# Patient Record
Sex: MALE | Race: WHITE | URBAN - METROPOLITAN AREA
[De-identification: names, ages, dates, MRNs, and addresses within clinical notes are randomized per-mention and may not be internally consistent; named-entity substitution may affect disease eponyms.]

---

## 2017-09-13 ENCOUNTER — OUTPATIENT (OUTPATIENT)
Dept: OUTPATIENT SERVICES | Facility: HOSPITAL | Age: 77
LOS: 1 days | Discharge: HOME | End: 2017-09-13

## 2017-09-13 DIAGNOSIS — D64.9 ANEMIA, UNSPECIFIED: ICD-10-CM

## 2017-10-24 ENCOUNTER — OUTPATIENT (OUTPATIENT)
Dept: OUTPATIENT SERVICES | Facility: HOSPITAL | Age: 77
LOS: 1 days | Discharge: HOME | End: 2017-10-24

## 2017-10-24 DIAGNOSIS — D64.9 ANEMIA, UNSPECIFIED: ICD-10-CM

## 2018-01-08 ENCOUNTER — EMERGENCY (EMERGENCY)
Facility: HOSPITAL | Age: 78
LOS: 1 days | Discharge: HOME | End: 2018-01-08
Admitting: INTERNAL MEDICINE

## 2018-01-08 DIAGNOSIS — Z88.0 ALLERGY STATUS TO PENICILLIN: ICD-10-CM

## 2018-01-08 DIAGNOSIS — D64.9 ANEMIA, UNSPECIFIED: ICD-10-CM

## 2018-01-08 DIAGNOSIS — I25.10 ATHEROSCLEROTIC HEART DISEASE OF NATIVE CORONARY ARTERY WITHOUT ANGINA PECTORIS: ICD-10-CM

## 2018-01-08 DIAGNOSIS — Z79.02 LONG TERM (CURRENT) USE OF ANTITHROMBOTICS/ANTIPLATELETS: ICD-10-CM

## 2018-01-08 DIAGNOSIS — Z86.73 PERSONAL HISTORY OF TRANSIENT ISCHEMIC ATTACK (TIA), AND CEREBRAL INFARCTION WITHOUT RESIDUAL DEFICITS: ICD-10-CM

## 2018-01-08 DIAGNOSIS — I25.2 OLD MYOCARDIAL INFARCTION: ICD-10-CM

## 2018-01-08 DIAGNOSIS — E11.9 TYPE 2 DIABETES MELLITUS WITHOUT COMPLICATIONS: ICD-10-CM

## 2018-08-02 ENCOUNTER — EMERGENCY (EMERGENCY)
Facility: HOSPITAL | Age: 78
LOS: 0 days | Discharge: HOME | End: 2018-08-03
Attending: EMERGENCY MEDICINE | Admitting: EMERGENCY MEDICINE

## 2018-08-02 VITALS
RESPIRATION RATE: 18 BRPM | OXYGEN SATURATION: 100 % | TEMPERATURE: 98 F | HEART RATE: 72 BPM | DIASTOLIC BLOOD PRESSURE: 67 MMHG | SYSTOLIC BLOOD PRESSURE: 148 MMHG

## 2018-08-02 DIAGNOSIS — E11.9 TYPE 2 DIABETES MELLITUS WITHOUT COMPLICATIONS: ICD-10-CM

## 2018-08-02 DIAGNOSIS — D64.89 OTHER SPECIFIED ANEMIAS: ICD-10-CM

## 2018-08-02 DIAGNOSIS — Z98.61 CORONARY ANGIOPLASTY STATUS: ICD-10-CM

## 2018-08-02 DIAGNOSIS — I25.10 ATHEROSCLEROTIC HEART DISEASE OF NATIVE CORONARY ARTERY WITHOUT ANGINA PECTORIS: ICD-10-CM

## 2018-08-02 DIAGNOSIS — F17.210 NICOTINE DEPENDENCE, CIGARETTES, UNCOMPLICATED: ICD-10-CM

## 2018-08-02 DIAGNOSIS — Z95.5 PRESENCE OF CORONARY ANGIOPLASTY IMPLANT AND GRAFT: Chronic | ICD-10-CM

## 2018-08-02 DIAGNOSIS — Z88.0 ALLERGY STATUS TO PENICILLIN: ICD-10-CM

## 2018-08-02 LAB
ALBUMIN SERPL ELPH-MCNC: 4.5 G/DL — SIGNIFICANT CHANGE UP (ref 3.5–5.2)
ALLERGY+IMMUNOLOGY DIAG STUDY NOTE: SIGNIFICANT CHANGE UP
ALP SERPL-CCNC: 112 U/L — SIGNIFICANT CHANGE UP (ref 30–115)
ALT FLD-CCNC: 9 U/L — SIGNIFICANT CHANGE UP (ref 0–41)
ANION GAP SERPL CALC-SCNC: 13 MMOL/L — SIGNIFICANT CHANGE UP (ref 7–14)
AST SERPL-CCNC: 15 U/L — SIGNIFICANT CHANGE UP (ref 0–41)
BASOPHILS # BLD AUTO: 0.03 K/UL — SIGNIFICANT CHANGE UP (ref 0–0.2)
BASOPHILS NFR BLD AUTO: 0.7 % — SIGNIFICANT CHANGE UP (ref 0–1)
BILIRUB SERPL-MCNC: <0.2 MG/DL — SIGNIFICANT CHANGE UP (ref 0.2–1.2)
BLD GP AB SCN SERPL QL: ABNORMAL
BUN SERPL-MCNC: 21 MG/DL — HIGH (ref 10–20)
CALCIUM SERPL-MCNC: 9.1 MG/DL — SIGNIFICANT CHANGE UP (ref 8.5–10.1)
CHLORIDE SERPL-SCNC: 102 MMOL/L — SIGNIFICANT CHANGE UP (ref 98–110)
CO2 SERPL-SCNC: 22 MMOL/L — SIGNIFICANT CHANGE UP (ref 17–32)
CREAT SERPL-MCNC: 1.2 MG/DL — SIGNIFICANT CHANGE UP (ref 0.7–1.5)
DIR ANTIGLOB POLYSPECIFIC INTERPRETATION: SIGNIFICANT CHANGE UP
EOSINOPHIL # BLD AUTO: 0.09 K/UL — SIGNIFICANT CHANGE UP (ref 0–0.7)
EOSINOPHIL NFR BLD AUTO: 2.1 % — SIGNIFICANT CHANGE UP (ref 0–8)
GLUCOSE SERPL-MCNC: 96 MG/DL — SIGNIFICANT CHANGE UP (ref 70–99)
HCT VFR BLD CALC: 23.7 % — LOW (ref 42–52)
HGB BLD-MCNC: 6.7 G/DL — CRITICAL LOW (ref 14–18)
IMM GRANULOCYTES NFR BLD AUTO: 0.2 % — SIGNIFICANT CHANGE UP (ref 0.1–0.3)
IRON SATN MFR SERPL: 24 UG/DL — LOW (ref 35–150)
IRON SATN MFR SERPL: 5 % — LOW (ref 15–50)
LYMPHOCYTES # BLD AUTO: 1.25 K/UL — SIGNIFICANT CHANGE UP (ref 1.2–3.4)
LYMPHOCYTES # BLD AUTO: 29 % — SIGNIFICANT CHANGE UP (ref 20.5–51.1)
MAGNESIUM SERPL-MCNC: 2.5 MG/DL — HIGH (ref 1.8–2.4)
MCHC RBC-ENTMCNC: 20.1 PG — LOW (ref 27–31)
MCHC RBC-ENTMCNC: 28.3 G/DL — LOW (ref 32–37)
MCV RBC AUTO: 71 FL — LOW (ref 80–94)
MONOCYTES # BLD AUTO: 0.54 K/UL — SIGNIFICANT CHANGE UP (ref 0.1–0.6)
MONOCYTES NFR BLD AUTO: 12.5 % — HIGH (ref 1.7–9.3)
NEUTROPHILS # BLD AUTO: 2.39 K/UL — SIGNIFICANT CHANGE UP (ref 1.4–6.5)
NEUTROPHILS NFR BLD AUTO: 55.5 % — SIGNIFICANT CHANGE UP (ref 42.2–75.2)
NRBC # BLD: 0 /100 WBCS — SIGNIFICANT CHANGE UP (ref 0–0)
PHOSPHATE SERPL-MCNC: 3.8 MG/DL — SIGNIFICANT CHANGE UP (ref 2.1–4.9)
PLATELET # BLD AUTO: 243 K/UL — SIGNIFICANT CHANGE UP (ref 130–400)
POTASSIUM SERPL-MCNC: 4.8 MMOL/L — SIGNIFICANT CHANGE UP (ref 3.5–5)
POTASSIUM SERPL-SCNC: 4.8 MMOL/L — SIGNIFICANT CHANGE UP (ref 3.5–5)
PROT SERPL-MCNC: 7.4 G/DL — SIGNIFICANT CHANGE UP (ref 6–8)
RBC # BLD: 3.34 M/UL — LOW (ref 4.7–6.1)
RBC # FLD: 18.1 % — HIGH (ref 11.5–14.5)
SODIUM SERPL-SCNC: 137 MMOL/L — SIGNIFICANT CHANGE UP (ref 135–146)
TIBC SERPL-MCNC: 504 UG/DL — HIGH (ref 220–430)
TYPE + AB SCN PNL BLD: SIGNIFICANT CHANGE UP
UIBC SERPL-MCNC: 480 UG/DL — HIGH (ref 110–370)
WBC # BLD: 4.31 K/UL — LOW (ref 4.8–10.8)
WBC # FLD AUTO: 4.31 K/UL — LOW (ref 4.8–10.8)

## 2018-08-02 RX ORDER — ASPIRIN/CALCIUM CARB/MAGNESIUM 324 MG
1 TABLET ORAL
Qty: 30 | Refills: 0 | OUTPATIENT
Start: 2018-08-02 | End: 2018-08-31

## 2018-08-02 RX ORDER — ATORVASTATIN CALCIUM 80 MG/1
1 TABLET, FILM COATED ORAL
Qty: 30 | Refills: 0 | OUTPATIENT
Start: 2018-08-02 | End: 2018-08-31

## 2018-08-02 NOTE — ED PROVIDER NOTE - OBJECTIVE STATEMENT
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 Last colonoscopy and EGD x1 year ago. No plan for intervention on AVM. Pt denies BRBPR, melena, CP, SOB, near syncope, or change in exercise tolerance (pt is chronic tobacco smoker; tolerance is 1 flight of stairs for years). Pt does not have COPD or use home oxygen.

## 2018-08-02 NOTE — ED CDU PROVIDER INITIAL DAY NOTE - PHYSICAL EXAMINATION
Physical Exam    Vital Signs: I have reviewed the initial vital signs.  Constitutional: well-nourished, appears stated age, no acute distress  Eyes: PERRLA, EOM intact, RAPD absent, conjunctiva clear and symmetrical lids.  ENT: neck supple with no adenopathy, moist MM.  Cardiovascular: +S1/S2, no murmurs, regular rate, regular rhythm, well-perfused extremities  Respiratory: unlabored respiratory effort, clear to auscultation bilaterally, speaks in full sentences  Gastrointestinal: soft, non-tender abdomen, no pulsatile mass

## 2018-08-02 NOTE — ED CDU PROVIDER INITIAL DAY NOTE - OBJECTIVE STATEMENT
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 Last colonoscopy and EGD x1 year ago. No plan for intervention on AVM. Pt denies BRBPR, melena, CP, SOB, near syncope, or change in exercise tolerance.

## 2018-08-02 NOTE — ED PROVIDER NOTE - MEDICAL DECISION MAKING DETAILS
79 y/o M PMHx CAD and AVM requiring recurrent transfusion presenting with hemoglobin of 6.8 on outside lab. No signs or SX of symptomatic anemia. Will obtain repeat labs, including type and screen, and transfuse as necessary.

## 2018-08-02 NOTE — ED PROVIDER NOTE - NS ED ROS FT
Constitutional: See HPI. No fever/chills. Found to have hemoglobin of 6.8 yesterday.   Eyes: No visual changes, eye pain or discharge.  ENT: No hearing changes, pain, discharge or infections.  Neck: No neck pain or stiffness.  Cardiac: No chest pain, SOB or edema. No chest pain with exertion.  Respiratory: No cough or respiratory distress. No hemoptysis.   GI: No nausea, vomiting, diarrhea or abdominal pain.  : No dysuria, frequency or burning.   MS: No myalgia, muscle weakness, joint pain or back pain.  EXT: (+) b/l intermittent calf pain when walking, no pain currently.   Neuro: No headache or weakness. No LOC.  Skin: No rash.  Except as documented in the HPI, all other systems are negative.

## 2018-08-02 NOTE — ED PROVIDER NOTE - PROGRESS NOTE DETAILS
YA[place]BERSTEIN: Brown stool on rectal exam.  Pt aware of plan for transfusion and obs. Pt and familiy agree with plan

## 2018-08-02 NOTE — ED PROVIDER NOTE - PHYSICAL EXAMINATION
VITAL SIGNS: I have reviewed nursing notes and confirm.  CONSTITUTIONAL: Well-developed; well-nourished; in no acute distress.  SKIN: Skin exam is warm and dry, no acute rash.  HEAD: Normocephalic; atraumatic.  EYES: PERRL, EOM intact. Notable for questionable conjunctival pallor, no icterus.   ENT: MMM. No nasal discharge; airway clear. TMs clear.  NECK: Supple; non tender. No thyromegaly.   CARD: Tachycardic with 2/6 systolic murmur, regular   RESP: Unlabored breathing, barrel chested, no wheeze   ABD: Normal bowel sounds; soft; non-distended; non-tender; no hepatosplenomegaly.  EXT: Normal ROM. No clubbing, cyanosis or edema. No calf tenderness or swelling. No muscle tenderness to palpation.   LYMPH: No acute cervical adenopathy.  NEURO: Alert, oriented. Grossly unremarkable. No focal deficits.  PSYCH: Cooperative, appropriate.

## 2018-08-02 NOTE — ED ADULT NURSE NOTE - OBJECTIVE STATEMENT
pt comes in with hgb of 6.8 sent in by pmd. pt has history of intestional avm which causes him to lose blood. pt has had transfusions in the past.

## 2018-08-02 NOTE — ED CDU PROVIDER INITIAL DAY NOTE - ATTENDING CONTRIBUTION TO CARE
Seen with PA agree with above, lungs- clear, abdomen- soft no tenderness to any region, neuro- non focal, s1s2 no gallops or murmurs, full workup in progress will continue to re-evaluate

## 2018-08-02 NOTE — ED ADULT NURSE NOTE - NSIMPLEMENTINTERV_GEN_ALL_ED
Implemented All Universal Safety Interventions:  [place] to call system. Call bell, personal items and telephone within reach. Instruct patient to call for assistance. Room bathroom lighting operational. Non-slip footwear when patient is off stretcher. Physically safe environment: no spills, clutter or unnecessary equipment. Stretcher in lowest position, wheels locked, appropriate side rails in place.

## 2018-08-03 VITALS
RESPIRATION RATE: 18 BRPM | DIASTOLIC BLOOD PRESSURE: 61 MMHG | HEART RATE: 56 BPM | OXYGEN SATURATION: 98 % | TEMPERATURE: 98 F | SYSTOLIC BLOOD PRESSURE: 134 MMHG

## 2018-08-03 LAB
BASOPHILS # BLD AUTO: 0.02 K/UL — SIGNIFICANT CHANGE UP (ref 0–0.2)
BASOPHILS NFR BLD AUTO: 0.3 % — SIGNIFICANT CHANGE UP (ref 0–1)
EOSINOPHIL # BLD AUTO: 0.16 K/UL — SIGNIFICANT CHANGE UP (ref 0–0.7)
EOSINOPHIL NFR BLD AUTO: 2.8 % — SIGNIFICANT CHANGE UP (ref 0–8)
FERRITIN SERPL-MCNC: 8 NG/ML — LOW (ref 30–400)
HCT VFR BLD CALC: 27.2 % — LOW (ref 42–52)
HGB BLD-MCNC: 7.9 G/DL — LOW (ref 14–18)
IMM GRANULOCYTES NFR BLD AUTO: 0.3 % — SIGNIFICANT CHANGE UP (ref 0.1–0.3)
LYMPHOCYTES # BLD AUTO: 1.23 K/UL — SIGNIFICANT CHANGE UP (ref 1.2–3.4)
LYMPHOCYTES # BLD AUTO: 21.4 % — SIGNIFICANT CHANGE UP (ref 20.5–51.1)
MCHC RBC-ENTMCNC: 21.2 PG — LOW (ref 27–31)
MCHC RBC-ENTMCNC: 29 G/DL — LOW (ref 32–37)
MCV RBC AUTO: 72.9 FL — LOW (ref 80–94)
MONOCYTES # BLD AUTO: 0.52 K/UL — SIGNIFICANT CHANGE UP (ref 0.1–0.6)
MONOCYTES NFR BLD AUTO: 9.1 % — SIGNIFICANT CHANGE UP (ref 1.7–9.3)
NEUTROPHILS # BLD AUTO: 3.79 K/UL — SIGNIFICANT CHANGE UP (ref 1.4–6.5)
NEUTROPHILS NFR BLD AUTO: 66.1 % — SIGNIFICANT CHANGE UP (ref 42.2–75.2)
NRBC # BLD: 0 /100 WBCS — SIGNIFICANT CHANGE UP (ref 0–0)
PLATELET # BLD AUTO: 198 K/UL — SIGNIFICANT CHANGE UP (ref 130–400)
RBC # BLD: 3.73 M/UL — LOW (ref 4.7–6.1)
RBC # FLD: 18.6 % — HIGH (ref 11.5–14.5)
WBC # BLD: 5.74 K/UL — SIGNIFICANT CHANGE UP (ref 4.8–10.8)
WBC # FLD AUTO: 5.74 K/UL — SIGNIFICANT CHANGE UP (ref 4.8–10.8)

## 2018-08-03 NOTE — ED CDU PROVIDER SUBSEQUENT DAY NOTE - NS ED ROS FT
Review of Systems    Constitutional: (-) fever  Cardiovascular: (-) chest pain, (-) syncope  Respiratory: (-) cough, (-) shortness of breath  Gastrointestinal: (-) vomiting, (-) diarrhea  Musculoskeletal: (-) neck pain, (-) back pain  Integumentary: (-) rash, (-) edema  Neurological: (-) headache
EOMI; PERRL; no drainage or redness

## 2018-08-03 NOTE — ED ADULT NURSE REASSESSMENT NOTE - NS ED NURSE REASSESS COMMENT FT1
spoke with blood bank on status of 2 units of prbc. as per blood bank pt may be positive anti-b and blood is still running. blood bank will inform us when blood is ready. will continue to monitor.
0130 Pt with 2nd unit of blood transfusion in progress, no s/s of any reaction, will continue to monitor
2045 Received pt from outgoing nurse, pt in no distress, pt with low H & H, blood transfusion in progress, pt tolerating same well, no adverse reaction
0430 2nd unit of blood transfusion completed, blood specimen drawn & send for follow up result, pt tolerated process well with no complaint, pending discharge,

## 2018-08-03 NOTE — ED CDU PROVIDER SUBSEQUENT DAY NOTE - PROGRESS NOTE DETAILS
Pt doing well. discussed pt with Dr. Kaufman. He agrees that after pt gets 2U PRBC, if hgb improved, ok for d/c and will follow up with him.

## 2018-08-03 NOTE — ED CDU PROVIDER SUBSEQUENT DAY NOTE - PHYSICAL EXAMINATION
PHYSICAL EXAM:    GENERAL: Alert, appears stated age, well appearing, non-toxic  SKIN: Warm, pale and dry. MMM. no ecchymoses.   EYE: Normal lids/conjunctiva, PERRL, EOMI  ENT: Normal hearing, patent oropharynx  NECK: +supple. No meningismus  Pulm: Bilateral BS, normal resp effort, no wheezes, stridor, or retractions  CV: RRR, no M/R/G, 2+ pulses  Abd: soft, non-tender, non-distended  rectal exam as documented by Dr. storm Gould: no erythema, cyanosis, edema. no calf tenderness.   Neuro: AAOx3, no sensory/motor deficits, 5/5 strength throughout. normal gait.

## 2018-08-03 NOTE — ED CDU PROVIDER DISPOSITION NOTE - CLINICAL COURSE
Patient transfused blood and felt improved.  Hb reviewed.  Patient has close follow up.    Full DC instructions discussed and patient knows when to seek immediate medical attention.  Patient has proper follow up.  All results discussed and patient aware they may require further work up.  Proper follow up ensured. Limitations of ED work up discussed.  Medications administered and prescribed/OTC home meds discussed.  All questions and concerns from patient or family addressed. Understanding of instructions verbalized.

## 2018-08-03 NOTE — ED CDU PROVIDER SUBSEQUENT DAY NOTE - HISTORY
79 y/o M with PMH DM not on insulin, CAD with stent on Plavix, known AVM which is being managed non-operatively complicated by GI bleeding requiring intermittent transfusions found by cardiologist Dr. sorensen to have Hgb 6.8. Sent in for transfusion. Pt relates he presented for evaluation today because his family was concerned that he was too pale. Denies CP, palpitations, SOB, back pain, abdominal pain, n/v/d, black or bloody stools, fevers, trauma, fall, cough, recent travel, recent illness, sick contacts, leg pain/swelling, urinary symptoms, rash, syncope, lightheadedness.

## 2018-12-10 ENCOUNTER — TRANSCRIPTION ENCOUNTER (OUTPATIENT)
Age: 78
End: 2018-12-10

## 2020-09-23 ENCOUNTER — INPATIENT (INPATIENT)
Facility: HOSPITAL | Age: 80
LOS: 1 days | Discharge: HOME | End: 2020-09-25
Attending: INTERNAL MEDICINE | Admitting: INTERNAL MEDICINE
Payer: MEDICARE

## 2020-09-23 VITALS
RESPIRATION RATE: 16 BRPM | WEIGHT: 169.98 LBS | SYSTOLIC BLOOD PRESSURE: 125 MMHG | HEART RATE: 69 BPM | TEMPERATURE: 99 F | DIASTOLIC BLOOD PRESSURE: 68 MMHG | OXYGEN SATURATION: 98 %

## 2020-09-23 DIAGNOSIS — Z95.5 PRESENCE OF CORONARY ANGIOPLASTY IMPLANT AND GRAFT: Chronic | ICD-10-CM

## 2020-09-23 LAB
ALBUMIN SERPL ELPH-MCNC: 4.5 G/DL — SIGNIFICANT CHANGE UP (ref 3.5–5.2)
ALP SERPL-CCNC: 90 U/L — SIGNIFICANT CHANGE UP (ref 30–115)
ALT FLD-CCNC: 7 U/L — SIGNIFICANT CHANGE UP (ref 0–41)
ANION GAP SERPL CALC-SCNC: 11 MMOL/L — SIGNIFICANT CHANGE UP (ref 7–14)
APTT BLD: 30 SEC — SIGNIFICANT CHANGE UP (ref 27–39.2)
AST SERPL-CCNC: 13 U/L — SIGNIFICANT CHANGE UP (ref 0–41)
BASOPHILS # BLD AUTO: 0.02 K/UL — SIGNIFICANT CHANGE UP (ref 0–0.2)
BASOPHILS NFR BLD AUTO: 0.3 % — SIGNIFICANT CHANGE UP (ref 0–1)
BILIRUB SERPL-MCNC: <0.2 MG/DL — SIGNIFICANT CHANGE UP (ref 0.2–1.2)
BLD GP AB SCN SERPL QL: SIGNIFICANT CHANGE UP
BUN SERPL-MCNC: 23 MG/DL — HIGH (ref 10–20)
CALCIUM SERPL-MCNC: 9.7 MG/DL — SIGNIFICANT CHANGE UP (ref 8.5–10.1)
CHLORIDE SERPL-SCNC: 103 MMOL/L — SIGNIFICANT CHANGE UP (ref 98–110)
CHOLEST SERPL-MCNC: 104 MG/DL — SIGNIFICANT CHANGE UP (ref 100–200)
CO2 SERPL-SCNC: 25 MMOL/L — SIGNIFICANT CHANGE UP (ref 17–32)
CREAT SERPL-MCNC: 1.2 MG/DL — SIGNIFICANT CHANGE UP (ref 0.7–1.5)
DAT IGG-SP REAG RBC-IMP: ABNORMAL
DIR ANTIGLOB POLYSPECIFIC INTERPRETATION: ABNORMAL
EOSINOPHIL # BLD AUTO: 0.17 K/UL — SIGNIFICANT CHANGE UP (ref 0–0.7)
EOSINOPHIL NFR BLD AUTO: 2.4 % — SIGNIFICANT CHANGE UP (ref 0–8)
GLUCOSE SERPL-MCNC: 126 MG/DL — HIGH (ref 70–99)
HCT VFR BLD CALC: 38.9 % — LOW (ref 42–52)
HDLC SERPL-MCNC: 32 MG/DL — LOW
HGB BLD-MCNC: 12.8 G/DL — LOW (ref 14–18)
IAT COMP-SP REAG SERPL QL: SIGNIFICANT CHANGE UP
IMM GRANULOCYTES NFR BLD AUTO: 0.3 % — SIGNIFICANT CHANGE UP (ref 0.1–0.3)
INR BLD: 1.04 RATIO — SIGNIFICANT CHANGE UP (ref 0.65–1.3)
LIPID PNL WITH DIRECT LDL SERPL: 55 MG/DL — SIGNIFICANT CHANGE UP (ref 4–129)
LYMPHOCYTES # BLD AUTO: 1.97 K/UL — SIGNIFICANT CHANGE UP (ref 1.2–3.4)
LYMPHOCYTES # BLD AUTO: 27.9 % — SIGNIFICANT CHANGE UP (ref 20.5–51.1)
MCHC RBC-ENTMCNC: 32.3 PG — HIGH (ref 27–31)
MCHC RBC-ENTMCNC: 32.9 G/DL — SIGNIFICANT CHANGE UP (ref 32–37)
MCV RBC AUTO: 98.2 FL — HIGH (ref 80–94)
MONOCYTES # BLD AUTO: 0.75 K/UL — HIGH (ref 0.1–0.6)
MONOCYTES NFR BLD AUTO: 10.6 % — HIGH (ref 1.7–9.3)
NEUTROPHILS # BLD AUTO: 4.13 K/UL — SIGNIFICANT CHANGE UP (ref 1.4–6.5)
NEUTROPHILS NFR BLD AUTO: 58.5 % — SIGNIFICANT CHANGE UP (ref 42.2–75.2)
NRBC # BLD: 0 /100 WBCS — SIGNIFICANT CHANGE UP (ref 0–0)
PLATELET # BLD AUTO: 173 K/UL — SIGNIFICANT CHANGE UP (ref 130–400)
POTASSIUM SERPL-MCNC: 4.3 MMOL/L — SIGNIFICANT CHANGE UP (ref 3.5–5)
POTASSIUM SERPL-SCNC: 4.3 MMOL/L — SIGNIFICANT CHANGE UP (ref 3.5–5)
PROT SERPL-MCNC: 7.2 G/DL — SIGNIFICANT CHANGE UP (ref 6–8)
PROTHROM AB SERPL-ACNC: 12 SEC — SIGNIFICANT CHANGE UP (ref 9.95–12.87)
RAPID RVP RESULT: SIGNIFICANT CHANGE UP
RBC # BLD: 3.96 M/UL — LOW (ref 4.7–6.1)
RBC # FLD: 11.9 % — SIGNIFICANT CHANGE UP (ref 11.5–14.5)
SARS-COV-2 RNA SPEC QL NAA+PROBE: SIGNIFICANT CHANGE UP
SODIUM SERPL-SCNC: 139 MMOL/L — SIGNIFICANT CHANGE UP (ref 135–146)
TOTAL CHOLESTEROL/HDL RATIO MEASUREMENT: 3.2 RATIO — LOW (ref 4–5.5)
TRIGL SERPL-MCNC: 216 MG/DL — HIGH (ref 10–149)
TROPONIN T SERPL-MCNC: <0.01 NG/ML — SIGNIFICANT CHANGE UP
WBC # BLD: 7.06 K/UL — SIGNIFICANT CHANGE UP (ref 4.8–10.8)
WBC # FLD AUTO: 7.06 K/UL — SIGNIFICANT CHANGE UP (ref 4.8–10.8)

## 2020-09-23 PROCEDURE — 70496 CT ANGIOGRAPHY HEAD: CPT | Mod: 26

## 2020-09-23 PROCEDURE — 86077 PHYS BLOOD BANK SERV XMATCH: CPT

## 2020-09-23 PROCEDURE — 70450 CT HEAD/BRAIN W/O DYE: CPT | Mod: 26,59

## 2020-09-23 PROCEDURE — 99285 EMERGENCY DEPT VISIT HI MDM: CPT | Mod: GC

## 2020-09-23 PROCEDURE — 93010 ELECTROCARDIOGRAM REPORT: CPT

## 2020-09-23 PROCEDURE — 71045 X-RAY EXAM CHEST 1 VIEW: CPT | Mod: 26

## 2020-09-23 PROCEDURE — 99291 CRITICAL CARE FIRST HOUR: CPT

## 2020-09-23 PROCEDURE — 70498 CT ANGIOGRAPHY NECK: CPT | Mod: 26

## 2020-09-23 NOTE — ED ADULT NURSE REASSESSMENT NOTE - NS ED NURSE REASSESS COMMENT FT1
received pt from previous RN. pt aox4 in no acute distress. denies pain / discomfort. returned from ct at this time; pending read. NIH score documented in flow sheet. denies any new onset of weakness / neurological changes since he came to ed. VSS will continue to monitor /assess

## 2020-09-23 NOTE — ED ADULT TRIAGE NOTE - CHIEF COMPLAINT QUOTE
Pt c/o left arm/face numbness that began today around 545; pt felt similar symptoms about a week ago and then they resolved. As per son, pt's face looks normal - no droop noted. Left slightly weaker hand grasp. Pt c/o left arm/face numbness that began today around 545; pt felt similar symptoms about a week ago (left leg involved at that point) and then they resolved. As per son, pt's face looks normal - no droop noted. Left slightly weaker hand grasp. Pt denies difficulty speaking or getting words out. Stroke code called, Pt brought to crit.

## 2020-09-23 NOTE — ED PROVIDER NOTE - PROGRESS NOTE DETAILS
PODLOG: Stroke code called upon arrival. Pt taken to CT. Discussed with neurologist, requesting CTA's. PODLOG: CT head wnl. [place] bedside. NIHSS still 1. SC: Awaiting CT angio results, spoke with Pancho who requests admission to stroke unit after results.

## 2020-09-23 NOTE — ED ADULT NURSE NOTE - NSIMPLEMENTINTERV_GEN_ALL_ED
Implemented All Fall with Harm Risk Interventions:  [place] to call system. Call bell, personal items and telephone within reach. Instruct patient to call for assistance. Room bathroom lighting operational. Non-slip footwear when patient is off stretcher. Physically safe environment: no spills, clutter or unnecessary equipment. Stretcher in lowest position, wheels locked, appropriate side rails in place. Provide visual cue, wrist band, yellow gown, etc. Monitor gait and stability. Monitor for mental status changes and reorient to person, place, and time. Review medications for side effects contributing to fall risk. Reinforce activity limits and safety measures with patient and family. Provide visual clues: red socks.

## 2020-09-23 NOTE — ED PROVIDER NOTE - ATTENDING CONTRIBUTION TO CARE
I personally evaluated the patient. I reviewed the Resident’s or Physician Assistant’s note (as assigned above), and agree with the findings and plan except as documented in my note.    Pt is a 81 y/o male with hx of HTN, DM, DLD, CAD s/p PCI on plavix, presents for left arm numbness/heaviness and left facial droop that started 1745, mild, constant, improving since onset. No slurred speech, arm/leg weakness currently, chest pain, SOB, fever. Pt had similar sx's last week, resolved spontaneously.    Constitutional: Well developed, well nourished. NAD.  Head: Normocephalic, atraumatic.  Eyes: PERRL. EOMI.  ENT: No nasal discharge. Mucous membranes moist.  Neck: Supple. Painless ROM.  Cardiovascular: Normal S1, S2. Regular rate and rhythm. No murmurs, rubs, or gallops.  Pulmonary: Normal respiratory rate and effort. Lungs clear to auscultation bilaterally. No wheezing, rales, or rhonchi.  Abdominal: Soft. Nondistended. Nontender. No rebound, guarding, rigidity.  Extremities. Pelvis stable. No lower extremity edema, symmetric calves.  Skin: No rashes, cyanosis.  Neuro: AAOx3. CN II-XII grossly intact, except mild left facial weakness, no slurred speech. Strength 5/5 in upper and lower extremities. Sensation intact in all extremities. FNF testing normal. No pronator drift. NIHSS 1.  Psych: Normal mood. Normal affect.

## 2020-09-23 NOTE — ED PROVIDER NOTE - OBJECTIVE STATEMENT
80yoM w/ pmhx of 2 coronary stents and HLD who present with left sided weakness. His last known normal was at 1745 today 9/23/20. Patient had similar symptoms 1w ago which lasted several minutes. No history of CVA or seizures. Denies fever, chills, nausea, vomiting, CP, abd pain, urinary issues, fecal pattern issues, lower extremity weakness, numbness, or paresthesia. 80yoM w/ pmhx of 2 coronary stents and HLD who present with left sided weakness. Endorses left lower facial droop and left side UE weakness. His last known normal was at 1745 today 9/23/20. Patient had similar symptoms 1w ago which lasted several minutes. No history of CVA or seizures. Denies fever, chills, nausea, vomiting, CP, abd pain, urinary issues, fecal pattern issues, lower extremity weakness, numbness, or paresthesia. Does not use recreational drug or drink alcohol. Smoke 2-3ppd for 60 years. 80yoM w/ pmhx of 2 coronary stents, HTN, and HLD who present with left sided weakness. Endorses left lower facial droop and left side UE weakness. His last known normal was at 1745 today 9/23/20. Patient had similar symptoms 1w ago which lasted several minutes. No history of CVA or seizures. Denies fever, chills, nausea, vomiting, CP, abd pain, urinary issues, fecal pattern issues, lower extremity weakness, numbness, or paresthesia. Does not use recreational drug or drink alcohol. Smoke 2-3ppd for 60 years.

## 2020-09-23 NOTE — ED PROVIDER NOTE - PHYSICAL EXAMINATION
CONSTITUTIONAL: Well-developed; well-nourished; in no acute distress.   SKIN: warm, dry  HEAD: Normocephalic.  EYES: PERRL, EOMI.  ENT: Airway clear.  NECK: Supple.  LYMPH: No acute cervical adenopathy.  CARD: No murmurs, rubs or gallops. Regular rate and rhythm.   RESP: No wheezing, rales or rhonchi.  ABD: soft ntnd  EXT: No clubbing, cyanosis.   NEURO: Alert, oriented. no dysmetria, no pronator drift, speech clear, CN II-XII grossly intact except forL facial droop, negative romberg  PSYCH: Cooperative, appropriate.

## 2020-09-23 NOTE — ED PROVIDER NOTE - CLINICAL SUMMARY MEDICAL DECISION MAKING FREE TEXT BOX
Pt presented to Ed with left facial and left arm numbness, weakness, improving. NIHSS 1. Stroke code activated. Pt taken to CT and CTA. No bleed or LVO. Pt assessed by neurology, will admit to stroke unit. Endorsed to MAR.

## 2020-09-23 NOTE — ED ADULT NURSE NOTE - CHIEF COMPLAINT QUOTE
Pt c/o left arm/face numbness that began today around 545; pt felt similar symptoms about a week ago (left leg involved at that point) and then they resolved. As per son, pt's face looks normal - no droop noted. Left slightly weaker hand grasp. Pt denies difficulty speaking or getting words out. Stroke code called, Pt brought to crit.

## 2020-09-23 NOTE — ED ADULT NURSE NOTE - OBJECTIVE STATEMENT
81 y/o male brought in for left arm numbness radiating to left jaw starting at 5:45 pm. Patient states he has previous sx of numbness with left arm, left leg, and left facial numbness that lasted a few minutes. Patient on arrival speech is clear, no drift on extremities, no sensory deficits.

## 2020-09-24 ENCOUNTER — TRANSCRIPTION ENCOUNTER (OUTPATIENT)
Age: 80
End: 2020-09-24

## 2020-09-24 PROBLEM — E11.9 TYPE 2 DIABETES MELLITUS WITHOUT COMPLICATIONS: Chronic | Status: ACTIVE | Noted: 2018-08-02

## 2020-09-24 LAB
ALBUMIN SERPL ELPH-MCNC: 4.1 G/DL — SIGNIFICANT CHANGE UP (ref 3.5–5.2)
ALLERGY+IMMUNOLOGY DIAG STUDY NOTE: SIGNIFICANT CHANGE UP
ALP SERPL-CCNC: 86 U/L — SIGNIFICANT CHANGE UP (ref 30–115)
ALT FLD-CCNC: 6 U/L — SIGNIFICANT CHANGE UP (ref 0–41)
ANION GAP SERPL CALC-SCNC: 9 MMOL/L — SIGNIFICANT CHANGE UP (ref 7–14)
AST SERPL-CCNC: 11 U/L — SIGNIFICANT CHANGE UP (ref 0–41)
BILIRUB SERPL-MCNC: 0.3 MG/DL — SIGNIFICANT CHANGE UP (ref 0.2–1.2)
BUN SERPL-MCNC: 20 MG/DL — SIGNIFICANT CHANGE UP (ref 10–20)
CALCIUM SERPL-MCNC: 9 MG/DL — SIGNIFICANT CHANGE UP (ref 8.5–10.1)
CHLORIDE SERPL-SCNC: 107 MMOL/L — SIGNIFICANT CHANGE UP (ref 98–110)
CO2 SERPL-SCNC: 25 MMOL/L — SIGNIFICANT CHANGE UP (ref 17–32)
CREAT SERPL-MCNC: 1.1 MG/DL — SIGNIFICANT CHANGE UP (ref 0.7–1.5)
GLUCOSE BLDC GLUCOMTR-MCNC: 116 MG/DL — HIGH (ref 70–99)
GLUCOSE BLDC GLUCOMTR-MCNC: 124 MG/DL — HIGH (ref 70–99)
GLUCOSE BLDC GLUCOMTR-MCNC: 148 MG/DL — HIGH (ref 70–99)
GLUCOSE BLDC GLUCOMTR-MCNC: 225 MG/DL — HIGH (ref 70–99)
GLUCOSE SERPL-MCNC: 103 MG/DL — HIGH (ref 70–99)
HCT VFR BLD CALC: 36.6 % — LOW (ref 42–52)
HGB BLD-MCNC: 11.9 G/DL — LOW (ref 14–18)
MAGNESIUM SERPL-MCNC: 2.3 MG/DL — SIGNIFICANT CHANGE UP (ref 1.8–2.4)
MCHC RBC-ENTMCNC: 32.1 PG — HIGH (ref 27–31)
MCHC RBC-ENTMCNC: 32.5 G/DL — SIGNIFICANT CHANGE UP (ref 32–37)
MCV RBC AUTO: 98.7 FL — HIGH (ref 80–94)
NRBC # BLD: 0 /100 WBCS — SIGNIFICANT CHANGE UP (ref 0–0)
PLATELET # BLD AUTO: 161 K/UL — SIGNIFICANT CHANGE UP (ref 130–400)
POTASSIUM SERPL-MCNC: 4.2 MMOL/L — SIGNIFICANT CHANGE UP (ref 3.5–5)
POTASSIUM SERPL-SCNC: 4.2 MMOL/L — SIGNIFICANT CHANGE UP (ref 3.5–5)
PROT SERPL-MCNC: 6.1 G/DL — SIGNIFICANT CHANGE UP (ref 6–8)
RBC # BLD: 3.71 M/UL — LOW (ref 4.7–6.1)
RBC # FLD: 11.9 % — SIGNIFICANT CHANGE UP (ref 11.5–14.5)
SODIUM SERPL-SCNC: 141 MMOL/L — SIGNIFICANT CHANGE UP (ref 135–146)
WBC # BLD: 5.01 K/UL — SIGNIFICANT CHANGE UP (ref 4.8–10.8)
WBC # FLD AUTO: 5.01 K/UL — SIGNIFICANT CHANGE UP (ref 4.8–10.8)

## 2020-09-24 PROCEDURE — 99223 1ST HOSP IP/OBS HIGH 75: CPT | Mod: AI

## 2020-09-24 PROCEDURE — 70551 MRI BRAIN STEM W/O DYE: CPT | Mod: 26

## 2020-09-24 RX ORDER — SODIUM CHLORIDE 9 MG/ML
1000 INJECTION INTRAMUSCULAR; INTRAVENOUS; SUBCUTANEOUS
Refills: 0 | Status: DISCONTINUED | OUTPATIENT
Start: 2020-09-24 | End: 2020-09-25

## 2020-09-24 RX ORDER — INSULIN LISPRO 100/ML
VIAL (ML) SUBCUTANEOUS AT BEDTIME
Refills: 0 | Status: DISCONTINUED | OUTPATIENT
Start: 2020-09-24 | End: 2020-09-25

## 2020-09-24 RX ORDER — ASPIRIN/CALCIUM CARB/MAGNESIUM 324 MG
81 TABLET ORAL DAILY
Refills: 0 | Status: DISCONTINUED | OUTPATIENT
Start: 2020-09-24 | End: 2020-09-25

## 2020-09-24 RX ORDER — ENOXAPARIN SODIUM 100 MG/ML
40 INJECTION SUBCUTANEOUS DAILY
Refills: 0 | Status: DISCONTINUED | OUTPATIENT
Start: 2020-09-24 | End: 2020-09-25

## 2020-09-24 RX ORDER — METOPROLOL TARTRATE 50 MG
25 TABLET ORAL DAILY
Refills: 0 | Status: DISCONTINUED | OUTPATIENT
Start: 2020-09-24 | End: 2020-09-24

## 2020-09-24 RX ORDER — METFORMIN HYDROCHLORIDE 850 MG/1
1 TABLET ORAL
Qty: 0 | Refills: 0 | DISCHARGE

## 2020-09-24 RX ORDER — ATORVASTATIN CALCIUM 80 MG/1
80 TABLET, FILM COATED ORAL AT BEDTIME
Refills: 0 | Status: DISCONTINUED | OUTPATIENT
Start: 2020-09-24 | End: 2020-09-25

## 2020-09-24 RX ORDER — OMEPRAZOLE 10 MG/1
1 CAPSULE, DELAYED RELEASE ORAL
Qty: 0 | Refills: 0 | DISCHARGE

## 2020-09-24 RX ORDER — LISINOPRIL 2.5 MG/1
5 TABLET ORAL DAILY
Refills: 0 | Status: DISCONTINUED | OUTPATIENT
Start: 2020-09-24 | End: 2020-09-24

## 2020-09-24 RX ORDER — HYDROCHLOROTHIAZIDE 25 MG
12.5 TABLET ORAL
Refills: 0 | Status: DISCONTINUED | OUTPATIENT
Start: 2020-09-24 | End: 2020-09-24

## 2020-09-24 RX ORDER — HYDROCHLOROTHIAZIDE 25 MG
6.25 TABLET ORAL DAILY
Refills: 0 | Status: DISCONTINUED | OUTPATIENT
Start: 2020-09-24 | End: 2020-09-24

## 2020-09-24 RX ORDER — PANTOPRAZOLE SODIUM 20 MG/1
40 TABLET, DELAYED RELEASE ORAL
Refills: 0 | Status: DISCONTINUED | OUTPATIENT
Start: 2020-09-24 | End: 2020-09-25

## 2020-09-24 RX ORDER — INSULIN LISPRO 100/ML
VIAL (ML) SUBCUTANEOUS
Refills: 0 | Status: DISCONTINUED | OUTPATIENT
Start: 2020-09-24 | End: 2020-09-25

## 2020-09-24 RX ADMIN — LISINOPRIL 5 MILLIGRAM(S): 2.5 TABLET ORAL at 05:25

## 2020-09-24 RX ADMIN — PANTOPRAZOLE SODIUM 40 MILLIGRAM(S): 20 TABLET, DELAYED RELEASE ORAL at 05:25

## 2020-09-24 RX ADMIN — ATORVASTATIN CALCIUM 80 MILLIGRAM(S): 80 TABLET, FILM COATED ORAL at 21:25

## 2020-09-24 RX ADMIN — ENOXAPARIN SODIUM 40 MILLIGRAM(S): 100 INJECTION SUBCUTANEOUS at 11:12

## 2020-09-24 RX ADMIN — SODIUM CHLORIDE 50 MILLILITER(S): 9 INJECTION INTRAMUSCULAR; INTRAVENOUS; SUBCUTANEOUS at 11:12

## 2020-09-24 RX ADMIN — Medication 81 MILLIGRAM(S): at 17:31

## 2020-09-24 NOTE — OCCUPATIONAL THERAPY INITIAL EVALUATION ADULT - LUE MMT, REHAB EVAL
Pt states she had a lung biopsy. She went and had sutures removed on 09/08/2020 and now is having yellowish drainage, she threw up and feels awful now. I  Advised her to call office who removed. She stated was told if any problems to call the office. She also stated twice was told is released and should F/U with PCP. She was offered appt w/Dr Minoo Lomeli today at 4:00 but asked if Alfa Kent had an appt. Maria Esther's schedule is full. Tony Triston does have home care coming out today. She said will call to see if they can come early. no strength deficits were identified

## 2020-09-24 NOTE — DISCHARGE NOTE PROVIDER - NSDCCPCAREPLAN_GEN_ALL_CORE_FT
PRINCIPAL DISCHARGE DIAGNOSIS  Diagnosis: CVA (cerebral vascular accident)  Assessment and Plan of Treatment:       SECONDARY DISCHARGE DIAGNOSES  Diagnosis: Mass of right parotid gland  Assessment and Plan of Treatment: mass was incidentally found on Imaging. Please follow up with ENT as out patient.     PRINCIPAL DISCHARGE DIAGNOSIS  Diagnosis: TIA (transient ischemic attack)  Assessment and Plan of Treatment: please make sure you are taking your medicines. Neurology recommends to take 2 baby aspirins daily to prevent future strokes. Please stop smoking. Please follow up with neurologist in 2wks.      SECONDARY DISCHARGE DIAGNOSES  Diagnosis: Hypertension  Assessment and Plan of Treatment: please monitor your blood pressure at home and bring results to PMD (an appointment with a Geriatrician scheduled for you)    Diagnosis: Skull lesion  Assessment and Plan of Treatment: radiologist recommends a bone scan to be done at earliest convenience due to a questionable lesion on MRI.    Diagnosis: Mass of right parotid gland  Assessment and Plan of Treatment: mass was incidentally found on Cat Scan. Please follow up with ENT as out patient.     PRINCIPAL DISCHARGE DIAGNOSIS  Diagnosis: TIA (transient ischemic attack)  Assessment and Plan of Treatment: please make sure you are taking your medicines. Neurology recommends to take 2 baby aspirins daily to prevent future strokes. Please stop smoking. Please follow up with neurologist in 2wks.      SECONDARY DISCHARGE DIAGNOSES  Diagnosis: Hypertension  Assessment and Plan of Treatment: please monitor your blood pressure at home and bring results to PMD (an appointment with a Geriatrician scheduled for you). You blood pressure was good in the hospital and we recommend to not take Benazepril-HCTZ unless your BP is high at home.    Diagnosis: Skull lesion  Assessment and Plan of Treatment: radiologist recommends a bone scan to be done at earliest convenience due to a questionable lesion on MRI.    Diagnosis: Mass of right parotid gland  Assessment and Plan of Treatment: mass was incidentally found on Cat Scan. Please follow up with ENT as out patient.     PRINCIPAL DISCHARGE DIAGNOSIS  Diagnosis: TIA (transient ischemic attack)  Assessment and Plan of Treatment: please make sure you are taking your medicines. Neurology recommends to take 2 baby aspirins daily to prevent future strokes. Please stop smoking. Please follow up with neurologist in 2wks.      SECONDARY DISCHARGE DIAGNOSES  Diagnosis: Bradycardia  Assessment and Plan of Treatment: we recommend to not take Metoprolol for now as your heart rate was on a slow side. Please follow up with Dr. eMra as outpt    Diagnosis: Hypertension  Assessment and Plan of Treatment: please monitor your blood pressure at home and bring results to PMD (an appointment with a Geriatrician scheduled for you). You blood pressure was good in the hospital and we recommend to not take Benazepril-HCTZ unless your BP is high at home.    Diagnosis: Skull lesion  Assessment and Plan of Treatment: radiologist recommends a bone scan to be done at earliest convenience due to a questionable lesion on MRI.    Diagnosis: Mass of right parotid gland  Assessment and Plan of Treatment: mass was incidentally found on Cat Scan. Please follow up with ENT as out patient.

## 2020-09-24 NOTE — PROGRESS NOTE ADULT - SUBJECTIVE AND OBJECTIVE BOX
HPI:  80 year old male ith a PMH of CAD (s/p 2 stents, follows with Dr. Saldana), DM, HLD presents to the ED due to sudden onset of left arm and facial numbness, with a left sides facial droop. Of note patient is fully functional, right handed male at baseline. The symptoms of numbness lasted less then an hour but the facial droop persisted. Currently symptoms have resolved. Due to the numbness he presented to the ED. Endorses having these symptoms one week prior which lasted 20-30 mins, but also included lower extremity weakness as well. Denied SOB, dizziness, lightheadedness, vertigo, difficulty ambulating, slurred speech, changes in cognition, CP, headache, or head trauma.   When presenting to ED code stroke was called. No TPA given. Vitals: Temp 98.9F, HR 69, / 69, RR 16 YqC010%. CT head: No CT evidence for acute intracranial pathology. CT A H + N:  No large vessel occlusion or significant stenosis of the vessels of the head and neck. Patient admitted for CVA vs. TIA.        (24 Sep 2020 00:55)    Currently admitted to medicine with the primary diagnosis of CVA (cerebral vascular accident)     Today is hospital day 1d.     INTERVAL HPI / OVERNIGHT EVENTS:  Patient was examined and seen at bedside. This morning he is resting comfortably in bed and reports no new issues or overnight events. Patient states he feels back to normal this morning.   ROS: Otherwise unremarkable     PAST MEDICAL & SURGICAL HISTORY  MI (myocardial infarction)  DM (diabetes mellitus)  Coronary stent patent      ALLERGIES  penicillin (Unknown)    MEDICATIONS  STANDING MEDICATIONS  atorvastatin 80 milliGRAM(s) Oral at bedtime  enoxaparin Injectable 40 milliGRAM(s) SubCutaneous daily  pantoprazole    Tablet 40 milliGRAM(s) Oral before breakfast  sodium chloride 0.9%. 1000 milliLiter(s) IV Continuous <Continuous>    PRN MEDICATIONS    VITALS:  T(F): 96.5  HR: 57  BP: 111/60  RR: 18  SpO2: 98%    PHYSICAL EXAM  GEN: NAD, Resting comfortably in bed  PULM: Clear to auscultation bilaterally, No wheezes  CVS: Regular rate and rhythm, S1-S2, no murmurs  ABD: Soft, non-tender, non-distended, no guarding  EXT: No edema  NEURO: A&Ox3, no focal deficits  LABS                        11.9   5.01  )-----------( 161      ( 24 Sep 2020 06:57 )             36.6     09-24    141  |  107  |  20  ----------------------------<  103<H>  4.2   |  25  |  1.1    Ca    9.0      24 Sep 2020 06:57  Mg     2.3     09-24    TPro  6.1  /  Alb  4.1  /  TBili  0.3  /  DBili  x   /  AST  11  /  ALT  6   /  AlkPhos  86  09-24  PT/INR - ( 23 Sep 2020 19:10 )   PT: 12.00 sec;   INR: 1.04 ratio    PTT - ( 23 Sep 2020 19:10 )  PTT:30.0 sec  Troponin T, Serum: <0.01 ng/mL (09-23-20 @ 19:10)      CARDIAC MARKERS ( 23 Sep 2020 19:10 )  x     / <0.01 ng/mL / x     / x     / x        RADIOLOGY  < from: Xray Chest 1 View- PORTABLE-Urgent (09.23.20 @ 21:42) >  IMPRESSION:  No radiographic evidence of acute pulmonary disease.  < from: CT Angio Head w/ IV Cont (09.23.20 @ 19:59) >  1.  No evidence of major vascular stenosis or occlusion.  2.  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 Heterogeneous appearance of the palatine and lingual tonsils. Recommend ENT follow-up on an elective/nonemergent basis.  < from: CT Angio Neck w/ IV Cont (09.23.20 @ 19:58) >  IMPRESSION:  1.  No evidence of major vascular stenosis or occlusion.  2.  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 Heterogeneous appearance of the palatine and lingual tonsils. Recommend ENT follow-up on an elective/nonemergent basis.    CT Brain Stroke Protocol (09.23.20 @ 19:11  IMPRESSION:  No CT evidence for acute intracranial pathology.  Streak artifact through the frontal lobe limits evaluation of this area

## 2020-09-24 NOTE — DISCHARGE NOTE PROVIDER - NSDCHHHOMEBOUND_GEN_ALL_CORE
Stroke/TBI (neurological/cognitive impairment)/Requires supervison due to deteriorating mental status...

## 2020-09-24 NOTE — H&P ADULT - ATTENDING COMMENTS
I saw and evaluated the patient. I have reviewed and agree with the findings and plan of care as documented above in the resident’s note (unless indicated differently below). Any necessary changes were made in the body of the text. I saw and evaluated the patient. I have reviewed and agree with the findings and plan of care as documented above in the resident’s note (unless indicated differently below). Any necessary changes were made in the body of the text.    81 yo M pt p/w a second episode of left sided facial numbness and LUE numbness. Initial episode was about one week ago. Both episodes lasted for about 30 minutes. Initially episode associated w/ LLE weakness. 2nd episode associated w/ facial drooping (left sided). At the time of this evaluation, the patient's symptoms had resolved and he had no other concerns or complaints except as aforementioned.     ROS:  Constitutional: no fevers; no chills  Eyes: no conjunctivitis; no itching  ENT: no dysphagia; no odynophagia  CVS: no PND; no orthopnea; no chest pain  Resp: no SOB; no coughing  GI: no nausea; no vomiting; no diarrhea; no abd pain  : no dysuria; no hematuria  MSK: no myalgias; no arthralgias  Skin: no rashes; no ulcers  Neuro: no focal weakness; no headache  All other systems reviewed and are negative    PMHx, home medications, SurHx, FHx and Social history as above in the corresponding sections of the note - reviewed and edited where appropriate    Exam:  Vitals: BP = 165/78; P = 53; T = 96.3; RR = 18; SpO2 > 95 on room air  General: appears stated age; cooperative  Eyes: anicteric sclera; moist conjunctiva; PERRL; EOMI  HENT: NC/AT; clear oropharynx w/ moist mucous membranes; nL hard/soft palate  Neck: supple w/ FROM; trachea midline; no thyromegaly; no carotid bruits  Lungs: cta b/l with no tachypnea, accessory muscle usage, wheezing, rhonci or rales  CVS: RRR; S1 and S2 w/o MRGs  Abd: BS+; soft; non-tender to palpation x 4; no masses or HSM  Ext: no peripheral edema; pulses 2+ b/l  Skin: normal temp, turgor and texture; no rashes, ulcers or nodules  Neuro: CN II-XII intact; str 5/5 throughout; reports numbness of the plantar aspect of his feet b/l; otherwise sensation grossly intact  Psych: appropriate affect; alert and oriented to person, place, time and situation    Labs significant for H&H 12.8/38.9, MCV 98.2, BUN/Cr 23/1.2, gluc 126, trig 216  Images reviewed: CT w/ no large vessel occlusion; CT head unrevealing  EKG: ectopic atrial bradycardia; lateral T-w changes    Assessment:  (1) Suspected CVA vs. TIA  (2) Macrocytic anemia - stable H&H  (3) CKD 3a likely diabetic nephropathy  (4) Dyslipidemia w/ hypertriglyceridemia  (5) CAD - hx of PCI w/ placement of 2 stents   (6) Bradycardia - asymptomatic   (7) DM w/ hyperglycemia     Plan:  (1) Stroke unit monitoring and neuro w/u as ordered  (2) Neurology recommendations appreciated  (3) Medications as dosed  (4) Supportive care  (5) Dispo: anticipate d/c in 48 hrs (pending aforementioned)    Full code

## 2020-09-24 NOTE — DISCHARGE NOTE PROVIDER - CARE PROVIDERS DIRECT ADDRESSES
[email or web address]

## 2020-09-24 NOTE — DISCHARGE NOTE PROVIDER - NSDCQMSTROKERISK_NEU_ALL_CORE
History of a stroke or TIA Carotid stenosis/High blood pressure/Diabetes/High cholesterol/History of a stroke or TIA/Tobacco use

## 2020-09-24 NOTE — PHYSICAL THERAPY INITIAL EVALUATION ADULT - SPECIFY REASON(S)
Attempted to see pt for PT Initial Evaluation, pt is bradycardic - HR in the 40's. Will f/u for PT when appropriate.

## 2020-09-24 NOTE — CHART NOTE - NSCHARTNOTEFT_GEN_A_CORE
Patient verify now PRUTest  Date: 9-24-20  Time: 11:48:15  Cartridge Lot: [id number]  Exp. Date: 24-apr-21   ID [id number]   Result: 207 PRU     Patient verify now Aspirin Test  Date: 9-24-20  Time: 12:08:45  Cartridge Lot: [id number]  Exp. Date: 13-Jul-21   ID [id number]   Result: 637 ARU

## 2020-09-24 NOTE — H&P ADULT - NSHPPHYSICALEXAM_GEN_ALL_CORE
PHYSICAL EXAM:  GENERAL: NAD, speaks in full sentences, no signs of respiratory distress  HEAD: Atraumatic  NECK: Supple  CHEST/LUNG: Clear to auscultation bilaterally  HEART: S1, S2; RRR; No murmurs, rubs, or gallops  ABDOMEN: BS+; Soft, Non-tender, Non-distended  EXTREMITIES:  2+ Peripheral Pulses  PSYCH: AAOx3  NEUROLOGY: non-focal

## 2020-09-24 NOTE — DISCHARGE NOTE PROVIDER - CARE PROVIDER_API CALL
Darnell Brenner  EEG/EPILEPSY  [address], [address]  [place] [address]  Phone: [phone]  Fax: [phone]  Follow Up Time: 1 week    Yong Waters  OTOLARYNGOLOGY  [address] [place] [address]  Phone: [phone]  Fax: [phone]  Follow Up Time: 2 weeks    Samy Saldana  Cardiovascular Disease  [address]  [place] [address]  Phone: [phone]  Fax: [phone]  Follow Up Time: 2 weeks    Meron Chatman (DO)  Geriatric Medicine; Internal Medicine  [address] [place] [address]  Phone: [phone]  Fax: [phone]  Follow Up Time: 1 week   Darnell Brenner  EEG/EPILEPSY  [address]  [place] [address]  Phone: [phone]  Fax: [phone]  Follow Up Time: 1 week    Yong Waters  OTOLARYNGOLOGY  [address] [place] [address]  Phone: [phone]  Fax: [phone]  Follow Up Time: 2 weeks    Samy Saldana  Cardiovascular Disease  [address]  [place] [address]  Phone: [phone]  Fax: [phone]  Follow Up Time: 2 weeks    Meron Chatman (DO)  Geriatric Medicine; Internal Medicine  [address] [place] [address]  Phone: [phone]  Fax: [phone]  Scheduled Appointment: 09/30/2020 09:30 AM

## 2020-09-24 NOTE — PHARMACOTHERAPY INTERVENTION NOTE - COMMENTS
3B resident called at 1156-MD notified that 6.25mg dose is unavailable - will change to12.5 mg which is the lowest dose available

## 2020-09-24 NOTE — H&P ADULT - ASSESSMENT
80 year old male ith a PMH of CAD (s/p 2 stents, follows with Dr. Saldana), DM, HLD presents to the ED due to sudden onset of left arm and facial numbness, with a left sides facial droop.       #CVA vs TIA  -CT head: No CT evidence for acute intracranial pathology.   -CT A H + N:  No large vessel occlusion or significant stenosis of the vessels of the head and neck.   - Neurology on board   - f/u MRI Brain  - f/u ECHO with bubble study   - c/w plavix 75mg QD  - f/u neuro about aspirin 81mg. As per patient his cardiologist recently discontinued.   - Atorvastatin 80mg QD  - PT/ Rehab  - Stroke unit   - Q 4 neuro checks   - S + S screen      80 year old male ith a PMH of CAD (s/p 2 stents, follows with Dr. Saldana), DM, HLD presents to the ED due to sudden onset of left arm and facial numbness, with a left sides facial droop.       #CVA vs TIA  -CT head: No CT evidence for acute intracranial pathology.   -CT A H + N:  No large vessel occlusion or significant stenosis of the vessels of the head and neck.   - Neurology on board   - f/u MRI Brain  - f/u ECHO with bubble study   - c/w plavix 75mg QD  - f/u neuro about aspirin 81mg. As per patient his cardiologist recently discontinued.   - Atorvastatin 80mg QD  - PT/ Rehab  - Stroke unit   - Q 4 neuro checks   - S + S screen     #CAD s/p 2 stends   - follows with Dr. Dr. Saldana  - c/w plavix 75mg QD  - atorvostatin 80mg QD    #HLD  - atorvostatin 80mg QD    #DM  - holding home meds   - Start basal/ bolus if fingersticks > 180     #DVT PPX: lovenox  #GI PPX: protonix   #Activity: ambulate as tolerated  #Diet: low fat   #Dispo: acute

## 2020-09-24 NOTE — H&P ADULT - NSHPLABSRESULTS_GEN_ALL_CORE
VITALS:   T(F): 96.3  HR: 53  BP: 165/78  RR: 20  SpO2: 99%    LABS:                        12.8   7.06  )-----------( 173      ( 23 Sep 2020 19:10 )             38.9     09-23    139  |  103  |  23<H>  ----------------------------<  126<H>  4.3   |  25  |  1.2    Ca    9.7      23 Sep 2020 19:10    TPro  7.2  /  Alb  4.5  /  TBili  <0.2  /  DBili  x   /  AST  13  /  ALT  7   /  AlkPhos  90  09-23    PT/INR - ( 23 Sep 2020 19:10 )   PT: 12.00 sec;   INR: 1.04 ratio         PTT - ( 23 Sep 2020 19:10 )  PTT:30.0 sec      Troponin T, Serum: <0.01 ng/mL (09-23-20 @ 19:10)      CARDIAC MARKERS ( 23 Sep 2020 19:10 )  x     / <0.01 ng/mL / x     / x     / x

## 2020-09-24 NOTE — DISCHARGE NOTE PROVIDER - HOSPITAL COURSE
80 year old male ith a PMH of CAD (s/p 2 stents, follows with Dr. Saldana), DM, HLD presents to the ED due to sudden onset of left arm and facial numbness, with a left sides facial droop. Of note patient is fully functional, right handed male at baseline. The symptoms of numbness lasted less then an hour but the facial droop persisted. Currently symptoms have resolved. Due to the numbness he presented to the ED. Endorses having these symptoms one week prior which lasted 20-30 mins, but also included lower extremity weakness as well. Denied SOB, dizziness, lightheadedness, vertigo, difficulty ambulating, slurred speech, changes in cognition, CP, headache, or head trauma.   When presenting to ED code stroke was called. No TPA given. Vitals: Temp 98.9F, HR 69, / 69, RR 16 LnK075%. CT head: No CT evidence for acute intracranial pathology. CT A H + N:  No large vessel occlusion or significant stenosis of the vessels of the head and neck. Patient admitted for CVA vs. TIA. The workup did not elucidate any acute CVA incident, and the patient returned to baseline. It was found that the patient was non-adherent to plavix at home. Per his outpatient cardiologist, patient may be discharged on Aspirin 162 mg daily; patient has an appointment to followup with cardiology outpatient.    80 year old male ith a PMH of CAD (s/p 2 stents, follows with Dr. Saldana), DM, HLD presents to the ED due to sudden onset of left arm and facial numbness, with a left sides facial droop. Of note patient is fully functional, right handed male at baseline. The symptoms of numbness lasted less then an hour but the facial droop persisted. Currently symptoms have resolved. Due to the numbness he presented to the ED. Endorses having these symptoms one week prior which lasted 20-30 mins, but also included lower extremity weakness as well. Denied SOB, dizziness, lightheadedness, vertigo, difficulty ambulating, slurred speech, changes in cognition, CP, headache, or head trauma.   When presenting to ED code stroke was called. No TPA given. Vitals: Temp 98.9F, HR 69, / 69, RR 16 [id number]%. CT head: No CT evidence for acute intracranial pathology. CT A H + N:  No large vessel occlusion or significant stenosis of the vessels of the head and neck. Patient admitted for CVA vs. TIA. The workup, which included MRI, echo, did not elucidate any acute CVA incident or cardiac etiology of symptoms, and the patient returned to baseline. It was found that the patient was non-adherent to plavix at home. Per his outpatient cardiologist, patient may be discharged on Aspirin 162 mg daily; patient has an appointment to followup with cardiology outpatient.

## 2020-09-24 NOTE — PHYSICAL THERAPY INITIAL EVALUATION ADULT - PERTINENT HX OF CURRENT PROBLEM, REHAB EVAL
pt is a 79 y/o male admitted for PMH of CAD (s/p 2 stents, follows with Dr. Saldana), DM, HLD presents to the ED due to sudden onset of left arm and facial numbness, with a left sides facial droop

## 2020-09-24 NOTE — PROGRESS NOTE ADULT - ASSESSMENT
Assessment: Currently admitted to medicine with the primary diagnosis of CVA (cerebral vascular accident) vs TIA      Plan:  #Suspected CVA vs. TIA       - Symptoms have resolved       - Holding HTN and diuretics for now      - N/C MRI Brain pending      - ECHO with bubble study pending    #Macrocytic anemia - stable H&H    #CKD 3a likely diabetic nephropathy    #Dyslipidemia w/ hypertriglyceridemia      - Atorvastatin 80 mg PO Daily    #CAD - hx of PCI w/ placement of 2 stents      - Unclear if patient was taking plavix 75 mg po daily   Plt and asa function assay as below:    Patient verify now PRUTest  Date: 9-24-20  Time: 11:48:15  Cartridge Lot: [id number]  Exp. Date: 24-apr-21   ID [id number]   Result: 207 PRU     Patient verify now Aspirin Test  Date: 9-24-20  Time: 12:08:45  Cartridge Lot: [id number]  Exp. Date: 13-Jul-21   ID [id number]   Result: 637 ARU.    #Bradycardia - asymptomatic       - Monitor for now    #DM w/ hyperglycemia       - POCT ACHS      - Carb consistent diet (cleared dysphagia screen)    Diet: carb consistent   Dispo: anticipate d/c in 48 hrs (pending aforementioned)

## 2020-09-24 NOTE — SWALLOW BEDSIDE ASSESSMENT ADULT - SLP PERTINENT HISTORY OF CURRENT PROBLEM
81 y/o M presented w/ L arm and facial numbness, w/ L sided facial droop- which has now resolved. Pt also endorses same symptoms 1 wk prior which resolved in 20-30 mins. No TPA administered. CT Head (-). No hx dysphagia. Per RN, pt passed dysphagia screen this AM. MRI pending.

## 2020-09-24 NOTE — H&P ADULT - HISTORY OF PRESENT ILLNESS
80 year old male ith a PMH of CAD (s/p 2 stents, follows with Dr. Saldana), DM, HLD presents to the ED due to sudden onset of left arm and facial numbness, with a left sides facial droop. Of note patient is fully functional, right handed male at baseline. The symptoms of numbness lasted less then an hour but the facial droop persisted. Currently symptoms have resolved. Due to the numbness he presented to the ED. Endorses having these symptoms one week prior which lasted 20-30 mins, but also included lower extremity weakness as well. Denied SOB, dizziness, lightheadedness, vertigo, difficulty ambulating, slurred speech, changes in cognition, CP, headache, or head trauma.   When presenting to ED code stroke was called. No TPA given. Vitals: Temp 98.9F, HR 69, / 69, RR 16 [id number]%. CT head: No CT evidence for acute intracranial pathology. CT A H + N:  No large vessel occlusion or significant stenosis of the vessels of the head and neck. Patient admitted for CVA vs. TIA.

## 2020-09-24 NOTE — OCCUPATIONAL THERAPY INITIAL EVALUATION ADULT - SHORT TERM MEMORY, REHAB EVAL
impaired. Pt reports STM is baseline. Pt able to repeat 3/3 words, recall 2/3 words post 1 min and recall 1/3 words post 5 min. Pt able to recall 3/3 words post 5 min with verbal cues.

## 2020-09-24 NOTE — DISCHARGE NOTE PROVIDER - NSDCMRMEDTOKEN_GEN_ALL_CORE_FT
aspirin 81 mg oral tablet: 1 tab(s) orally  benazepril-hydrochlorothiazide 5 mg-6.25 mg oral tablet: 1 tab(s) orally once a day  Lipitor 80 mg oral tablet: 1 tab(s) orally once a day  metFORMIN 500 mg oral tablet: 1 tab(s) orally 2 times a day  metoprolol succinate 25 mg oral tablet, extended release: 1 tab(s) orally once a day  omeprazole 40 mg oral delayed release capsule: 1 cap(s) orally once a day   aspirin 81 mg oral tablet, chewable: 2 tab(s) orally once a day   atorvastatin 80 mg oral tablet: 1 tab(s) orally once a day (at bedtime)  metFORMIN 500 mg oral tablet: 1 tab(s) orally 2 times a day  metoprolol succinate 25 mg oral tablet, extended release: 1 tab(s) orally once a day  omeprazole 40 mg oral delayed release capsule: 1 cap(s) orally once a day   aspirin 81 mg oral tablet, chewable: 2 tab(s) orally once a day   atorvastatin 80 mg oral tablet: 1 tab(s) orally once a day (at bedtime)  metFORMIN 500 mg oral tablet: 1 tab(s) orally 2 times a day  omeprazole 40 mg oral delayed release capsule: 1 cap(s) orally once a day

## 2020-09-24 NOTE — SWALLOW BEDSIDE ASSESSMENT ADULT - SLP GENERAL OBSERVATIONS
Pt received sitting upright in bed, alert and oriented x3, no c/o pain. +room air. Pt reports speech/language abilities currently at baseline. some confusion noted.

## 2020-09-24 NOTE — DISCHARGE NOTE PROVIDER - PROVIDER TOKENS
PROVIDER:[TOKEN:[[id number]],FOLLOWUP:[1 week]],PROVIDER:[TOKEN:[[id number]],FOLLOWUP:[2 weeks]],PROVIDER:[TOKEN:[[id number]],FOLLOWUP:[2 weeks]],PROVIDER:[TOKEN:[[id number]],FOLLOWUP:[1 week]] PROVIDER:[TOKEN:[[id number]],FOLLOWUP:[1 week]],PROVIDER:[TOKEN:[[id number]],FOLLOWUP:[2 weeks]],PROVIDER:[TOKEN:[[id number]],FOLLOWUP:[2 weeks]],PROVIDER:[TOKEN:[[id number]],SCHEDULEDAPPT:[09/30/2020],SCHEDULEDAPPTTIME:[09:30 AM]]

## 2020-09-24 NOTE — OCCUPATIONAL THERAPY INITIAL EVALUATION ADULT - DIAGNOSIS, OT EVAL
What Type Of Note Output Would You Prefer (Optional)?: Standard Output Is The Patient Presenting As Previously Scheduled?: Yes How Severe Is Your Rash?: moderate Is This A New Presentation, Or A Follow-Up?: Rash Rehab of r/o cva Additional History: He stands for extended periods of time at work (>12 hours).  He states that the Topicort is not getting rid of the rash.

## 2020-09-25 ENCOUNTER — TRANSCRIPTION ENCOUNTER (OUTPATIENT)
Age: 80
End: 2020-09-25

## 2020-09-25 VITALS — WEIGHT: 174.17 LBS

## 2020-09-25 LAB
A1C WITH ESTIMATED AVERAGE GLUCOSE RESULT: 6.5 % — HIGH (ref 4–5.6)
ALBUMIN SERPL ELPH-MCNC: 3.9 G/DL — SIGNIFICANT CHANGE UP (ref 3.5–5.2)
ALP SERPL-CCNC: 84 U/L — SIGNIFICANT CHANGE UP (ref 30–115)
ALT FLD-CCNC: 6 U/L — SIGNIFICANT CHANGE UP (ref 0–41)
ANION GAP SERPL CALC-SCNC: 8 MMOL/L — SIGNIFICANT CHANGE UP (ref 7–14)
AST SERPL-CCNC: 11 U/L — SIGNIFICANT CHANGE UP (ref 0–41)
BASOPHILS # BLD AUTO: 0.01 K/UL — SIGNIFICANT CHANGE UP (ref 0–0.2)
BASOPHILS NFR BLD AUTO: 0.2 % — SIGNIFICANT CHANGE UP (ref 0–1)
BILIRUB SERPL-MCNC: 0.3 MG/DL — SIGNIFICANT CHANGE UP (ref 0.2–1.2)
BUN SERPL-MCNC: 26 MG/DL — HIGH (ref 10–20)
CALCIUM SERPL-MCNC: 8.7 MG/DL — SIGNIFICANT CHANGE UP (ref 8.5–10.1)
CHLORIDE SERPL-SCNC: 108 MMOL/L — SIGNIFICANT CHANGE UP (ref 98–110)
CHOLEST SERPL-MCNC: 90 MG/DL — LOW (ref 100–200)
CO2 SERPL-SCNC: 24 MMOL/L — SIGNIFICANT CHANGE UP (ref 17–32)
CREAT SERPL-MCNC: 1.4 MG/DL — SIGNIFICANT CHANGE UP (ref 0.7–1.5)
EOSINOPHIL # BLD AUTO: 0.2 K/UL — SIGNIFICANT CHANGE UP (ref 0–0.7)
EOSINOPHIL NFR BLD AUTO: 4 % — SIGNIFICANT CHANGE UP (ref 0–8)
ESTIMATED AVERAGE GLUCOSE: 140 MG/DL — HIGH (ref 68–114)
GLUCOSE BLDC GLUCOMTR-MCNC: 123 MG/DL — HIGH (ref 70–99)
GLUCOSE BLDC GLUCOMTR-MCNC: 129 MG/DL — HIGH (ref 70–99)
GLUCOSE SERPL-MCNC: 115 MG/DL — HIGH (ref 70–99)
HCT VFR BLD CALC: 36.1 % — LOW (ref 42–52)
HDLC SERPL-MCNC: 29 MG/DL — LOW
HGB BLD-MCNC: 11.7 G/DL — LOW (ref 14–18)
IMM GRANULOCYTES NFR BLD AUTO: 0.4 % — HIGH (ref 0.1–0.3)
LIPID PNL WITH DIRECT LDL SERPL: 51 MG/DL — SIGNIFICANT CHANGE UP (ref 4–129)
LYMPHOCYTES # BLD AUTO: 1.28 K/UL — SIGNIFICANT CHANGE UP (ref 1.2–3.4)
LYMPHOCYTES # BLD AUTO: 25.3 % — SIGNIFICANT CHANGE UP (ref 20.5–51.1)
MCHC RBC-ENTMCNC: 32.1 PG — HIGH (ref 27–31)
MCHC RBC-ENTMCNC: 32.4 G/DL — SIGNIFICANT CHANGE UP (ref 32–37)
MCV RBC AUTO: 98.9 FL — HIGH (ref 80–94)
MONOCYTES # BLD AUTO: 0.52 K/UL — SIGNIFICANT CHANGE UP (ref 0.1–0.6)
MONOCYTES NFR BLD AUTO: 10.3 % — HIGH (ref 1.7–9.3)
NEUTROPHILS # BLD AUTO: 3.03 K/UL — SIGNIFICANT CHANGE UP (ref 1.4–6.5)
NEUTROPHILS NFR BLD AUTO: 59.8 % — SIGNIFICANT CHANGE UP (ref 42.2–75.2)
NRBC # BLD: 0 /100 WBCS — SIGNIFICANT CHANGE UP (ref 0–0)
PLATELET # BLD AUTO: 147 K/UL — SIGNIFICANT CHANGE UP (ref 130–400)
POTASSIUM SERPL-MCNC: 4.4 MMOL/L — SIGNIFICANT CHANGE UP (ref 3.5–5)
POTASSIUM SERPL-SCNC: 4.4 MMOL/L — SIGNIFICANT CHANGE UP (ref 3.5–5)
PROT SERPL-MCNC: 6 G/DL — SIGNIFICANT CHANGE UP (ref 6–8)
RBC # BLD: 3.65 M/UL — LOW (ref 4.7–6.1)
RBC # FLD: 11.8 % — SIGNIFICANT CHANGE UP (ref 11.5–14.5)
SODIUM SERPL-SCNC: 140 MMOL/L — SIGNIFICANT CHANGE UP (ref 135–146)
TOTAL CHOLESTEROL/HDL RATIO MEASUREMENT: 3.1 RATIO — LOW (ref 4–5.5)
TRIGL SERPL-MCNC: 63 MG/DL — SIGNIFICANT CHANGE UP (ref 10–149)
TSH SERPL-MCNC: 1.95 UIU/ML — SIGNIFICANT CHANGE UP (ref 0.27–4.2)
WBC # BLD: 5.06 K/UL — SIGNIFICANT CHANGE UP (ref 4.8–10.8)
WBC # FLD AUTO: 5.06 K/UL — SIGNIFICANT CHANGE UP (ref 4.8–10.8)

## 2020-09-25 PROCEDURE — 99232 SBSQ HOSP IP/OBS MODERATE 35: CPT

## 2020-09-25 PROCEDURE — 99239 HOSP IP/OBS DSCHRG MGMT >30: CPT

## 2020-09-25 PROCEDURE — 93306 TTE W/DOPPLER COMPLETE: CPT | Mod: 26

## 2020-09-25 RX ORDER — ASPIRIN/CALCIUM CARB/MAGNESIUM 324 MG
1 TABLET ORAL
Qty: 0 | Refills: 0 | DISCHARGE

## 2020-09-25 RX ORDER — ASPIRIN/CALCIUM CARB/MAGNESIUM 324 MG
1 TABLET ORAL
Qty: 0 | Refills: 0 | DISCHARGE
Start: 2020-09-25

## 2020-09-25 RX ORDER — BENAZEPRIL HYDROCHLORIDE AND HYDROCHLOROTHIAZIDE 10; 12.5 MG/1; MG/1
1 TABLET, FILM COATED ORAL
Qty: 0 | Refills: 0 | DISCHARGE

## 2020-09-25 RX ORDER — METOPROLOL TARTRATE 50 MG
1 TABLET ORAL
Qty: 0 | Refills: 0 | DISCHARGE

## 2020-09-25 RX ORDER — ASPIRIN/CALCIUM CARB/MAGNESIUM 324 MG
2 TABLET ORAL
Qty: 60 | Refills: 0
Start: 2020-09-25 | End: 2020-10-24

## 2020-09-25 RX ORDER — ATORVASTATIN CALCIUM 80 MG/1
1 TABLET, FILM COATED ORAL
Qty: 0 | Refills: 0 | DISCHARGE

## 2020-09-25 RX ORDER — ASPIRIN/CALCIUM CARB/MAGNESIUM 324 MG
81 TABLET ORAL DAILY
Refills: 0 | Status: DISCONTINUED | OUTPATIENT
Start: 2020-09-25 | End: 2020-09-25

## 2020-09-25 RX ORDER — ATORVASTATIN CALCIUM 80 MG/1
1 TABLET, FILM COATED ORAL
Qty: 0 | Refills: 0 | DISCHARGE
Start: 2020-09-25

## 2020-09-25 RX ADMIN — Medication 81 MILLIGRAM(S): at 11:26

## 2020-09-25 RX ADMIN — PANTOPRAZOLE SODIUM 40 MILLIGRAM(S): 20 TABLET, DELAYED RELEASE ORAL at 10:20

## 2020-09-25 RX ADMIN — ENOXAPARIN SODIUM 40 MILLIGRAM(S): 100 INJECTION SUBCUTANEOUS at 11:27

## 2020-09-25 NOTE — PROGRESS NOTE ADULT - ASSESSMENT
Impression:  79 yo right handed male with pmhx of cad , MI , DM ,cardiac stents x 2, HLD, fully functional at baseline with baseline mrankin score of 0 presented with acute onset left arm and facial numbness and a left facial droop. On arrival to ed a stroke code was activated , his initial nihss was 4 and was not a tpa candidate due to unclear time of onset of symptoms. CTA H/N was negative for LVO so was not an NI candidate. Non focal neuro exam today. No acute overnight events. MRI brain completed overnight ,pending results.        Suggestion:  -Follow up pending official read of MRI BRAIN   -Follow up pending ECHO   -Continue Plavix 75 mg q 24  -continue Lipitor 80 mg q 24  -Q 4 neuro checks  -Call for acute change in neuro status    Disposition: Continue  Stroke Unit

## 2020-09-25 NOTE — DISCHARGE NOTE NURSING/CASE MANAGEMENT/SOCIAL WORK - NSDCPEPTSTRK_GEN_ALL_CORE
Risk factors for stroke/Stroke warning signs and symptoms/Signs and symptoms of stroke/Need for follow up after discharge/Call 911 for stroke/Prescribed medications/Stroke education booklet/Stroke support groups for patients, families, and friends

## 2020-09-25 NOTE — CONSULT NOTE ADULT - SUBJECTIVE AND OBJECTIVE BOX
HPI:  80 year old male ith a PMH of CAD (s/p 2 stents, follows with Dr. Saldana), DM, HLD presents to the ED due to sudden onset of left arm and facial numbness, with a left sides facial droop. Of note patient is fully functional, right handed male at baseline. The symptoms of numbness lasted less then an hour but the facial droop persisted. Currently symptoms have resolved. Due to the numbness he presented to the ED. Endorses having these symptoms one week prior which lasted 20-30 mins, but also included lower extremity weakness as well. Denied SOB, dizziness, lightheadedness, vertigo, difficulty ambulating, slurred speech, changes in cognition, CP, headache, or head trauma.   When presenting to ED code stroke was called. No TPA given. Vitals: Temp 98.9F, HR 69, / 69, RR 16 TyQ497%. CT head: No CT evidence for acute intracranial pathology. CT A H + N:  No large vessel occlusion or significant stenosis of the vessels of the head and neck. Patient admitted for CVA vs. TIA.        (24 Sep 2020 00:55)      PAST MEDICAL & SURGICAL HISTORY:  MI (myocardial infarction)    DM (diabetes mellitus)    Coronary stent patent        Hospital Course:    TODAY'S SUBJECTIVE & REVIEW OF SYMPTOMS:     Constitutional WNL   Cardio WNL   Resp WNL   GI WNL  Heme WNL  Endo WNL  Skin WNL  MSK WNL  Neuro left arm weakness / slurred speech  Cognitive WNL  Psych WNL      MEDICATIONS  (STANDING):  atorvastatin 80 milliGRAM(s) Oral at bedtime  enoxaparin Injectable 40 milliGRAM(s) SubCutaneous daily  pantoprazole    Tablet 40 milliGRAM(s) Oral before breakfast  sodium chloride 0.9%. 1000 milliLiter(s) (50 mL/Hr) IV Continuous <Continuous>    MEDICATIONS  (PRN):      FAMILY HISTORY:      Allergies    penicillin (Unknown)    Intolerances        SOCIAL HISTORY:    [  ] Etoh  [  ] Smoking  [  ] Substance abuse     Home Environment:  [  ] Home Alone  [x  ] Lives with Family  [  ] Home Health Aid    Dwelling:  [  ] Apartment  [x  ] Private House  [  ] Adult Home  [  ] Skilled Nursing Facility      [  ] Short Term  [  ] Long Term  [ x ] Stairs       Elevator [  ]    FUNCTIONAL STATUS PTA: (Check all that apply)  Ambulation: [  x ]Independent    [  ] Dependent     [  ] Non-Ambulatory  Assistive Device: [  ] SA Cane  [  ]  Q Cane  [  ] Walker  [  ]  Wheelchair  ADL : [x  ] Independent  [  ]  Dependent       Vital Signs Last 24 Hrs  T(C): 36.4 (24 Sep 2020 05:06), Max: 37.2 (23 Sep 2020 18:42)  T(F): 97.5 (24 Sep 2020 05:06), Max: 98.9 (23 Sep 2020 18:42)  HR: 47 (24 Sep 2020 05:06) (47 - 69)  BP: 129/60 (24 Sep 2020 05:06) (125/68 - 165/78)  BP(mean): --  RR: 18 (24 Sep 2020 05:06) (16 - 20)  SpO2: 98% (24 Sep 2020 02:05) (98% - 99%)      PHYSICAL EXAM: Awake & Alert  GENERAL: NAD  HEAD:  Normocephalic  CHEST/LUNG: Clear   HEART: S1S2+  ABDOMEN: Soft, Nontender  EXTREMITIES:  no calf tenderness    NERVOUS SYSTEM:  Cranial Nerves 2-12 intact [  ] Abnormal  [ x ] + mild dysarthria  ROM: WFL all extremities [ x ]  Abnormal [  ]  Motor Strength: WFL all extremities  [x  ]  Abnormal [  ]  Sensation: intact to light touch [ x ] Abnormal [  ]    FUNCTIONAL STATUS:  Bed Mobility: Independent [  ]  Supervision [ x ]  Needs Assistance [  ]  N/A [  ]  Transfers: Independent [  ]  Supervision [  ]  Needs Assistance [x  ]  N/A [  ]   Ambulation: Independent [  ]  Supervision [  ]  Needs Assistance [  ]  N/A [  ]  ADL: Independent [  ] Requires Assistance [  ] N/A [  ]      LABS:                        11.9   5.01  )-----------( 161      ( 24 Sep 2020 06:57 )             36.6     09-24    141  |  107  |  20  ----------------------------<  103<H>  4.2   |  25  |  1.1    Ca    9.0      24 Sep 2020 06:57  Mg     2.3     09-24    TPro  6.1  /  Alb  4.1  /  TBili  0.3  /  DBili  x   /  AST  11  /  ALT  6   /  AlkPhos  86  09-24    PT/INR - ( 23 Sep 2020 19:10 )   PT: 12.00 sec;   INR: 1.04 ratio         PTT - ( 23 Sep 2020 19:10 )  PTT:30.0 sec      RADIOLOGY & ADDITIONAL STUDIES:  
Patient is a 80y old  Male who presents with a chief complaint of     HPI:  80 year old male ith a PMH of CAD (s/p 2 stents, follows with Dr. Saldana), DM, HLD presents to the ED due to sudden onset of left arm and facial numbness, with a left sides facial droop. Of note patient is fully functional, right handed male at baseline. The symptoms of numbness lasted less then an hour but the facial droop persisted. Currently symptoms have resolved. Due to the numbness he presented to the ED. Endorses having these symptoms one week prior which lasted 20-30 mins, but also included lower extremity weakness as well. Denied SOB, dizziness, lightheadedness, vertigo, difficulty ambulating, slurred speech, changes in cognition, CP, headache, or head trauma.   When presenting to ED code stroke was called. No TPA given. Vitals: Temp 98.9F, HR 69, / 69, RR 16 GrW901%. CT head: No CT evidence for acute intracranial pathology. CT A H + N:  No large vessel occlusion or significant stenosis of the vessels of the head and neck. Patient admitted for CVA vs. TIA.        (24 Sep 2020 00:55)      PAST MEDICAL & SURGICAL HISTORY:  MI (myocardial infarction)    DM (diabetes mellitus)    Coronary stent patent        PREVIOUS DIAGNOSTIC TESTING:      ECHO  FINDINGS:    STRESS  FINDINGS:    CATHETERIZATION  FINDINGS:    MEDICATIONS  (STANDING):  aspirin  chewable 81 milliGRAM(s) Oral daily  atorvastatin 80 milliGRAM(s) Oral at bedtime  enoxaparin Injectable 40 milliGRAM(s) SubCutaneous daily  insulin lispro (HumaLOG) corrective regimen sliding scale   SubCutaneous three times a day before meals  insulin lispro (HumaLOG) corrective regimen sliding scale   SubCutaneous at bedtime  pantoprazole    Tablet 40 milliGRAM(s) Oral before breakfast    MEDICATIONS  (PRN):      FAMILY HISTORY:      SOCIAL HISTORY:  CIGARETTES:    ALCOHOL:    REVIEW OF SYSTEMS:  CONSTITUTIONAL: No fever, weight loss, or fatigue  NECK: No pain or stiffness  RESPIRATORY: No cough, wheezing, chills or hemoptysis; No shortness of breath  CARDIOVASCULAR: No chest pain, palpitations, dizziness, or leg swelling  GASTROINTESTINAL: No abdominal or epigastric pain. No nausea, vomiting, or hematemesis; No diarrhea or constipation. No melena or hematochezia.  GENITOURINARY: No dysuria, frequency, hematuria, or incontinence  NEUROLOGICAL: No headaches, memory loss, loss of strength, numbness, or tremors  SKIN: No itching, burning, rashes, or lesions   ENDOCRINE: No heat or cold intolerance; No hair loss  MUSCULOSKELETAL: No joint pain or swelling; No muscle, back, or extremity pain  HEME/LYMPH: No easy bruising, or bleeding gums          Vital Signs Last 24 Hrs  T(C): 35.8 (25 Sep 2020 14:27), Max: 36.6 (24 Sep 2020 20:55)  T(F): 96.4 (25 Sep 2020 14:27), Max: 97.9 (24 Sep 2020 20:55)  HR: 55 (25 Sep 2020 14:27) (52 - 60)  BP: 108/55 (25 Sep 2020 14:27) (108/55 - 166/76)  BP(mean): --  RR: 18 (25 Sep 2020 05:11) (18 - 20)  SpO2: 97% (25 Sep 2020 01:40) (97% - 97%)        PHYSICAL EXAM:  GENERAL: NAD, well-groomed, well-developed  HEAD:  Atraumatic, Normocephalic  NECK: Supple, No JVD, Normal thyroid  NERVOUS SYSTEM:  Alert & Oriented X3, Good concentration  CHEST/LUNG: Clear to percussion bilaterally; No rales, rhonchi, wheezing, or rubs  HEART: Regular rate and rhythm; No murmurs, rubs, or gallops  ABDOMEN: Soft, Nontender, Nondistended; Bowel sounds present  EXTREMITIES:  2+ Peripheral Pulses, No clubbing, cyanosis, or edema  SKIN: No rashes or lesions    INTERPRETATION OF TELEMETRY:    ECG:    I&O's Detail    24 Sep 2020 07:01  -  25 Sep 2020 07:00  --------------------------------------------------------  IN:    sodium chloride 0.9%: 337.5 mL  Total IN: 337.5 mL    OUT:    Voided (mL): 1 mL  Total OUT: 1 mL    Total NET: 336.5 mL          LABS:                        11.7   5.06  )-----------( 147      ( 25 Sep 2020 07:11 )             36.1     09-25    140  |  108  |  26<H>  ----------------------------<  115<H>  4.4   |  24  |  1.4    Ca    8.7      25 Sep 2020 07:11  Mg     2.3     09-24    TPro  6.0  /  Alb  3.9  /  TBili  0.3  /  DBili  x   /  AST  11  /  ALT  6   /  AlkPhos  84  09-25            I&O's Summary    24 Sep 2020 07:01  -  25 Sep 2020 07:00  --------------------------------------------------------  IN: 337.5 mL / OUT: 1 mL / NET: 336.5 mL        RADIOLOGY & ADDITIONAL STUDIES:
    Chief Complaint: Left arm and facial numbness and left facial droop      HPI:  79 yo right handed male with pmhx of cad , MI , DM, cardiac stents x 2, HLD, fully functional at baseline with baseline mrankin score of 0 presenting today with acute onset left arm and facial numbness and a left facial droop. He further states that he had similar symptoms a week ago which resolved spontaneously and he did not seek medical attention. On arrival to ed a stroke code was activated , his initial BP was 125/68 -->163/80 . On my evaluation patients sensory symptoms had completely resolved but his left facial droop remained . Patient or family does not know the time of onset of the facial droop so the exact time of onset of his symptoms is currently unclear. Patient denies dizziness, headache, fall trauma, n/v focal weakness or other complaints.         Relevant PMH:  [] Prior ischemic stroke/TIA  [] Afib  [x]CAD  [x]HTN  [x]DLD  [x]DM []PVD []Obesity [] Sedentary lifestyle []CHF  []MARY  []Cancer Hx         Social History: [x] Smoking (3-4 cigarettes/day, smoking for 30+years) []  Drug Use: []   Alcohol Use:   [] Other:        Possible Location of Stroke:  Unknown at this time, will have a better understanding post stroke workup.        Possible Cause of Stroke:  Unknown at this time, will have a better understanding post stroke workup.      Relevant Cerebral Imaging:  < from: CT Brain Stroke Protocol (09.23.20 @ 19:11) >  FINDINGS:    The ventricles and cortical sulci are appropriate for the patient's stated age.    Gray-white matter differentiation is preserved    Few scattered patchy hypodensities throughout the hemispheric white matter which are nonspecific and without mass effect, compatible with chronic microvascular ischemic changes.    There is no evidence for intracranial hemorrhage, significant space-occupying process, or recent territorial infarction    The calvarium is intact. Visualized paranasal sinuses and mastoids are well-aerated      IMPRESSION:    No CT evidence for acute intracranial pathology.      Dr. Victor M Wallace discussed preliminary findings with PodTej michaels M.D. on 9/23/2020 at 7:16 PM with read back.          ******PRELIMINARY REPORT******    ******PRELIMINARY REPORT******          VICTOR M EISMAN M.D., RESIDENT RADIOLOGIST    < end of copied text >          Relevant Cervicocerebral Imaging:  < from: CT Angio Head w/ IV Cont (09.23.20 @ 19:59) >  FINDINGS:      CTA neck:    The aortic arch, origin of the great vessels and subclavian arteries are patent. Brachiocephalic and left common carotid arise from a common trunk. Aortic atherosclerotic disease. The bilateral common carotid arteries and internal carotid arteries are patent. Few calcifications at the bilateral carotid bifurcations without significant stenosis.    The bilateral vertebral arteries and basilar artery are unremarkable. Left vertebral artery is dominant. Few calcifications at the distal left vertebral artery without significant stenosis.      CTA head:    Bilateral distal internal carotid arteries are patent. Calcifications of the bilateral carotid siphons without significant stenosis. The bilateral middle cerebral arteries and anterior cerebral arteries are unremarkable without significant  stenosis. The right vertebral artery terminates as PICA. Hypoplastic T1 segment of the right posterior cervical artery. The basilar artery, superior cerebellar arteries and left posterior cerebral artery are patent without significant stenosis.    Dural sinuses are well opacified and unremarkable.    Other:  Degenerative changes of the cervical spine.      IMPRESSION:    No large vessel occlusion or significant stenosis of the vessels of the head and neck.      < end of copied text >            Relevant blood tests:  lComplete Blood Count + Automated Diff (09.23.20 @ 19:10)   WBC Count: 7.06 K/uL   RBC Count: 3.96 M/uL   Hemoglobin: 12.8 g/dL   Hematocrit: 38.9 %   Mean Cell Volume: 98.2 fL   Mean Cell Hemoglobin: 32.3 pg   Mean Cell Hemoglobin Conc: 32.9 g/dL   Red Cell Distrib Width: 11.9 %   Platelet Count - Automated: 173 K/uL   Auto Neutrophil #: 4.13 K/uL   Auto Lymphocyte #: 1.97 K/uL   Auto Monocyte #: 0.75 K/uL   Auto Eosinophil #: 0.17 K/uL   Auto Basophil #: 0.02 K/uL   Auto Neutrophil %: 58.5: Differential percentages must be correlated with absolute numbers for   clinical significance. %   Auto Lymphocyte %: 27.9 %   Auto Monocyte %: 10.6 %   Auto Eosinophil %: 2.4 %   Auto Basophil %: 0.3 %   Auto Immature Granulocyte %: 0.3 %   Nucleated RBC: 0 /100 WBCs         Lipid Profile (09.23.20 @ 19:10)   Total Cholesterol/HDL Ratio Measurement: 3.2 Ratio   Cholesterol, Serum: 104 mg/dL   Triglycerides, Serum: 216 mg/dL   HDL Cholesterol, Serum: 32: HDL Levels >/= 60 mg/dL are considered beneficial and a "negative" risk   factor.   Effective 08/15/2018: New reference range and interpretive comment. mg/dL   Direct LDL: 55: LDL Cholesterol (mg/dL) --- Interpretive Comment (for adults 18 and over)     Relevant cardiac rhythm monitoring: Pending       Relevant Cardiac Structure:(TTE/CRISTY +/-):[]No intracardiac thrombus/[] no vegetation/[]no [place]/EF: Pending      Home Medications:  Plavix 75 mg q 24  Metoprolol 25 mg q 24  Enalapril 5mg q 24  Lipitor 80 mg q 24  Metformin 500mg q 24  Omeprazole 40 mg q 24      PT/OT/Speech/Rehab/S&Swr: Pending    Exam:  Patient a/o x 4, speech is fluent, with good repeats. Normal attention and comprehension, able to give history.  CN: Eomi, VFF, PERRLA, Speech and language intact, Left facial droop  Power: 5/5 throughout/ no drift  FTN: No dysmetria  HKS: No limb ataxia  Gait: deferred  No neglect      Vital Signs Last 24 Hrs  T(C): 37.2 (23 Sep 2020 19:02), Max: 37.2 (23 Sep 2020 18:42)  T(F): 98.9 (23 Sep 2020 19:02), Max: 98.9 (23 Sep 2020 18:42)  HR: 69 (23 Sep 2020 19:02) (69 - 69)  BP: 125/68 (23 Sep 2020 19:02) (125/68 - 125/68)  BP(mean): --  RR: 16 (23 Sep 2020 19:02) (16 - 16)  SpO2: 98% (23 Sep 2020 19:02) (98% - 98%)        NIHSS  LOC:       1a:  0   1b(Questions):   0        1c(Instructions):  0           Best Gaze: 0  Visual: 0  Motor:                 RUE: 0    RLE:  0   LUE:  0   LLE: 0    FACE:  1   Limb Ataxia: 0  Sensory:     0   Language:   0    Dysarthria:   0       Extinction and Inattention: 0    NIHSS on admission: 2--> 1                 m-RS:  0 No symptoms at all  1 No significant disability despite symptoms; able to carry out all usual duties and activities without assistance  2 Slight disability; unable to carry out all previous activities, but able to look after own affairs  3 Moderate disability; requiring some help, but able to walk without assistance  4 Moderately severe disability; unable to walk without assistance and unable to attend to own bodily needs without assistance  5 Severe disability; bedridden, incontinent and requiring constant nursing care and attention  6 Dead

## 2020-09-25 NOTE — PROGRESS NOTE ADULT - SUBJECTIVE AND OBJECTIVE BOX
Chief Complaint: Left arm and facial numbness and dysarthria    Examined patient at bedside, he is resting comfortably in his bed. No acute complaints. No focal deficits today. MRI brain completed, awaiting results. No acute events on the cardiac monitor. Vital signs stable          HPI:  79 yo right handed male with pmhx of cad , MI , DM, cardiac stents x 2, HLD, fully functional at baseline with baseline mrankin score of 0 presenting today with acute onset left arm and facial numbness and a left facial droop. He further states that he had similar symptoms a week ago which resolved spontaneously and he did not seek medical attention. On arrival to ed a stroke code was activated , his initial BP was 125/68 -->163/80 . On my evaluation patients sensory symptoms had completely resolved but his left facial droop remained . Patient or family does not know the time of onset of the facial droop so the exact time of onset of his symptoms is currently unclear. Patient denies dizziness, headache, fall trauma, n/v focal weakness or other complaints.         Relevant PMH:  [] Prior ischemic stroke/TIA  [] Afib  [x]CAD  [x]HTN  [x]DLD  [x]DM []PVD []Obesity [] Sedentary lifestyle []CHF  []MARY  []Cancer Hx         Social History: [x] Smoking (3-4 cigarettes/day, smoking for 30+years) []  Drug Use: []   Alcohol Use:   [] Other:          Possible Location of Stroke:  Unknown at this time, will have a better understanding post stroke workup.      Possible Cause of Stroke:  Unknown at this time, will have a better understanding post stroke workup.          Relevant Cerebral Imaging:  < from: CT Brain Stroke Protocol (09.23.20 @ 19:11) >  FINDINGS:    The ventricles and cortical sulci are appropriate for the patient's stated age.    Gray-white matter differentiation is preserved    Few scattered patchy hypodensities throughout the hemispheric white matter which are nonspecific and without mass effect, compatible with chronic microvascular ischemic changes.    There is no evidence for intracranial hemorrhage, significant space-occupying process, or recent territorial infarction    The calvarium is intact. Visualized paranasal sinuses and mastoids are well-aerated      IMPRESSION:    No CT evidence for acute intracranial pathology.      Dr. Victor M Wallace discussed preliminary findings with PodTej michaels M.D. on 9/23/2020 at 7:16 PM with read back.          ******PRELIMINARY REPORT******    ******PRELIMINARY REPORT******          VICTOR M KIMBALL M.D., RESIDENT RADIOLOGIST    < end of copied text >        Relevant Cervicocerebral Imaging:  CT Angio Neck w/ IV Cont:   EXAM:  CT ANGIO BRAIN (W)AW IC        EXAM:  CT ANGIO NECK (W)AW IC            PROCEDURE DATE:  09/23/2020    INTERPRETATION:  CLINICAL HISTORY / REASON FOR EXAM: Stroke code. Left arm numbness and weakness, resolving left facial droop.    TECHNIQUE: CTA of the head and neck was obtained following the administration of intravenous contrast. Coronal, sagittal and multiple 3-D MIP and volume rendered reformats were generated.    COMPARISON: None available.    FINDINGS:    CTA neck:  The visualized aortic arch and the great vessel origins demonstrate calcific plaque without significant stenosis. Brachiocephalic and left common carotid arise from a common trunk, normal variation (bovine configuration).    Common and internal carotid arteries are patent. There is small calcific plaque at the carotid bifurcations without significant stenosis.    The vertebral arteries are patent, dominant on the left and diminutive on the right.    CTA head:  There is calcific plaque of the carotid siphons without significant stenosis. The anterior and middle cerebral arteries are patent.    The right vertebral artery terminates as PICA. The basilar artery and posterior cerebral arteries are patent. Hypoplastic P1 segment of the right PCA, normal variant.    Dural sinuses are well opacified and unremarkable.    Other:  Degenerative changes of the cervical spine.  There is a bilobed heterogeneous right parotid mass measuring 1.2 x 2.8 x 4.2 cm (transverse, AP, CC).  Heterogeneous low attenuation regions are present within bilateral palatine tonsils and the lingual tonsils.    IMPRESSION:    1.  No evidence of major vascular stenosis or occlusion.    2.  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 Heterogeneous appearance of the palatine and lingual tonsils. Recommend ENT follow-up on an elective/nonemergent basis.    *Discussed with Dr. Lerma 8:33 am 9/24/20    VICTOR M KIMBALL M.D., RESIDENT RADIOLOGIST  This document has been electronically signed.  JOSE MANUEL MONDRAGON M.D., ATTENDING RADIOLOGIST  This document has been electronically signed. Sep 24 2020  8:33AM (09-23-20 @ 19:58)            Relevant blood tests:  Direct LDL: 55 mg/dL [4 - 129] (09-23-20 @ 19:10)      Relevant cardiac rhythm monitoring:  g< from: 12 Lead ECG (09.23.20 @ 20:01) >  Ventricular Rate 54 BPM    Atrial Rate 54 BPM    P-R Interval 144 ms    QRS Duration 102 ms    Q-T Interval 434 ms    QTC Calculation(Bazett) 411 ms    P Axis 200 degrees    R Axis 67 degrees    T Axis 151 degrees    Diagnosis Line Unusual P axis, possible ectopic atrial bradycardia  Cannot rule out Inferior infarct , age undetermined  ST & T wave abnormality, consider lateral ischemia  Abnormal ECG    Confirmed by Dipak Farmer ([id number]) on 9/23/2020 10:46:54 PM    < end of copied text >        Relevant Cardiac Structure:(TTE/CRISTY +/-):[]No intracardiac thrombus/[] no vegetation/[]no [place]ia/EF: PENDING      Home Medications:  aspirin 81 mg oral tablet: 1 tab(s) orally (24 Sep 2020 00:57)  benazepril-hydrochlorothiazide 5 mg-6.25 mg oral tablet: 1 tab(s) orally once a day (24 Sep 2020 00:56)  Lipitor 80 mg oral tablet: 1 tab(s) orally once a day (24 Sep 2020 00:56)  metFORMIN 500 mg oral tablet: 1 tab(s) orally 2 times a day (24 Sep 2020 00:57)  metoprolol succinate 25 mg oral tablet, extended release: 1 tab(s) orally once a day (24 Sep 2020 00:56)  omeprazole 40 mg oral delayed release capsule: 1 cap(s) orally once a day (24 Sep 2020 00:57)          MEDICATIONS  (STANDING):  aspirin  chewable 81 milligram Oral daily  atorvastatin 80 milligrams Oral at bedtime  enoxaparin Injectable 40 milligrams Subcutaneous daily  insulin lispro (HumaLOG) corrective regimen sliding scale   Subcutaneous three times a day before meals  insulin lispro (HumaLOG) corrective regimen sliding scale   Subcutaneous at bedtime  pantoprazole    Tablet 40 milligram Oral before breakfast  sodium chloride 0.9%. 1000 milliliter (50 mL/Hr) IV Continuous <Continuous>      PT/OT/Speech/Rehab/S&Swr: Completed    Exam:  -Patient is a/o x 3, speech fluent, with good repeats and normal attention and comprehension  -CN: EOMI, PERRLA, VFF, Face symmetric, speech and language intact, tongue midline  -Power: 5/5 throughout/ no drift  -FTN: No dysmetria  -HKS: No limb ataxia  -Sensory: Intact and symmetric  -Gait: deferred    NIHSS 0  Loc 0       commands 0      questions 0  VF 0  Gaze 0  Face 0  RUE 0   RLE 0  LUE 0    LLE 0  Sensory 0  Speech 0  Language 0  Ataxia 0  Extinction 0    NIHSS Yesterday  1       Nihss today 0        mRS   0 No symptoms at all  1 No significant disability despite symptoms; able to carry out all usual duties and activities without assistance  2 Slight disability; unable to carry out all previous activities, but able to look after own affairs  3 Moderate disability; requiring some help, but able to walk without assistance  4 Moderately severe disability; unable to walk without assistance and unable to attend to own bodily needs without assistance  5 Severe disability; bedridden, incontinent and requiring constant nursing care and attention  6 Dead      Vital Signs Last 24 Hrs  T(C): 36.2 (25 Sep 2020 01:40), Max: 36.6 (24 Sep 2020 20:55)  T(F): 97.1 (25 Sep 2020 01:40), Max: 97.9 (24 Sep 2020 20:55)  HR: 52 (25 Sep 2020 01:40) (47 - 60)  BP: 124/57 (25 Sep 2020 01:40) (111/60 - 166/76)  BP(mean): --  RR: 18 (25 Sep 2020 01:40) (18 - 20)  SpO2: 97% (25 Sep 2020 01:40) (97% - 98%)

## 2020-09-25 NOTE — CONSULT NOTE ADULT - ASSESSMENT
Impression:  79 yo right handed male with pmhx of cad , MI , DM ,cardiac stents x 2, HLD, fully functional at baseline with baseline mrankin score of 0 presenting today with acute onset left arm and facial numbness and a left facial droop. He further states that he had similar symptoms a week ago which resolved spontaneously and he did not seek medical attention. On arrival to ed a stroke code was activated , his initial BP was 125/68 -->163/80 . On my evaluation patients sensory symptoms had completely resolved but his left facial droop remained . Patient or family does not know the time of onset of the facial droop so the exact time of onset of his symptoms is currently unclear. CTH is negative for acute intracranial findings, patient is currently not a tpa candidate due to unclear time of onset of symptoms. CTA H/N is negative for LVO . Case was discussed with neuro attending on call Dr Valente.     Suggestion:  Routine stroke workup including:  N/C MRI BRAIN   ECHO with bubble study  Continue Plavix 75 mg q 24  continue Lipitor 80 mg q 24  pt/rehab eval  Keep npo until clears dysphagia screen  Q 4 neuro checks  Call for acute change in neuro status    Disposition: Admit to Stroke Unit    
History   MI CAD PCI DM CHOL with CVA     echo EF  45    cont meds neuro follow
IMPRESSION: Rehab of r/o cva    PRECAUTIONS: [  ] Cardiac  [  ] Respiratory  [  ] Seizures [  ] Contact Isolation  [  ] Droplet Isolation  [  ] Other    Weight Bearing Status:     RECOMMENDATION:    Out of Bed to Chair     DVT/Decubiti Prophylaxis    REHAB PLAN:     [ x  ] Bedside P/T 3-5 times a week   [ x  ]   Bedside O/T  2-3 times a week             [   ] No Rehab Therapy Indicated                   [ x  ]  Speech Therapy   Conditioning/ROM                                    ADL  Bed Mobility                                               Conditioning/ROM  Transfers                                                     Bed Mobility  Sitting /Standing Balance                         Transfers                                        Gait Training                                               Sitting/Standing Balance  Stair Training [   ]Applicable                    Home equipment Eval                                                                        Splinting  [   ] Only      GOALS:   ADL   [ x  ]   Independent                    Transfers  [ x  ] Independent                          Ambulation  [x   ] Independent     [ x   ] With device                            [   ]  CG                                                         [   ]  CG                                                                  [   ] CG                            [    ] Min A                                                   [   ] Min A                                                              [   ] Min  A          DISCHARGE PLAN:   [   ]  Good candidate for Intensive Rehabilitation/Hospital based                                             Will tolerate 3hrs Intensive Rehab Daily                                       [    ]  Short Term Rehab in Skilled Nursing Facility                                       [ x   ]  Home with Outpatient or  services                                         [    ]  Possible Candidate for Intensive Hospital based Rehab

## 2020-09-25 NOTE — DISCHARGE NOTE NURSING/CASE MANAGEMENT/SOCIAL WORK - PATIENT PORTAL LINK FT
You can access the FollowMyHealth Patient Portal offered by [facility] by registering at the following website: [email or web address]. By joining [employer]’s FollowMyHealth portal, you will also be able to view your health information using other applications (apps) compatible with our system.

## 2020-09-25 NOTE — PROGRESS NOTE ADULT - ATTENDING COMMENTS
Abby Burgos MD  Hospitalist   Spectra: [phone]    Patient is a 80y old  Male who presents with a chief complaint of     INTERVAL HPI/OVERNIGHT EVENTS: no acute events noted, complete resolution of symptoms.     REVIEW OF SYSTEMS: negative  Vital Signs Last 24 Hrs  T(C): 35.3 (24 Sep 2020 19:27), Max: 36.4 (24 Sep 2020 05:06)  T(F): 95.6 (24 Sep 2020 19:27), Max: 97.5 (24 Sep 2020 05:06)  HR: 60 (24 Sep 2020 19:27) (47 - 60)  BP: 166/76 (24 Sep 2020 19:27) (111/60 - 166/76)  BP(mean): --  RR: 20 (24 Sep 2020 19:27) (18 - 20)  SpO2: 98% (24 Sep 2020 10:59) (98% - 99%)    PHYSICAL EXAM:   NAD; Normocephalic;   LUNGS - no wheezing  HEART: S1 S2+   ABDOMEN: Soft, Nontender, non distended  EXTREMITIES: no cyanosis; no edema  NERVOUS SYSTEM:  Awake and alert; no focal neuro deficits appreciated    LABS:                        11.9   5.01  )-----------( 161      ( 24 Sep 2020 06:57 )             36.6     09-24    141  |  107  |  20  ----------------------------<  103<H>  4.2   |  25  |  1.1    Ca    9.0      24 Sep 2020 06:57  Mg     2.3     09-24    TPro  6.1  /  Alb  4.1  /  TBili  0.3  /  DBili  x   /  AST  11  /  ALT  6   /  AlkPhos  86  09-24    PT/INR - ( 23 Sep 2020 19:10 )   PT: 12.00 sec;   INR: 1.04 ratio         PTT - ( 23 Sep 2020 19:10 )  PTT:30.0 sec    CAPILLARY BLOOD GLUCOSE  126 (23 Sep 2020 21:09)      POCT Blood Glucose.: 124 mg/dL (24 Sep 2020 16:43)  POCT Blood Glucose.: 225 mg/dL (24 Sep 2020 11:18)  POCT Blood Glucose.: 116 mg/dL (24 Sep 2020 07:35)      A/P:-  Pt is seen and evaluated by bedside.   1. CVA   2. CKD stage 3 - stable   3. CAD s/p 2 stents   4. HLD  5. DM     - CT head negative for acute pathology   - CTA head/neck - no large vessel occlusion   - Neuro on board   - Follow echo with bubble studies and MRI   - platelet functional assay for aspirin and plavix noted   - continue with aspirin - follow up neuro for further recs   - continue atorvastatin 80 mg daily   - sliding scale - add basal bolus if needed   - target -180  - follow HbA1c, fasting lipid profile and tsh   - lovenox for dvt prophy      Plan of care was discussed with patient ; all questions and concerns were addressed and care was aligned with patient's wishes.
Transient sensory deficit, if due to a vascular neurologic event, likely to be from small vessel thrombotic  Antiplatelets treatment (either ASA or plavix) and risk factor modification is recommended for secondary prophylaxis. ASA seems reasonable as patient was currently not on any antiplatelet   Stable for discharge.

## 2020-09-25 NOTE — PROGRESS NOTE ADULT - SUBJECTIVE AND OBJECTIVE BOX
INTERVAL HPI/OVERNIGHT EVENTS: no complaints, feels back to baseline  ROS: Denies CP, SOB, AP, new weakness  All other systems reviewed and are within normal limits.  InitialHPI:  80 year old male ith a PMH of CAD (s/p 2 stents, follows with Dr. Saldana), DM, HLD presents to the ED due to sudden onset of left arm and facial numbness, with a left sides facial droop. Of note patient is fully functional, right handed male at baseline. The symptoms of numbness lasted less then an hour but the facial droop persisted. Currently symptoms have resolved. Due to the numbness he presented to the ED. Endorses having these symptoms one week prior which lasted 20-30 mins, but also included lower extremity weakness as well. Denied SOB, dizziness, lightheadedness, vertigo, difficulty ambulating, slurred speech, changes in cognition, CP, headache, or head trauma.   When presenting to ED code stroke was called. No TPA given. Vitals: Temp 98.9F, HR 69, / 69, RR 16 [id number]%. CT head: No CT evidence for acute intracranial pathology. CT A H + N:  No large vessel occlusion or significant stenosis of the vessels of the head and neck. Patient admitted for CVA vs. TIA.        (24 Sep 2020 00:55)    PAST MEDICAL & SURGICAL HISTORY:  MI (myocardial infarction)    DM (diabetes mellitus)    Coronary stent patent        General: NAD, AAO3, +mild cognitive deficits  HEENT:  no LAD  CV: S1 S2  Resp: decreased breath sounds at bases  GI: NT/ND/S +BS  MS: no clubbing/cyanosis/edema, + pulses b/l  Neuro: nonfocal, +reflexes thruout    tele - no events    MEDICATIONS  (STANDING):  aspirin  chewable 81 milliGRAM(s) Oral daily  atorvastatin 80 milliGRAM(s) Oral at bedtime  enoxaparin Injectable 40 milliGRAM(s) SubCutaneous daily  insulin lispro (HumaLOG) corrective regimen sliding scale   SubCutaneous three times a day before meals  insulin lispro (HumaLOG) corrective regimen sliding scale   SubCutaneous at bedtime  pantoprazole    Tablet 40 milliGRAM(s) Oral before breakfast    MEDICATIONS  (PRN):    Vital Signs Last 24 Hrs  T(C): 35.8 (25 Sep 2020 14:27), Max: 36.6 (24 Sep 2020 20:55)  T(F): 96.4 (25 Sep 2020 14:27), Max: 97.9 (24 Sep 2020 20:55)  HR: 55 (25 Sep 2020 14:27) (52 - 60)  BP: 108/55 (25 Sep 2020 14:27) (108/55 - 166/76)  BP(mean): --  RR: 18 (25 Sep 2020 05:11) (18 - 20)  SpO2: 97% (25 Sep 2020 01:40) (97% - 97%)  CAPILLARY BLOOD GLUCOSE      POCT Blood Glucose.: 123 mg/dL (25 Sep 2020 11:05)  POCT Blood Glucose.: 129 mg/dL (25 Sep 2020 07:35)  POCT Blood Glucose.: 148 mg/dL (24 Sep 2020 21:13)  POCT Blood Glucose.: 124 mg/dL (24 Sep 2020 16:43)                          11.7   5.06  )-----------( 147      ( 25 Sep 2020 07:11 )             36.1     09-25    140  |  108  |  26<H>  ----------------------------<  115<H>  4.4   |  24  |  1.4    Ca    8.7      25 Sep 2020 07:11  Mg     2.3     09-24    TPro  6.0  /  Alb  3.9  /  TBili  0.3  /  DBili  x   /  AST  11  /  ALT  6   /  AlkPhos  84  09-25    LIVER FUNCTIONS - ( 25 Sep 2020 07:11 )  Alb: 3.9 g/dL / Pro: 6.0 g/dL / ALK PHOS: 84 U/L / ALT: 6 U/L / AST: 11 U/L / GGT: x           CARDIAC MARKERS ( 23 Sep 2020 19:10 )  x     / <0.01 ng/mL / x     / x     / x          PT/INR - ( 23 Sep 2020 19:10 )   PT: 12.00 sec;   INR: 1.04 ratio         PTT - ( 23 Sep 2020 19:10 )  PTT:30.0 sec            Chart, Consultant(s) Notes Reviewed:  [x ] YES  [ ] NO  Care Discussed with Consultants/Other Providers/ Housestaff [ x] YES  [ ] NO  Radiology, labs, old available records personally reviewed.

## 2020-09-25 NOTE — PROGRESS NOTE ADULT - ASSESSMENT
Assessment: Currently admitted to medicine with the primary diagnosis of CVA (cerebral vascular accident) vs TIA    Plan:  #Suspected TIA       - Symptoms have resolved    -MRI negative  -pt was not taking neither ASA nor Plavix (confirmed with pharmacy) and therefore Plt function test showed no inhibition  -neuro Dr. Bonner recommends  on d/c  -d/w Dr. Mera who agrees w/ ASA from cardiac standpoint  -appt scheduled with Dr. Cordoba on 9/30 9.30am    #Macrocytic anemia - stable H&H    #CKD 3a likely diabetic nephropathy    #Dyslipidemia w/ hypertriglyceridemia      - Atorvastatin 80 mg PO Daily    #CAD - hx of PCI w/ placement of 2 stents      #Bradycardia - asymptomatic     #DM w/ hyperglycemia       - POCT ACHS      - Carb consistent diet (cleared dysphagia screen)    D/w son John in details and encouraged him to go to appts with his father and monitor his meds due to some mild cognitive deficits.    Discharge instructions discussed and patient/son Jonh know when to seek immediate medical attention.  Patient has proper follow up.  All results discussed and patient/son aware they require further follow up/work up.  Stressed importance of proper follow up.  Medications prescribed and changes discussed.  All questions and concerns from patient and family addressed. Understanding of instructions verbalized.    Time spent in completing discharge process and coordinating care 45 minutes.    Discussed with housestaff, nursing, case mgmt, neuro, cardio, son

## 2020-09-29 DIAGNOSIS — D53.9 NUTRITIONAL ANEMIA, UNSPECIFIED: ICD-10-CM

## 2020-09-29 DIAGNOSIS — E11.22 TYPE 2 DIABETES MELLITUS WITH DIABETIC CHRONIC KIDNEY DISEASE: ICD-10-CM

## 2020-09-29 DIAGNOSIS — E11.65 TYPE 2 DIABETES MELLITUS WITH HYPERGLYCEMIA: ICD-10-CM

## 2020-09-29 DIAGNOSIS — F17.200 NICOTINE DEPENDENCE, UNSPECIFIED, UNCOMPLICATED: ICD-10-CM

## 2020-09-29 DIAGNOSIS — R29.810 FACIAL WEAKNESS: ICD-10-CM

## 2020-09-29 DIAGNOSIS — G45.9 TRANSIENT CEREBRAL ISCHEMIC ATTACK, UNSPECIFIED: ICD-10-CM

## 2020-09-29 DIAGNOSIS — Z79.4 LONG TERM (CURRENT) USE OF INSULIN: ICD-10-CM

## 2020-09-29 DIAGNOSIS — Z88.0 ALLERGY STATUS TO PENICILLIN: ICD-10-CM

## 2020-09-29 DIAGNOSIS — Z79.82 LONG TERM (CURRENT) USE OF ASPIRIN: ICD-10-CM

## 2020-09-29 DIAGNOSIS — I10 ESSENTIAL (PRIMARY) HYPERTENSION: ICD-10-CM

## 2020-09-29 DIAGNOSIS — R00.1 BRADYCARDIA, UNSPECIFIED: ICD-10-CM

## 2020-09-29 DIAGNOSIS — R22.0 LOCALIZED SWELLING, MASS AND LUMP, HEAD: ICD-10-CM

## 2020-09-29 DIAGNOSIS — E78.5 HYPERLIPIDEMIA, UNSPECIFIED: ICD-10-CM

## 2020-09-29 DIAGNOSIS — I25.2 OLD MYOCARDIAL INFARCTION: ICD-10-CM

## 2020-09-29 PROBLEM — I21.9 ACUTE MYOCARDIAL INFARCTION, UNSPECIFIED: Chronic | Status: ACTIVE | Noted: 2020-09-23

## 2020-09-29 PROCEDURE — 86077 PHYS BLOOD BANK SERV XMATCH: CPT

## 2020-09-30 ENCOUNTER — APPOINTMENT (OUTPATIENT)
Dept: GERIATRICS | Facility: CLINIC | Age: 80
End: 2020-09-30

## 2020-09-30 PROBLEM — Z00.00 ENCOUNTER FOR PREVENTIVE HEALTH EXAMINATION: Status: ACTIVE | Noted: 2020-09-30

## 2020-11-23 NOTE — ED ADULT NURSE NOTE - CHPI ED NUR SYMPTOMS NEG
Dry/Warm
no vomiting/no blurred vision/no change in level of consciousness/no loss of consciousness/no confusion/no nausea/no weakness/no dizziness/no fever

## 2021-04-05 NOTE — PHYSICAL THERAPY INITIAL EVALUATION ADULT - FOLLOWS COMMANDS/ANSWERS QUESTIONS, REHAB EVAL
CALL TO PATIENT; he had a Bamlanivimab infusion for covid on 1/4/21; he was adivsed not to have a coivd vaccination for 90 days; the 90 days are up now and he questions what to do ; he is scheduled for heart catherization on 4/14/21; should he wait on getting the vaccine until that procedure is over; he has office f/u scheduled with  on 4/27/21.   100% of the time

## 2023-03-31 NOTE — OCCUPATIONAL THERAPY INITIAL EVALUATION ADULT - LEVEL OF INDEPENDENCE: STAND/SIT, REHAB EVAL
Patient: Jaylyn Erickson Date: 3/31/2023   : 1963    60 year old female      OUTPATIENT WOUND CARE PROGRESS NOTE    Supervising Wound Care / Hyperbaric Medicine Physician: Not Applicable  Consulting Provider:  MAX Powers  Date of Consultation/Last Comprehensive Exam:  2023  Referring  Provider:  Tanisha Pandey MD    SUBJECTIVE:    Chief Complaint:  Right leg ulcer      Wound/Ulcer Present:    Other, Trauma/Cellulitis    Additional Wound Category:  None     Maximum Baseline Ambulatory Status:  Unable to assess    History of Present Illness:  This is a 60 year old female admitted to acute care on 2023 for infected right leg wound. Patient reports that she had dropped a heavy load of wet laundry in a laundry basket down her leg about two weeks ago.     Wound culture taken 2023; prelim staph aureus and proteus mirabilis.  Venous duplex negative for DVT 2023.    At baseline, the patient lives independently with her spouse. She works at [employer] in the [employer].    Right leg wound s/p I and D by surgery on 2023 with placement of NPWT.    Patient has returned to work on 3/7/2023 full time without issue.    Interval Note 23:  Patient is in clinic today for routine wound check of right leg wound and dressing change.   Patient states that she's tired since after working.  She has no issue with her wound or compression wraps.  Per RN, patient has heavy drainage and through the wraps, today.  However, noted by RN to have no periwound skin breakdown, today.  Patient has been doing well, otherwise, and had no acute illness.  She has been eating well.     Current Treatment Regimen:  Dressing:  Keli/Therabond//Coflex 2-layer   Frequency:  Twice a week   Changed by:  Wound care clinic nurse    Review of Systems:  Pertinent items are noted in HPI (history of present illness).    Past Medical History:   Diagnosis Date   • Arthritis     Arthritis in her back   • Asthma     • Chronic venous insufficiency 2023   • Essential (primary) hypertension    • Hypothyroidism    • Surgical wound, non healing, sequela 2023     Past Surgical History:   Procedure Laterality Date   • Appendectomy     •  delivery+postpartum care         •  delivery+postpartum care         • Ligate fallopian tube      Sterilization ligate Fallopian tubes   • Removal gallbladder      Cholecystectomy   • Remove tonsils/adenoids,<14 y/o      Tonsillectomy w Adenoids   • Thyroidectomy      Thyroidectomy (complete)   • Tonsillectomy and adenoidectomy     • Total abdom hysterectomy      [facility] with Ovaries Intact     Social History     Socioeconomic History   • Marital status: /Civil Union     Spouse name: Not on file   • Number of children: Not on file   • Years of education: Not on file   • Highest education level: Not on file   Occupational History   • Occupation: deli at [employer]   Tobacco Use   • Smoking status: Former     Packs/day: 0.30     Years: 15.00     Pack years: 4.50     Types: Cigarettes     Quit date: 2017     Years since quittin.2   • Smokeless tobacco: Never   • Tobacco comments:     1 pack per week; quit !   Vaping Use   • Vaping Use: never used   Substance and Sexual Activity   • Alcohol use: No   • Drug use: No   • Sexual activity: Yes     Partners: Male     Birth control/protection: Surgical     Comment: hysterectomy   Other Topics Concern   • Not on file   Social History Narrative   • Not on file     Social Determinants of Health     Financial Resource Strain: Low Risk    • Social Determinants: Financial Resource Strain: None   Food Insecurity: No Food Insecurity   • Social Determinants: Food Insecurity: Never   Transportation Needs: No Transportation Needs   • Lack of Transportation (Medical): No   • Lack of Transportation (Non-Medical): No   Physical Activity: Not on file   Stress: Not on file   Social Connections: Socially Integrated    • Social Determinants: Social Connections: 5 or more times a week   Intimate Partner Violence: Not At Risk   • Social Determinants: Intimate Partner Violence Past Fear: No   • Social Determinants: Intimate Partner Violence Current Fear: No     Family History   Problem Relation Age of Onset   • Heart disease Mother         had Shunts placed   • Cancer Maternal Aunt         breast   • NEGATIVE FAMILY HX OF Other         ovarian, colon cancer       Current Outpatient Medications   Medication Sig   • acetaminophen (TYLENOL) 325 MG tablet Take 2 tablets by mouth every 4 hours as needed for Pain.   • oxyCODONE, IMM REL, (ROXICODONE) 5 MG immediate release tablet Take 1 tablet by mouth every 6 hours as needed for Pain.   • polyethylene glycol (MIRALAX) 17 GM/SCOOP powder Take 17 g by mouth daily as needed (constipation). Stir and dissolve powder in any 4 to 8 ounces of beverage, then drink.   • amLODIPine (NORVASC) 5 MG tablet Take 5 mg by mouth daily.   • aspirin 325 MG EC tablet Take 325 mg by mouth daily.   • ferrous sulfate 325 (65 FE) MG tablet Take 325 mg by mouth daily.   • LORazepam (ATIVAN) 1 MG tablet Take 1 mg by mouth at bedtime as needed for Anxiety.   • levothyroxine 200 MCG tablet Take 200 mcg by mouth daily.   • lisinopril (ZESTRIL) 40 MG tablet Take 40 mg by mouth 2 (two) times a day.   • amphetamine-dextroamphetamine (ADDERALL) 10 MG tablet Take 10 mg by mouth 2 times daily.   • ALBUTEROL 90 MCG/ACT IN AERS Inhale 2 puffs into the lungs as needed.     No current facility-administered medications for this encounter.        ALLERGIES:  Patient has no known allergies.    OBJECTIVE:  Vital Signs:    Visit Vitals  BP (!) 173/81 (BP Location: LFA - Left forearm, Patient Position: Sitting)   Pulse 85   Temp 97.6 °F (36.4 °C) (Tympanic)   Resp 20   LMP 10/09/2007       Physical Exam:  General appearance: Alert, obese, oriented to person, place, time and situation, in no distress and cooperative  Pulmonary:  normal respiratory effort     Right lower extremity: trace to 1+ edema, controlled; palpable pedal pulses; (triphasic DP/PT doppler signals 2023). Periwound skin are intact today.  No skin breakdown.    Right medial leg: Full-thickness ulcer; base with primarily granulation tissue with mix of non viable slough sharply debrided to increase viability. Neoepithelial ingrowth from wound margins, noted.  Interspersed areas with depth in wound remains but with increase granulation tissue noted.  Heavy amount of drainage remains.  No purulence or malodor.  No periwound erythema or increase warmth or fluctuance or induration.  Chronic inflammatory changes to skin, less inflammed, with chronic hemosiderosis staining.       3/31/2023 R medial leg Pre                         Post debride      3/24/2023 Overview RLE             R medial leg Pre debride                     Post debride      3/17/2023 Overview of RLE            R medial leg Pre debride                    Post debride      03/10/2023 Overview RLE:   Right medial le/10/2023 Right medial leg pre-debride:  Post-debride:    ===============================================================  2023 Overview of RLE      2023 Overview of RLE                                        R medial leg wound      2023 Overview of RLE                                       R medial leg      2023 Right medial leg wound      2023 Right leg:      Wound Bed Quality:  As above      Antonina-wound Quality:    As above    Additional Descriptors:  As above    Wound Measurements Per Flowsheet:     Wound Leg Right Anterior;Lower Incision (Active)   Number of days: 1067       Wound Abdomen Skin fold Moisture Associated Skin Damage (MASD) (Active)   Number of days: 1067       Wound Leg Right Anterior Traumatic (Active)   Wound Length (cm) 6.6 cm 23 1400   Wound Width (cm) 3.3 cm 23 1400   Wound Depth (cm) 0.7 cm 23 1400   Wound Surface Area  (cm^2) 21.78 cm^2 03/31/23 1400   Wound Volume (cm^3) 15.246 cm^3 03/31/23 1400   Wound Volume Change (Initial) -5 cm3 03/31/23 1400   Wound Volume % Change (Initial) -24.71 % 03/31/23 1400   Wound Volume Change (30 days) 1.92 cm3 03/31/23 1400   Wound Volume % Change (30 days) 14.42 % 03/31/23 1400   Number of days: 65     PROCEDURE: Right medial leg wound debrided  Debridement: Selective Non-Excisional Debridement of Non-viable Tissue.  Informed consent obtained.  Time out was completed.  Patient, procedure, and site verified.  Anesthesia-  Topical  Size-  Less than or equal to 20 sq cm  Total debrided area was 6 sq cm    Instrument-  Curette  Non-viable tissue removed-  Fibrin/Slough  Hemostasis achieved-  Pressure  Patient procedure was-  limited by pain.  Refer to nursing notes for wound dimensions and assessment.  Patient stable upon completion of procedure.    Procedure was Performed by:  Physician     Laboratory assessments reviewed:  Prealbumin (mg/dL)   Date Value   01/26/2023 12.1 (L)      Albumin (g/dL)   Date Value   01/30/2023 2.8 (L)   01/25/2023 3.2 (L)   04/28/2020 3.5 (L)      No results available in last 24 hours    Lab Results   Component Value Date    WBC 3.5 (L) 01/30/2023    GLUCOSE 98 01/30/2023    CRP 0.3 01/30/2023    RESR 34 (H) 01/25/2023    CREATININE 0.68 01/30/2023    GFRA >60 09/17/2011    GFRNA >60 09/17/2011        Culture results:  No results found for: SDES CULTURE (no units)   Date Value   03/28/2007 ESCHERICHIA COLI        Diagnostic Assessments Reviewed:  No new update    Nutritional Assessment:  Prealbumin and/or Albumin reviewed  Nutritional consult completed 01/26/2023    Wound treatment goals are palliative:  No    DIAGNOSES:  Cellulitis RIGHT LEG  Non-healing surgical wound right leg  Traumatic wound RIGHT LEG  Venous insufficiency, chronic       Medical Decision Making:   Traumatic right leg wound complicated by infection; in the setting of venous insufficiency.    S/p  debridement by surgery on 1/27/2022 and wound vac placement.    Patient is seen for interval  follow up, 03/31/23 for right leg wound; Wound continues to improve with increase granulation and neoepithelial ingrowth, with interspersed areas of depth. Wound was again, sharply debrided to increase viability; debridement limited due to pain, today. Increase drainage noted on dressings, today, went through the wrap. No signs of acute infection. Will continue with Keli to areas of depth, only, followed by Adaptic to prevent sticking, thenTherabond and absorbent dry secondary dressing like diaper.    Detailed wound care as below.       Local wound care (RIGHT LOWER EXTREMITY):  -2x/week and as needed.  -Clean with soap and water.  Rinse and pat dry.  -Vashe soak for 5 minutes  -Keli to wound base with depth, followed by adaptic, then Therabond;  Absorbent dry secondary dressings of choice for drainage as needed.( diaper or equivalent)  -Compression with Coflex 2-layer    Infection control:  - Wound culture taken 01/25/2023; prelim with staph and proteus.  - On Rocephin per hospitalist.  Discharged to home on 1/30/2023 with Cefpodoxime( Vantin) 200 mg bid for 12 days.  Completed.  -No signs of acute infection.     Vascular:  - No evidence of ischemia; does appear to have an underlying venous insufficiency; initiating light compression 01/26/2023.    Offloading:  - Encourage elevation of legs; avoid pressure to wound area.    Nutrition:  - Low albumins/prealbumin; dietician consult 01/26/2023; started Ensure Plus HP. Patient is eating well.     Follow up in 1 week with provider.  2x/week with RN for dressings change.  Sooner if needed.      Plan of Care:  Advanced Wound Care Recommendations:  As above  Percent Wound Closure from consult:  N/A  Care plan to augment wound closure:  Not applicable.  Consult 01/25/2023    Significant note data copied forward from my prior note and reviewed by me as needed in the formulation  of this note and plan.    Ade Grey MD     supervision

## 2023-09-14 ENCOUNTER — APPOINTMENT (OUTPATIENT)
Dept: NEUROLOGY | Facility: CLINIC | Age: 83
End: 2023-09-14
Payer: MEDICARE

## 2023-09-14 ENCOUNTER — APPOINTMENT (OUTPATIENT)
Dept: RADIOLOGY | Facility: CLINIC | Age: 83
End: 2023-09-14

## 2023-09-14 VITALS — HEART RATE: 68 BPM | SYSTOLIC BLOOD PRESSURE: 167 MMHG | DIASTOLIC BLOOD PRESSURE: 96 MMHG

## 2023-09-14 VITALS — HEIGHT: 70 IN | WEIGHT: 170 LBS | BODY MASS INDEX: 24.34 KG/M2

## 2023-09-14 DIAGNOSIS — F17.200 NICOTINE DEPENDENCE, UNSPECIFIED, UNCOMPLICATED: ICD-10-CM

## 2023-09-14 PROCEDURE — 99204 OFFICE O/P NEW MOD 45 MIN: CPT

## 2023-09-14 PROCEDURE — 72110 X-RAY EXAM L-2 SPINE 4/>VWS: CPT

## 2023-09-14 RX ORDER — GABAPENTIN 100 MG/1
100 CAPSULE ORAL 3 TIMES DAILY
Qty: 90 | Refills: 2 | Status: ACTIVE | COMMUNITY
Start: 2023-09-14 | End: 1900-01-01

## 2023-09-22 ENCOUNTER — APPOINTMENT (OUTPATIENT)
Dept: MRI IMAGING | Facility: CLINIC | Age: 83
End: 2023-09-22
Payer: MEDICARE

## 2023-09-22 PROCEDURE — 72148 MRI LUMBAR SPINE W/O DYE: CPT | Mod: MH

## 2023-09-25 NOTE — PATIENT PROFILE ADULT - LONGEST PERIOD TOBACCO-FREE
Urine culture does not show UTI.  Please call mom with this update.  Looks like he was hospitalized over the weekend for constipation and abdominal pain.  Please check in for an update and report back to Dr. Gorge Walls if there are still concerns regarding abdominal pain.  She is PCP. 2WEEKS

## 2023-09-29 ENCOUNTER — APPOINTMENT (OUTPATIENT)
Dept: PAIN MANAGEMENT | Facility: CLINIC | Age: 83
End: 2023-09-29
Payer: MEDICARE

## 2023-09-29 PROCEDURE — 95886 MUSC TEST DONE W/N TEST COMP: CPT

## 2023-09-29 PROCEDURE — 95912 NRV CNDJ TEST 11-12 STUDIES: CPT

## 2023-10-17 ENCOUNTER — APPOINTMENT (OUTPATIENT)
Dept: PAIN MANAGEMENT | Facility: CLINIC | Age: 83
End: 2023-10-17
Payer: MEDICARE

## 2023-10-17 VITALS
DIASTOLIC BLOOD PRESSURE: 102 MMHG | SYSTOLIC BLOOD PRESSURE: 157 MMHG | BODY MASS INDEX: 24.34 KG/M2 | HEART RATE: 75 BPM | HEIGHT: 70 IN | WEIGHT: 170 LBS

## 2023-10-17 DIAGNOSIS — Z86.73 PERSONAL HISTORY OF TRANSIENT ISCHEMIC ATTACK (TIA), AND CEREBRAL INFARCTION W/OUT RESIDUAL DEFICITS: ICD-10-CM

## 2023-10-17 DIAGNOSIS — M54.50 LOW BACK PAIN, UNSPECIFIED: ICD-10-CM

## 2023-10-17 DIAGNOSIS — Z86.39 PERSONAL HISTORY OF OTHER ENDOCRINE, NUTRITIONAL AND METABOLIC DISEASE: ICD-10-CM

## 2023-10-17 DIAGNOSIS — M51.17 INTERVERTEBRAL DISC DISORDERS WITH RADICULOPATHY, LUMBOSACRAL REGION: ICD-10-CM

## 2023-10-17 DIAGNOSIS — I25.2 OLD MYOCARDIAL INFARCTION: ICD-10-CM

## 2023-10-17 PROCEDURE — 99214 OFFICE O/P EST MOD 30 MIN: CPT

## 2023-10-17 RX ORDER — ASPIRIN 81 MG
81 TABLET, DELAYED RELEASE (ENTERIC COATED) ORAL
Refills: 0 | Status: ACTIVE | COMMUNITY

## 2023-10-17 RX ORDER — ROSUVASTATIN CALCIUM 10 MG/1
10 TABLET, FILM COATED ORAL
Refills: 0 | Status: ACTIVE | COMMUNITY

## 2023-10-17 RX ORDER — METFORMIN HYDROCHLORIDE 500 MG/1
500 TABLET, COATED ORAL
Refills: 0 | Status: ACTIVE | COMMUNITY

## 2023-10-17 RX ORDER — CLOPIDOGREL BISULFATE 75 MG/1
75 TABLET, FILM COATED ORAL
Refills: 0 | Status: ACTIVE | COMMUNITY

## 2023-10-31 ENCOUNTER — APPOINTMENT (OUTPATIENT)
Dept: PAIN MANAGEMENT | Facility: CLINIC | Age: 83
End: 2023-10-31
Payer: MEDICARE

## 2023-10-31 PROCEDURE — 93040 RHYTHM ECG WITH REPORT: CPT | Mod: 79

## 2023-10-31 PROCEDURE — 94761 N-INVAS EAR/PLS OXIMETRY MLT: CPT

## 2023-10-31 PROCEDURE — 62323 NJX INTERLAMINAR LMBR/SAC: CPT

## 2023-10-31 PROCEDURE — 93770 DETERMINATION VENOUS PRESS: CPT

## 2023-11-21 ENCOUNTER — APPOINTMENT (OUTPATIENT)
Dept: PAIN MANAGEMENT | Facility: CLINIC | Age: 83
End: 2023-11-21

## 2023-11-21 ENCOUNTER — APPOINTMENT (OUTPATIENT)
Dept: PAIN MANAGEMENT | Facility: CLINIC | Age: 83
End: 2023-11-21
Payer: MEDICARE

## 2023-11-21 PROCEDURE — 93770 DETERMINATION VENOUS PRESS: CPT

## 2023-11-21 PROCEDURE — 93040 RHYTHM ECG WITH REPORT: CPT | Mod: 79

## 2023-11-21 PROCEDURE — 94761 N-INVAS EAR/PLS OXIMETRY MLT: CPT

## 2023-11-21 PROCEDURE — 62323 NJX INTERLAMINAR LMBR/SAC: CPT

## 2023-12-08 ENCOUNTER — APPOINTMENT (OUTPATIENT)
Dept: PAIN MANAGEMENT | Facility: CLINIC | Age: 83
End: 2023-12-08

## 2023-12-20 ENCOUNTER — APPOINTMENT (OUTPATIENT)
Dept: PAIN MANAGEMENT | Facility: CLINIC | Age: 83
End: 2023-12-20
Payer: MEDICARE

## 2023-12-20 VITALS
WEIGHT: 170 LBS | DIASTOLIC BLOOD PRESSURE: 65 MMHG | HEART RATE: 95 BPM | BODY MASS INDEX: 24.34 KG/M2 | HEIGHT: 70 IN | SYSTOLIC BLOOD PRESSURE: 140 MMHG

## 2023-12-20 DIAGNOSIS — M54.16 RADICULOPATHY, LUMBAR REGION: ICD-10-CM

## 2023-12-20 PROCEDURE — 99213 OFFICE O/P EST LOW 20 MIN: CPT

## 2023-12-20 RX ORDER — METHOCARBAMOL 750 MG/1
750 TABLET, FILM COATED ORAL 3 TIMES DAILY
Qty: 90 | Refills: 1 | Status: ACTIVE | COMMUNITY
Start: 2023-12-20 | End: 1900-01-01

## 2023-12-20 NOTE — HISTORY OF PRESENT ILLNESS
[FreeTextEntry1] : ORIGINAL PRESENTATION: This is an 83-year-old male here to establish care for lower back pain that travels across the lower back with consistent radicular features into the lower extremities. The pain is severe and makes it difficult for him to perform his ADLs. He previously underwent back surgery about 20 years ago. He is referred to me by Dr. Berg for further care. Imaging of the lumbar spine and EMG/NCV of the lower extremities were reviewed with the patient and are documented below. He was provided with a script for physical therapy but has not started as of yet. We discussed trialing conservative treat vs injection therapy in detail today. He is interested in moving forward with injection therapy.   The patient has had this pain for 1 year. The pain started after surgery.  Patient describes pain as moderate to severe and constant. During the last month the pain has been worse in no typical pattern. Pain described as sharp, pressure-like, cramping. Pain is associated with numbness and pins and needles into the feet. Pain is increased with standing, walking, exercise. Pain is not changed with lying down, sitting, relaxation, coughing/sneezing, bowel movements. Bowel or bladder habits have not changed.  ACTIVITIES: Patient could walk 1-2 blocks before the pain starts.The patient often lays down because of pain. Patient uses cane at this time. Patient has difficulty performing household chores, doing yard work or shopping, socializing with friends, participating regulation activities, exercising at this time.  PRIOR PAIN TREATMENTS: Moderate relief with surgery, heat treatment.  PRIOR PAIN MEDICATIONS: No prior pain medications.  PATIENT PRESENTS FOR FOLLOW UP: He is under our care for lower back pain that travels across the lower back with consistent radicular features into the lower extremities. He is s/p caudal JUSTIN x2 on 10/31/23 and 11/21/23 which overall provided him with moderate relief of his symptoms. The pain was not as severe following the first injection. After the second, he reports minimal relief and change in the state of his pain. Continues to have difficulty with ambulation and notes that he will not go anywhere without his cane because he is afraid of falling. There is also a lingering tightness in the leg muscles. The option to repeat the injection was discussed and he is agreeable to move forward.

## 2023-12-20 NOTE — PHYSICAL EXAM
[Normal Coordination] : normal coordination [Normal DTR UE/LE] : normal DTR UE/LE  [Normal Sensation] : normal sensation [Normal Mood and Affect] : normal mood and affect [Orientated] : orientated [Able to Communicate] : able to communicate [Well Developed] : well developed [Well Nourished] : well nourished [Extension] : extension [Diminished] : patella reflex diminished [] : no swelling

## 2023-12-20 NOTE — ASSESSMENT
[FreeTextEntry1] : This is an 83-year-old male with complaints of lower back pain with radicular features into the bilateral lower extremities. The pain is constant and makes it difficult for him to perform his ADLs. Pain also travels across the lower back. He is s/p caudal JUSTIN x2 on 10/31/23 and 11/21/23 which overall provided him with moderate relief of his symptoms. He continues to have a lingering tightness in the leg muscles and difficulty ambulating. He was scheduled to undergo a 3rd injection, but the appointment was canceled. He would like to trial a 3rd caudal JUSTIN w/ mac at this time. In the interim, I will send a muscle relaxant to the pharmacy.  We will follow up post injection. All this patients questions were answered and the conversation was understood well.  Patient had a MRI that shows a radicular component along with pain referred into the lower extremity. Patient has trialed rehab (Home exercise, physical therapy or chiropractic care) and medications I will schedule a Caudal 1-3 depending on effectiveness.  ANESTHESIA PARAGRAPH: The patient has severe anxiety of procedures that necessitates monitored anesthesia care (MAC). The procedure performed will be close to major nerves, arteries, and spinal cord and/or joint structures. Due to the proximity of these structures, we need the patient to be still during the procedure. With the help of MAC, this will be safely achieved and decrease the risk of any complications.  RISK AND BENEFIT PARAGRAPH: Risk, benefits, pros and cons of procedure were explained to the patient using models and diagrams and their questions were answered.  I, Norma Hoyt, attest that this documentation has been prepared under the direction and in the presence of Provider Rajni Gary MD.   Thank you for allowing me to assist in the management of this patient.    Best Regards,    Rajni Gary M.D., FAAPMR   Diplomate, American Board of Physical Medicine and Rehabilitation Diplomate, American Board of Pain Medicine  Diplomate, American Board of Pain Management

## 2023-12-20 NOTE — DATA REVIEWED
[FreeTextEntry1] : MRI of the lumbar spine dated 9/22/2023 finds remote L4-5 and L3-4 laminotomies and discectomies.  Levoscoliosis.  Marked urinary bladder distention.  Stable L4-5 postoperative changes with no evidence of recurrent disc herniation.  L3-4 progressive severe spinal stenosis and right foraminal stenosis with interval right central and foraminal disc herniation and severe right foraminal stenosis.  Normal L2-3, L1-2 and T12-L1, intravertebral discs.    X-ray of the lumbar spine dated 9/14/2023 finds mild levoscoliosis with preservation of normal sagittal lordosis.  Multilevel spondylosis with severe chronic degeneration of the L4-5 and L5-S1 intervertebral discs.  No appreciable change between normal and flexion views.  Minimally accentuated upper lumbar lordosis in extension and visualization of interbody cath at L5-S1.  No evidence of instability.  Classification of the abdominal aorta mild SI joint sclerosis.  EMG/NCV of the lower extremities dated 9/29/2023 finds evidence of diffuse sensorimotor polyneuropathy in both lower extremities, consistent with peripheral neuropathy.   SOAPP: low risk 10/17/23 Low risk: Patient has combination of a low risk SOAP and no risk factors. UDS would be repeated randomly every quarter.  UDS: No data obtained today.  [place] REGISTRY: Checked.

## 2024-01-01 ENCOUNTER — INPATIENT (INPATIENT)
Facility: HOSPITAL | Age: 84
LOS: 16 days | DRG: 812 | End: 2024-12-13
Attending: HOSPITALIST | Admitting: INTERNAL MEDICINE
Payer: MEDICARE

## 2024-01-01 ENCOUNTER — RESULT REVIEW (OUTPATIENT)
Age: 84
End: 2024-01-01

## 2024-01-01 ENCOUNTER — TRANSCRIPTION ENCOUNTER (OUTPATIENT)
Age: 84
End: 2024-01-01

## 2024-01-01 VITALS
HEART RATE: 93 BPM | DIASTOLIC BLOOD PRESSURE: 70 MMHG | WEIGHT: 126.1 LBS | OXYGEN SATURATION: 97 % | HEIGHT: 68 IN | RESPIRATION RATE: 18 BRPM | TEMPERATURE: 98 F | SYSTOLIC BLOOD PRESSURE: 109 MMHG

## 2024-01-01 VITALS — TEMPERATURE: 97 F

## 2024-01-01 DIAGNOSIS — D64.9 ANEMIA, UNSPECIFIED: ICD-10-CM

## 2024-01-01 DIAGNOSIS — Z51.5 ENCOUNTER FOR PALLIATIVE CARE: ICD-10-CM

## 2024-01-01 DIAGNOSIS — Z95.5 PRESENCE OF CORONARY ANGIOPLASTY IMPLANT AND GRAFT: Chronic | ICD-10-CM

## 2024-01-01 DIAGNOSIS — A41.9 SEPSIS, UNSPECIFIED ORGANISM: ICD-10-CM

## 2024-01-01 DIAGNOSIS — Z71.89 OTHER SPECIFIED COUNSELING: ICD-10-CM

## 2024-01-01 LAB
-  AMPICILLIN/SULBACTAM: SIGNIFICANT CHANGE UP
-  AMPICILLIN/SULBACTAM: SIGNIFICANT CHANGE UP
-  AMPICILLIN: SIGNIFICANT CHANGE UP
-  AMPICILLIN: SIGNIFICANT CHANGE UP
-  AZTREONAM: SIGNIFICANT CHANGE UP
-  AZTREONAM: SIGNIFICANT CHANGE UP
-  CEFAZOLIN: SIGNIFICANT CHANGE UP
-  CEFAZOLIN: SIGNIFICANT CHANGE UP
-  CEFEPIME: SIGNIFICANT CHANGE UP
-  CEFEPIME: SIGNIFICANT CHANGE UP
-  CEFTRIAXONE: SIGNIFICANT CHANGE UP
-  CEFTRIAXONE: SIGNIFICANT CHANGE UP
-  CEFUROXIME: SIGNIFICANT CHANGE UP
-  CIPROFLOXACIN: SIGNIFICANT CHANGE UP
-  CIPROFLOXACIN: SIGNIFICANT CHANGE UP
-  CTX-M RESISTANCE MARKER: SIGNIFICANT CHANGE UP
-  ERTAPENEM: SIGNIFICANT CHANGE UP
-  ERTAPENEM: SIGNIFICANT CHANGE UP
-  ESBL: SIGNIFICANT CHANGE UP
-  GENTAMICIN: SIGNIFICANT CHANGE UP
-  GENTAMICIN: SIGNIFICANT CHANGE UP
-  LEVOFLOXACIN: SIGNIFICANT CHANGE UP
-  LEVOFLOXACIN: SIGNIFICANT CHANGE UP
-  MEROPENEM: SIGNIFICANT CHANGE UP
-  MEROPENEM: SIGNIFICANT CHANGE UP
-  NITROFURANTOIN: SIGNIFICANT CHANGE UP
-  PIPERACILLIN/TAZOBACTAM: SIGNIFICANT CHANGE UP
-  PIPERACILLIN/TAZOBACTAM: SIGNIFICANT CHANGE UP
-  TOBRAMYCIN: SIGNIFICANT CHANGE UP
-  TOBRAMYCIN: SIGNIFICANT CHANGE UP
-  TRIMETHOPRIM/SULFAMETHOXAZOLE: SIGNIFICANT CHANGE UP
-  TRIMETHOPRIM/SULFAMETHOXAZOLE: SIGNIFICANT CHANGE UP
A1C WITH ESTIMATED AVERAGE GLUCOSE RESULT: 5.7 % — HIGH (ref 4–5.6)
ALBUMIN SERPL ELPH-MCNC: 2.3 G/DL — LOW (ref 3.5–5.2)
ALBUMIN SERPL ELPH-MCNC: 2.4 G/DL — LOW (ref 3.5–5.2)
ALBUMIN SERPL ELPH-MCNC: 2.5 G/DL — LOW (ref 3.5–5.2)
ALBUMIN SERPL ELPH-MCNC: 2.6 G/DL — LOW (ref 3.5–5.2)
ALBUMIN SERPL ELPH-MCNC: 3.2 G/DL — LOW (ref 3.5–5.2)
ALBUMIN SERPL ELPH-MCNC: 3.4 G/DL — LOW (ref 3.5–5.2)
ALP SERPL-CCNC: 104 U/L — SIGNIFICANT CHANGE UP (ref 30–115)
ALP SERPL-CCNC: 114 U/L — SIGNIFICANT CHANGE UP (ref 30–115)
ALP SERPL-CCNC: 125 U/L — HIGH (ref 30–115)
ALP SERPL-CCNC: 140 U/L — HIGH (ref 30–115)
ALP SERPL-CCNC: 145 U/L — HIGH (ref 30–115)
ALP SERPL-CCNC: 151 U/L — HIGH (ref 30–115)
ALP SERPL-CCNC: 167 U/L — HIGH (ref 30–115)
ALP SERPL-CCNC: 190 U/L — HIGH (ref 30–115)
ALT FLD-CCNC: 23 U/L — SIGNIFICANT CHANGE UP (ref 0–41)
ALT FLD-CCNC: 24 U/L — SIGNIFICANT CHANGE UP (ref 0–41)
ALT FLD-CCNC: 28 U/L — SIGNIFICANT CHANGE UP (ref 0–41)
ALT FLD-CCNC: 32 U/L — SIGNIFICANT CHANGE UP (ref 0–41)
ALT FLD-CCNC: 43 U/L — HIGH (ref 0–41)
ALT FLD-CCNC: 54 U/L — HIGH (ref 0–41)
ALT FLD-CCNC: <5 U/L — SIGNIFICANT CHANGE UP (ref 0–41)
ALT FLD-CCNC: <5 U/L — SIGNIFICANT CHANGE UP (ref 0–41)
ANION GAP SERPL CALC-SCNC: 10 MMOL/L — SIGNIFICANT CHANGE UP (ref 7–14)
ANION GAP SERPL CALC-SCNC: 10 MMOL/L — SIGNIFICANT CHANGE UP (ref 7–14)
ANION GAP SERPL CALC-SCNC: 11 MMOL/L — SIGNIFICANT CHANGE UP (ref 7–14)
ANION GAP SERPL CALC-SCNC: 12 MMOL/L — SIGNIFICANT CHANGE UP (ref 7–14)
ANION GAP SERPL CALC-SCNC: 13 MMOL/L — SIGNIFICANT CHANGE UP (ref 7–14)
ANION GAP SERPL CALC-SCNC: 14 MMOL/L — SIGNIFICANT CHANGE UP (ref 7–14)
ANION GAP SERPL CALC-SCNC: 17 MMOL/L — HIGH (ref 7–14)
ANION GAP SERPL CALC-SCNC: 9 MMOL/L — SIGNIFICANT CHANGE UP (ref 7–14)
APPEARANCE UR: ABNORMAL
APPEARANCE UR: ABNORMAL
APTT BLD: 34.5 SEC — SIGNIFICANT CHANGE UP (ref 27–39.2)
APTT BLD: 37.9 SEC — SIGNIFICANT CHANGE UP (ref 27–39.2)
AST SERPL-CCNC: 13 U/L — SIGNIFICANT CHANGE UP (ref 0–41)
AST SERPL-CCNC: 16 U/L — SIGNIFICANT CHANGE UP (ref 0–41)
AST SERPL-CCNC: 27 U/L — SIGNIFICANT CHANGE UP (ref 0–41)
AST SERPL-CCNC: 37 U/L — SIGNIFICANT CHANGE UP (ref 0–41)
AST SERPL-CCNC: 52 U/L — HIGH (ref 0–41)
AST SERPL-CCNC: 65 U/L — HIGH (ref 0–41)
AST SERPL-CCNC: 7 U/L — SIGNIFICANT CHANGE UP (ref 0–41)
AST SERPL-CCNC: 9 U/L — SIGNIFICANT CHANGE UP (ref 0–41)
B PERT IGG+IGM PNL SER: ABNORMAL
BASE EXCESS BLDA CALC-SCNC: -1.6 MMOL/L — SIGNIFICANT CHANGE UP (ref -2–3)
BASOPHILS # BLD AUTO: 0 K/UL — SIGNIFICANT CHANGE UP (ref 0–0.2)
BASOPHILS # BLD AUTO: 0.01 K/UL — SIGNIFICANT CHANGE UP (ref 0–0.2)
BASOPHILS NFR BLD AUTO: 0 % — SIGNIFICANT CHANGE UP (ref 0–1)
BASOPHILS NFR BLD AUTO: 0.1 % — SIGNIFICANT CHANGE UP (ref 0–1)
BILIRUB SERPL-MCNC: 0.2 MG/DL — SIGNIFICANT CHANGE UP (ref 0.2–1.2)
BILIRUB SERPL-MCNC: <0.2 MG/DL — SIGNIFICANT CHANGE UP (ref 0.2–1.2)
BILIRUB UR-MCNC: NEGATIVE — SIGNIFICANT CHANGE UP
BILIRUB UR-MCNC: NEGATIVE — SIGNIFICANT CHANGE UP
BUN SERPL-MCNC: 26 MG/DL — HIGH (ref 10–20)
BUN SERPL-MCNC: 27 MG/DL — HIGH (ref 10–20)
BUN SERPL-MCNC: 27 MG/DL — HIGH (ref 10–20)
BUN SERPL-MCNC: 28 MG/DL — HIGH (ref 10–20)
BUN SERPL-MCNC: 28 MG/DL — HIGH (ref 10–20)
BUN SERPL-MCNC: 32 MG/DL — HIGH (ref 10–20)
BUN SERPL-MCNC: 32 MG/DL — HIGH (ref 10–20)
BUN SERPL-MCNC: 33 MG/DL — HIGH (ref 10–20)
BUN SERPL-MCNC: 35 MG/DL — HIGH (ref 10–20)
BUN SERPL-MCNC: 36 MG/DL — HIGH (ref 10–20)
BUN SERPL-MCNC: 36 MG/DL — HIGH (ref 10–20)
BUN SERPL-MCNC: 38 MG/DL — HIGH (ref 10–20)
BUN SERPL-MCNC: 40 MG/DL — HIGH (ref 10–20)
BUN SERPL-MCNC: 42 MG/DL — HIGH (ref 10–20)
BUN SERPL-MCNC: 45 MG/DL — HIGH (ref 10–20)
BUN SERPL-MCNC: 48 MG/DL — HIGH (ref 10–20)
BUN SERPL-MCNC: 48 MG/DL — HIGH (ref 10–20)
BUN SERPL-MCNC: 51 MG/DL — HIGH (ref 10–20)
BUN SERPL-MCNC: 57 MG/DL — HIGH (ref 10–20)
BUN SERPL-MCNC: 58 MG/DL — HIGH (ref 10–20)
BUN SERPL-MCNC: 59 MG/DL — HIGH (ref 10–20)
CALCIUM SERPL-MCNC: 7.9 MG/DL — LOW (ref 8.4–10.4)
CALCIUM SERPL-MCNC: 8.3 MG/DL — LOW (ref 8.4–10.5)
CALCIUM SERPL-MCNC: 8.3 MG/DL — LOW (ref 8.4–10.5)
CALCIUM SERPL-MCNC: 8.4 MG/DL — SIGNIFICANT CHANGE UP (ref 8.4–10.4)
CALCIUM SERPL-MCNC: 8.4 MG/DL — SIGNIFICANT CHANGE UP (ref 8.4–10.4)
CALCIUM SERPL-MCNC: 8.4 MG/DL — SIGNIFICANT CHANGE UP (ref 8.4–10.5)
CALCIUM SERPL-MCNC: 8.5 MG/DL — SIGNIFICANT CHANGE UP (ref 8.4–10.4)
CALCIUM SERPL-MCNC: 8.5 MG/DL — SIGNIFICANT CHANGE UP (ref 8.4–10.4)
CALCIUM SERPL-MCNC: 8.5 MG/DL — SIGNIFICANT CHANGE UP (ref 8.4–10.5)
CALCIUM SERPL-MCNC: 8.6 MG/DL — SIGNIFICANT CHANGE UP (ref 8.4–10.5)
CALCIUM SERPL-MCNC: 8.7 MG/DL — SIGNIFICANT CHANGE UP (ref 8.4–10.5)
CALCIUM SERPL-MCNC: 8.7 MG/DL — SIGNIFICANT CHANGE UP (ref 8.4–10.5)
CALCIUM SERPL-MCNC: 8.8 MG/DL — SIGNIFICANT CHANGE UP (ref 8.4–10.5)
CALCIUM SERPL-MCNC: 8.8 MG/DL — SIGNIFICANT CHANGE UP (ref 8.4–10.5)
CALCIUM SERPL-MCNC: 8.9 MG/DL — SIGNIFICANT CHANGE UP (ref 8.4–10.4)
CALCIUM SERPL-MCNC: 8.9 MG/DL — SIGNIFICANT CHANGE UP (ref 8.4–10.5)
CALCIUM SERPL-MCNC: 9.4 MG/DL — SIGNIFICANT CHANGE UP (ref 8.4–10.5)
CALCIUM SERPL-MCNC: 9.5 MG/DL — SIGNIFICANT CHANGE UP (ref 8.4–10.5)
CALCIUM SERPL-MCNC: 9.5 MG/DL — SIGNIFICANT CHANGE UP (ref 8.4–10.5)
CHLORIDE SERPL-SCNC: 103 MMOL/L — SIGNIFICANT CHANGE UP (ref 98–110)
CHLORIDE SERPL-SCNC: 103 MMOL/L — SIGNIFICANT CHANGE UP (ref 98–110)
CHLORIDE SERPL-SCNC: 104 MMOL/L — SIGNIFICANT CHANGE UP (ref 98–110)
CHLORIDE SERPL-SCNC: 105 MMOL/L — SIGNIFICANT CHANGE UP (ref 98–110)
CHLORIDE SERPL-SCNC: 106 MMOL/L — SIGNIFICANT CHANGE UP (ref 98–110)
CHLORIDE SERPL-SCNC: 108 MMOL/L — SIGNIFICANT CHANGE UP (ref 98–110)
CHLORIDE SERPL-SCNC: 109 MMOL/L — SIGNIFICANT CHANGE UP (ref 98–110)
CHLORIDE SERPL-SCNC: 110 MMOL/L — SIGNIFICANT CHANGE UP (ref 98–110)
CHLORIDE SERPL-SCNC: 110 MMOL/L — SIGNIFICANT CHANGE UP (ref 98–110)
CHLORIDE SERPL-SCNC: 111 MMOL/L — HIGH (ref 98–110)
CHLORIDE SERPL-SCNC: 111 MMOL/L — HIGH (ref 98–110)
CHLORIDE SERPL-SCNC: 112 MMOL/L — HIGH (ref 98–110)
CHLORIDE SERPL-SCNC: 112 MMOL/L — HIGH (ref 98–110)
CHLORIDE SERPL-SCNC: 113 MMOL/L — HIGH (ref 98–110)
CHLORIDE SERPL-SCNC: 114 MMOL/L — HIGH (ref 98–110)
CO2 SERPL-SCNC: 16 MMOL/L — LOW (ref 17–32)
CO2 SERPL-SCNC: 17 MMOL/L — SIGNIFICANT CHANGE UP (ref 17–32)
CO2 SERPL-SCNC: 18 MMOL/L — SIGNIFICANT CHANGE UP (ref 17–32)
CO2 SERPL-SCNC: 18 MMOL/L — SIGNIFICANT CHANGE UP (ref 17–32)
CO2 SERPL-SCNC: 19 MMOL/L — SIGNIFICANT CHANGE UP (ref 17–32)
CO2 SERPL-SCNC: 20 MMOL/L — SIGNIFICANT CHANGE UP (ref 17–32)
CO2 SERPL-SCNC: 21 MMOL/L — SIGNIFICANT CHANGE UP (ref 17–32)
CO2 SERPL-SCNC: 22 MMOL/L — SIGNIFICANT CHANGE UP (ref 17–32)
CO2 SERPL-SCNC: 24 MMOL/L — SIGNIFICANT CHANGE UP (ref 17–32)
CO2 SERPL-SCNC: 25 MMOL/L — SIGNIFICANT CHANGE UP (ref 17–32)
CO2 SERPL-SCNC: 26 MMOL/L — SIGNIFICANT CHANGE UP (ref 17–32)
COLOR FLD: SIGNIFICANT CHANGE UP
COLOR SPEC: YELLOW — SIGNIFICANT CHANGE UP
COLOR SPEC: YELLOW — SIGNIFICANT CHANGE UP
CREAT ?TM UR-MCNC: <4 MG/DL — SIGNIFICANT CHANGE UP
CREAT SERPL-MCNC: 0.9 MG/DL — SIGNIFICANT CHANGE UP (ref 0.7–1.5)
CREAT SERPL-MCNC: 1.1 MG/DL — SIGNIFICANT CHANGE UP (ref 0.7–1.5)
CREAT SERPL-MCNC: 1.2 MG/DL — SIGNIFICANT CHANGE UP (ref 0.7–1.5)
CREAT SERPL-MCNC: 1.3 MG/DL — SIGNIFICANT CHANGE UP (ref 0.7–1.5)
CREAT SERPL-MCNC: 1.4 MG/DL — SIGNIFICANT CHANGE UP (ref 0.7–1.5)
CREAT SERPL-MCNC: 1.5 MG/DL — SIGNIFICANT CHANGE UP (ref 0.7–1.5)
CREAT SERPL-MCNC: 1.6 MG/DL — HIGH (ref 0.7–1.5)
CREAT SERPL-MCNC: 1.7 MG/DL — HIGH (ref 0.7–1.5)
CREAT SERPL-MCNC: 1.8 MG/DL — HIGH (ref 0.7–1.5)
CULTURE RESULTS: ABNORMAL
CULTURE RESULTS: SIGNIFICANT CHANGE UP
CULTURE RESULTS: SIGNIFICANT CHANGE UP
D DIMER BLD IA.RAPID-MCNC: 2501 NG/ML DDU — HIGH
DIFF PNL FLD: ABNORMAL
DIFF PNL FLD: ABNORMAL
EGFR: 37 ML/MIN/1.73M2 — LOW
EGFR: 40 ML/MIN/1.73M2 — LOW
EGFR: 42 ML/MIN/1.73M2 — LOW
EGFR: 46 ML/MIN/1.73M2 — LOW
EGFR: 50 ML/MIN/1.73M2 — LOW
EGFR: 55 ML/MIN/1.73M2 — LOW
EGFR: 60 ML/MIN/1.73M2 — SIGNIFICANT CHANGE UP
EGFR: 67 ML/MIN/1.73M2 — SIGNIFICANT CHANGE UP
EGFR: 85 ML/MIN/1.73M2 — SIGNIFICANT CHANGE UP
EOSINOPHIL # BLD AUTO: 0.01 K/UL — SIGNIFICANT CHANGE UP (ref 0–0.7)
EOSINOPHIL # BLD AUTO: 0.02 K/UL — SIGNIFICANT CHANGE UP (ref 0–0.7)
EOSINOPHIL # BLD AUTO: 0.05 K/UL — SIGNIFICANT CHANGE UP (ref 0–0.7)
EOSINOPHIL # BLD AUTO: 0.05 K/UL — SIGNIFICANT CHANGE UP (ref 0–0.7)
EOSINOPHIL # BLD AUTO: 0.06 K/UL — SIGNIFICANT CHANGE UP (ref 0–0.7)
EOSINOPHIL # BLD AUTO: 0.1 K/UL — SIGNIFICANT CHANGE UP (ref 0–0.7)
EOSINOPHIL # BLD AUTO: 0.13 K/UL — SIGNIFICANT CHANGE UP (ref 0–0.7)
EOSINOPHIL NFR BLD AUTO: 0.2 % — SIGNIFICANT CHANGE UP (ref 0–8)
EOSINOPHIL NFR BLD AUTO: 0.2 % — SIGNIFICANT CHANGE UP (ref 0–8)
EOSINOPHIL NFR BLD AUTO: 0.7 % — SIGNIFICANT CHANGE UP (ref 0–8)
EOSINOPHIL NFR BLD AUTO: 0.8 % — SIGNIFICANT CHANGE UP (ref 0–8)
EOSINOPHIL NFR BLD AUTO: 1 % — SIGNIFICANT CHANGE UP (ref 0–8)
EOSINOPHIL NFR BLD AUTO: 1.1 % — SIGNIFICANT CHANGE UP (ref 0–8)
EOSINOPHIL NFR BLD AUTO: 1.6 % — SIGNIFICANT CHANGE UP (ref 0–8)
ESTIMATED AVERAGE GLUCOSE: 117 MG/DL — HIGH (ref 68–114)
FERRITIN SERPL-MCNC: 74 NG/ML — SIGNIFICANT CHANGE UP (ref 30–400)
FLUID INTAKE SUBSTANCE CLASS: SIGNIFICANT CHANGE UP
GAS PNL BLDA: SIGNIFICANT CHANGE UP
GLUCOSE BLDC GLUCOMTR-MCNC: 111 MG/DL — HIGH (ref 70–99)
GLUCOSE BLDC GLUCOMTR-MCNC: 119 MG/DL — HIGH (ref 70–99)
GLUCOSE BLDC GLUCOMTR-MCNC: 122 MG/DL — HIGH (ref 70–99)
GLUCOSE BLDC GLUCOMTR-MCNC: 124 MG/DL — HIGH (ref 70–99)
GLUCOSE BLDC GLUCOMTR-MCNC: 125 MG/DL — HIGH (ref 70–99)
GLUCOSE BLDC GLUCOMTR-MCNC: 125 MG/DL — HIGH (ref 70–99)
GLUCOSE BLDC GLUCOMTR-MCNC: 128 MG/DL — HIGH (ref 70–99)
GLUCOSE BLDC GLUCOMTR-MCNC: 129 MG/DL — HIGH (ref 70–99)
GLUCOSE BLDC GLUCOMTR-MCNC: 134 MG/DL — HIGH (ref 70–99)
GLUCOSE BLDC GLUCOMTR-MCNC: 136 MG/DL — HIGH (ref 70–99)
GLUCOSE BLDC GLUCOMTR-MCNC: 140 MG/DL — HIGH (ref 70–99)
GLUCOSE BLDC GLUCOMTR-MCNC: 148 MG/DL — HIGH (ref 70–99)
GLUCOSE BLDC GLUCOMTR-MCNC: 150 MG/DL — HIGH (ref 70–99)
GLUCOSE BLDC GLUCOMTR-MCNC: 162 MG/DL — HIGH (ref 70–99)
GLUCOSE BLDC GLUCOMTR-MCNC: 164 MG/DL — HIGH (ref 70–99)
GLUCOSE BLDC GLUCOMTR-MCNC: 166 MG/DL — HIGH (ref 70–99)
GLUCOSE BLDC GLUCOMTR-MCNC: 171 MG/DL — HIGH (ref 70–99)
GLUCOSE BLDC GLUCOMTR-MCNC: 173 MG/DL — HIGH (ref 70–99)
GLUCOSE BLDC GLUCOMTR-MCNC: 184 MG/DL — HIGH (ref 70–99)
GLUCOSE BLDC GLUCOMTR-MCNC: 188 MG/DL — HIGH (ref 70–99)
GLUCOSE BLDC GLUCOMTR-MCNC: 189 MG/DL — HIGH (ref 70–99)
GLUCOSE BLDC GLUCOMTR-MCNC: 191 MG/DL — HIGH (ref 70–99)
GLUCOSE BLDC GLUCOMTR-MCNC: 192 MG/DL — HIGH (ref 70–99)
GLUCOSE BLDC GLUCOMTR-MCNC: 203 MG/DL — HIGH (ref 70–99)
GLUCOSE BLDC GLUCOMTR-MCNC: 220 MG/DL — HIGH (ref 70–99)
GLUCOSE BLDC GLUCOMTR-MCNC: 231 MG/DL — HIGH (ref 70–99)
GLUCOSE BLDC GLUCOMTR-MCNC: 232 MG/DL — HIGH (ref 70–99)
GLUCOSE BLDC GLUCOMTR-MCNC: 262 MG/DL — HIGH (ref 70–99)
GLUCOSE BLDC GLUCOMTR-MCNC: 344 MG/DL — HIGH (ref 70–99)
GLUCOSE BLDC GLUCOMTR-MCNC: 344 MG/DL — HIGH (ref 70–99)
GLUCOSE FLD-MCNC: 22 MG/DL — SIGNIFICANT CHANGE UP
GLUCOSE SERPL-MCNC: 106 MG/DL — HIGH (ref 70–99)
GLUCOSE SERPL-MCNC: 113 MG/DL — HIGH (ref 70–99)
GLUCOSE SERPL-MCNC: 116 MG/DL — HIGH (ref 70–99)
GLUCOSE SERPL-MCNC: 117 MG/DL — HIGH (ref 70–99)
GLUCOSE SERPL-MCNC: 120 MG/DL — HIGH (ref 70–99)
GLUCOSE SERPL-MCNC: 122 MG/DL — HIGH (ref 70–99)
GLUCOSE SERPL-MCNC: 125 MG/DL — HIGH (ref 70–99)
GLUCOSE SERPL-MCNC: 131 MG/DL — HIGH (ref 70–99)
GLUCOSE SERPL-MCNC: 134 MG/DL — HIGH (ref 70–99)
GLUCOSE SERPL-MCNC: 141 MG/DL — HIGH (ref 70–99)
GLUCOSE SERPL-MCNC: 141 MG/DL — HIGH (ref 70–99)
GLUCOSE SERPL-MCNC: 145 MG/DL — HIGH (ref 70–99)
GLUCOSE SERPL-MCNC: 149 MG/DL — HIGH (ref 70–99)
GLUCOSE SERPL-MCNC: 162 MG/DL — HIGH (ref 70–99)
GLUCOSE SERPL-MCNC: 175 MG/DL — HIGH (ref 70–99)
GLUCOSE SERPL-MCNC: 181 MG/DL — HIGH (ref 70–99)
GLUCOSE SERPL-MCNC: 182 MG/DL — HIGH (ref 70–99)
GLUCOSE SERPL-MCNC: 185 MG/DL — HIGH (ref 70–99)
GLUCOSE SERPL-MCNC: 197 MG/DL — HIGH (ref 70–99)
GLUCOSE SERPL-MCNC: 255 MG/DL — HIGH (ref 70–99)
GLUCOSE SERPL-MCNC: 269 MG/DL — HIGH (ref 70–99)
GLUCOSE UR QL: NEGATIVE MG/DL — SIGNIFICANT CHANGE UP
GLUCOSE UR QL: NEGATIVE MG/DL — SIGNIFICANT CHANGE UP
GRAM STN FLD: ABNORMAL
GRAM STN FLD: SIGNIFICANT CHANGE UP
HAPTOGLOB SERPL-MCNC: 319 MG/DL — HIGH (ref 34–200)
HCO3 BLDA-SCNC: 26 MMOL/L — SIGNIFICANT CHANGE UP (ref 21–28)
HCT VFR BLD CALC: 17.6 % — LOW (ref 42–52)
HCT VFR BLD CALC: 21.4 % — LOW (ref 42–52)
HCT VFR BLD CALC: 22.2 % — LOW (ref 42–52)
HCT VFR BLD CALC: 22.6 % — LOW (ref 42–52)
HCT VFR BLD CALC: 23.5 % — LOW (ref 42–52)
HCT VFR BLD CALC: 23.6 % — LOW (ref 42–52)
HCT VFR BLD CALC: 23.7 % — LOW (ref 42–52)
HCT VFR BLD CALC: 23.7 % — LOW (ref 42–52)
HCT VFR BLD CALC: 23.8 % — LOW (ref 42–52)
HCT VFR BLD CALC: 23.8 % — LOW (ref 42–52)
HCT VFR BLD CALC: 24.2 % — LOW (ref 42–52)
HCT VFR BLD CALC: 25 % — LOW (ref 42–52)
HCT VFR BLD CALC: 25.2 % — LOW (ref 42–52)
HCT VFR BLD CALC: 25.2 % — LOW (ref 42–52)
HCT VFR BLD CALC: 25.5 % — LOW (ref 42–52)
HCT VFR BLD CALC: 25.7 % — LOW (ref 42–52)
HCT VFR BLD CALC: 25.9 % — LOW (ref 42–52)
HCT VFR BLD CALC: 26 % — LOW (ref 42–52)
HCT VFR BLD CALC: 26.2 % — LOW (ref 42–52)
HCT VFR BLD CALC: 26.4 % — LOW (ref 42–52)
HCT VFR BLD CALC: 26.7 % — LOW (ref 42–52)
HCT VFR BLD CALC: 27 % — LOW (ref 42–52)
HCT VFR BLD CALC: 27.4 % — LOW (ref 42–52)
HCT VFR BLD CALC: 27.9 % — LOW (ref 42–52)
HCT VFR BLD CALC: 27.9 % — LOW (ref 42–52)
HCT VFR BLD CALC: 28 % — LOW (ref 42–52)
HCT VFR BLD CALC: 29.2 % — LOW (ref 42–52)
HCT VFR BLD CALC: 31.3 % — LOW (ref 42–52)
HGB BLD-MCNC: 5.4 G/DL — CRITICAL LOW (ref 14–18)
HGB BLD-MCNC: 6.6 G/DL — CRITICAL LOW (ref 14–18)
HGB BLD-MCNC: 6.7 G/DL — CRITICAL LOW (ref 14–18)
HGB BLD-MCNC: 7 G/DL — LOW (ref 14–18)
HGB BLD-MCNC: 7.1 G/DL — LOW (ref 14–18)
HGB BLD-MCNC: 7.1 G/DL — LOW (ref 14–18)
HGB BLD-MCNC: 7.2 G/DL — LOW (ref 14–18)
HGB BLD-MCNC: 7.2 G/DL — LOW (ref 14–18)
HGB BLD-MCNC: 7.3 G/DL — LOW (ref 14–18)
HGB BLD-MCNC: 7.4 G/DL — LOW (ref 14–18)
HGB BLD-MCNC: 7.5 G/DL — LOW (ref 14–18)
HGB BLD-MCNC: 7.6 G/DL — LOW (ref 14–18)
HGB BLD-MCNC: 7.7 G/DL — LOW (ref 14–18)
HGB BLD-MCNC: 7.8 G/DL — LOW (ref 14–18)
HGB BLD-MCNC: 7.9 G/DL — LOW (ref 14–18)
HGB BLD-MCNC: 8.1 G/DL — LOW (ref 14–18)
HGB BLD-MCNC: 8.1 G/DL — LOW (ref 14–18)
HGB BLD-MCNC: 8.2 G/DL — LOW (ref 14–18)
HGB BLD-MCNC: 8.2 G/DL — LOW (ref 14–18)
HGB BLD-MCNC: 8.4 G/DL — LOW (ref 14–18)
HGB BLD-MCNC: 8.5 G/DL — LOW (ref 14–18)
HGB BLD-MCNC: 8.5 G/DL — LOW (ref 14–18)
HGB BLD-MCNC: 8.6 G/DL — LOW (ref 14–18)
HGB BLD-MCNC: 8.6 G/DL — LOW (ref 14–18)
HGB BLD-MCNC: 8.9 G/DL — LOW (ref 14–18)
HGB BLD-MCNC: 9.3 G/DL — LOW (ref 14–18)
IMM GRANULOCYTES NFR BLD AUTO: 0.3 % — SIGNIFICANT CHANGE UP (ref 0.1–0.3)
IMM GRANULOCYTES NFR BLD AUTO: 0.6 % — HIGH (ref 0.1–0.3)
IMM GRANULOCYTES NFR BLD AUTO: 0.6 % — HIGH (ref 0.1–0.3)
IMM GRANULOCYTES NFR BLD AUTO: 0.7 % — HIGH (ref 0.1–0.3)
IMM GRANULOCYTES NFR BLD AUTO: 0.7 % — HIGH (ref 0.1–0.3)
IMM GRANULOCYTES NFR BLD AUTO: 0.9 % — HIGH (ref 0.1–0.3)
IMM GRANULOCYTES NFR BLD AUTO: 1.1 % — HIGH (ref 0.1–0.3)
INR BLD: 0.9 RATIO — SIGNIFICANT CHANGE UP (ref 0.65–1.3)
INR BLD: 1.1 RATIO — SIGNIFICANT CHANGE UP (ref 0.65–1.3)
IRON SATN MFR SERPL: 11 % — LOW (ref 15–50)
IRON SATN MFR SERPL: 31 UG/DL — LOW (ref 35–150)
KETONES UR-MCNC: NEGATIVE MG/DL — SIGNIFICANT CHANGE UP
KETONES UR-MCNC: NEGATIVE MG/DL — SIGNIFICANT CHANGE UP
LACTATE SERPL-SCNC: 1.6 MMOL/L — SIGNIFICANT CHANGE UP (ref 0.7–2)
LACTATE SERPL-SCNC: 2.3 MMOL/L — HIGH (ref 0.7–2)
LACTATE SERPL-SCNC: 2.3 MMOL/L — HIGH (ref 0.7–2)
LDH SERPL L TO P-CCNC: 145 U/L — SIGNIFICANT CHANGE UP (ref 50–242)
LEUKOCYTE ESTERASE UR-ACNC: ABNORMAL
LEUKOCYTE ESTERASE UR-ACNC: ABNORMAL
LYMPHOCYTES # BLD AUTO: 0.24 K/UL — LOW (ref 1.2–3.4)
LYMPHOCYTES # BLD AUTO: 0.26 K/UL — LOW (ref 1.2–3.4)
LYMPHOCYTES # BLD AUTO: 0.43 K/UL — LOW (ref 1.2–3.4)
LYMPHOCYTES # BLD AUTO: 0.45 K/UL — LOW (ref 1.2–3.4)
LYMPHOCYTES # BLD AUTO: 0.48 K/UL — LOW (ref 1.2–3.4)
LYMPHOCYTES # BLD AUTO: 0.62 K/UL — LOW (ref 1.2–3.4)
LYMPHOCYTES # BLD AUTO: 0.64 K/UL — LOW (ref 1.2–3.4)
LYMPHOCYTES # BLD AUTO: 3 % — LOW (ref 20.5–51.1)
LYMPHOCYTES # BLD AUTO: 4.6 % — LOW (ref 20.5–51.1)
LYMPHOCYTES # BLD AUTO: 4.7 % — LOW (ref 20.5–51.1)
LYMPHOCYTES # BLD AUTO: 6.5 % — LOW (ref 20.5–51.1)
LYMPHOCYTES # BLD AUTO: 7.9 % — LOW (ref 20.5–51.1)
LYMPHOCYTES # BLD AUTO: 8.1 % — LOW (ref 20.5–51.1)
LYMPHOCYTES # BLD AUTO: 8.7 % — LOW (ref 20.5–51.1)
LYMPHOCYTES # FLD: 4 — SIGNIFICANT CHANGE UP
MAGNESIUM SERPL-MCNC: 1.7 MG/DL — LOW (ref 1.8–2.4)
MAGNESIUM SERPL-MCNC: 1.8 MG/DL — SIGNIFICANT CHANGE UP (ref 1.8–2.4)
MAGNESIUM SERPL-MCNC: 1.9 MG/DL — SIGNIFICANT CHANGE UP (ref 1.8–2.4)
MAGNESIUM SERPL-MCNC: 2 MG/DL — SIGNIFICANT CHANGE UP (ref 1.8–2.4)
MAGNESIUM SERPL-MCNC: 2 MG/DL — SIGNIFICANT CHANGE UP (ref 1.8–2.4)
MAGNESIUM SERPL-MCNC: 2.1 MG/DL — SIGNIFICANT CHANGE UP (ref 1.8–2.4)
MAGNESIUM SERPL-MCNC: 2.2 MG/DL — SIGNIFICANT CHANGE UP (ref 1.8–2.4)
MAGNESIUM SERPL-MCNC: 2.3 MG/DL — SIGNIFICANT CHANGE UP (ref 1.8–2.4)
MCHC RBC-ENTMCNC: 28.7 PG — SIGNIFICANT CHANGE UP (ref 27–31)
MCHC RBC-ENTMCNC: 29 PG — SIGNIFICANT CHANGE UP (ref 27–31)
MCHC RBC-ENTMCNC: 29.2 PG — SIGNIFICANT CHANGE UP (ref 27–31)
MCHC RBC-ENTMCNC: 29.4 PG — SIGNIFICANT CHANGE UP (ref 27–31)
MCHC RBC-ENTMCNC: 29.4 PG — SIGNIFICANT CHANGE UP (ref 27–31)
MCHC RBC-ENTMCNC: 29.5 PG — SIGNIFICANT CHANGE UP (ref 27–31)
MCHC RBC-ENTMCNC: 29.5 PG — SIGNIFICANT CHANGE UP (ref 27–31)
MCHC RBC-ENTMCNC: 29.6 G/DL — LOW (ref 32–37)
MCHC RBC-ENTMCNC: 29.6 PG — SIGNIFICANT CHANGE UP (ref 27–31)
MCHC RBC-ENTMCNC: 29.7 G/DL — LOW (ref 32–37)
MCHC RBC-ENTMCNC: 29.7 G/DL — LOW (ref 32–37)
MCHC RBC-ENTMCNC: 29.8 PG — SIGNIFICANT CHANGE UP (ref 27–31)
MCHC RBC-ENTMCNC: 29.9 PG — SIGNIFICANT CHANGE UP (ref 27–31)
MCHC RBC-ENTMCNC: 29.9 PG — SIGNIFICANT CHANGE UP (ref 27–31)
MCHC RBC-ENTMCNC: 30 G/DL — LOW (ref 32–37)
MCHC RBC-ENTMCNC: 30 G/DL — LOW (ref 32–37)
MCHC RBC-ENTMCNC: 30 PG — SIGNIFICANT CHANGE UP (ref 27–31)
MCHC RBC-ENTMCNC: 30.1 G/DL — LOW (ref 32–37)
MCHC RBC-ENTMCNC: 30.1 G/DL — LOW (ref 32–37)
MCHC RBC-ENTMCNC: 30.1 PG — SIGNIFICANT CHANGE UP (ref 27–31)
MCHC RBC-ENTMCNC: 30.1 PG — SIGNIFICANT CHANGE UP (ref 27–31)
MCHC RBC-ENTMCNC: 30.2 PG — SIGNIFICANT CHANGE UP (ref 27–31)
MCHC RBC-ENTMCNC: 30.2 PG — SIGNIFICANT CHANGE UP (ref 27–31)
MCHC RBC-ENTMCNC: 30.4 G/DL — LOW (ref 32–37)
MCHC RBC-ENTMCNC: 30.4 G/DL — LOW (ref 32–37)
MCHC RBC-ENTMCNC: 30.5 G/DL — LOW (ref 32–37)
MCHC RBC-ENTMCNC: 30.5 G/DL — LOW (ref 32–37)
MCHC RBC-ENTMCNC: 30.6 G/DL — LOW (ref 32–37)
MCHC RBC-ENTMCNC: 30.7 G/DL — LOW (ref 32–37)
MCHC RBC-ENTMCNC: 30.7 G/DL — LOW (ref 32–37)
MCHC RBC-ENTMCNC: 30.8 G/DL — LOW (ref 32–37)
MCHC RBC-ENTMCNC: 30.8 G/DL — LOW (ref 32–37)
MCHC RBC-ENTMCNC: 30.9 G/DL — LOW (ref 32–37)
MCHC RBC-ENTMCNC: 30.9 PG — SIGNIFICANT CHANGE UP (ref 27–31)
MCHC RBC-ENTMCNC: 31 G/DL — LOW (ref 32–37)
MCHC RBC-ENTMCNC: 31 G/DL — LOW (ref 32–37)
MCHC RBC-ENTMCNC: 31.1 G/DL — LOW (ref 32–37)
MCHC RBC-ENTMCNC: 31.2 G/DL — LOW (ref 32–37)
MCHC RBC-ENTMCNC: 31.3 G/DL — LOW (ref 32–37)
MCHC RBC-ENTMCNC: 31.4 G/DL — LOW (ref 32–37)
MCV RBC AUTO: 100 FL — HIGH (ref 80–94)
MCV RBC AUTO: 100.6 FL — HIGH (ref 80–94)
MCV RBC AUTO: 95.2 FL — HIGH (ref 80–94)
MCV RBC AUTO: 95.5 FL — HIGH (ref 80–94)
MCV RBC AUTO: 95.5 FL — HIGH (ref 80–94)
MCV RBC AUTO: 95.7 FL — HIGH (ref 80–94)
MCV RBC AUTO: 95.8 FL — HIGH (ref 80–94)
MCV RBC AUTO: 95.9 FL — HIGH (ref 80–94)
MCV RBC AUTO: 96 FL — HIGH (ref 80–94)
MCV RBC AUTO: 96 FL — HIGH (ref 80–94)
MCV RBC AUTO: 96.2 FL — HIGH (ref 80–94)
MCV RBC AUTO: 96.3 FL — HIGH (ref 80–94)
MCV RBC AUTO: 96.4 FL — HIGH (ref 80–94)
MCV RBC AUTO: 96.7 FL — HIGH (ref 80–94)
MCV RBC AUTO: 96.9 FL — HIGH (ref 80–94)
MCV RBC AUTO: 97 FL — HIGH (ref 80–94)
MCV RBC AUTO: 97.1 FL — HIGH (ref 80–94)
MCV RBC AUTO: 97.1 FL — HIGH (ref 80–94)
MCV RBC AUTO: 97.5 FL — HIGH (ref 80–94)
MCV RBC AUTO: 97.6 FL — HIGH (ref 80–94)
MCV RBC AUTO: 97.6 FL — HIGH (ref 80–94)
MCV RBC AUTO: 97.7 FL — HIGH (ref 80–94)
MCV RBC AUTO: 98.3 FL — HIGH (ref 80–94)
MCV RBC AUTO: 98.8 FL — HIGH (ref 80–94)
MCV RBC AUTO: 99.6 FL — HIGH (ref 80–94)
METHOD TYPE: SIGNIFICANT CHANGE UP
MONOCYTES # BLD AUTO: 0.24 K/UL — SIGNIFICANT CHANGE UP (ref 0.1–0.6)
MONOCYTES # BLD AUTO: 0.37 K/UL — SIGNIFICANT CHANGE UP (ref 0.1–0.6)
MONOCYTES # BLD AUTO: 0.37 K/UL — SIGNIFICANT CHANGE UP (ref 0.1–0.6)
MONOCYTES # BLD AUTO: 0.38 K/UL — SIGNIFICANT CHANGE UP (ref 0.1–0.6)
MONOCYTES # BLD AUTO: 0.41 K/UL — SIGNIFICANT CHANGE UP (ref 0.1–0.6)
MONOCYTES # BLD AUTO: 0.47 K/UL — SIGNIFICANT CHANGE UP (ref 0.1–0.6)
MONOCYTES # BLD AUTO: 0.51 K/UL — SIGNIFICANT CHANGE UP (ref 0.1–0.6)
MONOCYTES NFR BLD AUTO: 3.9 % — SIGNIFICANT CHANGE UP (ref 1.7–9.3)
MONOCYTES NFR BLD AUTO: 4 % — SIGNIFICANT CHANGE UP (ref 1.7–9.3)
MONOCYTES NFR BLD AUTO: 5.1 % — SIGNIFICANT CHANGE UP (ref 1.7–9.3)
MONOCYTES NFR BLD AUTO: 5.8 % — SIGNIFICANT CHANGE UP (ref 1.7–9.3)
MONOCYTES NFR BLD AUTO: 6.4 % — SIGNIFICANT CHANGE UP (ref 1.7–9.3)
MONOCYTES NFR BLD AUTO: 6.5 % — SIGNIFICANT CHANGE UP (ref 1.7–9.3)
MONOCYTES NFR BLD AUTO: 6.6 % — SIGNIFICANT CHANGE UP (ref 1.7–9.3)
MONOS+MACROS # FLD: 1 % — SIGNIFICANT CHANGE UP
MRSA PCR RESULT.: NEGATIVE — SIGNIFICANT CHANGE UP
NEUTROPHILS # BLD AUTO: 4.97 K/UL — SIGNIFICANT CHANGE UP (ref 1.4–6.5)
NEUTROPHILS # BLD AUTO: 5.25 K/UL — SIGNIFICANT CHANGE UP (ref 1.4–6.5)
NEUTROPHILS # BLD AUTO: 5.67 K/UL — SIGNIFICANT CHANGE UP (ref 1.4–6.5)
NEUTROPHILS # BLD AUTO: 5.92 K/UL — SIGNIFICANT CHANGE UP (ref 1.4–6.5)
NEUTROPHILS # BLD AUTO: 6.59 K/UL — HIGH (ref 1.4–6.5)
NEUTROPHILS # BLD AUTO: 7.31 K/UL — HIGH (ref 1.4–6.5)
NEUTROPHILS # BLD AUTO: 8.52 K/UL — HIGH (ref 1.4–6.5)
NEUTROPHILS NFR BLD AUTO: 83.2 % — HIGH (ref 42.2–75.2)
NEUTROPHILS NFR BLD AUTO: 83.6 % — HIGH (ref 42.2–75.2)
NEUTROPHILS NFR BLD AUTO: 85.8 % — HIGH (ref 42.2–75.2)
NEUTROPHILS NFR BLD AUTO: 86.4 % — HIGH (ref 42.2–75.2)
NEUTROPHILS NFR BLD AUTO: 88 % — HIGH (ref 42.2–75.2)
NEUTROPHILS NFR BLD AUTO: 89.6 % — HIGH (ref 42.2–75.2)
NEUTROPHILS NFR BLD AUTO: 90.7 % — HIGH (ref 42.2–75.2)
NEUTROPHILS-BODY FLUID: 95 % — SIGNIFICANT CHANGE UP
NITRITE UR-MCNC: NEGATIVE — SIGNIFICANT CHANGE UP
NITRITE UR-MCNC: NEGATIVE — SIGNIFICANT CHANGE UP
NRBC # BLD: 0 /100 WBCS — SIGNIFICANT CHANGE UP (ref 0–0)
ORGANISM # SPEC MICROSCOPIC CNT: ABNORMAL
ORGANISM # SPEC MICROSCOPIC CNT: SIGNIFICANT CHANGE UP
ORGANISM # SPEC MICROSCOPIC CNT: SIGNIFICANT CHANGE UP
OSMOLALITY UR: 474 MOS/KG — SIGNIFICANT CHANGE UP (ref 50–1200)
PCO2 BLDA: 52 MMHG — HIGH (ref 35–48)
PH BLDA: 7.3 — LOW (ref 7.35–7.45)
PH FLD: 7.4 — SIGNIFICANT CHANGE UP
PH UR: 6 — SIGNIFICANT CHANGE UP (ref 5–8)
PH UR: 8.5 (ref 5–8)
PHOSPHATE SERPL-MCNC: 3 MG/DL — SIGNIFICANT CHANGE UP (ref 2.1–4.9)
PHOSPHATE SERPL-MCNC: 4 MG/DL — SIGNIFICANT CHANGE UP (ref 2.1–4.9)
PLATELET # BLD AUTO: 224 K/UL — SIGNIFICANT CHANGE UP (ref 130–400)
PLATELET # BLD AUTO: 226 K/UL — SIGNIFICANT CHANGE UP (ref 130–400)
PLATELET # BLD AUTO: 233 K/UL — SIGNIFICANT CHANGE UP (ref 130–400)
PLATELET # BLD AUTO: 238 K/UL — SIGNIFICANT CHANGE UP (ref 130–400)
PLATELET # BLD AUTO: 240 K/UL — SIGNIFICANT CHANGE UP (ref 130–400)
PLATELET # BLD AUTO: 241 K/UL — SIGNIFICANT CHANGE UP (ref 130–400)
PLATELET # BLD AUTO: 265 K/UL — SIGNIFICANT CHANGE UP (ref 130–400)
PLATELET # BLD AUTO: 271 K/UL — SIGNIFICANT CHANGE UP (ref 130–400)
PLATELET # BLD AUTO: 306 K/UL — SIGNIFICANT CHANGE UP (ref 130–400)
PLATELET # BLD AUTO: 348 K/UL — SIGNIFICANT CHANGE UP (ref 130–400)
PLATELET # BLD AUTO: 363 K/UL — SIGNIFICANT CHANGE UP (ref 130–400)
PLATELET # BLD AUTO: 371 K/UL — SIGNIFICANT CHANGE UP (ref 130–400)
PLATELET # BLD AUTO: 372 K/UL — SIGNIFICANT CHANGE UP (ref 130–400)
PLATELET # BLD AUTO: 380 K/UL — SIGNIFICANT CHANGE UP (ref 130–400)
PLATELET # BLD AUTO: 399 K/UL — SIGNIFICANT CHANGE UP (ref 130–400)
PLATELET # BLD AUTO: 401 K/UL — HIGH (ref 130–400)
PLATELET # BLD AUTO: 412 K/UL — HIGH (ref 130–400)
PLATELET # BLD AUTO: 417 K/UL — HIGH (ref 130–400)
PLATELET # BLD AUTO: 419 K/UL — HIGH (ref 130–400)
PLATELET # BLD AUTO: 421 K/UL — HIGH (ref 130–400)
PLATELET # BLD AUTO: 432 K/UL — HIGH (ref 130–400)
PLATELET # BLD AUTO: 435 K/UL — HIGH (ref 130–400)
PLATELET # BLD AUTO: 441 K/UL — HIGH (ref 130–400)
PLATELET # BLD AUTO: 469 K/UL — HIGH (ref 130–400)
PLATELET # BLD AUTO: 480 K/UL — HIGH (ref 130–400)
PMV BLD: 10 FL — SIGNIFICANT CHANGE UP (ref 7.4–10.4)
PMV BLD: 10.1 FL — SIGNIFICANT CHANGE UP (ref 7.4–10.4)
PMV BLD: 10.2 FL — SIGNIFICANT CHANGE UP (ref 7.4–10.4)
PMV BLD: 10.3 FL — SIGNIFICANT CHANGE UP (ref 7.4–10.4)
PMV BLD: 10.3 FL — SIGNIFICANT CHANGE UP (ref 7.4–10.4)
PMV BLD: 10.5 FL — HIGH (ref 7.4–10.4)
PMV BLD: 10.7 FL — HIGH (ref 7.4–10.4)
PMV BLD: 10.8 FL — HIGH (ref 7.4–10.4)
PMV BLD: 10.8 FL — HIGH (ref 7.4–10.4)
PMV BLD: 11.1 FL — HIGH (ref 7.4–10.4)
PMV BLD: 11.1 FL — HIGH (ref 7.4–10.4)
PMV BLD: 11.2 FL — HIGH (ref 7.4–10.4)
PMV BLD: 11.3 FL — HIGH (ref 7.4–10.4)
PMV BLD: 11.3 FL — HIGH (ref 7.4–10.4)
PMV BLD: 12.4 FL — HIGH (ref 7.4–10.4)
PMV BLD: 9.6 FL — SIGNIFICANT CHANGE UP (ref 7.4–10.4)
PMV BLD: 9.7 FL — SIGNIFICANT CHANGE UP (ref 7.4–10.4)
PMV BLD: 9.8 FL — SIGNIFICANT CHANGE UP (ref 7.4–10.4)
PMV BLD: 9.9 FL — SIGNIFICANT CHANGE UP (ref 7.4–10.4)
PMV BLD: 9.9 FL — SIGNIFICANT CHANGE UP (ref 7.4–10.4)
PO2 BLDA: 91 MMHG — SIGNIFICANT CHANGE UP (ref 83–108)
POTASSIUM SERPL-MCNC: 3.6 MMOL/L — SIGNIFICANT CHANGE UP (ref 3.5–5)
POTASSIUM SERPL-MCNC: 3.7 MMOL/L — SIGNIFICANT CHANGE UP (ref 3.5–5)
POTASSIUM SERPL-MCNC: 3.8 MMOL/L — SIGNIFICANT CHANGE UP (ref 3.5–5)
POTASSIUM SERPL-MCNC: 3.8 MMOL/L — SIGNIFICANT CHANGE UP (ref 3.5–5)
POTASSIUM SERPL-MCNC: 4.1 MMOL/L — SIGNIFICANT CHANGE UP (ref 3.5–5)
POTASSIUM SERPL-MCNC: 4.2 MMOL/L — SIGNIFICANT CHANGE UP (ref 3.5–5)
POTASSIUM SERPL-MCNC: 4.2 MMOL/L — SIGNIFICANT CHANGE UP (ref 3.5–5)
POTASSIUM SERPL-MCNC: 4.3 MMOL/L — SIGNIFICANT CHANGE UP (ref 3.5–5)
POTASSIUM SERPL-MCNC: 4.3 MMOL/L — SIGNIFICANT CHANGE UP (ref 3.5–5)
POTASSIUM SERPL-MCNC: 4.4 MMOL/L — SIGNIFICANT CHANGE UP (ref 3.5–5)
POTASSIUM SERPL-MCNC: 4.5 MMOL/L — SIGNIFICANT CHANGE UP (ref 3.5–5)
POTASSIUM SERPL-MCNC: 4.6 MMOL/L — SIGNIFICANT CHANGE UP (ref 3.5–5)
POTASSIUM SERPL-MCNC: 4.8 MMOL/L — SIGNIFICANT CHANGE UP (ref 3.5–5)
POTASSIUM SERPL-MCNC: 4.9 MMOL/L — SIGNIFICANT CHANGE UP (ref 3.5–5)
POTASSIUM SERPL-MCNC: 4.9 MMOL/L — SIGNIFICANT CHANGE UP (ref 3.5–5)
POTASSIUM SERPL-MCNC: 5.1 MMOL/L — HIGH (ref 3.5–5)
POTASSIUM SERPL-MCNC: 5.7 MMOL/L — HIGH (ref 3.5–5)
POTASSIUM SERPL-SCNC: 3.6 MMOL/L — SIGNIFICANT CHANGE UP (ref 3.5–5)
POTASSIUM SERPL-SCNC: 3.7 MMOL/L — SIGNIFICANT CHANGE UP (ref 3.5–5)
POTASSIUM SERPL-SCNC: 3.8 MMOL/L — SIGNIFICANT CHANGE UP (ref 3.5–5)
POTASSIUM SERPL-SCNC: 3.8 MMOL/L — SIGNIFICANT CHANGE UP (ref 3.5–5)
POTASSIUM SERPL-SCNC: 4.1 MMOL/L — SIGNIFICANT CHANGE UP (ref 3.5–5)
POTASSIUM SERPL-SCNC: 4.2 MMOL/L — SIGNIFICANT CHANGE UP (ref 3.5–5)
POTASSIUM SERPL-SCNC: 4.2 MMOL/L — SIGNIFICANT CHANGE UP (ref 3.5–5)
POTASSIUM SERPL-SCNC: 4.3 MMOL/L — SIGNIFICANT CHANGE UP (ref 3.5–5)
POTASSIUM SERPL-SCNC: 4.3 MMOL/L — SIGNIFICANT CHANGE UP (ref 3.5–5)
POTASSIUM SERPL-SCNC: 4.4 MMOL/L — SIGNIFICANT CHANGE UP (ref 3.5–5)
POTASSIUM SERPL-SCNC: 4.5 MMOL/L — SIGNIFICANT CHANGE UP (ref 3.5–5)
POTASSIUM SERPL-SCNC: 4.6 MMOL/L — SIGNIFICANT CHANGE UP (ref 3.5–5)
POTASSIUM SERPL-SCNC: 4.8 MMOL/L — SIGNIFICANT CHANGE UP (ref 3.5–5)
POTASSIUM SERPL-SCNC: 4.9 MMOL/L — SIGNIFICANT CHANGE UP (ref 3.5–5)
POTASSIUM SERPL-SCNC: 4.9 MMOL/L — SIGNIFICANT CHANGE UP (ref 3.5–5)
POTASSIUM SERPL-SCNC: 5.1 MMOL/L — HIGH (ref 3.5–5)
POTASSIUM SERPL-SCNC: 5.7 MMOL/L — HIGH (ref 3.5–5)
POTASSIUM UR-SCNC: 3 MMOL/L — SIGNIFICANT CHANGE UP
PROCALCITONIN SERPL-MCNC: 1.59 NG/ML — HIGH (ref 0.02–0.1)
PROT ?TM UR-MCNC: <5 MG/DLG/24H — SIGNIFICANT CHANGE UP
PROT FLD-MCNC: 2.2 G/DL — SIGNIFICANT CHANGE UP
PROT SERPL-MCNC: 4.7 G/DL — LOW (ref 6–8)
PROT SERPL-MCNC: 4.9 G/DL — LOW (ref 6–8)
PROT SERPL-MCNC: 5 G/DL — LOW (ref 6–8)
PROT SERPL-MCNC: 5 G/DL — LOW (ref 6–8)
PROT SERPL-MCNC: 5.4 G/DL — LOW (ref 6–8)
PROT SERPL-MCNC: 5.6 G/DL — LOW (ref 6–8)
PROT SERPL-MCNC: 5.7 G/DL — LOW (ref 6–8)
PROT SERPL-MCNC: 6.2 G/DL — SIGNIFICANT CHANGE UP (ref 6–8)
PROT UR-MCNC: 100 MG/DL
PROT UR-MCNC: 300 MG/DL
PROT/CREAT UR-RTO: SIGNIFICANT CHANGE UP RATIO (ref 0–0.2)
PROTEUS SP DNA BLD POS QL NAA+NON-PROBE: SIGNIFICANT CHANGE UP
PROTHROM AB SERPL-ACNC: 10.6 SEC — SIGNIFICANT CHANGE UP (ref 9.95–12.87)
PROTHROM AB SERPL-ACNC: 13 SEC — HIGH (ref 9.95–12.87)
RAPID RVP RESULT: SIGNIFICANT CHANGE UP
RBC # BLD: 1.75 M/UL — LOW (ref 4.7–6.1)
RBC # BLD: 2.22 M/UL — LOW (ref 4.7–6.1)
RBC # BLD: 2.23 M/UL — LOW (ref 4.7–6.1)
RBC # BLD: 2.37 M/UL — LOW (ref 4.7–6.1)
RBC # BLD: 2.41 M/UL — LOW (ref 4.7–6.1)
RBC # BLD: 2.43 M/UL — LOW (ref 4.7–6.1)
RBC # BLD: 2.43 M/UL — LOW (ref 4.7–6.1)
RBC # BLD: 2.48 M/UL — LOW (ref 4.7–6.1)
RBC # BLD: 2.48 M/UL — LOW (ref 4.7–6.1)
RBC # BLD: 2.56 M/UL — LOW (ref 4.7–6.1)
RBC # BLD: 2.6 M/UL — LOW (ref 4.7–6.1)
RBC # BLD: 2.63 M/UL — LOW (ref 4.7–6.1)
RBC # BLD: 2.64 M/UL — LOW (ref 4.7–6.1)
RBC # BLD: 2.65 M/UL — LOW (ref 4.7–6.1)
RBC # BLD: 2.67 M/UL — LOW (ref 4.7–6.1)
RBC # BLD: 2.71 M/UL — LOW (ref 4.7–6.1)
RBC # BLD: 2.72 M/UL — LOW (ref 4.7–6.1)
RBC # BLD: 2.75 M/UL — LOW (ref 4.7–6.1)
RBC # BLD: 2.75 M/UL — LOW (ref 4.7–6.1)
RBC # BLD: 2.78 M/UL — LOW (ref 4.7–6.1)
RBC # BLD: 2.86 M/UL — LOW (ref 4.7–6.1)
RBC # BLD: 2.87 M/UL — LOW (ref 4.7–6.1)
RBC # BLD: 2.89 M/UL — LOW (ref 4.7–6.1)
RBC # BLD: 2.93 M/UL — LOW (ref 4.7–6.1)
RBC # BLD: 3.05 M/UL — LOW (ref 4.7–6.1)
RBC # BLD: 3.21 M/UL — LOW (ref 4.7–6.1)
RBC # FLD: 15.3 % — HIGH (ref 11.5–14.5)
RBC # FLD: 15.4 % — HIGH (ref 11.5–14.5)
RBC # FLD: 15.5 % — HIGH (ref 11.5–14.5)
RBC # FLD: 15.5 % — HIGH (ref 11.5–14.5)
RBC # FLD: 15.6 % — HIGH (ref 11.5–14.5)
RBC # FLD: 15.7 % — HIGH (ref 11.5–14.5)
RBC # FLD: 15.8 % — HIGH (ref 11.5–14.5)
RBC # FLD: 15.9 % — HIGH (ref 11.5–14.5)
RBC # FLD: 16 % — HIGH (ref 11.5–14.5)
RBC # FLD: 16 % — HIGH (ref 11.5–14.5)
RBC # FLD: 16.2 % — HIGH (ref 11.5–14.5)
RBC # FLD: 16.4 % — HIGH (ref 11.5–14.5)
RBC # FLD: 16.5 % — HIGH (ref 11.5–14.5)
RBC # FLD: 16.5 % — HIGH (ref 11.5–14.5)
RBC # FLD: 16.9 % — HIGH (ref 11.5–14.5)
RBC # FLD: 17.3 % — HIGH (ref 11.5–14.5)
RCV VOL RI: HIGH /UL (ref 0–0)
RETICS #: 87.7 K/UL — SIGNIFICANT CHANGE UP (ref 25–125)
RETICS/RBC NFR: 3.2 % — HIGH (ref 0.5–1.5)
SAO2 % BLDA: 97.3 % — SIGNIFICANT CHANGE UP (ref 94–98)
SARS-COV-2 RNA SPEC QL NAA+PROBE: SIGNIFICANT CHANGE UP
SODIUM SERPL-SCNC: 138 MMOL/L — SIGNIFICANT CHANGE UP (ref 135–146)
SODIUM SERPL-SCNC: 139 MMOL/L — SIGNIFICANT CHANGE UP (ref 135–146)
SODIUM SERPL-SCNC: 140 MMOL/L — SIGNIFICANT CHANGE UP (ref 135–146)
SODIUM SERPL-SCNC: 141 MMOL/L — SIGNIFICANT CHANGE UP (ref 135–146)
SODIUM SERPL-SCNC: 142 MMOL/L — SIGNIFICANT CHANGE UP (ref 135–146)
SODIUM SERPL-SCNC: 143 MMOL/L — SIGNIFICANT CHANGE UP (ref 135–146)
SODIUM SERPL-SCNC: 144 MMOL/L — SIGNIFICANT CHANGE UP (ref 135–146)
SODIUM SERPL-SCNC: 144 MMOL/L — SIGNIFICANT CHANGE UP (ref 135–146)
SODIUM SERPL-SCNC: 145 MMOL/L — SIGNIFICANT CHANGE UP (ref 135–146)
SODIUM SERPL-SCNC: 145 MMOL/L — SIGNIFICANT CHANGE UP (ref 135–146)
SODIUM SERPL-SCNC: 148 MMOL/L — HIGH (ref 135–146)
SODIUM UR-SCNC: 20 MMOL/L — SIGNIFICANT CHANGE UP
SP GR SPEC: 1.02 — SIGNIFICANT CHANGE UP (ref 1–1.03)
SP GR SPEC: >1.03 — HIGH (ref 1–1.03)
SPECIMEN SOURCE: SIGNIFICANT CHANGE UP
SURGICAL PATHOLOGY STUDY: SIGNIFICANT CHANGE UP
SURGICAL PATHOLOGY STUDY: SIGNIFICANT CHANGE UP
TIBC SERPL-MCNC: 278 UG/DL — SIGNIFICANT CHANGE UP (ref 220–430)
TOTAL NUCLEATED CELL COUNT, BODY FLUID: 372 /UL — SIGNIFICANT CHANGE UP
TRANSFERRIN SERPL-MCNC: 230 MG/DL — SIGNIFICANT CHANGE UP (ref 200–360)
TUBE TYPE: SIGNIFICANT CHANGE UP
UIBC SERPL-MCNC: 247 UG/DL — SIGNIFICANT CHANGE UP (ref 110–370)
UROBILINOGEN FLD QL: 0.2 MG/DL — SIGNIFICANT CHANGE UP (ref 0.2–1)
UROBILINOGEN FLD QL: 0.2 MG/DL — SIGNIFICANT CHANGE UP (ref 0.2–1)
UUN UR-MCNC: <6 MG/DL — SIGNIFICANT CHANGE UP
WBC # BLD: 10.73 K/UL — SIGNIFICANT CHANGE UP (ref 4.8–10.8)
WBC # BLD: 13.4 K/UL — HIGH (ref 4.8–10.8)
WBC # BLD: 13.69 K/UL — HIGH (ref 4.8–10.8)
WBC # BLD: 14.62 K/UL — HIGH (ref 4.8–10.8)
WBC # BLD: 15.15 K/UL — HIGH (ref 4.8–10.8)
WBC # BLD: 15.31 K/UL — HIGH (ref 4.8–10.8)
WBC # BLD: 15.36 K/UL — HIGH (ref 4.8–10.8)
WBC # BLD: 15.44 K/UL — HIGH (ref 4.8–10.8)
WBC # BLD: 15.45 K/UL — HIGH (ref 4.8–10.8)
WBC # BLD: 16.63 K/UL — HIGH (ref 4.8–10.8)
WBC # BLD: 3.75 K/UL — LOW (ref 4.8–10.8)
WBC # BLD: 5.5 K/UL — SIGNIFICANT CHANGE UP (ref 4.8–10.8)
WBC # BLD: 5.65 K/UL — SIGNIFICANT CHANGE UP (ref 4.8–10.8)
WBC # BLD: 6.07 K/UL — SIGNIFICANT CHANGE UP (ref 4.8–10.8)
WBC # BLD: 6.27 K/UL — SIGNIFICANT CHANGE UP (ref 4.8–10.8)
WBC # BLD: 6.6 K/UL — SIGNIFICANT CHANGE UP (ref 4.8–10.8)
WBC # BLD: 6.75 K/UL — SIGNIFICANT CHANGE UP (ref 4.8–10.8)
WBC # BLD: 7.09 K/UL — SIGNIFICANT CHANGE UP (ref 4.8–10.8)
WBC # BLD: 7.24 K/UL — SIGNIFICANT CHANGE UP (ref 4.8–10.8)
WBC # BLD: 7.92 K/UL — SIGNIFICANT CHANGE UP (ref 4.8–10.8)
WBC # BLD: 7.93 K/UL — SIGNIFICANT CHANGE UP (ref 4.8–10.8)
WBC # BLD: 8.06 K/UL — SIGNIFICANT CHANGE UP (ref 4.8–10.8)
WBC # BLD: 8.27 K/UL — SIGNIFICANT CHANGE UP (ref 4.8–10.8)
WBC # BLD: 8.77 K/UL — SIGNIFICANT CHANGE UP (ref 4.8–10.8)
WBC # BLD: 9.51 K/UL — SIGNIFICANT CHANGE UP (ref 4.8–10.8)
WBC # FLD AUTO: 10.73 K/UL — SIGNIFICANT CHANGE UP (ref 4.8–10.8)
WBC # FLD AUTO: 13.4 K/UL — HIGH (ref 4.8–10.8)
WBC # FLD AUTO: 13.69 K/UL — HIGH (ref 4.8–10.8)
WBC # FLD AUTO: 14.62 K/UL — HIGH (ref 4.8–10.8)
WBC # FLD AUTO: 15.15 K/UL — HIGH (ref 4.8–10.8)
WBC # FLD AUTO: 15.31 K/UL — HIGH (ref 4.8–10.8)
WBC # FLD AUTO: 15.36 K/UL — HIGH (ref 4.8–10.8)
WBC # FLD AUTO: 15.44 K/UL — HIGH (ref 4.8–10.8)
WBC # FLD AUTO: 15.45 K/UL — HIGH (ref 4.8–10.8)
WBC # FLD AUTO: 16.63 K/UL — HIGH (ref 4.8–10.8)
WBC # FLD AUTO: 3.75 K/UL — LOW (ref 4.8–10.8)
WBC # FLD AUTO: 5.5 K/UL — SIGNIFICANT CHANGE UP (ref 4.8–10.8)
WBC # FLD AUTO: 5.65 K/UL — SIGNIFICANT CHANGE UP (ref 4.8–10.8)
WBC # FLD AUTO: 6.07 K/UL — SIGNIFICANT CHANGE UP (ref 4.8–10.8)
WBC # FLD AUTO: 6.27 K/UL — SIGNIFICANT CHANGE UP (ref 4.8–10.8)
WBC # FLD AUTO: 6.6 K/UL — SIGNIFICANT CHANGE UP (ref 4.8–10.8)
WBC # FLD AUTO: 6.75 K/UL — SIGNIFICANT CHANGE UP (ref 4.8–10.8)
WBC # FLD AUTO: 7.09 K/UL — SIGNIFICANT CHANGE UP (ref 4.8–10.8)
WBC # FLD AUTO: 7.24 K/UL — SIGNIFICANT CHANGE UP (ref 4.8–10.8)
WBC # FLD AUTO: 7.92 K/UL — SIGNIFICANT CHANGE UP (ref 4.8–10.8)
WBC # FLD AUTO: 7.93 K/UL — SIGNIFICANT CHANGE UP (ref 4.8–10.8)
WBC # FLD AUTO: 8.06 K/UL — SIGNIFICANT CHANGE UP (ref 4.8–10.8)
WBC # FLD AUTO: 8.27 K/UL — SIGNIFICANT CHANGE UP (ref 4.8–10.8)
WBC # FLD AUTO: 8.77 K/UL — SIGNIFICANT CHANGE UP (ref 4.8–10.8)
WBC # FLD AUTO: 9.51 K/UL — SIGNIFICANT CHANGE UP (ref 4.8–10.8)

## 2024-01-01 PROCEDURE — 71275 CT ANGIOGRAPHY CHEST: CPT | Mod: 26

## 2024-01-01 PROCEDURE — 80053 COMPREHEN METABOLIC PANEL: CPT

## 2024-01-01 PROCEDURE — 85045 AUTOMATED RETICULOCYTE COUNT: CPT

## 2024-01-01 PROCEDURE — 36415 COLL VENOUS BLD VENIPUNCTURE: CPT

## 2024-01-01 PROCEDURE — 85018 HEMOGLOBIN: CPT

## 2024-01-01 PROCEDURE — 93010 ELECTROCARDIOGRAM REPORT: CPT

## 2024-01-01 PROCEDURE — 99239 HOSP IP/OBS DSCHRG MGMT >30: CPT

## 2024-01-01 PROCEDURE — 71045 X-RAY EXAM CHEST 1 VIEW: CPT | Mod: 26

## 2024-01-01 PROCEDURE — 83036 HEMOGLOBIN GLYCOSYLATED A1C: CPT

## 2024-01-01 PROCEDURE — 99223 1ST HOSP IP/OBS HIGH 75: CPT

## 2024-01-01 PROCEDURE — 44366 SMALL BOWEL ENDOSCOPY: CPT | Mod: XS

## 2024-01-01 PROCEDURE — 71045 X-RAY EXAM CHEST 1 VIEW: CPT

## 2024-01-01 PROCEDURE — 36430 TRANSFUSION BLD/BLD COMPNT: CPT

## 2024-01-01 PROCEDURE — 99232 SBSQ HOSP IP/OBS MODERATE 35: CPT

## 2024-01-01 PROCEDURE — 86900 BLOOD TYPING SEROLOGIC ABO: CPT

## 2024-01-01 PROCEDURE — 43239 EGD BIOPSY SINGLE/MULTIPLE: CPT | Mod: XS

## 2024-01-01 PROCEDURE — 87077 CULTURE AEROBIC IDENTIFY: CPT

## 2024-01-01 PROCEDURE — 85379 FIBRIN DEGRADATION QUANT: CPT

## 2024-01-01 PROCEDURE — 87205 SMEAR GRAM STAIN: CPT

## 2024-01-01 PROCEDURE — 89051 BODY FLUID CELL COUNT: CPT

## 2024-01-01 PROCEDURE — 80048 BASIC METABOLIC PNL TOTAL CA: CPT

## 2024-01-01 PROCEDURE — 51703 INSERT BLADDER CATH COMPLEX: CPT

## 2024-01-01 PROCEDURE — 83935 ASSAY OF URINE OSMOLALITY: CPT

## 2024-01-01 PROCEDURE — 87086 URINE CULTURE/COLONY COUNT: CPT

## 2024-01-01 PROCEDURE — 84156 ASSAY OF PROTEIN URINE: CPT

## 2024-01-01 PROCEDURE — 83010 ASSAY OF HAPTOGLOBIN QUANT: CPT

## 2024-01-01 PROCEDURE — 99233 SBSQ HOSP IP/OBS HIGH 50: CPT

## 2024-01-01 PROCEDURE — 87150 DNA/RNA AMPLIFIED PROBE: CPT

## 2024-01-01 PROCEDURE — 84157 ASSAY OF PROTEIN OTHER: CPT

## 2024-01-01 PROCEDURE — 86902 BLOOD TYPE ANTIGEN DONOR EA: CPT

## 2024-01-01 PROCEDURE — 86850 RBC ANTIBODY SCREEN: CPT

## 2024-01-01 PROCEDURE — 87075 CULTR BACTERIA EXCEPT BLOOD: CPT

## 2024-01-01 PROCEDURE — 87015 SPECIMEN INFECT AGNT CONCNTJ: CPT

## 2024-01-01 PROCEDURE — 87186 SC STD MICRODIL/AGAR DIL: CPT

## 2024-01-01 PROCEDURE — 88305 TISSUE EXAM BY PATHOLOGIST: CPT

## 2024-01-01 PROCEDURE — 83605 ASSAY OF LACTIC ACID: CPT

## 2024-01-01 PROCEDURE — 85025 COMPLETE CBC W/AUTO DIFF WBC: CPT

## 2024-01-01 PROCEDURE — 45380 COLONOSCOPY AND BIOPSY: CPT | Mod: XU

## 2024-01-01 PROCEDURE — 88305 TISSUE EXAM BY PATHOLOGIST: CPT | Mod: 26

## 2024-01-01 PROCEDURE — 83615 LACTATE (LD) (LDH) ENZYME: CPT

## 2024-01-01 PROCEDURE — 93005 ELECTROCARDIOGRAM TRACING: CPT

## 2024-01-01 PROCEDURE — 82570 ASSAY OF URINE CREATININE: CPT

## 2024-01-01 PROCEDURE — 99285 EMERGENCY DEPT VISIT HI MDM: CPT

## 2024-01-01 PROCEDURE — 84295 ASSAY OF SERUM SODIUM: CPT

## 2024-01-01 PROCEDURE — 45385 COLONOSCOPY W/LESION REMOVAL: CPT

## 2024-01-01 PROCEDURE — 83735 ASSAY OF MAGNESIUM: CPT

## 2024-01-01 PROCEDURE — 88312 SPECIAL STAINS GROUP 1: CPT | Mod: 26

## 2024-01-01 PROCEDURE — 87070 CULTURE OTHR SPECIMN AEROBIC: CPT

## 2024-01-01 PROCEDURE — 83986 ASSAY PH BODY FLUID NOS: CPT

## 2024-01-01 PROCEDURE — 86901 BLOOD TYPING SEROLOGIC RH(D): CPT

## 2024-01-01 PROCEDURE — 94640 AIRWAY INHALATION TREATMENT: CPT

## 2024-01-01 PROCEDURE — 97162 PT EVAL MOD COMPLEX 30 MIN: CPT | Mod: GP

## 2024-01-01 PROCEDURE — 84540 ASSAY OF URINE/UREA-N: CPT

## 2024-01-01 PROCEDURE — 76770 US EXAM ABDO BACK WALL COMP: CPT

## 2024-01-01 PROCEDURE — 82330 ASSAY OF CALCIUM: CPT

## 2024-01-01 PROCEDURE — 84100 ASSAY OF PHOSPHORUS: CPT

## 2024-01-01 PROCEDURE — 97166 OT EVAL MOD COMPLEX 45 MIN: CPT | Mod: GO

## 2024-01-01 PROCEDURE — 86922 COMPATIBILITY TEST ANTIGLOB: CPT

## 2024-01-01 PROCEDURE — 81001 URINALYSIS AUTO W/SCOPE: CPT

## 2024-01-01 PROCEDURE — 93970 EXTREMITY STUDY: CPT

## 2024-01-01 PROCEDURE — 0225U NFCT DS DNA&RNA 21 SARSCOV2: CPT

## 2024-01-01 PROCEDURE — 82945 GLUCOSE OTHER FLUID: CPT

## 2024-01-01 PROCEDURE — 84300 ASSAY OF URINE SODIUM: CPT

## 2024-01-01 PROCEDURE — 84132 ASSAY OF SERUM POTASSIUM: CPT

## 2024-01-01 PROCEDURE — 82803 BLOOD GASES ANY COMBINATION: CPT

## 2024-01-01 PROCEDURE — 85610 PROTHROMBIN TIME: CPT

## 2024-01-01 PROCEDURE — 87040 BLOOD CULTURE FOR BACTERIA: CPT

## 2024-01-01 PROCEDURE — 85730 THROMBOPLASTIN TIME PARTIAL: CPT

## 2024-01-01 PROCEDURE — 93970 EXTREMITY STUDY: CPT | Mod: 26

## 2024-01-01 PROCEDURE — 71275 CT ANGIOGRAPHY CHEST: CPT | Mod: MC

## 2024-01-01 PROCEDURE — 85014 HEMATOCRIT: CPT

## 2024-01-01 PROCEDURE — 84133 ASSAY OF URINE POTASSIUM: CPT

## 2024-01-01 PROCEDURE — 87640 STAPH A DNA AMP PROBE: CPT

## 2024-01-01 PROCEDURE — 76770 US EXAM ABDO BACK WALL COMP: CPT | Mod: 26

## 2024-01-01 PROCEDURE — 82962 GLUCOSE BLOOD TEST: CPT

## 2024-01-01 PROCEDURE — 87641 MR-STAPH DNA AMP PROBE: CPT

## 2024-01-01 PROCEDURE — P9016: CPT

## 2024-01-01 PROCEDURE — 88312 SPECIAL STAINS GROUP 1: CPT

## 2024-01-01 PROCEDURE — 71045 X-RAY EXAM CHEST 1 VIEW: CPT | Mod: 26,76

## 2024-01-01 PROCEDURE — 97110 THERAPEUTIC EXERCISES: CPT | Mod: GO

## 2024-01-01 PROCEDURE — 85027 COMPLETE CBC AUTOMATED: CPT

## 2024-01-01 PROCEDURE — 84145 PROCALCITONIN (PCT): CPT

## 2024-01-01 RX ORDER — CEFEPIME 2 G/1
1000 INJECTION, POWDER, FOR SOLUTION INTRAVENOUS ONCE
Refills: 0 | Status: COMPLETED | OUTPATIENT
Start: 2024-01-01 | End: 2024-01-01

## 2024-01-01 RX ORDER — SODIUM CHLORIDE 9 MG/ML
1000 INJECTION, SOLUTION INTRAMUSCULAR; INTRAVENOUS; SUBCUTANEOUS
Refills: 0 | Status: DISCONTINUED | OUTPATIENT
Start: 2024-01-01 | End: 2024-01-01

## 2024-01-01 RX ORDER — 0.9 % SODIUM CHLORIDE 0.9 %
500 INTRAVENOUS SOLUTION INTRAVENOUS ONCE
Refills: 0 | Status: COMPLETED | OUTPATIENT
Start: 2024-01-01 | End: 2024-01-01

## 2024-01-01 RX ORDER — IRON SUCROSE 20 MG/ML
200 INJECTION, SOLUTION INTRAVENOUS ONCE
Refills: 0 | Status: COMPLETED | OUTPATIENT
Start: 2024-01-01 | End: 2024-01-01

## 2024-01-01 RX ORDER — CHLORHEXIDINE GLUCONATE 1.2 MG/ML
1 RINSE ORAL
Refills: 0 | Status: DISCONTINUED | OUTPATIENT
Start: 2024-01-01 | End: 2024-01-01

## 2024-01-01 RX ORDER — SODIUM SULFATE, MAGNESIUM SULFATE, AND POTASSIUM CHLORIDE 17.75; 2.7; 2.25 G/1; G/1; G/1
24 TABLET ORAL
Qty: 1 | Refills: 0
Start: 2024-01-01 | End: 2024-01-01

## 2024-01-01 RX ORDER — OMEPRAZOLE 20 MG/1
1 CAPSULE, DELAYED RELEASE ORAL
Refills: 0 | DISCHARGE

## 2024-01-01 RX ORDER — GLYCOPYRROLATE 1 MG/1
0.2 TABLET ORAL ONCE
Refills: 0 | Status: COMPLETED | OUTPATIENT
Start: 2024-01-01 | End: 2024-01-01

## 2024-01-01 RX ORDER — POLYETHYLENE GLYCOL 3350 17 G/17G
17 POWDER, FOR SOLUTION ORAL DAILY
Refills: 0 | Status: DISCONTINUED | OUTPATIENT
Start: 2024-01-01 | End: 2024-01-01

## 2024-01-01 RX ORDER — SODIUM BICARBONATE 84 MG/ML
650 INJECTION, SOLUTION INTRAVENOUS THREE TIMES A DAY
Refills: 0 | Status: DISCONTINUED | OUTPATIENT
Start: 2024-01-01 | End: 2024-01-01

## 2024-01-01 RX ORDER — MEROPENEM 500 MG/1
1000 INJECTION, POWDER, FOR SOLUTION INTRAVENOUS EVERY 12 HOURS
Refills: 0 | Status: DISCONTINUED | OUTPATIENT
Start: 2024-01-01 | End: 2024-01-01

## 2024-01-01 RX ORDER — VANCOMYCIN HCL 900 MCG/MG
1000 POWDER (GRAM) MISCELLANEOUS ONCE
Refills: 0 | Status: COMPLETED | OUTPATIENT
Start: 2024-01-01 | End: 2024-01-01

## 2024-01-01 RX ORDER — PANTOPRAZOLE SODIUM 40 MG/1
40 TABLET, DELAYED RELEASE ORAL EVERY 12 HOURS
Refills: 0 | Status: DISCONTINUED | OUTPATIENT
Start: 2024-01-01 | End: 2024-01-01

## 2024-01-01 RX ORDER — VANCOMYCIN HCL 900 MCG/MG
1000 POWDER (GRAM) MISCELLANEOUS EVERY 24 HOURS
Refills: 0 | Status: DISCONTINUED | OUTPATIENT
Start: 2024-01-01 | End: 2024-01-01

## 2024-01-01 RX ORDER — MEROPENEM 500 MG/1
1000 INJECTION, POWDER, FOR SOLUTION INTRAVENOUS ONCE
Refills: 0 | Status: COMPLETED | OUTPATIENT
Start: 2024-01-01 | End: 2024-01-01

## 2024-01-01 RX ORDER — INSULIN GLARGINE 100 [IU]/ML
4 INJECTION, SOLUTION SUBCUTANEOUS ONCE
Refills: 0 | Status: COMPLETED | OUTPATIENT
Start: 2024-01-01 | End: 2024-01-01

## 2024-01-01 RX ORDER — VANCOMYCIN HCL 900 MCG/MG
1000 POWDER (GRAM) MISCELLANEOUS EVERY 12 HOURS
Refills: 0 | Status: DISCONTINUED | OUTPATIENT
Start: 2024-01-01 | End: 2024-01-01

## 2024-01-01 RX ORDER — ACETAMINOPHEN 500MG 500 MG/1
975 TABLET, COATED ORAL EVERY 8 HOURS
Refills: 0 | Status: DISCONTINUED | OUTPATIENT
Start: 2024-01-01 | End: 2024-01-01

## 2024-01-01 RX ORDER — MEROPENEM 500 MG/1
INJECTION, POWDER, FOR SOLUTION INTRAVENOUS
Refills: 0 | Status: DISCONTINUED | OUTPATIENT
Start: 2024-01-01 | End: 2024-01-01

## 2024-01-01 RX ORDER — IPRATROPIUM BROMIDE AND ALBUTEROL SULFATE 2.5; .5 MG/3ML; MG/3ML
3 SOLUTION RESPIRATORY (INHALATION) ONCE
Refills: 0 | Status: COMPLETED | OUTPATIENT
Start: 2024-01-01 | End: 2024-01-01

## 2024-01-01 RX ORDER — VANCOMYCIN HCL 900 MCG/MG
POWDER (GRAM) MISCELLANEOUS
Refills: 0 | Status: DISCONTINUED | OUTPATIENT
Start: 2024-01-01 | End: 2024-01-01

## 2024-01-01 RX ORDER — 0.9 % SODIUM CHLORIDE 0.9 %
1000 INTRAVENOUS SOLUTION INTRAVENOUS
Refills: 0 | Status: DISCONTINUED | OUTPATIENT
Start: 2024-01-01 | End: 2024-01-01

## 2024-01-01 RX ORDER — SODIUM CHLORIDE 9 MG/ML
4 INJECTION, SOLUTION INTRAMUSCULAR; INTRAVENOUS; SUBCUTANEOUS EVERY 12 HOURS
Refills: 0 | Status: DISCONTINUED | OUTPATIENT
Start: 2024-01-01 | End: 2024-01-01

## 2024-01-01 RX ORDER — IPRATROPIUM BROMIDE AND ALBUTEROL SULFATE 2.5; .5 MG/3ML; MG/3ML
3 SOLUTION RESPIRATORY (INHALATION) EVERY 6 HOURS
Refills: 0 | Status: DISCONTINUED | OUTPATIENT
Start: 2024-01-01 | End: 2024-01-01

## 2024-01-01 RX ORDER — SODIUM ZIRCONIUM CYCLOSILICATE 5 G/5G
10 POWDER, FOR SUSPENSION ORAL ONCE
Refills: 0 | Status: COMPLETED | OUTPATIENT
Start: 2024-01-01 | End: 2024-01-01

## 2024-01-01 RX ORDER — SODIUM CHLORIDE 9 MG/ML
4 INJECTION, SOLUTION INTRAMUSCULAR; INTRAVENOUS; SUBCUTANEOUS ONCE
Refills: 0 | Status: COMPLETED | OUTPATIENT
Start: 2024-01-01 | End: 2024-01-01

## 2024-01-01 RX ORDER — ACETAMINOPHEN, DIPHENHYDRAMINE HCL, PHENYLEPHRINE HCL 325; 25; 5 MG/1; MG/1; MG/1
3 TABLET ORAL AT BEDTIME
Refills: 0 | Status: DISCONTINUED | OUTPATIENT
Start: 2024-01-01 | End: 2024-01-01

## 2024-01-01 RX ORDER — ACETAMINOPHEN 500MG 500 MG/1
650 TABLET, COATED ORAL ONCE
Refills: 0 | Status: COMPLETED | OUTPATIENT
Start: 2024-01-01 | End: 2024-01-01

## 2024-01-01 RX ORDER — IRON SUCROSE 20 MG/ML
200 INJECTION, SOLUTION INTRAVENOUS EVERY 24 HOURS
Refills: 0 | Status: DISCONTINUED | OUTPATIENT
Start: 2024-01-01 | End: 2024-01-01

## 2024-01-01 RX ORDER — CEFTRIAXONE SODIUM 1 G
1000 VIAL (EA) INJECTION EVERY 24 HOURS
Refills: 0 | Status: DISCONTINUED | OUTPATIENT
Start: 2024-01-01 | End: 2024-01-01

## 2024-01-01 RX ORDER — IRON SUCROSE 20 MG/ML
200 INJECTION, SOLUTION INTRAVENOUS ONCE
Refills: 0 | Status: DISCONTINUED | OUTPATIENT
Start: 2024-01-01 | End: 2024-01-01

## 2024-01-01 RX ORDER — SENNOSIDES 8.6 MG
2 TABLET ORAL AT BEDTIME
Refills: 0 | Status: DISCONTINUED | OUTPATIENT
Start: 2024-01-01 | End: 2024-01-01

## 2024-01-01 RX ORDER — GLUCAGON INJECTION, SOLUTION 0.5 MG/.1ML
1 INJECTION, SOLUTION SUBCUTANEOUS ONCE
Refills: 0 | Status: DISCONTINUED | OUTPATIENT
Start: 2024-01-01 | End: 2024-01-01

## 2024-01-01 RX ORDER — 0.9 % SODIUM CHLORIDE 0.9 %
250 INTRAVENOUS SOLUTION INTRAVENOUS ONCE
Refills: 0 | Status: COMPLETED | OUTPATIENT
Start: 2024-01-01 | End: 2024-01-01

## 2024-01-01 RX ORDER — INSULIN GLARGINE 100 [IU]/ML
5 INJECTION, SOLUTION SUBCUTANEOUS ONCE
Refills: 0 | Status: COMPLETED | OUTPATIENT
Start: 2024-01-01 | End: 2024-01-01

## 2024-01-01 RX ORDER — INSULIN GLARGINE 100 [IU]/ML
5 INJECTION, SOLUTION SUBCUTANEOUS EVERY MORNING
Refills: 0 | Status: DISCONTINUED | OUTPATIENT
Start: 2024-01-01 | End: 2024-01-01

## 2024-01-01 RX ORDER — TAMSULOSIN HYDROCHLORIDE 0.4 MG/1
0.4 CAPSULE ORAL AT BEDTIME
Refills: 0 | Status: DISCONTINUED | OUTPATIENT
Start: 2024-01-01 | End: 2024-01-01

## 2024-01-01 RX ORDER — MEROPENEM 500 MG/1
1000 INJECTION, POWDER, FOR SOLUTION INTRAVENOUS EVERY 12 HOURS
Refills: 0 | Status: COMPLETED | OUTPATIENT
Start: 2024-01-01 | End: 2024-01-01

## 2024-01-01 RX ORDER — SODIUM CHLORIDE 9 MG/ML
1000 INJECTION, SOLUTION INTRAMUSCULAR; INTRAVENOUS; SUBCUTANEOUS ONCE
Refills: 0 | Status: COMPLETED | OUTPATIENT
Start: 2024-01-01 | End: 2024-01-01

## 2024-01-01 RX ORDER — CEFEPIME 2 G/1
INJECTION, POWDER, FOR SOLUTION INTRAVENOUS
Refills: 0 | Status: DISCONTINUED | OUTPATIENT
Start: 2024-01-01 | End: 2024-01-01

## 2024-01-01 RX ORDER — 0.9 % SODIUM CHLORIDE 0.9 %
250 INTRAVENOUS SOLUTION INTRAVENOUS
Refills: 0 | Status: DISCONTINUED | OUTPATIENT
Start: 2024-01-01 | End: 2024-01-01

## 2024-01-01 RX ORDER — GABAPENTIN 300 MG/1
300 CAPSULE ORAL THREE TIMES A DAY
Refills: 0 | Status: DISCONTINUED | OUTPATIENT
Start: 2024-01-01 | End: 2024-01-01

## 2024-01-01 RX ORDER — FERROUS SULFATE 325(65) MG
325 TABLET ORAL DAILY
Refills: 0 | Status: DISCONTINUED | OUTPATIENT
Start: 2024-01-01 | End: 2024-01-01

## 2024-01-01 RX ORDER — CEFEPIME 2 G/1
1000 INJECTION, POWDER, FOR SOLUTION INTRAVENOUS EVERY 12 HOURS
Refills: 0 | Status: DISCONTINUED | OUTPATIENT
Start: 2024-01-01 | End: 2024-01-01

## 2024-01-01 RX ORDER — SCOPOLAMINE 1 MG/3D
1 PATCH, EXTENDED RELEASE TRANSDERMAL
Refills: 0 | Status: DISCONTINUED | OUTPATIENT
Start: 2024-01-01 | End: 2024-01-01

## 2024-01-01 RX ORDER — VANCOMYCIN HCL 900 MCG/MG
850 POWDER (GRAM) MISCELLANEOUS EVERY 24 HOURS
Refills: 0 | Status: DISCONTINUED | OUTPATIENT
Start: 2024-01-01 | End: 2024-01-01

## 2024-01-01 RX ADMIN — PANTOPRAZOLE SODIUM 40 MILLIGRAM(S): 40 TABLET, DELAYED RELEASE ORAL at 17:37

## 2024-01-01 RX ADMIN — TAMSULOSIN HYDROCHLORIDE 0.4 MILLIGRAM(S): 0.4 CAPSULE ORAL at 21:54

## 2024-01-01 RX ADMIN — MEROPENEM 100 MILLIGRAM(S): 500 INJECTION, POWDER, FOR SOLUTION INTRAVENOUS at 05:36

## 2024-01-01 RX ADMIN — Medication 2 TABLET(S): at 21:40

## 2024-01-01 RX ADMIN — GABAPENTIN 300 MILLIGRAM(S): 300 CAPSULE ORAL at 21:48

## 2024-01-01 RX ADMIN — Medication 20 MILLIGRAM(S): at 21:54

## 2024-01-01 RX ADMIN — CHLORHEXIDINE GLUCONATE 1 APPLICATION(S): 1.2 RINSE ORAL at 05:46

## 2024-01-01 RX ADMIN — PANTOPRAZOLE SODIUM 40 MILLIGRAM(S): 40 TABLET, DELAYED RELEASE ORAL at 16:46

## 2024-01-01 RX ADMIN — Medication 4: at 17:21

## 2024-01-01 RX ADMIN — GABAPENTIN 300 MILLIGRAM(S): 300 CAPSULE ORAL at 06:11

## 2024-01-01 RX ADMIN — CHLORHEXIDINE GLUCONATE 1 APPLICATION(S): 1.2 RINSE ORAL at 06:08

## 2024-01-01 RX ADMIN — Medication 250 MILLIGRAM(S): at 18:12

## 2024-01-01 RX ADMIN — SODIUM BICARBONATE 650 MILLIGRAM(S): 84 INJECTION, SOLUTION INTRAVENOUS at 13:11

## 2024-01-01 RX ADMIN — SODIUM CHLORIDE 75 MILLILITER(S): 9 INJECTION, SOLUTION INTRAMUSCULAR; INTRAVENOUS; SUBCUTANEOUS at 08:00

## 2024-01-01 RX ADMIN — MEROPENEM 100 MILLIGRAM(S): 500 INJECTION, POWDER, FOR SOLUTION INTRAVENOUS at 17:59

## 2024-01-01 RX ADMIN — GABAPENTIN 300 MILLIGRAM(S): 300 CAPSULE ORAL at 05:31

## 2024-01-01 RX ADMIN — Medication 2 TABLET(S): at 21:26

## 2024-01-01 RX ADMIN — CHLORHEXIDINE GLUCONATE 1 APPLICATION(S): 1.2 RINSE ORAL at 05:42

## 2024-01-01 RX ADMIN — SODIUM BICARBONATE 650 MILLIGRAM(S): 84 INJECTION, SOLUTION INTRAVENOUS at 06:54

## 2024-01-01 RX ADMIN — PANTOPRAZOLE SODIUM 40 MILLIGRAM(S): 40 TABLET, DELAYED RELEASE ORAL at 06:54

## 2024-01-01 RX ADMIN — INSULIN GLARGINE 5 UNIT(S): 100 INJECTION, SOLUTION SUBCUTANEOUS at 11:59

## 2024-01-01 RX ADMIN — GABAPENTIN 300 MILLIGRAM(S): 300 CAPSULE ORAL at 21:53

## 2024-01-01 RX ADMIN — GABAPENTIN 300 MILLIGRAM(S): 300 CAPSULE ORAL at 13:39

## 2024-01-01 RX ADMIN — CHLORHEXIDINE GLUCONATE 1 APPLICATION(S): 1.2 RINSE ORAL at 05:53

## 2024-01-01 RX ADMIN — Medication 20 MILLIGRAM(S): at 21:39

## 2024-01-01 RX ADMIN — PANTOPRAZOLE SODIUM 40 MILLIGRAM(S): 40 TABLET, DELAYED RELEASE ORAL at 06:06

## 2024-01-01 RX ADMIN — MEROPENEM 100 MILLIGRAM(S): 500 INJECTION, POWDER, FOR SOLUTION INTRAVENOUS at 06:54

## 2024-01-01 RX ADMIN — GABAPENTIN 300 MILLIGRAM(S): 300 CAPSULE ORAL at 05:24

## 2024-01-01 RX ADMIN — ACETAMINOPHEN, DIPHENHYDRAMINE HCL, PHENYLEPHRINE HCL 3 MILLIGRAM(S): 325; 25; 5 TABLET ORAL at 21:49

## 2024-01-01 RX ADMIN — MEROPENEM 100 MILLIGRAM(S): 500 INJECTION, POWDER, FOR SOLUTION INTRAVENOUS at 17:37

## 2024-01-01 RX ADMIN — SODIUM BICARBONATE 650 MILLIGRAM(S): 84 INJECTION, SOLUTION INTRAVENOUS at 05:35

## 2024-01-01 RX ADMIN — PANTOPRAZOLE SODIUM 40 MILLIGRAM(S): 40 TABLET, DELAYED RELEASE ORAL at 05:46

## 2024-01-01 RX ADMIN — GABAPENTIN 300 MILLIGRAM(S): 300 CAPSULE ORAL at 06:54

## 2024-01-01 RX ADMIN — ACETAMINOPHEN 500MG 975 MILLIGRAM(S): 500 TABLET, COATED ORAL at 12:43

## 2024-01-01 RX ADMIN — SODIUM CHLORIDE 100 MILLILITER(S): 9 INJECTION, SOLUTION INTRAMUSCULAR; INTRAVENOUS; SUBCUTANEOUS at 13:14

## 2024-01-01 RX ADMIN — Medication 3: at 11:56

## 2024-01-01 RX ADMIN — SODIUM CHLORIDE 4 MILLILITER(S): 9 INJECTION, SOLUTION INTRAMUSCULAR; INTRAVENOUS; SUBCUTANEOUS at 10:32

## 2024-01-01 RX ADMIN — SODIUM BICARBONATE 650 MILLIGRAM(S): 84 INJECTION, SOLUTION INTRAVENOUS at 13:16

## 2024-01-01 RX ADMIN — SCOPOLAMINE 1 PATCH: 1 PATCH, EXTENDED RELEASE TRANSDERMAL at 18:31

## 2024-01-01 RX ADMIN — Medication 325 MILLIGRAM(S): at 12:18

## 2024-01-01 RX ADMIN — Medication 20 MILLIGRAM(S): at 21:26

## 2024-01-01 RX ADMIN — TAMSULOSIN HYDROCHLORIDE 0.4 MILLIGRAM(S): 0.4 CAPSULE ORAL at 23:47

## 2024-01-01 RX ADMIN — POLYETHYLENE GLYCOL 3350 17 GRAM(S): 17 POWDER, FOR SOLUTION ORAL at 11:28

## 2024-01-01 RX ADMIN — Medication 1: at 11:47

## 2024-01-01 RX ADMIN — Medication 20 MILLIGRAM(S): at 22:29

## 2024-01-01 RX ADMIN — Medication 250 MILLIGRAM(S): at 05:53

## 2024-01-01 RX ADMIN — GABAPENTIN 300 MILLIGRAM(S): 300 CAPSULE ORAL at 21:12

## 2024-01-01 RX ADMIN — Medication 81 MILLIGRAM(S): at 11:52

## 2024-01-01 RX ADMIN — PANTOPRAZOLE SODIUM 40 MILLIGRAM(S): 40 TABLET, DELAYED RELEASE ORAL at 17:49

## 2024-01-01 RX ADMIN — ACETAMINOPHEN 500MG 650 MILLIGRAM(S): 500 TABLET, COATED ORAL at 14:16

## 2024-01-01 RX ADMIN — CHLORHEXIDINE GLUCONATE 1 APPLICATION(S): 1.2 RINSE ORAL at 05:44

## 2024-01-01 RX ADMIN — TAMSULOSIN HYDROCHLORIDE 0.4 MILLIGRAM(S): 0.4 CAPSULE ORAL at 21:39

## 2024-01-01 RX ADMIN — TAMSULOSIN HYDROCHLORIDE 0.4 MILLIGRAM(S): 0.4 CAPSULE ORAL at 21:14

## 2024-01-01 RX ADMIN — POLYETHYLENE GLYCOL 3350 17 GRAM(S): 17 POWDER, FOR SOLUTION ORAL at 11:18

## 2024-01-01 RX ADMIN — Medication 325 MILLIGRAM(S): at 11:52

## 2024-01-01 RX ADMIN — Medication 2: at 17:27

## 2024-01-01 RX ADMIN — SODIUM CHLORIDE 1000 MILLILITER(S): 9 INJECTION, SOLUTION INTRAMUSCULAR; INTRAVENOUS; SUBCUTANEOUS at 14:34

## 2024-01-01 RX ADMIN — Medication 100 MILLIGRAM(S): at 17:20

## 2024-01-01 RX ADMIN — Medication 3: at 11:47

## 2024-01-01 RX ADMIN — PANTOPRAZOLE SODIUM 40 MILLIGRAM(S): 40 TABLET, DELAYED RELEASE ORAL at 18:07

## 2024-01-01 RX ADMIN — GABAPENTIN 300 MILLIGRAM(S): 300 CAPSULE ORAL at 13:11

## 2024-01-01 RX ADMIN — PANTOPRAZOLE SODIUM 40 MILLIGRAM(S): 40 TABLET, DELAYED RELEASE ORAL at 05:41

## 2024-01-01 RX ADMIN — SODIUM BICARBONATE 650 MILLIGRAM(S): 84 INJECTION, SOLUTION INTRAVENOUS at 21:36

## 2024-01-01 RX ADMIN — TAMSULOSIN HYDROCHLORIDE 0.4 MILLIGRAM(S): 0.4 CAPSULE ORAL at 21:48

## 2024-01-01 RX ADMIN — SODIUM BICARBONATE 650 MILLIGRAM(S): 84 INJECTION, SOLUTION INTRAVENOUS at 05:43

## 2024-01-01 RX ADMIN — Medication 1: at 17:49

## 2024-01-01 RX ADMIN — CHLORHEXIDINE GLUCONATE 1 APPLICATION(S): 1.2 RINSE ORAL at 12:44

## 2024-01-01 RX ADMIN — PANTOPRAZOLE SODIUM 40 MILLIGRAM(S): 40 TABLET, DELAYED RELEASE ORAL at 05:44

## 2024-01-01 RX ADMIN — PANTOPRAZOLE SODIUM 40 MILLIGRAM(S): 40 TABLET, DELAYED RELEASE ORAL at 05:59

## 2024-01-01 RX ADMIN — SODIUM BICARBONATE 650 MILLIGRAM(S): 84 INJECTION, SOLUTION INTRAVENOUS at 21:14

## 2024-01-01 RX ADMIN — GABAPENTIN 300 MILLIGRAM(S): 300 CAPSULE ORAL at 21:39

## 2024-01-01 RX ADMIN — SODIUM BICARBONATE 650 MILLIGRAM(S): 84 INJECTION, SOLUTION INTRAVENOUS at 21:48

## 2024-01-01 RX ADMIN — GABAPENTIN 300 MILLIGRAM(S): 300 CAPSULE ORAL at 21:44

## 2024-01-01 RX ADMIN — MEROPENEM 100 MILLIGRAM(S): 500 INJECTION, POWDER, FOR SOLUTION INTRAVENOUS at 13:57

## 2024-01-01 RX ADMIN — Medication 2: at 08:40

## 2024-01-01 RX ADMIN — Medication 50 MILLILITER(S): at 18:28

## 2024-01-01 RX ADMIN — GLYCOPYRROLATE 0.2 MILLIGRAM(S): 1 TABLET ORAL at 09:38

## 2024-01-01 RX ADMIN — GABAPENTIN 300 MILLIGRAM(S): 300 CAPSULE ORAL at 06:04

## 2024-01-01 RX ADMIN — TAMSULOSIN HYDROCHLORIDE 0.4 MILLIGRAM(S): 0.4 CAPSULE ORAL at 21:49

## 2024-01-01 RX ADMIN — MEROPENEM 100 MILLIGRAM(S): 500 INJECTION, POWDER, FOR SOLUTION INTRAVENOUS at 05:18

## 2024-01-01 RX ADMIN — GABAPENTIN 300 MILLIGRAM(S): 300 CAPSULE ORAL at 13:36

## 2024-01-01 RX ADMIN — Medication 81 MILLIGRAM(S): at 11:28

## 2024-01-01 RX ADMIN — INSULIN GLARGINE 4 UNIT(S): 100 INJECTION, SOLUTION SUBCUTANEOUS at 23:52

## 2024-01-01 RX ADMIN — PANTOPRAZOLE SODIUM 40 MILLIGRAM(S): 40 TABLET, DELAYED RELEASE ORAL at 06:04

## 2024-01-01 RX ADMIN — GABAPENTIN 300 MILLIGRAM(S): 300 CAPSULE ORAL at 06:01

## 2024-01-01 RX ADMIN — PANTOPRAZOLE SODIUM 40 MILLIGRAM(S): 40 TABLET, DELAYED RELEASE ORAL at 17:01

## 2024-01-01 RX ADMIN — IRON SUCROSE 100 MILLIGRAM(S): 20 INJECTION, SOLUTION INTRAVENOUS at 13:37

## 2024-01-01 RX ADMIN — Medication 1: at 08:15

## 2024-01-01 RX ADMIN — CHLORHEXIDINE GLUCONATE 1 APPLICATION(S): 1.2 RINSE ORAL at 06:21

## 2024-01-01 RX ADMIN — PANTOPRAZOLE SODIUM 40 MILLIGRAM(S): 40 TABLET, DELAYED RELEASE ORAL at 18:05

## 2024-01-01 RX ADMIN — Medication 1: at 17:00

## 2024-01-01 RX ADMIN — Medication 2: at 17:25

## 2024-01-01 RX ADMIN — ACETAMINOPHEN, DIPHENHYDRAMINE HCL, PHENYLEPHRINE HCL 3 MILLIGRAM(S): 325; 25; 5 TABLET ORAL at 21:44

## 2024-01-01 RX ADMIN — ACETAMINOPHEN, DIPHENHYDRAMINE HCL, PHENYLEPHRINE HCL 3 MILLIGRAM(S): 325; 25; 5 TABLET ORAL at 21:38

## 2024-01-01 RX ADMIN — Medication 25 GRAM(S): at 16:46

## 2024-01-01 RX ADMIN — SODIUM BICARBONATE 650 MILLIGRAM(S): 84 INJECTION, SOLUTION INTRAVENOUS at 22:00

## 2024-01-01 RX ADMIN — IRON SUCROSE 100 MILLIGRAM(S): 20 INJECTION, SOLUTION INTRAVENOUS at 06:51

## 2024-01-01 RX ADMIN — TAMSULOSIN HYDROCHLORIDE 0.4 MILLIGRAM(S): 0.4 CAPSULE ORAL at 21:37

## 2024-01-01 RX ADMIN — SODIUM ZIRCONIUM CYCLOSILICATE 10 GRAM(S): 5 POWDER, FOR SUSPENSION ORAL at 06:00

## 2024-01-01 RX ADMIN — POLYETHYLENE GLYCOL 3350 17 GRAM(S): 17 POWDER, FOR SOLUTION ORAL at 13:57

## 2024-01-01 RX ADMIN — MEROPENEM 100 MILLIGRAM(S): 500 INJECTION, POWDER, FOR SOLUTION INTRAVENOUS at 17:38

## 2024-01-01 RX ADMIN — GABAPENTIN 300 MILLIGRAM(S): 300 CAPSULE ORAL at 05:35

## 2024-01-01 RX ADMIN — SODIUM BICARBONATE 650 MILLIGRAM(S): 84 INJECTION, SOLUTION INTRAVENOUS at 05:46

## 2024-01-01 RX ADMIN — SCOPOLAMINE 1 PATCH: 1 PATCH, EXTENDED RELEASE TRANSDERMAL at 19:02

## 2024-01-01 RX ADMIN — TAMSULOSIN HYDROCHLORIDE 0.4 MILLIGRAM(S): 0.4 CAPSULE ORAL at 22:30

## 2024-01-01 RX ADMIN — Medication 20 MILLIGRAM(S): at 22:00

## 2024-01-01 RX ADMIN — TAMSULOSIN HYDROCHLORIDE 0.4 MILLIGRAM(S): 0.4 CAPSULE ORAL at 21:38

## 2024-01-01 RX ADMIN — CHLORHEXIDINE GLUCONATE 1 APPLICATION(S): 1.2 RINSE ORAL at 06:07

## 2024-01-01 RX ADMIN — Medication 1: at 07:59

## 2024-01-01 RX ADMIN — TAMSULOSIN HYDROCHLORIDE 0.4 MILLIGRAM(S): 0.4 CAPSULE ORAL at 21:53

## 2024-01-01 RX ADMIN — Medication 250 MILLIGRAM(S): at 18:59

## 2024-01-01 RX ADMIN — CHLORHEXIDINE GLUCONATE 1 APPLICATION(S): 1.2 RINSE ORAL at 05:45

## 2024-01-01 RX ADMIN — SODIUM BICARBONATE 650 MILLIGRAM(S): 84 INJECTION, SOLUTION INTRAVENOUS at 14:16

## 2024-01-01 RX ADMIN — Medication 81 MILLIGRAM(S): at 12:43

## 2024-01-01 RX ADMIN — Medication 325 MILLIGRAM(S): at 11:35

## 2024-01-01 RX ADMIN — SODIUM BICARBONATE 650 MILLIGRAM(S): 84 INJECTION, SOLUTION INTRAVENOUS at 21:53

## 2024-01-01 RX ADMIN — SODIUM BICARBONATE 650 MILLIGRAM(S): 84 INJECTION, SOLUTION INTRAVENOUS at 05:52

## 2024-01-01 RX ADMIN — PANTOPRAZOLE SODIUM 40 MILLIGRAM(S): 40 TABLET, DELAYED RELEASE ORAL at 05:31

## 2024-01-01 RX ADMIN — Medication 1: at 11:27

## 2024-01-01 RX ADMIN — GABAPENTIN 300 MILLIGRAM(S): 300 CAPSULE ORAL at 13:09

## 2024-01-01 RX ADMIN — CHLORHEXIDINE GLUCONATE 1 APPLICATION(S): 1.2 RINSE ORAL at 06:57

## 2024-01-01 RX ADMIN — Medication 3: at 11:52

## 2024-01-01 RX ADMIN — Medication 3: at 18:14

## 2024-01-01 RX ADMIN — SODIUM BICARBONATE 650 MILLIGRAM(S): 84 INJECTION, SOLUTION INTRAVENOUS at 21:38

## 2024-01-01 RX ADMIN — ACETAMINOPHEN, DIPHENHYDRAMINE HCL, PHENYLEPHRINE HCL 3 MILLIGRAM(S): 325; 25; 5 TABLET ORAL at 21:12

## 2024-01-01 RX ADMIN — GABAPENTIN 300 MILLIGRAM(S): 300 CAPSULE ORAL at 21:38

## 2024-01-01 RX ADMIN — GABAPENTIN 300 MILLIGRAM(S): 300 CAPSULE ORAL at 05:41

## 2024-01-01 RX ADMIN — Medication 20 MILLIGRAM(S): at 23:48

## 2024-01-01 RX ADMIN — GABAPENTIN 300 MILLIGRAM(S): 300 CAPSULE ORAL at 05:52

## 2024-01-01 RX ADMIN — SODIUM BICARBONATE 650 MILLIGRAM(S): 84 INJECTION, SOLUTION INTRAVENOUS at 06:01

## 2024-01-01 RX ADMIN — PANTOPRAZOLE SODIUM 40 MILLIGRAM(S): 40 TABLET, DELAYED RELEASE ORAL at 05:35

## 2024-01-01 RX ADMIN — Medication 250 MILLILITER(S): at 03:16

## 2024-01-01 RX ADMIN — Medication 325 MILLIGRAM(S): at 11:18

## 2024-01-01 RX ADMIN — Medication 2 TABLET(S): at 21:44

## 2024-01-01 RX ADMIN — SODIUM CHLORIDE 75 MILLILITER(S): 9 INJECTION, SOLUTION INTRAMUSCULAR; INTRAVENOUS; SUBCUTANEOUS at 21:50

## 2024-01-01 RX ADMIN — IRON SUCROSE 100 MILLIGRAM(S): 20 INJECTION, SOLUTION INTRAVENOUS at 05:52

## 2024-01-01 RX ADMIN — ACETAMINOPHEN 500MG 975 MILLIGRAM(S): 500 TABLET, COATED ORAL at 09:37

## 2024-01-01 RX ADMIN — Medication 325 MILLIGRAM(S): at 11:48

## 2024-01-01 RX ADMIN — GLYCOPYRROLATE 0.2 MILLIGRAM(S): 1 TABLET ORAL at 15:34

## 2024-01-01 RX ADMIN — IPRATROPIUM BROMIDE AND ALBUTEROL SULFATE 3 MILLILITER(S): 2.5; .5 SOLUTION RESPIRATORY (INHALATION) at 16:46

## 2024-01-01 RX ADMIN — Medication 20 MILLIGRAM(S): at 21:49

## 2024-01-01 RX ADMIN — GABAPENTIN 300 MILLIGRAM(S): 300 CAPSULE ORAL at 16:22

## 2024-01-01 RX ADMIN — TAMSULOSIN HYDROCHLORIDE 0.4 MILLIGRAM(S): 0.4 CAPSULE ORAL at 21:44

## 2024-01-01 RX ADMIN — PANTOPRAZOLE SODIUM 40 MILLIGRAM(S): 40 TABLET, DELAYED RELEASE ORAL at 05:24

## 2024-01-01 RX ADMIN — MEROPENEM 100 MILLIGRAM(S): 500 INJECTION, POWDER, FOR SOLUTION INTRAVENOUS at 06:12

## 2024-01-01 RX ADMIN — ACETAMINOPHEN 500MG 975 MILLIGRAM(S): 500 TABLET, COATED ORAL at 11:35

## 2024-01-01 RX ADMIN — SODIUM BICARBONATE 650 MILLIGRAM(S): 84 INJECTION, SOLUTION INTRAVENOUS at 22:30

## 2024-01-01 RX ADMIN — PANTOPRAZOLE SODIUM 40 MILLIGRAM(S): 40 TABLET, DELAYED RELEASE ORAL at 17:28

## 2024-01-01 RX ADMIN — CHLORHEXIDINE GLUCONATE 1 APPLICATION(S): 1.2 RINSE ORAL at 05:28

## 2024-01-01 RX ADMIN — PANTOPRAZOLE SODIUM 40 MILLIGRAM(S): 40 TABLET, DELAYED RELEASE ORAL at 17:30

## 2024-01-01 RX ADMIN — GABAPENTIN 300 MILLIGRAM(S): 300 CAPSULE ORAL at 22:00

## 2024-01-01 RX ADMIN — Medication 81 MILLIGRAM(S): at 11:23

## 2024-01-01 RX ADMIN — POLYETHYLENE GLYCOL 3350 17 GRAM(S): 17 POWDER, FOR SOLUTION ORAL at 13:38

## 2024-01-01 RX ADMIN — Medication 81 MILLIGRAM(S): at 13:38

## 2024-01-01 RX ADMIN — MEROPENEM 100 MILLIGRAM(S): 500 INJECTION, POWDER, FOR SOLUTION INTRAVENOUS at 05:06

## 2024-01-01 RX ADMIN — Medication 81 MILLIGRAM(S): at 11:48

## 2024-01-01 RX ADMIN — SCOPOLAMINE 1 PATCH: 1 PATCH, EXTENDED RELEASE TRANSDERMAL at 19:30

## 2024-01-01 RX ADMIN — INSULIN GLARGINE 5 UNIT(S): 100 INJECTION, SOLUTION SUBCUTANEOUS at 08:45

## 2024-01-01 RX ADMIN — Medication 325 MILLIGRAM(S): at 12:03

## 2024-01-01 RX ADMIN — SODIUM CHLORIDE 75 MILLILITER(S): 9 INJECTION, SOLUTION INTRAMUSCULAR; INTRAVENOUS; SUBCUTANEOUS at 23:48

## 2024-01-01 RX ADMIN — MEROPENEM 100 MILLIGRAM(S): 500 INJECTION, POWDER, FOR SOLUTION INTRAVENOUS at 17:16

## 2024-01-01 RX ADMIN — SODIUM CHLORIDE 75 MILLILITER(S): 9 INJECTION, SOLUTION INTRAMUSCULAR; INTRAVENOUS; SUBCUTANEOUS at 15:45

## 2024-01-01 RX ADMIN — SODIUM BICARBONATE 650 MILLIGRAM(S): 84 INJECTION, SOLUTION INTRAVENOUS at 21:37

## 2024-01-01 RX ADMIN — PANTOPRAZOLE SODIUM 40 MILLIGRAM(S): 40 TABLET, DELAYED RELEASE ORAL at 06:01

## 2024-01-01 RX ADMIN — Medication 1: at 11:38

## 2024-01-01 RX ADMIN — Medication 81 MILLIGRAM(S): at 12:19

## 2024-01-01 RX ADMIN — PANTOPRAZOLE SODIUM 40 MILLIGRAM(S): 40 TABLET, DELAYED RELEASE ORAL at 17:58

## 2024-01-01 RX ADMIN — ACETAMINOPHEN 500MG 975 MILLIGRAM(S): 500 TABLET, COATED ORAL at 20:33

## 2024-01-01 RX ADMIN — POLYETHYLENE GLYCOL 3350 17 GRAM(S): 17 POWDER, FOR SOLUTION ORAL at 11:48

## 2024-01-01 RX ADMIN — Medication 3: at 12:51

## 2024-01-01 RX ADMIN — POLYETHYLENE GLYCOL 3350 17 GRAM(S): 17 POWDER, FOR SOLUTION ORAL at 12:42

## 2024-01-01 RX ADMIN — Medication 2 TABLET(S): at 22:16

## 2024-01-01 RX ADMIN — GABAPENTIN 300 MILLIGRAM(S): 300 CAPSULE ORAL at 14:17

## 2024-01-01 RX ADMIN — Medication 81 MILLIGRAM(S): at 11:36

## 2024-01-01 RX ADMIN — ACETAMINOPHEN, DIPHENHYDRAMINE HCL, PHENYLEPHRINE HCL 3 MILLIGRAM(S): 325; 25; 5 TABLET ORAL at 21:54

## 2024-01-01 RX ADMIN — GABAPENTIN 300 MILLIGRAM(S): 300 CAPSULE ORAL at 22:16

## 2024-01-01 RX ADMIN — PANTOPRAZOLE SODIUM 40 MILLIGRAM(S): 40 TABLET, DELAYED RELEASE ORAL at 18:15

## 2024-01-01 RX ADMIN — SODIUM BICARBONATE 650 MILLIGRAM(S): 84 INJECTION, SOLUTION INTRAVENOUS at 13:36

## 2024-01-01 RX ADMIN — ACETAMINOPHEN, DIPHENHYDRAMINE HCL, PHENYLEPHRINE HCL 3 MILLIGRAM(S): 325; 25; 5 TABLET ORAL at 22:16

## 2024-01-01 RX ADMIN — GABAPENTIN 300 MILLIGRAM(S): 300 CAPSULE ORAL at 06:20

## 2024-01-01 RX ADMIN — CHLORHEXIDINE GLUCONATE 1 APPLICATION(S): 1.2 RINSE ORAL at 05:33

## 2024-01-01 RX ADMIN — Medication 1: at 16:46

## 2024-01-01 RX ADMIN — GABAPENTIN 300 MILLIGRAM(S): 300 CAPSULE ORAL at 06:07

## 2024-01-01 RX ADMIN — GABAPENTIN 300 MILLIGRAM(S): 300 CAPSULE ORAL at 13:31

## 2024-01-01 RX ADMIN — POLYETHYLENE GLYCOL 3350 17 GRAM(S): 17 POWDER, FOR SOLUTION ORAL at 11:24

## 2024-01-01 RX ADMIN — Medication 250 MILLILITER(S): at 01:37

## 2024-01-01 RX ADMIN — SODIUM BICARBONATE 650 MILLIGRAM(S): 84 INJECTION, SOLUTION INTRAVENOUS at 16:15

## 2024-01-01 RX ADMIN — Medication 20 MILLIGRAM(S): at 21:14

## 2024-01-01 RX ADMIN — ACETAMINOPHEN, DIPHENHYDRAMINE HCL, PHENYLEPHRINE HCL 3 MILLIGRAM(S): 325; 25; 5 TABLET ORAL at 21:48

## 2024-01-01 RX ADMIN — GABAPENTIN 300 MILLIGRAM(S): 300 CAPSULE ORAL at 05:46

## 2024-01-01 RX ADMIN — TAMSULOSIN HYDROCHLORIDE 0.4 MILLIGRAM(S): 0.4 CAPSULE ORAL at 22:16

## 2024-01-01 RX ADMIN — ACETAMINOPHEN 500MG 650 MILLIGRAM(S): 500 TABLET, COATED ORAL at 13:46

## 2024-01-01 RX ADMIN — Medication 325 MILLIGRAM(S): at 11:23

## 2024-01-01 RX ADMIN — Medication 1: at 07:54

## 2024-01-01 RX ADMIN — PANTOPRAZOLE SODIUM 40 MILLIGRAM(S): 40 TABLET, DELAYED RELEASE ORAL at 06:02

## 2024-01-01 RX ADMIN — MEROPENEM 100 MILLIGRAM(S): 500 INJECTION, POWDER, FOR SOLUTION INTRAVENOUS at 17:27

## 2024-01-01 RX ADMIN — SODIUM BICARBONATE 650 MILLIGRAM(S): 84 INJECTION, SOLUTION INTRAVENOUS at 13:31

## 2024-01-01 RX ADMIN — MEROPENEM 100 MILLIGRAM(S): 500 INJECTION, POWDER, FOR SOLUTION INTRAVENOUS at 17:01

## 2024-01-01 RX ADMIN — CHLORHEXIDINE GLUCONATE 1 APPLICATION(S): 1.2 RINSE ORAL at 06:06

## 2024-01-01 RX ADMIN — Medication 2: at 12:43

## 2024-01-01 RX ADMIN — Medication 81 MILLIGRAM(S): at 12:03

## 2024-01-01 RX ADMIN — Medication 2 TABLET(S): at 21:13

## 2024-01-01 RX ADMIN — Medication 325 MILLIGRAM(S): at 13:37

## 2024-01-01 RX ADMIN — Medication 2 TABLET(S): at 21:37

## 2024-01-01 RX ADMIN — SODIUM BICARBONATE 650 MILLIGRAM(S): 84 INJECTION, SOLUTION INTRAVENOUS at 05:59

## 2024-01-01 RX ADMIN — Medication 1: at 07:55

## 2024-01-01 RX ADMIN — ACETAMINOPHEN 500MG 975 MILLIGRAM(S): 500 TABLET, COATED ORAL at 13:30

## 2024-01-01 RX ADMIN — SODIUM BICARBONATE 650 MILLIGRAM(S): 84 INJECTION, SOLUTION INTRAVENOUS at 06:11

## 2024-01-01 RX ADMIN — Medication 81 MILLIGRAM(S): at 13:35

## 2024-01-01 RX ADMIN — Medication 2 TABLET(S): at 21:48

## 2024-01-01 RX ADMIN — ACETAMINOPHEN, DIPHENHYDRAMINE HCL, PHENYLEPHRINE HCL 3 MILLIGRAM(S): 325; 25; 5 TABLET ORAL at 21:39

## 2024-01-01 RX ADMIN — CHLORHEXIDINE GLUCONATE 1 APPLICATION(S): 1.2 RINSE ORAL at 06:01

## 2024-01-01 RX ADMIN — Medication 2 TABLET(S): at 21:38

## 2024-01-01 RX ADMIN — ACETAMINOPHEN 500MG 975 MILLIGRAM(S): 500 TABLET, COATED ORAL at 10:00

## 2024-01-01 RX ADMIN — SODIUM CHLORIDE 75 MILLILITER(S): 9 INJECTION, SOLUTION INTRAMUSCULAR; INTRAVENOUS; SUBCUTANEOUS at 21:58

## 2024-01-01 RX ADMIN — SODIUM BICARBONATE 650 MILLIGRAM(S): 84 INJECTION, SOLUTION INTRAVENOUS at 06:07

## 2024-01-01 RX ADMIN — Medication 325 MILLIGRAM(S): at 12:44

## 2024-01-01 RX ADMIN — Medication 81 MILLIGRAM(S): at 11:18

## 2024-01-01 RX ADMIN — PANTOPRAZOLE SODIUM 40 MILLIGRAM(S): 40 TABLET, DELAYED RELEASE ORAL at 06:20

## 2024-01-01 RX ADMIN — Medication 325 MILLIGRAM(S): at 14:15

## 2024-01-01 RX ADMIN — Medication 20 MILLIGRAM(S): at 21:44

## 2024-01-01 RX ADMIN — INSULIN GLARGINE 5 UNIT(S): 100 INJECTION, SOLUTION SUBCUTANEOUS at 08:41

## 2024-01-01 RX ADMIN — PANTOPRAZOLE SODIUM 40 MILLIGRAM(S): 40 TABLET, DELAYED RELEASE ORAL at 06:11

## 2024-01-01 RX ADMIN — PANTOPRAZOLE SODIUM 40 MILLIGRAM(S): 40 TABLET, DELAYED RELEASE ORAL at 05:53

## 2024-01-01 RX ADMIN — GABAPENTIN 300 MILLIGRAM(S): 300 CAPSULE ORAL at 06:02

## 2024-01-01 RX ADMIN — ACETAMINOPHEN 500MG 975 MILLIGRAM(S): 500 TABLET, COATED ORAL at 11:47

## 2024-01-01 RX ADMIN — POLYETHYLENE GLYCOL 3350 17 GRAM(S): 17 POWDER, FOR SOLUTION ORAL at 12:03

## 2024-01-01 RX ADMIN — Medication 1: at 17:16

## 2024-01-01 RX ADMIN — Medication 2 TABLET(S): at 21:54

## 2024-01-01 RX ADMIN — GABAPENTIN 300 MILLIGRAM(S): 300 CAPSULE ORAL at 22:30

## 2024-01-01 RX ADMIN — Medication 20 MILLIGRAM(S): at 21:38

## 2024-01-01 RX ADMIN — MEROPENEM 100 MILLIGRAM(S): 500 INJECTION, POWDER, FOR SOLUTION INTRAVENOUS at 18:15

## 2024-01-01 RX ADMIN — PANTOPRAZOLE SODIUM 40 MILLIGRAM(S): 40 TABLET, DELAYED RELEASE ORAL at 17:35

## 2024-01-01 RX ADMIN — GABAPENTIN 300 MILLIGRAM(S): 300 CAPSULE ORAL at 13:16

## 2024-01-01 RX ADMIN — ACETAMINOPHEN, DIPHENHYDRAMINE HCL, PHENYLEPHRINE HCL 3 MILLIGRAM(S): 325; 25; 5 TABLET ORAL at 22:29

## 2024-01-01 RX ADMIN — TAMSULOSIN HYDROCHLORIDE 0.4 MILLIGRAM(S): 0.4 CAPSULE ORAL at 21:26

## 2024-01-01 RX ADMIN — PANTOPRAZOLE SODIUM 40 MILLIGRAM(S): 40 TABLET, DELAYED RELEASE ORAL at 17:20

## 2024-01-01 RX ADMIN — Medication 1: at 17:30

## 2024-01-01 RX ADMIN — Medication 81 MILLIGRAM(S): at 13:58

## 2024-01-01 RX ADMIN — ACETAMINOPHEN, DIPHENHYDRAMINE HCL, PHENYLEPHRINE HCL 3 MILLIGRAM(S): 325; 25; 5 TABLET ORAL at 23:47

## 2024-01-01 RX ADMIN — Medication 250 MILLILITER(S): at 18:28

## 2024-01-01 RX ADMIN — MEROPENEM 100 MILLIGRAM(S): 500 INJECTION, POWDER, FOR SOLUTION INTRAVENOUS at 05:53

## 2024-01-01 RX ADMIN — GABAPENTIN 300 MILLIGRAM(S): 300 CAPSULE ORAL at 21:26

## 2024-01-01 RX ADMIN — ACETAMINOPHEN 500MG 975 MILLIGRAM(S): 500 TABLET, COATED ORAL at 20:53

## 2024-01-01 RX ADMIN — TAMSULOSIN HYDROCHLORIDE 0.4 MILLIGRAM(S): 0.4 CAPSULE ORAL at 22:00

## 2024-01-01 RX ADMIN — MEROPENEM 100 MILLIGRAM(S): 500 INJECTION, POWDER, FOR SOLUTION INTRAVENOUS at 05:59

## 2024-01-01 RX ADMIN — GABAPENTIN 300 MILLIGRAM(S): 300 CAPSULE ORAL at 23:47

## 2024-01-01 RX ADMIN — SODIUM CHLORIDE 75 MILLILITER(S): 9 INJECTION, SOLUTION INTRAMUSCULAR; INTRAVENOUS; SUBCUTANEOUS at 11:17

## 2024-01-01 RX ADMIN — MEROPENEM 100 MILLIGRAM(S): 500 INJECTION, POWDER, FOR SOLUTION INTRAVENOUS at 03:16

## 2024-01-01 RX ADMIN — SODIUM BICARBONATE 650 MILLIGRAM(S): 84 INJECTION, SOLUTION INTRAVENOUS at 22:16

## 2024-01-01 RX ADMIN — Medication 325 MILLIGRAM(S): at 11:28

## 2024-01-01 RX ADMIN — PANTOPRAZOLE SODIUM 40 MILLIGRAM(S): 40 TABLET, DELAYED RELEASE ORAL at 17:16

## 2024-01-01 RX ADMIN — SODIUM CHLORIDE 100 MILLILITER(S): 9 INJECTION, SOLUTION INTRAMUSCULAR; INTRAVENOUS; SUBCUTANEOUS at 23:29

## 2024-01-01 RX ADMIN — Medication 50 MILLILITER(S): at 05:53

## 2024-01-01 RX ADMIN — SODIUM CHLORIDE 75 MILLILITER(S): 9 INJECTION, SOLUTION INTRAMUSCULAR; INTRAVENOUS; SUBCUTANEOUS at 17:32

## 2024-01-01 RX ADMIN — GABAPENTIN 300 MILLIGRAM(S): 300 CAPSULE ORAL at 05:43

## 2024-01-01 RX ADMIN — GLYCOPYRROLATE 0.2 MILLIGRAM(S): 1 TABLET ORAL at 22:25

## 2024-01-01 RX ADMIN — Medication 20 MILLIGRAM(S): at 21:48

## 2024-01-01 RX ADMIN — INSULIN GLARGINE 5 UNIT(S): 100 INJECTION, SOLUTION SUBCUTANEOUS at 08:21

## 2024-01-01 RX ADMIN — Medication 20 MILLIGRAM(S): at 22:17

## 2024-01-01 RX ADMIN — IPRATROPIUM BROMIDE AND ALBUTEROL SULFATE 3 MILLILITER(S): 2.5; .5 SOLUTION RESPIRATORY (INHALATION) at 10:32

## 2024-01-01 RX ADMIN — SODIUM BICARBONATE 650 MILLIGRAM(S): 84 INJECTION, SOLUTION INTRAVENOUS at 21:39

## 2024-01-01 RX ADMIN — Medication 325 MILLIGRAM(S): at 13:58

## 2024-01-01 RX ADMIN — Medication 5: at 11:17

## 2024-01-01 RX ADMIN — Medication 1: at 11:58

## 2024-01-01 RX ADMIN — MEROPENEM 100 MILLIGRAM(S): 500 INJECTION, POWDER, FOR SOLUTION INTRAVENOUS at 05:43

## 2024-01-01 RX ADMIN — Medication 81 MILLIGRAM(S): at 14:17

## 2024-01-01 RX ADMIN — GABAPENTIN 300 MILLIGRAM(S): 300 CAPSULE ORAL at 21:55

## 2024-01-01 RX ADMIN — CHLORHEXIDINE GLUCONATE 1 APPLICATION(S): 1.2 RINSE ORAL at 11:48

## 2024-01-01 RX ADMIN — SODIUM BICARBONATE 650 MILLIGRAM(S): 84 INJECTION, SOLUTION INTRAVENOUS at 05:53

## 2024-01-01 RX ADMIN — Medication 20 MILLIGRAM(S): at 21:37

## 2024-01-01 RX ADMIN — MEROPENEM 100 MILLIGRAM(S): 500 INJECTION, POWDER, FOR SOLUTION INTRAVENOUS at 18:07

## 2024-01-01 RX ADMIN — PANTOPRAZOLE SODIUM 40 MILLIGRAM(S): 40 TABLET, DELAYED RELEASE ORAL at 05:52

## 2024-01-01 RX ADMIN — MEROPENEM 100 MILLIGRAM(S): 500 INJECTION, POWDER, FOR SOLUTION INTRAVENOUS at 05:45

## 2024-01-01 RX ADMIN — GABAPENTIN 300 MILLIGRAM(S): 300 CAPSULE ORAL at 13:58

## 2024-01-01 RX ADMIN — ACETAMINOPHEN, DIPHENHYDRAMINE HCL, PHENYLEPHRINE HCL 3 MILLIGRAM(S): 325; 25; 5 TABLET ORAL at 21:26

## 2024-01-01 RX ADMIN — ACETAMINOPHEN, DIPHENHYDRAMINE HCL, PHENYLEPHRINE HCL 3 MILLIGRAM(S): 325; 25; 5 TABLET ORAL at 22:00

## 2024-01-01 RX ADMIN — CEFEPIME 100 MILLIGRAM(S): 2 INJECTION, POWDER, FOR SOLUTION INTRAVENOUS at 10:01

## 2024-01-09 ENCOUNTER — EMERGENCY (EMERGENCY)
Facility: HOSPITAL | Age: 84
LOS: 0 days | Discharge: HOME | End: 2024-01-10
Attending: EMERGENCY MEDICINE
Payer: MEDICARE

## 2024-01-09 VITALS
SYSTOLIC BLOOD PRESSURE: 183 MMHG | HEART RATE: 77 BPM | DIASTOLIC BLOOD PRESSURE: 79 MMHG | RESPIRATION RATE: 18 BRPM | OXYGEN SATURATION: 96 % | TEMPERATURE: 98 F

## 2024-01-09 VITALS
TEMPERATURE: 98 F | OXYGEN SATURATION: 99 % | WEIGHT: 169.98 LBS | RESPIRATION RATE: 18 BRPM | SYSTOLIC BLOOD PRESSURE: 191 MMHG | DIASTOLIC BLOOD PRESSURE: 78 MMHG | HEART RATE: 88 BPM

## 2024-01-09 DIAGNOSIS — E78.5 HYPERLIPIDEMIA, UNSPECIFIED: ICD-10-CM

## 2024-01-09 DIAGNOSIS — D64.9 ANEMIA, UNSPECIFIED: ICD-10-CM

## 2024-01-09 DIAGNOSIS — Z95.5 PRESENCE OF CORONARY ANGIOPLASTY IMPLANT AND GRAFT: Chronic | ICD-10-CM

## 2024-01-09 DIAGNOSIS — R53.1 WEAKNESS: ICD-10-CM

## 2024-01-09 DIAGNOSIS — E11.9 TYPE 2 DIABETES MELLITUS WITHOUT COMPLICATIONS: ICD-10-CM

## 2024-01-09 DIAGNOSIS — Z88.0 ALLERGY STATUS TO PENICILLIN: ICD-10-CM

## 2024-01-09 DIAGNOSIS — F17.210 NICOTINE DEPENDENCE, CIGARETTES, UNCOMPLICATED: ICD-10-CM

## 2024-01-09 DIAGNOSIS — I10 ESSENTIAL (PRIMARY) HYPERTENSION: ICD-10-CM

## 2024-01-09 LAB
ALBUMIN SERPL ELPH-MCNC: 4.2 G/DL — SIGNIFICANT CHANGE UP (ref 3.5–5.2)
ALBUMIN SERPL ELPH-MCNC: 4.2 G/DL — SIGNIFICANT CHANGE UP (ref 3.5–5.2)
ALP SERPL-CCNC: 90 U/L — SIGNIFICANT CHANGE UP (ref 30–115)
ALP SERPL-CCNC: 90 U/L — SIGNIFICANT CHANGE UP (ref 30–115)
ALT FLD-CCNC: 7 U/L — SIGNIFICANT CHANGE UP (ref 0–41)
ALT FLD-CCNC: 7 U/L — SIGNIFICANT CHANGE UP (ref 0–41)
ANION GAP SERPL CALC-SCNC: 11 MMOL/L — SIGNIFICANT CHANGE UP (ref 7–14)
ANION GAP SERPL CALC-SCNC: 11 MMOL/L — SIGNIFICANT CHANGE UP (ref 7–14)
AST SERPL-CCNC: 13 U/L — SIGNIFICANT CHANGE UP (ref 0–41)
AST SERPL-CCNC: 13 U/L — SIGNIFICANT CHANGE UP (ref 0–41)
BASOPHILS # BLD AUTO: 0.02 K/UL — SIGNIFICANT CHANGE UP (ref 0–0.2)
BASOPHILS # BLD AUTO: 0.02 K/UL — SIGNIFICANT CHANGE UP (ref 0–0.2)
BASOPHILS NFR BLD AUTO: 0.4 % — SIGNIFICANT CHANGE UP (ref 0–1)
BASOPHILS NFR BLD AUTO: 0.4 % — SIGNIFICANT CHANGE UP (ref 0–1)
BILIRUB SERPL-MCNC: <0.2 MG/DL — SIGNIFICANT CHANGE UP (ref 0.2–1.2)
BILIRUB SERPL-MCNC: <0.2 MG/DL — SIGNIFICANT CHANGE UP (ref 0.2–1.2)
BLD GP AB SCN SERPL QL: SIGNIFICANT CHANGE UP
BLD GP AB SCN SERPL QL: SIGNIFICANT CHANGE UP
BUN SERPL-MCNC: 18 MG/DL — SIGNIFICANT CHANGE UP (ref 10–20)
BUN SERPL-MCNC: 18 MG/DL — SIGNIFICANT CHANGE UP (ref 10–20)
CALCIUM SERPL-MCNC: 8.9 MG/DL — SIGNIFICANT CHANGE UP (ref 8.4–10.5)
CALCIUM SERPL-MCNC: 8.9 MG/DL — SIGNIFICANT CHANGE UP (ref 8.4–10.5)
CHLORIDE SERPL-SCNC: 103 MMOL/L — SIGNIFICANT CHANGE UP (ref 98–110)
CHLORIDE SERPL-SCNC: 103 MMOL/L — SIGNIFICANT CHANGE UP (ref 98–110)
CO2 SERPL-SCNC: 24 MMOL/L — SIGNIFICANT CHANGE UP (ref 17–32)
CO2 SERPL-SCNC: 24 MMOL/L — SIGNIFICANT CHANGE UP (ref 17–32)
CREAT SERPL-MCNC: 1.1 MG/DL — SIGNIFICANT CHANGE UP (ref 0.7–1.5)
CREAT SERPL-MCNC: 1.1 MG/DL — SIGNIFICANT CHANGE UP (ref 0.7–1.5)
EGFR: 67 ML/MIN/1.73M2 — SIGNIFICANT CHANGE UP
EGFR: 67 ML/MIN/1.73M2 — SIGNIFICANT CHANGE UP
EOSINOPHIL # BLD AUTO: 0.21 K/UL — SIGNIFICANT CHANGE UP (ref 0–0.7)
EOSINOPHIL # BLD AUTO: 0.21 K/UL — SIGNIFICANT CHANGE UP (ref 0–0.7)
EOSINOPHIL NFR BLD AUTO: 4 % — SIGNIFICANT CHANGE UP (ref 0–8)
EOSINOPHIL NFR BLD AUTO: 4 % — SIGNIFICANT CHANGE UP (ref 0–8)
GLUCOSE SERPL-MCNC: 165 MG/DL — HIGH (ref 70–99)
GLUCOSE SERPL-MCNC: 165 MG/DL — HIGH (ref 70–99)
HCT VFR BLD CALC: 21.3 % — LOW (ref 42–52)
HCT VFR BLD CALC: 21.3 % — LOW (ref 42–52)
HGB BLD-MCNC: 6.2 G/DL — CRITICAL LOW (ref 14–18)
HGB BLD-MCNC: 6.2 G/DL — CRITICAL LOW (ref 14–18)
IMM GRANULOCYTES NFR BLD AUTO: 0.2 % — SIGNIFICANT CHANGE UP (ref 0.1–0.3)
IMM GRANULOCYTES NFR BLD AUTO: 0.2 % — SIGNIFICANT CHANGE UP (ref 0.1–0.3)
LYMPHOCYTES # BLD AUTO: 0.93 K/UL — LOW (ref 1.2–3.4)
LYMPHOCYTES # BLD AUTO: 0.93 K/UL — LOW (ref 1.2–3.4)
LYMPHOCYTES # BLD AUTO: 17.5 % — LOW (ref 20.5–51.1)
LYMPHOCYTES # BLD AUTO: 17.5 % — LOW (ref 20.5–51.1)
MCHC RBC-ENTMCNC: 26.1 PG — LOW (ref 27–31)
MCHC RBC-ENTMCNC: 26.1 PG — LOW (ref 27–31)
MCHC RBC-ENTMCNC: 29.1 G/DL — LOW (ref 32–37)
MCHC RBC-ENTMCNC: 29.1 G/DL — LOW (ref 32–37)
MCV RBC AUTO: 89.5 FL — SIGNIFICANT CHANGE UP (ref 80–94)
MCV RBC AUTO: 89.5 FL — SIGNIFICANT CHANGE UP (ref 80–94)
MONOCYTES # BLD AUTO: 0.7 K/UL — HIGH (ref 0.1–0.6)
MONOCYTES # BLD AUTO: 0.7 K/UL — HIGH (ref 0.1–0.6)
MONOCYTES NFR BLD AUTO: 13.2 % — HIGH (ref 1.7–9.3)
MONOCYTES NFR BLD AUTO: 13.2 % — HIGH (ref 1.7–9.3)
NEUTROPHILS # BLD AUTO: 3.44 K/UL — SIGNIFICANT CHANGE UP (ref 1.4–6.5)
NEUTROPHILS # BLD AUTO: 3.44 K/UL — SIGNIFICANT CHANGE UP (ref 1.4–6.5)
NEUTROPHILS NFR BLD AUTO: 64.7 % — SIGNIFICANT CHANGE UP (ref 42.2–75.2)
NEUTROPHILS NFR BLD AUTO: 64.7 % — SIGNIFICANT CHANGE UP (ref 42.2–75.2)
NRBC # BLD: 0 /100 WBCS — SIGNIFICANT CHANGE UP (ref 0–0)
NRBC # BLD: 0 /100 WBCS — SIGNIFICANT CHANGE UP (ref 0–0)
PLATELET # BLD AUTO: 213 K/UL — SIGNIFICANT CHANGE UP (ref 130–400)
PLATELET # BLD AUTO: 213 K/UL — SIGNIFICANT CHANGE UP (ref 130–400)
PMV BLD: 10.8 FL — HIGH (ref 7.4–10.4)
PMV BLD: 10.8 FL — HIGH (ref 7.4–10.4)
POTASSIUM SERPL-MCNC: 4.3 MMOL/L — SIGNIFICANT CHANGE UP (ref 3.5–5)
POTASSIUM SERPL-MCNC: 4.3 MMOL/L — SIGNIFICANT CHANGE UP (ref 3.5–5)
POTASSIUM SERPL-SCNC: 4.3 MMOL/L — SIGNIFICANT CHANGE UP (ref 3.5–5)
POTASSIUM SERPL-SCNC: 4.3 MMOL/L — SIGNIFICANT CHANGE UP (ref 3.5–5)
PROT SERPL-MCNC: 6.5 G/DL — SIGNIFICANT CHANGE UP (ref 6–8)
PROT SERPL-MCNC: 6.5 G/DL — SIGNIFICANT CHANGE UP (ref 6–8)
RBC # BLD: 2.38 M/UL — LOW (ref 4.7–6.1)
RBC # BLD: 2.38 M/UL — LOW (ref 4.7–6.1)
RBC # FLD: 16.1 % — HIGH (ref 11.5–14.5)
RBC # FLD: 16.1 % — HIGH (ref 11.5–14.5)
SODIUM SERPL-SCNC: 138 MMOL/L — SIGNIFICANT CHANGE UP (ref 135–146)
SODIUM SERPL-SCNC: 138 MMOL/L — SIGNIFICANT CHANGE UP (ref 135–146)
WBC # BLD: 5.31 K/UL — SIGNIFICANT CHANGE UP (ref 4.8–10.8)
WBC # BLD: 5.31 K/UL — SIGNIFICANT CHANGE UP (ref 4.8–10.8)
WBC # FLD AUTO: 5.31 K/UL — SIGNIFICANT CHANGE UP (ref 4.8–10.8)
WBC # FLD AUTO: 5.31 K/UL — SIGNIFICANT CHANGE UP (ref 4.8–10.8)

## 2024-01-09 PROCEDURE — 36415 COLL VENOUS BLD VENIPUNCTURE: CPT

## 2024-01-09 PROCEDURE — 86900 BLOOD TYPING SEROLOGIC ABO: CPT

## 2024-01-09 PROCEDURE — 36430 TRANSFUSION BLD/BLD COMPNT: CPT

## 2024-01-09 PROCEDURE — 86901 BLOOD TYPING SEROLOGIC RH(D): CPT

## 2024-01-09 PROCEDURE — P9016: CPT

## 2024-01-09 PROCEDURE — 85025 COMPLETE CBC W/AUTO DIFF WBC: CPT

## 2024-01-09 PROCEDURE — 86850 RBC ANTIBODY SCREEN: CPT

## 2024-01-09 PROCEDURE — 86922 COMPATIBILITY TEST ANTIGLOB: CPT

## 2024-01-09 PROCEDURE — 99285 EMERGENCY DEPT VISIT HI MDM: CPT

## 2024-01-09 PROCEDURE — 80053 COMPREHEN METABOLIC PANEL: CPT

## 2024-01-09 RX ORDER — DIPHENHYDRAMINE HCL 50 MG
50 CAPSULE ORAL ONCE
Refills: 0 | Status: COMPLETED | OUTPATIENT
Start: 2024-01-09 | End: 2024-01-09

## 2024-01-09 RX ADMIN — Medication 50 MILLIGRAM(S): at 20:21

## 2024-01-09 NOTE — ED PROVIDER NOTE - OBJECTIVE STATEMENT
84 yo male with a pmh of htn, hld, dm, iron deficiency anemia sent in by hematologist for low hgb. pt states to have experienced weakness. pt denies any bloody stools. pt denies any other symptoms including fevers, chill, headache, recent illness/travel, cough, abdominal pain, chest pain, or SOB. 82 yo male with a pmh of htn, hld, dm, iron deficiency anemia sent in by hematologist for low hgb. pt states to have experienced weakness. pt denies any bloody stools. pt denies any other symptoms including fevers, chill, headache, recent illness/travel, cough, abdominal pain, chest pain, or SOB.

## 2024-01-09 NOTE — ED PROVIDER NOTE - CLINICAL SUMMARY MEDICAL DECISION MAKING FREE TEXT BOX
Labs, EKG and imaging were ordered, where indicated.  Independent interpretation of any labs, EKG & imaging that was ordered was performed by me, Dr. Jacobo. Appropriate medications for patient's presenting complaints were ordered and effects were reassessed, where indicated.  Patient's records (prior hospital, ED visit, and/or nursing home note) were reviewed, if available.  Additional history was obtained from EMS, family, and/or PCP (where available).  Escalation to admission/observation was considered.  However patient feels much better and patient/parent is comfortable with discharge.  Appropriate follow-up was arranged.     Authored by Dr. Gale Jacobo: s/o to me by dr. bush - 83m [facility] anemia, s/o pending completion of prbc transfusion which was completed w/o issue, dc Labs, EKG and imaging were ordered, where indicated.  Independent interpretation of any labs, EKG & imaging that was ordered was performed by me, Dr. Jacobo. Appropriate medications for patient's presenting complaints were ordered and effects were reassessed, where indicated.  Patient's records (prior hospital, ED visit, and/or nursing home note) were reviewed, if available.  Additional history was obtained from EMS, family, and/or PCP (where available).  Escalation to admission/observation was considered.  However patient feels much better and patient/parent is comfortable with discharge.  Appropriate follow-up was arranged.     Authored by Dr. Gale Jacobo: s/o to me by dr. bush - 83m [facility] anemia, s/o pending completion of prbc transfusion which was completed w/o issue, dc

## 2024-01-09 NOTE — ED PROVIDER NOTE - PROVIDER TOKENS
PROVIDER:[TOKEN:[[id number]],FOLLOWUP:[1-3 Days]] PROVIDER:[TOKEN:[[id number]],FOLLOWUP:[1-3 Days]]

## 2024-01-09 NOTE — ED PROVIDER NOTE - PATIENT PORTAL LINK FT
You can access the FollowMyHealth Patient Portal offered by [facility] by registering at the following website: [email or web address]. By joining [employer]’s FollowMyHealth portal, you will also be able to view your health information using other applications (apps) compatible with our system. You can access the FollowMyHealth Patient Portal offered by [facility] by registering at the following website: [email or web address]. By joining [employer]’s FollowMyHealth portal, you will also be able to view your health information using other applications (apps) compatible with our system.

## 2024-01-09 NOTE — ED PROVIDER NOTE - CARE PROVIDER_API CALL
Jane Epperson  Hematology  [address]  [place] [address]  Phone: [phone]  Fax: [phone]  Follow Up Time: 1-3 Days   Jane Epperson  Hematology  [address]  [place] [address]  Phone: [phone]  Fax: [phone]  Follow Up Time: 1-3 Days

## 2024-01-09 NOTE — ED PROVIDER NOTE - ATTENDING APP SHARED VISIT CONTRIBUTION OF CARE
83-year-old male past med history of anemia requiring iron infusion and blood transfusions presenting to ED for blood transfusion after he was found to have hemoglobin 6.5 on routine testing.  He denies any acute complaints.  No history of bleeding.  Patient had colonoscopy in the past, refusing any further colonoscopies. Currently denies any chest pain or shortness of breath, no dizziness lightheadedness or any other acute complaints.  Collateral information obtained from the patient's son and granddaughter. The patient is scheduled to see his hematologist soon.  Well-appearing elderly male sitting on a stretcher no acute distress, PERRL, pale conjunctivae and skin, speaking full sentences, lungs clear to auscultation bilaterally, no midline spine or CVA TTP, RRR, well-perfused extremities, abdomen soft/NT/ND, patient is awake and alert, normal mood and affect.  Plan: Labs, transfuse as needed, anticipate discharge home.  Patient and family are amenable to plan.

## 2024-01-09 NOTE — ED ADULT NURSE NOTE - NSFALLUNIVINTERV_ED_ALL_ED
Assistance with ambulation/Monitor gait and stability/Monitor for mental status changes and reorient to person, place, and time, as needed/Provide visual cue: red socks, yellow wristband, yellow gown, etc/Reinforce activity limits and safety measures with patient and family/Use of alarms - bed, stretcher, chair and/or video monitoring/Bed/Stretcher in lowest position, wheels locked, appropriate side rails in place/Call bell, personal items and telephone in reach/Instruct patient to call for assistance before getting out of bed/chair/stretcher/Non-slip footwear applied when patient is off stretcher/[place] to call system/Physically safe environment - no spills, clutter or unnecessary equipment/Purposeful proactive rounding/Room/bathroom lighting operational, light cord in reach Assistance with ambulation/Monitor gait and stability/Monitor for mental status changes and reorient to person, place, and time, as needed/Provide visual cue: red socks, yellow wristband, yellow gown, etc/Reinforce activity limits and safety measures with patient and family/Use of alarms - bed, stretcher, chair and/or video monitoring/Bed/Stretcher in lowest position, wheels locked, appropriate side rails in place/Call bell, personal items and telephone in reach/Instruct patient to call for assistance before getting out of bed/chair/stretcher/Non-slip footwear applied when patient is off stretcher/[place] to call system/Physically safe environment - no spills, clutter or unnecessary equipment/Purposeful proactive rounding/Room/bathroom lighting operational, light cord in reach

## 2024-01-12 ENCOUNTER — APPOINTMENT (OUTPATIENT)
Dept: PAIN MANAGEMENT | Facility: CLINIC | Age: 84
End: 2024-01-12

## 2024-02-20 NOTE — PATIENT PROFILE ADULT - IS THERE A SUSPICION OF ABUSE/NEGLIGENCE?
Date of Service: 02-20-24 @ 08:30           CARDIOLOGY     PROGRESS  NOTE   ________________________________________________    CHIEF COMPLAINT:Patient is a 88y old  Female who presents with a chief complaint of abd  pain/  vomiting (19 Feb 2024 22:02)  no complain  	  REVIEW OF SYSTEMS:  CONSTITUTIONAL: No fever, weight loss, or fatigue  EYES: No eye pain, visual disturbances, or discharge  ENT:  No difficulty hearing, tinnitus, vertigo; No sinus or throat pain  NECK: No pain or stiffness  RESPIRATORY: No cough, wheezing, chills or hemoptysis; No Shortness of Breath  CARDIOVASCULAR: No chest pain, palpitations, passing out, dizziness, or leg swelling  GASTROINTESTINAL: No abdominal or epigastric pain. No nausea, vomiting, or hematemesis; No diarrhea or constipation. No melena or hematochezia.  GENITOURINARY: No dysuria, frequency, hematuria, or incontinence  NEUROLOGICAL: No headaches, memory loss, loss of strength, numbness, or tremors  SKIN: No itching, burning, rashes, or lesions   LYMPH Nodes: No enlarged glands  ENDOCRINE: No heat or cold intolerance; No hair loss  MUSCULOSKELETAL: No joint pain or swelling; No muscle, back, or extremity pain  PSYCHIATRIC: No depression, anxiety, mood swings, or difficulty sleeping  HEME/LYMPH: No easy bruising, or bleeding gums  ALLERGY AND IMMUNOLOGIC: No hives or eczema	    [x ] All others negative	  [ ] Unable to obtain    PHYSICAL EXAM:  T(C): 36.3 (02-20-24 @ 05:55), Max: 36.8 (02-19-24 @ 20:11)  HR: 67 (02-20-24 @ 05:55) (67 - 90)  BP: 153/72 (02-20-24 @ 05:55) (107/64 - 153/72)  RR: 19 (02-20-24 @ 05:55) (18 - 20)  SpO2: 95% (02-20-24 @ 05:55) (92% - 95%)  Wt(kg): --  I&O's Summary    19 Feb 2024 07:01  -  20 Feb 2024 07:00  --------------------------------------------------------  IN: 480 mL / OUT: 0 mL / NET: 480 mL        Appearance: Normal	  HEENT:   Normal oral mucosa, PERRL, EOMI	  Lymphatic: No lymphadenopathy  Cardiovascular: Normal S1 S2, No JVD, + murmurs, No edema  Respiratory: rhonchi  Psychiatry: A & O x 3, Mood & affect appropriate  Gastrointestinal:  Soft, Non-tender, + BS	  Skin: No rashes, No ecchymoses, No cyanosis	  Neurologic: Non-focal  Extremities: Normal range of motion, No clubbing, cyanosis or edema  Vascular: Peripheral pulses palpable 2+ bilaterally    MEDICATIONS  (STANDING):  albuterol/ipratropium for Nebulization 3 milliLiter(s) Nebulizer every 6 hours  amLODIPine   Tablet 5 milliGRAM(s) Oral daily  apixaban 2.5 milliGRAM(s) Oral two times a day  aspirin enteric coated 81 milliGRAM(s) Oral daily  atorvastatin 20 milliGRAM(s) Oral at bedtime  cyclopentolate 1% Solution 1 Drop(s) Left EYE two times a day  FLUoxetine 20 milliGRAM(s) Oral daily  influenza  Vaccine (HIGH DOSE) 0.7 milliLiter(s) IntraMuscular once  mirtazapine 7.5 milliGRAM(s) Oral daily  nystatin Powder 1 Application(s) Topical two times a day  prednisoLONE acetate 1% Suspension 1 Drop(s) Left EYE two times a day  triamterene 37.5 mG/hydrochlorothiazide 25 mG Tablet 1 Tablet(s) Oral daily      TELEMETRY: 	    ECG:  	  RADIOLOGY:  OTHER: 	  	  LABS:	 	    CARDIAC MARKERS:        proBNP:   Lipid Profile:   HgA1c:   TSH:     < from: 12 Lead ECG (02.08.24 @ 14:16) >  Diagnosis Line NORMAL SINUS RHYTHM  LEFT AXIS DEVIATION  ANTEROSEPTAL INFARCT (CITED ON OR BEFORE 22-MAR-2023)  ABNORMAL ECG  WHEN COMPARED WITH ECG OF 09-MAY-2023 01:32,  SIGNIFICANT CHANGES HAVE OCCURRED      Assessment and plan  ---------------------------  88 F with PMH CAD, pulmonary sarcoidosis, COPD (not on home O2), HTN, BIBEMS from [facility] for evaluation of abdominal pain, constipation, vomiting x 4 days.  Patient is unreliable historian and forgetful, answers questions with brief responses.  Has no complaints and denies abdominal pain or constipation, reporting stool output today morning that appeared normal.  Reports 1 episode of vomiting this morning appearing food colored, denies nausea at present time.  Per aide at bedside, patient has chronic constipation but has been straining more than usual over the past 3-4 days and history from nauseated with intermittent retching, only vomiting this morning.  Per further daughter over the phone, there is concern for possible bowel obstruction.  Patient has no history of abdominal surgeries.  pt with hx of htn, sarcoidosis admitted with abdominal pain/ chest cw chf  continue triamterene HCTZ  dvt prophylaxis  check pro bnp  will consider ct chest no contrast  may add ACE/ARB for increase bp  will adjust cardiac meds  check echo, awaiting ct chest  add Norvasc 5 mg daily  dc nebevilol  severe protein deficiency , nutrition eval  ct chest noted abnormality on chest x ray sec to underlying emphysema/ atelectasis  increase ambulation, incentive spirometry, no objection to dc cardiac wise  may cancel TTE not a candidate for any invasive cardiac dow      	         no

## 2024-05-29 NOTE — ED ADULT NURSE NOTE - HOW OFTEN DO YOU HAVE A DRINK CONTAINING ALCOHOL?
Medication: humalog 10 U TID w/ meals  Last office visit date: 11/2/23  Medication Refill Protocol Failed.  Protocol approved due to: other (documentation required).  Will recheck labs at upcoming appt.       Medication: Tresiba 54 U daily  Last office visit date: 11/2/23  Medication Refill Protocol Failed.  Protocol approved due to: other (documentation required).  Will recheck labs at upcoming appt.       
Never

## 2024-08-01 ENCOUNTER — RESULT REVIEW (OUTPATIENT)
Age: 84
End: 2024-08-01

## 2024-08-06 ENCOUNTER — RESULT REVIEW (OUTPATIENT)
Age: 84
End: 2024-08-06

## 2024-08-12 ENCOUNTER — TRANSCRIPTION ENCOUNTER (OUTPATIENT)
Age: 84
End: 2024-08-12

## 2024-08-13 ENCOUNTER — TRANSCRIPTION ENCOUNTER (OUTPATIENT)
Age: 84
End: 2024-08-13

## 2024-08-15 DIAGNOSIS — R91.8 OTHER NONSPECIFIC ABNORMAL FINDING OF LUNG FIELD: ICD-10-CM

## 2024-08-16 PROBLEM — F17.200 NICOTINE DEPENDENCE, UNSPECIFIED, UNCOMPLICATED: Chronic | Status: ACTIVE | Noted: 2024-07-21

## 2024-08-16 PROBLEM — Z86.79 PERSONAL HISTORY OF OTHER DISEASES OF THE CIRCULATORY SYSTEM: Chronic | Status: ACTIVE | Noted: 2024-07-21

## 2024-08-16 PROBLEM — E78.5 HYPERLIPIDEMIA, UNSPECIFIED: Chronic | Status: ACTIVE | Noted: 2024-07-21

## 2024-08-18 ENCOUNTER — NON-APPOINTMENT (OUTPATIENT)
Age: 84
End: 2024-08-18

## 2024-08-19 ENCOUNTER — APPOINTMENT (OUTPATIENT)
Dept: CARDIOTHORACIC SURGERY | Facility: CLINIC | Age: 84
End: 2024-08-19

## 2024-08-19 VITALS
HEIGHT: 70 IN | SYSTOLIC BLOOD PRESSURE: 96 MMHG | HEART RATE: 92 BPM | WEIGHT: 132 LBS | DIASTOLIC BLOOD PRESSURE: 62 MMHG | OXYGEN SATURATION: 98 % | RESPIRATION RATE: 14 BRPM | BODY MASS INDEX: 18.9 KG/M2 | TEMPERATURE: 97.4 F

## 2024-08-19 DIAGNOSIS — Z09 ENCOUNTER FOR FOLLOW-UP EXAMINATION AFTER COMPLETED TREATMENT FOR CONDITIONS OTHER THAN MALIGNANT NEOPLASM: ICD-10-CM

## 2024-08-19 DIAGNOSIS — J90 PLEURAL EFFUSION, NOT ELSEWHERE CLASSIFIED: ICD-10-CM

## 2024-08-19 RX ORDER — TAMSULOSIN HYDROCHLORIDE 0.4 MG/1
0.4 CAPSULE ORAL
Refills: 0 | Status: ACTIVE | COMMUNITY

## 2024-08-19 NOTE — ASSESSMENT
[FreeTextEntry1] : Mr. Abdifatah Mason is an 83 yo M with PMHx CAD (s/p 2 stents in 2007, follows with Dr. Avila), DM, HLD, anemia, tobacco use, presented for shortness of breath over the past several weeks prior to the ED on 7/21. Presented to the ED as family member just discovered patient's symptoms and brought him in. Patient states that his symptoms mildly worsened with exertion. Denied any chest pain, nausea/vomiting/diarrhea, abdominal pain, bloody stools, leg swelling, cough, fever, chills. Smokes <1 ppd. Ct chest revealed large right pleural effusion that was drained via thoracentesis. Thoracic surgery was consulted for recurrent pleural effusions on the right side requiring 2x thoracenteses. The patient underwent thoracentesis with output of 1.8 L on 7/24 and with output of 1.9 L on 7/26. Patient is a poor historian and is not able to provide details of past medical history, but reports that he is a smoker and had smoked about 1 pack per day but has recently stopped in the past few weeks, but has COPD. He says he has had some intermittent shortness of breath recently, but is unsure of when it started. 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 CXR showed right sided pleural effusion and CT of the chest showed Partially imaged large right hydropneumothorax with near complete collapse of the right lower middle lobes. Possible small loculated pericardial effusion. Placed right sided pigtail 7/31. Preop cardiac risk strat done and taken for a right VATS, partial lung decortication and pleural biopsy and placement of pleurx catheter for recurrent pleural effusions on 8/6. R sided 28fr chest tube placed during case that was removed POD #1. Pleurx cath to be left in place for foreseeable future. Intra-operative frozen sample + for neoplasm. Physical therapy recommended rehab facility. Urology consulted for laws recs given lack of ambulation and not passing TOV in which they recommended continue flomax, DC w/ laws, and follow-up outpatient with Dr. Pastrana for TOV & further management. Sugars were spiking as high as 300s his admission for which is now better controlled. A1C 5.8. Overall, patient breathing well, no accessory muscle use, serial AM CXR look good besides an increase of right sided opacity likely recurrent effusion for which the pleurx would be needed to drain. Saturating high 90s on room air. Hemodynamically stable, tolerating diet and ready for discharge. Attending aware and approved. Patient will be discharged to [facility] rehab. Will be discharged with laws catheter and right sided Pleurx catheter. Patient is here from [facility], for rehabilitation, with his son and granddaughter, presents in a wheelchair. Patient is frail and endorses his pain is not controlled. Right Pleurx in place and intact, incisions healing well. Patient denies any fever or chills  Path results reviewed and discussed. Right Pleura BX, parietal pleura BX, Right Lung Visceral pleura BX & Mass BX all positive for Malignant mesothelioma, epithelioid type. (with rare atypical cells in diaphragmatic mass) PET CT ordered and scheduled for 8/21/2024. Right Pleurx cath drained in office, 450 serous drainage and dressing changed.   -Plan Continue Draining Pleurx cath 2 X / week RX'd Percocet Q6H PRN for pain PET/ CT scheduled 8/21/2024 @ [facility] F/U CTS PRN F/U PMD: Dr. Stevens for routine medical care F/U HemOnc: Dr. aJne Epperson (appointment on 8/26/2024) F/U RadiOnc  Dr. Johnson for palliative services (will consult) Uro Dr. June for management of urinary retention (currently dons leg bag)

## 2024-08-19 NOTE — REASON FOR VISIT
[Family Member] : family member [de-identified] : 8/6/2024 [de-identified] : Right VATS, partial lung decortication and pleural biopsy and placement of pleurex catheter

## 2024-08-19 NOTE — REASON FOR VISIT
[Family Member] : family member [de-identified] : 8/6/2024 [de-identified] : Right VATS, partial lung decortication and pleural biopsy and placement of pleurex catheter

## 2024-08-19 NOTE — COUNSELING
[Hygeine (Including Daily Shower)] : hygeine (including daily shower) [Importance of Regular Medical Follow-Up] : the importance of regular medical follow-up [No Heavy Lifting] : no heavy lifting (>15-20 lb. for 1 month or 25 lb. for 3 months from date of surgery) [Blood Pressure Control] : blood pressure control [S/S of infection] : signs and symptoms of infection (and to whom it should be reported) [Progressive Ambulation/Activity] : progressive ambulation/activity [Medication/Vitamin/Herb/Food Interaction] : medication/vitamin/herb/food interaction [FreeTextEntry1] :  Mr. CLARI BURGER 84 year M   , S/P Lung Resection.  Incisions healing well and all sutures were removed. Incision site care was reviewed. No lotions, perfumes to the incision site. On arrival patient denies fever, chills nausea, and vomiting. Denies SOB or palpitation. All questions and concerns were addressed.  Medications were reviewed with the patient. Pain needs addressed.  Advised no flying or lifting anything >10lbs for at least 6 weeks post thoracic surgery.

## 2024-08-19 NOTE — ASSESSMENT
[FreeTextEntry1] : Mr. Abdifatah Mason is an 83 yo M with PMHx CAD (s/p 2 stents in 2007, follows with Dr. Avila), DM, HLD, anemia, tobacco use, presented for shortness of breath over the past several weeks prior to the ED on 7/21. Presented to the ED as family member just discovered patient's symptoms and brought him in. Patient states that his symptoms mildly worsened with exertion. Denied any chest pain, nausea/vomiting/diarrhea, abdominal pain, bloody stools, leg swelling, cough, fever, chills. Smokes <1 ppd. Ct chest revealed large right pleural effusion that was drained via thoracentesis. Thoracic surgery was consulted for recurrent pleural effusions on the right side requiring 2x thoracenteses. The patient underwent thoracentesis with output of 1.8 L on 7/24 and with output of 1.9 L on 7/26. Patient is a poor historian and is not able to provide details of past medical history, but reports that he is a smoker and had smoked about 1 pack per day but has recently stopped in the past few weeks, but has COPD. He says he has had some intermittent shortness of breath recently, but is unsure of when it started. 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 CXR showed right sided pleural effusion and CT of the chest showed Partially imaged large right hydropneumothorax with near complete collapse of the right lower middle lobes. Possible small loculated pericardial effusion. Placed right sided pigtail 7/31. Preop cardiac risk strat done and taken for a right VATS, partial lung decortication and pleural biopsy and placement of pleurx catheter for recurrent pleural effusions on 8/6. R sided 28fr chest tube placed during case that was removed POD #1. Pleurx cath to be left in place for foreseeable future. Intra-operative frozen sample + for neoplasm. Physical therapy recommended rehab facility. Urology consulted for laws recs given lack of ambulation and not passing TOV in which they recommended continue flomax, DC w/ laws, and follow-up outpatient with Dr. Pastrana for TOV & further management. Sugars were spiking as high as 300s his admission for which is now better controlled. A1C 5.8. Overall, patient breathing well, no accessory muscle use, serial AM CXR look good besides an increase of right sided opacity likely recurrent effusion for which the pleurx would be needed to drain. Saturating high 90s on room air. Hemodynamically stable, tolerating diet and ready for discharge. Attending aware and approved. Patient will be discharged to [facility] rehab. Will be discharged with laws catheter and right sided Pleurx catheter. Patient is here from [facility], for rehabilitation, with his son and granddaughter, presents in a wheelchair. Patient is frail and endorses his pain is not controlled. Right Pleurx in place and intact, incisions healing well. Patient denies any fever or chills  Path results reviewed and discussed. Right Pleura BX, parietal pleura BX, Right Lung Visceral pleura BX & Mass BX all positive for Malignant mesothelioma, epithelioid type. (with rare atypical cells in diaphragmatic mass) PET CT ordered and scheduled for 8/21/2024. Right Pleurx cath drained in office, 450 serous drainage and dressing changed.   -Plan Continue Draining Pleurx cath 2 X / week RX'd Percocet Q6H PRN for pain PET/ CT scheduled 8/21/2024 @ [facility] F/U CTS PRN F/U PMD: Dr. Stevens for routine medical care F/U HemOnc: Dr. Jane Epperson (appointment on 8/26/2024) F/U RadiOnc  Dr. Johnson for palliative services (will consult) Uro Dr. June for management of urinary retention (currently dons leg bag)

## 2024-08-21 ENCOUNTER — RESULT REVIEW (OUTPATIENT)
Age: 84
End: 2024-08-21

## 2024-08-29 ENCOUNTER — APPOINTMENT (OUTPATIENT)
Dept: RADIATION ONCOLOGY | Facility: HOSPITAL | Age: 84
End: 2024-08-29
Payer: MEDICARE

## 2024-08-29 ENCOUNTER — OUTPATIENT (OUTPATIENT)
Dept: OUTPATIENT SERVICES | Facility: HOSPITAL | Age: 84
LOS: 1 days | End: 2024-08-29
Payer: MEDICARE

## 2024-08-29 VITALS
BODY MASS INDEX: 19.33 KG/M2 | RESPIRATION RATE: 16 BRPM | HEIGHT: 70 IN | DIASTOLIC BLOOD PRESSURE: 67 MMHG | OXYGEN SATURATION: 100 % | HEART RATE: 83 BPM | TEMPERATURE: 97.2 F | SYSTOLIC BLOOD PRESSURE: 109 MMHG | WEIGHT: 135 LBS

## 2024-08-29 DIAGNOSIS — C45.9 MESOTHELIOMA, UNSPECIFIED: ICD-10-CM

## 2024-08-29 DIAGNOSIS — Z95.5 PRESENCE OF CORONARY ANGIOPLASTY IMPLANT AND GRAFT: Chronic | ICD-10-CM

## 2024-08-29 DIAGNOSIS — R91.8 OTHER NONSPECIFIC ABNORMAL FINDING OF LUNG FIELD: ICD-10-CM

## 2024-08-29 PROCEDURE — 99203 OFFICE O/P NEW LOW 30 MIN: CPT

## 2024-08-30 ENCOUNTER — NON-APPOINTMENT (OUTPATIENT)
Age: 84
End: 2024-08-30

## 2024-08-30 PROBLEM — C45.9 MESOTHELIOMA, MALIGNANT: Status: ACTIVE | Noted: 2024-08-30

## 2024-08-30 RX ORDER — OXYCODONE HYDROCHLORIDE AND ACETAMINOPHEN 5; 325 MG/1; MG/1
5-325 TABLET ORAL
Refills: 0 | Status: ACTIVE | COMMUNITY

## 2024-08-30 RX ORDER — PRAVASTATIN SODIUM 20 MG/1
20 TABLET ORAL
Refills: 0 | Status: ACTIVE | COMMUNITY

## 2024-08-30 RX ORDER — HEPARIN SODIUM 5000 [USP'U]/ML
5000 INJECTION INTRAVENOUS; SUBCUTANEOUS
Refills: 0 | Status: ACTIVE | COMMUNITY

## 2024-08-30 RX ORDER — SENNOSIDES 8.6 MG TABLETS 8.6 MG/1
8.6 TABLET ORAL
Refills: 0 | Status: ACTIVE | COMMUNITY

## 2024-08-30 RX ORDER — LORATADINE 10 MG
17 TABLET,DISINTEGRATING ORAL
Refills: 0 | Status: ACTIVE | COMMUNITY

## 2024-08-30 RX ORDER — GLUCOSAMINE HCL/CHONDROITIN SU 500-400 MG
3 CAPSULE ORAL
Refills: 0 | Status: ACTIVE | COMMUNITY

## 2024-08-30 RX ORDER — GABAPENTIN 300 MG/1
300 CAPSULE ORAL 3 TIMES DAILY
Qty: 90 | Refills: 0 | Status: ACTIVE | COMMUNITY
Start: 2024-08-30 | End: 1900-01-01

## 2024-08-30 RX ORDER — CHLORHEXIDINE GLUCONATE 4 %
325 (65 FE) LIQUID (ML) TOPICAL
Refills: 0 | Status: ACTIVE | COMMUNITY

## 2024-08-30 NOTE — VITALS
[Maximal Pain Intensity: 7/10] : 7/10 [Pain Location: ___] : Pain Location: [unfilled] [Opioid] : opioid [50: Requires considerable assistance and frequent medical care.] : 50: Requires considerable assistance and frequent medical care. [Date: ____________] : Patient's last distress assessment performed on [unfilled]. [8 - Distress Level] : Distress Level: 8 [Referred Patient  to social work for follow-up] : Patient was referred to social work for follow-up [Patient given social work contact information and resource sheet] : Patient was given social work contact information and resource sheet

## 2024-08-30 NOTE — SOCIAL HISTORY
Neurological Surgery Attending    Sonya Dill    Interval History: More alert today. Sitting up in chair to eat his breakfast.  No complaint of headache. Interval Examination: Awake alert and following commands with all 4 extremities.   Strength is [Date (Year) Stopped: _____] : Date (Year) Stopped: [unfilled]

## 2024-09-01 NOTE — PHYSICAL EXAM
[General Appearance - Alert] : alert [General Appearance - In No Acute Distress] : in no acute distress [Thin] : thin [] : no respiratory distress [Oriented To Time, Place, And Person] : oriented to person, place, and time [de-identified] : his right chest wall is very sensitive to touch [de-identified] : unable to assess/wheelchair

## 2024-09-01 NOTE — DISEASE MANAGEMENT
[Clinical] : TNM Stage: c [N/A] : Currently not applicable [FreeTextEntry4] : Malignant mesothelioma, epithelioid type. [TTNM] : x [NTNM] : 1 [MTNM] : 0 [de-identified] : right lung

## 2024-09-01 NOTE — END OF VISIT
[Time Spent: ___ minutes] : I have spent [unfilled] minutes of time on the encounter which excludes teaching and separately reported services. [FreeTextEntry3] : MD Note: I personally performed the evaluation and management services for this patient. This includes conducting the examination, assessing all conditions, and establishing the plan of care. Today, my ACP, Lucie Chapman, was here to observe my evaluation and management services for this patient. I agree with the documentation above, which I have reviewed and edited where appropriate.

## 2024-09-01 NOTE — HISTORY OF PRESENT ILLNESS
[FreeTextEntry1] : Patient is a 84-year-old gentlemen who initially present to the ED at [facility], in July 2024 for progressively worsening NOONAN.  On chest x-ray (7/21/2024) revealed right pleural effusion/opacity. CT Chest on 7/21/2024 shows, large right pleural effusion with associated right pleural thickening, near-complete atelectasis of the right lung and mediastinal shift to the left. Partially imaged moderate bilateral hydronephrosis, of uncertain etiology; if clinically warranted, further evaluation with dedicated imaging of the abdomen and pelvis may be of use.   7/24/2024 & 7/26/ 2024 s/p right thoracentesis X2 (1.8 L & 1.9 L).   On 7/29/2024 CT Abdomen shows, no definite evidence of abdominal pelvic neoplastic process  On 8/6/2024 he underwent right VATs with biopsies and pathology shows, right parietal pleura, Malignant mesothelioma, epithelioid type.   Right lung visceral pleura, Malignant mesothelioma, epithelioid type.  Right pleura, Malignant mesothelioma, epithelioid type. Right chest diaphragmatic mass (3 X 3 X 0.3 cm) excision, fibrous plaque with rare atypical cells, consistent with malignant mesothelioma.  PET CT 8/21/2024 1. Few scattered right pleural FDG avid foci, max SUV 4.2, possibly neoplastic with history of malignant mesothelioma. 2. Post right VATS, partial lung decortication and pleural biopsy. Slight interval increase right hydropneumothorax since 7/31/2024, post right pleurex catheter placement. 3. Nonspecific FDG avid left cervical level III lymph node, 0.8 cm short axis with max SUV 7.5. 4. FDG avid right parotid gland mass, measuring up to 2.5 cm with max SUV 8.3, previously noted September 2020. Most FDG avid parotid lesions are benign. 5. Urinary bladder collapsed with Laws catheter. Apparent diffuse urinary bladder wall thickening, which may be due to underdistention versus urinary bladder infection.  Patient is here to discuss radiation. He is accompanied with son, and granddaughter, Meaghan. After his discharge from [facility], on 8/13/24, he has been residing at [facility] for Rehab. He is currently in a wheelchair and requires assistance with ambulating and ADL's. He presents today with pain in right upper shoulder region that radiates to mid back. Pain is a 7/10 and he takes percocet q6hrs prn. He feels he does need it most times after the 6 hrs passes. He also was discharged with a laws catheter and right lower chest Pluerix tube. [place] [facility] [place] it 2 times per week. He was a smoker up until July when he was admitted at [facility].   He has been in the care of , [facility] for a few years due to anemia. Family has spoken to  about his new findings, but they have not yet made a follow up appointment. They will do so soon.   Dr. Guzmán is the CTSx

## 2024-09-01 NOTE — END OF VISIT
[FreeTextEntry3] : MD Note: I personally performed the evaluation and management services for this patient. This includes conducting the examination, assessing all conditions, and establishing the plan of care. Today, my ACP, Lucie Chapman, was here to observe my evaluation and management services for this patient. I agree with the documentation above, which I have reviewed and edited where appropriate. [Time Spent: ___ minutes] : I have spent [unfilled] minutes of time on the encounter which excludes teaching and separately reported services.

## 2024-09-01 NOTE — REVIEW OF SYSTEMS
[Fatigue] : fatigue [Recent Change In Weight] : ~T recent weight change [SOB on Exertion] : shortness of breath during exertion [Joint Pain] : joint pain [Difficulty Walking] : difficulty walking [Muscle Weakness] : muscle weakness [Leg Claudication] : intermittent leg claudication [Constipation] : constipation [Negative] : Allergic/Immunologic [Fever] : no fever [Chills] : no chills [Night Sweats] : no night sweats [Shortness Of Breath] : no shortness of breath [Wheezing] : no wheezing [Cough] : no cough [Muscle Pain] : no muscle pain [FreeTextEntry2] : [facility]  [FreeTextEntry6] : right lower chest drain intact/Pleurix/ Shortness of breath improved since hospitalization [FreeTextEntry7] : decreased appetite/ heart burn [FreeTextEntry8] : laws catheter since July [FreeTextEntry9] : using wheelchair [de-identified] : confusion and memory loss [de-identified] : anemia/ follows with Dr Epperson for few years

## 2024-09-01 NOTE — DISEASE MANAGEMENT
[Clinical] : TNM Stage: c [N/A] : Currently not applicable [FreeTextEntry4] : Malignant mesothelioma, epithelioid type. [TTNM] : x [NTNM] : 1 [MTNM] : 0 [de-identified] : right lung

## 2024-09-01 NOTE — PHYSICAL EXAM
[General Appearance - Alert] : alert [General Appearance - In No Acute Distress] : in no acute distress [Thin] : thin [] : no respiratory distress [Oriented To Time, Place, And Person] : oriented to person, place, and time [de-identified] : his right chest wall is very sensitive to touch [de-identified] : unable to assess/wheelchair

## 2024-09-01 NOTE — REVIEW OF SYSTEMS
[Fatigue] : fatigue [Recent Change In Weight] : ~T recent weight change [SOB on Exertion] : shortness of breath during exertion [Joint Pain] : joint pain [Difficulty Walking] : difficulty walking [Muscle Weakness] : muscle weakness [Leg Claudication] : intermittent leg claudication [Constipation] : constipation [Negative] : Allergic/Immunologic [Fever] : no fever [Chills] : no chills [Night Sweats] : no night sweats [Shortness Of Breath] : no shortness of breath [Wheezing] : no wheezing [Cough] : no cough [Muscle Pain] : no muscle pain [FreeTextEntry2] : [facility]  [FreeTextEntry6] : right lower chest drain intact/Pleurix/ Shortness of breath improved since hospitalization [FreeTextEntry7] : decreased appetite/ heart burn [FreeTextEntry8] : laws catheter since July [FreeTextEntry9] : using wheelchair [de-identified] : confusion and memory loss [de-identified] : anemia/ follows with Dr Epperson for few years

## 2024-09-01 NOTE — DISEASE MANAGEMENT
[Clinical] : TNM Stage: c [N/A] : Currently not applicable [FreeTextEntry4] : Malignant mesothelioma, epithelioid type. [TTNM] : x [NTNM] : 1 [MTNM] : 0 [de-identified] : right lung

## 2024-09-01 NOTE — PHYSICAL EXAM
[General Appearance - Alert] : alert [General Appearance - In No Acute Distress] : in no acute distress [Thin] : thin [] : no respiratory distress [Oriented To Time, Place, And Person] : oriented to person, place, and time [de-identified] : his right chest wall is very sensitive to touch [de-identified] : unable to assess/wheelchair

## 2024-09-01 NOTE — HISTORY OF PRESENT ILLNESS
[FreeTextEntry1] : Patient is a 84-year-old gentlemen who initially present to the ED at [facility], in July 2024 for progressively worsening NOONAN.  On chest x-ray (7/21/2024) revealed right pleural effusion/opacity. CT Chest on 7/21/2024 shows, large right pleural effusion with associated right pleural thickening, near-complete atelectasis of the right lung and mediastinal shift to the left. Partially imaged moderate bilateral hydronephrosis, of uncertain etiology; if clinically warranted, further evaluation with dedicated imaging of the abdomen and pelvis may be of use.   7/24/2024 & 7/26/ 2024 s/p right thoracentesis X2 (1.8 L & 1.9 L).   On 7/29/2024 CT Abdomen shows, no definite evidence of abdominal pelvic neoplastic process  On 8/6/2024 he underwent right VATs with biopsies and pathology shows, right parietal pleura, Malignant mesothelioma, epithelioid type.   Right lung visceral pleura, Malignant mesothelioma, epithelioid type.  Right pleura, Malignant mesothelioma, epithelioid type. Right chest diaphragmatic mass (3 X 3 X 0.3 cm) excision, fibrous plaque with rare atypical cells, consistent with malignant mesothelioma.  PET CT 8/21/2024 1. Few scattered right pleural FDG avid foci, max SUV 4.2, possibly neoplastic with history of malignant mesothelioma. 2. Post right VATS, partial lung decortication and pleural biopsy. Slight interval increase right hydropneumothorax since 7/31/2024, post right pleurex catheter placement. 3. Nonspecific FDG avid left cervical level III lymph node, 0.8 cm short axis with max SUV 7.5. 4. FDG avid right parotid gland mass, measuring up to 2.5 cm with max SUV 8.3, previously noted September 2020. Most FDG avid parotid lesions are benign. 5. Urinary bladder collapsed with Laws catheter. Apparent diffuse urinary bladder wall thickening, which may be due to underdistention versus urinary bladder infection.  Patient is here to discuss radiation. He is accompanied with son, and granddaughter, Meaghan. After his discharge from [facility], on 8/13/24, he has been residing at [facility] for Rehab. He is currently in a wheelchair and requires assistance with ambulating and ADL's. He presents today with pain in right upper shoulder region that radiates to mid back. Pain is a 7/10 and he takes percocet q6hrs prn. He feels he does need it most times after the 6 hrs passes. He also was discharged with a laws catheter and right lower chest Pluerix tube. Nurses at [facility] empty it 2 times per week. He was a smoker up until July when he was admitted at [facility].   He has been in the care of , [facility] for a few years due to anemia. Family has spoken to  about his new findings, but they have not yet made a follow up appointment. They will do so soon.   Dr. Guzmán is the CTSx

## 2024-09-01 NOTE — HISTORY OF PRESENT ILLNESS
[FreeTextEntry1] : Patient is a 84-year-old gentlemen who initially present to the ED at [facility], in July 2024 for progressively worsening NOONAN.  On chest x-ray (7/21/2024) revealed right pleural effusion/opacity. CT Chest on 7/21/2024 shows, large right pleural effusion with associated right pleural thickening, near-complete atelectasis of the right lung and mediastinal shift to the left. Partially imaged moderate bilateral hydronephrosis, of uncertain etiology; if clinically warranted, further evaluation with dedicated imaging of the abdomen and pelvis may be of use.   7/24/2024 & 7/26/ 2024 s/p right thoracentesis X2 (1.8 L & 1.9 L).   On 7/29/2024 CT Abdomen shows, no definite evidence of abdominal pelvic neoplastic process  On 8/6/2024 he underwent right VATs with biopsies and pathology shows, right parietal pleura, Malignant mesothelioma, epithelioid type.   Right lung visceral pleura, Malignant mesothelioma, epithelioid type.  Right pleura, Malignant mesothelioma, epithelioid type. Right chest diaphragmatic mass (3 X 3 X 0.3 cm) excision, fibrous plaque with rare atypical cells, consistent with malignant mesothelioma.  PET CT 8/21/2024 1. Few scattered right pleural FDG avid foci, max SUV 4.2, possibly neoplastic with history of malignant mesothelioma. 2. Post right VATS, partial lung decortication and pleural biopsy. Slight interval increase right hydropneumothorax since 7/31/2024, post right pleurex catheter placement. 3. Nonspecific FDG avid left cervical level III lymph node, 0.8 cm short axis with max SUV 7.5. 4. FDG avid right parotid gland mass, measuring up to 2.5 cm with max SUV 8.3, previously noted September 2020. Most FDG avid parotid lesions are benign. 5. Urinary bladder collapsed with Laws catheter. Apparent diffuse urinary bladder wall thickening, which may be due to underdistention versus urinary bladder infection.  Patient is here to discuss radiation. He is accompanied with son, and granddaughter, Meaghan. After his discharge from [facility], on 8/13/24, he has been residing at [facility] for [facility]hab. He is currently in a wheelchair and requires assistance with ambulating and ADL's. He presents today with pain in right upper shoulder region that radiates to mid back. Pain is a 7/10 and he takes percocet q6hrs prn. He feels he does need it most times after the 6 hrs passes. He also was discharged with a laws catheter and right lower chest Pluerix tube. Nurses at [facility] empty it 2 times per week. He was a smoker up until July when he was admitted at [facility].   He has been in the care of , [facility] for a few years due to anemia. Family has spoken to  about his new findings, but they have not yet made a follow up appointment. They will do so soon.   Dr. Guzmán is the CTSx

## 2024-09-01 NOTE — DISEASE MANAGEMENT
[Clinical] : TNM Stage: c [N/A] : Currently not applicable [FreeTextEntry4] : Malignant mesothelioma, epithelioid type. [TTNM] : x [NTNM] : 1 [MTNM] : 0 [de-identified] : right lung

## 2024-09-01 NOTE — PHYSICAL EXAM
[General Appearance - Alert] : alert [General Appearance - In No Acute Distress] : in no acute distress [Thin] : thin [] : no respiratory distress [Oriented To Time, Place, And Person] : oriented to person, place, and time [de-identified] : his right chest wall is very sensitive to touch [de-identified] : unable to assess/wheelchair

## 2024-09-01 NOTE — REVIEW OF SYSTEMS
[Fatigue] : fatigue [Recent Change In Weight] : ~T recent weight change [SOB on Exertion] : shortness of breath during exertion [Joint Pain] : joint pain [Difficulty Walking] : difficulty walking [Muscle Weakness] : muscle weakness [Leg Claudication] : intermittent leg claudication [Constipation] : constipation [Negative] : Allergic/Immunologic [Fever] : no fever [Chills] : no chills [Night Sweats] : no night sweats [Shortness Of Breath] : no shortness of breath [Wheezing] : no wheezing [Cough] : no cough [Muscle Pain] : no muscle pain [FreeTextEntry2] : [facility]  [FreeTextEntry6] : right lower chest drain intact/Pleurix/ Shortness of breath improved since hospitalization [FreeTextEntry7] : decreased appetite/ heart burn [FreeTextEntry8] : laws catheter since July [FreeTextEntry9] : using wheelchair [de-identified] : confusion and memory loss [de-identified] : anemia/ follows with Dr Epperson for few years

## 2024-09-01 NOTE — HISTORY OF PRESENT ILLNESS
[FreeTextEntry1] : Patient is a 84-year-old gentlemen who initially present to the ED at [facility], in July 2024 for progressively worsening NOONAN.  On chest x-ray (7/21/2024) revealed right pleural effusion/opacity. CT Chest on 7/21/2024 shows, large right pleural effusion with associated right pleural thickening, near-complete atelectasis of the right lung and mediastinal shift to the left. Partially imaged moderate bilateral hydronephrosis, of uncertain etiology; if clinically warranted, further evaluation with dedicated imaging of the abdomen and pelvis may be of use.   7/24/2024 & 7/26/ 2024 s/p right thoracentesis X2 (1.8 L & 1.9 L).   On 7/29/2024 CT Abdomen shows, no definite evidence of abdominal pelvic neoplastic process  On 8/6/2024 he underwent right VATs with biopsies and pathology shows, right parietal pleura, Malignant mesothelioma, epithelioid type.   Right lung visceral pleura, Malignant mesothelioma, epithelioid type.  Right pleura, Malignant mesothelioma, epithelioid type. Right chest diaphragmatic mass (3 X 3 X 0.3 cm) excision, fibrous plaque with rare atypical cells, consistent with malignant mesothelioma.  PET CT 8/21/2024 1. Few scattered right pleural FDG avid foci, max SUV 4.2, possibly neoplastic with history of malignant mesothelioma. 2. Post right VATS, partial lung decortication and pleural biopsy. Slight interval increase right hydropneumothorax since 7/31/2024, post right pleurex catheter placement. 3. Nonspecific FDG avid left cervical level III lymph node, 0.8 cm short axis with max SUV 7.5. 4. FDG avid right parotid gland mass, measuring up to 2.5 cm with max SUV 8.3, previously noted September 2020. Most FDG avid parotid lesions are benign. 5. Urinary bladder collapsed with Laws catheter. Apparent diffuse urinary bladder wall thickening, which may be due to underdistention versus urinary bladder infection.  Patient is here to discuss radiation. He is accompanied with son, and granddaughter, Meaghan. After his discharge from [facility], on 8/13/24, he has been residing at [facility] for Rehab. He is currently in a wheelchair and requires assistance with ambulating and ADL's. He presents today with pain in right upper shoulder region that radiates to mid back. Pain is a 7/10 and he takes percocet q6hrs prn. He feels he does need it most times after the 6 hrs passes. He also was discharged with a laws catheter and right lower chest Pluerix tube. [place] [facility] [place] 2 times per week. He was a smoker up until July when he was admitted at [facility].   He has been in the care of , [facility] for a few years due to anemia. Family has spoken to  about his new findings, but they have not yet made a follow up appointment. They will do so soon.   Dr. Guzmán is the CTSx

## 2024-09-01 NOTE — REVIEW OF SYSTEMS
[Fatigue] : fatigue [Recent Change In Weight] : ~T recent weight change [SOB on Exertion] : shortness of breath during exertion [Joint Pain] : joint pain [Difficulty Walking] : difficulty walking [Muscle Weakness] : muscle weakness [Leg Claudication] : intermittent leg claudication [Constipation] : constipation [Negative] : Allergic/Immunologic [Fever] : no fever [Chills] : no chills [Night Sweats] : no night sweats [Shortness Of Breath] : no shortness of breath [Wheezing] : no wheezing [Cough] : no cough [Muscle Pain] : no muscle pain [FreeTextEntry2] : [facility]  [FreeTextEntry6] : right lower chest drain intact/Pleurix/ Shortness of breath improved since hospitalization [FreeTextEntry7] : decreased appetite/ heart burn [FreeTextEntry8] : laws catheter since July [FreeTextEntry9] : using wheelchair [de-identified] : confusion and memory loss [de-identified] : anemia/ follows with Dr Epperson for few years

## 2024-09-09 DIAGNOSIS — C45.0 MESOTHELIOMA OF PLEURA: ICD-10-CM

## 2024-09-19 ENCOUNTER — INPATIENT (INPATIENT)
Facility: HOSPITAL | Age: 84
LOS: 7 days | Discharge: HOME | DRG: 812 | End: 2024-09-27
Attending: INTERNAL MEDICINE | Admitting: HOSPITALIST
Payer: MEDICARE

## 2024-09-19 VITALS
RESPIRATION RATE: 20 BRPM | HEART RATE: 86 BPM | TEMPERATURE: 100 F | OXYGEN SATURATION: 100 % | WEIGHT: 134.92 LBS | HEIGHT: 68 IN | SYSTOLIC BLOOD PRESSURE: 115 MMHG | DIASTOLIC BLOOD PRESSURE: 68 MMHG

## 2024-09-19 DIAGNOSIS — Z95.5 PRESENCE OF CORONARY ANGIOPLASTY IMPLANT AND GRAFT: Chronic | ICD-10-CM

## 2024-09-19 DIAGNOSIS — D64.9 ANEMIA, UNSPECIFIED: ICD-10-CM

## 2024-09-19 LAB
ALBUMIN SERPL ELPH-MCNC: 3.5 G/DL — SIGNIFICANT CHANGE UP (ref 3.5–5.2)
ALP SERPL-CCNC: 105 U/L — SIGNIFICANT CHANGE UP (ref 30–115)
ALT FLD-CCNC: 8 U/L — SIGNIFICANT CHANGE UP (ref 0–41)
ANION GAP SERPL CALC-SCNC: 12 MMOL/L — SIGNIFICANT CHANGE UP (ref 7–14)
ANISOCYTOSIS BLD QL: SIGNIFICANT CHANGE UP
APTT BLD: 35.9 SEC — SIGNIFICANT CHANGE UP (ref 27–39.2)
AST SERPL-CCNC: 11 U/L — SIGNIFICANT CHANGE UP (ref 0–41)
BASOPHILS # BLD AUTO: 0 K/UL — SIGNIFICANT CHANGE UP (ref 0–0.2)
BASOPHILS NFR BLD AUTO: 0 % — SIGNIFICANT CHANGE UP (ref 0–1)
BILIRUB SERPL-MCNC: <0.2 MG/DL — SIGNIFICANT CHANGE UP (ref 0.2–1.2)
BLD GP AB SCN SERPL QL: SIGNIFICANT CHANGE UP
BUN SERPL-MCNC: 25 MG/DL — HIGH (ref 10–20)
CALCIUM SERPL-MCNC: 9.3 MG/DL — SIGNIFICANT CHANGE UP (ref 8.4–10.4)
CHLORIDE SERPL-SCNC: 100 MMOL/L — SIGNIFICANT CHANGE UP (ref 98–110)
CO2 SERPL-SCNC: 26 MMOL/L — SIGNIFICANT CHANGE UP (ref 17–32)
CREAT SERPL-MCNC: 1.5 MG/DL — SIGNIFICANT CHANGE UP (ref 0.7–1.5)
EGFR: 46 ML/MIN/1.73M2 — LOW
EOSINOPHIL # BLD AUTO: 0 K/UL — SIGNIFICANT CHANGE UP (ref 0–0.7)
EOSINOPHIL NFR BLD AUTO: 0 % — SIGNIFICANT CHANGE UP (ref 0–8)
GIANT PLATELETS BLD QL SMEAR: PRESENT — SIGNIFICANT CHANGE UP
GLUCOSE SERPL-MCNC: 130 MG/DL — HIGH (ref 70–99)
HCT VFR BLD CALC: 22.3 % — LOW (ref 42–52)
HGB BLD-MCNC: 6.9 G/DL — CRITICAL LOW (ref 14–18)
INR BLD: 1 RATIO — SIGNIFICANT CHANGE UP (ref 0.65–1.3)
IRON SATN MFR SERPL: 10 % — LOW (ref 15–50)
IRON SATN MFR SERPL: 31 UG/DL — LOW (ref 35–150)
LYMPHOCYTES # BLD AUTO: 0.53 K/UL — LOW (ref 1.2–3.4)
LYMPHOCYTES # BLD AUTO: 5.3 % — LOW (ref 20.5–51.1)
MACROCYTES BLD QL: SIGNIFICANT CHANGE UP
MANUAL SMEAR VERIFICATION: SIGNIFICANT CHANGE UP
MCHC RBC-ENTMCNC: 30.5 PG — SIGNIFICANT CHANGE UP (ref 27–31)
MCHC RBC-ENTMCNC: 30.9 G/DL — LOW (ref 32–37)
MCV RBC AUTO: 98.7 FL — HIGH (ref 80–94)
MONOCYTES # BLD AUTO: 0.45 K/UL — SIGNIFICANT CHANGE UP (ref 0.1–0.6)
MONOCYTES NFR BLD AUTO: 4.5 % — SIGNIFICANT CHANGE UP (ref 1.7–9.3)
MYELOCYTES NFR BLD: 1.8 % — HIGH (ref 0–0)
NEUTROPHILS # BLD AUTO: 8.34 K/UL — HIGH (ref 1.4–6.5)
NEUTROPHILS NFR BLD AUTO: 82.1 % — HIGH (ref 42.2–75.2)
NEUTS BAND # BLD: 0.9 % — SIGNIFICANT CHANGE UP (ref 0–6)
OVALOCYTES BLD QL SMEAR: SLIGHT — SIGNIFICANT CHANGE UP
PLAT MORPH BLD: NORMAL — SIGNIFICANT CHANGE UP
PLATELET # BLD AUTO: 553 K/UL — HIGH (ref 130–400)
PMV BLD: 9.2 FL — SIGNIFICANT CHANGE UP (ref 7.4–10.4)
POLYCHROMASIA BLD QL SMEAR: SLIGHT — SIGNIFICANT CHANGE UP
POTASSIUM SERPL-MCNC: 4.7 MMOL/L — SIGNIFICANT CHANGE UP (ref 3.5–5)
POTASSIUM SERPL-SCNC: 4.7 MMOL/L — SIGNIFICANT CHANGE UP (ref 3.5–5)
PROT SERPL-MCNC: 6.2 G/DL — SIGNIFICANT CHANGE UP (ref 6–8)
PROTHROM AB SERPL-ACNC: 11.4 SEC — SIGNIFICANT CHANGE UP (ref 9.95–12.87)
RBC # BLD: 2.26 M/UL — LOW (ref 4.7–6.1)
RBC # FLD: 14 % — SIGNIFICANT CHANGE UP (ref 11.5–14.5)
RBC BLD AUTO: ABNORMAL
SODIUM SERPL-SCNC: 138 MMOL/L — SIGNIFICANT CHANGE UP (ref 135–146)
TIBC SERPL-MCNC: 325 UG/DL — SIGNIFICANT CHANGE UP (ref 220–430)
UIBC SERPL-MCNC: 294 UG/DL — SIGNIFICANT CHANGE UP (ref 110–370)
VARIANT LYMPHS # BLD: 5.4 % — HIGH (ref 0–5)
WBC # BLD: 10.05 K/UL — SIGNIFICANT CHANGE UP (ref 4.8–10.8)
WBC # FLD AUTO: 10.05 K/UL — SIGNIFICANT CHANGE UP (ref 4.8–10.8)

## 2024-09-19 PROCEDURE — 82746 ASSAY OF FOLIC ACID SERUM: CPT

## 2024-09-19 PROCEDURE — 85025 COMPLETE CBC W/AUTO DIFF WBC: CPT

## 2024-09-19 PROCEDURE — 81001 URINALYSIS AUTO W/SCOPE: CPT

## 2024-09-19 PROCEDURE — 86850 RBC ANTIBODY SCREEN: CPT

## 2024-09-19 PROCEDURE — 97116 GAIT TRAINING THERAPY: CPT | Mod: GP

## 2024-09-19 PROCEDURE — 83930 ASSAY OF BLOOD OSMOLALITY: CPT

## 2024-09-19 PROCEDURE — P9016: CPT

## 2024-09-19 PROCEDURE — 71045 X-RAY EXAM CHEST 1 VIEW: CPT | Mod: 26

## 2024-09-19 PROCEDURE — 83540 ASSAY OF IRON: CPT

## 2024-09-19 PROCEDURE — 97161 PT EVAL LOW COMPLEX 20 MIN: CPT | Mod: GP

## 2024-09-19 PROCEDURE — 82607 VITAMIN B-12: CPT

## 2024-09-19 PROCEDURE — 84100 ASSAY OF PHOSPHORUS: CPT

## 2024-09-19 PROCEDURE — 82962 GLUCOSE BLOOD TEST: CPT

## 2024-09-19 PROCEDURE — 83550 IRON BINDING TEST: CPT

## 2024-09-19 PROCEDURE — 86901 BLOOD TYPING SEROLOGIC RH(D): CPT

## 2024-09-19 PROCEDURE — 86922 COMPATIBILITY TEST ANTIGLOB: CPT

## 2024-09-19 PROCEDURE — 84156 ASSAY OF PROTEIN URINE: CPT

## 2024-09-19 PROCEDURE — 84443 ASSAY THYROID STIM HORMONE: CPT

## 2024-09-19 PROCEDURE — 80053 COMPREHEN METABOLIC PANEL: CPT

## 2024-09-19 PROCEDURE — 85027 COMPLETE CBC AUTOMATED: CPT

## 2024-09-19 PROCEDURE — 80048 BASIC METABOLIC PNL TOTAL CA: CPT

## 2024-09-19 PROCEDURE — 82728 ASSAY OF FERRITIN: CPT

## 2024-09-19 PROCEDURE — 99285 EMERGENCY DEPT VISIT HI MDM: CPT

## 2024-09-19 PROCEDURE — 84300 ASSAY OF URINE SODIUM: CPT

## 2024-09-19 PROCEDURE — 82570 ASSAY OF URINE CREATININE: CPT

## 2024-09-19 PROCEDURE — 36430 TRANSFUSION BLD/BLD COMPNT: CPT

## 2024-09-19 PROCEDURE — 82533 TOTAL CORTISOL: CPT

## 2024-09-19 PROCEDURE — P9040: CPT

## 2024-09-19 PROCEDURE — 71045 X-RAY EXAM CHEST 1 VIEW: CPT

## 2024-09-19 PROCEDURE — 83735 ASSAY OF MAGNESIUM: CPT

## 2024-09-19 PROCEDURE — 86900 BLOOD TYPING SEROLOGIC ABO: CPT

## 2024-09-19 PROCEDURE — 36415 COLL VENOUS BLD VENIPUNCTURE: CPT

## 2024-09-19 PROCEDURE — 97110 THERAPEUTIC EXERCISES: CPT | Mod: GP

## 2024-09-19 PROCEDURE — 84133 ASSAY OF URINE POTASSIUM: CPT

## 2024-09-19 PROCEDURE — 84540 ASSAY OF URINE/UREA-N: CPT

## 2024-09-19 PROCEDURE — 97530 THERAPEUTIC ACTIVITIES: CPT | Mod: GP

## 2024-09-19 PROCEDURE — 83935 ASSAY OF URINE OSMOLALITY: CPT

## 2024-09-19 RX ORDER — SODIUM CHLORIDE 0.9 % (FLUSH) 0.9 %
1000 SYRINGE (ML) INJECTION ONCE
Refills: 0 | Status: DISCONTINUED | OUTPATIENT
Start: 2024-09-19 | End: 2024-09-19

## 2024-09-19 RX ORDER — 5-HYDROXYTRYPTOPHAN (5-HTP) 100 MG
3 TABLET,DISINTEGRATING ORAL AT BEDTIME
Refills: 0 | Status: DISCONTINUED | OUTPATIENT
Start: 2024-09-19 | End: 2024-09-27

## 2024-09-19 RX ORDER — ACETAMINOPHEN 325 MG
650 TABLET ORAL EVERY 6 HOURS
Refills: 0 | Status: DISCONTINUED | OUTPATIENT
Start: 2024-09-19 | End: 2024-09-27

## 2024-09-19 NOTE — ED PROVIDER NOTE - PHYSICAL EXAMINATION
CONST: Well appearing in NAD  EYES: PERRL, EOMI, Sclera and conjunctiva clear.   ENT: Oropharynx normal appearing, no erythema or exudates. Uvula midline.  CARD: Normal S1 S2; Normal rate and rhythm  RESP: Equal BS B/L, No wheezes, rhonchi or rales. No distress  GI: Soft, non-tender, non-distended.  CHINYERE brown stool per rectum exam chaperoned by AR Soliman  MS: Normal ROM in all extremities. No midline spinal tenderness.  SKIN: Warm, dry, no acute rashes.   NEURO: A&Ox3, No focal deficits. Strength 5/5 with no sensory deficits. Steady gait

## 2024-09-19 NOTE — ED PROVIDER NOTE - ATTENDING APP SHARED VISIT CONTRIBUTION OF CARE
83 yo M pmhx CAD s/p stents, DM, HLD, anemia, (+) smoker, admitted Aug 2024, found to have pleural effusions, s/p Pleurx, s/p VATS, bx (+) for lung neoplasm, presents to ED from [facility] for eval of low hemoglobin. Pt denies rectal bleeding, hematuria, vomiting blood. Reports generalized weakness. No CP or SOB.    CONSTITUTIONAL: NAD.   SKIN: warm, dry, pale   HEAD: Normocephalic; atraumatic.  ENT: MMM.   NECK: Supple.  CARD: RRR.   RESP: No wheezes, rales or rhonchi. R sided Pleurx   ABD: soft ntnd.   RECTAL: negative for blood   EXT: Normal ROM.  No lower extremity edema.   NEURO: AAOX3. follows commands. no FND.

## 2024-09-19 NOTE — ED PROVIDER NOTE - OBJECTIVE STATEMENT
84-year-old male past medical history of CAD status post stents, diabetes, hyperlipidemia, anemia, lung cancer, recurrent pleural effusions status post thoracenteses with chronic Pleurx cath in place, chronic indwelling Walden presents to the ED for evaluation.  Patient sent in from nursing home for low hemoglobin level to 6.3.  No prior transfusions.  Patient denies any black or bloody stools, hematemesis, anticoagulation use.  On Plavix.  Endorsing generalized fatigue.  Denies any chest pain, shortness of breath, cough, abdominal pain, nausea, vomiting, diarrhea.

## 2024-09-19 NOTE — ED PROVIDER NOTE - DIFFERENTIAL DIAGNOSIS
Differential Diagnosis The differential diagnosis for patients clinical presentation includes but is not limited to: anemia, malignancy related, gi bleed

## 2024-09-19 NOTE — CHART NOTE - NSCHARTNOTEFT_GEN_A_CORE
Sign out from ED:     83 y/o man w PMHx of HLD, DM, CAD, s/p stents, lung CA dx within last few months, R sided PleurX, h/o AVM/GIB, JASMIN, referred from NH for anemia to 6.3, in ED found to have Hb 6.9, ordered for 1uPRBC, admitted to medicine for anemia.   Cr 1.5 form prior 1.2, plt 553    Added on TIBC and ferritin to initial labs done prior to pt's transfusion.

## 2024-09-19 NOTE — ED PROVIDER NOTE - CARE PLAN
Assessment and plan of treatment:	Plan - labs   1 Principal Discharge DX:	Anemia  Assessment and plan of treatment:	Plan - labs

## 2024-09-20 LAB
ALBUMIN SERPL ELPH-MCNC: 3.2 G/DL — LOW (ref 3.5–5.2)
ALP SERPL-CCNC: 104 U/L — SIGNIFICANT CHANGE UP (ref 30–115)
ALT FLD-CCNC: 7 U/L — SIGNIFICANT CHANGE UP (ref 0–41)
ANION GAP SERPL CALC-SCNC: 12 MMOL/L — SIGNIFICANT CHANGE UP (ref 7–14)
AST SERPL-CCNC: 9 U/L — SIGNIFICANT CHANGE UP (ref 0–41)
BASOPHILS # BLD AUTO: 0.02 K/UL — SIGNIFICANT CHANGE UP (ref 0–0.2)
BASOPHILS NFR BLD AUTO: 0.2 % — SIGNIFICANT CHANGE UP (ref 0–1)
BILIRUB SERPL-MCNC: 0.3 MG/DL — SIGNIFICANT CHANGE UP (ref 0.2–1.2)
BUN SERPL-MCNC: 25 MG/DL — HIGH (ref 10–20)
CALCIUM SERPL-MCNC: 9.2 MG/DL — SIGNIFICANT CHANGE UP (ref 8.4–10.5)
CHLORIDE SERPL-SCNC: 103 MMOL/L — SIGNIFICANT CHANGE UP (ref 98–110)
CO2 SERPL-SCNC: 25 MMOL/L — SIGNIFICANT CHANGE UP (ref 17–32)
CREAT SERPL-MCNC: 1.2 MG/DL — SIGNIFICANT CHANGE UP (ref 0.7–1.5)
EGFR: 60 ML/MIN/1.73M2 — SIGNIFICANT CHANGE UP
EOSINOPHIL # BLD AUTO: 0.22 K/UL — SIGNIFICANT CHANGE UP (ref 0–0.7)
EOSINOPHIL NFR BLD AUTO: 2.5 % — SIGNIFICANT CHANGE UP (ref 0–8)
FERRITIN SERPL-MCNC: 82 NG/ML — SIGNIFICANT CHANGE UP (ref 30–400)
FOLATE SERPL-MCNC: 6.2 NG/ML — SIGNIFICANT CHANGE UP
GLUCOSE BLDC GLUCOMTR-MCNC: 126 MG/DL — HIGH (ref 70–99)
GLUCOSE SERPL-MCNC: 104 MG/DL — HIGH (ref 70–99)
HCT VFR BLD CALC: 22.2 % — LOW (ref 42–52)
HCT VFR BLD CALC: 24.5 % — LOW (ref 42–52)
HGB BLD-MCNC: 6.8 G/DL — CRITICAL LOW (ref 14–18)
HGB BLD-MCNC: 7.4 G/DL — LOW (ref 14–18)
IMM GRANULOCYTES NFR BLD AUTO: 0.7 % — HIGH (ref 0.1–0.3)
LYMPHOCYTES # BLD AUTO: 1.24 K/UL — SIGNIFICANT CHANGE UP (ref 1.2–3.4)
LYMPHOCYTES # BLD AUTO: 13.9 % — LOW (ref 20.5–51.1)
MCHC RBC-ENTMCNC: 28.2 PG — SIGNIFICANT CHANGE UP (ref 27–31)
MCHC RBC-ENTMCNC: 28.5 PG — SIGNIFICANT CHANGE UP (ref 27–31)
MCHC RBC-ENTMCNC: 30.2 G/DL — LOW (ref 32–37)
MCHC RBC-ENTMCNC: 30.6 G/DL — LOW (ref 32–37)
MCV RBC AUTO: 92.9 FL — SIGNIFICANT CHANGE UP (ref 80–94)
MCV RBC AUTO: 93.5 FL — SIGNIFICANT CHANGE UP (ref 80–94)
MONOCYTES # BLD AUTO: 0.85 K/UL — HIGH (ref 0.1–0.6)
MONOCYTES NFR BLD AUTO: 9.6 % — HIGH (ref 1.7–9.3)
NEUTROPHILS # BLD AUTO: 6.51 K/UL — HIGH (ref 1.4–6.5)
NEUTROPHILS NFR BLD AUTO: 73.1 % — SIGNIFICANT CHANGE UP (ref 42.2–75.2)
NRBC # BLD: 0 /100 WBCS — SIGNIFICANT CHANGE UP (ref 0–0)
NRBC # BLD: 0 /100 WBCS — SIGNIFICANT CHANGE UP (ref 0–0)
PLATELET # BLD AUTO: 458 K/UL — HIGH (ref 130–400)
PLATELET # BLD AUTO: 503 K/UL — HIGH (ref 130–400)
PMV BLD: 9.3 FL — SIGNIFICANT CHANGE UP (ref 7.4–10.4)
PMV BLD: 9.3 FL — SIGNIFICANT CHANGE UP (ref 7.4–10.4)
POTASSIUM SERPL-MCNC: 4.9 MMOL/L — SIGNIFICANT CHANGE UP (ref 3.5–5)
POTASSIUM SERPL-SCNC: 4.9 MMOL/L — SIGNIFICANT CHANGE UP (ref 3.5–5)
PROT SERPL-MCNC: 6 G/DL — SIGNIFICANT CHANGE UP (ref 6–8)
RBC # BLD: 2.39 M/UL — LOW (ref 4.7–6.1)
RBC # BLD: 2.62 M/UL — LOW (ref 4.7–6.1)
RBC # FLD: 16.1 % — HIGH (ref 11.5–14.5)
RBC # FLD: 16.6 % — HIGH (ref 11.5–14.5)
SODIUM SERPL-SCNC: 140 MMOL/L — SIGNIFICANT CHANGE UP (ref 135–146)
VIT B12 SERPL-MCNC: 589 PG/ML — SIGNIFICANT CHANGE UP (ref 232–1245)
WBC # BLD: 8.9 K/UL — SIGNIFICANT CHANGE UP (ref 4.8–10.8)
WBC # BLD: 9.23 K/UL — SIGNIFICANT CHANGE UP (ref 4.8–10.8)
WBC # FLD AUTO: 8.9 K/UL — SIGNIFICANT CHANGE UP (ref 4.8–10.8)
WBC # FLD AUTO: 9.23 K/UL — SIGNIFICANT CHANGE UP (ref 4.8–10.8)

## 2024-09-20 PROCEDURE — 99222 1ST HOSP IP/OBS MODERATE 55: CPT

## 2024-09-20 RX ORDER — ATORVASTATIN CALCIUM 10 MG/1
10 TABLET, FILM COATED ORAL AT BEDTIME
Refills: 0 | Status: DISCONTINUED | OUTPATIENT
Start: 2024-09-20 | End: 2024-09-27

## 2024-09-20 RX ORDER — ALCOHOL ANTISEPTIC PADS
15 PADS, MEDICATED (EA) TOPICAL ONCE
Refills: 0 | Status: DISCONTINUED | OUTPATIENT
Start: 2024-09-20 | End: 2024-09-27

## 2024-09-20 RX ORDER — ALCOHOL ANTISEPTIC PADS
25 PADS, MEDICATED (EA) TOPICAL ONCE
Refills: 0 | Status: DISCONTINUED | OUTPATIENT
Start: 2024-09-20 | End: 2024-09-27

## 2024-09-20 RX ORDER — PANTOPRAZOLE SODIUM 40 MG/1
40 TABLET, DELAYED RELEASE ORAL
Refills: 0 | Status: DISCONTINUED | OUTPATIENT
Start: 2024-09-20 | End: 2024-09-24

## 2024-09-20 RX ORDER — GLUCAGON INJECTION, SOLUTION 0.5 MG/.1ML
1 INJECTION, SOLUTION SUBCUTANEOUS ONCE
Refills: 0 | Status: DISCONTINUED | OUTPATIENT
Start: 2024-09-20 | End: 2024-09-27

## 2024-09-20 RX ORDER — FERROUS SULFATE 325(65) MG
325 TABLET ORAL DAILY
Refills: 0 | Status: DISCONTINUED | OUTPATIENT
Start: 2024-09-20 | End: 2024-09-22

## 2024-09-20 RX ORDER — INFLUENZA VIRUS VACCINE 15; 15; 15; 15 UG/.5ML; UG/.5ML; UG/.5ML; UG/.5ML
0.5 SUSPENSION INTRAMUSCULAR ONCE
Refills: 0 | Status: DISCONTINUED | OUTPATIENT
Start: 2024-09-20 | End: 2024-09-27

## 2024-09-20 RX ORDER — ALCOHOL ANTISEPTIC PADS
12.5 PADS, MEDICATED (EA) TOPICAL ONCE
Refills: 0 | Status: DISCONTINUED | OUTPATIENT
Start: 2024-09-20 | End: 2024-09-27

## 2024-09-20 RX ORDER — INSULIN LISPRO 100/ML
VIAL (ML) SUBCUTANEOUS AT BEDTIME
Refills: 0 | Status: DISCONTINUED | OUTPATIENT
Start: 2024-09-20 | End: 2024-09-27

## 2024-09-20 RX ORDER — INSULIN LISPRO 100/ML
VIAL (ML) SUBCUTANEOUS
Refills: 0 | Status: DISCONTINUED | OUTPATIENT
Start: 2024-09-20 | End: 2024-09-27

## 2024-09-20 RX ORDER — SENNOSIDES 8.6 MG
2 TABLET ORAL AT BEDTIME
Refills: 0 | Status: DISCONTINUED | OUTPATIENT
Start: 2024-09-20 | End: 2024-09-27

## 2024-09-20 RX ORDER — GABAPENTIN 800 MG/1
300 TABLET, FILM COATED ORAL THREE TIMES A DAY
Refills: 0 | Status: DISCONTINUED | OUTPATIENT
Start: 2024-09-20 | End: 2024-09-27

## 2024-09-20 RX ORDER — METFORMIN HCL 500 MG
500 TABLET ORAL
Refills: 0 | Status: DISCONTINUED | OUTPATIENT
Start: 2024-09-20 | End: 2024-09-21

## 2024-09-20 RX ORDER — SODIUM CHLORIDE IRRIG SOLUTION 0.9 %
1000 SOLUTION, IRRIGATION IRRIGATION
Refills: 0 | Status: DISCONTINUED | OUTPATIENT
Start: 2024-09-20 | End: 2024-09-27

## 2024-09-20 RX ORDER — TAMSULOSIN HCL 0.4 MG
0.4 CAPSULE ORAL AT BEDTIME
Refills: 0 | Status: DISCONTINUED | OUTPATIENT
Start: 2024-09-20 | End: 2024-09-27

## 2024-09-20 RX ORDER — GABAPENTIN 800 MG/1
1 TABLET, FILM COATED ORAL
Refills: 0 | DISCHARGE

## 2024-09-20 RX ORDER — SODIUM CHLORIDE 0.9 % (FLUSH) 0.9 %
1000 SYRINGE (ML) INJECTION
Refills: 0 | Status: DISCONTINUED | OUTPATIENT
Start: 2024-09-20 | End: 2024-09-27

## 2024-09-20 RX ADMIN — Medication 500 MILLIGRAM(S): at 21:11

## 2024-09-20 RX ADMIN — Medication 75 MILLILITER(S): at 06:51

## 2024-09-20 RX ADMIN — Medication 0.4 MILLIGRAM(S): at 21:12

## 2024-09-20 RX ADMIN — ATORVASTATIN CALCIUM 10 MILLIGRAM(S): 10 TABLET, FILM COATED ORAL at 21:11

## 2024-09-20 RX ADMIN — GABAPENTIN 300 MILLIGRAM(S): 800 TABLET, FILM COATED ORAL at 06:51

## 2024-09-20 RX ADMIN — Medication 500 MILLIGRAM(S): at 06:51

## 2024-09-20 RX ADMIN — PANTOPRAZOLE SODIUM 40 MILLIGRAM(S): 40 TABLET, DELAYED RELEASE ORAL at 08:19

## 2024-09-20 RX ADMIN — Medication 2: at 21:45

## 2024-09-20 RX ADMIN — ATORVASTATIN CALCIUM 10 MILLIGRAM(S): 10 TABLET, FILM COATED ORAL at 03:13

## 2024-09-20 RX ADMIN — GABAPENTIN 300 MILLIGRAM(S): 800 TABLET, FILM COATED ORAL at 15:17

## 2024-09-20 RX ADMIN — GABAPENTIN 300 MILLIGRAM(S): 800 TABLET, FILM COATED ORAL at 03:13

## 2024-09-20 RX ADMIN — Medication 0.4 MILLIGRAM(S): at 03:15

## 2024-09-20 RX ADMIN — GABAPENTIN 300 MILLIGRAM(S): 800 TABLET, FILM COATED ORAL at 21:11

## 2024-09-20 RX ADMIN — Medication 325 MILLIGRAM(S): at 13:59

## 2024-09-20 NOTE — PHYSICAL THERAPY INITIAL EVALUATION ADULT - PERTINENT HX OF CURRENT PROBLEM, REHAB EVAL
83 y/o man w PMHx of HLD, DM, CAD s/p stents x2 in 2007, pt of Dr Avila, h/o asbestosis of lung, dx w malignant mesothelioma ~8/2024 and s/p VATS pleurodesis, R sided PleurX, h/o AVM/GIB, JASMIN, referred from NH for anemia to 6.3, in ED found to have Hb 6.9, ordered for 1uPRBC, admitted to medicine for anemia.   Cr 1.5 form prior 1.2, plt 553, Follows Dr Johnson for rad/onc

## 2024-09-20 NOTE — DISCHARGE NOTE PROVIDER - PROVIDER TOKENS
PROVIDER:[TOKEN:[[id number]]],PROVIDER:[TOKEN:[[id number]]] PROVIDER:[TOKEN:[[id number]],FOLLOWUP:[2 weeks]],PROVIDER:[TOKEN:[[id number]],FOLLOWUP:[1-3 days]],PROVIDER:[TOKEN:[[id number]],FOLLOWUP:[1 week]],PROVIDER:[TOKEN:[[id number]],FOLLOWUP:[2 weeks]]

## 2024-09-20 NOTE — DISCHARGE NOTE PROVIDER - HOSPITAL COURSE
85 y/o man w PMHx of HLD, DM, CAD s/p stents x2 in 2007, pt of Dr Avila, h/o asbestosis of lung, dx w malignant mesothelioma ~8/2024 and s/p VATS pleurodesis, R sided PleurX, h/o AVM/GIB, JASMIN, referred from NH for anemia to 6.3, in ED found to have Hb 6.9, ordered for 1uPRBC, admitted to medicine for anemia.   Cr 1.5 form prior 1.2, plt 553, Follows Dr Johnson for rad/onc    Per pt, has not noted any bleeding - no hematuria, black or bloody stools. Felt generally malaised/weak earlier, and improved now. No falls/syncope. No fever, cough. Reports he has maintained a decent appetite, no n/v.         #Anemia, normo/macrocytic  #h/o AVM/GIB, JASMIN  DDx anemia 2/2 nutritional deficits, blood loss anemia though no gross/overt bleeding, ?BM suppression. No concurrent thrombocytopenia.   Received 1uPRBC in ED w subsequent Hb approximately the same. However, pt is stable, no tachycardia/hypotension, increased WOB, significant pallor, and reports feeling improved. Will repeat am Hb.   - Trend Hb; on last d/c was 7.3 so not dramatic drop in Hb   - Keep active T&S   - Added on TIBC and ferritin to initial labs done prior to pt's transfusion.  -Hb 7.4 post transfusion   -Oncology evaluation done prior to dc     #HLD - cont home meds   #DM - 1+SS   #CAD s/p stents x2 in 2007, pt of Dr Avila - cont home meds     #malignant mesothelioma ~8/2024   s/p VATS pleurodesis, R sided PleurX  - f/u as outpatient      83 y/o man w PMHx of HLD, DM, CAD s/p stents x2 in 2007, pt of Dr Avila, h/o asbestosis of lung, dx w malignant mesothelioma ~8/2024 and s/p VATS pleurodesis, R sided PleurX, h/o AVM/GIB, JASMIN, referred from NH for anemia to 6.3, in ED found to have Hb 6.9, ordered for 1uPRBC, admitted to medicine for anemia.   Cr 1.5 form prior 1.2, plt 553, Follows Dr Johnson for rad/onc    Per pt, has not noted any bleeding - no hematuria, black or bloody stools. Felt generally malaised/weak earlier, and improved now. No falls/syncope. No fever, cough. Reports he has maintained a decent appetite, no n/v.     Hospital Course  - patient received a total of 2 PRBCs; no signs of blood loss or hemodynamic instability were noted while monitored inpatient.  - plan is to discharge patient with hematology f/u for outpatient immunotherapy with dr. Jane Epperson.    #Anemia, normo/macrocytic  #h/o AVM/GIB, JASMIN  DDx anemia 2/2 nutritional deficits, blood loss anemia though no gross/overt bleeding, ?BM suppression. No concurrent thrombocytopenia.   Received 1uPRBC in ED w subsequent Hb approximately the same. However, pt is stable, no tachycardia/hypotension, increased WOB, significant pallor, and reports feeling improved. Will repeat am Hb.   - Trend Hb; on last d/c was 7.3 so not dramatic drop in Hb   - Keep active T&S   - Added on TIBC and ferritin to initial labs done prior to pt's transfusion.  -Hb 7.4 post transfusion     #HLD - cont home meds   #DM - 1+SS   #CAD s/p stents x2 in 2007, pt of Dr Avila - cont home meds     #malignant mesothelioma ~8/2024   s/p VATS pleurodesis, R sided PleurX  - f/u as outpatient      83 y/o man w PMHx of HLD, DM, CAD s/p stents x2 in 2007, pt of Dr Avila, h/o asbestosis of lung, dx w malignant mesothelioma ~8/2024 and s/p VATS pleurodesis, R sided PleurX, h/o AVM/GIB, JASMIN, referred from NH for anemia to 6.3, in ED found to have Hb 6.9, ordered for 1uPRBC, admitted to medicine for anemia.   Cr 1.5 form prior 1.2, plt 553, Follows Dr Johnson for rad/onc    Per pt, has not noted any bleeding - no hematuria, black or bloody stools. Felt generally malaised/weak earlier, and improved now. No falls/syncope. No fever, cough. Reports he has maintained a decent appetite, no n/v.     Hospital Course  - patient received a total of 2 PRBCs; no signs of blood loss or hemodynamic instability were noted while monitored inpatient. Hematology recommended venofer which the patient received. GI recommended colonoscopy but patient and son refused. Patient remained HD stable and Hb stable.   - plan is to discharge patient with hematology f/u for outpatient immunotherapy with dr. Jane Epperson.  - Patient has acute grief vs MDD, will follow up with psychiatry     #Acute Grief vs MDD  - wife passed away a few months ago  - patient refusing medication sometimes and asks to be left alone, no suicide ideas, no ideas to hurt other people.    - Psychiatry consulted and patient will follow up outpatient     #Anemia, normo/macrocytic  #h/o AVM/GIB, JASMIN  DDx anemia 2/2 nutritional deficits, blood loss anemia though no gross/overt bleeding, ?BM suppression. No concurrent thrombocytopenia.   Received 1uPRBC in ED w subsequent Hb approximately the same. However, pt is stable, no tachycardia/hypotension, increased WOB, significant pallor, and reports feeling improved. Will repeat am Hb.   - Trend Hb; on last d/c was 7.3 so not dramatic drop in Hb   - Keep active T&S   - Added on TIBC and ferritin to initial labs done prior to pt's transfusion.  -Hb 7.4 post transfusion     #HLD - cont home meds   #DM - 1+SS   #CAD s/p stents x2 in 2007, pt of Dr Avila - cont home meds     #malignant mesothelioma ~8/2024   s/p VATS pleurodesis, R sided PleurX  - f/u as outpatient     Case discussed with attending Dr. Sanchez and patient is fit for discharge.

## 2024-09-20 NOTE — DISCHARGE NOTE PROVIDER - DETAILS OF MALNUTRITION DIAGNOSIS/DIAGNOSES
This patient has been assessed with a concern for Malnutrition and was treated during this hospitalization for the following Nutrition diagnosis/diagnoses:     -  09/22/2024: Severe protein-calorie malnutrition

## 2024-09-20 NOTE — ED ADULT NURSE NOTE - CHIEF COMPLAINT QUOTE
Arrived EMS from [facility] for low hemoglobin, Pt c/ o weakness

## 2024-09-20 NOTE — PROGRESS NOTE ADULT - SUBJECTIVE AND OBJECTIVE BOX
Patient is a 84y old  Male who presents with a chief complaint of severe anemia      84yoM with PMHx CAD (s/p 2 stents, follows with Dr. Avila), DM, HLD, anemia, tobacco use, presents for severe anemia at hb 6   He was brought in from rehab after bw showing hb 6. Pt underwent 1 prbc and hb is now at 7 .  Pt was recently dx of mesothelioma and underwent surgery removing dx in pleural cavity but unable to remove below the diaphragm.   Denies any chest pain, nausea/vomiting/diarrhea, abdominal pain, bloody stools, leg swelling, cough, fever, chills.   He also has hx of AVM causing slow and intermittent GI bleeding requiring iv venofer   Pt has improved clinically while rehab with active PT    PAST MEDICAL & SURGICAL HISTORY:  DM (diabetes mellitus)      MI (myocardial infarction)      History of CAD (coronary artery disease)      Dyslipidemia      Currently smokes tobacco      Coronary stent patent          SOCIAL HX:   Smoking        Active smoker                ETOH                            Other    FAMILY HISTORY:  .  No cardiovascular or pulmonary family history     REVIEW OF SYSTEMS:    All ROS are negative exept per HPI       Allergies    penicillin (Unknown)    Intolerances          PHYSICAL EXAM  vss      pt sleeping   CONSTITUTIONAL:  ILL Looking    ENT:   Airway patent,   No thrush    EYES:   Clear bilaterally,   pupils equal,   round and reactive to light.    CARDIAC:   Normal rate,   regular rhythm.    no edema      RESPIRATORY:   dec bs r side    GASTROINTESTINAL:  Abdomen soft, non-tender,   No guarding,   Positive BS    MUSCULOSKELETAL:   Range of motion is not limited,  No clubbing, cyanosis    NEUROLOGICAL:   Alert and oriented   No motor deficits.    SKIN:   Skin normal color for race,   No evidence of rash.        LABS:               hb 7 from 6 s/p transfusion  no ferritin available                                                                                                           MEDICATIONS  (STANDING):  atorvastatin 10 milliGRAM(s) Oral at bedtime  budesonide 160 MICROgram(s)/formoterol 4.5 MICROgram(s) Inhaler 2 Puff(s) Inhalation two times a day  clopidogrel Tablet 75 milliGRAM(s) Oral daily  dextrose 5%. 1000 milliLiter(s) (50 mL/Hr) IV Continuous <Continuous>  dextrose 5%. 1000 milliLiter(s) (100 mL/Hr) IV Continuous <Continuous>  dextrose 50% Injectable 12.5 Gram(s) IV Push once  dextrose 50% Injectable 25 Gram(s) IV Push once  dextrose 50% Injectable 25 Gram(s) IV Push once  enoxaparin Injectable 40 milliGRAM(s) SubCutaneous every 24 hours  glucagon  Injectable 1 milliGRAM(s) IntraMuscular once  insulin glargine Injectable (LANTUS) 10 Unit(s) SubCutaneous at bedtime  insulin lispro (ADMELOG) corrective regimen sliding scale   SubCutaneous three times a day before meals  insulin lispro (ADMELOG) corrective regimen sliding scale   SubCutaneous at bedtime  insulin lispro Injectable (ADMELOG) 3 Unit(s) SubCutaneous three times a day before meals    MEDICATIONS  (PRN):  dextrose Oral Gel 15 Gram(s) Oral once PRN Blood Glucose LESS THAN 70 milliGRAM(s)/deciliter

## 2024-09-20 NOTE — H&P ADULT - HISTORY OF PRESENT ILLNESS
83 y/o man w PMHx of HLD, DM, CAD s/p stents x2 in 2007, pt of Dr Avila, h/o asbestosis of lung, dx w malignant mesothelioma ~8/2024 and s/p VATS pleurodesis, R sided PleurX, h/o AVM/GIB, JASMIN, referred from NH for anemia to 6.3, in ED found to have Hb 6.9, ordered for 1uPRBC, admitted to medicine for anemia.   Cr 1.5 form prior 1.2, plt 553    Follows   85 y/o man w PMHx of HLD, DM, CAD s/p stents x2 in 2007, pt of Dr Avila, h/o asbestosis of lung, dx w malignant mesothelioma ~8/2024 and s/p VATS pleurodesis, R sided PleurX, h/o AVM/GIB, JASMIN, referred from NH for anemia to 6.3, in ED found to have Hb 6.9, ordered for 1uPRBC, admitted to medicine for anemia.   Cr 1.5 form prior 1.2, plt 553, Follows Dr Johnson for rad/onc    Per pt, has not noted any bleeding - no hematuria, black or bloody stools. Felt generally malaised/weak earlier, and improved now. No falls/syncope. No fever, cough. Reports he has maintained a decent appetite, no n/v.

## 2024-09-20 NOTE — H&P ADULT - ASSESSMENT
85 y/o man w PMHx of HLD, DM, CAD s/p stents x2 in 2007, pt of Dr Avila, h/o asbestosis of lung, dx w malignant mesothelioma ~8/2024 and s/p VATS pleurodesis, R sided PleurX, h/o AVM/GIB, JASMIN, referred from NH for anemia to 6.3, in ED found to have Hb 6.9, ordered for 1uPRBC, admitted to medicine for anemia.   Cr 1.5 form prior 1.2, plt 553, Follows Dr Johnson for rad/onc    Per pt, has not noted any bleeding - no hematuria, black or bloody stools. Felt generally malaised/weak earlier, and improved now. No falls/syncope. No fever, cough. Reports he has maintained a decent appetite, no n/v.                                                                                 ----------------------------------------------------  # DVT prophylaxis:   # GI prophylaxis:   # Diet:   # Activity:   # Code status:   # Disposition:                                                                            --------------------------------------------------------    # Handoff:  83 y/o man w PMHx of HLD, DM, CAD s/p stents x2 in 2007, pt of Dr Avila, h/o asbestosis of lung, dx w malignant mesothelioma ~8/2024 and s/p VATS pleurodesis, R sided PleurX, h/o AVM/GIB, JASMIN, referred from NH for anemia to 6.3, in ED found to have Hb 6.9, ordered for 1uPRBC, admitted to medicine for anemia.     #Anemia, normo/macrocytic  #h/o AVM/GIB, JASMIN  DDx anemia 2/2 nutritional deficits, blood loss anemia though no gross/overt bleeding, ?BM suppression. No concurrent thrombocytopenia.   Received 1uPRBC in ED w subsequent Hb approximately the same. However, pt is stable, no tachycardia/hypotension, increased WOB, significant pallor, and reports feeling improved. Will repeat am Hb.   - Trend Hb; on last d/c was 7.3 so not dramatic drop in Hb   - Keep active T&S   - Added on TIBC and ferritin to initial labs done prior to pt's transfusion.  - f/u B12, B9   - start DVT ppx when able   - Please call to add on reticulocyte count in am     #HLD - cont home meds   #DM - 1+SS   #CAD s/p stents x2 in 2007, pt of Dr Avila - cont home meds     #malignant mesothelioma ~8/2024   s/p VATS pleurodesis, R sided PleurX  - f/u as outpatient                                                                               ----------------------------------------------------  # DVT prophylaxis: start when stable hb  # GI prophylaxis:   # Diet: regular dash tlc cc  # Activity: ambulate w assistance   # Code status: did not discuss   # Disposition: medicine; 24-48h                                                                           --------------------------------------------------------

## 2024-09-20 NOTE — H&P ADULT - NSHPPHYSICALEXAM_GEN_ALL_CORE
GENERAL: NAD, lying in bed comfortably. Thin appearing man.   HEAD:  Atraumatic, Normocephalic  EYES: EOMI, sclera clear  ENT: Moist mucous membranes  NECK: Supple, trachea midline  CHEST/LUNG: Decreased breath sounds R lung most prominently in RLL, L lung clear w good air movement. Anterior R low chest w PleurX catheter, minimal erythema, nontender, clean, dry, intact.   HEART: Regular rate and rhythm; No appreciable murmurs, rubs, or gallops  ABDOMEN: (+)BS; Soft, nontender, nondistended  EXTREMITIES:  2+ Radial Pulses, brisk capillary refill. No clubbing, cyanosis, or edema  NERVOUS SYSTEM:  A&Ox3, no noted facial droop. Moving all extremities w/o difficulty.   SKIN: No rashes or lesions to b/l forearm, face.

## 2024-09-20 NOTE — DISCHARGE NOTE PROVIDER - NSDCCPCAREPLAN_GEN_ALL_CORE_FT
PRINCIPAL DISCHARGE DIAGNOSIS  Diagnosis: Anemia  Assessment and Plan of Treatment: You were seen and admitted for worsening weakness and a low hemoglobin level requiring transfusion. You were given 1 packed red blood cell transfusion, and your hemoglobin level went up accordingly. Your oncologist was consulted for assessment and further recommendations prior to your discharge. Please take your medications as prescribed and follow the instructions given to you regarding your health. Keep follow-up appointments with your doctors as instructed.      PRINCIPAL DISCHARGE DIAGNOSIS  Diagnosis: Anemia  Assessment and Plan of Treatment: You were seen and admitted for worsening weakness and a low hemoglobin level requiring transfusion. You were given packed red blood cell transfusions, and your hemoglobin level went up accordingly. Your oncologist was consulted for assessment and further recommendations prior to your discharge. Please take your medications as prescribed and follow the instructions given to you regarding your health. Keep follow-up appointments with your doctors as instructed.      PRINCIPAL DISCHARGE DIAGNOSIS  Diagnosis: Anemia  Assessment and Plan of Treatment: You were seen and admitted for worsening weakness and a low hemoglobin level requiring transfusion. You were given packed red blood cell transfusions, and your hemoglobin level went up accordingly. Our gastroenterology team recommended you undergo a colonoscopy but you refused. Please follow up with gastroeneterology clinic with Dr. Young. Your oncologist was consulted for assessment and further recommendations prior to your discharge. Please follow up with Dr. Epperson. Also follow up with Dr. Rowe from psychiatry. Please take your medications as prescribed and follow the instructions given to you regarding your health. Keep follow-up appointments with your doctors as instructed.

## 2024-09-20 NOTE — H&P ADULT - ATTENDING COMMENTS
Pt has a pmhx of HTN, DM, CAD s/p 2 stents, asbestosis, malignant mesothelioma 8/24, JASMIN, came from NH found to have hgb 6.3, Hgb 6.9 in ED, received 1 unit pRBC, he denies lower GI bleeding, denies upper GI bleeding, he had malase but he feels better transfusion    Vitals: HD stable, O2 stable  Gen: appropriate affect, no acute distress  Neuro: no focal defects, no sensory deficits  HEENT: EOMI, no JVD, normocephalic, atraumatic, no scleral icterus  Cardio: RRR, S1 S2 present, no murmurs, rubs, or gallops  Resp: lungs b/l CTA, chest wall intact  Abd: soft, nondistended, nontender  MSK: no gross joint abnormalities, no obvious swelling  Skin: no rashes or wounds  Heme: no ecchymosis or petechiae present    symptomatic anemia  -CHINYERE negative in ER    pain control  DVT ppx  GI ppx  diet  Code status  DC planning     I personally reviewed labs and imaging and ordered necessary testing/medications  I discussed care of the patient with licensed providers  I personally reviewed chart and consultant recommendations  Pt has complex medical issues that require extensive diagnosis and intervention / threat to life  I personally spent X minutes in care of patient. Pt has a pmhx of HTN, DM, CAD s/p 2 stents, asbestosis, malignant mesothelioma 8/24, JASMIN, came from NH found to have hgb 6.3, Hgb 6.9 in ED, received 1 unit pRBC, he denies lower GI bleeding, denies upper GI bleeding, he had malase but he feels better transfusion    Vitals: HD stable, O2 stable  Gen: appropriate affect, no acute distress  Neuro: no focal defects, no sensory deficits  HEENT: EOMI, no JVD, normocephalic, atraumatic, no scleral icterus  Cardio: RRR, S1 S2 present, no murmurs, rubs, or gallops  Resp: lungs b/l CTA, chest wall intact  Abd: soft, nondistended, nontender  MSK: no gross joint abnormalities, no obvious swelling  Skin: no rashes or wounds  Heme: no ecchymosis or petechiae present    symptomatic anemia, JASMIN  -CHINYERE negative in ER  -received 1 unit in ED  -to receive a second unit today 9/20  -start ferrous sulfate     HLD, DM, CAD s/p stents  -as above    malignant mesothelioma  -CXR with mildly increased right pleural effusion  -to have r pleurx drained today    DVT ppx  - holding at this time due to anemia  DC planning - prepare for DC tomorrow 9/21 in AM    I personally reviewed labs and imaging (CXR) and ordered necessary testing/medications  I discussed care of the patient with licensed providers  I personally reviewed chart and consultant recommendations  Pt has complex medical issues that require extensive diagnosis and intervention / threat to life  I personally spent 45 minutes in care of patient.

## 2024-09-20 NOTE — DISCHARGE NOTE PROVIDER - NSDCMRMEDTOKEN_GEN_ALL_CORE_FT
clopidogrel 75 mg oral tablet: 1 tab(s) orally once a day  ferrous sulfate 325 mg (65 mg elemental iron) oral tablet: 3 tab(s) orally once a day  gabapentin 300 mg oral tablet: 1 tab(s) orally 3 times a day  melatonin 3 mg oral tablet: 1 tab(s) orally once a day (at bedtime)  metFORMIN 500 mg oral tablet: 1 tab(s) orally 2 times a day  polyethylene glycol 3350 oral powder for reconstitution: 17 gram(s) orally once a day  pravastatin 20 mg oral tablet: 1 tab(s) orally  senna leaf extract oral tablet: 2 tab(s) orally once a day (at bedtime)  tamsulosin 0.4 mg oral capsule: 1 cap(s) orally once a day (at bedtime)   clopidogrel 75 mg oral tablet: 1 tab(s) orally once a day  ferrous sulfate 325 mg (65 mg elemental iron) oral tablet: 1 tab(s) orally once a day  gabapentin 300 mg oral tablet: 1 tab(s) orally 3 times a day  iron sucrose 20 mg/mL intravenous solution: 5 milliliter(s) intravenous every 24 hours one dose on 9/26 then stop  melatonin 3 mg oral tablet: 1 tab(s) orally once a day (at bedtime)  metFORMIN 500 mg oral tablet: 1 tab(s) orally 2 times a day  pantoprazole 40 mg oral delayed release tablet: 1 tab(s) orally every 12 hours  polyethylene glycol 3350 oral powder for reconstitution: 17 gram(s) orally once a day  pravastatin 20 mg oral tablet: 1 tab(s) orally  senna leaf extract oral tablet: 2 tab(s) orally once a day (at bedtime)  tamsulosin 0.4 mg oral capsule: 1 cap(s) orally once a day (at bedtime)

## 2024-09-20 NOTE — ED ADULT NURSE REASSESSMENT NOTE - NS ED NURSE REASSESS COMMENT FT1
Pt. received from previous RN. Pt. lying on stretcher, breathing with ease on RA. A&O x4. 20g noted to the L FA; IV intact, no redness/swelling to the site. Awaiting clean bed assignment. Pt. received from previous RN. Pt. lying on stretcher, breathing with ease on RA. A&O x4. 20g noted to the L FA; IV intact, no redness/swelling to the site. NS running at 75 mL/hr at an hour via 20g. Awaiting clean bed assignment.

## 2024-09-20 NOTE — DISCHARGE NOTE PROVIDER - CARE PROVIDERS DIRECT ADDRESSES
[email or web address]

## 2024-09-20 NOTE — H&P ADULT - NSICDXPASTMEDICALHX_GEN_ALL_CORE_FT
PAST MEDICAL HISTORY:  Currently smokes tobacco     DM (diabetes mellitus)     Dyslipidemia     History of CAD (coronary artery disease)     Mesothelioma, malignant     MI (myocardial infarction)

## 2024-09-20 NOTE — CHART NOTE - NSCHARTNOTEFT_GEN_A_CORE
Pt was seen personally by me Dr. Douglas Ojeda for symptomatic anemia and mildly increased R sided pleural effusion. Pt was given 2 units of blood in hospital. Pt also had pleurx catheter drained, which is routine for pt and was done here for pt as routine care. Pt started on iron pills for iron deficiency. Pt will go back to nursing home upon discharge 9/21 This note is for DC planning for nursing home authoriziation of return. Pt was seen personally by me Dr. Douglas Ojeda for symptomatic anemia and mildly increased R sided pleural effusion. Pt was given 2 units of blood in hospital. Pt also had pleurx catheter drained, which is routine for pt and was done here for pt as routine care. Pt started on iron pills for iron deficiency. Pt will go back to nursing home after SECOND UNIT OF BLOOD agreed with Dr. Zeenat fernandes upon discharge 9/21 and nursing home is aware.

## 2024-09-20 NOTE — DISCHARGE NOTE PROVIDER - CARE PROVIDER_API CALL
Jane Epperson  Hematology  [address]  [place] [address]  Phone: [phone]  Fax: [phone]  Follow Up Time:     Kris Olivia  Internal Medicine  [address] [place] [address]  Phone: [phone]  Fax: [phone]  Follow Up Time:    Jane Epperson  Hematology  [address]  [place] [address]  Phone: [phone]  Fax: [phone]  Follow Up Time: 2 weeks    Kris Olivia  Internal Medicine  [address] [place] [address]  Phone: [phone]  Fax: [phone]  Follow Up Time: 1-3 days    Braxton Young  Gastroenterology  [address] [place] [address]  Phone: [phone]  Fax: [phone]  Follow Up Time: 1 week    Augie Rowe  Psychiatry  [address] [place] [address]  Phone: [phone]  Fax: [phone]  Follow Up Time: 2 weeks

## 2024-09-20 NOTE — PATIENT PROFILE ADULT - FALL HARM RISK - HARM RISK INTERVENTIONS
Assistance with ambulation/Assistance OOB with selected safe patient handling equipment/Communicate Risk of Fall with Harm to all staff/Discuss with provider need for PT consult/Monitor gait and stability/Provide patient with walking aids - walker, cane, crutches/Reinforce activity limits and safety measures with patient and family/Review medications for side effects contributing to fall risk/Sit up slowly, dangle for a short time, stand at bedside before walking/Tailored Fall Risk Interventions/Toileting schedule using arm’s reach rule for commode and bathroom/Visual Cue: Yellow wristband and red socks/Bed in lowest position, wheels locked, appropriate side rails in place/Call bell, personal items and telephone in reach/Instruct patient to call for assistance before getting out of bed or chair/Non-slip footwear when patient is out of bed/[place] to call system/Physically safe environment - no spills, clutter or unnecessary equipment/Purposeful Proactive Rounding/Room/bathroom lighting operational, light cord in reach

## 2024-09-20 NOTE — PHYSICAL THERAPY INITIAL EVALUATION ADULT - ADDITIONAL COMMENTS
Pt unable to provide hx, per chart pt admitted from [facility] STR, ambulating with RW and requiring assistance with ADLs.

## 2024-09-20 NOTE — PHYSICAL THERAPY INITIAL EVALUATION ADULT - GENERAL OBSERVATIONS, REHAB EVAL
[id number] Pt received and left L sidelying in bed, NAD, pt agreeable to PT session however deferred OOB mobility, +IV +laws

## 2024-09-21 LAB
BASOPHILS # BLD AUTO: 0.03 K/UL — SIGNIFICANT CHANGE UP (ref 0–0.2)
BASOPHILS NFR BLD AUTO: 0.4 % — SIGNIFICANT CHANGE UP (ref 0–1)
EOSINOPHIL # BLD AUTO: 0.29 K/UL — SIGNIFICANT CHANGE UP (ref 0–0.7)
EOSINOPHIL NFR BLD AUTO: 3.6 % — SIGNIFICANT CHANGE UP (ref 0–8)
FERRITIN SERPL-MCNC: 89 NG/ML — SIGNIFICANT CHANGE UP (ref 30–400)
GLUCOSE BLDC GLUCOMTR-MCNC: 127 MG/DL — HIGH (ref 70–99)
GLUCOSE BLDC GLUCOMTR-MCNC: 266 MG/DL — HIGH (ref 70–99)
HCT VFR BLD CALC: 25.1 % — LOW (ref 42–52)
HCT VFR BLD CALC: 26.2 % — LOW (ref 42–52)
HGB BLD-MCNC: 8 G/DL — LOW (ref 14–18)
HGB BLD-MCNC: 8.1 G/DL — LOW (ref 14–18)
IMM GRANULOCYTES NFR BLD AUTO: 0.5 % — HIGH (ref 0.1–0.3)
LYMPHOCYTES # BLD AUTO: 1.04 K/UL — LOW (ref 1.2–3.4)
LYMPHOCYTES # BLD AUTO: 12.7 % — LOW (ref 20.5–51.1)
MCHC RBC-ENTMCNC: 28.6 PG — SIGNIFICANT CHANGE UP (ref 27–31)
MCHC RBC-ENTMCNC: 29.6 PG — SIGNIFICANT CHANGE UP (ref 27–31)
MCHC RBC-ENTMCNC: 30.9 G/DL — LOW (ref 32–37)
MCHC RBC-ENTMCNC: 31.9 G/DL — LOW (ref 32–37)
MCV RBC AUTO: 92.6 FL — SIGNIFICANT CHANGE UP (ref 80–94)
MCV RBC AUTO: 93 FL — SIGNIFICANT CHANGE UP (ref 80–94)
MONOCYTES # BLD AUTO: 0.72 K/UL — HIGH (ref 0.1–0.6)
MONOCYTES NFR BLD AUTO: 8.8 % — SIGNIFICANT CHANGE UP (ref 1.7–9.3)
NEUTROPHILS # BLD AUTO: 6.04 K/UL — SIGNIFICANT CHANGE UP (ref 1.4–6.5)
NEUTROPHILS NFR BLD AUTO: 74 % — SIGNIFICANT CHANGE UP (ref 42.2–75.2)
NRBC # BLD: 0 /100 WBCS — SIGNIFICANT CHANGE UP (ref 0–0)
NRBC # BLD: 0 /100 WBCS — SIGNIFICANT CHANGE UP (ref 0–0)
PLATELET # BLD AUTO: 423 K/UL — HIGH (ref 130–400)
PLATELET # BLD AUTO: 430 K/UL — HIGH (ref 130–400)
PMV BLD: 9.4 FL — SIGNIFICANT CHANGE UP (ref 7.4–10.4)
PMV BLD: 9.4 FL — SIGNIFICANT CHANGE UP (ref 7.4–10.4)
RBC # BLD: 2.7 M/UL — LOW (ref 4.7–6.1)
RBC # BLD: 2.83 M/UL — LOW (ref 4.7–6.1)
RBC # FLD: 16.3 % — HIGH (ref 11.5–14.5)
RBC # FLD: 16.5 % — HIGH (ref 11.5–14.5)
WBC # BLD: 8.16 K/UL — SIGNIFICANT CHANGE UP (ref 4.8–10.8)
WBC # BLD: 9.18 K/UL — SIGNIFICANT CHANGE UP (ref 4.8–10.8)
WBC # FLD AUTO: 8.16 K/UL — SIGNIFICANT CHANGE UP (ref 4.8–10.8)
WBC # FLD AUTO: 9.18 K/UL — SIGNIFICANT CHANGE UP (ref 4.8–10.8)

## 2024-09-21 PROCEDURE — 99232 SBSQ HOSP IP/OBS MODERATE 35: CPT

## 2024-09-21 RX ADMIN — GABAPENTIN 300 MILLIGRAM(S): 800 TABLET, FILM COATED ORAL at 14:12

## 2024-09-21 RX ADMIN — Medication 2 TABLET(S): at 21:34

## 2024-09-21 RX ADMIN — GABAPENTIN 300 MILLIGRAM(S): 800 TABLET, FILM COATED ORAL at 05:30

## 2024-09-21 RX ADMIN — ATORVASTATIN CALCIUM 10 MILLIGRAM(S): 10 TABLET, FILM COATED ORAL at 21:34

## 2024-09-21 RX ADMIN — Medication 325 MILLIGRAM(S): at 12:51

## 2024-09-21 RX ADMIN — Medication 75 MILLIGRAM(S): at 14:12

## 2024-09-21 RX ADMIN — PANTOPRAZOLE SODIUM 40 MILLIGRAM(S): 40 TABLET, DELAYED RELEASE ORAL at 05:29

## 2024-09-21 RX ADMIN — Medication 0.4 MILLIGRAM(S): at 21:34

## 2024-09-21 RX ADMIN — Medication 500 MILLIGRAM(S): at 05:28

## 2024-09-21 RX ADMIN — GABAPENTIN 300 MILLIGRAM(S): 800 TABLET, FILM COATED ORAL at 21:34

## 2024-09-21 RX ADMIN — Medication 1: at 21:33

## 2024-09-21 NOTE — PROGRESS NOTE ADULT - SUBJECTIVE AND OBJECTIVE BOX
Patient is a 84y old  Male who presents with a chief complaint of severe anemia      84yoM with PMHx CAD (s/p 2 stents, follows with Dr. Avila), DM, HLD, anemia, tobacco use, presents for severe anemia at hb 6   He was brought in from rehab after bw showing hb 6. Pt underwent 1 prbc and hb is now at 7 .  Pt was recently dx of mesothelioma and underwent surgery removing dx in pleural cavity but unable to remove below the diaphragm.   Denies any chest pain, nausea/vomiting/diarrhea, abdominal pain, bloody stools, leg swelling, cough, fever, chills.   He also has hx of AVM causing slow and intermittent GI bleeding requiring iv venofer   Pt has improved clinically while rehab with active PT    PAST MEDICAL & SURGICAL HISTORY:  DM (diabetes mellitus)      MI (myocardial infarction)      History of CAD (coronary artery disease)      Dyslipidemia      Currently smokes tobacco      Coronary stent patent          SOCIAL HX:   Smoking        Active smoker                ETOH                            Other    FAMILY HISTORY:  .  No cardiovascular or pulmonary family history     REVIEW OF SYSTEMS:    All ROS are negative exept per HPI       Allergies    penicillin (Unknown)    Intolerances          PHYSICAL EXAM  vss      pt sleeping   CONSTITUTIONAL:  ILL Looking    ENT:   Airway patent,   No thrush    EYES:   Clear bilaterally,   pupils equal,   round and reactive to light.    CARDIAC:   Normal rate,   regular rhythm.    no edema      RESPIRATORY:   dec bs r side    GASTROINTESTINAL:  Abdomen soft, non-tender,   No guarding,   Positive BS    MUSCULOSKELETAL:   Range of motion is not limited,  No clubbing, cyanosis    NEUROLOGICAL:   Alert and oriented   No motor deficits.    SKIN:   Skin normal color for race,   No evidence of rash.        LABS:               hb 7 from 6 s/p transfusion >> hb 8   ferr 80's                                                                                                           MEDICATIONS  (STANDING):  atorvastatin 10 milliGRAM(s) Oral at bedtime  budesonide 160 MICROgram(s)/formoterol 4.5 MICROgram(s) Inhaler 2 Puff(s) Inhalation two times a day  clopidogrel Tablet 75 milliGRAM(s) Oral daily  dextrose 5%. 1000 milliLiter(s) (50 mL/Hr) IV Continuous <Continuous>  dextrose 5%. 1000 milliLiter(s) (100 mL/Hr) IV Continuous <Continuous>  dextrose 50% Injectable 12.5 Gram(s) IV Push once  dextrose 50% Injectable 25 Gram(s) IV Push once  dextrose 50% Injectable 25 Gram(s) IV Push once  enoxaparin Injectable 40 milliGRAM(s) SubCutaneous every 24 hours  glucagon  Injectable 1 milliGRAM(s) IntraMuscular once  insulin glargine Injectable (LANTUS) 10 Unit(s) SubCutaneous at bedtime  insulin lispro (ADMELOG) corrective regimen sliding scale   SubCutaneous three times a day before meals  insulin lispro (ADMELOG) corrective regimen sliding scale   SubCutaneous at bedtime  insulin lispro Injectable (ADMELOG) 3 Unit(s) SubCutaneous three times a day before meals    MEDICATIONS  (PRN):  dextrose Oral Gel 15 Gram(s) Oral once PRN Blood Glucose LESS THAN 70 milliGRAM(s)/deciliter

## 2024-09-21 NOTE — PROGRESS NOTE ADULT - SUBJECTIVE AND OBJECTIVE BOX
T H I S   I S    N O  T   A    F I N A L I Z E D   N O T CLARI FISHMAN  84y, Male  Allergy: penicillin (Unknown)    Hospital Day: 2d    Patient seen and examined earlier today.     PMH/PSH:  PAST MEDICAL & SURGICAL HISTORY:  DM (diabetes mellitus)      MI (myocardial infarction)      History of CAD (coronary artery disease)      Dyslipidemia      Currently smokes tobacco      Mesothelioma, malignant      Coronary stent patent          LAST 24-Hr EVENTS:    VITALS:  T(F): 98.1 (09-21-24 @ 07:24), Max: 99.2 (09-20-24 @ 23:00)  HR: 77 (09-21-24 @ 07:24)  BP: 131/70 (09-21-24 @ 07:24) (115/58 - 152/73)  RR: 18 (09-21-24 @ 07:24)  SpO2: 97% (09-20-24 @ 23:00)          TESTS & MEASUREMENTS:  Weight/BMI  61.2 (09-19-24 @ 15:29)  20.5 (09-19-24 @ 15:29)    09-20-24 @ 07:01  -  09-21-24 @ 07:00  --------------------------------------------------------  IN: 0 mL / OUT: 600 mL / NET: -600 mL                            8.0    8.16  )-----------( 430      ( 21 Sep 2024 06:15 )             25.1     PT/INR - ( 19 Sep 2024 17:17 )   PT: 11.40 sec;   INR: 1.00 ratio         PTT - ( 19 Sep 2024 17:17 )  PTT:35.9 sec  INR: 1.00 ratio (09-19-24 @ 17:17)    09-20    140  |  103  |  25[H]  ----------------------------<  104[H]  4.9   |  25  |  1.2    Ca    9.2      20 Sep 2024 06:15    TPro  6.0  /  Alb  3.2[L]  /  TBili  0.3  /  DBili  x   /  AST  9   /  ALT  7   /  AlkPhos  104  09-20    LIVER FUNCTIONS - ( 20 Sep 2024 06:15 )  Alb: 3.2 g/dL / Pro: 6.0 g/dL / ALK PHOS: 104 U/L / ALT: 7 U/L / AST: 9 U/L / GGT: x                 Urinalysis Basic - ( 20 Sep 2024 06:15 )    Color: x / Appearance: x / SG: x / pH: x  Gluc: 104 mg/dL / Ketone: x  / Bili: x / Urobili: x   Blood: x / Protein: x / Nitrite: x   Leuk Esterase: x / RBC: x / WBC x   Sq Epi: x / Non Sq Epi: x / Bacteria: x          Ferritin: 89 ng/mL (09-20-24 @ 15:07)  Ferritin: 82 ng/mL (09-19-24 @ 21:13)            A1C with Estimated Average Glucose Result: 5.8 % (08-11-24 @ 05:09)  A1C with Estimated Average Glucose Result: 5.7 % (07-22-24 @ 04:30)          RADIOLOGY, ECG, & ADDITIONAL TESTS:  12 Lead ECG:   Ventricular Rate 79 BPM    Atrial Rate 79 BPM    P-R Interval 136 ms    QRS Duration 104 ms    Q-T Interval 394 ms    QTC Calculation(Bazett) 451 ms    P Axis 18 degrees    R Axis 120 degrees    T Axis -38 degrees    Diagnosis Line Sinus rhythm with Premature atrial complexes  Possible Right ventricular hypertrophy  ST & T wave abnormality, consider inferolateral ischemia  Abnormal ECG    Confirmed by Ramakrishna German ([id number]) on 7/21/2024 5:21:45 PM (07-21-24 @ 16:22)      RECENT DIAGNOSTIC ORDERS:      MEDICATIONS:  MEDICATIONS  (STANDING):  atorvastatin 10 milliGRAM(s) Oral at bedtime  dextrose 5%. 1000 milliLiter(s) (50 mL/Hr) IV Continuous <Continuous>  dextrose 5%. 1000 milliLiter(s) (100 mL/Hr) IV Continuous <Continuous>  dextrose 50% Injectable 25 Gram(s) IV Push once  dextrose 50% Injectable 12.5 Gram(s) IV Push once  dextrose 50% Injectable 25 Gram(s) IV Push once  ferrous    sulfate 325 milliGRAM(s) Oral daily  gabapentin 300 milliGRAM(s) Oral three times a day  glucagon  Injectable 1 milliGRAM(s) IntraMuscular once  influenza  Vaccine (HIGH DOSE) 0.5 milliLiter(s) IntraMuscular once  insulin lispro (ADMELOG) corrective regimen sliding scale   SubCutaneous at bedtime  insulin lispro (ADMELOG) corrective regimen sliding scale   SubCutaneous three times a day before meals  metFORMIN 500 milliGRAM(s) Oral two times a day  pantoprazole    Tablet 40 milliGRAM(s) Oral before breakfast  polyethylene glycol 3350 17 Gram(s) Oral daily  senna 2 Tablet(s) Oral at bedtime  sodium chloride 0.9%. 1000 milliLiter(s) (75 mL/Hr) IV Continuous <Continuous>  tamsulosin 0.4 milliGRAM(s) Oral at bedtime    MEDICATIONS  (PRN):  acetaminophen     Tablet .. 650 milliGRAM(s) Oral every 6 hours PRN Temp greater or equal to 38C (100.4F), Mild Pain (1 - 3)  dextrose Oral Gel 15 Gram(s) Oral once PRN Blood Glucose LESS THAN 70 milliGRAM(s)/deciliter  melatonin 3 milliGRAM(s) Oral at bedtime PRN Insomnia      HOME MEDICATIONS:  clopidogrel 75 mg oral tablet (07-21)  ferrous sulfate 325 mg (65 mg elemental iron) oral tablet (09-20)  gabapentin 300 mg oral tablet (09-20)  melatonin 3 mg oral tablet (08-13)  metFORMIN 500 mg oral tablet (07-21)  polyethylene glycol 3350 oral powder for reconstitution (08-13)  pravastatin 20 mg oral tablet (07-21)  senna leaf extract oral tablet (08-13)  tamsulosin 0.4 mg oral capsule (08-13)      PHYSICAL EXAM:  GENERAL:   CHEST/LUNG:   HEART:   ABDOMEN:   EXTREMITIES:               JENNFYERCLARI  84y, Male  Allergy: penicillin (Unknown)    Hospital Day: 2d    Patient seen and examined earlier today. patient stated that he feels ok , no reported bleeding     PMH/PSH:  PAST MEDICAL & SURGICAL HISTORY:  DM (diabetes mellitus)      MI (myocardial infarction)      History of CAD (coronary artery disease)      Dyslipidemia      Currently smokes tobacco      Mesothelioma, malignant      Coronary stent patent          LAST 24-Hr EVENTS:    VITALS:  T(F): 98.1 (09-21-24 @ 07:24), Max: 99.2 (09-20-24 @ 23:00)  HR: 77 (09-21-24 @ 07:24)  BP: 131/70 (09-21-24 @ 07:24) (115/58 - 152/73)  RR: 18 (09-21-24 @ 07:24)  SpO2: 97% (09-20-24 @ 23:00)          TESTS & MEASUREMENTS:  Weight/BMI  61.2 (09-19-24 @ 15:29)  20.5 (09-19-24 @ 15:29)    09-20-24 @ 07:01  -  09-21-24 @ 07:00  --------------------------------------------------------  IN: 0 mL / OUT: 600 mL / NET: -600 mL                            8.0    8.16  )-----------( 430      ( 21 Sep 2024 06:15 )             25.1     PT/INR - ( 19 Sep 2024 17:17 )   PT: 11.40 sec;   INR: 1.00 ratio         PTT - ( 19 Sep 2024 17:17 )  PTT:35.9 sec  INR: 1.00 ratio (09-19-24 @ 17:17)    09-20    140  |  103  |  25[H]  ----------------------------<  104[H]  4.9   |  25  |  1.2    Ca    9.2      20 Sep 2024 06:15    TPro  6.0  /  Alb  3.2[L]  /  TBili  0.3  /  DBili  x   /  AST  9   /  ALT  7   /  AlkPhos  104  09-20    LIVER FUNCTIONS - ( 20 Sep 2024 06:15 )  Alb: 3.2 g/dL / Pro: 6.0 g/dL / ALK PHOS: 104 U/L / ALT: 7 U/L / AST: 9 U/L / GGT: x                 Urinalysis Basic - ( 20 Sep 2024 06:15 )    Color: x / Appearance: x / SG: x / pH: x  Gluc: 104 mg/dL / Ketone: x  / Bili: x / Urobili: x   Blood: x / Protein: x / Nitrite: x   Leuk Esterase: x / RBC: x / WBC x   Sq Epi: x / Non Sq Epi: x / Bacteria: x          Ferritin: 89 ng/mL (09-20-24 @ 15:07)  Ferritin: 82 ng/mL (09-19-24 @ 21:13)            A1C with Estimated Average Glucose Result: 5.8 % (08-11-24 @ 05:09)  A1C with Estimated Average Glucose Result: 5.7 % (07-22-24 @ 04:30)          RADIOLOGY, ECG, & ADDITIONAL TESTS:  12 Lead ECG:   Ventricular Rate 79 BPM    Atrial Rate 79 BPM    P-R Interval 136 ms    QRS Duration 104 ms    Q-T Interval 394 ms    QTC Calculation(Bazett) 451 ms    P Axis 18 degrees    R Axis 120 degrees    T Axis -38 degrees    Diagnosis Line Sinus rhythm with Premature atrial complexes  Possible Right ventricular hypertrophy  ST & T wave abnormality, consider inferolateral ischemia  Abnormal ECG    Confirmed by Ramakrishna German ([id number]) on 7/21/2024 5:21:45 PM (07-21-24 @ 16:22)      RECENT DIAGNOSTIC ORDERS:      MEDICATIONS:  MEDICATIONS  (STANDING):  atorvastatin 10 milliGRAM(s) Oral at bedtime  dextrose 5%. 1000 milliLiter(s) (50 mL/Hr) IV Continuous <Continuous>  dextrose 5%. 1000 milliLiter(s) (100 mL/Hr) IV Continuous <Continuous>  dextrose 50% Injectable 25 Gram(s) IV Push once  dextrose 50% Injectable 12.5 Gram(s) IV Push once  dextrose 50% Injectable 25 Gram(s) IV Push once  ferrous    sulfate 325 milliGRAM(s) Oral daily  gabapentin 300 milliGRAM(s) Oral three times a day  glucagon  Injectable 1 milliGRAM(s) IntraMuscular once  influenza  Vaccine (HIGH DOSE) 0.5 milliLiter(s) IntraMuscular once  insulin lispro (ADMELOG) corrective regimen sliding scale   SubCutaneous at bedtime  insulin lispro (ADMELOG) corrective regimen sliding scale   SubCutaneous three times a day before meals  metFORMIN 500 milliGRAM(s) Oral two times a day  pantoprazole    Tablet 40 milliGRAM(s) Oral before breakfast  polyethylene glycol 3350 17 Gram(s) Oral daily  senna 2 Tablet(s) Oral at bedtime  sodium chloride 0.9%. 1000 milliLiter(s) (75 mL/Hr) IV Continuous <Continuous>  tamsulosin 0.4 milliGRAM(s) Oral at bedtime    MEDICATIONS  (PRN):  acetaminophen     Tablet .. 650 milliGRAM(s) Oral every 6 hours PRN Temp greater or equal to 38C (100.4F), Mild Pain (1 - 3)  dextrose Oral Gel 15 Gram(s) Oral once PRN Blood Glucose LESS THAN 70 milliGRAM(s)/deciliter  melatonin 3 milliGRAM(s) Oral at bedtime PRN Insomnia      HOME MEDICATIONS:  clopidogrel 75 mg oral tablet (07-21)  ferrous sulfate 325 mg (65 mg elemental iron) oral tablet (09-20)  gabapentin 300 mg oral tablet (09-20)  melatonin 3 mg oral tablet (08-13)  metFORMIN 500 mg oral tablet (07-21)  polyethylene glycol 3350 oral powder for reconstitution (08-13)  pravastatin 20 mg oral tablet (07-21)  senna leaf extract oral tablet (08-13)  tamsulosin 0.4 mg oral capsule (08-13)      PHYSICAL EXAM:  On exam  General: awake, alert, NAD, chronic ill appearance, pale   Lungs:  clear to ausculations b/l, normal resp effort  Heart: regular ryhthm   Abdomen: soft, non tender non distended  Ext: no edema, can move all  his extremities

## 2024-09-22 LAB
BLD GP AB SCN SERPL QL: SIGNIFICANT CHANGE UP
GLUCOSE BLDC GLUCOMTR-MCNC: 129 MG/DL — HIGH (ref 70–99)
GLUCOSE BLDC GLUCOMTR-MCNC: 183 MG/DL — HIGH (ref 70–99)
GLUCOSE BLDC GLUCOMTR-MCNC: 200 MG/DL — HIGH (ref 70–99)
GLUCOSE BLDC GLUCOMTR-MCNC: 238 MG/DL — HIGH (ref 70–99)

## 2024-09-22 PROCEDURE — 99232 SBSQ HOSP IP/OBS MODERATE 35: CPT

## 2024-09-22 RX ORDER — IRON SUCROSE 20 MG/ML
100 INJECTION, SOLUTION INTRAVENOUS EVERY 24 HOURS
Refills: 0 | Status: DISCONTINUED | OUTPATIENT
Start: 2024-09-22 | End: 2024-09-25

## 2024-09-22 RX ORDER — IRON SUCROSE 20 MG/ML
100 INJECTION, SOLUTION INTRAVENOUS EVERY 24 HOURS
Refills: 0 | Status: DISCONTINUED | OUTPATIENT
Start: 2024-09-22 | End: 2024-09-22

## 2024-09-22 RX ADMIN — GABAPENTIN 300 MILLIGRAM(S): 800 TABLET, FILM COATED ORAL at 05:15

## 2024-09-22 RX ADMIN — Medication 2: at 11:20

## 2024-09-22 RX ADMIN — Medication 2 TABLET(S): at 21:06

## 2024-09-22 RX ADMIN — PANTOPRAZOLE SODIUM 40 MILLIGRAM(S): 40 TABLET, DELAYED RELEASE ORAL at 05:15

## 2024-09-22 RX ADMIN — Medication 0.4 MILLIGRAM(S): at 21:06

## 2024-09-22 RX ADMIN — Medication 1: at 16:17

## 2024-09-22 RX ADMIN — GABAPENTIN 300 MILLIGRAM(S): 800 TABLET, FILM COATED ORAL at 15:21

## 2024-09-22 RX ADMIN — GABAPENTIN 300 MILLIGRAM(S): 800 TABLET, FILM COATED ORAL at 21:06

## 2024-09-22 RX ADMIN — IRON SUCROSE 210 MILLIGRAM(S): 20 INJECTION, SOLUTION INTRAVENOUS at 20:19

## 2024-09-22 RX ADMIN — ATORVASTATIN CALCIUM 10 MILLIGRAM(S): 10 TABLET, FILM COATED ORAL at 21:06

## 2024-09-22 RX ADMIN — Medication 75 MILLIGRAM(S): at 11:22

## 2024-09-22 RX ADMIN — Medication 325 MILLIGRAM(S): at 11:21

## 2024-09-22 NOTE — DIETITIAN INITIAL EVALUATION ADULT - PERTINENT LABORATORY DATA
POCT Blood Glucose.: 183 mg/dL (09-22-24 @ 16:12)  A1C with Estimated Average Glucose Result: 5.8 % (08-11-24 @ 05:09)  A1C with Estimated Average Glucose Result: 5.7 % (07-22-24 @ 04:30)

## 2024-09-22 NOTE — DIETITIAN INITIAL EVALUATION ADULT - ORAL INTAKE PTA/DIET HISTORY
I spoke to the patient's son name John via telephone [phone]. Per son, the patient followed a regular diet at home but is a very picky eater but likes to consume hamburgers, sandwiches, meatloaf, mashed potatoes, lettuce and tomato. Denies chewing and swallowing difficulty. NKFA but dislikes fish. Took an iron supplement. Per son, prior to admission, the patient resided at [facility]. Per son, the patient experienced a 35# unintentional weight loss in six months secondary to mesothelioma.

## 2024-09-22 NOTE — DIETITIAN INITIAL EVALUATION ADULT - NSFNSGIIOFT_GEN_A_CORE
Dx: 85y/o male with h/o HTN, DM, CAD s/p 2 stents, asbestosis, malignant mesothelioma, JASMIN, presented from nursing home. Found to have hgb 6.3, Hgb 6.9 in the ED. Admitted for symptomatic anemia. The patient received 1unit of PRBC.

## 2024-09-22 NOTE — DIETITIAN INITIAL EVALUATION ADULT - NAME AND PHONE
Intervention: 1.Meals and Snacks 2.Medical Food Supplement   Monitor/Evaluate: Diet order, energy intake, nutrition focused physical findings, glucose, renal and anemia profile

## 2024-09-22 NOTE — DIETITIAN INITIAL EVALUATION ADULT - ORAL NUTRITION SUPPLEMENTS
1.Recommend provide Vanilla Glucerna Shake TID (220kcal, 10gm protein, 8oz per carton)  2.Continue to provide Ensure Max TID (150kcal, 30gm protein, 8oz per carton) -recommend change the flavor to vanilla per patient's request

## 2024-09-22 NOTE — DIETITIAN INITIAL EVALUATION ADULT - OTHER CALCULATIONS
The patient is a 21y Female complaining of sore throat.
Estimated Calorie Needs: [id number] x AF 1.4-1.9=0547-1195ikpn/day -Due to PCM  Estimated Protein Needs: 86-110grams/day (1.4-1.8grams/kg of admit weight) -Due to age and PCM  Estimated Fluid Needs: 1803-2318mL/day (1mL/kcal)

## 2024-09-22 NOTE — DIETITIAN NUTRITION RISK NOTIFICATION - TREATMENT: THE FOLLOWING DIET HAS BEEN RECOMMENDED
Diet, Consistent Carbohydrate w/Evening Snack:   Supplement Feeding Modality:  Oral  Ensure Max Cans or Servings Per Day:  1       Frequency:  Three Times a day  Glucerna Shake Cans or Servings Per Day:  1       Frequency:  Three Times a day (09-22-24 @ 20:42) [Pending Verification By Attending]  Diet, Regular:   Consistent Carbohydrate {Evening Snack} (CSTCHOSN)  DASH/TLC {Sodium & Cholesterol Restricted} (DASH)  Supplement Feeding Modality:  Oral  Ensure Max Cans or Servings Per Day:  3       Frequency:  Daily (09-20-24 @ 04:56) [Active]

## 2024-09-22 NOTE — DIETITIAN INITIAL EVALUATION ADULT - PERTINENT MEDS FT
MEDICATIONS  (STANDING):  atorvastatin 10 milliGRAM(s) Oral at bedtime  clopidogrel Tablet 75 milliGRAM(s) Oral daily  dextrose 5%. 1000 milliLiter(s) (50 mL/Hr) IV Continuous <Continuous>  dextrose 5%. 1000 milliLiter(s) (100 mL/Hr) IV Continuous <Continuous>  dextrose 50% Injectable 25 Gram(s) IV Push once  dextrose 50% Injectable 12.5 Gram(s) IV Push once  dextrose 50% Injectable 25 Gram(s) IV Push once  gabapentin 300 milliGRAM(s) Oral three times a day  glucagon  Injectable 1 milliGRAM(s) IntraMuscular once  influenza  Vaccine (HIGH DOSE) 0.5 milliLiter(s) IntraMuscular once  insulin lispro (ADMELOG) corrective regimen sliding scale   SubCutaneous at bedtime  insulin lispro (ADMELOG) corrective regimen sliding scale   SubCutaneous three times a day before meals  iron sucrose IVPB 100 milliGRAM(s) IV Intermittent every 24 hours  pantoprazole    Tablet 40 milliGRAM(s) Oral before breakfast  polyethylene glycol 3350 17 Gram(s) Oral daily  senna 2 Tablet(s) Oral at bedtime  sodium chloride 0.9%. 1000 milliLiter(s) (75 mL/Hr) IV Continuous <Continuous>  tamsulosin 0.4 milliGRAM(s) Oral at bedtime    MEDICATIONS  (PRN):  acetaminophen     Tablet .. 650 milliGRAM(s) Oral every 6 hours PRN Temp greater or equal to 38C (100.4F), Mild Pain (1 - 3)  dextrose Oral Gel 15 Gram(s) Oral once PRN Blood Glucose LESS THAN 70 milliGRAM(s)/deciliter  melatonin 3 milliGRAM(s) Oral at bedtime PRN Insomnia

## 2024-09-22 NOTE — PROGRESS NOTE ADULT - SUBJECTIVE AND OBJECTIVE BOX
T H I S   I S    N O  T   A    F I N A L I Z E D   N O T E      CLARI BURGER  84y, Male  Allergy: penicillin (Unknown)    Hospital Day: 3d    Patient seen and examined earlier today.     PMH/PSH:  PAST MEDICAL & SURGICAL HISTORY:  DM (diabetes mellitus)      MI (myocardial infarction)      History of CAD (coronary artery disease)      Dyslipidemia      Currently smokes tobacco      Mesothelioma, malignant      Coronary stent patent          LAST 24-Hr EVENTS:    VITALS:  T(F): 99 (09-22-24 @ 07:52), Max: 99 (09-22-24 @ 07:52)  HR: 80 (09-22-24 @ 07:52)  BP: 147/81 (09-22-24 @ 07:52) (111/68 - 155/63)  RR: 18 (09-22-24 @ 07:52)  SpO2: 98% (09-22-24 @ 07:52)          TESTS & MEASUREMENTS:  Weight/BMI  61.2 (09-19-24 @ 15:29)  20.5 (09-19-24 @ 15:29)    09-20-24 @ 07:01  -  09-21-24 @ 07:00  --------------------------------------------------------  IN: 0 mL / OUT: 600 mL / NET: -600 mL    09-21-24 @ 07:01  -  09-22-24 @ 07:00  --------------------------------------------------------  IN: 0 mL / OUT: 1450 mL / NET: -1450 mL                            8.1    9.18  )-----------( 423      ( 21 Sep 2024 21:25 )             26.2       INR: 1.00 ratio (09-19-24 @ 17:17)                        Ferritin: 89 ng/mL (09-20-24 @ 15:07)  Ferritin: 82 ng/mL (09-19-24 @ 21:13)            A1C with Estimated Average Glucose Result: 5.8 % (08-11-24 @ 05:09)  A1C with Estimated Average Glucose Result: 5.7 % (07-22-24 @ 04:30)          RADIOLOGY, ECG, & ADDITIONAL TESTS:  12 Lead ECG:   Ventricular Rate 79 BPM    Atrial Rate 79 BPM    P-R Interval 136 ms    QRS Duration 104 ms    Q-T Interval 394 ms    QTC Calculation(Bazett) 451 ms    P Axis 18 degrees    R Axis 120 degrees    T Axis -38 degrees    Diagnosis Line Sinus rhythm with Premature atrial complexes  Possible Right ventricular hypertrophy  ST & T wave abnormality, consider inferolateral ischemia  Abnormal ECG    Confirmed by Ramakrishna German ([id number]) on 7/21/2024 5:21:45 PM (07-21-24 @ 16:22)      RECENT DIAGNOSTIC ORDERS:  Complete Blood Count: 16:00 (09-22-24 @ 10:35)  Complete Blood Count: Routine (09-22-24 @ 10:35)      MEDICATIONS:  MEDICATIONS  (STANDING):  atorvastatin 10 milliGRAM(s) Oral at bedtime  clopidogrel Tablet 75 milliGRAM(s) Oral daily  dextrose 5%. 1000 milliLiter(s) (100 mL/Hr) IV Continuous <Continuous>  dextrose 5%. 1000 milliLiter(s) (50 mL/Hr) IV Continuous <Continuous>  dextrose 50% Injectable 12.5 Gram(s) IV Push once  dextrose 50% Injectable 25 Gram(s) IV Push once  dextrose 50% Injectable 25 Gram(s) IV Push once  ferrous    sulfate 325 milliGRAM(s) Oral daily  gabapentin 300 milliGRAM(s) Oral three times a day  glucagon  Injectable 1 milliGRAM(s) IntraMuscular once  influenza  Vaccine (HIGH DOSE) 0.5 milliLiter(s) IntraMuscular once  insulin lispro (ADMELOG) corrective regimen sliding scale   SubCutaneous at bedtime  insulin lispro (ADMELOG) corrective regimen sliding scale   SubCutaneous three times a day before meals  pantoprazole    Tablet 40 milliGRAM(s) Oral before breakfast  polyethylene glycol 3350 17 Gram(s) Oral daily  senna 2 Tablet(s) Oral at bedtime  sodium chloride 0.9%. 1000 milliLiter(s) (75 mL/Hr) IV Continuous <Continuous>  tamsulosin 0.4 milliGRAM(s) Oral at bedtime    MEDICATIONS  (PRN):  acetaminophen     Tablet .. 650 milliGRAM(s) Oral every 6 hours PRN Temp greater or equal to 38C (100.4F), Mild Pain (1 - 3)  dextrose Oral Gel 15 Gram(s) Oral once PRN Blood Glucose LESS THAN 70 milliGRAM(s)/deciliter  melatonin 3 milliGRAM(s) Oral at bedtime PRN Insomnia      HOME MEDICATIONS:  clopidogrel 75 mg oral tablet (07-21)  ferrous sulfate 325 mg (65 mg elemental iron) oral tablet (09-20)  gabapentin 300 mg oral tablet (09-20)  melatonin 3 mg oral tablet (08-13)  metFORMIN 500 mg oral tablet (07-21)  polyethylene glycol 3350 oral powder for reconstitution (08-13)  pravastatin 20 mg oral tablet (07-21)  senna leaf extract oral tablet (08-13)  tamsulosin 0.4 mg oral capsule (08-13)      PHYSICAL EXAM:  GENERAL:   CHEST/LUNG:   HEART:   ABDOMEN:   EXTREMITIES:             JENNYFERCLARI  84y, Male  Allergy: penicillin (Unknown)    Hospital Day: 3d    Patient seen and examined earlier today. Pt stated that he feels fine, no new events or issues reported     PMH/PSH:  PAST MEDICAL & SURGICAL HISTORY:  DM (diabetes mellitus)      MI (myocardial infarction)      History of CAD (coronary artery disease)      Dyslipidemia      Currently smokes tobacco      Mesothelioma, malignant      Coronary stent patent          LAST 24-Hr EVENTS:    VITALS:  T(F): 99 (09-22-24 @ 07:52), Max: 99 (09-22-24 @ 07:52)  HR: 80 (09-22-24 @ 07:52)  BP: 147/81 (09-22-24 @ 07:52) (111/68 - 155/63)  RR: 18 (09-22-24 @ 07:52)  SpO2: 98% (09-22-24 @ 07:52)          TESTS & MEASUREMENTS:  Weight/BMI  61.2 (09-19-24 @ 15:29)  20.5 (09-19-24 @ 15:29)    09-20-24 @ 07:01  -  09-21-24 @ 07:00  --------------------------------------------------------  IN: 0 mL / OUT: 600 mL / NET: -600 mL    09-21-24 @ 07:01  -  09-22-24 @ 07:00  --------------------------------------------------------  IN: 0 mL / OUT: 1450 mL / NET: -1450 mL                            8.1    9.18  )-----------( 423      ( 21 Sep 2024 21:25 )             26.2       INR: 1.00 ratio (09-19-24 @ 17:17)                        Ferritin: 89 ng/mL (09-20-24 @ 15:07)  Ferritin: 82 ng/mL (09-19-24 @ 21:13)            A1C with Estimated Average Glucose Result: 5.8 % (08-11-24 @ 05:09)  A1C with Estimated Average Glucose Result: 5.7 % (07-22-24 @ 04:30)          RADIOLOGY, ECG, & ADDITIONAL TESTS:  12 Lead ECG:   Ventricular Rate 79 BPM    Atrial Rate 79 BPM    P-R Interval 136 ms    QRS Duration 104 ms    Q-T Interval 394 ms    QTC Calculation(Bazett) 451 ms    P Axis 18 degrees    R Axis 120 degrees    T Axis -38 degrees    Diagnosis Line Sinus rhythm with Premature atrial complexes  Possible Right ventricular hypertrophy  ST & T wave abnormality, consider inferolateral ischemia  Abnormal ECG    Confirmed by Ramakrishna German ([id number]) on 7/21/2024 5:21:45 PM (07-21-24 @ 16:22)      RECENT DIAGNOSTIC ORDERS:  Complete Blood Count: 16:00 (09-22-24 @ 10:35)  Complete Blood Count: Routine (09-22-24 @ 10:35)      MEDICATIONS:  MEDICATIONS  (STANDING):  atorvastatin 10 milliGRAM(s) Oral at bedtime  clopidogrel Tablet 75 milliGRAM(s) Oral daily  dextrose 5%. 1000 milliLiter(s) (100 mL/Hr) IV Continuous <Continuous>  dextrose 5%. 1000 milliLiter(s) (50 mL/Hr) IV Continuous <Continuous>  dextrose 50% Injectable 12.5 Gram(s) IV Push once  dextrose 50% Injectable 25 Gram(s) IV Push once  dextrose 50% Injectable 25 Gram(s) IV Push once  ferrous    sulfate 325 milliGRAM(s) Oral daily  gabapentin 300 milliGRAM(s) Oral three times a day  glucagon  Injectable 1 milliGRAM(s) IntraMuscular once  influenza  Vaccine (HIGH DOSE) 0.5 milliLiter(s) IntraMuscular once  insulin lispro (ADMELOG) corrective regimen sliding scale   SubCutaneous at bedtime  insulin lispro (ADMELOG) corrective regimen sliding scale   SubCutaneous three times a day before meals  pantoprazole    Tablet 40 milliGRAM(s) Oral before breakfast  polyethylene glycol 3350 17 Gram(s) Oral daily  senna 2 Tablet(s) Oral at bedtime  sodium chloride 0.9%. 1000 milliLiter(s) (75 mL/Hr) IV Continuous <Continuous>  tamsulosin 0.4 milliGRAM(s) Oral at bedtime    MEDICATIONS  (PRN):  acetaminophen     Tablet .. 650 milliGRAM(s) Oral every 6 hours PRN Temp greater or equal to 38C (100.4F), Mild Pain (1 - 3)  dextrose Oral Gel 15 Gram(s) Oral once PRN Blood Glucose LESS THAN 70 milliGRAM(s)/deciliter  melatonin 3 milliGRAM(s) Oral at bedtime PRN Insomnia      HOME MEDICATIONS:  clopidogrel 75 mg oral tablet (07-21)  ferrous sulfate 325 mg (65 mg elemental iron) oral tablet (09-20)  gabapentin 300 mg oral tablet (09-20)  melatonin 3 mg oral tablet (08-13)  metFORMIN 500 mg oral tablet (07-21)  polyethylene glycol 3350 oral powder for reconstitution (08-13)  pravastatin 20 mg oral tablet (07-21)  senna leaf extract oral tablet (08-13)  tamsulosin 0.4 mg oral capsule (08-13)      PHYSICAL EXAM:  GENERAL:   CHEST/LUNG:   HEART:   ABDOMEN:   EXTREMITIES:

## 2024-09-22 NOTE — DIETITIAN INITIAL EVALUATION ADULT - COLLABORATION WITH OTHER PROVIDERS
I tried to contact the patient's physician name Dr. Terrell but was unsuccessful.  I tried to contact the patient's physician name Dr. Parsons but was unsuccessful.

## 2024-09-23 LAB
ALBUMIN SERPL ELPH-MCNC: 3.1 G/DL — LOW (ref 3.5–5.2)
ALP SERPL-CCNC: 87 U/L — SIGNIFICANT CHANGE UP (ref 30–115)
ALT FLD-CCNC: 7 U/L — SIGNIFICANT CHANGE UP (ref 0–41)
ANION GAP SERPL CALC-SCNC: 8 MMOL/L — SIGNIFICANT CHANGE UP (ref 7–14)
ANION GAP SERPL CALC-SCNC: 8 MMOL/L — SIGNIFICANT CHANGE UP (ref 7–14)
APPEARANCE UR: CLEAR — SIGNIFICANT CHANGE UP
AST SERPL-CCNC: 9 U/L — SIGNIFICANT CHANGE UP (ref 0–41)
BILIRUB SERPL-MCNC: <0.2 MG/DL — SIGNIFICANT CHANGE UP (ref 0.2–1.2)
BILIRUB UR-MCNC: NEGATIVE — SIGNIFICANT CHANGE UP
BUN SERPL-MCNC: 27 MG/DL — HIGH (ref 10–20)
BUN SERPL-MCNC: 30 MG/DL — HIGH (ref 10–20)
CALCIUM SERPL-MCNC: 8.7 MG/DL — SIGNIFICANT CHANGE UP (ref 8.4–10.5)
CALCIUM SERPL-MCNC: 8.8 MG/DL — SIGNIFICANT CHANGE UP (ref 8.4–10.5)
CHLORIDE SERPL-SCNC: 98 MMOL/L — SIGNIFICANT CHANGE UP (ref 98–110)
CHLORIDE SERPL-SCNC: 98 MMOL/L — SIGNIFICANT CHANGE UP (ref 98–110)
CO2 SERPL-SCNC: 23 MMOL/L — SIGNIFICANT CHANGE UP (ref 17–32)
CO2 SERPL-SCNC: 25 MMOL/L — SIGNIFICANT CHANGE UP (ref 17–32)
COLOR SPEC: YELLOW — SIGNIFICANT CHANGE UP
CREAT ?TM UR-MCNC: 87 MG/DL — SIGNIFICANT CHANGE UP
CREAT SERPL-MCNC: 1.1 MG/DL — SIGNIFICANT CHANGE UP (ref 0.7–1.5)
CREAT SERPL-MCNC: 1.2 MG/DL — SIGNIFICANT CHANGE UP (ref 0.7–1.5)
DIFF PNL FLD: NEGATIVE — SIGNIFICANT CHANGE UP
EGFR: 60 ML/MIN/1.73M2 — SIGNIFICANT CHANGE UP
EGFR: 67 ML/MIN/1.73M2 — SIGNIFICANT CHANGE UP
GLUCOSE BLDC GLUCOMTR-MCNC: 124 MG/DL — HIGH (ref 70–99)
GLUCOSE BLDC GLUCOMTR-MCNC: 165 MG/DL — HIGH (ref 70–99)
GLUCOSE BLDC GLUCOMTR-MCNC: 181 MG/DL — HIGH (ref 70–99)
GLUCOSE SERPL-MCNC: 134 MG/DL — HIGH (ref 70–99)
GLUCOSE SERPL-MCNC: 142 MG/DL — HIGH (ref 70–99)
GLUCOSE UR QL: NEGATIVE MG/DL — SIGNIFICANT CHANGE UP
HCT VFR BLD CALC: 25.1 % — LOW (ref 42–52)
HGB BLD-MCNC: 7.9 G/DL — LOW (ref 14–18)
KETONES UR-MCNC: NEGATIVE MG/DL — SIGNIFICANT CHANGE UP
LEUKOCYTE ESTERASE UR-ACNC: ABNORMAL
MCHC RBC-ENTMCNC: 29 PG — SIGNIFICANT CHANGE UP (ref 27–31)
MCHC RBC-ENTMCNC: 31.5 G/DL — LOW (ref 32–37)
MCV RBC AUTO: 92.3 FL — SIGNIFICANT CHANGE UP (ref 80–94)
NITRITE UR-MCNC: NEGATIVE — SIGNIFICANT CHANGE UP
NRBC # BLD: 0 /100 WBCS — SIGNIFICANT CHANGE UP (ref 0–0)
OSMOLALITY SERPL: 295 MOS/KG — SIGNIFICANT CHANGE UP (ref 280–301)
OSMOLALITY UR: 518 MOS/KG — SIGNIFICANT CHANGE UP (ref 50–1200)
PH UR: 5.5 — SIGNIFICANT CHANGE UP (ref 5–8)
PLATELET # BLD AUTO: 398 K/UL — SIGNIFICANT CHANGE UP (ref 130–400)
PMV BLD: 9.1 FL — SIGNIFICANT CHANGE UP (ref 7.4–10.4)
POTASSIUM SERPL-MCNC: 4.4 MMOL/L — SIGNIFICANT CHANGE UP (ref 3.5–5)
POTASSIUM SERPL-MCNC: 4.4 MMOL/L — SIGNIFICANT CHANGE UP (ref 3.5–5)
POTASSIUM SERPL-SCNC: 4.4 MMOL/L — SIGNIFICANT CHANGE UP (ref 3.5–5)
POTASSIUM SERPL-SCNC: 4.4 MMOL/L — SIGNIFICANT CHANGE UP (ref 3.5–5)
POTASSIUM UR-SCNC: 36 MMOL/L — SIGNIFICANT CHANGE UP
PROT ?TM UR-MCNC: 16 MG/DLG/24H — SIGNIFICANT CHANGE UP
PROT SERPL-MCNC: 5.5 G/DL — LOW (ref 6–8)
PROT UR-MCNC: SIGNIFICANT CHANGE UP MG/DL
PROT/CREAT UR-RTO: 0.2 RATIO — SIGNIFICANT CHANGE UP (ref 0–0.2)
RBC # BLD: 2.72 M/UL — LOW (ref 4.7–6.1)
RBC # FLD: 15.9 % — HIGH (ref 11.5–14.5)
SODIUM SERPL-SCNC: 129 MMOL/L — LOW (ref 135–146)
SODIUM SERPL-SCNC: 131 MMOL/L — LOW (ref 135–146)
SODIUM UR-SCNC: 55 MMOL/L — SIGNIFICANT CHANGE UP
SP GR SPEC: 1.02 — SIGNIFICANT CHANGE UP (ref 1–1.03)
TSH SERPL-MCNC: 1.11 UIU/ML — SIGNIFICANT CHANGE UP (ref 0.27–4.2)
UROBILINOGEN FLD QL: 0.2 MG/DL — SIGNIFICANT CHANGE UP (ref 0.2–1)
UUN UR-MCNC: 818 MG/DL — SIGNIFICANT CHANGE UP
WBC # BLD: 8.59 K/UL — SIGNIFICANT CHANGE UP (ref 4.8–10.8)
WBC # FLD AUTO: 8.59 K/UL — SIGNIFICANT CHANGE UP (ref 4.8–10.8)

## 2024-09-23 PROCEDURE — 99233 SBSQ HOSP IP/OBS HIGH 50: CPT

## 2024-09-23 PROCEDURE — 71045 X-RAY EXAM CHEST 1 VIEW: CPT | Mod: 26

## 2024-09-23 RX ADMIN — PANTOPRAZOLE SODIUM 40 MILLIGRAM(S): 40 TABLET, DELAYED RELEASE ORAL at 05:17

## 2024-09-23 RX ADMIN — GABAPENTIN 300 MILLIGRAM(S): 800 TABLET, FILM COATED ORAL at 14:23

## 2024-09-23 RX ADMIN — GABAPENTIN 300 MILLIGRAM(S): 800 TABLET, FILM COATED ORAL at 05:17

## 2024-09-23 RX ADMIN — GABAPENTIN 300 MILLIGRAM(S): 800 TABLET, FILM COATED ORAL at 21:01

## 2024-09-23 RX ADMIN — Medication 0.4 MILLIGRAM(S): at 21:01

## 2024-09-23 RX ADMIN — IRON SUCROSE 210 MILLIGRAM(S): 20 INJECTION, SOLUTION INTRAVENOUS at 20:24

## 2024-09-23 RX ADMIN — Medication 1: at 08:10

## 2024-09-23 RX ADMIN — Medication 2 TABLET(S): at 21:01

## 2024-09-23 RX ADMIN — Medication 1: at 11:32

## 2024-09-23 RX ADMIN — ATORVASTATIN CALCIUM 10 MILLIGRAM(S): 10 TABLET, FILM COATED ORAL at 21:01

## 2024-09-23 RX ADMIN — Medication 75 MILLIGRAM(S): at 11:05

## 2024-09-23 NOTE — PROGRESS NOTE ADULT - SUBJECTIVE AND OBJECTIVE BOX
T H I S   I S    N O  T   A    F I N A L I Z E D   N O T CLARI FISHMAN  84y, Male  Allergy: penicillin (Unknown)    Hospital Day: 4d    Patient seen and examined earlier today.     PMH/PSH:  PAST MEDICAL & SURGICAL HISTORY:  DM (diabetes mellitus)      MI (myocardial infarction)      History of CAD (coronary artery disease)      Dyslipidemia      Currently smokes tobacco      Mesothelioma, malignant      Coronary stent patent          LAST 24-Hr EVENTS:    VITALS:  T(F): 98.2 (09-23-24 @ 08:47), Max: 98.5 (09-22-24 @ 15:41)  HR: 91 (09-23-24 @ 08:47)  BP: 136/70 (09-23-24 @ 08:47) (116/69 - 136/70)  RR: 17 (09-23-24 @ 08:47)  SpO2: 98% (09-23-24 @ 08:47)          TESTS & MEASUREMENTS:  Weight/BMI  61.2 (09-19-24 @ 15:29)  20.5 (09-22-24 @ 20:06)    09-21-24 @ 07:01  -  09-22-24 @ 07:00  --------------------------------------------------------  IN: 0 mL / OUT: 1450 mL / NET: -1450 mL    09-22-24 @ 07:01  -  09-23-24 @ 07:00  --------------------------------------------------------  IN: 0 mL / OUT: 1400 mL / NET: -1400 mL    09-23-24 @ 07:01  -  09-23-24 @ 10:40  --------------------------------------------------------  IN: 0 mL / OUT: 300 mL / NET: -300 mL                            7.9    8.59  )-----------( 398      ( 23 Sep 2024 06:18 )             25.1       INR: 1.00 ratio (09-19-24 @ 17:17)    09-23    129[L]  |  98  |  27[H]  ----------------------------<  134[H]  4.4   |  23  |  1.1    Ca    8.7      23 Sep 2024 06:18    TPro  5.5[L]  /  Alb  3.1[L]  /  TBili  <0.2  /  DBili  x   /  AST  9   /  ALT  7   /  AlkPhos  87  09-23    LIVER FUNCTIONS - ( 23 Sep 2024 06:18 )  Alb: 3.1 g/dL / Pro: 5.5 g/dL / ALK PHOS: 87 U/L / ALT: 7 U/L / AST: 9 U/L / GGT: x                 Urinalysis Basic - ( 23 Sep 2024 06:18 )    Color: x / Appearance: x / SG: x / pH: x  Gluc: 134 mg/dL / Ketone: x  / Bili: x / Urobili: x   Blood: x / Protein: x / Nitrite: x   Leuk Esterase: x / RBC: x / WBC x   Sq Epi: x / Non Sq Epi: x / Bacteria: x          Ferritin: 89 ng/mL (09-20-24 @ 15:07)  Ferritin: 82 ng/mL (09-19-24 @ 21:13)            A1C with Estimated Average Glucose Result: 5.8 % (08-11-24 @ 05:09)  A1C with Estimated Average Glucose Result: 5.7 % (07-22-24 @ 04:30)      Indwelling Urethral Catheter:     Connect To:  Leg Bag    Indication:  Urinary Retention / Obstruction (09-23-24 @ 03:26) (not performed)      RADIOLOGY, ECG, & ADDITIONAL TESTS:  12 Lead ECG:   Ventricular Rate 79 BPM    Atrial Rate 79 BPM    P-R Interval 136 ms    QRS Duration 104 ms    Q-T Interval 394 ms    QTC Calculation(Bazett) 451 ms    P Axis 18 degrees    R Axis 120 degrees    T Axis -38 degrees    Diagnosis Line Sinus rhythm with Premature atrial complexes  Possible Right ventricular hypertrophy  ST & T wave abnormality, consider inferolateral ischemia  Abnormal ECG    Confirmed by Ramakrishna German ([id number]) on 7/21/2024 5:21:45 PM (07-21-24 @ 16:22)      RECENT DIAGNOSTIC ORDERS:  Potassium, Random Urine: Routine (09-23-24 @ 10:24)  Urea Nitrogen,  Random Urine: Routine (09-23-24 @ 10:24)  Osmolality, Random Urine: Routine (09-23-24 @ 10:24)  Protein/Creatinine Ratio, Urine: Routine (09-23-24 @ 10:24)  Sodium, Random Urine: Routine (09-23-24 @ 10:24)  Urinalysis: Routine (09-23-24 @ 10:24)  Osmolality, Serum: 11:00 (09-23-24 @ 10:24)  Thyroid Stimulating Hormone, Serum: 11:00 (09-23-24 @ 10:24)  Basic Metabolic Panel: 11:00 (09-23-24 @ 08:38)  Diet, Consistent Carbohydrate w/Evening Snack:   Supplement Feeding Modality:  Oral  Ensure Max Cans or Servings Per Day:  1       Frequency:  Three Times a day  Glucerna Shake Cans or Servings Per Day:  1       Frequency:  Three Times a day (09-22-24 @ 20:42)      MEDICATIONS:  MEDICATIONS  (STANDING):  atorvastatin 10 milliGRAM(s) Oral at bedtime  clopidogrel Tablet 75 milliGRAM(s) Oral daily  dextrose 5%. 1000 milliLiter(s) (50 mL/Hr) IV Continuous <Continuous>  dextrose 5%. 1000 milliLiter(s) (100 mL/Hr) IV Continuous <Continuous>  dextrose 50% Injectable 25 Gram(s) IV Push once  dextrose 50% Injectable 12.5 Gram(s) IV Push once  dextrose 50% Injectable 25 Gram(s) IV Push once  gabapentin 300 milliGRAM(s) Oral three times a day  glucagon  Injectable 1 milliGRAM(s) IntraMuscular once  influenza  Vaccine (HIGH DOSE) 0.5 milliLiter(s) IntraMuscular once  insulin lispro (ADMELOG) corrective regimen sliding scale   SubCutaneous three times a day before meals  insulin lispro (ADMELOG) corrective regimen sliding scale   SubCutaneous at bedtime  iron sucrose IVPB 100 milliGRAM(s) IV Intermittent every 24 hours  pantoprazole    Tablet 40 milliGRAM(s) Oral before breakfast  polyethylene glycol 3350 17 Gram(s) Oral daily  senna 2 Tablet(s) Oral at bedtime  sodium chloride 0.9%. 1000 milliLiter(s) (75 mL/Hr) IV Continuous <Continuous>  tamsulosin 0.4 milliGRAM(s) Oral at bedtime    MEDICATIONS  (PRN):  acetaminophen     Tablet .. 650 milliGRAM(s) Oral every 6 hours PRN Temp greater or equal to 38C (100.4F), Mild Pain (1 - 3)  dextrose Oral Gel 15 Gram(s) Oral once PRN Blood Glucose LESS THAN 70 milliGRAM(s)/deciliter  melatonin 3 milliGRAM(s) Oral at bedtime PRN Insomnia      HOME MEDICATIONS:  clopidogrel 75 mg oral tablet (07-21)  ferrous sulfate 325 mg (65 mg elemental iron) oral tablet (09-20)  gabapentin 300 mg oral tablet (09-20)  melatonin 3 mg oral tablet (08-13)  metFORMIN 500 mg oral tablet (07-21)  polyethylene glycol 3350 oral powder for reconstitution (08-13)  pravastatin 20 mg oral tablet (07-21)  senna leaf extract oral tablet (08-13)  tamsulosin 0.4 mg oral capsule (08-13)      PHYSICAL EXAM:  GENERAL:   CHEST/LUNG:   HEART:   ABDOMEN:   EXTREMITIES:               JENNYFERCLARI  84y, Male  Allergy: penicillin (Unknown)    Hospital Day: 4d    Patient seen and examined earlier today. STATED THAT he feels better, his son at bedside later     PMH/PSH:  PAST MEDICAL & SURGICAL HISTORY:  DM (diabetes mellitus)      MI (myocardial infarction)      History of CAD (coronary artery disease)      Dyslipidemia      Currently smokes tobacco      Mesothelioma, malignant      Coronary stent patent          LAST 24-Hr EVENTS:    VITALS:  T(F): 98.2 (09-23-24 @ 08:47), Max: 98.5 (09-22-24 @ 15:41)  HR: 91 (09-23-24 @ 08:47)  BP: 136/70 (09-23-24 @ 08:47) (116/69 - 136/70)  RR: 17 (09-23-24 @ 08:47)  SpO2: 98% (09-23-24 @ 08:47)          TESTS & MEASUREMENTS:  Weight/BMI  61.2 (09-19-24 @ 15:29)  20.5 (09-22-24 @ 20:06)    09-21-24 @ 07:01  -  09-22-24 @ 07:00  --------------------------------------------------------  IN: 0 mL / OUT: 1450 mL / NET: -1450 mL    09-22-24 @ 07:01  -  09-23-24 @ 07:00  --------------------------------------------------------  IN: 0 mL / OUT: 1400 mL / NET: -1400 mL    09-23-24 @ 07:01  -  09-23-24 @ 10:40  --------------------------------------------------------  IN: 0 mL / OUT: 300 mL / NET: -300 mL                            7.9    8.59  )-----------( 398      ( 23 Sep 2024 06:18 )             25.1       INR: 1.00 ratio (09-19-24 @ 17:17)    09-23    129[L]  |  98  |  27[H]  ----------------------------<  134[H]  4.4   |  23  |  1.1    Ca    8.7      23 Sep 2024 06:18    TPro  5.5[L]  /  Alb  3.1[L]  /  TBili  <0.2  /  DBili  x   /  AST  9   /  ALT  7   /  AlkPhos  87  09-23    LIVER FUNCTIONS - ( 23 Sep 2024 06:18 )  Alb: 3.1 g/dL / Pro: 5.5 g/dL / ALK PHOS: 87 U/L / ALT: 7 U/L / AST: 9 U/L / GGT: x                 Urinalysis Basic - ( 23 Sep 2024 06:18 )    Color: x / Appearance: x / SG: x / pH: x  Gluc: 134 mg/dL / Ketone: x  / Bili: x / Urobili: x   Blood: x / Protein: x / Nitrite: x   Leuk Esterase: x / RBC: x / WBC x   Sq Epi: x / Non Sq Epi: x / Bacteria: x          Ferritin: 89 ng/mL (09-20-24 @ 15:07)  Ferritin: 82 ng/mL (09-19-24 @ 21:13)            A1C with Estimated Average Glucose Result: 5.8 % (08-11-24 @ 05:09)  A1C with Estimated Average Glucose Result: 5.7 % (07-22-24 @ 04:30)      Indwelling Urethral Catheter:     Connect To:  Leg Bag    Indication:  Urinary Retention / Obstruction (09-23-24 @ 03:26) (not performed)      RADIOLOGY, ECG, & ADDITIONAL TESTS:  12 Lead ECG:   Ventricular Rate 79 BPM    Atrial Rate 79 BPM    P-R Interval 136 ms    QRS Duration 104 ms    Q-T Interval 394 ms    QTC Calculation(Bazett) 451 ms    P Axis 18 degrees    R Axis 120 degrees    T Axis -38 degrees    Diagnosis Line Sinus rhythm with Premature atrial complexes  Possible Right ventricular hypertrophy  ST & T wave abnormality, consider inferolateral ischemia  Abnormal ECG    Confirmed by Ramakrishna German ([id number]) on 7/21/2024 5:21:45 PM (07-21-24 @ 16:22)      RECENT DIAGNOSTIC ORDERS:  Potassium, Random Urine: Routine (09-23-24 @ 10:24)  Urea Nitrogen,  Random Urine: Routine (09-23-24 @ 10:24)  Osmolality, Random Urine: Routine (09-23-24 @ 10:24)  Protein/Creatinine Ratio, Urine: Routine (09-23-24 @ 10:24)  Sodium, Random Urine: Routine (09-23-24 @ 10:24)  Urinalysis: Routine (09-23-24 @ 10:24)  Osmolality, Serum: 11:00 (09-23-24 @ 10:24)  Thyroid Stimulating Hormone, Serum: 11:00 (09-23-24 @ 10:24)  Basic Metabolic Panel: 11:00 (09-23-24 @ 08:38)  Diet, Consistent Carbohydrate w/Evening Snack:   Supplement Feeding Modality:  Oral  Ensure Max Cans or Servings Per Day:  1       Frequency:  Three Times a day  Glucerna Shake Cans or Servings Per Day:  1       Frequency:  Three Times a day (09-22-24 @ 20:42)      MEDICATIONS:  MEDICATIONS  (STANDING):  atorvastatin 10 milliGRAM(s) Oral at bedtime  clopidogrel Tablet 75 milliGRAM(s) Oral daily  dextrose 5%. 1000 milliLiter(s) (50 mL/Hr) IV Continuous <Continuous>  dextrose 5%. 1000 milliLiter(s) (100 mL/Hr) IV Continuous <Continuous>  dextrose 50% Injectable 25 Gram(s) IV Push once  dextrose 50% Injectable 12.5 Gram(s) IV Push once  dextrose 50% Injectable 25 Gram(s) IV Push once  gabapentin 300 milliGRAM(s) Oral three times a day  glucagon  Injectable 1 milliGRAM(s) IntraMuscular once  influenza  Vaccine (HIGH DOSE) 0.5 milliLiter(s) IntraMuscular once  insulin lispro (ADMELOG) corrective regimen sliding scale   SubCutaneous three times a day before meals  insulin lispro (ADMELOG) corrective regimen sliding scale   SubCutaneous at bedtime  iron sucrose IVPB 100 milliGRAM(s) IV Intermittent every 24 hours  pantoprazole    Tablet 40 milliGRAM(s) Oral before breakfast  polyethylene glycol 3350 17 Gram(s) Oral daily  senna 2 Tablet(s) Oral at bedtime  sodium chloride 0.9%. 1000 milliLiter(s) (75 mL/Hr) IV Continuous <Continuous>  tamsulosin 0.4 milliGRAM(s) Oral at bedtime    MEDICATIONS  (PRN):  acetaminophen     Tablet .. 650 milliGRAM(s) Oral every 6 hours PRN Temp greater or equal to 38C (100.4F), Mild Pain (1 - 3)  dextrose Oral Gel 15 Gram(s) Oral once PRN Blood Glucose LESS THAN 70 milliGRAM(s)/deciliter  melatonin 3 milliGRAM(s) Oral at bedtime PRN Insomnia      HOME MEDICATIONS:  clopidogrel 75 mg oral tablet (07-21)  ferrous sulfate 325 mg (65 mg elemental iron) oral tablet (09-20)  gabapentin 300 mg oral tablet (09-20)  melatonin 3 mg oral tablet (08-13)  metFORMIN 500 mg oral tablet (07-21)  polyethylene glycol 3350 oral powder for reconstitution (08-13)  pravastatin 20 mg oral tablet (07-21)  senna leaf extract oral tablet (08-13)  tamsulosin 0.4 mg oral capsule (08-13)      PHYSICAL EXAM:  On exam  General: awake, alert, NAD, chronic ill appearance  Lungs:  clear to ausculations b/l, normal resp effort  Heart: regular ryhthm   Abdomen: soft, non tender non distended  Ext: no edema, can move all  his extremities

## 2024-09-24 LAB
ALBUMIN SERPL ELPH-MCNC: 2.9 G/DL — LOW (ref 3.5–5.2)
ALP SERPL-CCNC: 88 U/L — SIGNIFICANT CHANGE UP (ref 30–115)
ALT FLD-CCNC: 7 U/L — SIGNIFICANT CHANGE UP (ref 0–41)
ANION GAP SERPL CALC-SCNC: 11 MMOL/L — SIGNIFICANT CHANGE UP (ref 7–14)
AST SERPL-CCNC: 8 U/L — SIGNIFICANT CHANGE UP (ref 0–41)
BASOPHILS # BLD AUTO: 0.02 K/UL — SIGNIFICANT CHANGE UP (ref 0–0.2)
BASOPHILS # BLD AUTO: 0.02 K/UL — SIGNIFICANT CHANGE UP (ref 0–0.2)
BASOPHILS NFR BLD AUTO: 0.2 % — SIGNIFICANT CHANGE UP (ref 0–1)
BASOPHILS NFR BLD AUTO: 0.2 % — SIGNIFICANT CHANGE UP (ref 0–1)
BILIRUB SERPL-MCNC: <0.2 MG/DL — SIGNIFICANT CHANGE UP (ref 0.2–1.2)
BUN SERPL-MCNC: 29 MG/DL — HIGH (ref 10–20)
CALCIUM SERPL-MCNC: 9 MG/DL — SIGNIFICANT CHANGE UP (ref 8.4–10.5)
CHLORIDE SERPL-SCNC: 104 MMOL/L — SIGNIFICANT CHANGE UP (ref 98–110)
CO2 SERPL-SCNC: 26 MMOL/L — SIGNIFICANT CHANGE UP (ref 17–32)
CORTIS AM PEAK SERPL-MCNC: 16 UG/DL — SIGNIFICANT CHANGE UP (ref 6–18.4)
CREAT SERPL-MCNC: 1 MG/DL — SIGNIFICANT CHANGE UP (ref 0.7–1.5)
EGFR: 75 ML/MIN/1.73M2 — SIGNIFICANT CHANGE UP
EOSINOPHIL # BLD AUTO: 0.16 K/UL — SIGNIFICANT CHANGE UP (ref 0–0.7)
EOSINOPHIL # BLD AUTO: 0.27 K/UL — SIGNIFICANT CHANGE UP (ref 0–0.7)
EOSINOPHIL NFR BLD AUTO: 1.4 % — SIGNIFICANT CHANGE UP (ref 0–8)
EOSINOPHIL NFR BLD AUTO: 3 % — SIGNIFICANT CHANGE UP (ref 0–8)
GLUCOSE BLDC GLUCOMTR-MCNC: 115 MG/DL — HIGH (ref 70–99)
GLUCOSE BLDC GLUCOMTR-MCNC: 138 MG/DL — HIGH (ref 70–99)
GLUCOSE BLDC GLUCOMTR-MCNC: 181 MG/DL — HIGH (ref 70–99)
GLUCOSE BLDC GLUCOMTR-MCNC: 328 MG/DL — HIGH (ref 70–99)
GLUCOSE SERPL-MCNC: 122 MG/DL — HIGH (ref 70–99)
HCT VFR BLD CALC: 23.8 % — LOW (ref 42–52)
HCT VFR BLD CALC: 24.2 % — LOW (ref 42–52)
HGB BLD-MCNC: 7.3 G/DL — LOW (ref 14–18)
HGB BLD-MCNC: 7.5 G/DL — LOW (ref 14–18)
IMM GRANULOCYTES NFR BLD AUTO: 0.4 % — HIGH (ref 0.1–0.3)
IMM GRANULOCYTES NFR BLD AUTO: 0.6 % — HIGH (ref 0.1–0.3)
LYMPHOCYTES # BLD AUTO: 0.85 K/UL — LOW (ref 1.2–3.4)
LYMPHOCYTES # BLD AUTO: 0.88 K/UL — LOW (ref 1.2–3.4)
LYMPHOCYTES # BLD AUTO: 7.5 % — LOW (ref 20.5–51.1)
LYMPHOCYTES # BLD AUTO: 9.7 % — LOW (ref 20.5–51.1)
MAGNESIUM SERPL-MCNC: 1.9 MG/DL — SIGNIFICANT CHANGE UP (ref 1.8–2.4)
MCHC RBC-ENTMCNC: 29 PG — SIGNIFICANT CHANGE UP (ref 27–31)
MCHC RBC-ENTMCNC: 29.3 PG — SIGNIFICANT CHANGE UP (ref 27–31)
MCHC RBC-ENTMCNC: 30.7 G/DL — LOW (ref 32–37)
MCHC RBC-ENTMCNC: 31 G/DL — LOW (ref 32–37)
MCV RBC AUTO: 94.4 FL — HIGH (ref 80–94)
MCV RBC AUTO: 94.5 FL — HIGH (ref 80–94)
MONOCYTES # BLD AUTO: 0.82 K/UL — HIGH (ref 0.1–0.6)
MONOCYTES # BLD AUTO: 0.92 K/UL — HIGH (ref 0.1–0.6)
MONOCYTES NFR BLD AUTO: 10.1 % — HIGH (ref 1.7–9.3)
MONOCYTES NFR BLD AUTO: 7.2 % — SIGNIFICANT CHANGE UP (ref 1.7–9.3)
NEUTROPHILS # BLD AUTO: 6.95 K/UL — HIGH (ref 1.4–6.5)
NEUTROPHILS # BLD AUTO: 9.44 K/UL — HIGH (ref 1.4–6.5)
NEUTROPHILS NFR BLD AUTO: 76.6 % — HIGH (ref 42.2–75.2)
NEUTROPHILS NFR BLD AUTO: 83.1 % — HIGH (ref 42.2–75.2)
NRBC # BLD: 0 /100 WBCS — SIGNIFICANT CHANGE UP (ref 0–0)
NRBC # BLD: 0 /100 WBCS — SIGNIFICANT CHANGE UP (ref 0–0)
PHOSPHATE SERPL-MCNC: 3.8 MG/DL — SIGNIFICANT CHANGE UP (ref 2.1–4.9)
PLATELET # BLD AUTO: 392 K/UL — SIGNIFICANT CHANGE UP (ref 130–400)
PLATELET # BLD AUTO: 395 K/UL — SIGNIFICANT CHANGE UP (ref 130–400)
PMV BLD: 9.5 FL — SIGNIFICANT CHANGE UP (ref 7.4–10.4)
PMV BLD: 9.5 FL — SIGNIFICANT CHANGE UP (ref 7.4–10.4)
POTASSIUM SERPL-MCNC: 4.6 MMOL/L — SIGNIFICANT CHANGE UP (ref 3.5–5)
POTASSIUM SERPL-SCNC: 4.6 MMOL/L — SIGNIFICANT CHANGE UP (ref 3.5–5)
PROT SERPL-MCNC: 5.6 G/DL — LOW (ref 6–8)
RBC # BLD: 2.52 M/UL — LOW (ref 4.7–6.1)
RBC # BLD: 2.56 M/UL — LOW (ref 4.7–6.1)
RBC # FLD: 15.8 % — HIGH (ref 11.5–14.5)
RBC # FLD: 15.9 % — HIGH (ref 11.5–14.5)
SODIUM SERPL-SCNC: 141 MMOL/L — SIGNIFICANT CHANGE UP (ref 135–146)
WBC # BLD: 11.36 K/UL — HIGH (ref 4.8–10.8)
WBC # BLD: 9.08 K/UL — SIGNIFICANT CHANGE UP (ref 4.8–10.8)
WBC # FLD AUTO: 11.36 K/UL — HIGH (ref 4.8–10.8)
WBC # FLD AUTO: 9.08 K/UL — SIGNIFICANT CHANGE UP (ref 4.8–10.8)

## 2024-09-24 PROCEDURE — 99222 1ST HOSP IP/OBS MODERATE 55: CPT

## 2024-09-24 PROCEDURE — 99232 SBSQ HOSP IP/OBS MODERATE 35: CPT

## 2024-09-24 RX ORDER — PANTOPRAZOLE SODIUM 40 MG/1
40 TABLET, DELAYED RELEASE ORAL EVERY 12 HOURS
Refills: 0 | Status: DISCONTINUED | OUTPATIENT
Start: 2024-09-24 | End: 2024-09-27

## 2024-09-24 RX ADMIN — Medication 650 MILLIGRAM(S): at 10:32

## 2024-09-24 RX ADMIN — GABAPENTIN 300 MILLIGRAM(S): 800 TABLET, FILM COATED ORAL at 05:25

## 2024-09-24 RX ADMIN — Medication 75 MILLIGRAM(S): at 11:48

## 2024-09-24 RX ADMIN — Medication 650 MILLIGRAM(S): at 09:37

## 2024-09-24 RX ADMIN — Medication 2 TABLET(S): at 21:11

## 2024-09-24 RX ADMIN — ATORVASTATIN CALCIUM 10 MILLIGRAM(S): 10 TABLET, FILM COATED ORAL at 21:11

## 2024-09-24 RX ADMIN — Medication 2: at 21:12

## 2024-09-24 RX ADMIN — GABAPENTIN 300 MILLIGRAM(S): 800 TABLET, FILM COATED ORAL at 14:18

## 2024-09-24 RX ADMIN — Medication 0.4 MILLIGRAM(S): at 21:11

## 2024-09-24 RX ADMIN — PANTOPRAZOLE SODIUM 40 MILLIGRAM(S): 40 TABLET, DELAYED RELEASE ORAL at 05:25

## 2024-09-24 RX ADMIN — Medication 1: at 11:48

## 2024-09-24 RX ADMIN — GABAPENTIN 300 MILLIGRAM(S): 800 TABLET, FILM COATED ORAL at 21:11

## 2024-09-24 NOTE — CONSULT NOTE ADULT - ASSESSMENT
85 y/o man w PMHx of HLD, DM, CAD s/p stents x2 in 2007 on plavix,  h/o asbestosis of lung, recent diagnosis of  malignant mesothelioma ~8/2024 and s/p VATS pleurodesis, R sided PleurX, h/o AVM/GIB? , JASMIN, referred from NH for anemia to 6.3, in ED found to have Hb 6.9,  admitted to medicine for anemia. Patient denies any abdominal pain, nausea, vomiting, unintentional weight loss, swallowing difficulty, blood in stool. GI consulted for anemia.     #Acute on chronic macrocytic anemia with JASIMN - no overt GI bleed  - Hemodynamically stable & no active bleeding  - Baseline hemoglobin - 8-9   - Hemoglobin on admission - 6.9 >s/p 2u 8  - On plavix   - Iron :31, %sat: 10 TIBC 325 ferritin 89 while on iron  - reported AVMs? endoscopic work up with  per grand son Nelli Lopez  - CHINYERE: dark green stool, no blood    #Rec  - Spoke to grandson (HCP) over phone [phone] regarding endoscopic work up. After discussing risks vs benefits given his advance age and co morbidities he would like to hold off. Offered VCE as outpatient   - Maintain active Type and screen  - Trend H&H BID  - Recommend PPI 40mg BID PO  - Please target Hb  >8  - Please avoid any NSAIDs  - Patient can follow  or  Follow up with our [facility] located at [address] [place] [address]. Phone Number: [phone]    - Recall us PRN 83 y/o man w PMHx of HLD, DM, CAD s/p stents x2 in 2007 on plavix,  h/o asbestosis of lung, recent diagnosis of  malignant mesothelioma ~8/2024 and s/p VATS pleurodesis, R sided PleurX, h/o AVM/GIB? , JASMIN, referred from NH for anemia to 6.3, in ED found to have Hb 6.9,  admitted to medicine for anemia. Patient denies any abdominal pain, nausea, vomiting, unintentional weight loss, swallowing difficulty, blood in stool. GI consulted for anemia.     #Acute on chronic macrocytic anemia with JASMIN - no overt GI bleed  - Hemodynamically stable & no active bleeding  - Baseline hemoglobin - 8-9   - Hemoglobin on admission - 6.9 >s/p 2u 8  - On plavix   - Iron :31, %sat: 10 TIBC 325 ferritin 89 while on iron  - reported AVMs? endoscopic work up with  per grand son Nelli Lopez  - CHINYERE: dark green stool, no blood    #Rec  - Spoke to grandson (HCP) over phone [phone] regarding endoscopic work up. After discussing risks vs benefits given his advance age and co morbidities he would like to hold off. Offered VCE as outpatient   - Maintain active Type and screen  - Trend H&H BID  - Recommend PPI 40mg BID PO  - Please target Hb  >8  - Please avoid any NSAIDs  - Patient can follow  or  Follow up with our [facility] located at [address] [place] [address]. Phone Number: [phone]    - Recall us PRN    #Small hypodensity in segment 7 of the liver seen on prior CT.  -Consider RUQ US as OP  -f/u w/ primary GI

## 2024-09-24 NOTE — PROGRESS NOTE ADULT - TIME BILLING
time spent on review of labs, imaging studies, old records, obtaining history, personally examining patient, counselling and communicating with patient, entering orders for medications/tests/etc, discussions with other health care providers, documentation in electronic health records, independent interpretation of labs, imaging/procedure results and care coordination.

## 2024-09-24 NOTE — CONSULT NOTE ADULT - SUBJECTIVE AND OBJECTIVE BOX
Gastroenterology Consultation:    Patient is a 84y old  Male who presents with a chief complaint of Anemia     (22 Sep 2024 20:06)        Admitted on: 09-19-24      HPI:  85 y/o man w PMHx of HLD, DM, CAD s/p stents x2 in 2007, pt of Dr Avila, h/o asbestosis of lung, dx w malignant mesothelioma ~8/2024 and s/p VATS pleurodesis, R sided PleurX, h/o AVM/GIB, JASMIN, referred from NH for anemia to 6.3, in ED found to have Hb 6.9, ordered for 1uPRBC, admitted to medicine for anemia.   Cr 1.5 form prior 1.2, plt 553, Follows Dr Johnson for rad/onc    Per pt, has not noted any bleeding - no hematuria, black or bloody stools. Felt generally malaised/weak earlier, and improved now. No falls/syncope. No fever, cough. Reports he has maintained a decent appetite, no n/v.       Prior EGD:    Prior Colonoscopy:      PAST MEDICAL & SURGICAL HISTORY:  DM (diabetes mellitus)      MI (myocardial infarction)      History of CAD (coronary artery disease)      Dyslipidemia      Currently smokes tobacco      Mesothelioma, malignant      Coronary stent patent            FAMILY HISTORY:      Social History:  Tobacco:  Alcohol:  Drugs:    Home Medications:  clopidogrel 75 mg oral tablet: 1 tab(s) orally once a day (21 Jul 2024 18:04)  ferrous sulfate 325 mg (65 mg elemental iron) oral tablet: 3 tab(s) orally once a day (20 Sep 2024 01:39)  gabapentin 300 mg oral tablet: 1 tab(s) orally 3 times a day (20 Sep 2024 01:37)  melatonin 3 mg oral tablet: 1 tab(s) orally once a day (at bedtime) (13 Aug 2024 09:23)  metFORMIN 500 mg oral tablet: 1 tab(s) orally 2 times a day (21 Jul 2024 18:04)  polyethylene glycol 3350 oral powder for reconstitution: 17 gram(s) orally once a day (13 Aug 2024 09:23)  pravastatin 20 mg oral tablet: 1 tab(s) orally (21 Jul 2024 18:04)  senna leaf extract oral tablet: 2 tab(s) orally once a day (at bedtime) (13 Aug 2024 09:23)  tamsulosin 0.4 mg oral capsule: 1 cap(s) orally once a day (at bedtime) (13 Aug 2024 09:23)        MEDICATIONS  (STANDING):  atorvastatin 10 milliGRAM(s) Oral at bedtime  clopidogrel Tablet 75 milliGRAM(s) Oral daily  dextrose 5%. 1000 milliLiter(s) (100 mL/Hr) IV Continuous <Continuous>  dextrose 5%. 1000 milliLiter(s) (50 mL/Hr) IV Continuous <Continuous>  dextrose 50% Injectable 12.5 Gram(s) IV Push once  dextrose 50% Injectable 25 Gram(s) IV Push once  dextrose 50% Injectable 25 Gram(s) IV Push once  gabapentin 300 milliGRAM(s) Oral three times a day  glucagon  Injectable 1 milliGRAM(s) IntraMuscular once  influenza  Vaccine (HIGH DOSE) 0.5 milliLiter(s) IntraMuscular once  insulin lispro (ADMELOG) corrective regimen sliding scale   SubCutaneous three times a day before meals  insulin lispro (ADMELOG) corrective regimen sliding scale   SubCutaneous at bedtime  iron sucrose IVPB 100 milliGRAM(s) IV Intermittent every 24 hours  pantoprazole    Tablet 40 milliGRAM(s) Oral before breakfast  polyethylene glycol 3350 17 Gram(s) Oral daily  senna 2 Tablet(s) Oral at bedtime  sodium chloride 0.9%. 1000 milliLiter(s) (75 mL/Hr) IV Continuous <Continuous>  tamsulosin 0.4 milliGRAM(s) Oral at bedtime    MEDICATIONS  (PRN):  acetaminophen     Tablet .. 650 milliGRAM(s) Oral every 6 hours PRN Temp greater or equal to 38C (100.4F), Mild Pain (1 - 3)  dextrose Oral Gel 15 Gram(s) Oral once PRN Blood Glucose LESS THAN 70 milliGRAM(s)/deciliter  melatonin 3 milliGRAM(s) Oral at bedtime PRN Insomnia      Allergies  penicillin (Unknown)      Review of Systems:   Constitutional:  No Fever, No Chills  ENT/Mouth:  No Hearing Changes,  No Difficulty Swallowing  Eyes:  No Eye Pain, No Vision Changes  Cardiovascular:  No Chest Pain, No Palpitations  Respiratory:  No Cough, No Dyspnea  Gastrointestinal:  As described in HPI          Physical Examination:  T(C): 36.9 (09-24-24 @ 08:52), Max: 36.9 (09-24-24 @ 08:52)  HR: 96 (09-24-24 @ 08:52) (75 - 98)  BP: 120/70 (09-24-24 @ 08:52) (120/70 - 136/75)  RR: 17 (09-24-24 @ 08:52) (17 - 18)  SpO2: 95% (09-24-24 @ 08:52) (95% - 98%)      09-22-24 @ 07:01  -  09-23-24 @ 07:00  --------------------------------------------------------  IN: 0 mL / OUT: 1400 mL / NET: -1400 mL    09-23-24 @ 07:01  -  09-24-24 @ 07:00  --------------------------------------------------------  IN: 0 mL / OUT: 1200 mL / NET: -1200 mL          GENERAL: AAOx3, no acute distress.  HEAD:  Atraumatic, Normocephalic  EYES: conjunctiva and sclera clear  CHEST/LUNG: Clear to auscultation bilaterally; No wheeze, rhonchi, or rales  HEART: Regular rate and rhythm; normal S1, S2, No murmurs.  ABDOMEN: Soft, nontender, nondistended; Bowel sounds present  SKIN: Intact, no jaundice        Data:                        7.3    9.08  )-----------( 395      ( 24 Sep 2024 06:40 )             23.8     Hgb Trend:  7.3  09-24-24 @ 06:40  7.9  09-23-24 @ 06:18  8.1  09-21-24 @ 21:25        09-24    141  |  104  |  29[H]  ----------------------------<  122[H]  4.6   |  26  |  1.0    Ca    9.0      24 Sep 2024 06:40  Phos  3.8     09-24  Mg     1.9     09-24    TPro  5.6[L]  /  Alb  2.9[L]  /  TBili  <0.2  /  DBili  x   /  AST  8   /  ALT  7   /  AlkPhos  88  09-24    Liver panel trend:  TBili <0.2   /   AST 8   /   ALT 7   /   AlkP 88   /   Tptn 5.6   /   Alb 2.9    /   DBili --      09-24  TBili <0.2   /   AST 9   /   ALT 7   /   AlkP 87   /   Tptn 5.5   /   Alb 3.1    /   DBili -- 09-23  TBili 0.3   /   AST 9   /   ALT 7   /   AlkP 104   /   Tptn 6.0   /   Alb 3.2    /   DBili -- 09-20  TBili <0.2   /   AST 11   /   ALT 8   /   AlkP 105   /   Tptn 6.2   /   Alb 3.5    /   DBili --      09-19       Gastroenterology Consultation:    Patient is a 84y old  Male who presents with a chief complaint of Anemia     (22 Sep 2024 20:06)        Admitted on: 09-19-24      HPI:  83 y/o man w PMHx of HLD, DM, CAD s/p stents x2 in 2007 on plavix,  h/o asbestosis of lung, recent diagnosis of  malignant mesothelioma ~8/2024 and s/p VATS pleurodesis, R sided PleurX, h/o AVM/GIB? , JASMIN, referred from NH for anemia to 6.3, in ED found to have Hb 6.9,  admitted to medicine for anemia. Patient denies any abdominal pain, nausea, vomiting, unintentional weight loss, swallowing difficulty, blood in stool. GI consulted for anemia.   No recent travel, antibiotic use, SSRI use, NSAID use    Collateral history from grand son John Aiken [phone], reports that he had endoscopic work up with  years ago at [facility] and was told he had AVMs. Currently he has black stool while on iron.       Prior EGD: >5 years ago with     Prior Colonoscopy: >5 years ago with       PAST MEDICAL & SURGICAL HISTORY:  DM (diabetes mellitus)      MI (myocardial infarction)      History of CAD (coronary artery disease)      Dyslipidemia      Currently smokes tobacco      Mesothelioma, malignant      Coronary stent patent            FAMILY HISTORY:      Social History:  Tobacco:  Alcohol:  Drugs:    Home Medications:  clopidogrel 75 mg oral tablet: 1 tab(s) orally once a day (21 Jul 2024 18:04)  ferrous sulfate 325 mg (65 mg elemental iron) oral tablet: 3 tab(s) orally once a day (20 Sep 2024 01:39)  gabapentin 300 mg oral tablet: 1 tab(s) orally 3 times a day (20 Sep 2024 01:37)  melatonin 3 mg oral tablet: 1 tab(s) orally once a day (at bedtime) (13 Aug 2024 09:23)  metFORMIN 500 mg oral tablet: 1 tab(s) orally 2 times a day (21 Jul 2024 18:04)  polyethylene glycol 3350 oral powder for reconstitution: 17 gram(s) orally once a day (13 Aug 2024 09:23)  pravastatin 20 mg oral tablet: 1 tab(s) orally (21 Jul 2024 18:04)  senna leaf extract oral tablet: 2 tab(s) orally once a day (at bedtime) (13 Aug 2024 09:23)  tamsulosin 0.4 mg oral capsule: 1 cap(s) orally once a day (at bedtime) (13 Aug 2024 09:23)        MEDICATIONS  (STANDING):  atorvastatin 10 milliGRAM(s) Oral at bedtime  clopidogrel Tablet 75 milliGRAM(s) Oral daily  dextrose 5%. 1000 milliLiter(s) (100 mL/Hr) IV Continuous <Continuous>  dextrose 5%. 1000 milliLiter(s) (50 mL/Hr) IV Continuous <Continuous>  dextrose 50% Injectable 12.5 Gram(s) IV Push once  dextrose 50% Injectable 25 Gram(s) IV Push once  dextrose 50% Injectable 25 Gram(s) IV Push once  gabapentin 300 milliGRAM(s) Oral three times a day  glucagon  Injectable 1 milliGRAM(s) IntraMuscular once  influenza  Vaccine (HIGH DOSE) 0.5 milliLiter(s) IntraMuscular once  insulin lispro (ADMELOG) corrective regimen sliding scale   SubCutaneous three times a day before meals  insulin lispro (ADMELOG) corrective regimen sliding scale   SubCutaneous at bedtime  iron sucrose IVPB 100 milliGRAM(s) IV Intermittent every 24 hours  pantoprazole    Tablet 40 milliGRAM(s) Oral before breakfast  polyethylene glycol 3350 17 Gram(s) Oral daily  senna 2 Tablet(s) Oral at bedtime  sodium chloride 0.9%. 1000 milliLiter(s) (75 mL/Hr) IV Continuous <Continuous>  tamsulosin 0.4 milliGRAM(s) Oral at bedtime    MEDICATIONS  (PRN):  acetaminophen     Tablet .. 650 milliGRAM(s) Oral every 6 hours PRN Temp greater or equal to 38C (100.4F), Mild Pain (1 - 3)  dextrose Oral Gel 15 Gram(s) Oral once PRN Blood Glucose LESS THAN 70 milliGRAM(s)/deciliter  melatonin 3 milliGRAM(s) Oral at bedtime PRN Insomnia      Allergies  penicillin (Unknown)      Review of Systems:   Constitutional:  No Fever, No Chills  ENT/Mouth:  No Hearing Changes,  No Difficulty Swallowing  Eyes:  No Eye Pain, No Vision Changes  Cardiovascular:  No Chest Pain, No Palpitations  Respiratory:  No Cough, No Dyspnea  Gastrointestinal:  As described in HPI          Physical Examination:  T(C): 36.9 (09-24-24 @ 08:52), Max: 36.9 (09-24-24 @ 08:52)  HR: 96 (09-24-24 @ 08:52) (75 - 98)  BP: 120/70 (09-24-24 @ 08:52) (120/70 - 136/75)  RR: 17 (09-24-24 @ 08:52) (17 - 18)  SpO2: 95% (09-24-24 @ 08:52) (95% - 98%)      09-22-24 @ 07:01  -  09-23-24 @ 07:00  --------------------------------------------------------  IN: 0 mL / OUT: 1400 mL / NET: -1400 mL    09-23-24 @ 07:01  -  09-24-24 @ 07:00  --------------------------------------------------------  IN: 0 mL / OUT: 1200 mL / NET: -1200 mL          GENERAL: AAOx3, no acute distress.  HEAD:  Atraumatic, Normocephalic  EYES: conjunctiva and sclera clear  CHEST/LUNG: Clear to auscultation bilaterally; No wheeze, rhonchi, or rales  HEART: Regular rate and rhythm; normal S1, S2, No murmurs.  ABDOMEN: Soft, nontender, nondistended; Bowel sounds present  SKIN: Intact, no jaundice        Data:                        7.3    9.08  )-----------( 395      ( 24 Sep 2024 06:40 )             23.8     Hgb Trend:  7.3  09-24-24 @ 06:40  7.9  09-23-24 @ 06:18  8.1  09-21-24 @ 21:25        09-24    141  |  104  |  29[H]  ----------------------------<  122[H]  4.6   |  26  |  1.0    Ca    9.0      24 Sep 2024 06:40  Phos  3.8     09-24  Mg     1.9     09-24    TPro  5.6[L]  /  Alb  2.9[L]  /  TBili  <0.2  /  DBili  x   /  AST  8   /  ALT  7   /  AlkPhos  88  09-24    Liver panel trend:  TBili <0.2   /   AST 8   /   ALT 7   /   AlkP 88   /   Tptn 5.6   /   Alb 2.9    /   DBili --      09-24  TBili <0.2   /   AST 9   /   ALT 7   /   AlkP 87   /   Tptn 5.5   /   Alb 3.1    /   DBili --      09-23  TBili 0.3   /   AST 9   /   ALT 7   /   AlkP 104   /   Tptn 6.0   /   Alb 3.2    /   DBili --      09-20  TBili <0.2   /   AST 11   /   ALT 8   /   AlkP 105   /   Tptn 6.2   /   Alb 3.5    /   DBili --      09-19

## 2024-09-24 NOTE — CONSULT NOTE ADULT - ATTENDING COMMENTS
Agree with the above.  The patient and family refusing endoscopic intervention at this time.  Transfuse as needed to keep hemoglobin above 8.  Can follow-up with primary GI Dr. Katz. Please call back PRN. Rest of recommendations per the note above.     Time-based billing (NON-critical care).   55 minutes spent on total encounter; more than 50% of the visit was spent counseling and / or coordinating care by the attending physician.  The necessity of the time spent during the encounter on this date of service was due to: Coordination of care.

## 2024-09-24 NOTE — PROGRESS NOTE ADULT - SUBJECTIVE AND OBJECTIVE BOX
T H I S   I S    N O  T   A    F I N A L I Z E D   N O T CLARI FISHMAN  84y, Male  Allergy: penicillin (Unknown)    Hospital Day: 5d    Patient seen and examined earlier today.     PMH/PSH:  PAST MEDICAL & SURGICAL HISTORY:  DM (diabetes mellitus)      MI (myocardial infarction)      History of CAD (coronary artery disease)      Dyslipidemia      Currently smokes tobacco      Mesothelioma, malignant      Coronary stent patent          LAST 24-Hr EVENTS:    VITALS:  T(F): 98.5 (09-24-24 @ 08:52), Max: 98.5 (09-24-24 @ 08:52)  HR: 96 (09-24-24 @ 08:52)  BP: 120/70 (09-24-24 @ 08:52) (120/70 - 136/75)  RR: 17 (09-24-24 @ 08:52)  SpO2: 95% (09-24-24 @ 08:52)          TESTS & MEASUREMENTS:  Weight/BMI  61.2 (09-19-24 @ 15:29)  20.5 (09-22-24 @ 20:06)    09-22-24 @ 07:01  -  09-23-24 @ 07:00  --------------------------------------------------------  IN: 0 mL / OUT: 1400 mL / NET: -1400 mL    09-23-24 @ 07:01  -  09-24-24 @ 07:00  --------------------------------------------------------  IN: 0 mL / OUT: 1200 mL / NET: -1200 mL                            7.5    11.36 )-----------( 392      ( 24 Sep 2024 11:30 )             24.2       INR: 1.00 ratio (09-19-24 @ 17:17)    09-24    141  |  104  |  29[H]  ----------------------------<  122[H]  4.6   |  26  |  1.0    Ca    9.0      24 Sep 2024 06:40  Phos  3.8     09-24  Mg     1.9     09-24    TPro  5.6[L]  /  Alb  2.9[L]  /  TBili  <0.2  /  DBili  x   /  AST  8   /  ALT  7   /  AlkPhos  88  09-24    LIVER FUNCTIONS - ( 24 Sep 2024 06:40 )  Alb: 2.9 g/dL / Pro: 5.6 g/dL / ALK PHOS: 88 U/L / ALT: 7 U/L / AST: 8 U/L / GGT: x                 Urinalysis Basic - ( 24 Sep 2024 06:40 )    Color: x / Appearance: x / SG: x / pH: x  Gluc: 122 mg/dL / Ketone: x  / Bili: x / Urobili: x   Blood: x / Protein: x / Nitrite: x   Leuk Esterase: x / RBC: x / WBC x   Sq Epi: x / Non Sq Epi: x / Bacteria: x          Ferritin: 89 ng/mL (09-20-24 @ 15:07)  Ferritin: 82 ng/mL (09-19-24 @ 21:13)            A1C with Estimated Average Glucose Result: 5.8 % (08-11-24 @ 05:09)  A1C with Estimated Average Glucose Result: 5.7 % (07-22-24 @ 04:30)      Indwelling Urethral Catheter:     Connect To:  Leg Bag    Indication:  Urinary Retention / Obstruction (09-24-24 @ 03:02) (not performed)  Indwelling Urethral Catheter:     Connect To:  Leg Bag    Indication:  Urinary Retention / Obstruction (09-23-24 @ 03:26) (not performed)      RADIOLOGY, ECG, & ADDITIONAL TESTS:  12 Lead ECG:   Ventricular Rate 79 BPM    Atrial Rate 79 BPM    P-R Interval 136 ms    QRS Duration 104 ms    Q-T Interval 394 ms    QTC Calculation(Bazett) 451 ms    P Axis 18 degrees    R Axis 120 degrees    T Axis -38 degrees    Diagnosis Line Sinus rhythm with Premature atrial complexes  Possible Right ventricular hypertrophy  ST & T wave abnormality, consider inferolateral ischemia  Abnormal ECG    Confirmed by Ramakrishna German ([id number]) on 7/21/2024 5:21:45 PM (07-21-24 @ 16:22)      RECENT DIAGNOSTIC ORDERS:  Antibody Screen Interpretation: 11:30 (09-24-24 @ 12:06)  Type + Screen: AM  Sched. Collection:25-Sep-2024 04:30 (09-24-24 @ 09:37)  Magnesium: AM Sched. Collection: 25-Sep-2024 04:30 (09-24-24 @ 09:37)  Phosphorus: AM Sched. Collection: 25-Sep-2024 04:30 (09-24-24 @ 09:37)  Comprehensive Metabolic Panel: AM Sched. Collection: 25-Sep-2024 04:30 (09-24-24 @ 09:37)  Complete Blood Count + Automated Diff: AM Sched. Collection: 25-Sep-2024 04:30 (09-24-24 @ 09:37)  Type + Screen: 11:00 (09-24-24 @ 08:26)      MEDICATIONS:  MEDICATIONS  (STANDING):  atorvastatin 10 milliGRAM(s) Oral at bedtime  clopidogrel Tablet 75 milliGRAM(s) Oral daily  dextrose 5%. 1000 milliLiter(s) (100 mL/Hr) IV Continuous <Continuous>  dextrose 5%. 1000 milliLiter(s) (50 mL/Hr) IV Continuous <Continuous>  dextrose 50% Injectable 12.5 Gram(s) IV Push once  dextrose 50% Injectable 25 Gram(s) IV Push once  dextrose 50% Injectable 25 Gram(s) IV Push once  gabapentin 300 milliGRAM(s) Oral three times a day  glucagon  Injectable 1 milliGRAM(s) IntraMuscular once  influenza  Vaccine (HIGH DOSE) 0.5 milliLiter(s) IntraMuscular once  insulin lispro (ADMELOG) corrective regimen sliding scale   SubCutaneous at bedtime  insulin lispro (ADMELOG) corrective regimen sliding scale   SubCutaneous three times a day before meals  iron sucrose IVPB 100 milliGRAM(s) IV Intermittent every 24 hours  pantoprazole    Tablet 40 milliGRAM(s) Oral before breakfast  polyethylene glycol 3350 17 Gram(s) Oral daily  senna 2 Tablet(s) Oral at bedtime  sodium chloride 0.9%. 1000 milliLiter(s) (75 mL/Hr) IV Continuous <Continuous>  tamsulosin 0.4 milliGRAM(s) Oral at bedtime    MEDICATIONS  (PRN):  acetaminophen     Tablet .. 650 milliGRAM(s) Oral every 6 hours PRN Temp greater or equal to 38C (100.4F), Mild Pain (1 - 3)  dextrose Oral Gel 15 Gram(s) Oral once PRN Blood Glucose LESS THAN 70 milliGRAM(s)/deciliter  melatonin 3 milliGRAM(s) Oral at bedtime PRN Insomnia      HOME MEDICATIONS:  clopidogrel 75 mg oral tablet (07-21)  ferrous sulfate 325 mg (65 mg elemental iron) oral tablet (09-20)  gabapentin 300 mg oral tablet (09-20)  melatonin 3 mg oral tablet (08-13)  metFORMIN 500 mg oral tablet (07-21)  polyethylene glycol 3350 oral powder for reconstitution (08-13)  pravastatin 20 mg oral tablet (07-21)  senna leaf extract oral tablet (08-13)  tamsulosin 0.4 mg oral capsule (08-13)      PHYSICAL EXAM:  GENERAL:   CHEST/LUNG:   HEART:   ABDOMEN:   EXTREMITIES:             JENNYFERCLARI  84y, Male  Allergy: penicillin (Unknown)    Hospital Day: 5d    Patient seen and examined earlier today.     PMH/PSH:  PAST MEDICAL & SURGICAL HISTORY:  DM (diabetes mellitus)      MI (myocardial infarction)      History of CAD (coronary artery disease)      Dyslipidemia      Currently smokes tobacco      Mesothelioma, malignant      Coronary stent patent          LAST 24-Hr EVENTS:    VITALS:  T(F): 98.5 (09-24-24 @ 08:52), Max: 98.5 (09-24-24 @ 08:52)  HR: 96 (09-24-24 @ 08:52)  BP: 120/70 (09-24-24 @ 08:52) (120/70 - 136/75)  RR: 17 (09-24-24 @ 08:52)  SpO2: 95% (09-24-24 @ 08:52)          TESTS & MEASUREMENTS:  Weight/BMI  61.2 (09-19-24 @ 15:29)  20.5 (09-22-24 @ 20:06)    09-22-24 @ 07:01  -  09-23-24 @ 07:00  --------------------------------------------------------  IN: 0 mL / OUT: 1400 mL / NET: -1400 mL    09-23-24 @ 07:01  -  09-24-24 @ 07:00  --------------------------------------------------------  IN: 0 mL / OUT: 1200 mL / NET: -1200 mL                            7.5    11.36 )-----------( 392      ( 24 Sep 2024 11:30 )             24.2       INR: 1.00 ratio (09-19-24 @ 17:17)    09-24    141  |  104  |  29[H]  ----------------------------<  122[H]  4.6   |  26  |  1.0    Ca    9.0      24 Sep 2024 06:40  Phos  3.8     09-24  Mg     1.9     09-24    TPro  5.6[L]  /  Alb  2.9[L]  /  TBili  <0.2  /  DBili  x   /  AST  8   /  ALT  7   /  AlkPhos  88  09-24    LIVER FUNCTIONS - ( 24 Sep 2024 06:40 )  Alb: 2.9 g/dL / Pro: 5.6 g/dL / ALK PHOS: 88 U/L / ALT: 7 U/L / AST: 8 U/L / GGT: x                 Urinalysis Basic - ( 24 Sep 2024 06:40 )    Color: x / Appearance: x / SG: x / pH: x  Gluc: 122 mg/dL / Ketone: x  / Bili: x / Urobili: x   Blood: x / Protein: x / Nitrite: x   Leuk Esterase: x / RBC: x / WBC x   Sq Epi: x / Non Sq Epi: x / Bacteria: x          Ferritin: 89 ng/mL (09-20-24 @ 15:07)  Ferritin: 82 ng/mL (09-19-24 @ 21:13)            A1C with Estimated Average Glucose Result: 5.8 % (08-11-24 @ 05:09)  A1C with Estimated Average Glucose Result: 5.7 % (07-22-24 @ 04:30)      Indwelling Urethral Catheter:     Connect To:  Leg Bag    Indication:  Urinary Retention / Obstruction (09-24-24 @ 03:02) (not performed)  Indwelling Urethral Catheter:     Connect To:  Leg Bag    Indication:  Urinary Retention / Obstruction (09-23-24 @ 03:26) (not performed)      RADIOLOGY, ECG, & ADDITIONAL TESTS:  12 Lead ECG:   Ventricular Rate 79 BPM    Atrial Rate 79 BPM    P-R Interval 136 ms    QRS Duration 104 ms    Q-T Interval 394 ms    QTC Calculation(Bazett) 451 ms    P Axis 18 degrees    R Axis 120 degrees    T Axis -38 degrees    Diagnosis Line Sinus rhythm with Premature atrial complexes  Possible Right ventricular hypertrophy  ST & T wave abnormality, consider inferolateral ischemia  Abnormal ECG    Confirmed by Ramakrishna German ([id number]) on 7/21/2024 5:21:45 PM (07-21-24 @ 16:22)      RECENT DIAGNOSTIC ORDERS:  Antibody Screen Interpretation: 11:30 (09-24-24 @ 12:06)  Type + Screen: AM  Sched. Collection:25-Sep-2024 04:30 (09-24-24 @ 09:37)  Magnesium: AM Sched. Collection: 25-Sep-2024 04:30 (09-24-24 @ 09:37)  Phosphorus: AM Sched. Collection: 25-Sep-2024 04:30 (09-24-24 @ 09:37)  Comprehensive Metabolic Panel: AM Sched. Collection: 25-Sep-2024 04:30 (09-24-24 @ 09:37)  Complete Blood Count + Automated Diff: AM Sched. Collection: 25-Sep-2024 04:30 (09-24-24 @ 09:37)  Type + Screen: 11:00 (09-24-24 @ 08:26)      MEDICATIONS:  MEDICATIONS  (STANDING):  atorvastatin 10 milliGRAM(s) Oral at bedtime  clopidogrel Tablet 75 milliGRAM(s) Oral daily  dextrose 5%. 1000 milliLiter(s) (100 mL/Hr) IV Continuous <Continuous>  dextrose 5%. 1000 milliLiter(s) (50 mL/Hr) IV Continuous <Continuous>  dextrose 50% Injectable 12.5 Gram(s) IV Push once  dextrose 50% Injectable 25 Gram(s) IV Push once  dextrose 50% Injectable 25 Gram(s) IV Push once  gabapentin 300 milliGRAM(s) Oral three times a day  glucagon  Injectable 1 milliGRAM(s) IntraMuscular once  influenza  Vaccine (HIGH DOSE) 0.5 milliLiter(s) IntraMuscular once  insulin lispro (ADMELOG) corrective regimen sliding scale   SubCutaneous at bedtime  insulin lispro (ADMELOG) corrective regimen sliding scale   SubCutaneous three times a day before meals  iron sucrose IVPB 100 milliGRAM(s) IV Intermittent every 24 hours  pantoprazole    Tablet 40 milliGRAM(s) Oral before breakfast  polyethylene glycol 3350 17 Gram(s) Oral daily  senna 2 Tablet(s) Oral at bedtime  sodium chloride 0.9%. 1000 milliLiter(s) (75 mL/Hr) IV Continuous <Continuous>  tamsulosin 0.4 milliGRAM(s) Oral at bedtime    MEDICATIONS  (PRN):  acetaminophen     Tablet .. 650 milliGRAM(s) Oral every 6 hours PRN Temp greater or equal to 38C (100.4F), Mild Pain (1 - 3)  dextrose Oral Gel 15 Gram(s) Oral once PRN Blood Glucose LESS THAN 70 milliGRAM(s)/deciliter  melatonin 3 milliGRAM(s) Oral at bedtime PRN Insomnia      HOME MEDICATIONS:  clopidogrel 75 mg oral tablet (07-21)  ferrous sulfate 325 mg (65 mg elemental iron) oral tablet (09-20)  gabapentin 300 mg oral tablet (09-20)  melatonin 3 mg oral tablet (08-13)  metFORMIN 500 mg oral tablet (07-21)  polyethylene glycol 3350 oral powder for reconstitution (08-13)  pravastatin 20 mg oral tablet (07-21)  senna leaf extract oral tablet (08-13)  tamsulosin 0.4 mg oral capsule (08-13)      PHYSICAL EXAM:  On exam  General: awake, alert, NAD, chronic ill appearance  Lungs:  clear to ausculations b/l, normal resp effort  Heart: regular ryhthm   Abdomen: soft, non tender non distended  Ext: no edema, can move all  his extremities

## 2024-09-25 ENCOUNTER — TRANSCRIPTION ENCOUNTER (OUTPATIENT)
Age: 84
End: 2024-09-25

## 2024-09-25 LAB
ALBUMIN SERPL ELPH-MCNC: 2.7 G/DL — LOW (ref 3.5–5.2)
ALP SERPL-CCNC: 88 U/L — SIGNIFICANT CHANGE UP (ref 30–115)
ALT FLD-CCNC: 9 U/L — SIGNIFICANT CHANGE UP (ref 0–41)
ANION GAP SERPL CALC-SCNC: 10 MMOL/L — SIGNIFICANT CHANGE UP (ref 7–14)
AST SERPL-CCNC: 12 U/L — SIGNIFICANT CHANGE UP (ref 0–41)
BASOPHILS # BLD AUTO: 0.02 K/UL — SIGNIFICANT CHANGE UP (ref 0–0.2)
BASOPHILS NFR BLD AUTO: 0.2 % — SIGNIFICANT CHANGE UP (ref 0–1)
BILIRUB SERPL-MCNC: 0.4 MG/DL — SIGNIFICANT CHANGE UP (ref 0.2–1.2)
BUN SERPL-MCNC: 33 MG/DL — HIGH (ref 10–20)
CALCIUM SERPL-MCNC: 8.7 MG/DL — SIGNIFICANT CHANGE UP (ref 8.4–10.5)
CHLORIDE SERPL-SCNC: 103 MMOL/L — SIGNIFICANT CHANGE UP (ref 98–110)
CO2 SERPL-SCNC: 24 MMOL/L — SIGNIFICANT CHANGE UP (ref 17–32)
CREAT SERPL-MCNC: 1.1 MG/DL — SIGNIFICANT CHANGE UP (ref 0.7–1.5)
EGFR: 67 ML/MIN/1.73M2 — SIGNIFICANT CHANGE UP
EOSINOPHIL # BLD AUTO: 0.21 K/UL — SIGNIFICANT CHANGE UP (ref 0–0.7)
EOSINOPHIL NFR BLD AUTO: 2.2 % — SIGNIFICANT CHANGE UP (ref 0–8)
GLUCOSE BLDC GLUCOMTR-MCNC: 149 MG/DL — HIGH (ref 70–99)
GLUCOSE SERPL-MCNC: 142 MG/DL — HIGH (ref 70–99)
HCT VFR BLD CALC: 24.9 % — LOW (ref 42–52)
HGB BLD-MCNC: 7.8 G/DL — LOW (ref 14–18)
IMM GRANULOCYTES NFR BLD AUTO: 0.5 % — HIGH (ref 0.1–0.3)
LYMPHOCYTES # BLD AUTO: 0.96 K/UL — LOW (ref 1.2–3.4)
LYMPHOCYTES # BLD AUTO: 10.2 % — LOW (ref 20.5–51.1)
MAGNESIUM SERPL-MCNC: 1.9 MG/DL — SIGNIFICANT CHANGE UP (ref 1.8–2.4)
MCHC RBC-ENTMCNC: 28.8 PG — SIGNIFICANT CHANGE UP (ref 27–31)
MCHC RBC-ENTMCNC: 31.3 G/DL — LOW (ref 32–37)
MCV RBC AUTO: 91.9 FL — SIGNIFICANT CHANGE UP (ref 80–94)
MONOCYTES # BLD AUTO: 0.81 K/UL — HIGH (ref 0.1–0.6)
MONOCYTES NFR BLD AUTO: 8.6 % — SIGNIFICANT CHANGE UP (ref 1.7–9.3)
NEUTROPHILS # BLD AUTO: 7.39 K/UL — HIGH (ref 1.4–6.5)
NEUTROPHILS NFR BLD AUTO: 78.3 % — HIGH (ref 42.2–75.2)
NRBC # BLD: 0 /100 WBCS — SIGNIFICANT CHANGE UP (ref 0–0)
PHOSPHATE SERPL-MCNC: 3.7 MG/DL — SIGNIFICANT CHANGE UP (ref 2.1–4.9)
PLATELET # BLD AUTO: 366 K/UL — SIGNIFICANT CHANGE UP (ref 130–400)
PMV BLD: 9.5 FL — SIGNIFICANT CHANGE UP (ref 7.4–10.4)
POTASSIUM SERPL-MCNC: 4.7 MMOL/L — SIGNIFICANT CHANGE UP (ref 3.5–5)
POTASSIUM SERPL-SCNC: 4.7 MMOL/L — SIGNIFICANT CHANGE UP (ref 3.5–5)
PROT SERPL-MCNC: 5.3 G/DL — LOW (ref 6–8)
RBC # BLD: 2.71 M/UL — LOW (ref 4.7–6.1)
RBC # FLD: 16 % — HIGH (ref 11.5–14.5)
SODIUM SERPL-SCNC: 137 MMOL/L — SIGNIFICANT CHANGE UP (ref 135–146)
WBC # BLD: 9.44 K/UL — SIGNIFICANT CHANGE UP (ref 4.8–10.8)
WBC # FLD AUTO: 9.44 K/UL — SIGNIFICANT CHANGE UP (ref 4.8–10.8)

## 2024-09-25 PROCEDURE — 99232 SBSQ HOSP IP/OBS MODERATE 35: CPT

## 2024-09-25 RX ORDER — IRON SUCROSE 20 MG/ML
100 INJECTION, SOLUTION INTRAVENOUS EVERY 24 HOURS
Refills: 0 | Status: COMPLETED | OUTPATIENT
Start: 2024-09-25 | End: 2024-09-25

## 2024-09-25 RX ORDER — PANTOPRAZOLE SODIUM 40 MG/1
1 TABLET, DELAYED RELEASE ORAL
Qty: 0 | Refills: 0 | DISCHARGE
Start: 2024-09-25

## 2024-09-25 RX ORDER — CHLORHEXIDINE GLUCONATE ORAL RINSE 1.2 MG/ML
1 SOLUTION DENTAL
Refills: 0 | Status: DISCONTINUED | OUTPATIENT
Start: 2024-09-25 | End: 2024-09-27

## 2024-09-25 RX ORDER — FERROUS SULFATE 325(65) MG
1 TABLET ORAL
Qty: 0 | Refills: 0 | DISCHARGE

## 2024-09-25 RX ORDER — IRON SUCROSE 20 MG/ML
5 INJECTION, SOLUTION INTRAVENOUS
Qty: 0 | Refills: 0 | DISCHARGE
Start: 2024-09-25

## 2024-09-25 RX ADMIN — ATORVASTATIN CALCIUM 10 MILLIGRAM(S): 10 TABLET, FILM COATED ORAL at 21:30

## 2024-09-25 RX ADMIN — PANTOPRAZOLE SODIUM 40 MILLIGRAM(S): 40 TABLET, DELAYED RELEASE ORAL at 05:00

## 2024-09-25 RX ADMIN — CHLORHEXIDINE GLUCONATE ORAL RINSE 1 APPLICATION(S): 1.2 SOLUTION DENTAL at 11:16

## 2024-09-25 RX ADMIN — Medication 3 MILLIGRAM(S): at 21:30

## 2024-09-25 RX ADMIN — Medication 17 GRAM(S): at 11:02

## 2024-09-25 RX ADMIN — Medication 75 MILLIGRAM(S): at 11:02

## 2024-09-25 RX ADMIN — IRON SUCROSE 210 MILLIGRAM(S): 20 INJECTION, SOLUTION INTRAVENOUS at 05:00

## 2024-09-25 RX ADMIN — Medication 0.4 MILLIGRAM(S): at 21:30

## 2024-09-25 RX ADMIN — GABAPENTIN 300 MILLIGRAM(S): 800 TABLET, FILM COATED ORAL at 05:00

## 2024-09-25 RX ADMIN — GABAPENTIN 300 MILLIGRAM(S): 800 TABLET, FILM COATED ORAL at 21:31

## 2024-09-25 NOTE — DISCHARGE NOTE NURSING/CASE MANAGEMENT/SOCIAL WORK - PATIENT PORTAL LINK FT
You can access the FollowMyHealth Patient Portal offered by [facility] by registering at the following website: [email or web address]. By joining [employer]’s FollowMyHealth portal, you will also be able to view your health information using other applications (apps) compatible with our system.

## 2024-09-25 NOTE — PROGRESS NOTE ADULT - SUBJECTIVE AND OBJECTIVE BOX
SUBJECTIVE:  HPI:  83 y/o man w PMHx of HLD, DM, CAD s/p stents x2 in 2007, pt of Dr Avila, h/o asbestosis of lung, dx w malignant mesothelioma ~8/2024 and s/p VATS pleurodesis, R sided PleurX, h/o AVM/GIB, JASMIN, referred from NH for anemia to 6.3, in ED found to have Hb 6.9, ordered for 1uPRBC, admitted to medicine for anemia.   Cr 1.5 form prior 1.2, plt 553, Follows Dr Johnson for rad/onc    Per pt, has not noted any bleeding - no hematuria, black or bloody stools. Felt generally malaised/weak earlier, and improved now. No falls/syncope. No fever, cough. Reports he has maintained a decent appetite, no n/v.      (20 Sep 2024 04:40)      Patient is a 84y old Male who presents with a chief complaint of Anemia     (22 Sep 2024 20:06)    Currently admitted to medicine with the primary diagnosis of Anemia       Today is hospital day 6d.     PAST MEDICAL & SURGICAL HISTORY  DM (diabetes mellitus)    MI (myocardial infarction)    History of CAD (coronary artery disease)    Dyslipidemia    Currently smokes tobacco    Mesothelioma, malignant    Coronary stent patent        ALLERGIES:  penicillin (Unknown)    MEDICATIONS:  ACTIVE MEDICATIONS  acetaminophen     Tablet .. 650 milliGRAM(s) Oral every 6 hours PRN  atorvastatin 10 milliGRAM(s) Oral at bedtime  chlorhexidine 2% Cloths 1 Application(s) Topical <User Schedule>  clopidogrel Tablet 75 milliGRAM(s) Oral daily  dextrose 5%. 1000 milliLiter(s) IV Continuous <Continuous>  dextrose 5%. 1000 milliLiter(s) IV Continuous <Continuous>  dextrose 50% Injectable 25 Gram(s) IV Push once  dextrose 50% Injectable 12.5 Gram(s) IV Push once  dextrose 50% Injectable 25 Gram(s) IV Push once  dextrose Oral Gel 15 Gram(s) Oral once PRN  gabapentin 300 milliGRAM(s) Oral three times a day  glucagon  Injectable 1 milliGRAM(s) IntraMuscular once  influenza  Vaccine (HIGH DOSE) 0.5 milliLiter(s) IntraMuscular once  insulin lispro (ADMELOG) corrective regimen sliding scale   SubCutaneous at bedtime  insulin lispro (ADMELOG) corrective regimen sliding scale   SubCutaneous three times a day before meals  iron sucrose IVPB 100 milliGRAM(s) IV Intermittent every 24 hours  melatonin 3 milliGRAM(s) Oral at bedtime PRN  pantoprazole    Tablet 40 milliGRAM(s) Oral every 12 hours  polyethylene glycol 3350 17 Gram(s) Oral daily  senna 2 Tablet(s) Oral at bedtime  sodium chloride 0.9%. 1000 milliLiter(s) IV Continuous <Continuous>  tamsulosin 0.4 milliGRAM(s) Oral at bedtime      VITALS:   T(F): 97.8  HR: 80  BP: 123/65  RR: 18  SpO2: 96%    LABS:                        7.8    9.44  )-----------( 366      ( 25 Sep 2024 07:44 )             24.9     09-25    137  |  103  |  33[H]  ----------------------------<  142[H]  4.7   |  24  |  1.1    Ca    8.7      25 Sep 2024 07:44  Phos  3.7     09-25  Mg     1.9     09-25    TPro  5.3[L]  /  Alb  2.7[L]  /  TBili  0.4  /  DBili  x   /  AST  12  /  ALT  9   /  AlkPhos  88  09-25      Urinalysis Basic - ( 25 Sep 2024 07:44 )    Color: x / Appearance: x / SG: x / pH: x  Gluc: 142 mg/dL / Ketone: x  / Bili: x / Urobili: x   Blood: x / Protein: x / Nitrite: x   Leuk Esterase: x / RBC: x / WBC x   Sq Epi: x / Non Sq Epi: x / Bacteria: x                    PHYSICAL EXAM:  GEN: No acute distress  LUNGS: Clear to auscultation bilaterally   HEART: S1/S2 present.    ABD: Soft, non-tender, non-distended.   EXT: No pedal edema  NEURO: AAOX3    Indwelling Urethral Catheter:     Connect To:  Leg Bag    Indication:  Urinary Retention / Obstruction (09-25-24 @ 08:17) (not performed) [Active]  Indwelling Urethral Catheter:     Connect To:  Leg Bag    Indication:  Urinary Retention / Obstruction (09-24-24 @ 03:02) (not performed) [Active]  Indwelling Urethral Catheter:     Connect To:  Leg Bag    Indication:  Urinary Retention / Obstruction (09-23-24 @ 03:26) (not performed) [Active]

## 2024-09-25 NOTE — PROGRESS NOTE ADULT - SUBJECTIVE AND OBJECTIVE BOX
SUBJECTIVE:    Patient is a 84y old Male who presents with a chief complaint of Anemia     (22 Sep 2024 20:06)    Currently admitted to medicine with the primary diagnosis of Anemia       Today is hospital day 6d.     PAST MEDICAL & SURGICAL HISTORY  DM (diabetes mellitus)    MI (myocardial infarction)    History of CAD (coronary artery disease)    Dyslipidemia    Currently smokes tobacco    Mesothelioma, malignant    Coronary stent patent      ALLERGIES:  penicillin (Unknown)    MEDICATIONS:  STANDING MEDICATIONS  atorvastatin 10 milliGRAM(s) Oral at bedtime  chlorhexidine 2% Cloths 1 Application(s) Topical <User Schedule>  clopidogrel Tablet 75 milliGRAM(s) Oral daily  dextrose 5%. 1000 milliLiter(s) IV Continuous <Continuous>  dextrose 5%. 1000 milliLiter(s) IV Continuous <Continuous>  dextrose 50% Injectable 12.5 Gram(s) IV Push once  dextrose 50% Injectable 25 Gram(s) IV Push once  dextrose 50% Injectable 25 Gram(s) IV Push once  gabapentin 300 milliGRAM(s) Oral three times a day  glucagon  Injectable 1 milliGRAM(s) IntraMuscular once  influenza  Vaccine (HIGH DOSE) 0.5 milliLiter(s) IntraMuscular once  insulin lispro (ADMELOG) corrective regimen sliding scale   SubCutaneous three times a day before meals  insulin lispro (ADMELOG) corrective regimen sliding scale   SubCutaneous at bedtime  iron sucrose IVPB 100 milliGRAM(s) IV Intermittent every 24 hours  pantoprazole    Tablet 40 milliGRAM(s) Oral every 12 hours  polyethylene glycol 3350 17 Gram(s) Oral daily  senna 2 Tablet(s) Oral at bedtime  sodium chloride 0.9%. 1000 milliLiter(s) IV Continuous <Continuous>  tamsulosin 0.4 milliGRAM(s) Oral at bedtime    PRN MEDICATIONS  acetaminophen     Tablet .. 650 milliGRAM(s) Oral every 6 hours PRN  dextrose Oral Gel 15 Gram(s) Oral once PRN  melatonin 3 milliGRAM(s) Oral at bedtime PRN    VITALS:   T(F): 97.8  HR: 80  BP: 123/65  RR: 18  SpO2: 96%    LABS:                        7.8    9.44  )-----------( 366      ( 25 Sep 2024 07:44 )             24.9     09-25    137  |  103  |  33[H]  ----------------------------<  142[H]  4.7   |  24  |  1.1    Ca    8.7      25 Sep 2024 07:44  Phos  3.7     09-25  Mg     1.9     09-25    TPro  5.3[L]  /  Alb  2.7[L]  /  TBili  0.4  /  DBili  x   /  AST  12  /  ALT  9   /  AlkPhos  88  09-25      Urinalysis Basic - ( 25 Sep 2024 07:44 )    Color: x / Appearance: x / SG: x / pH: x  Gluc: 142 mg/dL / Ketone: x  / Bili: x / Urobili: x   Blood: x / Protein: x / Nitrite: x   Leuk Esterase: x / RBC: x / WBC x   Sq Epi: x / Non Sq Epi: x / Bacteria: x                RADIOLOGY:    PHYSICAL EXAM:  GEN: No acute distress  LUNGS: Clear to auscultation bilaterally   HEART: S1/S2 present. RRR.   ABD/ GI: Soft, non-tender, non-distended. Bowel sounds present  EXT: NC/NC/NE/2+PP/MCCAIN  NEURO: AAOX3

## 2024-09-25 NOTE — PROGRESS NOTE ADULT - SUBJECTIVE AND OBJECTIVE BOX
Patient is a 84y old  Male who presents with a chief complaint of severe anemia      84yoM with PMHx CAD (s/p 2 stents, follows with Dr. Avila), DM, HLD, anemia, tobacco use, presents for severe anemia at hb 6   He was brought in from rehab after bw showing hb 6. Pt underwent 1 prbc and hb is now at 7 .  Pt was recently dx of mesothelioma and underwent surgery removing dx in pleural cavity but unable to remove below the diaphragm.   Denies any chest pain, nausea/vomiting/diarrhea, abdominal pain, bloody stools, leg swelling, cough, fever, chills.   He also has hx of AVM causing slow and intermittent GI bleeding requiring iv venofer   Pt has improved clinically while rehab with active PT    PAST MEDICAL & SURGICAL HISTORY:  DM (diabetes mellitus)      MI (myocardial infarction)      History of CAD (coronary artery disease)      Dyslipidemia      Currently smokes tobacco      Coronary stent patent          SOCIAL HX:   Smoking        Active smoker                ETOH                            Other    FAMILY HISTORY:  .  No cardiovascular or pulmonary family history     REVIEW OF SYSTEMS:    All ROS are negative exept per HPI       Allergies    penicillin (Unknown)    Intolerances          PHYSICAL EXAM  vss      pt sleeping   CONSTITUTIONAL:  ILL Looking    ENT:   Airway patent,   No thrush    EYES:   Clear bilaterally,   pupils equal,   round and reactive to light.    CARDIAC:   Normal rate,   regular rhythm.    no edema      RESPIRATORY:   dec bs r side    GASTROINTESTINAL:  Abdomen soft, non-tender,   No guarding,   Positive BS    MUSCULOSKELETAL:   Range of motion is not limited,  No clubbing, cyanosis    NEUROLOGICAL:   Alert and oriented   No motor deficits.    SKIN:   Skin normal color for race,   No evidence of rash.        LABS:               hb 7 from 6 s/p transfusion >> hb 8 >> hb 7   ferr 80's                                                                                                           MEDICATIONS  (STANDING):  atorvastatin 10 milliGRAM(s) Oral at bedtime  budesonide 160 MICROgram(s)/formoterol 4.5 MICROgram(s) Inhaler 2 Puff(s) Inhalation two times a day  clopidogrel Tablet 75 milliGRAM(s) Oral daily  dextrose 5%. 1000 milliLiter(s) (50 mL/Hr) IV Continuous <Continuous>  dextrose 5%. 1000 milliLiter(s) (100 mL/Hr) IV Continuous <Continuous>  dextrose 50% Injectable 12.5 Gram(s) IV Push once  dextrose 50% Injectable 25 Gram(s) IV Push once  dextrose 50% Injectable 25 Gram(s) IV Push once  enoxaparin Injectable 40 milliGRAM(s) SubCutaneous every 24 hours  glucagon  Injectable 1 milliGRAM(s) IntraMuscular once  insulin glargine Injectable (LANTUS) 10 Unit(s) SubCutaneous at bedtime  insulin lispro (ADMELOG) corrective regimen sliding scale   SubCutaneous three times a day before meals  insulin lispro (ADMELOG) corrective regimen sliding scale   SubCutaneous at bedtime  insulin lispro Injectable (ADMELOG) 3 Unit(s) SubCutaneous three times a day before meals    MEDICATIONS  (PRN):  dextrose Oral Gel 15 Gram(s) Oral once PRN Blood Glucose LESS THAN 70 milliGRAM(s)/deciliter

## 2024-09-26 LAB
ALBUMIN SERPL ELPH-MCNC: 3.1 G/DL — LOW (ref 3.5–5.2)
ALP SERPL-CCNC: 92 U/L — SIGNIFICANT CHANGE UP (ref 30–115)
ALT FLD-CCNC: 17 U/L — SIGNIFICANT CHANGE UP (ref 0–41)
ANION GAP SERPL CALC-SCNC: 10 MMOL/L — SIGNIFICANT CHANGE UP (ref 7–14)
AST SERPL-CCNC: 18 U/L — SIGNIFICANT CHANGE UP (ref 0–41)
BASOPHILS # BLD AUTO: 0.02 K/UL — SIGNIFICANT CHANGE UP (ref 0–0.2)
BASOPHILS NFR BLD AUTO: 0.2 % — SIGNIFICANT CHANGE UP (ref 0–1)
BILIRUB SERPL-MCNC: 0.2 MG/DL — SIGNIFICANT CHANGE UP (ref 0.2–1.2)
BUN SERPL-MCNC: 30 MG/DL — HIGH (ref 10–20)
CALCIUM SERPL-MCNC: 8.5 MG/DL — SIGNIFICANT CHANGE UP (ref 8.4–10.5)
CHLORIDE SERPL-SCNC: 104 MMOL/L — SIGNIFICANT CHANGE UP (ref 98–110)
CO2 SERPL-SCNC: 26 MMOL/L — SIGNIFICANT CHANGE UP (ref 17–32)
CREAT SERPL-MCNC: 1.1 MG/DL — SIGNIFICANT CHANGE UP (ref 0.7–1.5)
EGFR: 67 ML/MIN/1.73M2 — SIGNIFICANT CHANGE UP
EOSINOPHIL # BLD AUTO: 0.33 K/UL — SIGNIFICANT CHANGE UP (ref 0–0.7)
EOSINOPHIL NFR BLD AUTO: 4 % — SIGNIFICANT CHANGE UP (ref 0–8)
GLUCOSE BLDC GLUCOMTR-MCNC: 153 MG/DL — HIGH (ref 70–99)
GLUCOSE BLDC GLUCOMTR-MCNC: 178 MG/DL — HIGH (ref 70–99)
GLUCOSE BLDC GLUCOMTR-MCNC: 194 MG/DL — HIGH (ref 70–99)
GLUCOSE BLDC GLUCOMTR-MCNC: 212 MG/DL — HIGH (ref 70–99)
GLUCOSE SERPL-MCNC: 136 MG/DL — HIGH (ref 70–99)
HCT VFR BLD CALC: 25.5 % — LOW (ref 42–52)
HGB BLD-MCNC: 7.9 G/DL — LOW (ref 14–18)
IMM GRANULOCYTES NFR BLD AUTO: 0.6 % — HIGH (ref 0.1–0.3)
IRON SATN MFR SERPL: 21 UG/DL — LOW (ref 35–150)
LYMPHOCYTES # BLD AUTO: 0.66 K/UL — LOW (ref 1.2–3.4)
LYMPHOCYTES # BLD AUTO: 8.1 % — LOW (ref 20.5–51.1)
MAGNESIUM SERPL-MCNC: 2 MG/DL — SIGNIFICANT CHANGE UP (ref 1.8–2.4)
MCHC RBC-ENTMCNC: 29.5 PG — SIGNIFICANT CHANGE UP (ref 27–31)
MCHC RBC-ENTMCNC: 31 G/DL — LOW (ref 32–37)
MCV RBC AUTO: 95.1 FL — HIGH (ref 80–94)
MONOCYTES # BLD AUTO: 0.77 K/UL — HIGH (ref 0.1–0.6)
MONOCYTES NFR BLD AUTO: 9.4 % — HIGH (ref 1.7–9.3)
NEUTROPHILS # BLD AUTO: 6.32 K/UL — SIGNIFICANT CHANGE UP (ref 1.4–6.5)
NEUTROPHILS NFR BLD AUTO: 77.7 % — HIGH (ref 42.2–75.2)
NRBC # BLD: 0 /100 WBCS — SIGNIFICANT CHANGE UP (ref 0–0)
PLATELET # BLD AUTO: 387 K/UL — SIGNIFICANT CHANGE UP (ref 130–400)
PMV BLD: 9.5 FL — SIGNIFICANT CHANGE UP (ref 7.4–10.4)
POTASSIUM SERPL-MCNC: 4.5 MMOL/L — SIGNIFICANT CHANGE UP (ref 3.5–5)
POTASSIUM SERPL-SCNC: 4.5 MMOL/L — SIGNIFICANT CHANGE UP (ref 3.5–5)
PROT SERPL-MCNC: 5.6 G/DL — LOW (ref 6–8)
RBC # BLD: 2.68 M/UL — LOW (ref 4.7–6.1)
RBC # FLD: 15.9 % — HIGH (ref 11.5–14.5)
SODIUM SERPL-SCNC: 140 MMOL/L — SIGNIFICANT CHANGE UP (ref 135–146)
WBC # BLD: 8.15 K/UL — SIGNIFICANT CHANGE UP (ref 4.8–10.8)
WBC # FLD AUTO: 8.15 K/UL — SIGNIFICANT CHANGE UP (ref 4.8–10.8)

## 2024-09-26 PROCEDURE — 99232 SBSQ HOSP IP/OBS MODERATE 35: CPT

## 2024-09-26 RX ADMIN — Medication 1: at 11:15

## 2024-09-26 RX ADMIN — PANTOPRAZOLE SODIUM 40 MILLIGRAM(S): 40 TABLET, DELAYED RELEASE ORAL at 05:46

## 2024-09-26 RX ADMIN — GABAPENTIN 300 MILLIGRAM(S): 800 TABLET, FILM COATED ORAL at 21:37

## 2024-09-26 RX ADMIN — ATORVASTATIN CALCIUM 10 MILLIGRAM(S): 10 TABLET, FILM COATED ORAL at 21:38

## 2024-09-26 RX ADMIN — GABAPENTIN 300 MILLIGRAM(S): 800 TABLET, FILM COATED ORAL at 13:03

## 2024-09-26 RX ADMIN — Medication 17 GRAM(S): at 11:09

## 2024-09-26 RX ADMIN — Medication 75 MILLIGRAM(S): at 11:09

## 2024-09-26 RX ADMIN — GABAPENTIN 300 MILLIGRAM(S): 800 TABLET, FILM COATED ORAL at 05:46

## 2024-09-26 RX ADMIN — Medication 3 MILLIGRAM(S): at 21:38

## 2024-09-26 RX ADMIN — PANTOPRAZOLE SODIUM 40 MILLIGRAM(S): 40 TABLET, DELAYED RELEASE ORAL at 17:48

## 2024-09-26 RX ADMIN — Medication 0.4 MILLIGRAM(S): at 21:37

## 2024-09-26 RX ADMIN — CHLORHEXIDINE GLUCONATE ORAL RINSE 1 APPLICATION(S): 1.2 SOLUTION DENTAL at 05:47

## 2024-09-26 RX ADMIN — Medication 2: at 17:48

## 2024-09-26 NOTE — PROGRESS NOTE ADULT - SUBJECTIVE AND OBJECTIVE BOX
SUBJECTIVE:  HPI:  85 y/o man w PMHx of HLD, DM, CAD s/p stents x2 in 2007, pt of Dr Avila, h/o asbestosis of lung, dx w malignant mesothelioma ~8/2024 and s/p VATS pleurodesis, R sided PleurX, h/o AVM/GIB, JASMIN, referred from NH for anemia to 6.3, in ED found to have Hb 6.9, ordered for 1uPRBC, admitted to medicine for anemia.   Cr 1.5 form prior 1.2, plt 553, Follows Dr Johnson for rad/onc    Per pt, has not noted any bleeding - no hematuria, black or bloody stools. Felt generally malaised/weak earlier, and improved now. No falls/syncope. No fever, cough. Reports he has maintained a decent appetite, no n/v.      (20 Sep 2024 04:40)      Patient is a 84y old Male who presents with a chief complaint of Anemia     (22 Sep 2024 20:06)    Currently admitted to medicine with the primary diagnosis of Anemia       Today is hospital day 7d.     PAST MEDICAL & SURGICAL HISTORY  DM (diabetes mellitus)    MI (myocardial infarction)    History of CAD (coronary artery disease)    Dyslipidemia    Currently smokes tobacco    Mesothelioma, malignant    Coronary stent patent        ALLERGIES:  penicillin (Unknown)    MEDICATIONS:  ACTIVE MEDICATIONS  acetaminophen     Tablet .. 650 milliGRAM(s) Oral every 6 hours PRN  atorvastatin 10 milliGRAM(s) Oral at bedtime  chlorhexidine 2% Cloths 1 Application(s) Topical <User Schedule>  clopidogrel Tablet 75 milliGRAM(s) Oral daily  dextrose 5%. 1000 milliLiter(s) IV Continuous <Continuous>  dextrose 5%. 1000 milliLiter(s) IV Continuous <Continuous>  dextrose 50% Injectable 25 Gram(s) IV Push once  dextrose 50% Injectable 12.5 Gram(s) IV Push once  dextrose 50% Injectable 25 Gram(s) IV Push once  dextrose Oral Gel 15 Gram(s) Oral once PRN  gabapentin 300 milliGRAM(s) Oral three times a day  glucagon  Injectable 1 milliGRAM(s) IntraMuscular once  influenza  Vaccine (HIGH DOSE) 0.5 milliLiter(s) IntraMuscular once  insulin lispro (ADMELOG) corrective regimen sliding scale   SubCutaneous at bedtime  insulin lispro (ADMELOG) corrective regimen sliding scale   SubCutaneous three times a day before meals  iron sucrose IVPB 100 milliGRAM(s) IV Intermittent every 24 hours  melatonin 3 milliGRAM(s) Oral at bedtime PRN  pantoprazole    Tablet 40 milliGRAM(s) Oral every 12 hours  polyethylene glycol 3350 17 Gram(s) Oral daily  senna 2 Tablet(s) Oral at bedtime  sodium chloride 0.9%. 1000 milliLiter(s) IV Continuous <Continuous>  tamsulosin 0.4 milliGRAM(s) Oral at bedtime      VITALS:   Vital Signs Last 24 Hrs  T(C): 36.4 (26 Sep 2024 08:01), Max: 36.4 (26 Sep 2024 08:01)  T(F): 97.6 (26 Sep 2024 08:01), Max: 97.6 (26 Sep 2024 08:01)  HR: 80 (26 Sep 2024 08:01) (80 - 80)  BP: 164/81 (26 Sep 2024 08:01) (164/81 - 164/81)  BP(mean): --  RR: 18 (26 Sep 2024 08:01) (18 - 18)  SpO2: 96% (26 Sep 2024 08:01) (96% - 96%)    Parameters below as of 26 Sep 2024 08:01  Patient On (Oxygen Delivery Method): room air        LABS:                          7.9    8.15  )-----------( 387      ( 26 Sep 2024 06:46 )             25.5     09-26    140  |  104  |  30[H]  ----------------------------<  136[H]  4.5   |  26  |  1.1    Ca    8.5      26 Sep 2024 06:46  Phos  3.7     09-25  Mg     2.0     09-26    TPro  5.6[L]  /  Alb  3.1[L]  /  TBili  0.2  /  DBili  x   /  AST  18  /  ALT  17  /  AlkPhos  92  09-26    LIVER FUNCTIONS - ( 26 Sep 2024 06:46 )  Alb: 3.1 g/dL / Pro: 5.6 g/dL / ALK PHOS: 92 U/L / ALT: 17 U/L / AST: 18 U/L / GGT: x             Urinalysis Basic - ( 26 Sep 2024 06:46 )    Color: x / Appearance: x / SG: x / pH: x  Gluc: 136 mg/dL / Ketone: x  / Bili: x / Urobili: x   Blood: x / Protein: x / Nitrite: x   Leuk Esterase: x / RBC: x / WBC x   Sq Epi: x / Non Sq Epi: x / Bacteria: x        PHYSICAL EXAM:  GEN: No acute distress  LUNGS: Clear to auscultation bilaterally   HEART: S1/S2 present.    ABD: Soft, non-tender, non-distended.   EXT: No pedal edema  NEURO: AAOX3    Indwelling Urethral Catheter:     Connect To:  Leg Bag    Indication:  Urinary Retention / Obstruction (09-25-24 @ 08:17) (not performed) [Active]  Indwelling Urethral Catheter:     Connect To:  Leg Bag    Indication:  Urinary Retention / Obstruction (09-24-24 @ 03:02) (not performed) [Active]  Indwelling Urethral Catheter:     Connect To:  Leg Bag    Indication:  Urinary Retention / Obstruction (09-23-24 @ 03:26) (not performed) [Active]

## 2024-09-26 NOTE — PROGRESS NOTE ADULT - SUBJECTIVE AND OBJECTIVE BOX
Patient is a 84y old  Male who presents with a chief complaint of Anemia     (22 Sep 2024 20:06)       Pt is seen and examined this morning   pt is awake and lying in bed  pt seems comfortable and denies any complaints at this time          ROS:  Negative except for:weakness    MEDICATIONS  (STANDING):  atorvastatin 10 milliGRAM(s) Oral at bedtime  chlorhexidine 2% Cloths 1 Application(s) Topical <User Schedule>  clopidogrel Tablet 75 milliGRAM(s) Oral daily  dextrose 5%. 1000 milliLiter(s) (50 mL/Hr) IV Continuous <Continuous>  dextrose 5%. 1000 milliLiter(s) (100 mL/Hr) IV Continuous <Continuous>  dextrose 50% Injectable 12.5 Gram(s) IV Push once  dextrose 50% Injectable 25 Gram(s) IV Push once  dextrose 50% Injectable 25 Gram(s) IV Push once  gabapentin 300 milliGRAM(s) Oral three times a day  glucagon  Injectable 1 milliGRAM(s) IntraMuscular once  influenza  Vaccine (HIGH DOSE) 0.5 milliLiter(s) IntraMuscular once  insulin lispro (ADMELOG) corrective regimen sliding scale   SubCutaneous at bedtime  insulin lispro (ADMELOG) corrective regimen sliding scale   SubCutaneous three times a day before meals  pantoprazole    Tablet 40 milliGRAM(s) Oral every 12 hours  polyethylene glycol 3350 17 Gram(s) Oral daily  senna 2 Tablet(s) Oral at bedtime  sodium chloride 0.9%. 1000 milliLiter(s) (75 mL/Hr) IV Continuous <Continuous>  tamsulosin 0.4 milliGRAM(s) Oral at bedtime    MEDICATIONS  (PRN):  acetaminophen     Tablet .. 650 milliGRAM(s) Oral every 6 hours PRN Temp greater or equal to 38C (100.4F), Mild Pain (1 - 3)  dextrose Oral Gel 15 Gram(s) Oral once PRN Blood Glucose LESS THAN 70 milliGRAM(s)/deciliter  melatonin 3 milliGRAM(s) Oral at bedtime PRN Insomnia      Allergies    penicillin (Unknown)    Intolerances        Vital Signs Last 24 Hrs  T(C): 36.4 (26 Sep 2024 08:01), Max: 36.4 (26 Sep 2024 08:01)  T(F): 97.6 (26 Sep 2024 08:01), Max: 97.6 (26 Sep 2024 08:01)  HR: 80 (26 Sep 2024 08:01) (80 - 80)  BP: 164/81 (26 Sep 2024 08:01) (164/81 - 164/81)  BP(mean): --  RR: 18 (26 Sep 2024 08:01) (18 - 18)  SpO2: 96% (26 Sep 2024 08:01) (96% - 96%)    Parameters below as of 26 Sep 2024 08:01  Patient On (Oxygen Delivery Method): room air        PHYSICAL EXAM  General: adult in NAD  HEENT: clear oropharynx, anicteric sclera, pink conjunctiva  Neck: supple  CV: normal S1/S2 with no murmur rubs or gallops  Lungs: positive air movement b/l ant lungs,clear to auscultation, no wheezes, no rales  Abdomen: soft non-tender non-distended, no hepatosplenomegaly  Ext: no clubbing cyanosis or edema  Skin: no rashes and no petechiae  Neuro: alert and oriented X 2, no focal deficits  LABS:                          7.9    8.15  )-----------( 387      ( 26 Sep 2024 06:46 )             25.5         Mean Cell Volume : 95.1 fL  Mean Cell Hemoglobin : 29.5 pg  Mean Cell Hemoglobin Concentration : 31.0 g/dL  Auto Neutrophil # : 6.32 K/uL  Auto Lymphocyte # : 0.66 K/uL  Auto Monocyte # : 0.77 K/uL  Auto Eosinophil # : 0.33 K/uL  Auto Basophil # : 0.02 K/uL  Auto Neutrophil % : 77.7 %  Auto Lymphocyte % : 8.1 %  Auto Monocyte % : 9.4 %  Auto Eosinophil % : 4.0 %  Auto Basophil % : 0.2 %    Serial CBC's  09-26 @ 06:46  Hct-25.5 / Hgb-7.9 / Plat-387 / RBC-2.68 / WBC-8.15          Serial CBC's  09-25 @ 07:44  Hct-24.9 / Hgb-7.8 / Plat-366 / RBC-2.71 / WBC-9.44          Serial CBC's  09-24 @ 11:30  Hct-24.2 / Hgb-7.5 / Plat-392 / RBC-2.56 / WBC-11.36          Serial CBC's  09-24 @ 06:40  Hct-23.8 / Hgb-7.3 / Plat-395 / RBC-2.52 / WBC-9.08          Serial CBC's  09-23 @ 06:18  Hct-25.1 / Hgb-7.9 / Plat-398 / RBC-2.72 / WBC-8.59            09-26    140  |  104  |  30[H]  ----------------------------<  136[H]  4.5   |  26  |  1.1    Ca    8.5      26 Sep 2024 06:46  Phos  3.7     09-25  Mg     2.0     09-26    TPro  5.6[L]  /  Alb  3.1[L]  /  TBili  0.2  /  DBili  x   /  AST  18  /  ALT  17  /  AlkPhos  92  09-26          WBC Count: 8.15 K/uL (09-26-24 @ 06:46)  Hemoglobin: 7.9 g/dL (09-26-24 @ 06:46)  Hematocrit: 25.5 % (09-26-24 @ 06:46)  Platelet Count - Automated: 387 K/uL (09-26-24 @ 06:46)  WBC Count: 9.44 K/uL (09-25-24 @ 07:44)  Hemoglobin: 7.8 g/dL (09-25-24 @ 07:44)  Hematocrit: 24.9 % (09-25-24 @ 07:44)  Platelet Count - Automated: 366 K/uL (09-25-24 @ 07:44)  WBC Count: 11.36 K/uL (09-24-24 @ 11:30)  Hemoglobin: 7.5 g/dL (09-24-24 @ 11:30)  Hematocrit: 24.2 % (09-24-24 @ 11:30)  Platelet Count - Automated: 392 K/uL (09-24-24 @ 11:30)  WBC Count: 9.08 K/uL (09-24-24 @ 06:40)  Hemoglobin: 7.3 g/dL (09-24-24 @ 06:40)  Hematocrit: 23.8 % (09-24-24 @ 06:40)  Platelet Count - Automated: 395 K/uL (09-24-24 @ 06:40)  WBC Count: 8.59 K/uL (09-23-24 @ 06:18)  Hemoglobin: 7.9 g/dL (09-23-24 @ 06:18)  Hematocrit: 25.1 % (09-23-24 @ 06:18)  Platelet Count - Automated: 398 K/uL (09-23-24 @ 06:18)  WBC Count: 9.18 K/uL (09-21-24 @ 21:25)  Hemoglobin: 8.1 g/dL (09-21-24 @ 21:25)  Hematocrit: 26.2 % (09-21-24 @ 21:25)  Platelet Count - Automated: 423 K/uL (09-21-24 @ 21:25)  WBC Count: 8.16 K/uL (09-21-24 @ 06:15)  Hemoglobin: 8.0 g/dL (09-21-24 @ 06:15)  Hematocrit: 25.1 % (09-21-24 @ 06:15)  Platelet Count - Automated: 430 K/uL (09-21-24 @ 06:15)  Ferritin: 89 ng/mL (09-20-24 @ 15:07)  Vitamin B12, Serum: 589 pg/mL (09-20-24 @ 06:15)  Folate, Serum: 6.2 ng/mL (09-20-24 @ 06:15)  WBC Count: 8.90 K/uL (09-20-24 @ 06:15)  Hemoglobin: 7.4 g/dL (09-20-24 @ 06:15)  Hematocrit: 24.5 % (09-20-24 @ 06:15)  Platelet Count - Automated: 503 K/uL (09-20-24 @ 06:15)  WBC Count: 9.23 K/uL (09-20-24 @ 00:13)  Hemoglobin: 6.8 g/dL (09-20-24 @ 00:13)  Hematocrit: 22.2 % (09-20-24 @ 00:13)  Platelet Count - Automated: 458 K/uL (09-20-24 @ 00:13)  Iron - Total Binding Capacity.: 325 ug/dL (09-19-24 @ 21:13)  Ferritin: 82 ng/mL (09-19-24 @ 21:13)  WBC Count: 10.05 K/uL (09-19-24 @ 17:17)  Hemoglobin: 6.9 g/dL (09-19-24 @ 17:17)  Hematocrit: 22.3 % (09-19-24 @ 17:17)  Platelet Count - Automated: 553 K/uL (09-19-24 @ 17:17)                BLOOD SMEAR INTERPRETATION:       RADIOLOGY & ADDITIONAL STUDIES:

## 2024-09-26 NOTE — PROGRESS NOTE ADULT - SUBJECTIVE AND OBJECTIVE BOX
SUBJECTIVE:    Patient is a 84y old Male who presents with a chief complaint of Anemia     (22 Sep 2024 20:06)    Currently admitted to medicine with the primary diagnosis of Anemia       Today is hospital day 7d.     PAST MEDICAL & SURGICAL HISTORY  DM (diabetes mellitus)    MI (myocardial infarction)    History of CAD (coronary artery disease)    Dyslipidemia    Currently smokes tobacco    Mesothelioma, malignant    Coronary stent patent      ALLERGIES:  penicillin (Unknown)    MEDICATIONS:  STANDING MEDICATIONS  atorvastatin 10 milliGRAM(s) Oral at bedtime  chlorhexidine 2% Cloths 1 Application(s) Topical <User Schedule>  clopidogrel Tablet 75 milliGRAM(s) Oral daily  dextrose 5%. 1000 milliLiter(s) IV Continuous <Continuous>  dextrose 5%. 1000 milliLiter(s) IV Continuous <Continuous>  dextrose 50% Injectable 25 Gram(s) IV Push once  dextrose 50% Injectable 12.5 Gram(s) IV Push once  dextrose 50% Injectable 25 Gram(s) IV Push once  gabapentin 300 milliGRAM(s) Oral three times a day  glucagon  Injectable 1 milliGRAM(s) IntraMuscular once  influenza  Vaccine (HIGH DOSE) 0.5 milliLiter(s) IntraMuscular once  insulin lispro (ADMELOG) corrective regimen sliding scale   SubCutaneous three times a day before meals  insulin lispro (ADMELOG) corrective regimen sliding scale   SubCutaneous at bedtime  pantoprazole    Tablet 40 milliGRAM(s) Oral every 12 hours  polyethylene glycol 3350 17 Gram(s) Oral daily  senna 2 Tablet(s) Oral at bedtime  sodium chloride 0.9%. 1000 milliLiter(s) IV Continuous <Continuous>  tamsulosin 0.4 milliGRAM(s) Oral at bedtime    PRN MEDICATIONS  acetaminophen     Tablet .. 650 milliGRAM(s) Oral every 6 hours PRN  dextrose Oral Gel 15 Gram(s) Oral once PRN  melatonin 3 milliGRAM(s) Oral at bedtime PRN    VITALS:   T(F): 97.6  HR: 80  BP: 164/81  RR: 18  SpO2: 96%    LABS:                        7.9    8.15  )-----------( 387      ( 26 Sep 2024 06:46 )             25.5 09-26    140  |  104  |  30[H]  ----------------------------<  136[H]  4.5   |  26  |  1.1    Ca    8.5      26 Sep 2024 06:46  Phos  3.7     09-25  Mg     2.0     09-26    TPro  5.6[L]  /  Alb  3.1[L]  /  TBili  0.2  /  DBili  x   /  AST  18  /  ALT  17  /  AlkPhos  92  09-26      Urinalysis Basic - ( 26 Sep 2024 06:46 )    Color: x / Appearance: x / SG: x / pH: x  Gluc: 136 mg/dL / Ketone: x  / Bili: x / Urobili: x   Blood: x / Protein: x / Nitrite: x   Leuk Esterase: x / RBC: x / WBC x   Sq Epi: x / Non Sq Epi: x / Bacteria: x                RADIOLOGY:    PHYSICAL EXAM:  GEN: No acute distress  LUNGS: Clear to auscultation bilaterally   HEART: S1/S2 present. RRR.   ABD/ GI: Soft, non-tender, non-distended. Bowel sounds present  EXT: NC/NC/NE/2+PP/MCCAIN  NEURO: AAOX1

## 2024-09-27 VITALS
TEMPERATURE: 98 F | HEART RATE: 62 BPM | RESPIRATION RATE: 18 BRPM | OXYGEN SATURATION: 96 % | DIASTOLIC BLOOD PRESSURE: 63 MMHG | SYSTOLIC BLOOD PRESSURE: 113 MMHG

## 2024-09-27 LAB
ALBUMIN SERPL ELPH-MCNC: 3.1 G/DL — LOW (ref 3.5–5.2)
ALP SERPL-CCNC: 104 U/L — SIGNIFICANT CHANGE UP (ref 30–115)
ALT FLD-CCNC: 22 U/L — SIGNIFICANT CHANGE UP (ref 0–41)
ANION GAP SERPL CALC-SCNC: 12 MMOL/L — SIGNIFICANT CHANGE UP (ref 7–14)
AST SERPL-CCNC: 20 U/L — SIGNIFICANT CHANGE UP (ref 0–41)
BASOPHILS # BLD AUTO: 0.01 K/UL — SIGNIFICANT CHANGE UP (ref 0–0.2)
BASOPHILS NFR BLD AUTO: 0.1 % — SIGNIFICANT CHANGE UP (ref 0–1)
BILIRUB SERPL-MCNC: <0.2 MG/DL — SIGNIFICANT CHANGE UP (ref 0.2–1.2)
BUN SERPL-MCNC: 25 MG/DL — HIGH (ref 10–20)
CALCIUM SERPL-MCNC: 8.9 MG/DL — SIGNIFICANT CHANGE UP (ref 8.4–10.4)
CHLORIDE SERPL-SCNC: 102 MMOL/L — SIGNIFICANT CHANGE UP (ref 98–110)
CO2 SERPL-SCNC: 25 MMOL/L — SIGNIFICANT CHANGE UP (ref 17–32)
CREAT SERPL-MCNC: 1 MG/DL — SIGNIFICANT CHANGE UP (ref 0.7–1.5)
EGFR: 75 ML/MIN/1.73M2 — SIGNIFICANT CHANGE UP
EOSINOPHIL # BLD AUTO: 0.3 K/UL — SIGNIFICANT CHANGE UP (ref 0–0.7)
EOSINOPHIL NFR BLD AUTO: 4.4 % — SIGNIFICANT CHANGE UP (ref 0–8)
GLUCOSE BLDC GLUCOMTR-MCNC: 149 MG/DL — HIGH (ref 70–99)
GLUCOSE BLDC GLUCOMTR-MCNC: 155 MG/DL — HIGH (ref 70–99)
GLUCOSE SERPL-MCNC: 129 MG/DL — HIGH (ref 70–99)
HCT VFR BLD CALC: 26.5 % — LOW (ref 42–52)
HGB BLD-MCNC: 8.2 G/DL — LOW (ref 14–18)
IMM GRANULOCYTES NFR BLD AUTO: 0.4 % — HIGH (ref 0.1–0.3)
LYMPHOCYTES # BLD AUTO: 0.83 K/UL — LOW (ref 1.2–3.4)
LYMPHOCYTES # BLD AUTO: 12.2 % — LOW (ref 20.5–51.1)
MAGNESIUM SERPL-MCNC: 2 MG/DL — SIGNIFICANT CHANGE UP (ref 1.8–2.4)
MCHC RBC-ENTMCNC: 29.3 PG — SIGNIFICANT CHANGE UP (ref 27–31)
MCHC RBC-ENTMCNC: 30.9 G/DL — LOW (ref 32–37)
MCV RBC AUTO: 94.6 FL — HIGH (ref 80–94)
MONOCYTES # BLD AUTO: 0.62 K/UL — HIGH (ref 0.1–0.6)
MONOCYTES NFR BLD AUTO: 9.1 % — SIGNIFICANT CHANGE UP (ref 1.7–9.3)
NEUTROPHILS # BLD AUTO: 5.02 K/UL — SIGNIFICANT CHANGE UP (ref 1.4–6.5)
NEUTROPHILS NFR BLD AUTO: 73.8 % — SIGNIFICANT CHANGE UP (ref 42.2–75.2)
NRBC # BLD: 0 /100 WBCS — SIGNIFICANT CHANGE UP (ref 0–0)
PHOSPHATE SERPL-MCNC: 4 MG/DL — SIGNIFICANT CHANGE UP (ref 2.1–4.9)
PLATELET # BLD AUTO: 396 K/UL — SIGNIFICANT CHANGE UP (ref 130–400)
PMV BLD: 9.4 FL — SIGNIFICANT CHANGE UP (ref 7.4–10.4)
POTASSIUM SERPL-MCNC: 4.6 MMOL/L — SIGNIFICANT CHANGE UP (ref 3.5–5)
POTASSIUM SERPL-SCNC: 4.6 MMOL/L — SIGNIFICANT CHANGE UP (ref 3.5–5)
PROT SERPL-MCNC: 5.7 G/DL — LOW (ref 6–8)
RBC # BLD: 2.8 M/UL — LOW (ref 4.7–6.1)
RBC # FLD: 15.6 % — HIGH (ref 11.5–14.5)
SODIUM SERPL-SCNC: 139 MMOL/L — SIGNIFICANT CHANGE UP (ref 135–146)
WBC # BLD: 6.81 K/UL — SIGNIFICANT CHANGE UP (ref 4.8–10.8)
WBC # FLD AUTO: 6.81 K/UL — SIGNIFICANT CHANGE UP (ref 4.8–10.8)

## 2024-09-27 PROCEDURE — 99221 1ST HOSP IP/OBS SF/LOW 40: CPT

## 2024-09-27 PROCEDURE — 99239 HOSP IP/OBS DSCHRG MGMT >30: CPT

## 2024-09-27 RX ADMIN — Medication 1: at 09:15

## 2024-09-27 RX ADMIN — GABAPENTIN 300 MILLIGRAM(S): 800 TABLET, FILM COATED ORAL at 06:18

## 2024-09-27 RX ADMIN — GABAPENTIN 300 MILLIGRAM(S): 800 TABLET, FILM COATED ORAL at 13:20

## 2024-09-27 RX ADMIN — PANTOPRAZOLE SODIUM 40 MILLIGRAM(S): 40 TABLET, DELAYED RELEASE ORAL at 06:18

## 2024-09-27 RX ADMIN — CHLORHEXIDINE GLUCONATE ORAL RINSE 1 APPLICATION(S): 1.2 SOLUTION DENTAL at 09:15

## 2024-09-27 RX ADMIN — Medication 75 MILLIGRAM(S): at 12:15

## 2024-09-27 NOTE — PROGRESS NOTE ADULT - PROVIDER SPECIALTY LIST ADULT
Heme/Onc
Hospitalist
Heme/Onc
Heme/Onc
Hospitalist
Heme/Onc
Internal Medicine
Internal Medicine

## 2024-09-27 NOTE — PROGRESS NOTE ADULT - ASSESSMENT
1. worsening anemia baseline 7   now at 6 s/p transfusion >>> improved to 7 >>. hb 8 s/p 2nd unit   feeling better      2. hx of AVM JASMIN GI beeding  repeat ferritin >> 80's in setting PRBC transf.  suspect blood loss a/w ferr < 100 with transfusion >>> iv venofer qd upto x 5     3. recently dx mesothelioma s/p removal  planning on immunotherapy with/w/o chemo once he improves his perf status at rehab.      4  hx of asbestosis lung  CT >1 to 2 yrs ago  ongoing smoking.. advised him not to     5  . HO CAD SP PCI on plavix       
  1. worsening anemia baseline 7   now at 6 s/p transfusion >>> improved to 7 >>. hb 8 s/p 2nd unit   feeling better      2. hx of AVM JASMIN GI beeding  repeat ferritin >> 80's in setting PRBC transf.  suspect blood loss a/w ferr < 100 with transfusion >>> iv venofer qd upto x 5     3. recently dx mesothelioma s/p removal  planning on immunotherapy with/w/o chemo once he improves his perf status at rehab.      4  hx of asbestosis lung  CT >1 to 2 yrs ago  ongoing smoking.. advised him not to     5  . HO CAD SP PCI on plavix       
83 y/o male with     worsening anemia baseline 7 ,likely multifactorial including JASMIN  now at 6 s/p transfusion >>> improved to 7 >    2. hx of AVM JASMIN GI bleeding  repeat ferritin >> 80's with low sat  ,s/p tfx    on  iv venofer qd upto x 5     3. recently dx mesothelioma s/p removal  plan for  immunotherapy with/w/o chemo once he improves his perf status at rehab.  he will f/u with Dr Epperson as outpt for further discussion       4  hx of asbestosis lung  CT >1 to 2 yrs ago  ongoing smoking.    cont other supportive care  d/c plan as per primary team 
Pt has a pmhx of HTN, DM, CAD s/p 2 stents, asbestosis, malignant mesothelioma , JASMIN, came from NH found to have hgb 6.3, Hgb 6.9 in ED, received 1 unit pRBC, he denies lower GI bleeding, denies upper GI bleeding, he had malase but he feels better transfusion    symptomatic anemia, JASMIN  -reported CHINYERE negative in ER  - hx of AVM causing slow and intermittent GI bleeding requiring iv venofer as per hem/onc  -s/p 2 unit   - venofer got 3 doses pulled out IV  hgb on admission 6.8 .   Hgb  7.9.  s/p 3 Units OF PRBC  PPI BID as per GI   As per GI Family refused scope at this time   Offered VCE as outpatient     Hyponatremia resolved with no intervention     HLD, DM, CAD s/p stents  c/w Plavix and home meds   but hold metformin     malignant mesothelioma  -CXR with mildly increased right pleural effusion  -s/p r pleurx drained    hem/onc input appreciated   repeat cxr stable     DVT ppx  - holding at this time due to anemia- scd , heparin sq if Hgb stable       DC planning - called son that patient not talking and pulling out iv lines--he thinks patient is depressed after wife  and now he does not want to be in hospital and son says that he could be discharged to SNF to follow with psych there  will discuss Dc plan with CM today  
Pt has a pmhx of HTN, DM, CAD s/p 2 stents, asbestosis, malignant mesothelioma 8/24, JASMIN, came from NH found to have hgb 6.3, Hgb 6.9 in ED, received 1 unit pRBC, he denies lower GI bleeding, denies upper GI bleeding, he had malase but he feels better transfusion    #symptomatic anemia, JASMIN  -reported CHINYERE negative in ER  - hx of AVM causing slow and intermittent GI bleeding requiring iv venofer as per hem/onc  -s/p 2 unit   -started  ferrous sulfate - continue monior CBC today   hgb on admission 6.8 ,   Hem/onc input appreciated -c/w  IV Venofer   Hgb dropped again to 7.3 . GI consulted, would give one unit pRBC for tachycardiac patient asymptomatic   monitor CBC today  keep active type and screen  keep 2 large IV access  transfuse to keep Hgb >8  monitor hemodynamics  PPI BID as per GI   As per GI Family refused scope at this time   Offered VCE as outpatient     #hyponatremia resolved with no intervention     #HLD, DM, CAD s/p stents  c/w Plavix and home meds   but hold metformin     #malignant mesothelioma  -CXR with mildly increased right pleural effusion  -ts/p r pleurx drained 9/20   hem/onc input appreciated   repeat cxr stable     DVT ppx  - holding at this time due to anemia- scd , heparin sq if Hgb stable     Pending: patient will be dced tomorrow, monitoring cbc and last dose of venofer     family updated     
Pt has a pmhx of HTN, DM, CAD s/p 2 stents, asbestosis, malignant mesothelioma 8/24, JASMIN, came from NH found to have hgb 6.3, Hgb 6.9 in ED, received 1 unit pRBC, he denies lower GI bleeding, denies upper GI bleeding, he had malase but he feels better transfusion    symptomatic anemia, JASMIN  -reported CHINYERE negative in ER  - hx of AVM causing slow and intermittent GI bleeding requiring iv venofer as per hem/onc  -s/p 2 unit   -started  ferrous sulfate - continue monior CBC today   hgb on admission 6.8 ,   Hem/onc input appreciated -c/w  IV Venofer   Hgb dropped again to 7.3 . GI consulted, would give one unit pRBC for tachycardiac patient asymptomatic   monitor CBC today  keep active type and screen  keep 2 large IV access  transfuse to keep Hgb >8  monitor hemodynamics  PPI BID as per GI   As per GI Family refused scope at this time   Offered VCE as outpatient     hyponatremia resolved with no intervention     HLD, DM, CAD s/p stents  c/w Plavix and home meds   but hold metformin     malignant mesothelioma  -CXR with mildly increased right pleural effusion  -ts/p r pleurx drained 9/20   hem/onc input appreciated   repeat cxr stable     DVT ppx  - holding at this time due to anemia- scd , heparin sq if Hgb stable     Pending: patient will be dced tomorrow, monitoring cbc and last dose of venofer     family updated     
  1. worsening anemia baseline 7   now at 6 s/p transfusion >>> improved to 7   feeling better  recommend another unit to keep hb >8 and ok to d/c if stable    2. hx of AVM JASMIN GI beeding  repeat ferritin     3. recently dx mesothelioma s/p removal  planning on immunotherapy with/w/o chemo once he improves his perf status at rehab.      4  hx of asbestosis lung  CT >1 to 2 yrs ago  ongoing smoking.. advised him not to     5  . HO CAD SP PCI on plavix       
  1. worsening anemia baseline 7   now at 6 s/p transfusion >>> improved to 7 >>. hb 8 s/p 2nd unit   feeling better      2. hx of AVM JASMIN GI beeding  repeat ferritin     3. recently dx mesothelioma s/p removal  planning on immunotherapy with/w/o chemo once he improves his perf status at rehab.      4  hx of asbestosis lung  CT >1 to 2 yrs ago  ongoing smoking.. advised him not to     5  . HO CAD SP PCI on plavix       
  1. worsening anemia baseline 7   now at 6 s/p transfusion >>> improved to 7 >>. hb 8 s/p 2nd unit   feeling better      2. hx of AVM JASMIN GI beeding  repeat ferritin >> 80's in setting PRBC transf.  hb 7   suspect blood loss a/w ferr < 100 with transfusion >>> iv venofer qd upto x 5     3. recently dx mesothelioma s/p removal  planning on immunotherapy with/w/o chemo once he improves his perf status at rehab.      4  hx of asbestosis lung  CT >1 to 2 yrs ago  ongoing smoking.. advised him not to     5  . HO CAD SP PCI on plavix       
85 y/o male with     worsening anemia baseline 7 ,likely multifactorial including JASMIN  now at 6 s/p transfusion >>> improved to 7 >    2. hx of AVM JASMIN GI bleeding  repeat ferritin >> 80's with low sat  ,s/p tfx    on  iv venofer qd upto x 5     3. recently dx mesothelioma s/p removal  plan for  immunotherapy with/w/o chemo once he improves his perf status at rehab.  he will f/u with Dr Epperson as outpt for further discussion       4  hx of asbestosis lung  CT >1 to 2 yrs ago  ongoing smoking.    cont other supportive care  d/c plan as per primary team   will f/u     
  1. worsening anemia baseline 7   now at 6 s/p transfusion >>> improved to 7 >>. hb 8 s/p 2nd unit   feeling better      2. hx of AVM JASMIN GI beeding  repeat ferritin >> 80's in setting PRBC transf.  hb 7   suspect blood loss a/w ferr < 100 with transfusion >>>on  iv venofer qd upto x 5     3. recently dx mesothelioma s/p removal  planning on immunotherapy with/w/o chemo once he improves his perf status at rehab.      4  hx of asbestosis lung  CT >1 to 2 yrs ago  ongoing smoking.. advised him not to     5  . HO CAD SP PCI on plavix       
Pt has a pmhx of HTN, DM, CAD s/p 2 stents, asbestosis, malignant mesothelioma 8/24, JASMIN, came from NH found to have hgb 6.3, Hgb 6.9 in ED, received 1 unit pRBC, he denies lower GI bleeding, denies upper GI bleeding, he had malase but he feels better transfusion    symptomatic anemia, JASMIN  -reported CHINYERE negative in ER  - hx of AVM causing slow and intermittent GI bleeding requiring iv venofer as per hem/onc  -s/p 2 unit   - venofer day 4  hgb on admission 6.8 ,   Hgb dropped again to 7.3 . GI consulted, would give one unit pRBC for tachycardiac patient asymptomatic   PPI BID as per GI   As per GI Family refused scope at this time   Offered VCE as outpatient     Hyponatremia resolved with no intervention     HLD, DM, CAD s/p stents  c/w Plavix and home meds   but hold metformin     malignant mesothelioma  -CXR with mildly increased right pleural effusion  -ts/p r pleurx drained 9/20   hem/onc input appreciated   repeat cxr stable     DVT ppx  - holding at this time due to anemia- scd , heparin sq if Hgb stable       DC planning - moniotr CBC   possible d/c 24 hours  patient refusing to talk today        
Pt has a pmhx of HTN, DM, CAD s/p 2 stents, asbestosis, malignant mesothelioma 8/24, JASMIN, came from NH found to have hgb 6.3, Hgb 6.9 in ED, received 1 unit pRBC, he denies lower GI bleeding, denies upper GI bleeding, he had malase but he feels better transfusion    symptomatic anemia, JASMIN  -reported CHINYERE negative in ER  - hx of AVM causing slow and intermittent GI bleeding requiring iv venofer as per hem/onc  -s/p 2 unit   - venofer got 3 doses pulled out IV  hgb on admission 6.8 .   Hgb  8.2.  s/p 3 Units OF PRBC  PPI BID as per GI   As per GI Family refused scope at this time   Offered VCE as outpatient     Hyponatremia resolved with no intervention     HLD, DM, CAD s/p stents  c/w Plavix and home meds   metformin     malignant mesothelioma  -CXR with mildly increased right pleural effusion  -s/p r pleurx drained 9/20   hem/onc input appreciated   repeat cxr stable     DVT ppx  - holding at this time due to anemia- scd , heparin sq if Hgb stable       DC plan today-- son did not want psych eval here and he will do it as outpatient  spent more than 30mins
Pt has a pmhx of HTN, DM, CAD s/p 2 stents, asbestosis, malignant mesothelioma 8/24, JASMIN, came from NH found to have hgb 6.3, Hgb 6.9 in ED, received 1 unit pRBC, he denies lower GI bleeding, denies upper GI bleeding, he had malase but he feels better transfusion    symptomatic anemia, JASMIN  -reported CHINYERE negative in ER  - hx of AVM causing slow and intermittent GI bleeding requiring iv venofer as per hem/onc  -s/p 2 unit   -started  ferrous sulfate - continue monior CBC today   hgb on admission 6.8 , last night 8.1   Hem/onc input appreciated - start IV Venofer       HLD, DM, CAD s/p stents  resume Plavix and home meds   but hold metformin     malignant mesothelioma  -CXR with mildly increased right pleural effusion  -ts/p r pleurx drained 9/20   hem/onc input appreciated     DVT ppx  - holding at this time due to anemia- scd ,     pending repeat CBC today     DC planning - pending  authorization of return.  
Pt has a pmhx of HTN, DM, CAD s/p 2 stents, asbestosis, malignant mesothelioma 8/24, JASMIN, came from NH found to have hgb 6.3, Hgb 6.9 in ED, received 1 unit pRBC, he denies lower GI bleeding, denies upper GI bleeding, he had malase but he feels better transfusion    symptomatic anemia, JASMIN  -reported CHINYERE negative in ER  - hx of AVM causing slow and intermittent GI bleeding requiring iv venofer as per hem/onc  -s/p 2 unit   -started  ferrous sulfate - continue monior CBC today   hgb on admission 6.8 ,   Hem/onc input appreciated -c/w  IV Venofer   Hgb dropped again to 7.3 . GI consulted, would give one unit pRBC for tachycardiac patient asymptomatic   monitor CBC today  keep active type and screen  keep 2 large IV access  transfuse to keep Hgb >8  monitor hemodynamics  PPI BID as per GI   As per GI Family refused scope at this time   Offered VCE as outpatient     hyponatremia resolved with no intervention     HLD, DM, CAD s/p stents  c/w Plavix and home meds   but hold metformin     malignant mesothelioma  -CXR with mildly increased right pleural effusion  -ts/p r pleurx drained 9/20   hem/onc input appreciated   repeat cxr stable     DVT ppx  - holding at this time due to anemia- scd , heparin sq if Hgb stable       DC planning - moniotr CBC   possible d/c 24-48 hours if stable     family updated   
Pt has a pmhx of HTN, DM, CAD s/p 2 stents, asbestosis, malignant mesothelioma 8/24, JASMIN, came from NH found to have hgb 6.3, Hgb 6.9 in ED, received 1 unit pRBC, he denies lower GI bleeding, denies upper GI bleeding, he had malase but he feels better transfusion    symptomatic anemia, JASMIN  -reported CHINYERE negative in ER  -s/p 2 unit   -started  ferrous sulfate - continue monior CBC today   Hemoglobin: 8.0 g/dL (09-21 @ 06:15)  Hemoglobin: 7.4 g/dL (09-20 @ 06:15)  Hemoglobin: 6.8 g/dL (09-20 @ 00:13)  Hemoglobin: 6.9 g/dL (09-19 @ 17:17)  Hem/onc input appreciated       HLD, DM, CAD s/p stents  resume Plavix and home meds   but hold metformin     malignant mesothelioma  -CXR with mildly increased right pleural effusion  -ts/p r pleurx drained 9/20   hem/onc input appreciatd     DVT ppx  - holding at this time due to anemia- scd ,     pending repeat CBC today     DC planning - pending  authorization of return.  
Pt has a pmhx of HTN, DM, CAD s/p 2 stents, asbestosis, malignant mesothelioma 8/24, JASMIN, came from NH found to have hgb 6.3, Hgb 6.9 in ED, received 1 unit pRBC, he denies lower GI bleeding, denies upper GI bleeding, he had malase but he feels better transfusion    symptomatic anemia, JASMIN  -reported CHINYERE negative in ER  - hx of AVM causing slow and intermittent GI bleeding requiring iv venofer as per hem/onc  -s/p 2 unit   -started  ferrous sulfate - continue monior CBC today   hgb on admission 6.8 , last night 8.1   Hem/onc input appreciated -c/w  IV Venofer   Na noted , repeat and send hyponatremia work up , monitor for now       HLD, DM, CAD s/p stents  resume Plavix and home meds   but hold metformin     malignant mesothelioma  -CXR with mildly increased right pleural effusion  -ts/p r pleurx drained 9/20   hem/onc input appreciated   repeat cxr stable     DVT ppx  - holding at this time due to anemia- scd ,       DC planning - moniotr CBC and Na , I called [employer] . and had the insurance approval     discussed with the patient son in details

## 2024-09-27 NOTE — BH CONSULTATION LIAISON ASSESSMENT NOTE - NSBHATTESTCOMMENTATTENDFT_PSY_A_CORE
I saw and evaluated the patient today 09-27-24 with resident during key/critical portions of the visit.  Nursing/primary team/consultant records reviewed. I agree with the resident's note including past psych history, home medications, social history, allergies, surgical history, family history, and review of system. I have reviewed relevant vitals, laboratory values, imaging studies, and microbiology.  I agree with the trainee's findings and assessment and plan as documented in the trainee's note.     - Above resident's note was edited by me     - agree with rest of A/P as per detailed resident note, unless noted below.

## 2024-09-27 NOTE — BH CONSULTATION LIAISON ASSESSMENT NOTE - HPI (INCLUDE ILLNESS QUALITY, SEVERITY, DURATION, TIMING, CONTEXT, MODIFYING FACTORS, ASSOCIATED SIGNS AND SYMPTOMS)
83 y/o man w PMHx of HLD, DM, CAD s/p stents x2 in 2007, h/o asbestosis of lung, dx w malignant mesothelioma ~8/2024 and s/p VATS pleurodesis, R sided PleurX, h/o AVM/GIB, JASMIN, admitted for anemia with Hgb below 7. Psychiatry was consulted to evaluate for depression.    Writer spoke to RN at bedside who notified writer that consult was placed because pt's son was concerned his father may be depressed and requested a psych consult, however since that time son decided he does not want the consult anymore. Son reported to RN that his father has an OP appointment with a psychiatrist set up.    On assesment at bedside patient was uncooperative and asked psych team to leave. He denied depressed mood and when asked about SI, he stated "Why would I want to do that?".

## 2024-09-27 NOTE — BH CONSULTATION LIAISON ASSESSMENT NOTE - RISK ASSESSMENT
Patient denies SI or depressed mood. There are no acute etiology of concern. Pt is not at elevated acute risk of suicidality.     Due to lack of cooperation unable to assess chronic risk of suicidality, however chronic risk cannot be mitigated in an acute hospitalization and therefore it can be assessed and treated in the OP setting.

## 2024-09-27 NOTE — PROGRESS NOTE ADULT - SUBJECTIVE AND OBJECTIVE BOX
SUBJECTIVE:    Patient is a 84y old Male who presents with a chief complaint of Anemia     (22 Sep 2024 20:06)    Currently admitted to medicine with the primary diagnosis of Anemia       Today is hospital day 8d.     PAST MEDICAL & SURGICAL HISTORY  DM (diabetes mellitus)    MI (myocardial infarction)    History of CAD (coronary artery disease)    Dyslipidemia    Currently smokes tobacco    Mesothelioma, malignant    Coronary stent patent      ALLERGIES:  penicillin (Unknown)    MEDICATIONS:  STANDING MEDICATIONS  atorvastatin 10 milliGRAM(s) Oral at bedtime  chlorhexidine 2% Cloths 1 Application(s) Topical <User Schedule>  clopidogrel Tablet 75 milliGRAM(s) Oral daily  dextrose 5%. 1000 milliLiter(s) IV Continuous <Continuous>  dextrose 5%. 1000 milliLiter(s) IV Continuous <Continuous>  dextrose 50% Injectable 12.5 Gram(s) IV Push once  dextrose 50% Injectable 25 Gram(s) IV Push once  dextrose 50% Injectable 25 Gram(s) IV Push once  gabapentin 300 milliGRAM(s) Oral three times a day  glucagon  Injectable 1 milliGRAM(s) IntraMuscular once  influenza  Vaccine (HIGH DOSE) 0.5 milliLiter(s) IntraMuscular once  insulin lispro (ADMELOG) corrective regimen sliding scale   SubCutaneous at bedtime  insulin lispro (ADMELOG) corrective regimen sliding scale   SubCutaneous three times a day before meals  pantoprazole    Tablet 40 milliGRAM(s) Oral every 12 hours  polyethylene glycol 3350 17 Gram(s) Oral daily  senna 2 Tablet(s) Oral at bedtime  sodium chloride 0.9%. 1000 milliLiter(s) IV Continuous <Continuous>  tamsulosin 0.4 milliGRAM(s) Oral at bedtime    PRN MEDICATIONS  acetaminophen     Tablet .. 650 milliGRAM(s) Oral every 6 hours PRN  dextrose Oral Gel 15 Gram(s) Oral once PRN  melatonin 3 milliGRAM(s) Oral at bedtime PRN    VITALS:   T(F): 98.2  HR: 89  BP: 132/72  RR: 18  SpO2: 96%    LABS:                        8.2    6.81  )-----------( 396      ( 27 Sep 2024 06:37 )             26.5     09-27    139  |  102  |  25[H]  ----------------------------<  129[H]  4.6   |  25  |  1.0    Ca    8.9      27 Sep 2024 06:37  Phos  4.0     09-27  Mg     2.0     09-27    TPro  5.7[L]  /  Alb  3.1[L]  /  TBili  <0.2  /  DBili  x   /  AST  20  /  ALT  22  /  AlkPhos  104  09-27      Urinalysis Basic - ( 27 Sep 2024 06:37 )    Color: x / Appearance: x / SG: x / pH: x  Gluc: 129 mg/dL / Ketone: x  / Bili: x / Urobili: x   Blood: x / Protein: x / Nitrite: x   Leuk Esterase: x / RBC: x / WBC x   Sq Epi: x / Non Sq Epi: x / Bacteria: x                RADIOLOGY:    PHYSICAL EXAM:  GEN: No acute distress  LUNGS: Clear to auscultation bilaterally   HEART: S1/S2 present. RRR.   ABD/ GI: Soft, non-tender, non-distended. Bowel sounds present  EXT: NC/NC/NE/2+PP/MCCAIN  NEURO: AAOX3

## 2024-09-27 NOTE — BH CONSULTATION LIAISON ASSESSMENT NOTE - SUMMARY
84M with no known psych history admitted for anemia with Hgb below 7. Psychiatry was consulted to evaluate for depression. Patient denies depressed mood or SI. Pt and son no longer want psych consult. Pt has OP appointment with psychiatrist set up.    There is no acute mood or etiology on bedside evaluation.    Plan:  -FU with psychiatry OP  -psychiatry will be signing off case   84M with no known psych history admitted for anemia with Hgb below 7. Psychiatry was consulted to evaluate for depression. Patient denies depressed mood or SI. Pt and son no longer want psych consult. Pt has OP appointment with psychiatrist set up.    There is no acute mood or etiology on bedside evaluation.    Plan:  -FU with psychiatry OP  -psychiatry will be signing off case

## 2024-09-27 NOTE — PROGRESS NOTE ADULT - SUBJECTIVE AND OBJECTIVE BOX
Patient is a 84y old  Male who presents with a chief complaint of Anemia     (22 Sep 2024 20:06)       Pt is seen and examined  pt is awake and lying in bed  pt seems comfortable          ROS:  Negative except for:weakness    MEDICATIONS  (STANDING):  atorvastatin 10 milliGRAM(s) Oral at bedtime  chlorhexidine 2% Cloths 1 Application(s) Topical <User Schedule>  clopidogrel Tablet 75 milliGRAM(s) Oral daily  dextrose 5%. 1000 milliLiter(s) (100 mL/Hr) IV Continuous <Continuous>  dextrose 5%. 1000 milliLiter(s) (50 mL/Hr) IV Continuous <Continuous>  dextrose 50% Injectable 12.5 Gram(s) IV Push once  dextrose 50% Injectable 25 Gram(s) IV Push once  dextrose 50% Injectable 25 Gram(s) IV Push once  gabapentin 300 milliGRAM(s) Oral three times a day  glucagon  Injectable 1 milliGRAM(s) IntraMuscular once  influenza  Vaccine (HIGH DOSE) 0.5 milliLiter(s) IntraMuscular once  insulin lispro (ADMELOG) corrective regimen sliding scale   SubCutaneous at bedtime  insulin lispro (ADMELOG) corrective regimen sliding scale   SubCutaneous three times a day before meals  pantoprazole    Tablet 40 milliGRAM(s) Oral every 12 hours  polyethylene glycol 3350 17 Gram(s) Oral daily  senna 2 Tablet(s) Oral at bedtime  sodium chloride 0.9%. 1000 milliLiter(s) (75 mL/Hr) IV Continuous <Continuous>  tamsulosin 0.4 milliGRAM(s) Oral at bedtime    MEDICATIONS  (PRN):  acetaminophen     Tablet .. 650 milliGRAM(s) Oral every 6 hours PRN Temp greater or equal to 38C (100.4F), Mild Pain (1 - 3)  dextrose Oral Gel 15 Gram(s) Oral once PRN Blood Glucose LESS THAN 70 milliGRAM(s)/deciliter  melatonin 3 milliGRAM(s) Oral at bedtime PRN Insomnia      Allergies    penicillin (Unknown)    Intolerances        Vital Signs Last 24 Hrs  T(C): 36.8 (27 Sep 2024 00:01), Max: 36.8 (26 Sep 2024 15:38)  T(F): 98.2 (27 Sep 2024 00:01), Max: 98.2 (26 Sep 2024 15:38)  HR: 89 (27 Sep 2024 00:01) (88 - 89)  BP: 132/72 (27 Sep 2024 00:01) (132/72 - 134/71)  BP(mean): --  RR: 18 (27 Sep 2024 00:01) (18 - 18)  SpO2: 96% (27 Sep 2024 00:01) (95% - 96%)    Parameters below as of 27 Sep 2024 00:01  Patient On (Oxygen Delivery Method): room air        PHYSICAL EXAM  General: adult in NAD  HEENT: clear oropharynx, anicteric sclera, pink conjunctiva  Neck: supple  CV: normal S1/S2 with no murmur rubs or gallops  Lungs: positive air movement b/l ant lungs,clear to auscultation, no wheezes, no rales  Abdomen: soft non-tender non-distended, no hepatosplenomegaly  Ext: no clubbing cyanosis or edema  Skin: no rashes and no petechiae  Neuro: alert and oriented X 2, no focal deficits  LABS:                          8.2    6.81  )-----------( 396      ( 27 Sep 2024 06:37 )             26.5         Mean Cell Volume : 94.6 fL  Mean Cell Hemoglobin : 29.3 pg  Mean Cell Hemoglobin Concentration : 30.9 g/dL  Auto Neutrophil # : 5.02 K/uL  Auto Lymphocyte # : 0.83 K/uL  Auto Monocyte # : 0.62 K/uL  Auto Eosinophil # : 0.30 K/uL  Auto Basophil # : 0.01 K/uL  Auto Neutrophil % : 73.8 %  Auto Lymphocyte % : 12.2 %  Auto Monocyte % : 9.1 %  Auto Eosinophil % : 4.4 %  Auto Basophil % : 0.1 %    Serial CBC's  09-27 @ 06:37  Hct-26.5 / Hgb-8.2 / Plat-396 / RBC-2.80 / WBC-6.81          Serial CBC's  09-26 @ 06:46  Hct-25.5 / Hgb-7.9 / Plat-387 / RBC-2.68 / WBC-8.15          Serial CBC's  09-25 @ 07:44  Hct-24.9 / Hgb-7.8 / Plat-366 / RBC-2.71 / WBC-9.44          Serial CBC's  09-24 @ 11:30  Hct-24.2 / Hgb-7.5 / Plat-392 / RBC-2.56 / WBC-11.36          Serial CBC's  09-24 @ 06:40  Hct-23.8 / Hgb-7.3 / Plat-395 / RBC-2.52 / WBC-9.08            09-27    139  |  102  |  25[H]  ----------------------------<  129[H]  4.6   |  25  |  1.0    Ca    8.9      27 Sep 2024 06:37  Phos  4.0     09-27  Mg     2.0     09-27    TPro  5.7[L]  /  Alb  3.1[L]  /  TBili  <0.2  /  DBili  x   /  AST  20  /  ALT  22  /  AlkPhos  104  09-27          WBC Count: 6.81 K/uL (09-27-24 @ 06:37)  Hemoglobin: 8.2 g/dL (09-27-24 @ 06:37)  Hematocrit: 26.5 % (09-27-24 @ 06:37)  Platelet Count - Automated: 396 K/uL (09-27-24 @ 06:37)  WBC Count: 8.15 K/uL (09-26-24 @ 06:46)  Hemoglobin: 7.9 g/dL (09-26-24 @ 06:46)  Hematocrit: 25.5 % (09-26-24 @ 06:46)  Platelet Count - Automated: 387 K/uL (09-26-24 @ 06:46)  WBC Count: 9.44 K/uL (09-25-24 @ 07:44)  Hemoglobin: 7.8 g/dL (09-25-24 @ 07:44)  Hematocrit: 24.9 % (09-25-24 @ 07:44)  Platelet Count - Automated: 366 K/uL (09-25-24 @ 07:44)  WBC Count: 11.36 K/uL (09-24-24 @ 11:30)  Hemoglobin: 7.5 g/dL (09-24-24 @ 11:30)  Hematocrit: 24.2 % (09-24-24 @ 11:30)  Platelet Count - Automated: 392 K/uL (09-24-24 @ 11:30)  WBC Count: 9.08 K/uL (09-24-24 @ 06:40)  Hemoglobin: 7.3 g/dL (09-24-24 @ 06:40)  Hematocrit: 23.8 % (09-24-24 @ 06:40)  Platelet Count - Automated: 395 K/uL (09-24-24 @ 06:40)  WBC Count: 8.59 K/uL (09-23-24 @ 06:18)  Hemoglobin: 7.9 g/dL (09-23-24 @ 06:18)  Hematocrit: 25.1 % (09-23-24 @ 06:18)  Platelet Count - Automated: 398 K/uL (09-23-24 @ 06:18)  WBC Count: 9.18 K/uL (09-21-24 @ 21:25)  Hemoglobin: 8.1 g/dL (09-21-24 @ 21:25)  Hematocrit: 26.2 % (09-21-24 @ 21:25)  Platelet Count - Automated: 423 K/uL (09-21-24 @ 21:25)  WBC Count: 8.16 K/uL (09-21-24 @ 06:15)  Hemoglobin: 8.0 g/dL (09-21-24 @ 06:15)  Hematocrit: 25.1 % (09-21-24 @ 06:15)  Platelet Count - Automated: 430 K/uL (09-21-24 @ 06:15)  Ferritin: 89 ng/mL (09-20-24 @ 15:07)  Vitamin B12, Serum: 589 pg/mL (09-20-24 @ 06:15)  Folate, Serum: 6.2 ng/mL (09-20-24 @ 06:15)  WBC Count: 8.90 K/uL (09-20-24 @ 06:15)  Hemoglobin: 7.4 g/dL (09-20-24 @ 06:15)  Hematocrit: 24.5 % (09-20-24 @ 06:15)  Platelet Count - Automated: 503 K/uL (09-20-24 @ 06:15)  WBC Count: 9.23 K/uL (09-20-24 @ 00:13)  Hemoglobin: 6.8 g/dL (09-20-24 @ 00:13)  Hematocrit: 22.2 % (09-20-24 @ 00:13)  Platelet Count - Automated: 458 K/uL (09-20-24 @ 00:13)  Iron - Total Binding Capacity.: 325 ug/dL (09-19-24 @ 21:13)  Ferritin: 82 ng/mL (09-19-24 @ 21:13)  WBC Count: 10.05 K/uL (09-19-24 @ 17:17)  Hemoglobin: 6.9 g/dL (09-19-24 @ 17:17)  Hematocrit: 22.3 % (09-19-24 @ 17:17)  Platelet Count - Automated: 553 K/uL (09-19-24 @ 17:17)                BLOOD SMEAR INTERPRETATION:       RADIOLOGY & ADDITIONAL STUDIES:

## 2024-09-27 NOTE — BH CONSULTATION LIAISON ASSESSMENT NOTE - CURRENT MEDICATION
MEDICATIONS  (STANDING):  atorvastatin 10 milliGRAM(s) Oral at bedtime  chlorhexidine 2% Cloths 1 Application(s) Topical <User Schedule>  clopidogrel Tablet 75 milliGRAM(s) Oral daily  dextrose 5%. 1000 milliLiter(s) (50 mL/Hr) IV Continuous <Continuous>  dextrose 5%. 1000 milliLiter(s) (100 mL/Hr) IV Continuous <Continuous>  dextrose 50% Injectable 25 Gram(s) IV Push once  dextrose 50% Injectable 12.5 Gram(s) IV Push once  dextrose 50% Injectable 25 Gram(s) IV Push once  gabapentin 300 milliGRAM(s) Oral three times a day  glucagon  Injectable 1 milliGRAM(s) IntraMuscular once  influenza  Vaccine (HIGH DOSE) 0.5 milliLiter(s) IntraMuscular once  insulin lispro (ADMELOG) corrective regimen sliding scale   SubCutaneous three times a day before meals  insulin lispro (ADMELOG) corrective regimen sliding scale   SubCutaneous at bedtime  pantoprazole    Tablet 40 milliGRAM(s) Oral every 12 hours  polyethylene glycol 3350 17 Gram(s) Oral daily  senna 2 Tablet(s) Oral at bedtime  sodium chloride 0.9%. 1000 milliLiter(s) (75 mL/Hr) IV Continuous <Continuous>  tamsulosin 0.4 milliGRAM(s) Oral at bedtime    MEDICATIONS  (PRN):  acetaminophen     Tablet .. 650 milliGRAM(s) Oral every 6 hours PRN Temp greater or equal to 38C (100.4F), Mild Pain (1 - 3)  dextrose Oral Gel 15 Gram(s) Oral once PRN Blood Glucose LESS THAN 70 milliGRAM(s)/deciliter  melatonin 3 milliGRAM(s) Oral at bedtime PRN Insomnia

## 2024-09-27 NOTE — CHART NOTE - NSCHARTNOTEFT_GEN_A_CORE
Registered Dietitian Follow-Up     Patient Profile Reviewed                           Yes [X]   No []     Nutrition History Previously Obtained        Yes [X]  No []       Pertinent Subjective Information: Per RN, patient with normally good PO intake when his family brings him food from outside or home. Reports today patient did not want breakfast or lunch. Observed nutrition oral supplements on bedside. Pt reported he will drink them if he wants to. Pt tolerating meals well, no difficulty chewing/swallowing.      Pertinent Medical Interventions: Pt has a pmhx of HTN, DM, CAD s/p 2 stents, asbestosis, malignant mesothelioma 8/24, [facility], came from NH found to have hgb 6.3, Hgb 6.9 in ED, received 1 unit pRBC, he denies lower GI bleeding, denies upper GI bleeding, he had malase but he feels better transfusion     Diet order:   Diet, Regular:   Consistent Carbohydrate {Evening Snack} (CSTCHOSN)  DASH/TLC {Sodium & Cholesterol Restricted} (DASH)  Supplement Feeding Modality:  Oral  Ensure Max Cans or Servings Per Day:  3       Frequency:  Daily (09-20-24 @ 04:56) [Active]      Anthropometrics:  Height (cm): 172.7 (09-22-24 @ 20:06)  Weight (kg): 61.2 kg   BMI: 20.5   IBW: 70 kg    MEDICATIONS  (STANDING):  atorvastatin 10 milliGRAM(s) Oral at bedtime  chlorhexidine 2% Cloths 1 Application(s) Topical <User Schedule>  clopidogrel Tablet 75 milliGRAM(s) Oral daily  dextrose 5%. 1000 milliLiter(s) (100 mL/Hr) IV Continuous <Continuous>  dextrose 5%. 1000 milliLiter(s) (50 mL/Hr) IV Continuous <Continuous>  dextrose 50% Injectable 12.5 Gram(s) IV Push once  dextrose 50% Injectable 25 Gram(s) IV Push once  dextrose 50% Injectable 25 Gram(s) IV Push once  gabapentin 300 milliGRAM(s) Oral three times a day  glucagon  Injectable 1 milliGRAM(s) IntraMuscular once  influenza  Vaccine (HIGH DOSE) 0.5 milliLiter(s) IntraMuscular once  insulin lispro (ADMELOG) corrective regimen sliding scale   SubCutaneous three times a day before meals  insulin lispro (ADMELOG) corrective regimen sliding scale   SubCutaneous at bedtime  pantoprazole    Tablet 40 milliGRAM(s) Oral every 12 hours  polyethylene glycol 3350 17 Gram(s) Oral daily  senna 2 Tablet(s) Oral at bedtime  sodium chloride 0.9%. 1000 milliLiter(s) (75 mL/Hr) IV Continuous <Continuous>  tamsulosin 0.4 milliGRAM(s) Oral at bedtime    MEDICATIONS  (PRN):  acetaminophen     Tablet .. 650 milliGRAM(s) Oral every 6 hours PRN Temp greater or equal to 38C (100.4F), Mild Pain (1 - 3)  dextrose Oral Gel 15 Gram(s) Oral once PRN Blood Glucose LESS THAN 70 milliGRAM(s)/deciliter  melatonin 3 milliGRAM(s) Oral at bedtime PRN Insomnia    Pertinent Labs: 09-27 @ 06:37: Na 139, BUN 25[H], Cr 1.0, [H], K+ 4.6, Phos 4.0, Mg 2.0, Alk Phos 104, ALT/SGPT 22, AST/SGOT 20, HbA1c --    Finger Sticks:  POCT Blood Glucose.: 149 mg/dL (09-27 @ 11:04)  POCT Blood Glucose.: 155 mg/dL (09-27 @ 08:04)  POCT Blood Glucose.: 194 mg/dL (09-26 @ 21:57)  POCT Blood Glucose.: 212 mg/dL (09-26 @ 17:02)    Physical Findings:  - Appearance: Alert/ Confused  - GI function: per RN, no n/v. Last BM 9/26  - Tubes: N/A  - Oral/Mouth cavity: Regular   - Skin: No pressure injuries  - Edema: No edema      Nutrition Requirements: per initial assessment 9/22  Weight Used: CBW     Estimated Energy Needs    Continue [X]  Adjust []   Energy Recommendations: [id number] x AF 1.4-1.8=3614-1825pewr/day -Due to PCM       Estimated Protein Needs    Continue [X]  Adjust []   Protein Recommendations: 86-110grams/day (1.4-1.8grams/kg of admit weight) -Due to age and PCM        Estimated Fluid Needs        Continue [X]  Adjust []  Adjusted Fluid Recommendations: 1803-2318mL/day (1mL/kcal)     Nutrient Intake: Varied PO intake per RN     [X] Previous Nutrition Diagnosis: Severe PCM            [X] Ongoing          [] Resolved    [] No active nutrition diagnosis identified at this time     Nutrition Education:  Deferred - pt alert/confused     Goal/Expected Outcome: Pt to meet 50-75% of estimated needs consistently in 3-5 days.     Indicator/Monitoring: diet order, energy intake, weights, labs, GI s/s, BG, skin status, NFPE     Recommendation:   1. Continue with current diet order  2. Continue with Ensure Max TID to optimize nutrient needs  3. Obtain food preferences from patient/family to encourage increased PO intake  4. Assist with meal times prn     Pt at high risk f/u in 3-5 days or prn    RD to remain available: Shaila Santos x [phone] or via TEAMS

## 2024-09-27 NOTE — PROGRESS NOTE ADULT - NUTRITIONAL ASSESSMENT
This patient has been assessed with a concern for Malnutrition and has been determined to have a diagnosis/diagnoses of Severe protein-calorie malnutrition.    This patient is being managed with:   Diet Regular-  Consistent Carbohydrate {Evening Snack} (CSTCHOSN)  DASH/TLC {Sodium & Cholesterol Restricted} (DASH)  Supplement Feeding Modality:  Oral  Ensure Max Cans or Servings Per Day:  3       Frequency:  Daily  Entered: Sep 20 2024  4:55AM    The following pending diet order is being considered for treatment of Severe protein-calorie malnutrition:  Diet Consistent Carbohydrate w/Evening Snack-  Supplement Feeding Modality:  Oral  Ensure Max Cans or Servings Per Day:  1       Frequency:  Three Times a day  Glucerna Shake Cans or Servings Per Day:  1       Frequency:  Three Times a day  Entered: Sep 22 2024  8:42PM  

## 2024-09-27 NOTE — BH CONSULTATION LIAISON ASSESSMENT NOTE - NSBHCHARTREVIEWVS_PSY_A_CORE FT
Vital Signs Last 24 Hrs  T(C): 36.8 (27 Sep 2024 00:01), Max: 36.8 (26 Sep 2024 15:38)  T(F): 98.2 (27 Sep 2024 00:01), Max: 98.2 (26 Sep 2024 15:38)  HR: 89 (27 Sep 2024 00:01) (88 - 89)  BP: 132/72 (27 Sep 2024 00:01) (132/72 - 134/71)  BP(mean): --  RR: 18 (27 Sep 2024 00:01) (18 - 18)  SpO2: 96% (27 Sep 2024 00:01) (95% - 96%)    Parameters below as of 27 Sep 2024 00:01  Patient On (Oxygen Delivery Method): room air

## 2024-10-09 DIAGNOSIS — Z88.0 ALLERGY STATUS TO PENICILLIN: ICD-10-CM

## 2024-10-09 DIAGNOSIS — J90 PLEURAL EFFUSION, NOT ELSEWHERE CLASSIFIED: ICD-10-CM

## 2024-10-09 DIAGNOSIS — E43 UNSPECIFIED SEVERE PROTEIN-CALORIE MALNUTRITION: ICD-10-CM

## 2024-10-09 DIAGNOSIS — D50.9 IRON DEFICIENCY ANEMIA, UNSPECIFIED: ICD-10-CM

## 2024-10-09 DIAGNOSIS — C45.7 MESOTHELIOMA OF OTHER SITES: ICD-10-CM

## 2024-10-09 DIAGNOSIS — E87.1 HYPO-OSMOLALITY AND HYPONATREMIA: ICD-10-CM

## 2024-10-09 DIAGNOSIS — Z95.5 PRESENCE OF CORONARY ANGIOPLASTY IMPLANT AND GRAFT: ICD-10-CM

## 2024-10-09 DIAGNOSIS — Z72.0 TOBACCO USE: ICD-10-CM

## 2024-10-09 DIAGNOSIS — F43.20 ADJUSTMENT DISORDER, UNSPECIFIED: ICD-10-CM

## 2024-10-09 DIAGNOSIS — E11.9 TYPE 2 DIABETES MELLITUS WITHOUT COMPLICATIONS: ICD-10-CM

## 2024-10-09 DIAGNOSIS — G47.00 INSOMNIA, UNSPECIFIED: ICD-10-CM

## 2024-10-09 DIAGNOSIS — Z79.84 LONG TERM (CURRENT) USE OF ORAL HYPOGLYCEMIC DRUGS: ICD-10-CM

## 2024-10-09 DIAGNOSIS — E78.5 HYPERLIPIDEMIA, UNSPECIFIED: ICD-10-CM

## 2024-10-09 DIAGNOSIS — I25.2 OLD MYOCARDIAL INFARCTION: ICD-10-CM

## 2024-10-09 DIAGNOSIS — I25.10 ATHEROSCLEROTIC HEART DISEASE OF NATIVE CORONARY ARTERY WITHOUT ANGINA PECTORIS: ICD-10-CM

## 2024-10-14 ENCOUNTER — INPATIENT (INPATIENT)
Facility: HOSPITAL | Age: 84
LOS: 22 days | Discharge: NURSING, REHAB OR LONG-TERM CARE | DRG: 377 | End: 2024-11-06
Attending: INTERNAL MEDICINE | Admitting: STUDENT IN AN ORGANIZED HEALTH CARE EDUCATION/TRAINING PROGRAM
Payer: MEDICARE

## 2024-10-14 VITALS
DIASTOLIC BLOOD PRESSURE: 63 MMHG | TEMPERATURE: 98 F | HEART RATE: 89 BPM | OXYGEN SATURATION: 93 % | SYSTOLIC BLOOD PRESSURE: 114 MMHG | HEIGHT: 68 IN | WEIGHT: 141.98 LBS | RESPIRATION RATE: 18 BRPM

## 2024-10-14 DIAGNOSIS — D64.9 ANEMIA, UNSPECIFIED: ICD-10-CM

## 2024-10-14 DIAGNOSIS — Z95.5 PRESENCE OF CORONARY ANGIOPLASTY IMPLANT AND GRAFT: Chronic | ICD-10-CM

## 2024-10-14 LAB
IRON SATN MFR SERPL: 12 % — LOW (ref 15–50)
IRON SATN MFR SERPL: 29 UG/DL — LOW (ref 35–150)
TIBC SERPL-MCNC: 250 UG/DL — SIGNIFICANT CHANGE UP (ref 220–430)
TRANSFERRIN SERPL-MCNC: 223 MG/DL — SIGNIFICANT CHANGE UP (ref 200–360)
UIBC SERPL-MCNC: 221 UG/DL — SIGNIFICANT CHANGE UP (ref 110–370)

## 2024-10-14 PROCEDURE — 85730 THROMBOPLASTIN TIME PARTIAL: CPT

## 2024-10-14 PROCEDURE — 85025 COMPLETE CBC W/AUTO DIFF WBC: CPT

## 2024-10-14 PROCEDURE — 0001U RBC DNA HEA 35 AG 11 BLD GRP: CPT

## 2024-10-14 PROCEDURE — 80048 BASIC METABOLIC PNL TOTAL CA: CPT

## 2024-10-14 PROCEDURE — 85610 PROTHROMBIN TIME: CPT

## 2024-10-14 PROCEDURE — 93010 ELECTROCARDIOGRAM REPORT: CPT

## 2024-10-14 PROCEDURE — 36415 COLL VENOUS BLD VENIPUNCTURE: CPT

## 2024-10-14 PROCEDURE — 83010 ASSAY OF HAPTOGLOBIN QUANT: CPT

## 2024-10-14 PROCEDURE — 97110 THERAPEUTIC EXERCISES: CPT | Mod: GP

## 2024-10-14 PROCEDURE — 86901 BLOOD TYPING SEROLOGIC RH(D): CPT

## 2024-10-14 PROCEDURE — 86922 COMPATIBILITY TEST ANTIGLOB: CPT

## 2024-10-14 PROCEDURE — 83615 LACTATE (LD) (LDH) ENZYME: CPT

## 2024-10-14 PROCEDURE — 82962 GLUCOSE BLOOD TEST: CPT

## 2024-10-14 PROCEDURE — 85027 COMPLETE CBC AUTOMATED: CPT

## 2024-10-14 PROCEDURE — 86902 BLOOD TYPE ANTIGEN DONOR EA: CPT

## 2024-10-14 PROCEDURE — 86870 RBC ANTIBODY IDENTIFICATION: CPT

## 2024-10-14 PROCEDURE — 85045 AUTOMATED RETICULOCYTE COUNT: CPT

## 2024-10-14 PROCEDURE — 86850 RBC ANTIBODY SCREEN: CPT

## 2024-10-14 PROCEDURE — 86900 BLOOD TYPING SEROLOGIC ABO: CPT

## 2024-10-14 PROCEDURE — 36430 TRANSFUSION BLD/BLD COMPNT: CPT

## 2024-10-14 PROCEDURE — 80053 COMPREHEN METABOLIC PANEL: CPT

## 2024-10-14 PROCEDURE — 86880 COOMBS TEST DIRECT: CPT

## 2024-10-14 PROCEDURE — 97530 THERAPEUTIC ACTIVITIES: CPT | Mod: GP

## 2024-10-14 PROCEDURE — P9016: CPT

## 2024-10-14 PROCEDURE — 83735 ASSAY OF MAGNESIUM: CPT

## 2024-10-14 PROCEDURE — 99285 EMERGENCY DEPT VISIT HI MDM: CPT

## 2024-10-14 PROCEDURE — 93970 EXTREMITY STUDY: CPT

## 2024-10-14 PROCEDURE — 97116 GAIT TRAINING THERAPY: CPT | Mod: GP

## 2024-10-14 PROCEDURE — C1889: CPT

## 2024-10-14 PROCEDURE — 83036 HEMOGLOBIN GLYCOSYLATED A1C: CPT

## 2024-10-14 PROCEDURE — 71045 X-RAY EXAM CHEST 1 VIEW: CPT

## 2024-10-14 PROCEDURE — 97162 PT EVAL MOD COMPLEX 30 MIN: CPT | Mod: GP

## 2024-10-14 PROCEDURE — 91110 GI TRC IMG INTRAL ESOPH-ILE: CPT

## 2024-10-14 PROCEDURE — 71045 X-RAY EXAM CHEST 1 VIEW: CPT | Mod: 26

## 2024-10-14 NOTE — ED ADULT NURSE NOTE - NSFALLHARMRISKINTERV_ED_ALL_ED

## 2024-10-14 NOTE — ED PROVIDER NOTE - NS ED ATTENDING STATEMENT MOD
OFFICE VISIT      Patient: Kellie Darden   : 1972 MRN: [id number]    SUBJECTIVE:  Chief Complaint   Patient presents with   • Follow-up     3 month follow up       A 50 year old female presents for a follow-up.     Patient has given consent to record this visit for documentation in their clinical record.      HISTORY OF PRESENT ILLNESS:  Wears mask, practices social distancing, has not been sick recently and no exposure to any Covid-19 positive case.    Essential hypertension: On Valsartan, and Hydrochlorothiazide.     Morbid obesity with BMI of 45.0-49.9, adult (CMS/[facility]): Current BMI is 47 kg/m2. She has gained 3 lb since May 2022.     Primary osteoarthritis of both knees: Reports pain in the right knee. Denies pain in the left knee. She notices improvement in symptoms when she is physically active. She did XR right knee which revealed mild sparing. Last year she did XR left knee which revealed mild arthritis. Her left knee is worse as compared to the right knee.     Osteoarthritis of spine with radiculopathy, lumbar region: She has pain in the back, and left thigh. She has burning sensation. Reports numbness, and tingling in the back. She notices radiation of pain downwards. Reports aggravation of symptoms on lifting. States pain improves on stretching. Mentions pain is not consistent. She has an issue since the last weekend.  XR lumbar spine revealed arthritis, and loss of disc height. She did not do XR hips. She had sciatica before on the right side. She visited physical therapy at that time with benefits. She never did an MRI. She uses Ibuprofen, 2-3 times in a day. She used Ibuprofen, muscle relaxant, and a steroid before, and visited physical therapy.     Additional comments: She administered five Covid-19 vaccines.   Mammogram due in 2023. She did labs in May 2022.       PAST MEDICAL HISTORY:  Past Medical History:   Diagnosis Date   • Arthritis    • Cataract    • Essential (primary)  hypertension    • No known problems    • Sciatica of right side 4/8/2019     MEDICATIONS:  Current Outpatient Medications   Medication Sig   • methylPREDNISolone (MEDROL DOSEPAK) 4 MG tablet Take 1 tablet by mouth as directed. follow package directions   • valsartan (DIOVAN) 320 MG tablet Take 1 tablet by mouth daily.   • hydroCHLOROthiazide (HYDRODIURIL) 12.5 MG tablet Take 1 tablet by mouth daily.   • ibuprofen (MOTRIN) 800 MG tablet TAKE 1 TABLET BY MOUTH EVERY 8 HOURS AS NEEDED FOR PAIN   • fluconazole (DIFLUCAN) 150 MG tablet TAKE 1 TABLET BY MOUTH 1 TIME FOR 1 DOSE.   • hyoscyamine (LEVSIN SL) 0.125 MG sublingual tablet Place 1 tablet under the tongue every 4 hours as needed for Cramping.     No current facility-administered medications for this visit.     ALLERGIES:  ALLERGIES:  No Known Allergies  PAST SURGICAL HISTORY:  History reviewed. No pertinent surgical history.  FAMILY HISTORY:  Family History   Problem Relation Age of Onset   • Diabetes Mother    • Hypertension Father    • Hypertension Paternal Grandfather    • Cancer, Breast Neg Hx    • Cancer Neg Hx    • Cancer, Colon Neg Hx    • Cancer, Ovarian Neg Hx      SOCIAL HISTORY:  Social History     Tobacco Use   Smoking Status Never Smoker   Smokeless Tobacco Never Used     Social History     Substance and Sexual Activity   Alcohol Use No       Review of Systems     Constitutional: Negative for chills, fever and fatigue.  HENT: Negative for congestion, rhinorrhea, sinus pressure, sinus pain and sore throat.    Respiratory: Negative for shortness of breath, cough and wheezing.    Cardiovascular: Negative for chest pain, palpitations and leg swelling.  Gastrointestinal: Negative for abdominal pain, diarrhea, constipation, nausea and vomiting.  Genitourinary: Negative for difficulty urinating, burning in urination, frequency, and urgency.  Musculoskeletal: Per HPI.   Neurological: Per HPI.   Psychiatric/Behavioral: Negative for anxiety, depression.     All  other systems reviewed and are negative.        OBJECTIVE:  Vitals:    09/26/22 1022   BP: 137/70   BP Location: LFA - Left forearm   Patient Position: Sitting   Cuff Size: Large Adult   Pulse: 72   Resp: 16   Temp: 97.2 °F (36.2 °C)   TempSrc: Tympanic   SpO2: 100%   Weight: (!) 146.2 kg (322 lb 5 oz)   Height: 5' 9\"   PainSc: 6    LMP: 03/18/2022       Physical Exam     Constitutional: Obese.  Alert, in no acute distress and current vital signs reviewed.  HEENT: atraumatic and normocephalic. no discharge, no eyelid swelling and the sclerae were normal. normal appearing outer ear, normal appearing nose and normal lips.  Neck: normal appearing neck and supple neck. Respiratory: breath sounds clear to auscultation bilaterally, but no respiratory distress and normal respiratory rate and effort.  Cardiovascular: normal rate, regular rhythm, normal S1, normal S2 and edema was not present in the lower extremities.  Abdomen: soft and nontender.  Musculoskeletal: Swelling in the right knee. Crepitus in the left knee.   Psychiatric: alert and awake, interactive and mood/affect were appropriate.  Skin, Hair, Nails: normal skin color and pigmentation.        DIAGNOSTIC STUDIES:  LAB RESULTS:  No visits with results within 1 Month(s) from this visit.   Latest known visit with results is:   Lab Services on 05/27/2022   Component Date Value Ref Range Status   • Sodium 05/27/2022 142  135 - 145 mmol/L Final   • Potassium 05/27/2022 4.1  3.4 - 5.1 mmol/L Final   • Chloride 05/27/2022 111 (A) 97 - 110 mmol/L Final   • Carbon Dioxide 05/27/2022 27  21 - 32 mmol/L Final   • Anion Gap 05/27/2022 8  7 - 19 mmol/L Final   • Glucose 05/27/2022 82  70 - 99 mg/dL Final   • BUN 05/27/2022 11  6 - 20 mg/dL Final   • Creatinine 05/27/2022 0.74  0.51 - 0.95 mg/dL Final   • Glomerular Filtration Rate 05/27/2022 >90  >=60 Final   • BUN/ Creatinine Ratio 05/27/2022 15  7 - 25 Final   • Calcium 05/27/2022 8.7  8.4 - 10.2 mg/dL Final   • Bilirubin,  Total 05/27/2022 0.5  0.2 - 1.0 mg/dL Final   • GOT/AST 05/27/2022 16  <=37 Units/L Final   • GPT/ALT 05/27/2022 13  <64 Units/L Final   • Alkaline Phosphatase 05/27/2022 83  45 - 117 Units/L Final   • Albumin 05/27/2022 3.3 (A) 3.6 - 5.1 g/dL Final   • Protein, Total 05/27/2022 7.6  6.4 - 8.2 g/dL Final   • Globulin 05/27/2022 4.3 (A) 2.0 - 4.0 g/dL Final   • A/G Ratio 05/27/2022 0.8 (A) 1.0 - 2.4 Final   • Hemoglobin A1C 05/27/2022 5.5  4.5 - 5.6 % Final   • Cholesterol 05/27/2022 138  <=199 mg/dL Final   • Triglycerides 05/27/2022 74  <=149 mg/dL Final   • HDL 05/27/2022 38 (A) >=50 mg/dL Final   • LDL 05/27/2022 85  <=129 mg/dL Final   • Non-HDL Cholesterol 05/27/2022 100  mg/dL Final   • Cholesterol/ HDL Ratio 05/27/2022 3.6  <=4.4 Final   • WBC 05/27/2022 7.6  4.2 - 11.0 K/mcL Final   • RBC 05/27/2022 4.33  4.00 - 5.20 mil/mcL Final   • HGB 05/27/2022 12.8  12.0 - 15.5 g/dL Final   • HCT 05/27/2022 38.9  36.0 - 46.5 % Final   • MCV 05/27/2022 89.8  78.0 - 100.0 fl Final   • MCH 05/27/2022 29.6  26.0 - 34.0 pg Final   • MCHC 05/27/2022 32.9  32.0 - 36.5 g/dL Final   • RDW-CV 05/27/2022 12.8  11.0 - 15.0 % Final   • RDW-SD 05/27/2022 42.3  39.0 - 50.0 fL Final   • PLT 05/27/2022 352  140 - 450 K/mcL Final   • NRBC 05/27/2022 0  <=0 /100 WBC Final   • Neutrophil, Percent 05/27/2022 64  % Final   • Lymphocytes, Percent 05/27/2022 24  % Final   • Mono, Percent 05/27/2022 9  % Final   • Eosinophils, Percent 05/27/2022 3  % Final   • Basophils, Percent 05/27/2022 0  % Final   • Immature Granulocytes 05/27/2022 0  % Final   • Absolute Neutrophils 05/27/2022 4.9  1.8 - 7.7 K/mcL Final   • Absolute Lymphocytes 05/27/2022 1.8  1.0 - 4.8 K/mcL Final   • Absolute Monocytes 05/27/2022 0.7  0.3 - 0.9 K/mcL Final   • Absolute Eosinophils  05/27/2022 0.2  0.0 - 0.5 K/mcL Final   • Absolute Basophils 05/27/2022 0.0  0.0 - 0.3 K/mcL Final   • Absolute Immmature Granulocytes 05/27/2022 0.0  0.0 - 0.2 K/mcL Final       ASSESSMENT  AND PLAN:  This is a 50 year old female who presents with :  1. Essential hypertension    2. Morbid obesity with BMI of 45.0-49.9, adult (CMS/[facility])    3. Primary osteoarthritis of both knees    4. Osteoarthritis of spine with radiculopathy, lumbar region    5. Need for influenza vaccination        Orders Placed This Encounter   • MRI LUMBAR SPINE WO CONTRAST   • INFLUENZA QUADRIVALENT SPLIT PRES FREE 0.5 ML VACC, IM (FLULAVAL,FLUARIX,FLUZONE)   • SERVICE TO PHYSICAL THERAPY   • methylPREDNISolone (MEDROL DOSEPAK) 4 MG tablet   • valsartan (DIOVAN) 320 MG tablet   • hydroCHLOROthiazide (HYDRODIURIL) 12.5 MG tablet       Plan:    Essential hypertension:  Today, her BP is normal.   Reviewed and discussed recent and previous labs.   Medications reviewed and reconciled.  Continue current management.  Refills provided.     Morbid obesity with BMI of 45.0-49.9, adult (CMS/[facility]):  Continue current management.  Advised to lose weight.     Primary osteoarthritis of both knees:  Reviewed and discussed recent and previous diagnostics.  Continue current management.  Advised to visit an orthopedic for administering an injection in the right knee; however, she declined as she is scared.     Osteoarthritis of spine with radiculopathy, lumbar region:  Medications reviewed and reconciled.  Reviewed and discussed recent and previous diagnostics.  Ordered MRI lumbar spine wo contrast for further evaluation. Explained the rationale.   Prescribed Medrol Dosepak, to be taken as directed; mode of action, benefits and side effects explained.  Continue current management. Explained maximum dose of Ibuprofen.   Advised to continue home exercise as directed.   Recommended Gabapentin as an option.   Explained symptoms are likely to correlate to nerve impingement.   Referred to physical therapy for further evaluation and management.    Need for influenza vaccination:  Administered Influenza vaccine in office today without any complications;  guidelines, significance and side effects explained.    Additional plan:  Reviewed and discussed recent and previous labs.     Follow-up as directed.     Educated on the precautionary measures for prevention of Covid-19.    Refer to orders.  Medical compliance with plan discussed and risks of non-compliance reviewed.  Patient education completed on disease process, etiology & prognosis.  Proper usage and side effects of medications reviewed & discussed.  Patient understands and agrees with the plan.  Return to clinic as clinically indicated as discussed with patient who verbalized understanding of the plan and is in agreement with the plan.    I,  Dr. Jenniffer Hernandez, have created a visit summary document based on the audio recording between Dr. Shy ARRIAGA MD and this patient for the physician to review, edit as needed, and authenticate.  Creation Date: 9/27/2022 I have reviewed and edited the visit summary above and attest that it is accurate.       This was a shared visit with the [facility]. I reviewed and verified the documentation. Not applicable

## 2024-10-14 NOTE — ED PROVIDER NOTE - PHYSICAL EXAMINATION
Vital Signs: I have reviewed the initial vital signs.  CONSTITUTIONAL: Pt in no acute distress, pale appearing  SKIN: Skin exam is warm and dry, no acute rash.  HEAD: Normocephalic; atraumatic.  EYES: PERRL, EOM intact; +conjunctival pallor  ENT: No nasal discharge; airway clear.   NECK: Supple;   CARD: S1, S2 normal; no murmurs, gallops, or rubs. Regular rate and rhythm.  RESP: CTAB. No wheezes, rales or rhonchi.  soft; non-distended; non-tender; no hepatosplenomegaly.  MSK: +pleurex cath to right chest wall. No signs of surrounding infection or drainage. Normal ROM. No clubbing, cyanosis or edema.  NEURO: Alert, oriented. Grossly unremarkable. No focal deficits.  .

## 2024-10-14 NOTE — ED ADULT NURSE NOTE - OBJECTIVE STATEMENT
sent in from [facility] for low hgb. pt has no complaints at this time.PMH anemia with multiple transfusions in past

## 2024-10-14 NOTE — ED PROVIDER NOTE - CLINICAL SUMMARY MEDICAL DECISION MAKING FREE TEXT BOX
Patient admitted for further management and drainage of lung. Will also be transfused 2u rbc. anemia studies ordered for inpt team to follow

## 2024-10-14 NOTE — ED PROVIDER NOTE - ATTENDING APP SHARED VISIT CONTRIBUTION OF CARE
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 Per NH, patient also refused Pleurx drainage today. pt hgb mpoted 6.4. denies bleeding, blood in stool. feels very fatigued. no abddominal pain, fevers, chills, cough.     plan for trasnfusion, admission as pt needs lung care

## 2024-10-14 NOTE — ED PROVIDER NOTE - CARE PLAN
Principal Discharge DX:	Anemia  Secondary Diagnosis:	Pleural effusion  Secondary Diagnosis:	Mesothelioma   1

## 2024-10-14 NOTE — ED PROVIDER NOTE - OBJECTIVE STATEMENT
83 y/o M pmhx mesothelioma with recurrent right pleural effusions s/p catheter, CAD, HTN, HLD, DM, JASMIN presents with son from NH for low hemoglobin and generalized weakness. Per son, pt has had drops in hemoglobin multiple times requiring blood transfusions. Per EMR, pt was evaluated by GI during admission and further work up was deferred. Pt denies known bleeding. Denies fever, chest pain, sob, abdominal pain, urinary sxs. Son states pt gets Pleurx drained 2-3x per week and is due.

## 2024-10-15 PROBLEM — C45.9 MESOTHELIOMA, UNSPECIFIED: Chronic | Status: ACTIVE | Noted: 2024-09-20

## 2024-10-15 LAB
ANION GAP SERPL CALC-SCNC: 8 MMOL/L — SIGNIFICANT CHANGE UP (ref 7–14)
BUN SERPL-MCNC: 25 MG/DL — HIGH (ref 10–20)
CALCIUM SERPL-MCNC: 7 MG/DL — LOW (ref 8.4–10.5)
CHLORIDE SERPL-SCNC: 110 MMOL/L — SIGNIFICANT CHANGE UP (ref 98–110)
CO2 SERPL-SCNC: 21 MMOL/L — SIGNIFICANT CHANGE UP (ref 17–32)
CREAT SERPL-MCNC: 0.8 MG/DL — SIGNIFICANT CHANGE UP (ref 0.7–1.5)
EGFR: 88 ML/MIN/1.73M2 — SIGNIFICANT CHANGE UP
FERRITIN SERPL-MCNC: 142 NG/ML — SIGNIFICANT CHANGE UP (ref 30–400)
GLUCOSE BLDC GLUCOMTR-MCNC: 115 MG/DL — HIGH (ref 70–99)
GLUCOSE BLDC GLUCOMTR-MCNC: 144 MG/DL — HIGH (ref 70–99)
GLUCOSE BLDC GLUCOMTR-MCNC: 153 MG/DL — HIGH (ref 70–99)
GLUCOSE BLDC GLUCOMTR-MCNC: 164 MG/DL — HIGH (ref 70–99)
GLUCOSE SERPL-MCNC: 79 MG/DL — SIGNIFICANT CHANGE UP (ref 70–99)
HCT VFR BLD CALC: 27.7 % — LOW (ref 42–52)
HGB BLD-MCNC: 8.7 G/DL — LOW (ref 14–18)
MAGNESIUM SERPL-MCNC: 1.6 MG/DL — LOW (ref 1.8–2.4)
MCHC RBC-ENTMCNC: 28.2 PG — SIGNIFICANT CHANGE UP (ref 27–31)
MCHC RBC-ENTMCNC: 31.4 G/DL — LOW (ref 32–37)
MCV RBC AUTO: 89.9 FL — SIGNIFICANT CHANGE UP (ref 80–94)
NRBC # BLD: 0 /100 WBCS — SIGNIFICANT CHANGE UP (ref 0–0)
PLATELET # BLD AUTO: 456 K/UL — HIGH (ref 130–400)
PMV BLD: 9.2 FL — SIGNIFICANT CHANGE UP (ref 7.4–10.4)
POTASSIUM SERPL-MCNC: 3.8 MMOL/L — SIGNIFICANT CHANGE UP (ref 3.5–5)
POTASSIUM SERPL-SCNC: 3.8 MMOL/L — SIGNIFICANT CHANGE UP (ref 3.5–5)
RBC # BLD: 3.08 M/UL — LOW (ref 4.7–6.1)
RBC # FLD: 16.1 % — HIGH (ref 11.5–14.5)
SODIUM SERPL-SCNC: 139 MMOL/L — SIGNIFICANT CHANGE UP (ref 135–146)
WBC # BLD: 8.84 K/UL — SIGNIFICANT CHANGE UP (ref 4.8–10.8)
WBC # FLD AUTO: 8.84 K/UL — SIGNIFICANT CHANGE UP (ref 4.8–10.8)

## 2024-10-15 PROCEDURE — 99223 1ST HOSP IP/OBS HIGH 75: CPT

## 2024-10-15 PROCEDURE — 71045 X-RAY EXAM CHEST 1 VIEW: CPT | Mod: 26

## 2024-10-15 PROCEDURE — 99222 1ST HOSP IP/OBS MODERATE 55: CPT

## 2024-10-15 RX ORDER — FERROUS SULFATE 325(65) MG
325 TABLET ORAL DAILY
Refills: 0 | Status: DISCONTINUED | OUTPATIENT
Start: 2024-10-15 | End: 2024-11-03

## 2024-10-15 RX ORDER — PANTOPRAZOLE SODIUM 40 MG/1
40 TABLET, DELAYED RELEASE ORAL EVERY 12 HOURS
Refills: 0 | Status: DISCONTINUED | OUTPATIENT
Start: 2024-10-15 | End: 2024-10-25

## 2024-10-15 RX ORDER — MELATONIN 5 MG
3 TABLET ORAL AT BEDTIME
Refills: 0 | Status: DISCONTINUED | OUTPATIENT
Start: 2024-10-15 | End: 2024-11-06

## 2024-10-15 RX ORDER — GABAPENTIN 300 MG/1
300 CAPSULE ORAL THREE TIMES A DAY
Refills: 0 | Status: DISCONTINUED | OUTPATIENT
Start: 2024-10-15 | End: 2024-11-06

## 2024-10-15 RX ORDER — CLOPIDOGREL 75 MG/1
75 TABLET ORAL DAILY
Refills: 0 | Status: DISCONTINUED | OUTPATIENT
Start: 2024-10-15 | End: 2024-10-16

## 2024-10-15 RX ORDER — SENNA 187 MG
2 TABLET ORAL AT BEDTIME
Refills: 0 | Status: DISCONTINUED | OUTPATIENT
Start: 2024-10-15 | End: 2024-11-03

## 2024-10-15 RX ORDER — TAMSULOSIN HCL 0.4 MG
0.4 CAPSULE ORAL AT BEDTIME
Refills: 0 | Status: DISCONTINUED | OUTPATIENT
Start: 2024-10-15 | End: 2024-11-06

## 2024-10-15 RX ORDER — HEPARIN SODIUM 10000 [USP'U]/ML
5000 INJECTION INTRAVENOUS; SUBCUTANEOUS EVERY 12 HOURS
Refills: 0 | Status: DISCONTINUED | OUTPATIENT
Start: 2024-10-15 | End: 2024-10-26

## 2024-10-15 RX ORDER — POLYETHYLENE GLYCOL 3350 17 G/17G
17 POWDER, FOR SOLUTION ORAL DAILY
Refills: 0 | Status: DISCONTINUED | OUTPATIENT
Start: 2024-10-15 | End: 2024-11-03

## 2024-10-15 RX ORDER — MAGNESIUM SULFATE IN 0.9% NACL 2 G/50 ML
2 INTRAVENOUS SOLUTION, PIGGYBACK (ML) INTRAVENOUS ONCE
Refills: 0 | Status: COMPLETED | OUTPATIENT
Start: 2024-10-15 | End: 2024-10-15

## 2024-10-15 RX ADMIN — GABAPENTIN 300 MILLIGRAM(S): 300 CAPSULE ORAL at 05:07

## 2024-10-15 RX ADMIN — Medication 2 TABLET(S): at 21:08

## 2024-10-15 RX ADMIN — PANTOPRAZOLE SODIUM 40 MILLIGRAM(S): 40 TABLET, DELAYED RELEASE ORAL at 05:07

## 2024-10-15 RX ADMIN — GABAPENTIN 300 MILLIGRAM(S): 300 CAPSULE ORAL at 21:08

## 2024-10-15 RX ADMIN — POLYETHYLENE GLYCOL 3350 17 GRAM(S): 17 POWDER, FOR SOLUTION ORAL at 12:05

## 2024-10-15 RX ADMIN — Medication 0.4 MILLIGRAM(S): at 21:08

## 2024-10-15 RX ADMIN — Medication 325 MILLIGRAM(S): at 12:06

## 2024-10-15 RX ADMIN — HEPARIN SODIUM 5000 UNIT(S): 10000 INJECTION INTRAVENOUS; SUBCUTANEOUS at 15:49

## 2024-10-15 RX ADMIN — CLOPIDOGREL 75 MILLIGRAM(S): 75 TABLET ORAL at 12:06

## 2024-10-15 RX ADMIN — Medication 25 GRAM(S): at 15:46

## 2024-10-15 RX ADMIN — GABAPENTIN 300 MILLIGRAM(S): 300 CAPSULE ORAL at 13:36

## 2024-10-15 RX ADMIN — PANTOPRAZOLE SODIUM 40 MILLIGRAM(S): 40 TABLET, DELAYED RELEASE ORAL at 17:20

## 2024-10-15 RX ADMIN — Medication 3 MILLIGRAM(S): at 21:08

## 2024-10-15 RX ADMIN — Medication 10 MILLIGRAM(S): at 21:08

## 2024-10-15 NOTE — PATIENT PROFILE ADULT - FALL HARM RISK - HARM RISK INTERVENTIONS

## 2024-10-15 NOTE — H&P ADULT - ATTENDING COMMENTS
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 Per NH, patient also refused Pleurx drainage today and is requesting to drain it inpatient (gets drained 2-3 times per week).    #Acute on chronic anemia  #Hx JASMIN - no overt GI bleed  #Hx AVM?  - Hemodynamically stable & no active bleeding  - Baseline hemoglobin - 8-9   - Hemoglobin on admission - 6.3 >s/p 2units prbc  - On plavix   - last admission received 5 days of Venofer   -Seen by GI last admission> spoke to grandson (HCP)  regarding endoscopic work up. After discussing risks vs benefits given his advance age and co morbidities he would like to hold off. Offered VCE as outpatient .   -Can reconsider work up if within goals of care , will need to speak to grandson  -Will also need heme onc outpt for possible venofer infusions outpt   - Maintain active Type and screen  - Trend H&H  -c/w ferrous sulfate  - Continue home PPI 40mg BID PO  - Target Hgb >8   - Avoid NSAIDs    #Malignant mesothelioma (diagnosed 8/2024 )  #Hx Asbestos lung   -s/p VATS pleurodesis, R sided PleurX  -Per NH, patient also refused Pleurx drainage today and is requesting to drain it inpatient (gets drained 2-3 times per week)  -pulm recs noted - CTS c/s and Oncology c/s   -[facility] heme onc outpatient       #HTN/HLD  #CAD s/p stents x2 in 2007 (follows Dr Avila)  -c/w home meds     #Small hypodensity in segment 7 of the liver seen on prior CT.  - RUQ US as OP  -f/u outpt GI      #Acute Grief vs MDD  - wife passed away a few months ago  - patient refusing medication sometimes and asks to be left alone, no suicide ideas, no ideas to hurt other people.    - Psychiatry eval admission recommended outpt fu       #DVT ppx-HSQ   #GI ppx- PPI   #Diet-dash/tlc | CC   #Activity-IAT  #Dispo-acute

## 2024-10-15 NOTE — CONSULT NOTE ADULT - SUBJECTIVE AND OBJECTIVE BOX
Patient is a 84y old  Male who presents with a chief complaint of     HPI:  HPI: 83 y/o man  PMHx of HLD, DM, CAD s/p stents x2 in 2007, pt of Dr Avila, hx  asbestosis of lung, diagnosed w malignant mesothelioma (8/2024) and s/p VATS pleurodesis, R sided PleurX, hx AVM/GIB and JASMIN, sent in by NH for low hemoglobin. Per NH, patient also refused Pleurx drainage today and is requesting to drain it inpatient (gets drained 2-3 times per week). Of note, patient had a recent admission in September for anemia for which he received 2 units prbc, IV Venofer and was recommended EGD/[place] but given his advance age and co morbidities decided to hold off. Patient is a poor historian. Uses wheelchair at baseline. Denies fever chills, shortness of breath, cough, chest pain, leg swelling, hematemesis, hematochezia.     Vital Signs Last 24 Hrs  T(C): 36.4 (15 Oct 2024 01:09), Max: 36.9 (14 Oct 2024 22:51)  T(F): 97.5 (15 Oct 2024 01:09), Max: 98.4 (14 Oct 2024 22:51)  HR: 100 (15 Oct 2024 01:09) (89 - 105)  BP: 132/74 (15 Oct 2024 01:09) (104/64 - 132/74)  RR: 18 (15 Oct 2024 01:09) (17 - 19)  SpO2: 97% (15 Oct 2024 01:09) (93% - 97%)    Parameters below as of 15 Oct 2024 01:09  Patient On (Oxygen Delivery Method): room air    Labs: Hgb 6.4 , Platelets 500 , Cr 1.2 (baseline)   CXR: Worsening R pleural effusion   EKG: NSR with PACs    Admitted for anemia      (15 Oct 2024 01:16)      PAST MEDICAL & SURGICAL HISTORY:  DM (diabetes mellitus)      MI (myocardial infarction)      History of CAD (coronary artery disease)      Dyslipidemia      Currently smokes tobacco      Mesothelioma, malignant      Coronary stent patent          SOCIAL HX:   Smoking                             FAMILY HISTORY:  :  No known cardiovacular family hisotry     Review Of Systems:     All ROS are negative except per HPI       Allergies    penicillin (Unknown)    Intolerances          PHYSICAL EXAM    ICU Vital Signs Last 24 Hrs  T(C): 36.6 (15 Oct 2024 05:40), Max: 36.9 (14 Oct 2024 22:51)  T(F): 97.8 (15 Oct 2024 05:40), Max: 98.4 (14 Oct 2024 22:51)  HR: 78 (15 Oct 2024 05:40) (78 - 105)  BP: 129/77 (15 Oct 2024 05:40) (104/64 - 158/83)  BP(mean): 94 (15 Oct 2024 05:40) (94 - 94)  ABP: --  ABP(mean): --  RR: 19 (15 Oct 2024 05:40) (17 - 19)  SpO2: 95% (15 Oct 2024 05:40) (93% - 98%)    O2 Parameters below as of 15 Oct 2024 05:40  Patient On (Oxygen Delivery Method): room air            CONSTITUTIONAL:  Well nourished.   NAD    ENT:   Airway patent,   Mouth with normal mucosa.     CARDIAC:   Normal rate,   Regular rhythm.    No edema      RESPIRATORY:   No wheezing  Bilateral BS   Not tachypneic,  No use of accessory muscles    GASTROINTESTINAL:  Abdomen soft,   Non-tender,   No guarding,   + BS      NEUROLOGICAL:   Alert and oriented   No motor deficits.    SKIN:   Skin normal color for race,   No evidence of rash.              10-14-24 @ 07:01  -  10-15-24 @ 06:43  --------------------------------------------------------  IN:  Total IN: 0 mL    OUT:    Indwelling Catheter - Urethral (mL): 300 mL  Total OUT: 300 mL    Total NET: -300 mL          LABS:                          6.4    8.64  )-----------( 500      ( 14 Oct 2024 17:52 )             21.4                                               10-14    136  |  101  |  32[H]  ----------------------------<  104[H]  4.6   |  25  |  1.2    Ca    9.1      14 Oct 2024 17:52    TPro  6.0  /  Alb  3.3[L]  /  TBili  <0.2  /  DBili  x   /  AST  7   /  ALT  <5  /  AlkPhos  110  10-14                                             Urinalysis Basic - ( 14 Oct 2024 17:52 )    Color: x / Appearance: x / SG: x / pH: x  Gluc: 104 mg/dL / Ketone: x  / Bili: x / Urobili: x   Blood: x / Protein: x / Nitrite: x   Leuk Esterase: x / RBC: x / WBC x   Sq Epi: x / Non Sq Epi: x / Bacteria: x                                                  LIVER FUNCTIONS - ( 14 Oct 2024 17:52 )  Alb: 3.3 g/dL / Pro: 6.0 g/dL / ALK PHOS: 110 U/L / ALT: <5 U/L / AST: 7 U/L / GGT: x                                                                                                                                       X-Rays                                                                            ECHO      MEDICATIONS  (STANDING):  atorvastatin 10 milliGRAM(s) Oral at bedtime  clopidogrel Tablet 75 milliGRAM(s) Oral daily  ferrous    sulfate 325 milliGRAM(s) Oral daily  gabapentin 300 milliGRAM(s) Oral three times a day  heparin   Injectable 5000 Unit(s) SubCutaneous every 12 hours  melatonin 3 milliGRAM(s) Oral at bedtime  pantoprazole    Tablet 40 milliGRAM(s) Oral every 12 hours  polyethylene glycol 3350 17 Gram(s) Oral daily  senna 2 Tablet(s) Oral at bedtime  tamsulosin 0.4 milliGRAM(s) Oral at bedtime    MEDICATIONS  (PRN):         Patient is a 84y old  Male who presents with a chief complaint of Anemia    HPI:  HPI: 83 y/o man  PMHx of HLD, DM, CAD s/p stents x2 in 2007, pt of Dr Avila, hx  asbestosis of lung, diagnosed w malignant mesothelioma (8/2024) and s/p VATS pleurodesis, R sided PleurX, hx AVM/GIB and JASMIN, sent in by NH for low hemoglobin. Per NH, patient also refused Pleurx drainage today and is requesting to drain it inpatient (gets drained 2-3 times per week). Of note, patient had a recent admission in September for anemia for which he received 2 units prbc, IV Venofer and was recommended EGD/[place] but given his advance age and co morbidities decided to hold off. Patient is a poor historian. Uses wheelchair at baseline. Denies fever chills, shortness of breath, cough, chest pain, leg swelling, hematemesis, hematochezia.     Vital Signs Last 24 Hrs  T(C): 36.4 (15 Oct 2024 01:09), Max: 36.9 (14 Oct 2024 22:51)  T(F): 97.5 (15 Oct 2024 01:09), Max: 98.4 (14 Oct 2024 22:51)  HR: 100 (15 Oct 2024 01:09) (89 - 105)  BP: 132/74 (15 Oct 2024 01:09) (104/64 - 132/74)  RR: 18 (15 Oct 2024 01:09) (17 - 19)  SpO2: 97% (15 Oct 2024 01:09) (93% - 97%)    Parameters below as of 15 Oct 2024 01:09  Patient On (Oxygen Delivery Method): room air    Labs: Hgb 6.4 , Platelets 500 , Cr 1.2 (baseline)   CXR: Worsening R pleural effusion   EKG: NSR with PACs    Admitted for anemia      (15 Oct 2024 01:16)      PAST MEDICAL & SURGICAL HISTORY:  DM (diabetes mellitus)      MI (myocardial infarction)      History of CAD (coronary artery disease)      Dyslipidemia      Currently smokes tobacco      Mesothelioma, malignant      Coronary stent patent          SOCIAL HX:   Smoking                             FAMILY HISTORY:  :  No known cardiovacular family hisotry     Review Of Systems:     All ROS are negative except per HPI       Allergies    penicillin (Unknown)    Intolerances          PHYSICAL EXAM    ICU Vital Signs Last 24 Hrs  T(C): 36.6 (15 Oct 2024 05:40), Max: 36.9 (14 Oct 2024 22:51)  T(F): 97.8 (15 Oct 2024 05:40), Max: 98.4 (14 Oct 2024 22:51)  HR: 78 (15 Oct 2024 05:40) (78 - 105)  BP: 129/77 (15 Oct 2024 05:40) (104/64 - 158/83)  BP(mean): 94 (15 Oct 2024 05:40) (94 - 94)  ABP: --  ABP(mean): --  RR: 19 (15 Oct 2024 05:40) (17 - 19)  SpO2: 95% (15 Oct 2024 05:40) (93% - 98%)    O2 Parameters below as of 15 Oct 2024 05:40  Patient On (Oxygen Delivery Method): room air            CONSTITUTIONAL:  NAD    ENT:   Airway patent,   Mouth with normal mucosa.     CARDIAC:   Normal rate,   Regular rhythm.    No edema      RESPIRATORY:   No wheezing  decreased BS on right  Not tachypneic,  No use of accessory muscles    GASTROINTESTINAL:  Abdomen soft,   Non-tender,   No guarding,   + BS      NEUROLOGICAL:   awake and follows commands  No motor deficits.    SKIN:   Skin normal color for race,   No evidence of rash.              10-14-24 @ 07:01  -  10-15-24 @ 06:43  --------------------------------------------------------  IN:  Total IN: 0 mL    OUT:    Indwelling Catheter - Urethral (mL): 300 mL  Total OUT: 300 mL    Total NET: -300 mL          LABS:                          6.4    8.64  )-----------( 500      ( 14 Oct 2024 17:52 )             21.4                                               10-14    136  |  101  |  32[H]  ----------------------------<  104[H]  4.6   |  25  |  1.2    Ca    9.1      14 Oct 2024 17:52    TPro  6.0  /  Alb  3.3[L]  /  TBili  <0.2  /  DBili  x   /  AST  7   /  ALT  <5  /  AlkPhos  110  10-14                                             Urinalysis Basic - ( 14 Oct 2024 17:52 )    Color: x / Appearance: x / SG: x / pH: x  Gluc: 104 mg/dL / Ketone: x  / Bili: x / Urobili: x   Blood: x / Protein: x / Nitrite: x   Leuk Esterase: x / RBC: x / WBC x   Sq Epi: x / Non Sq Epi: x / Bacteria: x                                                  LIVER FUNCTIONS - ( 14 Oct 2024 17:52 )  Alb: 3.3 g/dL / Pro: 6.0 g/dL / ALK PHOS: 110 U/L / ALT: <5 U/L / AST: 7 U/L / GGT: x                                                                                                                                       X-Rays                                                                            ECHO      MEDICATIONS  (STANDING):  atorvastatin 10 milliGRAM(s) Oral at bedtime  clopidogrel Tablet 75 milliGRAM(s) Oral daily  ferrous    sulfate 325 milliGRAM(s) Oral daily  gabapentin 300 milliGRAM(s) Oral three times a day  heparin   Injectable 5000 Unit(s) SubCutaneous every 12 hours  melatonin 3 milliGRAM(s) Oral at bedtime  pantoprazole    Tablet 40 milliGRAM(s) Oral every 12 hours  polyethylene glycol 3350 17 Gram(s) Oral daily  senna 2 Tablet(s) Oral at bedtime  tamsulosin 0.4 milliGRAM(s) Oral at bedtime    MEDICATIONS  (PRN):         Patient is a 84y old  Male who presents with a chief complaint of Anemia    HPI:  HPI: 83 y/o man  PMHx of HLD, DM, CAD s/p stents x2 in 2007, pt of Dr Avila, hx  asbestosis of lung, diagnosed w malignant mesothelioma (8/2024) and s/p VATS pleurodesis, R sided PleurX, hx AVM/GIB and JASMIN, sent in by NH for low hemoglobin. Per NH, patient also refused Pleurx drainage today and is requesting to drain it inpatient (gets drained 2-3 times per week). Of note, patient had a recent admission in September for anemia for which he received 2 units prbc, IV Venofer and was recommended EGD/[place] but given his advance age and co morbidities decided to hold off. Patient is a poor historian. Uses wheelchair at baseline. Denies fever chills, shortness of breath, cough, chest pain, leg swelling, hematemesis, hematochezia.     Vital Signs Last 24 Hrs  T(C): 36.4 (15 Oct 2024 01:09), Max: 36.9 (14 Oct 2024 22:51)  T(F): 97.5 (15 Oct 2024 01:09), Max: 98.4 (14 Oct 2024 22:51)  HR: 100 (15 Oct 2024 01:09) (89 - 105)  BP: 132/74 (15 Oct 2024 01:09) (104/64 - 132/74)  RR: 18 (15 Oct 2024 01:09) (17 - 19)  SpO2: 97% (15 Oct 2024 01:09) (93% - 97%)    Parameters below as of 15 Oct 2024 01:09  Patient On (Oxygen Delivery Method): room air    Labs: Hgb 6.4 , Platelets 500 , Cr 1.2 (baseline)   CXR: Worsening R pleural effusion   EKG: NSR with PACs    Admitted for anemia      (15 Oct 2024 01:16)      PAST MEDICAL & SURGICAL HISTORY:  DM (diabetes mellitus)      MI (myocardial infarction)      History of CAD (coronary artery disease)      Dyslipidemia      Currently smokes tobacco      Mesothelioma, malignant      Coronary stent patent          SOCIAL HX:   Smoking   Former                           FAMILY HISTORY:  :  No known cardiovacular family hisotry     Review Of Systems:     All ROS are negative except per HPI       Allergies    penicillin (Unknown)    Intolerances          PHYSICAL EXAM    ICU Vital Signs Last 24 Hrs  T(C): 36.6 (15 Oct 2024 05:40), Max: 36.9 (14 Oct 2024 22:51)  T(F): 97.8 (15 Oct 2024 05:40), Max: 98.4 (14 Oct 2024 22:51)  HR: 78 (15 Oct 2024 05:40) (78 - 105)  BP: 129/77 (15 Oct 2024 05:40) (104/64 - 158/83)  BP(mean): 94 (15 Oct 2024 05:40) (94 - 94)  ABP: --  ABP(mean): --  RR: 19 (15 Oct 2024 05:40) (17 - 19)  SpO2: 95% (15 Oct 2024 05:40) (93% - 98%)    O2 Parameters below as of 15 Oct 2024 05:40  Patient On (Oxygen Delivery Method): room air            CONSTITUTIONAL:  NAD    ENT:   Airway patent,   Mouth with normal mucosa.     CARDIAC:   Normal rate,   Regular rhythm.    No edema      RESPIRATORY:   No wheezing  decreased BS on right  Not tachypneic,  No use of accessory muscles    GASTROINTESTINAL:  Abdomen soft,   Non-tender,   No guarding,   + BS      NEUROLOGICAL:   awake and follows commands  No motor deficits.    SKIN:   Skin normal color for race,   No evidence of rash.              10-14-24 @ 07:01  -  10-15-24 @ 06:43  --------------------------------------------------------  IN:  Total IN: 0 mL    OUT:    Indwelling Catheter - Urethral (mL): 300 mL  Total OUT: 300 mL    Total NET: -300 mL          LABS:                          6.4    8.64  )-----------( 500      ( 14 Oct 2024 17:52 )             21.4                                               10-14    136  |  101  |  32[H]  ----------------------------<  104[H]  4.6   |  25  |  1.2    Ca    9.1      14 Oct 2024 17:52    TPro  6.0  /  Alb  3.3[L]  /  TBili  <0.2  /  DBili  x   /  AST  7   /  ALT  <5  /  AlkPhos  110  10-14                                             Urinalysis Basic - ( 14 Oct 2024 17:52 )    Color: x / Appearance: x / SG: x / pH: x  Gluc: 104 mg/dL / Ketone: x  / Bili: x / Urobili: x   Blood: x / Protein: x / Nitrite: x   Leuk Esterase: x / RBC: x / WBC x   Sq Epi: x / Non Sq Epi: x / Bacteria: x                                                  LIVER FUNCTIONS - ( 14 Oct 2024 17:52 )  Alb: 3.3 g/dL / Pro: 6.0 g/dL / ALK PHOS: 110 U/L / ALT: <5 U/L / AST: 7 U/L / GGT: x                                                                                                                                       X-Rays                                                                            ECHO      MEDICATIONS  (STANDING):  atorvastatin 10 milliGRAM(s) Oral at bedtime  clopidogrel Tablet 75 milliGRAM(s) Oral daily  ferrous    sulfate 325 milliGRAM(s) Oral daily  gabapentin 300 milliGRAM(s) Oral three times a day  heparin   Injectable 5000 Unit(s) SubCutaneous every 12 hours  melatonin 3 milliGRAM(s) Oral at bedtime  pantoprazole    Tablet 40 milliGRAM(s) Oral every 12 hours  polyethylene glycol 3350 17 Gram(s) Oral daily  senna 2 Tablet(s) Oral at bedtime  tamsulosin 0.4 milliGRAM(s) Oral at bedtime    MEDICATIONS  (PRN):

## 2024-10-15 NOTE — H&P ADULT - HISTORY OF PRESENT ILLNESS
HPI: 83 y/o man  PMHx of HLD, DM, CAD s/p stents x2 in 2007, pt of Dr Avila, hx  asbestosis of lung, diagnosed w malignant mesothelioma (8/2024) and s/p VATS pleurodesis, R sided PleurX, hx AVM/GIB and JASMIN, presents to ED for   Of note, patient had a recent admission in September for anemia for which he received 2 units prbc, IV Venofer and was recommended EGD/[place] but refused. Follows Dr Johnson for rad/onc.     Vital Signs Last 24 Hrs  T(C): 36.4 (15 Oct 2024 01:09), Max: 36.9 (14 Oct 2024 22:51)  T(F): 97.5 (15 Oct 2024 01:09), Max: 98.4 (14 Oct 2024 22:51)  HR: 100 (15 Oct 2024 01:09) (89 - 105)  BP: 132/74 (15 Oct 2024 01:09) (104/64 - 132/74)  RR: 18 (15 Oct 2024 01:09) (17 - 19)  SpO2: 97% (15 Oct 2024 01:09) (93% - 97%)    Parameters below as of 15 Oct 2024 01:09  Patient On (Oxygen Delivery Method): room air    Labs:      HPI: 83 y/o man  PMHx of HLD, DM, CAD s/p stents x2 in 2007, pt of Dr Avila, hx  asbestosis of lung, diagnosed w malignant mesothelioma (8/2024) and s/p VATS pleurodesis, R sided PleurX, hx AVM/GIB and JASIMN, presents to ED for   Of note, patient had a recent admission in September for anemia for which he received 2 units prbc, IV Venofer and was recommended EGD/[place] but refused. Follows Dr Johnson for rad/onc.     Vital Signs Last 24 Hrs  T(C): 36.4 (15 Oct 2024 01:09), Max: 36.9 (14 Oct 2024 22:51)  T(F): 97.5 (15 Oct 2024 01:09), Max: 98.4 (14 Oct 2024 22:51)  HR: 100 (15 Oct 2024 01:09) (89 - 105)  BP: 132/74 (15 Oct 2024 01:09) (104/64 - 132/74)  RR: 18 (15 Oct 2024 01:09) (17 - 19)  SpO2: 97% (15 Oct 2024 01:09) (93% - 97%)    Parameters below as of 15 Oct 2024 01:09  Patient On (Oxygen Delivery Method): room air    Labs: Hgb 6.4 , Platelets 500 , Cr 1.2 (baseline)   CXR: Worsening R pleural effusion   EKG:    Admitted      HPI: 83 y/o man  PMHx of HLD, DM, CAD s/p stents x2 in 2007, pt of Dr Avila, hx  asbestosis of lung, diagnosed w malignant mesothelioma (8/2024) and s/p VATS pleurodesis, R sided PleurX, hx AVM/GIB and JASMIN, sent in by NH for low hemoglobin. Per NH, patient also refused Pleurx drainage today and is requesting to drain it inpatient (gets drained 2-3 times per week). Of note, patient had a recent admission in September for anemia for which he received 2 units prbc, IV Venofer and was recommended EGD/[place] but given his advance age and co morbidities decided to hold off. Patient is a poor historian. Uses wheelchair at baseline. Denies fever chills, shortness of breath, cough, chest pain, leg swelling, hematemesis, hematochezia.     Vital Signs Last 24 Hrs  T(C): 36.4 (15 Oct 2024 01:09), Max: 36.9 (14 Oct 2024 22:51)  T(F): 97.5 (15 Oct 2024 01:09), Max: 98.4 (14 Oct 2024 22:51)  HR: 100 (15 Oct 2024 01:09) (89 - 105)  BP: 132/74 (15 Oct 2024 01:09) (104/64 - 132/74)  RR: 18 (15 Oct 2024 01:09) (17 - 19)  SpO2: 97% (15 Oct 2024 01:09) (93% - 97%)    Parameters below as of 15 Oct 2024 01:09  Patient On (Oxygen Delivery Method): room air    Labs: Hgb 6.4 , Platelets 500 , Cr 1.2 (baseline)   CXR: Worsening R pleural effusion   EKG: NSR with PACs    Admitted for anemia

## 2024-10-15 NOTE — PATIENT PROFILE ADULT - NSTOBACCO TYPE_GEN_A_CORE_RD
Pt presents to ED C/O reoccurring LLE pain/ cramping worsening x 4 days. States, " I have a hx of a full body jerking sensation, they did an MRI on my back last year and didn't find anything I saw neurology and they put me on gabapentin and b12 but it didn't help". Pt able to ambulate independently. Neuro intact on arrival. Pt states, " I want an opinion on my leg and my hx".
Cigarettes

## 2024-10-15 NOTE — H&P ADULT - NSHPPHYSICALEXAM_GEN_ALL_CORE
T(C): 36.4 (10-15-24 @ 01:09), Max: 36.9 (10-14-24 @ 22:51)  HR: 100 (10-15-24 @ 01:09) (89 - 105)  BP: 132/74 (10-15-24 @ 01:09) (104/64 - 132/74)  RR: 18 (10-15-24 @ 01:09) (17 - 19)  SpO2: 97% (10-15-24 @ 01:09) (93% - 97%)    CONSTITUTIONAL: No apparent distress  RESP: No respiratory distress, Decreased BS Right   CV: RRR, +S1S2,  no peripheral edema  GI: Soft, NT, ND  SKIN: No rashes or ulcers   PSYCH: A+O x 3

## 2024-10-15 NOTE — H&P ADULT - ASSESSMENT
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 Per NH, patient also refused Pleurx drainage today and is requesting to drain it inpatient (gets drained 2-3 times per week).    #Acute on chronic anemia  #Hx JASMIN - no overt GI bleed  #Hx AVM?  - Hemodynamically stable & no active bleeding  - Baseline hemoglobin - 8-9   - Hemoglobin on admission - 6.3 >s/p 2units prbc  - On plavix   - last admission received 5 days of Venofer   -Seen by GI last admission> spoke to grandson (HCP)  regarding endoscopic work up. After discussing risks vs benefits given his advance age and co morbidities he would like to hold off. Offered VCE as outpatient .   -Can reconsider work up if within goals of care , will need to speak to grandson  -Will also need heme onc outpt for possible venofer infusions outpt   - Maintain active Type and screen  - Trend H&H  - Continue home PPI 40mg BID PO  - Target Hgb >8   - Avoid NSAIDs    #Malignant mesothelioma (diagnosed 8/2024 )  #Hx Asbestos lung   -s/p VATS pleurodesis, R sided PleurX  -Per NH, patient also refused Pleurx drainage today and is requesting to drain it inpatient (gets drained 2-3 times per week)  -Fu pulm  -Fu heme onc outpatient       #HTN/HLD  #CAD s/p stents x2 in 2007 (follows Dr Avila)  -c/w home meds     #Small hypodensity in segment 7 of the liver seen on prior CT.  - RUQ US as OP  -f/u outpt GI      #Acute Grief vs MDD  - wife passed away a few months ago  - patient refusing medication sometimes and asks to be left alone, no suicide ideas, no ideas to hurt other people.    - Psychiatry eval admission recommended outpt fu       #DVT ppx-HSQ   #GI ppx- PPI   #Diet-dash/tlc | CC   #Activity-IAT  #Dispo-acute       83 y/o man  PMHx of HLD, DM, CAD s/p stents x2 in 2007, pt of Dr Avila, hx  asbestosis of lung, diagnosed w malignant mesothelioma (8/2024) and s/p VATS pleurodesis, R sided PleurX, hx AVM/GIB and JASMIN, sent in by NH for low hemoglobin. Per NH, patient also refused Pleurx drainage today and is requesting to drain it inpatient (gets drained 2-3 times per week).    #Acute on chronic anemia  #Hx JASMIN - no overt GI bleed  #Hx AVM?  - Hemodynamically stable & no active bleeding  - Baseline hemoglobin - 8-9   - Hemoglobin on admission - 6.3 >s/p 2units prbc  - On plavix   - last admission received 5 days of Venofer   -Seen by GI last admission> spoke to grandson (HCP)  regarding endoscopic work up. After discussing risks vs benefits given his advance age and co morbidities he would like to hold off. Offered VCE as outpatient .   -Can reconsider work up if within goals of care , will need to speak to grandson  -Will also need heme onc outpt for possible venofer infusions outpt   - Maintain active Type and screen  - Trend H&H  -c/w ferrous sulfate  - Continue home PPI 40mg BID PO  - Target Hgb >8   - Avoid NSAIDs    #Malignant mesothelioma (diagnosed 8/2024 )  #Hx Asbestos lung   -s/p VATS pleurodesis, R sided PleurX  -Per NH, patient also refused Pleurx drainage today and is requesting to drain it inpatient (gets drained 2-3 times per week)  -Fu pulm  -Fu heme onc outpatient       #HTN/HLD  #CAD s/p stents x2 in 2007 (follows Dr Avila)  -c/w home meds     #Small hypodensity in segment 7 of the liver seen on prior CT.  - RUQ US as OP  -f/u outpt GI      #Acute Grief vs MDD  - wife passed away a few months ago  - patient refusing medication sometimes and asks to be left alone, no suicide ideas, no ideas to hurt other people.    - Psychiatry eval admission recommended outpt fu       #DVT ppx-HSQ   #GI ppx- PPI   #Diet-dash/tlc | CC   #Activity-IAT  #Dispo-acute

## 2024-10-15 NOTE — H&P ADULT - NSHPLABSRESULTS_GEN_ALL_CORE
.  LABS:                         6.4    8.64  )-----------( 500      ( 14 Oct 2024 17:52 )             21.4     10-14    136  |  101  |  32[H]  ----------------------------<  104[H]  4.6   |  25  |  1.2    Ca    9.1      14 Oct 2024 17:52    TPro  6.0  /  Alb  3.3[L]  /  TBili  <0.2  /  DBili  x   /  AST  7   /  ALT  <5  /  AlkPhos  110  10-14      Urinalysis Basic - ( 14 Oct 2024 17:52 )    Color: x / Appearance: x / SG: x / pH: x  Gluc: 104 mg/dL / Ketone: x  / Bili: x / Urobili: x   Blood: x / Protein: x / Nitrite: x   Leuk Esterase: x / RBC: x / WBC x   Sq Epi: x / Non Sq Epi: x / Bacteria: x            RADIOLOGY, EKG & ADDITIONAL TESTS: Reviewed.

## 2024-10-15 NOTE — PATIENT PROFILE ADULT - FUNCTIONAL SCREEN CURRENT LEVEL: COMMUNICATION, MLM
Patient called again. He would like orders for his labs. He will have them done at his work for free. He would also like to have One touch Verio test strips and delica lancets sent to [facility] on [address]. Please call patient back.  
Patient is requesting a phone call from the nurse regarding patient needing a retest of patient's labs. Patient states that he is now diabetic and would need another test done. Patient states that they can do this at his employer, but he would need a new order form the PCP. Patient states that it is unable to be computer generated.     Patient would also like to discuss diabetic supplies.   Please call patient to discuss.   
Scripts for One Touch Verio test strips and Delica lancets e-scribed to [facility] on [address]. Patient wishes to get his labs drawn at work. New external orders placed for CMP, glycohemoglobin and micro albumin; please sign.  
Spoke to patient. Notified him that One Touch Verio test strips and Delica lancets e-scribed to [facility] on [address]. Also that orders for labs were ready for . Patient verbalized understanding and will  next week.  
0 = understands/communicates without difficulty

## 2024-10-15 NOTE — CONSULT NOTE ADULT - ASSESSMENT
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    Oncology follow up   Currently on room air  target spo2 [id number]  Aspiration precaution  Pain control  Outpatient follow up  Dimer

## 2024-10-16 DIAGNOSIS — D64.9 ANEMIA, UNSPECIFIED: ICD-10-CM

## 2024-10-16 DIAGNOSIS — C45.9 MESOTHELIOMA, UNSPECIFIED: ICD-10-CM

## 2024-10-16 DIAGNOSIS — Z51.5 ENCOUNTER FOR PALLIATIVE CARE: ICD-10-CM

## 2024-10-16 LAB
ANION GAP SERPL CALC-SCNC: 13 MMOL/L — SIGNIFICANT CHANGE UP (ref 7–14)
BUN SERPL-MCNC: 31 MG/DL — HIGH (ref 10–20)
CALCIUM SERPL-MCNC: 9.4 MG/DL — SIGNIFICANT CHANGE UP (ref 8.4–10.5)
CHLORIDE SERPL-SCNC: 98 MMOL/L — SIGNIFICANT CHANGE UP (ref 98–110)
CO2 SERPL-SCNC: 24 MMOL/L — SIGNIFICANT CHANGE UP (ref 17–32)
CREAT SERPL-MCNC: 1.1 MG/DL — SIGNIFICANT CHANGE UP (ref 0.7–1.5)
EGFR: 67 ML/MIN/1.73M2 — SIGNIFICANT CHANGE UP
GLUCOSE BLDC GLUCOMTR-MCNC: 121 MG/DL — HIGH (ref 70–99)
GLUCOSE BLDC GLUCOMTR-MCNC: 129 MG/DL — HIGH (ref 70–99)
GLUCOSE BLDC GLUCOMTR-MCNC: 173 MG/DL — HIGH (ref 70–99)
GLUCOSE SERPL-MCNC: 135 MG/DL — HIGH (ref 70–99)
HCT VFR BLD CALC: 27.7 % — LOW (ref 42–52)
HCT VFR BLD CALC: 28 % — LOW (ref 42–52)
HGB BLD-MCNC: 8.5 G/DL — LOW (ref 14–18)
HGB BLD-MCNC: 8.8 G/DL — LOW (ref 14–18)
MAGNESIUM SERPL-MCNC: 1.9 MG/DL — SIGNIFICANT CHANGE UP (ref 1.8–2.4)
MCHC RBC-ENTMCNC: 28 PG — SIGNIFICANT CHANGE UP (ref 27–31)
MCHC RBC-ENTMCNC: 28.2 PG — SIGNIFICANT CHANGE UP (ref 27–31)
MCHC RBC-ENTMCNC: 30.7 G/DL — LOW (ref 32–37)
MCHC RBC-ENTMCNC: 31.4 G/DL — LOW (ref 32–37)
MCV RBC AUTO: 89.7 FL — SIGNIFICANT CHANGE UP (ref 80–94)
MCV RBC AUTO: 91.1 FL — SIGNIFICANT CHANGE UP (ref 80–94)
NRBC # BLD: 0 /100 WBCS — SIGNIFICANT CHANGE UP (ref 0–0)
NRBC # BLD: 0 /100 WBCS — SIGNIFICANT CHANGE UP (ref 0–0)
PLATELET # BLD AUTO: 459 K/UL — HIGH (ref 130–400)
PLATELET # BLD AUTO: 480 K/UL — HIGH (ref 130–400)
PMV BLD: 9.1 FL — SIGNIFICANT CHANGE UP (ref 7.4–10.4)
PMV BLD: 9.1 FL — SIGNIFICANT CHANGE UP (ref 7.4–10.4)
POTASSIUM SERPL-MCNC: 5 MMOL/L — SIGNIFICANT CHANGE UP (ref 3.5–5)
POTASSIUM SERPL-SCNC: 5 MMOL/L — SIGNIFICANT CHANGE UP (ref 3.5–5)
RBC # BLD: 3.04 M/UL — LOW (ref 4.7–6.1)
RBC # BLD: 3.12 M/UL — LOW (ref 4.7–6.1)
RBC # FLD: 15.9 % — HIGH (ref 11.5–14.5)
RBC # FLD: 15.9 % — HIGH (ref 11.5–14.5)
SODIUM SERPL-SCNC: 135 MMOL/L — SIGNIFICANT CHANGE UP (ref 135–146)
WBC # BLD: 8.72 K/UL — SIGNIFICANT CHANGE UP (ref 4.8–10.8)
WBC # BLD: 9.1 K/UL — SIGNIFICANT CHANGE UP (ref 4.8–10.8)
WBC # FLD AUTO: 8.72 K/UL — SIGNIFICANT CHANGE UP (ref 4.8–10.8)
WBC # FLD AUTO: 9.1 K/UL — SIGNIFICANT CHANGE UP (ref 4.8–10.8)

## 2024-10-16 PROCEDURE — 99222 1ST HOSP IP/OBS MODERATE 55: CPT

## 2024-10-16 PROCEDURE — 99232 SBSQ HOSP IP/OBS MODERATE 35: CPT

## 2024-10-16 PROCEDURE — 99497 ADVNCD CARE PLAN 30 MIN: CPT | Mod: 25

## 2024-10-16 RX ORDER — ASPIRIN/MAG CARB/ALUMINUM AMIN 325 MG
81 TABLET ORAL DAILY
Refills: 0 | Status: DISCONTINUED | OUTPATIENT
Start: 2024-10-16 | End: 2024-10-31

## 2024-10-16 RX ORDER — ACETAMINOPHEN 500 MG
650 TABLET ORAL EVERY 6 HOURS
Refills: 0 | Status: DISCONTINUED | OUTPATIENT
Start: 2024-10-16 | End: 2024-11-02

## 2024-10-16 RX ADMIN — HEPARIN SODIUM 5000 UNIT(S): 10000 INJECTION INTRAVENOUS; SUBCUTANEOUS at 17:03

## 2024-10-16 RX ADMIN — Medication 100 MILLILITER(S): at 17:03

## 2024-10-16 RX ADMIN — Medication 2 TABLET(S): at 21:01

## 2024-10-16 RX ADMIN — Medication 10 MILLIGRAM(S): at 21:01

## 2024-10-16 RX ADMIN — Medication 3 MILLIGRAM(S): at 21:01

## 2024-10-16 RX ADMIN — PANTOPRAZOLE SODIUM 40 MILLIGRAM(S): 40 TABLET, DELAYED RELEASE ORAL at 05:01

## 2024-10-16 RX ADMIN — GABAPENTIN 300 MILLIGRAM(S): 300 CAPSULE ORAL at 21:10

## 2024-10-16 RX ADMIN — Medication 325 MILLIGRAM(S): at 11:40

## 2024-10-16 RX ADMIN — Medication 81 MILLIGRAM(S): at 11:40

## 2024-10-16 RX ADMIN — GABAPENTIN 300 MILLIGRAM(S): 300 CAPSULE ORAL at 05:01

## 2024-10-16 RX ADMIN — Medication 0.4 MILLIGRAM(S): at 21:01

## 2024-10-16 RX ADMIN — GABAPENTIN 300 MILLIGRAM(S): 300 CAPSULE ORAL at 13:40

## 2024-10-16 RX ADMIN — POLYETHYLENE GLYCOL 3350 17 GRAM(S): 17 POWDER, FOR SOLUTION ORAL at 11:49

## 2024-10-16 RX ADMIN — PANTOPRAZOLE SODIUM 40 MILLIGRAM(S): 40 TABLET, DELAYED RELEASE ORAL at 17:02

## 2024-10-16 RX ADMIN — Medication 650 MILLIGRAM(S): at 23:19

## 2024-10-16 RX ADMIN — HEPARIN SODIUM 5000 UNIT(S): 10000 INJECTION INTRAVENOUS; SUBCUTANEOUS at 04:55

## 2024-10-16 NOTE — CONSULT NOTE ADULT - CONVERSATION DETAILS
Spoke with patient and son John at bedside. Palliative care introduced. They were able to provide a medical history and hospital course. They noted the patient is seen by Dr. Epperson in oncology and the plan is to continue treatment on discharged.  They wish for ongoing aggressive medical management and treatment of malignancy.    Discussed code status, including DNR/DNI.  Patient wants Full Code, but will reassess in the future.

## 2024-10-16 NOTE — PROGRESS NOTE ADULT - SUBJECTIVE AND OBJECTIVE BOX
24H events:  Today is 2d of hospitalization. This morning patient was seen and examined at bedside, resting comfortably in bed.    No acute or major events overnight.      PAST MEDICAL & SURGICAL HISTORY  DM (diabetes mellitus)    MI (myocardial infarction)    History of CAD (coronary artery disease)    Dyslipidemia    Currently smokes tobacco    Mesothelioma, malignant    Coronary stent patent      SOCIAL HISTORY:  Social History:      ALLERGIES:  penicillin (Unknown)    MEDICATIONS:  STANDING MEDICATIONS  aspirin  chewable 81 milliGRAM(s) Oral daily  atorvastatin 10 milliGRAM(s) Oral at bedtime  ferrous    sulfate 325 milliGRAM(s) Oral daily  gabapentin 300 milliGRAM(s) Oral three times a day  heparin   Injectable 5000 Unit(s) SubCutaneous every 12 hours  melatonin 3 milliGRAM(s) Oral at bedtime  pantoprazole    Tablet 40 milliGRAM(s) Oral every 12 hours  polyethylene glycol 3350 17 Gram(s) Oral daily  senna 2 Tablet(s) Oral at bedtime  tamsulosin 0.4 milliGRAM(s) Oral at bedtime    PRN MEDICATIONS    VITALS:   T(F): 97.4  HR: 86  BP: 122/69  RR: 18  SpO2: 95%    PHYSICAL EXAM:  GENERAL:   ( x) NAD, lying in bed comfortably     (  ) obtunded     (  ) lethargic     (  ) somnolent      NECK:  (x) Supple     (  ) neck stiffness     (  ) nuchal rigidity     (  )  no JVD     (  ) JVD present ( -- cm)    HEART:  Rate -->     (x) normal rate     (  ) bradycardic     (  ) tachycardic  Rhythm -->     (x) regular     (  ) regularly irregular     (  ) irregularly irregular  Murmurs -->     (x) normal s1s2     (  ) systolic murmur     (  ) diastolic murmur     (  ) continuous murmur      (  ) S3 present     (  ) S4 present    LUNGS:   ( x)Unlabored respirations     (  ) tachypnea  ( x) B/L air entry     (  ) decreased breath sounds in:  (location     )    ( x) no adventitious sound     (  ) crackles     (  ) wheezing      (  ) rhonchi      (specify location:       )  (  ) chest wall tenderness (specify location:       )    ABDOMEN:   ( x) Soft     (  ) tense   |   (  ) nondistended     (  ) distended   |   (  ) +BS     (  ) hypoactive bowel sounds     (  ) hyperactive bowel sounds  ( x) nontender     (  ) RUQ tenderness     (  ) RLQ tenderness     (  ) LLQ tenderness     (  ) epigastric tenderness     (  ) diffuse tenderness  (  ) Splenomegaly      (  ) Hepatomegaly      (  ) Jaundice     (  ) ecchymosis     EXTREMITIES:  ( x) Normal     (  ) Rash     (  ) ecchymosis     (  ) varicose veins      (  ) pitting edema     (  ) non-pitting edema   (  ) ulceration     (  ) gangrene:     (location:     )    NERVOUS SYSTEM:    ( x) A&Ox3     (  ) confused     (  ) lethargic  CN II-XII:     ( x) Intact     (  ) deficits found     (Specify:     )   Upper extremities:     (  ) no sensorimotor deficits     (  ) weakness     (  ) loss of proprioception/vibration     (  ) loss of touch/temperature (specify:    )  Lower extremities:     (  ) no sensorimotor deficits     (  ) weakness     (  ) loss of proprioception/vibration     (  ) loss of touch/temperature (specify:    )    SKIN:   (  ) No rashes or lesions     (  ) maculopapular rash     (  ) pustules     (  ) vesicles     (  ) ulcer     (  ) ecchymosis     (specify location:     )      LABS:                        8.8    9.10  )-----------( 480      ( 16 Oct 2024 08:36 )             28.0     10-15    139  |  110  |  25[H]  ----------------------------<  79  3.8   |  21  |  0.8    Ca    7.0[L]      15 Oct 2024 13:33  Mg     1.6     10-15    TPro  6.0  /  Alb  3.3[L]  /  TBili  <0.2  /  DBili  x   /  AST  7   /  ALT  <5  /  AlkPhos  110  10-14      Urinalysis Basic - ( 15 Oct 2024 13:33 )    Color: x / Appearance: x / SG: x / pH: x  Gluc: 79 mg/dL / Ketone: x  / Bili: x / Urobili: x   Blood: x / Protein: x / Nitrite: x   Leuk Esterase: x / RBC: x / WBC x   Sq Epi: x / Non Sq Epi: x / Bacteria: x

## 2024-10-16 NOTE — PROGRESS NOTE ADULT - ASSESSMENT
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 Per NH, patient also refused Pleurx drainage today and is requesting to drain it inpatient (gets drained 2-3 times per week).    #Acute on chronic anemia  #Hx JASMIN - no overt GI bleed  #Hx AVM?  - Hemodynamically stable & no active bleeding  - Baseline hemoglobin - 8-9   - Hemoglobin on admission - 6.3 >s/p 2units prbc  - On plavix   - last admission received 5 days of Venofer   -Seen by GI last admission> spoke to grandson (HCP)  regarding endoscopic work up. After discussing risks vs benefits given his advance age and co morbidities he would like to hold off. Offered VCE as outpatient .   -Can reconsider work up if within goals of care , will need to speak to grandson  -Will also need heme onc outpt for possible venofer infusions outpt   - Maintain active Type and screen  - Trend H&H  -c/w ferrous sulfate  - Continue home PPI 40mg BID PO  - Target Hgb >8   - Avoid NSAIDs    #Malignant mesothelioma (diagnosed 8/2024 )  #Hx Asbestos lung   -s/p VATS pleurodesis, R sided PleurX  -Per NH, patient also refused Pleurx drainage today and is requesting to drain it inpatient (gets drained 2-3 times per week)  -Fu pulm  -Fu heme onc outpatient   -Fu CT surgery for bloody Pleurx drainage  -Fu palliative    #HTN/HLD  #CAD s/p stents x2 in 2007 (follows Dr Avila)  -c/w home meds     #Small hypodensity in segment 7 of the liver seen on prior CT.  - RUQ US as OP  -f/u outpt GI    #Acute Grief vs MDD  - wife passed away a few months ago  - patient refusing medication sometimes and asks to be left alone, no suicide ideas, no ideas to hurt other people.    - Psychiatry eval admission recommended outpt fu     #DVT ppx-HSQ   #GI ppx- PPI   #Diet-dash/tlc | CC   #Activity-IAT  #Dispo-acute

## 2024-10-16 NOTE — PROGRESS NOTE ADULT - SUBJECTIVE AND OBJECTIVE BOX
JENNYFERCLARI  84y  [facility]      Patient is a 84y old  Male who presents with a chief complaint of Acute on chronic anemia (16 Oct 2024 10:27)      INTERVAL HPI/OVERNIGHT EVENTS:    Patient feels well.   has no complains   no overnight events  started discussing goc. would get palliative care for further eval         FAMILY HISTORY:    T(C): 36.3 (10-16-24 @ 04:35), Max: 36.3 (10-16-24 @ 04:35)  HR: 65 (10-16-24 @ 11:40) (65 - 86)  BP: 147/81 (10-16-24 @ 11:40) (122/69 - 147/81)  RR: 18 (10-16-24 @ 11:40) (18 - 18)  SpO2: 100% (10-16-24 @ 11:40) (95% - 100%)  Wt(kg): --Vital Signs Last 24 Hrs  T(C): 36.3 (16 Oct 2024 04:35), Max: 36.3 (16 Oct 2024 04:35)  T(F): 97.4 (16 Oct 2024 04:35), Max: 97.4 (16 Oct 2024 04:35)  HR: 65 (16 Oct 2024 11:40) (65 - 86)  BP: 147/81 (16 Oct 2024 11:40) (122/69 - 147/81)  BP(mean): --  RR: 18 (16 Oct 2024 11:40) (18 - 18)  SpO2: 100% (16 Oct 2024 11:40) (95% - 100%)    Parameters below as of 16 Oct 2024 11:40  Patient On (Oxygen Delivery Method): room air        PHYSICAL EXAM:  GENERAL: looks comfortable   NERVOUS SYSTEM:  Alert & Oriented X3,  PULM: Clear to auscultation bilaterally  CARDIAC: Regular rate and rhythm;  GI: Soft, Nontender, Nondistended; Bowel sounds present  EXTREMITIES:  2+ Peripheral Pulses,    Consultant(s) Notes Reviewed:  [x ] YES  [ ] NO  Care Discussed with Consultants/Other Providers [ x] YES  [ ] NO    LABS:                            8.8    9.10  )-----------( 480      ( 16 Oct 2024 08:36 )             28.0   10-16    135  |  98  |  31[H]  ----------------------------<  135[H]  5.0   |  24  |  1.1    Ca    9.4      16 Oct 2024 08:36  Mg     1.9     10-16    TPro  6.0  /  Alb  3.3[L]  /  TBili  <0.2  /  DBili  x   /  AST  7   /  ALT  <5  /  AlkPhos  110  10-14            aspirin  chewable 81 milliGRAM(s) Oral daily  atorvastatin 10 milliGRAM(s) Oral at bedtime  ferrous    sulfate 325 milliGRAM(s) Oral daily  gabapentin 300 milliGRAM(s) Oral three times a day  heparin   Injectable 5000 Unit(s) SubCutaneous every 12 hours  melatonin 3 milliGRAM(s) Oral at bedtime  pantoprazole    Tablet 40 milliGRAM(s) Oral every 12 hours  polyethylene glycol 3350 17 Gram(s) Oral daily  senna 2 Tablet(s) Oral at bedtime  tamsulosin 0.4 milliGRAM(s) Oral at bedtime      83 y/o man  PMHx of HLD, DM, CAD s/p stents x2 in 2007, pt of Dr Avila, hx  asbestosis of lung, diagnosed w malignant mesothelioma (8/2024) and s/p VATS pleurodesis, R sided PleurX, hx AVM/GIB and JASMIN, sent in by NH for low hemoglobin. Per NH, patient also refused Pleurx drainage today and is requesting to drain it inpatient (gets drained 2-3 times per week).    1. Acute on chronic anemia s/p 2 PRBC . Hx JASMIN - no overt GI bleed . Hx AVM?  - Admit to medicine       - Active type and screen   - Switch plavix to aspirin for now   - last admission received 5 days of Venofer   -c/w ferrous sulfate  - Continue home PPI 40mg BID PO  -Seen by GI last admission:   a)  spoke to grandson (HCP)  regarding endoscopic work up. After discussing risks vs benefits given his advance age and co morbidities he would like to hold off. Offered VCE as outpatient .       2. Malignant mesothelioma (diagnosed 8/2024 ) with recurrent pleural effusion . Hx Asbestos lung   -s/p VATS pleurodesis, R sided PleurX  * Per NH, patient also refused Pleurx drainage and is requesting to drain it inpatient (gets drained 2-3 times per week)  - Pulmonary consult appreciated   -Fu heme onc  -Fu palliative    3. HTN/HLD/CAD s/p stents x2 in 2007 (follows Dr Avila)  -c/w home meds     4. Small hypodensity in segment 7 of the liver seen on prior CT.  - RUQ US as OP  -f/u outpt GI    5. Acute Grief vs MDD  - wife passed away a few months ago  - patient refusing medication sometimes and asks to be left alone, no suicide ideas, no ideas to hurt other people.    - Psychiatry eval admission recommended outpt fu     #DVT ppx-HSQ   #GI ppx- PPI   #Diet-dash/tlc | CC   #Activity-IAT  #Dispo-acute    Son John [phone]

## 2024-10-16 NOTE — PROGRESS NOTE ADULT - CONVERSATION DETAILS
Discussed with patient plan of care and his mesothelioma dx.   I will get oncology to help understand his prognosis.   Started discussion abt code status. Palliative care will be consulted for further eval.

## 2024-10-16 NOTE — CONSULT NOTE ADULT - ASSESSMENT
84yMale with history of CAD, DM, abestosis, and recent diagnosis of mesothelioma s/p VATS pleurodesis, R sided pleurx presents with low hemoglobin.  Patient is s/p multiple units PRBC. Palliative care consulted for GOC.    Spoke with patient and son John at bedside. Palliative care introduced. They were able to provide a medical history and hospital course. They noted the patient is seen by Dr. Epperson in oncology and the plan is to continue treatment on discharged.  They wish for ongoing aggressive medical management and treatment of malignancy.    Discussed code status, including DNR/DNI.  Patient wants Full Code, but will reassess in the future.    Recommendations  -Full code  -ongoing medical management  -trend hemoglobin and transfuse PRN  -follow up pulm, CT surgery  -follow up heme onc likely as outpatient  -will sign off        Education about palliative care provided to patient/family.  See Recs below.    Please call [phone] with questions or concerns 24/7.

## 2024-10-16 NOTE — CONSULT NOTE ADULT - SUBJECTIVE AND OBJECTIVE BOX
CC: low hemoglobin    HPI:  HPI: 85 y/o man  PMHx of HLD, DM, CAD s/p stents x2 in 2007, pt of Dr Avila, hx  asbestosis of lung, diagnosed w malignant mesothelioma (8/2024) and s/p VATS pleurodesis, R sided PleurX, hx AVM/GIB and JASMIN, sent in by NH for low hemoglobin. Per NH, patient also refused Pleurx drainage today and is requesting to drain it inpatient (gets drained 2-3 times per week). Of note, patient had a recent admission in September for anemia for which he received 2 units prbc, IV Venofer and was recommended EGD/[place] but given his advance age and co morbidities decided to hold off. Patient is a poor historian. Uses wheelchair at baseline. Denies fever chills, shortness of breath, cough, chest pain, leg swelling, hematemesis, hematochezia.     Vital Signs Last 24 Hrs  T(C): 36.4 (15 Oct 2024 01:09), Max: 36.9 (14 Oct 2024 22:51)  T(F): 97.5 (15 Oct 2024 01:09), Max: 98.4 (14 Oct 2024 22:51)  HR: 100 (15 Oct 2024 01:09) (89 - 105)  BP: 132/74 (15 Oct 2024 01:09) (104/64 - 132/74)  RR: 18 (15 Oct 2024 01:09) (17 - 19)  SpO2: 97% (15 Oct 2024 01:09) (93% - 97%)    Parameters below as of 15 Oct 2024 01:09  Patient On (Oxygen Delivery Method): room air    Labs: Hgb 6.4 , Platelets 500 , Cr 1.2 (baseline)   CXR: Worsening R pleural effusion   EKG: NSR with PACs    Admitted for anemia      (15 Oct 2024 01:16)    PERTINENT PM/SXH:   DM (diabetes mellitus)    MI (myocardial infarction)    History of CAD (coronary artery disease)    Dyslipidemia    Currently smokes tobacco    Mesothelioma, malignant      Coronary stent patent      FAMILY HISTORY:    None pertinent    ITEMS NOT CHECKED ARE NOT PRESENT    SOCIAL HISTORY:   Significant other/partner[ ]  Children[ ]  Congregational/Spirituality:  Substance hx:  [ ]   Tobacco hx:  [ ]   Alcohol hx: [ ]   Living Situation: [ ]Home  [x ]Long term care  [ ]Rehab [ ]Other  Home Services: [ ] HHA [ ] Mahesh RN [ ] Hospice  Occupation:  Home Opioid hx:  [ ] Y [ ] N [x ] I-Stop Reference No:    Reference #: [id number]        Patient Demographic Information (PDI)       PDI	First Name	Last Name	Birth Date	Gender	Street Address	[facility]	Zip Code  LUH Mason	1940	Male	[address] 	[facility]	[address]    Prescription Information      PDI Filter:    PDI	Current Rx	Drug Type	Rx Written	Rx Dispensed	Drug	Quantity	Days Supply  A	Y	O	10/13/2024	10/13/2024	oxycodone-acetaminophen 5-325 mg tab	28	7  Prescriber Name Tiffany Arellano  Prescriber ROZINA # [id number]  Payment Method Insurance  Dispenser [employer]  A	N	O	08/20/2024	08/20/2024	oxycodone-acetaminophen 5-325 mg tab	60	15  Prescriber Name John Mireles MD  Prescriber ROZINA # [id number]  Payment Method Insurance  Dispenser [employer]     ADVANCE DIRECTIVES:     [x ] Full Code [ ] DNR  MOLST  [ ]  Living Will  [ ]   DECISION MAKER(s):  [ x] Health Care Proxy(s)  [ ] Surrogate(s)  [ ] Guardian           Name(s): Phone Number(s):  Delmis Mason      BASELINE (I)ADL(s) (prior to admission):    [place]: [ ]Total  [ ] Moderate [ ]Dependent  Palliative Performance Status Version 2:         %    http://npcrc.org/files/news/palliative_performance_scale_ppsv2.pdf    Allergies    penicillin (Unknown)    Intolerances    MEDICATIONS  (STANDING):  aspirin  chewable 81 milliGRAM(s) Oral daily  atorvastatin 10 milliGRAM(s) Oral at bedtime  ferrous    sulfate 325 milliGRAM(s) Oral daily  gabapentin 300 milliGRAM(s) Oral three times a day  heparin   Injectable 5000 Unit(s) SubCutaneous every 12 hours  melatonin 3 milliGRAM(s) Oral at bedtime  pantoprazole    Tablet 40 milliGRAM(s) Oral every 12 hours  polyethylene glycol 3350 17 Gram(s) Oral daily  senna 2 Tablet(s) Oral at bedtime  tamsulosin 0.4 milliGRAM(s) Oral at bedtime    MEDICATIONS  (PRN):    PRESENT SYMPTOMS: [ ]Unable to obtain due to poor mentation   Source if other than patient:  [ ]Family   [ ]Team     Pain: [ ]yes [ ]no  ALL PAIN ASSESSMENT COMPONENTS WERE ASKED ABOUT UNLESS OTHERWISE NOTED  QOL impact -   Location -                    Aggravating factors -  Quality -  Radiation -  Timing-  Severity (0-10 scale):  Minimal acceptable level (0-10 scale):     CPOT:    https://www.[facility].org/getattachment/[email or web address]/Critical-Care-Pain-Observation-Tool-(CPOT)    PAIN AD Score:   http://geriatrictoolkit.[facility]/cog/painad.pdf (press ctrl +  left click to view)    Dyspnea:                           [ ]None[ ]Mild [ ]Moderate [ ]Severe     Respiratory Distress Observation Scale (RDOS):   A score of 0 to 2 signifies little or no respiratory distress, 3 signifies mild distress, scores 4 to 6 indicate moderate distress, and scores greater than 7 signify severe distress  https://www.[facility].ca/sites/default/files/PDFS/534587-qjqbzejuxsi-ghnllsck-uyxaowqbbbh-xfzqi.pdf    Anxiety:                             [ ]None[ ]Mild [ ]Moderate [ ]Severe   Fatigue:                             [ ]None[ ]Mild [ ]Moderate [ ]Severe   Nausea:                             [ ]None[ ]Mild [ ]Moderate [ ]Severe   Loss of appetite:              [ ]None[ ]Mild [ ]Moderate [ ]Severe   Constipation:                    [ ]None[ ]Mild [ ]Moderate [ ]Severe    Other Symptoms:  [ ]All other review of systems negative     Palliative Performance Status Version 2:         %    http://npcrc.org/files/news/palliative_performance_scale_ppsv2.pdf    PHYSICAL EXAM:  Vital Signs Last 24 Hrs  T(C): 36.3 (16 Oct 2024 04:35), Max: 36.7 (15 Oct 2024 13:14)  T(F): 97.4 (16 Oct 2024 04:35), Max: 98 (15 Oct 2024 13:14)  HR: 86 (16 Oct 2024 04:35) (80 - 86)  BP: 122/69 (16 Oct 2024 04:35) (122/69 - 125/80)  BP(mean): --  RR: 18 (16 Oct 2024 04:35) (18 - 18)  SpO2: 95% (16 Oct 2024 04:35) (95% - 95%)     I&O's Summary    15 Oct 2024 07:01  -  16 Oct 2024 07:00  --------------------------------------------------------  IN: 0 mL / OUT: 1200 mL / NET: -1200 mL        GENERAL:  [ ] No acute distress [ ]Lethargic  [ ]Unarousable  [ ]Verbal  [ ]Non-Verbal [ ]Cachexia    BEHAVIORAL/PSYCH:  [ ]Alert and Oriented x  [ ] Anxiety [ ] Delirium [ ] Agitation [ ] Calm   EYES: [ ] No scleral icterus [ ] Scleral icterus [ ] Closed  ENMT:  [ ]Dry mouth  [ ]No external oral lesions [ ] No external ear or nose lesions  CARDIOVASCULAR:  [ ]Regular [ ]Irregular [ ]Tachy [ ]Not Tachy  [ ]William [ ] Edema [ ] No edema  PULMONARY:  [ ]Tachypnea  [ ]Audible excessive secretions [ ] No labored breathing [ ] labored breathing  GASTROINTESTINAL: [ ]Soft  [ ]Distended  [ ]Not distended [ ]Non tender [ ]Tender  MUSCULOSKELETAL: [ ]No clubbing [ ] clubbing  [ ] No cyanosis [ ] cyanosis  NEUROLOGIC: [ ]No focal deficits  [ ]Follows commands  [ ]Does not follow commands  [ ]Cognitive impairment  [ ]Dysphagia  [ ]Dysarthria  [ ]Paresis   SKIN: [ ] Jaundiced [ ] Non-jaundiced [ ]Rash [ ]No Rash [ ] Warm [ ] Dry  MISC/LINES: [ ] ET tube [ ] Trach [ ]NGT/OGT [ ]PEG [ ][place]    LABS: reviewed by me                        8.7    8.84  )-----------( 456      ( 15 Oct 2024 13:33 )             27.7   10-15    139  |  110  |  25[H]  ----------------------------<  79  3.8   |  21  |  0.8    Ca    7.0[L]      15 Oct 2024 13:33  Mg     1.6     10-15    TPro  6.0  /  Alb  3.3[L]  /  TBili  <0.2  /  DBili  x   /  AST  7   /  ALT  <5  /  AlkPhos  110  10-14      Urinalysis Basic - ( 15 Oct 2024 13:33 )    Color: x / Appearance: x / SG: x / pH: x  Gluc: 79 mg/dL / Ketone: x  / Bili: x / Urobili: x   Blood: x / Protein: x / Nitrite: x   Leuk Esterase: x / RBC: x / WBC x   Sq Epi: x / Non Sq Epi: x / Bacteria: x      RADIOLOGY & ADDITIONAL STUDIES: reviewed by me    EKG: reviewed by me      PROTEIN CALORIE MALNUTRITION PRESENT: [ ]mild [ ]moderate [ ]severe [ ]underweight [ ]morbid obesity  https://www.andeal.org/vault/2440/web/files/ONC/Table_Clinical%20Characteristics%20to%20Document%20Malnutrition-White%20JV%20et%20al%202012.pdf    Height (cm): 172.7 (10-14-24 @ 17:15), 172.7 (09-22-24 @ 20:06), 172.7 (08-06-24 @ 08:36)  Weight (kg): 64.4 (10-14-24 @ 17:15), 61.2 (09-19-24 @ 15:29), 68 (08-06-24 @ 08:36)  BMI (kg/m2): 21.6 (10-14-24 @ 17:15), 20.5 (09-22-24 @ 20:06), 20.5 (09-19-24 @ 15:29)  [ ]PPSV2 < or = to 30% [ ]significant weight loss  [ ]poor nutritional intake  [ ]anasarca      [ ]Artificial Nutrition      Palliative Care Spiritual/Emotional Screening Tool Question  Severity (0-4):                    OR                    [ x] Unable to determine. Will assess at later time if appropriate.  Score of 2 or greater indicates recommendation of Chaplaincy and/or SW referral  Chaplaincy Referral: [ ] Yes [ ] Refused [ ] Following     Caregiver [place]:  [ ] Yes [ ] No    OR    [x ] Unable to determine. Will assess at later time if appropriate.  Social Work Referral [ ]  Patient and Family Centered Care Referral [ ]    Anticipatory Grief Present: [ ] Yes [ ] No    OR     [ x] Unable to determine. Will assess at later time if appropriate.  Social Work Referral [ ]  Patient and Family Centered Care Referral [ ]    Patient discussed with primary medical team MD  Palliative care education provided to patient and/or family   CC: low hemoglobin    HPI:  HPI: 83 y/o man  PMHx of HLD, DM, CAD s/p stents x2 in 2007, pt of Dr Avila, hx  asbestosis of lung, diagnosed w malignant mesothelioma (8/2024) and s/p VATS pleurodesis, R sided PleurX, hx AVM/GIB and JASMIN, sent in by NH for low hemoglobin. Per NH, patient also refused Pleurx drainage today and is requesting to drain it inpatient (gets drained 2-3 times per week). Of note, patient had a recent admission in September for anemia for which he received 2 units prbc, IV Venofer and was recommended EGD/[place] but given his advance age and co morbidities decided to hold off. Patient is a poor historian. Uses wheelchair at baseline. Denies fever chills, shortness of breath, cough, chest pain, leg swelling, hematemesis, hematochezia.     Vital Signs Last 24 Hrs  T(C): 36.4 (15 Oct 2024 01:09), Max: 36.9 (14 Oct 2024 22:51)  T(F): 97.5 (15 Oct 2024 01:09), Max: 98.4 (14 Oct 2024 22:51)  HR: 100 (15 Oct 2024 01:09) (89 - 105)  BP: 132/74 (15 Oct 2024 01:09) (104/64 - 132/74)  RR: 18 (15 Oct 2024 01:09) (17 - 19)  SpO2: 97% (15 Oct 2024 01:09) (93% - 97%)    Parameters below as of 15 Oct 2024 01:09  Patient On (Oxygen Delivery Method): room air    Labs: Hgb 6.4 , Platelets 500 , Cr 1.2 (baseline)   CXR: Worsening R pleural effusion   EKG: NSR with PACs    Admitted for anemia      (15 Oct 2024 01:16)    PERTINENT PM/SXH:   DM (diabetes mellitus)    MI (myocardial infarction)    History of CAD (coronary artery disease)    Dyslipidemia    Currently smokes tobacco    Mesothelioma, malignant      Coronary stent patent      FAMILY HISTORY:    None pertinent    ITEMS NOT CHECKED ARE NOT PRESENT    SOCIAL HISTORY:   Significant other/partner[ ]  Children[ ]  Presybeterian/Spirituality:  Substance hx:  [ ]   Tobacco hx:  [ ]   Alcohol hx: [ ]   Living Situation: [ ]Home  [x ]Long term care  [ ]Rehab [ ]Other  Home Services: [ ] HHA [ ] Mahesh RN [ ] Hospice  Occupation:  Home Opioid hx:  [ ] Y [ ] N [x ] I-Stop Reference No:    Reference #: [id number]        Patient Demographic Information (PDI)       PDI	First Name	Last Name	Birth Date	Gender	Street Address	[facility]	Zip Code  LUH Mason	1940	Male	[place] [place] 	[facility]	[address]    Prescription Information      PDI Filter:    PDI	Current Rx	Drug Type	Rx Written	Rx Dispensed	Drug	Quantity	Days Supply  A	Y	O	10/13/2024	10/13/2024	oxycodone-acetaminophen 5-325 mg tab	28	7  Prescriber Name Tiffany Arellano  Prescriber ROZINA # [id number]  Payment Method Insurance  Dispenser [employer]  A	N	O	08/20/2024	08/20/2024	oxycodone-acetaminophen 5-325 mg tab	60	15  Prescriber Name John Mireles MD  Prescriber ROZINA # [id number]  Payment Method Insurance  Dispenser [employer]     ADVANCE DIRECTIVES:     [x ] Full Code [ ] DNR  MOLST  [ ]  Living Will  [ ]   DECISION MAKER(s):  [ x] Health Care Proxy(s)  [ ] Surrogate(s)  [ ] Guardian           Name(s): Phone Number(s):  Delmis Mason      BASELINE (I)ADL(s) (prior to admission):    [place]: [ ]Total  [ ] Moderate [ ]Dependent  Palliative Performance Status Version 2:         %    http://npcrc.org/files/news/palliative_performance_scale_ppsv2.pdf    Allergies    penicillin (Unknown)    Intolerances    MEDICATIONS  (STANDING):  aspirin  chewable 81 milliGRAM(s) Oral daily  atorvastatin 10 milliGRAM(s) Oral at bedtime  ferrous    sulfate 325 milliGRAM(s) Oral daily  gabapentin 300 milliGRAM(s) Oral three times a day  heparin   Injectable 5000 Unit(s) SubCutaneous every 12 hours  melatonin 3 milliGRAM(s) Oral at bedtime  pantoprazole    Tablet 40 milliGRAM(s) Oral every 12 hours  polyethylene glycol 3350 17 Gram(s) Oral daily  senna 2 Tablet(s) Oral at bedtime  tamsulosin 0.4 milliGRAM(s) Oral at bedtime    MEDICATIONS  (PRN):    PRESENT SYMPTOMS: [ ]Unable to obtain due to poor mentation   Source if other than patient:  [ ]Family   [ ]Team     Pain: [ x]yes [ ]no  ALL PAIN ASSESSMENT COMPONENTS WERE ASKED ABOUT UNLESS OTHERWISE NOTED  QOL impact - moderate  Location -   chest/epigastrum                Aggravating factors -  none noted  Quality -  Radiation -  Timing-  Severity (0-10 scale): 6/10  Minimal acceptable level (0-10 scale):     CPOT:    https://www.[facility].org/getattachment/[email or web address]/Critical-Care-Pain-Observation-Tool-(CPOT)    PAIN AD Score:   http://geriatrictoolkit.[facility]/cog/painad.pdf (press ctrl +  left click to view)    Dyspnea:                           [x ]None[ ]Mild [ ]Moderate [ ]Severe     Respiratory Distress Observation Scale (RDOS):   A score of 0 to 2 signifies little or no respiratory distress, 3 signifies mild distress, scores 4 to 6 indicate moderate distress, and scores greater than 7 signify severe distress  https://www.[facility].ca/sites/default/files/PDFS/553819-iphgxmvhdvt-hbniuefj-uzwoacfwtpi-gzjie.pdf    Anxiety:                             [ x]None[ ]Mild [ ]Moderate [ ]Severe   Fatigue:                             [ x]None[ ]Mild [ ]Moderate [ ]Severe   Nausea:                             [x ]None[ ]Mild [ ]Moderate [ ]Severe   Loss of appetite:              [ x]None[ ]Mild [ ]Moderate [ ]Severe   Constipation:                    [x ]None[ ]Mild [ ]Moderate [ ]Severe    Other Symptoms:  [x ]All other review of systems negative     Palliative Performance Status Version 2:         40%    http://npcrc.org/files/news/palliative_performance_scale_ppsv2.pdf    PHYSICAL EXAM:  Vital Signs Last 24 Hrs  T(C): 36.3 (16 Oct 2024 04:35), Max: 36.7 (15 Oct 2024 13:14)  T(F): 97.4 (16 Oct 2024 04:35), Max: 98 (15 Oct 2024 13:14)  HR: 86 (16 Oct 2024 04:35) (80 - 86)  BP: 122/69 (16 Oct 2024 04:35) (122/69 - 125/80)  BP(mean): --  RR: 18 (16 Oct 2024 04:35) (18 - 18)  SpO2: 95% (16 Oct 2024 04:35) (95% - 95%)     I&O's Summary    15 Oct 2024 07:01  -  16 Oct 2024 07:00  --------------------------------------------------------  IN: 0 mL / OUT: 1200 mL / NET: -1200 mL        GENERAL:  [ ] No acute distress [ ]Lethargic  [ ]Unarousable  [x ]Verbal  [ ]Non-Verbal [ ]Cachexia    BEHAVIORAL/PSYCH:  [x ]Alert and Oriented x4  [ ] Anxiety [ ] Delirium [ ] Agitation [ ] Calm   EYES: [x ] No scleral icterus [ ] Scleral icterus [ ] Closed  ENMT:  [ ]Dry mouth  [x ]No external oral lesions [ ] No external ear or nose lesions  CARDIOVASCULAR:  [ ]Regular [ ]Irregular [ ]Tachy [x ]Not Tachy  [ ]William [ ] Edema [ ] No edema  PULMONARY:  [ ]Tachypnea  [ ]Audible excessive secretions [x ] No labored breathing [ ] labored breathing  GASTROINTESTINAL: [ ]Soft  [ ]Distended  [ ]Not distended [ ]Non tender [ ]Tender  MUSCULOSKELETAL: [ ]No clubbing [ ] clubbing  [ ] No cyanosis [ ] cyanosis  NEUROLOGIC: [ ]No focal deficits  [x ]Follows commands  [ ]Does not follow commands  [ ]Cognitive impairment  [ ]Dysphagia  [ ]Dysarthria  [ ]Paresis   SKIN: [ ] Jaundiced [ ] Non-jaundiced [ ]Rash [ ]No Rash [ ] Warm [ ] Dry  MISC/LINES: [ ] ET tube [ ] Trach [ ]NGT/OGT [ ]PEG [ ][place]    LABS: reviewed by me                        8.7    8.84  )-----------( 456      ( 15 Oct 2024 13:33 )             27.7   10-15    139  |  110  |  25[H]  ----------------------------<  79  3.8   |  21  |  0.8    Ca    7.0[L]      15 Oct 2024 13:33  Mg     1.6     10-15    TPro  6.0  /  Alb  3.3[L]  /  TBili  <0.2  /  DBili  x   /  AST  7   /  ALT  <5  /  AlkPhos  110  10-14      Urinalysis Basic - ( 15 Oct 2024 13:33 )    Color: x / Appearance: x / SG: x / pH: x  Gluc: 79 mg/dL / Ketone: x  / Bili: x / Urobili: x   Blood: x / Protein: x / Nitrite: x   Leuk Esterase: x / RBC: x / WBC x   Sq Epi: x / Non Sq Epi: x / Bacteria: x      RADIOLOGY & ADDITIONAL STUDIES: reviewed by me    < from: Xray Chest 1 View- PORTABLE-Routine (Xray Chest 1 View- PORTABLE-Routine in AM.) (10.15.24 @ 06:56) >  IMPRESSION:    Stable appearance of the thorax.    < end of copied text >      EKG: reviewed by me    < from: 12 Lead ECG (10.14.24 @ 18:58) >  Ventricular Rate 92 BPM    Atrial Rate 92 BPM    P-R Interval 144 ms    QRS Duration 96 ms    Q-T Interval 364 ms    QTC Calculation(Bazett) 450 ms    P Axis 40 degrees    R Axis 45 degrees    T Axis 165 degrees    Diagnosis Line Sinus rhythm with Premature atrial complexes  Cannot rule out Inferior infarct , age undetermined  T wave abnormality, consider lateral ischemia  Abnormal ECG    < end of copied text >      PROTEIN CALORIE MALNUTRITION PRESENT: [ ]mild [ ]moderate [ ]severe [ ]underweight [ ]morbid obesity  https://www.andeal.org/vault/2440/web/files/ONC/Table_Clinical%20Characteristics%20to%20Document%20Malnutrition-White%20JV%20et%20al%215796.pdf    Height (cm): 172.7 (10-14-24 @ 17:15), 172.7 (09-22-24 @ 20:06), 172.7 (08-06-24 @ 08:36)  Weight (kg): 64.4 (10-14-24 @ 17:15), 61.2 (09-19-24 @ 15:29), 68 (08-06-24 @ 08:36)  BMI (kg/m2): 21.6 (10-14-24 @ 17:15), 20.5 (09-22-24 @ 20:06), 20.5 (09-19-24 @ 15:29)  [ ]PPSV2 < or = to 30% [ ]significant weight loss  [ ]poor nutritional intake  [ ]anasarca      [ ]Artificial Nutrition      Palliative Care Spiritual/Emotional Screening Tool Question  Severity (0-4):                    OR                    [ x] Unable to determine. Will assess at later time if appropriate.  Score of 2 or greater indicates recommendation of Chaplaincy and/or SW referral  Chaplaincy Referral: [ ] Yes [ ] Refused [ ] Following     Caregiver [place]:  [ ] Yes [ ] No    OR    [x ] Unable to determine. Will assess at later time if appropriate.  Social Work Referral [ ]  Patient and Family Centered Care Referral [ ]    Anticipatory Grief Present: [ ] Yes [ ] No    OR     [ x] Unable to determine. Will assess at later time if appropriate.  Social Work Referral [ ]  Patient and Family Centered Care Referral [ ]    Patient discussed with primary medical team MD  Palliative care education provided to patient and/or family

## 2024-10-17 PROCEDURE — 99233 SBSQ HOSP IP/OBS HIGH 50: CPT

## 2024-10-17 RX ORDER — IRON SUCROSE 20 MG/ML
200 INJECTION, SOLUTION INTRAVENOUS EVERY 24 HOURS
Refills: 0 | Status: COMPLETED | OUTPATIENT
Start: 2024-10-17 | End: 2024-10-21

## 2024-10-17 RX ADMIN — HEPARIN SODIUM 5000 UNIT(S): 10000 INJECTION INTRAVENOUS; SUBCUTANEOUS at 05:53

## 2024-10-17 RX ADMIN — Medication 325 MILLIGRAM(S): at 11:20

## 2024-10-17 RX ADMIN — GABAPENTIN 300 MILLIGRAM(S): 300 CAPSULE ORAL at 05:53

## 2024-10-17 RX ADMIN — GABAPENTIN 300 MILLIGRAM(S): 300 CAPSULE ORAL at 21:26

## 2024-10-17 RX ADMIN — Medication 10 MILLIGRAM(S): at 21:26

## 2024-10-17 RX ADMIN — PANTOPRAZOLE SODIUM 40 MILLIGRAM(S): 40 TABLET, DELAYED RELEASE ORAL at 05:53

## 2024-10-17 RX ADMIN — Medication 0.4 MILLIGRAM(S): at 21:27

## 2024-10-17 RX ADMIN — HEPARIN SODIUM 5000 UNIT(S): 10000 INJECTION INTRAVENOUS; SUBCUTANEOUS at 17:09

## 2024-10-17 RX ADMIN — GABAPENTIN 300 MILLIGRAM(S): 300 CAPSULE ORAL at 13:57

## 2024-10-17 RX ADMIN — PANTOPRAZOLE SODIUM 40 MILLIGRAM(S): 40 TABLET, DELAYED RELEASE ORAL at 17:10

## 2024-10-17 RX ADMIN — Medication 81 MILLIGRAM(S): at 11:20

## 2024-10-17 RX ADMIN — Medication 2 TABLET(S): at 21:26

## 2024-10-17 RX ADMIN — Medication 3 MILLIGRAM(S): at 21:26

## 2024-10-17 NOTE — CONSULT NOTE ADULT - SUBJECTIVE AND OBJECTIVE BOX
Patient is a 84y old  Male who presents with a chief complaint of Acute on chronic anemia (17 Oct 2024 15:51)      HPI:  HPI: 83 y/o man  PMHx of HLD, DM, CAD s/p stents x2 in 2007, pt of Dr Avila, hx  asbestosis of lung, diagnosed w malignant mesothelioma (8/2024) and s/p VATS pleurodesis, R sided PleurX, hx AVM/GIB and JASMIN, sent in by NH for low hemoglobin. Per NH, patient also refused Pleurx drainage today and is requesting to drain it inpatient (gets drained 2-3 times per week). Of note, patient had a recent admission in September for anemia for which he received 2 units prbc, IV Venofer and was recommended EGD/[place] but given his advance age and co morbidities decided to hold off. Patient is a poor historian. Uses wheelchair at baseline. Denies fever chills, shortness of breath, cough, chest pain, leg swelling, hematemesis, hematochezia.     Vital Signs Last 24 Hrs  T(C): 36.4 (15 Oct 2024 01:09), Max: 36.9 (14 Oct 2024 22:51)  T(F): 97.5 (15 Oct 2024 01:09), Max: 98.4 (14 Oct 2024 22:51)  HR: 100 (15 Oct 2024 01:09) (89 - 105)  BP: 132/74 (15 Oct 2024 01:09) (104/64 - 132/74)  RR: 18 (15 Oct 2024 01:09) (17 - 19)  SpO2: 97% (15 Oct 2024 01:09) (93% - 97%)    Parameters below as of 15 Oct 2024 01:09  Patient On (Oxygen Delivery Method): room air    Labs: Hgb 6.4 , Platelets 500 , Cr 1.2 (baseline)   CXR: Worsening R pleural effusion   EKG: NSR with PACs    Admitted for anemia      (15 Oct 2024 01:16)       ROS:  Negative except for:weakness    PAST MEDICAL & SURGICAL HISTORY:  DM (diabetes mellitus)      MI (myocardial infarction)      History of CAD (coronary artery disease)      Dyslipidemia      Currently smokes tobacco      Mesothelioma, malignant      Coronary stent patent          SOCIAL HISTORY:    FAMILY HISTORY:      MEDICATIONS  (STANDING):  aspirin  chewable 81 milliGRAM(s) Oral daily  atorvastatin 10 milliGRAM(s) Oral at bedtime  ferrous    sulfate 325 milliGRAM(s) Oral daily  gabapentin 300 milliGRAM(s) Oral three times a day  heparin   Injectable 5000 Unit(s) SubCutaneous every 12 hours  iron sucrose IVPB 200 milliGRAM(s) IV Intermittent every 24 hours  lactated ringers. 1000 milliLiter(s) (100 mL/Hr) IV Continuous <Continuous>  melatonin 3 milliGRAM(s) Oral at bedtime  pantoprazole    Tablet 40 milliGRAM(s) Oral every 12 hours  polyethylene glycol 3350 17 Gram(s) Oral daily  senna 2 Tablet(s) Oral at bedtime  tamsulosin 0.4 milliGRAM(s) Oral at bedtime    MEDICATIONS  (PRN):  acetaminophen     Tablet .. 650 milliGRAM(s) Oral every 6 hours PRN Mild Pain (1 - 3)      Allergies    penicillin (Unknown)    Intolerances        Vital Signs Last 24 Hrs  T(C): 36.3 (17 Oct 2024 13:23), Max: 36.4 (16 Oct 2024 22:25)  T(F): 97.3 (17 Oct 2024 13:23), Max: 97.6 (17 Oct 2024 05:21)  HR: 102 (17 Oct 2024 13:23) (78 - 102)  BP: 106/70 (17 Oct 2024 13:23) (106/70 - 129/64)  BP(mean): 86 (17 Oct 2024 05:21) (86 - 86)  RR: 18 (17 Oct 2024 13:23) (18 - 18)  SpO2: 98% (17 Oct 2024 13:23) (96% - 98%)    Parameters below as of 17 Oct 2024 13:23  Patient On (Oxygen Delivery Method): room air        PHYSICAL EXAM  General: adult in NAD  HEENT: clear oropharynx, anicteric sclera, pink conjunctiva  Neck: supple  CV: normal S1/S2 with no murmur rubs or gallops  Lungs: positive air movement b/l ant lungs,  Abdomen: soft non-tender non-distended, no hepatosplenomegaly  Ext: no clubbing cyanosis or edema  Skin: no rashes and no petechiae  Neuro: alert and oriented X 2, no focal deficits      LABS:                          8.5    8.72  )-----------( 459      ( 16 Oct 2024 18:11 )             27.7         Mean Cell Volume : 91.1 fL  Mean Cell Hemoglobin : 28.0 pg  Mean Cell Hemoglobin Concentration : 30.7 g/dL  Auto Neutrophil # : x  Auto Lymphocyte # : x  Auto Monocyte # : x  Auto Eosinophil # : x  Auto Basophil # : x  Auto Neutrophil % : x  Auto Lymphocyte % : x  Auto Monocyte % : x  Auto Eosinophil % : x  Auto Basophil % : x      Serial CBC's  10-16 @ 18:11  Hct-27.7 / Hgb-8.5 / Plat-459 / RBC-3.04 / WBC-8.72  Serial CBC's  10-16 @ 08:36  Hct-28.0 / Hgb-8.8 / Plat-480 / RBC-3.12 / WBC-9.10  Serial CBC's  10-15 @ 13:33  Hct-27.7 / Hgb-8.7 / Plat-456 / RBC-3.08 / WBC-8.84  Serial CBC's  10-14 @ 17:52  Hct-21.4 / Hgb-6.4 / Plat-500 / RBC-2.32 / WBC-8.64      10-16    135  |  98  |  31[H]  ----------------------------<  135[H]  5.0   |  24  |  1.1    Ca    9.4      16 Oct 2024 08:36  Mg     1.9     10-16            Ferritin: 142 ng/mL (10-14 @ 19:40)  Iron - Total Binding Capacity.: 250 ug/dL (10-14 @ 19:40)              BLOOD SMEAR INTERPRETATION:       RADIOLOGY & ADDITIONAL STUDIES:

## 2024-10-17 NOTE — DIETITIAN INITIAL EVALUATION ADULT - PERTINENT MEDS FT
MEDICATIONS  (STANDING):  aspirin  chewable 81 milliGRAM(s) Oral daily  atorvastatin 10 milliGRAM(s) Oral at bedtime  ferrous    sulfate 325 milliGRAM(s) Oral daily  gabapentin 300 milliGRAM(s) Oral three times a day  heparin   Injectable 5000 Unit(s) SubCutaneous every 12 hours  iron sucrose IVPB 200 milliGRAM(s) IV Intermittent every 24 hours  lactated ringers. 1000 milliLiter(s) (100 mL/Hr) IV Continuous <Continuous>  melatonin 3 milliGRAM(s) Oral at bedtime  pantoprazole    Tablet 40 milliGRAM(s) Oral every 12 hours  polyethylene glycol 3350 17 Gram(s) Oral daily  senna 2 Tablet(s) Oral at bedtime  tamsulosin 0.4 milliGRAM(s) Oral at bedtime

## 2024-10-17 NOTE — DIETITIAN INITIAL EVALUATION ADULT - ADD RECOMMEND
1. Add 2 x/day Ensure MAX (150 kcal + 30 grams protein / carton)   2. Continue current diet order and assist as needed.   3.  1. Add 2 x/day Ensure MAX (150 kcal + 30 grams protein / carton) in Vanilla  2. Continue current diet order and assist as needed.  1. Ensure Plus High Protein (350 kcal + 20 Grams Protein per 237 mL carton)  TID in vanilla  2. Continue current diet order and assist as needed.

## 2024-10-17 NOTE — PROGRESS NOTE ADULT - ASSESSMENT
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 Per NH, patient also refused Pleurx drainage today and is requesting to drain it inpatient (gets drained 2-3 times per week).    #Recurrent anemia  #Hx JASMIN - no overt GI bleed  #Hx AVM?  - Hemodynamically stable & no active bleeding  - Baseline hemoglobin - 8-9   - Hemoglobin on admission - 6.3 >s/p 2units prbc (received 2 units at the end of Spetember as well)  - On plavix   - last admission received 5 days of Venofer   -Seen by GI last admission> spoke to grandson (HCP)  regarding endoscopic work up. After discussing risks vs benefits given his advance age and co morbidities he would like to hold off. Offered VCE as outpatient .   -Can reconsider work up if within goals of care , will need to speak to grandson/son  -Will also need heme onc outpt for possible venofer infusions outpt   - Maintain active Type and screen  - Trend H&H  - Continue home PPI 40mg BID PO  - Target Hgb >8   - Avoid NSAIDs  - give Venofer as %sat, ferritin remain low  - recall GI for possible intervention if family agrees    #Malignant mesothelioma (diagnosed 8/2024 )  #Hx Asbestos lung   -s/p VATS pleurodesis, R sided PleurX  -Per NH, patient also refused Pleurx drainage today and is requesting to drain it inpatient (gets drained 2-3 times per week)  -Fu pulm  -Fu heme onc outpatient   -Fu CT surgery for bloody Pleurx drainage  -Fu palliative    #HTN/HLD  #CAD s/p stents x2 in 2007 (follows Dr Avila)  -c/w home meds     #Small hypodensity in segment 7 of the liver seen on prior CT.  - RUQ US as OP  -f/u outpt GI    #Acute Grief vs MDD  - wife passed away a few months ago  - patient refusing medication sometimes and asks to be left alone, no suicide ideas, no ideas to hurt other people.    - Psychiatry eval admission recommended outpt fu     #DVT ppx-HSQ   #GI ppx- PPI   #Diet-dash/tlc | CC   #Activity-IAT  #Dispo-acute 85 y/o man  PMHx of HLD, DM, CAD s/p stents x2 in 2007, pt of Dr Avila, hx  asbestosis of lung, diagnosed w malignant mesothelioma (8/2024) and s/p VATS pleurodesis, R sided PleurX, hx AVM/GIB and JASMIN, sent in by NH for low hemoglobin. Per NH, patient also refused Pleurx drainage today and is requesting to drain it inpatient (gets drained 2-3 times per week).    #Recurrent anemia  #Hx JASMIN - no overt GI bleed  #Hx AVM?  - Hemodynamically stable & no active bleeding  - Baseline hemoglobin - 8-9   - Hemoglobin on admission - 6.3 >s/p 2units prbc (received 2 units at the end of Spetember as well)  - On plavix   - last admission received 5 days of Venofer   -Seen by GI last admission> spoke to grandson (HCP)  regarding endoscopic work up. After discussing risks vs benefits given his advance age and co morbidities he would like to hold off. Offered VCE as outpatient .   -Can reconsider work up if within goals of care , will need to speak to grandson/son  -Will also need heme onc outpt for possible venofer infusions outpt   - Maintain active Type and screen  - Trend H&H  - Continue home PPI 40mg BID PO  - Target Hgb >8   - Avoid NSAIDs.  - give Venofer as %sat, ferritin remain low  - recall GI for possible intervention if family agrees in view of recurrent anemia requiring multiple PRBCs    #Malignant mesothelioma (diagnosed 8/2024 )  #Hx Asbestos lung   -s/p VATS pleurodesis, R sided PleurX  -pleurx drainage 2-3 times per week  -Fu heme onc outpatient   -Fu palliative    #HTN/HLD  #CAD s/p stents x2 in 2007 (follows Dr Avila)  -c/w home meds   -changed Plavix to ASA    #Small hypodensity in segment 7 of the liver seen on prior CT.  - RUQ US as OP  -f/u outpt GI    #Acute Grief vs MDD  #Baseline dementia  - wife passed away a few months ago  - patient refusing medication sometimes and asks to be left alone, no suicide ideas, no ideas to hurt other people.    - Psychiatry eval admission recommended outpt fu     #Nutrition/Fluids/Electrolytes   - replete K<4 and Mg <2  - ensure regular BMs  - consider Miralax BID, Senna    #DVT Px  SCDs  Heparin or Lovenox    #Progress Note Handoff  Pending: Clinical improvement and stability__x___PT____x____GI  Pt/Family discussion: Pt/family informed and agree with the current plan  Disposition: ?Nursing Home      My note supersedes the residents note should a discrepancy arise.    Chart and notes personally reviewed.  Care Discussed with Consultants/Other Providers/ Housestaff [ x] YES [ ] NO   Radiology, labs, old records personally reviewed.    discussed w/ housestaff, nursing, case management    Attestation Statements:    Attestation Statements:  Risk Statement (NON-critical care).     On this date of service, level of risk to patient is considered: High.     Due to: pt with illness causing risk to life or bodily fxn    Time-based billing (NON-critical care).     50 minutes spent on total encounter. The necessity of the time spent during the encounter on this date of service was due to:     time spent on review of labs, imaging studies, old records, obtaining history, personally examining patient, counselling and communicating with patient/ family, entering orders for medications/tests/etc, discussions with other health care providers, documentation in electronic health records, independent interpretation of labs, imaging/procedure results and care coordination.

## 2024-10-17 NOTE — DIETITIAN NUTRITION RISK NOTIFICATION - COMMENTS
Khushi Shafer x [phone] or Via TEAMS   High risk follow up in 3-5 days or PRN. s/p sPCM      Monitoring/Evaluating: Labs, Lytes, Wts, PO intake, Diet and texture tolerance, NFPF, GI S/S, BM.

## 2024-10-17 NOTE — DIETITIAN INITIAL EVALUATION ADULT - PERTINENT LABORATORY DATA
10-16    135  |  98  |  31[H]  ----------------------------<  135[H]  5.0   |  24  |  1.1    Ca    9.4      16 Oct 2024 08:36  Mg     1.9     10-16    POCT Blood Glucose.: 121 mg/dL (10-16-24 @ 16:36)  A1C with Estimated Average Glucose Result: 5.8 % (08-11-24 @ 05:09)  A1C with Estimated Average Glucose Result: 5.7 % (07-22-24 @ 04:30)

## 2024-10-17 NOTE — DIETITIAN INITIAL EVALUATION ADULT - OTHER CALCULATIONS
Increased estimated energy and protein needs with consideration for weight maintenance/gain, BMI, age, mobility, cancer dx, malnutrition and comorbidities.

## 2024-10-17 NOTE — CONSULT NOTE ADULT - ASSESSMENT
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     #Acute on chronic anemia with hx of AVM and JASMIN  r/o GI bleed  tfx to keep hb >7  start venofer 100  mg qdaily x 3 days   can continue as outpt if gets discharged   - On plavix   -Seen by GI last admission>endoscopic work up was deferred as per HCP    #Malignant mesothelioma (diagnosed 8/2024 )  #Hx Asbestos lung   -s/p VATS pleurodesis, R sided PleurX,s/p drainage  -he will f/u with Dr Epperson for  further [place] as outpt    cont other supportive care.  will f/u

## 2024-10-17 NOTE — DIETITIAN INITIAL EVALUATION ADULT - PHYSCIAL ASSESSMENT
Not an appropriate time to conduct full Nutrition Focused Physical Examination however upon observation, pt with over signs / symptoms of protein calorie malnutrition.

## 2024-10-17 NOTE — PROGRESS NOTE ADULT - SUBJECTIVE AND OBJECTIVE BOX
Patient is a 84y old  Male who presents with a chief complaint of Anemia     (17 Oct 2024 12:14)    INTERVAL HPI/OVERNIGHT EVENTS: Patient was examined and seen at bedside. This morning pt is resting comfortably in bed and reports no new issues or overnight events. No complaints, feels well.  ROS: Denies CP, SOB, AP, new weakness  All other systems reviewed and are within normal limits.  InitialHPI:  HPI: 85 y/o man  PMHx of HLD, DM, CAD s/p stents x2 in 2007, pt of Dr Avila, hx  asbestosis of lung, diagnosed w malignant mesothelioma (8/2024) and s/p VATS pleurodesis, R sided PleurX, hx AVM/GIB and JASMIN, sent in by NH for low hemoglobin. Per NH, patient also refused Pleurx drainage today and is requesting to drain it inpatient (gets drained 2-3 times per week). Of note, patient had a recent admission in September for anemia for which he received 2 units prbc, IV Venofer and was recommended EGD/[place] but given his advance age and co morbidities decided to hold off. Patient is a poor historian. Uses wheelchair at baseline. Denies fever chills, shortness of breath, cough, chest pain, leg swelling, hematemesis, hematochezia.     Vital Signs Last 24 Hrs  T(C): 36.4 (15 Oct 2024 01:09), Max: 36.9 (14 Oct 2024 22:51)  T(F): 97.5 (15 Oct 2024 01:09), Max: 98.4 (14 Oct 2024 22:51)  HR: 100 (15 Oct 2024 01:09) (89 - 105)  BP: 132/74 (15 Oct 2024 01:09) (104/64 - 132/74)  RR: 18 (15 Oct 2024 01:09) (17 - 19)  SpO2: 97% (15 Oct 2024 01:09) (93% - 97%)    Parameters below as of 15 Oct 2024 01:09  Patient On (Oxygen Delivery Method): room air    Labs: Hgb 6.4 , Platelets 500 , Cr 1.2 (baseline)   CXR: Worsening R pleural effusion   EKG: NSR with PACs    Admitted for anemia      (15 Oct 2024 01:16)    PAST MEDICAL & SURGICAL HISTORY:  DM (diabetes mellitus)      MI (myocardial infarction)      History of CAD (coronary artery disease)      Dyslipidemia      Currently smokes tobacco      Mesothelioma, malignant      Coronary stent patent          General: NAD, AAOx2, chronically ill appearing, b/l temp waisting  HEENT:  no LAD  CV: S1 S2  Resp: decreased breath sounds at bases  GI: NT/ND/S +BS  MS: no clubbing/cyanosis/edema, + pulses b/l  Neuro: nonfocal, +reflexes thruout  +Leg bag w/ clear yellow urine  +Rt sided ant pleurx cath w/ d/c/i dsg    MEDICATIONS  (STANDING):  aspirin  chewable 81 milliGRAM(s) Oral daily  atorvastatin 10 milliGRAM(s) Oral at bedtime  ferrous    sulfate 325 milliGRAM(s) Oral daily  gabapentin 300 milliGRAM(s) Oral three times a day  heparin   Injectable 5000 Unit(s) SubCutaneous every 12 hours  iron sucrose IVPB 200 milliGRAM(s) IV Intermittent every 24 hours  lactated ringers. 1000 milliLiter(s) (100 mL/Hr) IV Continuous <Continuous>  melatonin 3 milliGRAM(s) Oral at bedtime  pantoprazole    Tablet 40 milliGRAM(s) Oral every 12 hours  polyethylene glycol 3350 17 Gram(s) Oral daily  senna 2 Tablet(s) Oral at bedtime  tamsulosin 0.4 milliGRAM(s) Oral at bedtime    MEDICATIONS  (PRN):  acetaminophen     Tablet .. 650 milliGRAM(s) Oral every 6 hours PRN Mild Pain (1 - 3)    Vital Signs Last 24 Hrs  T(C): 36.3 (17 Oct 2024 13:23), Max: 36.5 (16 Oct 2024 19:34)  T(F): 97.3 (17 Oct 2024 13:23), Max: 97.7 (16 Oct 2024 19:34)  HR: 102 (17 Oct 2024 13:23) (78 - 102)  BP: 106/70 (17 Oct 2024 13:23) (106/70 - 129/64)  BP(mean): 86 (17 Oct 2024 05:21) (86 - 86)  RR: 18 (17 Oct 2024 13:23) (18 - 18)  SpO2: 98% (17 Oct 2024 13:23) (95% - 98%)    Parameters below as of 17 Oct 2024 13:23  Patient On (Oxygen Delivery Method): room air      CAPILLARY BLOOD GLUCOSE      POCT Blood Glucose.: 121 mg/dL (16 Oct 2024 16:36)                          8.5    8.72  )-----------( 459      ( 16 Oct 2024 18:11 )             27.7     10-16    135  |  98  |  31[H]  ----------------------------<  135[H]  5.0   |  24  |  1.1    Ca    9.4      16 Oct 2024 08:36  Mg     1.9     10-16              Urinalysis Basic - ( 16 Oct 2024 08:36 )    Color: x / Appearance: x / SG: x / pH: x  Gluc: 135 mg/dL / Ketone: x  / Bili: x / Urobili: x   Blood: x / Protein: x / Nitrite: x   Leuk Esterase: x / RBC: x / WBC x   Sq Epi: x / Non Sq Epi: x / Bacteria: x              Chart, Consultant(s) Notes Reviewed:  [x ] YES  [ ] NO  Care Discussed with Consultants/Other Providers/ Housestaff [ x] YES  [ ] NO  Radiology, labs, old available records personally reviewed.

## 2024-10-17 NOTE — DIETITIAN NUTRITION RISK NOTIFICATION - TREATMENT: THE FOLLOWING DIET HAS BEEN RECOMMENDED
Diet, DASH/TLC:   Sodium & Cholesterol Restricted  Consistent Carbohydrate {Evening Snack} (CSTCHOSN)  Supplement Feeding Modality:  Oral  Ensure Plus High Protein Cans or Servings Per Day:  1       Frequency:  Three Times a day (10-17-24 @ 16:01) [Active]

## 2024-10-17 NOTE — PROGRESS NOTE ADULT - SUBJECTIVE AND OBJECTIVE BOX
CLARI BURGER 84y Male  MRN#: [id number]     Hospital Day: 3d    Pt is currently admitted with the primary diagnosis of  Anemia        SUBJECTIVE     Overnight events  None    Subjective complaints  Pt was evaluated this am. Patient denied any active complaints and per patient his symptoms are improving                                            ----------------------------------------------------------  OBJECTIVE  PAST MEDICAL & SURGICAL HISTORY  DM (diabetes mellitus)    MI (myocardial infarction)    History of CAD (coronary artery disease)    Dyslipidemia    Currently smokes tobacco    Mesothelioma, malignant    Coronary stent patent                                              -----------------------------------------------------------  ALLERGIES:  penicillin (Unknown)                                            ------------------------------------------------------------    HOME MEDICATIONS  Home Medications:  clopidogrel 75 mg oral tablet: 1 tab(s) orally once a day (21 Jul 2024 18:04)  ferrous sulfate 325 mg (65 mg elemental iron) oral tablet: 1 tab(s) orally once a day (25 Sep 2024 15:37)  gabapentin 300 mg oral tablet: 1 tab(s) orally 3 times a day (20 Sep 2024 01:37)  melatonin 3 mg oral tablet: 1 tab(s) orally once a day (at bedtime) (13 Aug 2024 09:23)  metFORMIN 500 mg oral tablet: 1 tab(s) orally 2 times a day (21 Jul 2024 18:04)  pantoprazole 40 mg oral delayed release tablet: 1 tab(s) orally every 12 hours (25 Sep 2024 15:37)  polyethylene glycol 3350 oral powder for reconstitution: 17 gram(s) orally once a day (13 Aug 2024 09:23)  pravastatin 20 mg oral tablet: 1 tab(s) orally (21 Jul 2024 18:04)  senna leaf extract oral tablet: 2 tab(s) orally once a day (at bedtime) (13 Aug 2024 09:23)  tamsulosin 0.4 mg oral capsule: 1 cap(s) orally once a day (at bedtime) (13 Aug 2024 09:23)                           MEDICATIONS:  STANDING MEDICATIONS  aspirin  chewable 81 milliGRAM(s) Oral daily  atorvastatin 10 milliGRAM(s) Oral at bedtime  ferrous    sulfate 325 milliGRAM(s) Oral daily  gabapentin 300 milliGRAM(s) Oral three times a day  heparin   Injectable 5000 Unit(s) SubCutaneous every 12 hours  iron sucrose IVPB 200 milliGRAM(s) IV Intermittent every 24 hours  lactated ringers. 1000 milliLiter(s) IV Continuous <Continuous>  melatonin 3 milliGRAM(s) Oral at bedtime  pantoprazole    Tablet 40 milliGRAM(s) Oral every 12 hours  polyethylene glycol 3350 17 Gram(s) Oral daily  senna 2 Tablet(s) Oral at bedtime  tamsulosin 0.4 milliGRAM(s) Oral at bedtime    PRN MEDICATIONS  acetaminophen     Tablet .. 650 milliGRAM(s) Oral every 6 hours PRN                                            ------------------------------------------------------------  VITAL SIGNS: Last 24 Hours  T(C): 36.3 (17 Oct 2024 13:23), Max: 36.5 (16 Oct 2024 19:34)  T(F): 97.3 (17 Oct 2024 13:23), Max: 97.7 (16 Oct 2024 19:34)  HR: 102 (17 Oct 2024 13:23) (78 - 102)  BP: 106/70 (17 Oct 2024 13:23) (106/70 - 129/64)  BP(mean): 86 (17 Oct 2024 05:21) (86 - 86)  RR: 18 (17 Oct 2024 13:23) (18 - 18)  SpO2: 98% (17 Oct 2024 13:23) (95% - 98%)      10-16-24 @ 07:01  -  10-17-24 @ 07:00  --------------------------------------------------------  IN: 0 mL / OUT: 850 mL / NET: -850 mL    10-17-24 @ 07:01  -  10-17-24 @ 15:50  --------------------------------------------------------  IN: 0 mL / OUT: 450 mL / NET: -450 mL                                             --------------------------------------------------------------  LABS:                        8.5    8.72  )-----------( 459      ( 16 Oct 2024 18:11 )             27.7     10-16    135  |  98  |  31[H]  ----------------------------<  135[H]  5.0   |  24  |  1.1    Ca    9.4      16 Oct 2024 08:36  Mg     1.9     10-16        Urinalysis Basic - ( 16 Oct 2024 08:36 )    Color: x / Appearance: x / SG: x / pH: x  Gluc: 135 mg/dL / Ketone: x  / Bili: x / Urobili: x   Blood: x / Protein: x / Nitrite: x   Leuk Esterase: x / RBC: x / WBC x   Sq Epi: x / Non Sq Epi: x / Bacteria: x                                                            -------------------------------------------------------------  RADIOLOGY:     Xray Chest 1 View- PORTABLE-Routine (Xray Chest 1 View- PORTABLE-Routine in AM.) (10.15.24 @ 06:56) >  Stable appearance of the thorax.                                            --------------------------------------------------------------    PHYSICAL EXAM:  GENERAL: NAD, lying in bed comfortably  HEAD:  Atraumatic, Normocephalic  EYES: EOMI, conjunctiva and sclera clear  ENT: Moist mucous membranes  NECK: Supple, No JVD  CHEST/LUNG: Clear to auscultation bilaterally; No rales, rhonchi, wheezing, or rubs. Unlabored respirations  HEART: regular rate and rhythm; No murmurs, rubs, or gallops  ABDOMEN: Bowel sounds present; Soft, Nontender, Nondistended.    EXTREMITIES: Warm. No clubbing, cyanosis, or edema  NERVOUS SYSTEM:  Alert & Oriented X3. No focal deficits   SKIN: No rashes or lesions                                           --------------------------------------------------------------

## 2024-10-17 NOTE — DIETITIAN INITIAL EVALUATION ADULT - NAME AND PHONE
Khushi Shafer x [phone] or Via TEAMS     High risk follow up in 3 days or PRN s/p sPCM.    Monitoring/Evaluating: Labs, Lytes, Wts, PO intake, Diet and texture tolerance, NFPF, GI S/S, BM.

## 2024-10-17 NOTE — DIETITIAN INITIAL EVALUATION ADULT - SIGNS/SYMPTOMS
>10% in 6 months unintentional wt loss, Severe muscle wasting >10% in 6 months unintentional wt loss, Severe muscle wasting (temporalis, calf) Fat wasting in orbi

## 2024-10-17 NOTE — DIETITIAN INITIAL EVALUATION ADULT - OTHER INFO
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 Per NH, patient also refused Pleurx drainage today and is requesting to drain it inpatient (gets drained 2-3 times per week).    #Acute on chronic anemia #Diet-dash/tlc | CC #Acute Grief vs MDD 85 y/o man  PMHx of HLD, DM, CAD s/p stents x2 in 2007, hx  asbestosis of lung, diagnosed w malignant mesothelioma (8/2024) and s/p VATS pleurodesis, R sided PleurX, hx AVM/GIB and JASMIN, sent in by NH for low hemoglobin. Per NH, patient also refused Pleurx drainage today and is requesting to drain it inpatient (gets drained 2-3 times per week).    #Acute on chronic anemia #Diet-dash/tlc | CC #Acute Grief vs MDD

## 2024-10-17 NOTE — PHYSICAL THERAPY INITIAL EVALUATION ADULT - SPECIFY REASON(S)
Pt declined PT at this time despite encouragement and education about importance of mobility. Pt has full ROM BUE/ BLE, instructed in therapeutic exercise in bed to maintain mobility.  Will f/u for PT

## 2024-10-18 LAB
ALBUMIN SERPL ELPH-MCNC: 3 G/DL — LOW (ref 3.5–5.2)
ALLERGY+IMMUNOLOGY DIAG STUDY NOTE: SIGNIFICANT CHANGE UP
ALP SERPL-CCNC: 87 U/L — SIGNIFICANT CHANGE UP (ref 30–115)
ALT FLD-CCNC: <5 U/L — SIGNIFICANT CHANGE UP (ref 0–41)
ANION GAP SERPL CALC-SCNC: 10 MMOL/L — SIGNIFICANT CHANGE UP (ref 7–14)
AST SERPL-CCNC: 6 U/L — SIGNIFICANT CHANGE UP (ref 0–41)
BILIRUB SERPL-MCNC: <0.2 MG/DL — SIGNIFICANT CHANGE UP (ref 0.2–1.2)
BUN SERPL-MCNC: 39 MG/DL — HIGH (ref 10–20)
CALCIUM SERPL-MCNC: 9 MG/DL — SIGNIFICANT CHANGE UP (ref 8.4–10.5)
CHLORIDE SERPL-SCNC: 103 MMOL/L — SIGNIFICANT CHANGE UP (ref 98–110)
CO2 SERPL-SCNC: 25 MMOL/L — SIGNIFICANT CHANGE UP (ref 17–32)
CREAT SERPL-MCNC: 1 MG/DL — SIGNIFICANT CHANGE UP (ref 0.7–1.5)
EGFR: 75 ML/MIN/1.73M2 — SIGNIFICANT CHANGE UP
GLUCOSE SERPL-MCNC: 208 MG/DL — HIGH (ref 70–99)
HCT VFR BLD CALC: 22.8 % — LOW (ref 42–52)
HGB BLD-MCNC: 6.9 G/DL — CRITICAL LOW (ref 14–18)
MCHC RBC-ENTMCNC: 28.2 PG — SIGNIFICANT CHANGE UP (ref 27–31)
MCHC RBC-ENTMCNC: 30.3 G/DL — LOW (ref 32–37)
MCV RBC AUTO: 93.1 FL — SIGNIFICANT CHANGE UP (ref 80–94)
NRBC # BLD: 0 /100 WBCS — SIGNIFICANT CHANGE UP (ref 0–0)
PLATELET # BLD AUTO: 429 K/UL — HIGH (ref 130–400)
PMV BLD: 9.4 FL — SIGNIFICANT CHANGE UP (ref 7.4–10.4)
POTASSIUM SERPL-MCNC: 4.9 MMOL/L — SIGNIFICANT CHANGE UP (ref 3.5–5)
POTASSIUM SERPL-SCNC: 4.9 MMOL/L — SIGNIFICANT CHANGE UP (ref 3.5–5)
PROT SERPL-MCNC: 5.6 G/DL — LOW (ref 6–8)
RBC # BLD: 2.45 M/UL — LOW (ref 4.7–6.1)
RBC # FLD: 16.2 % — HIGH (ref 11.5–14.5)
SODIUM SERPL-SCNC: 138 MMOL/L — SIGNIFICANT CHANGE UP (ref 135–146)
WBC # BLD: 9.46 K/UL — SIGNIFICANT CHANGE UP (ref 4.8–10.8)
WBC # FLD AUTO: 9.46 K/UL — SIGNIFICANT CHANGE UP (ref 4.8–10.8)

## 2024-10-18 PROCEDURE — 86077 PHYS BLOOD BANK SERV XMATCH: CPT

## 2024-10-18 PROCEDURE — 71045 X-RAY EXAM CHEST 1 VIEW: CPT | Mod: 26

## 2024-10-18 PROCEDURE — 99497 ADVNCD CARE PLAN 30 MIN: CPT | Mod: 25

## 2024-10-18 PROCEDURE — 99233 SBSQ HOSP IP/OBS HIGH 50: CPT

## 2024-10-18 PROCEDURE — 99223 1ST HOSP IP/OBS HIGH 75: CPT

## 2024-10-18 RX ADMIN — Medication 10 MILLIGRAM(S): at 22:53

## 2024-10-18 RX ADMIN — Medication 0.4 MILLIGRAM(S): at 22:53

## 2024-10-18 RX ADMIN — GABAPENTIN 300 MILLIGRAM(S): 300 CAPSULE ORAL at 14:09

## 2024-10-18 RX ADMIN — GABAPENTIN 300 MILLIGRAM(S): 300 CAPSULE ORAL at 22:54

## 2024-10-18 RX ADMIN — GABAPENTIN 300 MILLIGRAM(S): 300 CAPSULE ORAL at 05:28

## 2024-10-18 RX ADMIN — PANTOPRAZOLE SODIUM 40 MILLIGRAM(S): 40 TABLET, DELAYED RELEASE ORAL at 15:04

## 2024-10-18 RX ADMIN — Medication 3 MILLIGRAM(S): at 22:53

## 2024-10-18 RX ADMIN — PANTOPRAZOLE SODIUM 40 MILLIGRAM(S): 40 TABLET, DELAYED RELEASE ORAL at 05:28

## 2024-10-18 RX ADMIN — HEPARIN SODIUM 5000 UNIT(S): 10000 INJECTION INTRAVENOUS; SUBCUTANEOUS at 15:04

## 2024-10-18 RX ADMIN — Medication 325 MILLIGRAM(S): at 11:43

## 2024-10-18 RX ADMIN — Medication 81 MILLIGRAM(S): at 11:44

## 2024-10-18 RX ADMIN — Medication 2 TABLET(S): at 22:53

## 2024-10-18 NOTE — CHART NOTE - NSCHARTNOTEFT_GEN_A_CORE
Calorie Count initiated on time, [id number] RN made aware on unit. RD covering unit to collect and post results on 10/21 or PRN     RD: Khushi Shafer x [phone] or via TEAMS.

## 2024-10-18 NOTE — PROGRESS NOTE ADULT - ASSESSMENT
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 Per NH, patient also refused Pleurx drainage today and is requesting to drain it inpatient (gets drained 2-3 times per week).    1. Acute on chronic anemia s/p 2 PRBC .   recurrent anemia   Hx JASIMN - no overt GI bleed . Hx AVM?  -Seen by GI last admission: regarding endoscopic work up. After discussing risks vs benefits given his advance age and co morbidities he would like to hold off. Offered VCE as outpatient .   Plan:  - Active type and screen   - Switched plavix to aspirin for now, will do cardio consult   - last admission received 5 days of Venofer   - c/w ferrous sulfate, IV venofovir for 5 days  - GI consult placed, Follow up   - Continue home PPI 40mg BID PO  - avoid Nsaids  - Hb 6.9> 1 unit PRBC today    2. Malignant mesothelioma (diagnosed 8/2024 ) with recurrent pleural effusion . Hx Asbestos lung    Severe protein calorie malnutrition (calorie count started - result 10/21)  -s/p VATS pleurodesis, R sided PleurX  * Per NH, patient also refused Pleurx drainage and is requesting to drain it inpatient (gets drained 2-3 times per week)  - Currently on room air (target spo2 92-96)  - Fu heme onc- Dr. Ramírez OP  - Fu palliative- full code  - As per pulm-  PleurX Drained 50 cc, Outpatient follow up  - Aspiration precaution  - Pain control   - Follow up bobby, retics, ldh, hapto  - continue with current diet with ensure    3. HTN/HLD/CAD s/p stents x2 in 2007 (follows Dr Avila)  -c/w home meds     4. Small hypodensity in segment 7 of the liver seen on prior CT.  - RUQ US as OP  -f/u outpt GI    5. Acute Grief vs MDD  - wife passed away a few months ago  - patient refusing medication sometimes and asks to be left alone, no suicide ideas, no ideas to hurt other people.    - Psychiatry eval admission recommended outpt fu     #DVT ppx-HSQ   #GI ppx- PPI   #Diet-dash/tlc | CC + ensure  #Activity-IAT  #Dispo-acute    Follow up: - Follow up bobby, retics, ldh, hapto, GOC discussion with the family, calorie count 10/21    Son John [id number]

## 2024-10-18 NOTE — PROGRESS NOTE ADULT - ASSESSMENT
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 Per NH, patient also refused Pleurx drainage today and is requesting to drain it inpatient (gets drained 2-3 times per week).    #Recurrent anemia  #Hx JASMIN - no overt GI bleed  #Hx AVM?  - Hemodynamically stable & no active bleeding  - Baseline hemoglobin - 8-9   - Hemoglobin on admission - 6.3 >s/p 2units prbc (received 2 units at the end of Spetember as well)  - On plavix   - last admission received 5 days of Venofer   -Seen by GI last admission> spoke to grandson (HCP)  regarding endoscopic work up. After discussing risks vs benefits given his advance age and co morbidities he would like to hold off. Offered VCE as outpatient .   -Can reconsider work up if within goals of care , will need to speak to grandson/son  -Will also need heme onc outpt for possible venofer infusions outpt   - Maintain active Type and screen  - Trend H&H  - Continue home PPI 40mg BID PO  - Target Hgb >8   - Avoid NSAIDs.  - give Venofer as %sat, ferritin remain low  - recall GI for possible intervention if family agrees in view of recurrent anemia requiring multiple PRBCs  6/18 convinced pt to give labs. Hgb 6.9. Transfuse 1uRPBCs. Spoke to GI and asked to speak to family. Serial CBC. Keep Hgb >7.5    #Malignant mesothelioma (diagnosed 8/2024 )  #Hx Asbestos lung   -s/p VATS pleurodesis, R sided PleurX  -pleurx drainage 2-3 times per week  -Fu heme onc outpatient. Plan was for possible Rx if pt's functional status improves.   -Fu palliative    #HTN/HLD  #CAD s/p stents x2 in 2007 (follows Dr Avila)  -c/w home meds   -changed Plavix to ASA    #Small hypodensity in segment 7 of the liver seen on prior CT.  - RUQ US as OP  -f/u outpt GI    #Acute Grief vs MDD  #Baseline dementia  - wife passed away a few months ago  - patient refusing medication sometimes and asks to be left alone, no suicide ideas, no ideas to hurt other people.    - Psychiatry eval admission recommended outpt fu     #Nutrition/Fluids/Electrolytes   - replete K<4 and Mg <2  - ensure regular BMs  -  Miralax BID, Senna    #DVT Px  SCDs  Heparin or Lovenox    GOC: d/w son in details. Asked the son his toughts on EGD/c-scopy/hospice. Son wants to think about and let us know.    #Progress Note Handoff  Pending: Clinical improvement and stability__x___PT____x__CBC stability  Pt/Family discussion: Pt/family informed and agree with the current plan  Disposition: Nursing Home      My note supersedes the residents note should a discrepancy arise.    Chart and notes personally reviewed.  Care Discussed with Consultants/Other Providers/ Housestaff [ x] YES [ ] NO   Radiology, labs, old records personally reviewed.    discussed w/ housestaff, nursing, case management, son, GI, PT    Attestation Statements:    Attestation Statements:  Risk Statement (NON-critical care).     On this date of service, level of risk to patient is considered: High.     Due to: pt with illness causing risk to life or bodily fxn    Time-based billing (NON-critical care).     50 minutes spent on total encounter. The necessity of the time spent during the encounter on this date of service was due to:     time spent on review of labs, imaging studies, old records, obtaining history, personally examining patient, counselling and communicating with patient/ family, entering orders for medications/tests/etc, discussions with other health care providers, documentation in electronic health records, independent interpretation of labs, imaging/procedure results and care coordination.

## 2024-10-18 NOTE — PROGRESS NOTE ADULT - CONVERSATION DETAILS
GOC: d/w son in details. Asked the son his toughts on EGD/c-scopy/hospice. Son wants to think about and let us know.

## 2024-10-18 NOTE — CONSULT NOTE ADULT - ATTENDING COMMENTS
Patient seen and examined on GI rounds, agree with the above, modified where needed
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

## 2024-10-18 NOTE — PROGRESS NOTE ADULT - CONVERSATION/DISCUSSION
Diagnosis/Prognosis/MOLST Discussed/Treatment Options/Hospice Referral/Palliative Care Referral
Diagnosis/MOLST Discussed

## 2024-10-18 NOTE — PROGRESS NOTE ADULT - SUBJECTIVE AND OBJECTIVE BOX
Patient is a 84y old  Male who presents with a chief complaint of Acute on chronic anemia (17 Oct 2024 15:51)       Pt is seen and examined  pt is  lying in bed        ROS:  unable to obtain   MEDICATIONS  (STANDING):  aspirin  chewable 81 milliGRAM(s) Oral daily  atorvastatin 10 milliGRAM(s) Oral at bedtime  ferrous    sulfate 325 milliGRAM(s) Oral daily  gabapentin 300 milliGRAM(s) Oral three times a day  heparin   Injectable 5000 Unit(s) SubCutaneous every 12 hours  iron sucrose IVPB 200 milliGRAM(s) IV Intermittent every 24 hours  lactated ringers. 1000 milliLiter(s) (100 mL/Hr) IV Continuous <Continuous>  melatonin 3 milliGRAM(s) Oral at bedtime  pantoprazole    Tablet 40 milliGRAM(s) Oral every 12 hours  polyethylene glycol 3350 17 Gram(s) Oral daily  senna 2 Tablet(s) Oral at bedtime  tamsulosin 0.4 milliGRAM(s) Oral at bedtime    MEDICATIONS  (PRN):  acetaminophen     Tablet .. 650 milliGRAM(s) Oral every 6 hours PRN Mild Pain (1 - 3)      Allergies    penicillin (Unknown)    Intolerances        Vital Signs Last 24 Hrs  T(C): 37.1 (18 Oct 2024 13:15), Max: 37.1 (17 Oct 2024 20:24)  T(F): 98.8 (18 Oct 2024 13:15), Max: 98.8 (17 Oct 2024 20:24)  HR: 92 (18 Oct 2024 13:15) (80 - 92)  BP: 124/77 (18 Oct 2024 13:15) (101/49 - 124/77)  BP(mean): 93 (18 Oct 2024 13:15) (67 - 93)  RR: 18 (18 Oct 2024 13:15) (18 - 18)  SpO2: 97% (18 Oct 2024 13:15) (97% - 98%)    Parameters below as of 18 Oct 2024 13:15  Patient On (Oxygen Delivery Method): room air        PHYSICAL EXAM  General: lethargic   HEENT: clear oropharynx, anicteric sclera, pink conjunctiva  Neck: supple  CV: normal S1/S2 with no murmur rubs or gallops  Lungs: positive air movement b/l ant lungs,clear to auscultation, no wheezes, no rales  Abdomen: soft non-tender non-distended, no hepatosplenomegaly  Ext: no clubbing cyanosis or edema  Skin: no rashes and no petechiae  Neuro: alert and oriented X 4, no focal deficits  LABS:                          6.9    9.46  )-----------( 429      ( 18 Oct 2024 11:19 )             22.8         Mean Cell Volume : 93.1 fL  Mean Cell Hemoglobin : 28.2 pg  Mean Cell Hemoglobin Concentration : 30.3 g/dL  Auto Neutrophil # : x  Auto Lymphocyte # : x  Auto Monocyte # : x  Auto Eosinophil # : x  Auto Basophil # : x  Auto Neutrophil % : x  Auto Lymphocyte % : x  Auto Monocyte % : x  Auto Eosinophil % : x  Auto Basophil % : x    Serial CBC's  10-18 @ 11:19  Hct-22.8 / Hgb-6.9 / Plat-429 / RBC-2.45 / WBC-9.46          Serial CBC's  10-16 @ 18:11  Hct-27.7 / Hgb-8.5 / Plat-459 / RBC-3.04 / WBC-8.72          Serial CBC's  10-16 @ 08:36  Hct-28.0 / Hgb-8.8 / Plat-480 / RBC-3.12 / WBC-9.10          Serial CBC's  10-15 @ 13:33  Hct-27.7 / Hgb-8.7 / Plat-456 / RBC-3.08 / WBC-8.84          Serial CBC's  10-14 @ 17:52  Hct-21.4 / Hgb-6.4 / Plat-500 / RBC-2.32 / WBC-8.64            10-18    138  |  103  |  39[H]  ----------------------------<  208[H]  4.9   |  25  |  1.0    Ca    9.0      18 Oct 2024 11:19    TPro  5.6[L]  /  Alb  3.0[L]  /  TBili  <0.2  /  DBili  x   /  AST  6   /  ALT  <5  /  AlkPhos  87  10-18          WBC Count: 9.46 K/uL (10-18-24 @ 11:19)  Hemoglobin: 6.9 g/dL (10-18-24 @ 11:19)  Hematocrit: 22.8 % (10-18-24 @ 11:19)  Platelet Count - Automated: 429 K/uL (10-18-24 @ 11:19)  WBC Count: 8.72 K/uL (10-16-24 @ 18:11)  Hemoglobin: 8.5 g/dL (10-16-24 @ 18:11)  Hematocrit: 27.7 % (10-16-24 @ 18:11)  Platelet Count - Automated: 459 K/uL (10-16-24 @ 18:11)  WBC Count: 9.10 K/uL (10-16-24 @ 08:36)  Hemoglobin: 8.8 g/dL (10-16-24 @ 08:36)  Hematocrit: 28.0 % (10-16-24 @ 08:36)  Platelet Count - Automated: 480 K/uL (10-16-24 @ 08:36)  WBC Count: 8.84 K/uL (10-15-24 @ 13:33)  Hemoglobin: 8.7 g/dL (10-15-24 @ 13:33)  Hematocrit: 27.7 % (10-15-24 @ 13:33)  Platelet Count - Automated: 456 K/uL (10-15-24 @ 13:33)  Ferritin: 142 ng/mL (10-14-24 @ 19:40)  Iron - Total Binding Capacity.: 250 ug/dL (10-14-24 @ 19:40)  WBC Count: 8.64 K/uL (10-14-24 @ 17:52)  Hemoglobin: 6.4 g/dL (10-14-24 @ 17:52)  Hematocrit: 21.4 % (10-14-24 @ 17:52)  Platelet Count - Automated: 500 K/uL (10-14-24 @ 17:52)                BLOOD SMEAR INTERPRETATION:       RADIOLOGY & ADDITIONAL STUDIES:

## 2024-10-18 NOTE — PROGRESS NOTE ADULT - SUBJECTIVE AND OBJECTIVE BOX
Patient is a 84y old  Male who presents with a chief complaint of Anemia     (17 Oct 2024 12:14)    INTERVAL HPI/OVERNIGHT EVENTS: Patient was examined and seen at bedside. This morning pt is resting comfortably in bed and reports no new issues or overnight events. No complaints. Has been refusing blood draws, PT on and off.  ROS: Denies CP, SOB, AP, new weakness  All other systems reviewed and are within normal limits.  InitialHPI:  HPI: 85 y/o man  PMHx of HLD, DM, CAD s/p stents x2 in 2007, pt of Dr Avila, hx  asbestosis of lung, diagnosed w malignant mesothelioma (8/2024) and s/p VATS pleurodesis, R sided PleurX, hx AVM/GIB and JASMIN, sent in by NH for low hemoglobin. Per NH, patient also refused Pleurx drainage today and is requesting to drain it inpatient (gets drained 2-3 times per week). Of note, patient had a recent admission in September for anemia for which he received 2 units prbc, IV Venofer and was recommended EGD/[place] but given his advance age and co morbidities decided to hold off. Patient is a poor historian. Uses wheelchair at baseline. Denies fever chills, shortness of breath, cough, chest pain, leg swelling, hematemesis, hematochezia.     Vital Signs Last 24 Hrs  T(C): 36.4 (15 Oct 2024 01:09), Max: 36.9 (14 Oct 2024 22:51)  T(F): 97.5 (15 Oct 2024 01:09), Max: 98.4 (14 Oct 2024 22:51)  HR: 100 (15 Oct 2024 01:09) (89 - 105)  BP: 132/74 (15 Oct 2024 01:09) (104/64 - 132/74)  RR: 18 (15 Oct 2024 01:09) (17 - 19)  SpO2: 97% (15 Oct 2024 01:09) (93% - 97%)    Parameters below as of 15 Oct 2024 01:09  Patient On (Oxygen Delivery Method): room air    Labs: Hgb 6.4 , Platelets 500 , Cr 1.2 (baseline)   CXR: Worsening R pleural effusion   EKG: NSR with PACs    Admitted for anemia      (15 Oct 2024 01:16)    PAST MEDICAL & SURGICAL HISTORY:  DM (diabetes mellitus)      MI (myocardial infarction)      History of CAD (coronary artery disease)      Dyslipidemia      Currently smokes tobacco      Mesothelioma, malignant      Coronary stent patent          General: NAD, AAOx2, chronically ill appearing, b/l temp waisting  HEENT:  no LAD  CV: S1 S2  Resp: decreased breath sounds at bases  GI: NT/ND/S +BS  MS: no clubbing/cyanosis/edema, + pulses b/l  Neuro: nonfocal, +reflexes thruout  +Leg bag w/ clear yellow urine  +Rt sided ant pleurx cath w/ d/c/i dsg          Home Medications:  clopidogrel 75 mg oral tablet: 1 tab(s) orally once a day (21 Jul 2024 18:04)  ferrous sulfate 325 mg (65 mg elemental iron) oral tablet: 1 tab(s) orally once a day (25 Sep 2024 15:37)  gabapentin 300 mg oral tablet: 1 tab(s) orally 3 times a day (20 Sep 2024 01:37)  melatonin 3 mg oral tablet: 1 tab(s) orally once a day (at bedtime) (13 Aug 2024 09:23)  metFORMIN 500 mg oral tablet: 1 tab(s) orally 2 times a day (21 Jul 2024 18:04)  pantoprazole 40 mg oral delayed release tablet: 1 tab(s) orally every 12 hours (25 Sep 2024 15:37)  polyethylene glycol 3350 oral powder for reconstitution: 17 gram(s) orally once a day (13 Aug 2024 09:23)  pravastatin 20 mg oral tablet: 1 tab(s) orally (21 Jul 2024 18:04)  senna leaf extract oral tablet: 2 tab(s) orally once a day (at bedtime) (13 Aug 2024 09:23)  tamsulosin 0.4 mg oral capsule: 1 cap(s) orally once a day (at bedtime) (13 Aug 2024 09:23)    MEDICATIONS  (STANDING):  aspirin  chewable 81 milliGRAM(s) Oral daily  atorvastatin 10 milliGRAM(s) Oral at bedtime  ferrous    sulfate 325 milliGRAM(s) Oral daily  gabapentin 300 milliGRAM(s) Oral three times a day  heparin   Injectable 5000 Unit(s) SubCutaneous every 12 hours  iron sucrose IVPB 200 milliGRAM(s) IV Intermittent every 24 hours  lactated ringers. 1000 milliLiter(s) (100 mL/Hr) IV Continuous <Continuous>  melatonin 3 milliGRAM(s) Oral at bedtime  pantoprazole    Tablet 40 milliGRAM(s) Oral every 12 hours  polyethylene glycol 3350 17 Gram(s) Oral daily  senna 2 Tablet(s) Oral at bedtime  tamsulosin 0.4 milliGRAM(s) Oral at bedtime    MEDICATIONS  (PRN):  acetaminophen     Tablet .. 650 milliGRAM(s) Oral every 6 hours PRN Mild Pain (1 - 3)    Vital Signs Last 24 Hrs  T(C): 37.1 (18 Oct 2024 13:15), Max: 37.1 (17 Oct 2024 20:24)  T(F): 98.8 (18 Oct 2024 13:15), Max: 98.8 (17 Oct 2024 20:24)  HR: 92 (18 Oct 2024 13:15) (80 - 92)  BP: 124/77 (18 Oct 2024 13:15) (101/49 - 124/77)  BP(mean): 93 (18 Oct 2024 13:15) (67 - 93)  RR: 18 (18 Oct 2024 13:15) (18 - 18)  SpO2: 97% (18 Oct 2024 13:15) (97% - 98%)    Parameters below as of 18 Oct 2024 13:15  Patient On (Oxygen Delivery Method): room air      CAPILLARY BLOOD GLUCOSE        LABS:                        6.9    9.46  )-----------( 429      ( 18 Oct 2024 11:19 )             22.8     10-18    138  |  103  |  39[H]  ----------------------------<  208[H]  4.9   |  25  |  1.0    Ca    9.0      18 Oct 2024 11:19    TPro  5.6[L]  /  Alb  3.0[L]  /  TBili  <0.2  /  DBili  x   /  AST  6   /  ALT  <5  /  AlkPhos  87  10-18    LIVER FUNCTIONS - ( 18 Oct 2024 11:19 )  Alb: 3.0 g/dL / Pro: 5.6 g/dL / ALK PHOS: 87 U/L / ALT: <5 U/L / AST: 6 U/L / GGT: x                 Urinalysis Basic - ( 18 Oct 2024 11:19 )    Color: x / Appearance: x / SG: x / pH: x  Gluc: 208 mg/dL / Ketone: x  / Bili: x / Urobili: x   Blood: x / Protein: x / Nitrite: x   Leuk Esterase: x / RBC: x / WBC x   Sq Epi: x / Non Sq Epi: x / Bacteria: x              Consultant Notes Reviewed:  [x ] YES  [ ] NO  Care Discussed with Consultants/Other Providers/ Housestaff [ x] YES  [ ] NO  Radiology, labs, EKGs, new studies personally reviewed.

## 2024-10-18 NOTE — PHYSICAL THERAPY INITIAL EVALUATION ADULT - ACTIVE RANGE OF MOTION EXAMINATION, REHAB EVAL
no Active ROM deficits were identified
B hips and knees WFL, B ankles to neutral/bilateral upper extremity Active ROM was WFL (within functional limits)

## 2024-10-18 NOTE — CONSULT NOTE ADULT - ASSESSMENT
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 patient had a recent admission in September for anemia for which he received 2 units prbc, IV Venofer and was recommended EGD/[place] but given his advance age and co morbidities family decided to hold off. Patient is a poor historian. GI was consulted for anemia       #Acute on chronic macrocytic anemia with JASMIN - no overt GI bleed  - Hemodynamically stable & no active bleeding    - On plavix   - Iron :31, %sat: 10 TIBC 325 ferritin 89 while on iron  - reported AVMs? endoscopic work up with  per grand son Nelli Lopez  - CHINYERE: dark green stool, no blood    #Rec  - obtain records from Dr Katz   - Had previous discussion with the patients family, refused egd and colonoscopy. Recommend GOC discussion with the family. Repeat CBC after transfusion.   - Maintain active Type and screen  - Trend H&H BID  - Recommend PPI 40mg BID PO  - Please target Hb  >8  - Please avoid any NSAIDs  - Patient can follow  or  Follow up with our [facility] located at [address] [place] [address]. Phone Number: [phone]    - will follow     #Small hypodensity in segment 7 of the liver seen on prior CT.  -Consider RUQ US as OP  -f/u w/ primary GI

## 2024-10-18 NOTE — PROGRESS NOTE ADULT - ASSESSMENT
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     #Acute on chronic anemia with hx of AVM and JASMIN  r/o GI bleed  tfx to keep hb >7   venofer 200  mg qdaily x 3 days   -Seen by GI last admission>endoscopic work up was deferred as per HCP  -GI f/u given worsening anemia  - On plavix   -check retic,hapto,LDH.DUKE       #Malignant mesothelioma (diagnosed 8/2024 )  #Hx Asbestos lung   -s/p VATS pleurodesis, R sided PleurX,s/p drainage  -he will f/u with Dr Epperson for  further Mn as outpt    cont other supportive care.  will f/u      85 y/o man  PMHx of HLD, DM, CAD s/p stents x2 in 2007, pt of Dr Avila, hx  asbestosis of lung, diagnosed w malignant mesothelioma (8/2024) and s/p VATS pleurodesis, R sided PleurX, hx AVM/GIB and JASMIN, sent in by NH for low hemoglobin.     #Acute on chronic anemia with hx of AVM and JASMIN  r/o GI bleed  tfx to keep hb >7  1 unit today    venofer 200  mg qdaily x 3 days   -Seen by GI last admission>endoscopic work up was deferred as per HCP  -GI f/u given worsening anemia  - On plavix   -check retic,hapto,LDH.DUKE       #Malignant mesothelioma (diagnosed 8/2024 )  #Hx Asbestos lung   -s/p VATS pleurodesis, R sided PleurX,s/p drainage  -he will f/u with Dr Epperson for  further Mn as outpt    cont other supportive care.  will f/u

## 2024-10-18 NOTE — PROGRESS NOTE ADULT - SUBJECTIVE AND OBJECTIVE BOX
CLARI BURGER 84y Male  MRN#: [id number]     Hospital Day: 3d    Pt is currently admitted with the primary diagnosis of  Anemia        SUBJECTIVE     Overnight events  None    Subjective complaints  Pt was evaluated this am. Patient denied any active complaints and per patient his symptoms are improving                                            ----------------------------------------------------------  OBJECTIVE  PAST MEDICAL & SURGICAL HISTORY  DM (diabetes mellitus)    MI (myocardial infarction)    History of CAD (coronary artery disease)    Dyslipidemia    Currently smokes tobacco    Mesothelioma, malignant    Coronary stent patent                                              -----------------------------------------------------------  ALLERGIES:  penicillin (Unknown)                                            ------------------------------------------------------------    HOME MEDICATIONS  Home Medications:  clopidogrel 75 mg oral tablet: 1 tab(s) orally once a day (21 Jul 2024 18:04)  ferrous sulfate 325 mg (65 mg elemental iron) oral tablet: 1 tab(s) orally once a day (25 Sep 2024 15:37)  gabapentin 300 mg oral tablet: 1 tab(s) orally 3 times a day (20 Sep 2024 01:37)  melatonin 3 mg oral tablet: 1 tab(s) orally once a day (at bedtime) (13 Aug 2024 09:23)  metFORMIN 500 mg oral tablet: 1 tab(s) orally 2 times a day (21 Jul 2024 18:04)  pantoprazole 40 mg oral delayed release tablet: 1 tab(s) orally every 12 hours (25 Sep 2024 15:37)  polyethylene glycol 3350 oral powder for reconstitution: 17 gram(s) orally once a day (13 Aug 2024 09:23)  pravastatin 20 mg oral tablet: 1 tab(s) orally (21 Jul 2024 18:04)  senna leaf extract oral tablet: 2 tab(s) orally once a day (at bedtime) (13 Aug 2024 09:23)  tamsulosin 0.4 mg oral capsule: 1 cap(s) orally once a day (at bedtime) (13 Aug 2024 09:23)                           MEDICATIONS:  STANDING MEDICATIONS  aspirin  chewable 81 milliGRAM(s) Oral daily  atorvastatin 10 milliGRAM(s) Oral at bedtime  ferrous    sulfate 325 milliGRAM(s) Oral daily  gabapentin 300 milliGRAM(s) Oral three times a day  heparin   Injectable 5000 Unit(s) SubCutaneous every 12 hours  iron sucrose IVPB 200 milliGRAM(s) IV Intermittent every 24 hours  lactated ringers. 1000 milliLiter(s) IV Continuous <Continuous>  melatonin 3 milliGRAM(s) Oral at bedtime  pantoprazole    Tablet 40 milliGRAM(s) Oral every 12 hours  polyethylene glycol 3350 17 Gram(s) Oral daily  senna 2 Tablet(s) Oral at bedtime  tamsulosin 0.4 milliGRAM(s) Oral at bedtime    PRN MEDICATIONS  acetaminophen     Tablet .. 650 milliGRAM(s) Oral every 6 hours PRN                                            ------------------------------------------------------------  VITAL SIGNS: Last 24 Hours  T(C): 36.3 (17 Oct 2024 13:23), Max: 36.5 (16 Oct 2024 19:34)  T(F): 97.3 (17 Oct 2024 13:23), Max: 97.7 (16 Oct 2024 19:34)  HR: 102 (17 Oct 2024 13:23) (78 - 102)  BP: 106/70 (17 Oct 2024 13:23) (106/70 - 129/64)  BP(mean): 86 (17 Oct 2024 05:21) (86 - 86)  RR: 18 (17 Oct 2024 13:23) (18 - 18)  SpO2: 98% (17 Oct 2024 13:23) (95% - 98%)      10-16-24 @ 07:01  -  10-17-24 @ 07:00  --------------------------------------------------------  IN: 0 mL / OUT: 850 mL / NET: -850 mL    10-17-24 @ 07:01  -  10-17-24 @ 15:50  --------------------------------------------------------  IN: 0 mL / OUT: 450 mL / NET: -450 mL                                             --------------------------------------------------------------  LABS:                        8.5    8.72  )-----------( 459      ( 16 Oct 2024 18:11 )             27.7     10-16    135  |  98  |  31[H]  ----------------------------<  135[H]  5.0   |  24  |  1.1    Ca    9.4      16 Oct 2024 08:36  Mg     1.9     10-16        Urinalysis Basic - ( 16 Oct 2024 08:36 )    Color: x / Appearance: x / SG: x / pH: x  Gluc: 135 mg/dL / Ketone: x  / Bili: x / Urobili: x   Blood: x / Protein: x / Nitrite: x   Leuk Esterase: x / RBC: x / WBC x   Sq Epi: x / Non Sq Epi: x / Bacteria: x                                                            -------------------------------------------------------------  RADIOLOGY:     Xray Chest 1 View- PORTABLE-Routine (Xray Chest 1 View- PORTABLE-Routine in AM.) (10.15.24 @ 06:56) >  Stable appearance of the thorax.                                            --------------------------------------------------------------    PHYSICAL EXAM:  GENERAL: NAD, lying in bed comfortably  HEAD:  Atraumatic, Normocephalic  EYES: EOMI, conjunctiva and sclera clear  ENT: Moist mucous membranes  NECK: Supple, No JVD  CHEST/LUNG: Clear to auscultation bilaterally; No rales, rhonchi, wheezing, or rubs. Unlabored respirations  HEART: regular rate and rhythm; No murmurs, rubs, or gallops  ABDOMEN: Bowel sounds present; Soft, Nontender, Nondistended.    EXTREMITIES: Warm. No clubbing, cyanosis, or edema  NERVOUS SYSTEM:  Alert & Oriented X3. No focal deficits   SKIN: No rashes or lesions                                           --------------------------------------------------------------

## 2024-10-18 NOTE — PHYSICAL THERAPY INITIAL EVALUATION ADULT - PERTINENT HX OF CURRENT PROBLEM, REHAB EVAL
Ot is a  83 y/o man  PMHx of HLD, DM, CAD s/p stents x2 in 2007, pt of Dr Avila, hx  asbestosis of lung, diagnosed w malignant mesothelioma (8/2024) and s/p VATS pleurodesis, R sided PleurX, hx AVM/GIB and JASMIN, sent in by NH for low hemoglobin. Per NH, patient also refused Pleurx drainage today and is requesting to drain it inpatient (gets drained 2-3 times per week). Of note, patient had a recent admission in September for anemia for which he received 2 units prbc, IV Venofer and was recommended EGD/[place] but given his advance age and co morbidities decided to hold off. Patient is a poor historian. Uses wheelchair at baseline. Denies fever chills, shortness of breath, cough, chest pain, leg swelling, hematemesis, hematochezia.

## 2024-10-18 NOTE — PHYSICAL THERAPY INITIAL EVALUATION ADULT - ADDITIONAL COMMENTS
Pt admitted from [facility], appears to be a poor historian. As per chart was receiving rehab services, requires assistance and RW for ambulation.
Pt was admitted from [facility] for short term rehab. Pt reports was living in pvt home prior to that admission with son next door. Pt was receiving rehab services at [facility] required assistance with RW.

## 2024-10-18 NOTE — PHYSICAL THERAPY INITIAL EVALUATION ADULT - GENERAL OBSERVATIONS, REHAB EVAL
14:45-14:57 Pt encountered semifowler in bed in NAD, Pt c/o of back pain -unable to quantify on pain scale Pt encouraged to sit at EOB for change in position but declined. Ildi RN present, reports provided pain meds earlier. Pt reports no change in pain.
08:15-08:43 Pt encountered semifowler in bed in NAD. Agreeable for PT with encouragement.  + laws, denies numbness/ tingling. Pt c/o of back pain 5/10 , Vanessa JOYNER aware

## 2024-10-18 NOTE — CONSULT NOTE ADULT - SUBJECTIVE AND OBJECTIVE BOX
Gastroenterology Consultation:    Patient is a 84y old  Male who presents with a chief complaint of Acute on chronic anemia (17 Oct 2024 15:51)    Admitted on: 10-14-24      HPI: 83 y/o man  PMHx of HLD, DM, CAD s/p stents x2 in 2007, pt of Dr Avila, hx  asbestosis of lung, diagnosed w malignant mesothelioma (8/2024) and s/p VATS pleurodesis, R sided PleurX, hx AVM/GIB and JASMIN, sent in by NH for low hemoglobin. patient had a recent admission in September for anemia for which he received 2 units prbc, IV Venofer and was recommended EGD/[place] but given his advance age and co morbidities family decided to hold off. Patient is a poor historian. GI was consulted for anemia       Prior EGD/ Colonoscopy:  No records available     PAST MEDICAL & SURGICAL HISTORY:  DM (diabetes mellitus)      MI (myocardial infarction)      History of CAD (coronary artery disease)      Dyslipidemia      Currently smokes tobacco      Mesothelioma, malignant      Coronary stent patent            FAMILY HISTORY:  no reported fhx of gi cancer      Social History:  no smoking alcohol or substance use     Home Medications:  clopidogrel 75 mg oral tablet: 1 tab(s) orally once a day (21 Jul 2024 18:04)  ferrous sulfate 325 mg (65 mg elemental iron) oral tablet: 1 tab(s) orally once a day (25 Sep 2024 15:37)  gabapentin 300 mg oral tablet: 1 tab(s) orally 3 times a day (20 Sep 2024 01:37)  melatonin 3 mg oral tablet: 1 tab(s) orally once a day (at bedtime) (13 Aug 2024 09:23)  metFORMIN 500 mg oral tablet: 1 tab(s) orally 2 times a day (21 Jul 2024 18:04)  pantoprazole 40 mg oral delayed release tablet: 1 tab(s) orally every 12 hours (25 Sep 2024 15:37)  polyethylene glycol 3350 oral powder for reconstitution: 17 gram(s) orally once a day (13 Aug 2024 09:23)  pravastatin 20 mg oral tablet: 1 tab(s) orally (21 Jul 2024 18:04)  senna leaf extract oral tablet: 2 tab(s) orally once a day (at bedtime) (13 Aug 2024 09:23)  tamsulosin 0.4 mg oral capsule: 1 cap(s) orally once a day (at bedtime) (13 Aug 2024 09:23)        MEDICATIONS  (STANDING):  aspirin  chewable 81 milliGRAM(s) Oral daily  atorvastatin 10 milliGRAM(s) Oral at bedtime  ferrous    sulfate 325 milliGRAM(s) Oral daily  gabapentin 300 milliGRAM(s) Oral three times a day  heparin   Injectable 5000 Unit(s) SubCutaneous every 12 hours  iron sucrose IVPB 200 milliGRAM(s) IV Intermittent every 24 hours  lactated ringers. 1000 milliLiter(s) (100 mL/Hr) IV Continuous <Continuous>  melatonin 3 milliGRAM(s) Oral at bedtime  pantoprazole    Tablet 40 milliGRAM(s) Oral every 12 hours  polyethylene glycol 3350 17 Gram(s) Oral daily  senna 2 Tablet(s) Oral at bedtime  tamsulosin 0.4 milliGRAM(s) Oral at bedtime    MEDICATIONS  (PRN):  acetaminophen     Tablet .. 650 milliGRAM(s) Oral every 6 hours PRN Mild Pain (1 - 3)      Allergies  penicillin (Unknown)      Review of Systems:   unable to obtain, pt is poor historian         Physical Examination:  T(C): 37.1 (10-18-24 @ 13:15), Max: 37.1 (10-17-24 @ 20:24)  HR: 92 (10-18-24 @ 13:15) (80 - 92)  BP: 124/77 (10-18-24 @ 13:15) (101/49 - 124/77)  RR: 18 (10-18-24 @ 13:15) (18 - 18)  SpO2: 97% (10-18-24 @ 13:15) (97% - 98%)      10-16-24 @ 07:01  -  10-17-24 @ 07:00  --------------------------------------------------------  IN: 0 mL / OUT: 850 mL / NET: -850 mL    10-17-24 @ 07:01  -  10-18-24 @ 07:00  --------------------------------------------------------  IN: 100 mL / OUT: 1200 mL / NET: -1100 mL    10-18-24 @ 07:01  -  10-18-24 @ 16:08  --------------------------------------------------------  IN: 0 mL / OUT: 300 mL / NET: -300 mL          GENERAL:  no acute distress.  HEAD:  Atraumatic, Normocephalic  EYES: conjunctiva and sclera clear  NECK: Supple, no JVD or thyromegaly  CHEST/LUNG: Clear to auscultation bilaterally  HEART: Regular rate and rhythm  ABDOMEN: Soft, nontender, nondistended  NEUROLOGY: No asterixis or tremor.   SKIN: Intact, no jaundice        Data:                        6.9    9.46  )-----------( 429      ( 18 Oct 2024 11:19 )             22.8     Hgb Trend:  6.9  10-18-24 @ 11:19  8.5  10-16-24 @ 18:11  8.8  10-16-24 @ 08:36        10-18    138  |  103  |  39[H]  ----------------------------<  208[H]  4.9   |  25  |  1.0    Ca    9.0      18 Oct 2024 11:19    TPro  5.6[L]  /  Alb  3.0[L]  /  TBili  <0.2  /  DBili  x   /  AST  6   /  ALT  <5  /  AlkPhos  87  10-18    Liver panel trend:  TBili <0.2   /   AST 6   /   ALT <5   /   AlkP 87   /   Tptn 5.6   /   Alb 3.0    /   DBili --      10-18  TBili <0.2   /   AST 7   /   ALT <5   /   AlkP 110   /   Tptn 6.0   /   Alb 3.3    /   DBili --      10-14              Radiology:

## 2024-10-19 LAB
GLUCOSE BLDC GLUCOMTR-MCNC: 138 MG/DL — HIGH (ref 70–99)
HAPTOGLOB SERPL-MCNC: 308 MG/DL — HIGH (ref 34–200)
HCT VFR BLD CALC: 24.6 % — LOW (ref 42–52)
HCT VFR BLD CALC: 25.7 % — LOW (ref 42–52)
HGB BLD-MCNC: 7.8 G/DL — LOW (ref 14–18)
HGB BLD-MCNC: 7.9 G/DL — LOW (ref 14–18)
LDH SERPL L TO P-CCNC: 122 U/L — SIGNIFICANT CHANGE UP (ref 50–242)
MCHC RBC-ENTMCNC: 28.6 PG — SIGNIFICANT CHANGE UP (ref 27–31)
MCHC RBC-ENTMCNC: 29 PG — SIGNIFICANT CHANGE UP (ref 27–31)
MCHC RBC-ENTMCNC: 30.7 G/DL — LOW (ref 32–37)
MCHC RBC-ENTMCNC: 31.7 G/DL — LOW (ref 32–37)
MCV RBC AUTO: 91.4 FL — SIGNIFICANT CHANGE UP (ref 80–94)
MCV RBC AUTO: 93.1 FL — SIGNIFICANT CHANGE UP (ref 80–94)
NRBC # BLD: 0 /100 WBCS — SIGNIFICANT CHANGE UP (ref 0–0)
NRBC # BLD: 0 /100 WBCS — SIGNIFICANT CHANGE UP (ref 0–0)
PLATELET # BLD AUTO: 396 K/UL — SIGNIFICANT CHANGE UP (ref 130–400)
PLATELET # BLD AUTO: 448 K/UL — HIGH (ref 130–400)
PMV BLD: 9.1 FL — SIGNIFICANT CHANGE UP (ref 7.4–10.4)
PMV BLD: 9.4 FL — SIGNIFICANT CHANGE UP (ref 7.4–10.4)
RBC # BLD: 2.69 M/UL — LOW (ref 4.7–6.1)
RBC # BLD: 2.69 M/UL — LOW (ref 4.7–6.1)
RBC # BLD: 2.76 M/UL — LOW (ref 4.7–6.1)
RBC # FLD: 16.6 % — HIGH (ref 11.5–14.5)
RBC # FLD: 17.2 % — HIGH (ref 11.5–14.5)
RETICS #: 93.3 K/UL — SIGNIFICANT CHANGE UP (ref 25–125)
RETICS/RBC NFR: 3.5 % — HIGH (ref 0.5–1.5)
WBC # BLD: 14.03 K/UL — HIGH (ref 4.8–10.8)
WBC # BLD: 8.52 K/UL — SIGNIFICANT CHANGE UP (ref 4.8–10.8)
WBC # FLD AUTO: 14.03 K/UL — HIGH (ref 4.8–10.8)
WBC # FLD AUTO: 8.52 K/UL — SIGNIFICANT CHANGE UP (ref 4.8–10.8)

## 2024-10-19 PROCEDURE — 99233 SBSQ HOSP IP/OBS HIGH 50: CPT

## 2024-10-19 RX ADMIN — GABAPENTIN 300 MILLIGRAM(S): 300 CAPSULE ORAL at 14:29

## 2024-10-19 RX ADMIN — Medication 0.4 MILLIGRAM(S): at 22:00

## 2024-10-19 RX ADMIN — Medication 81 MILLIGRAM(S): at 11:27

## 2024-10-19 RX ADMIN — Medication 2 TABLET(S): at 21:59

## 2024-10-19 RX ADMIN — Medication 325 MILLIGRAM(S): at 11:27

## 2024-10-19 RX ADMIN — GABAPENTIN 300 MILLIGRAM(S): 300 CAPSULE ORAL at 21:59

## 2024-10-19 RX ADMIN — IRON SUCROSE 100 MILLIGRAM(S): 20 INJECTION, SOLUTION INTRAVENOUS at 11:31

## 2024-10-19 RX ADMIN — Medication 10 MILLIGRAM(S): at 21:59

## 2024-10-19 RX ADMIN — Medication 3 MILLIGRAM(S): at 22:00

## 2024-10-19 NOTE — PROGRESS NOTE ADULT - ASSESSMENT
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 Per NH, patient also refused Pleurx drainage today and is requesting to drain it inpatient (gets drained 2-3 times per week).    1. Acute on chronic anemia s/p 2 PRBC .   recurrent anemia   Hx JASMIN - no overt GI bleed . Hx AVM?  - Seen by GI last admission: regarding endoscopic work up. After discussing risks vs benefits given his advance age and co morbidities he would like to hold off. Offered VCE as outpatient .   - last admission received 5 days of Venofer   Plan:  - Active type and screen   - Switched plavix to aspirin for now  - c/w ferrous sulfate, IV venofovir for 5 days  - GI consult placed, Follow up   - Continue home PPI 40mg BID PO  - avoid Nsaids  - Hb 6.9> 1 unit PRBC (10/18)> repeat 7.8  - CBC twice daily    2. Malignant mesothelioma (diagnosed 8/2024 ) with recurrent pleural effusion . Hx Asbestos lung    Severe protein calorie malnutrition (calorie count started - result 10/21)  -s/p VATS pleurodesis, R sided PleurX  * Per NH, patient also refused Pleurx drainage and is requesting to drain it inpatient (gets drained 2-3 times per week)  - Currently on room air (target spo2 92-96)  - Fu heme onc- Dr. Ramírez OP  - Fu palliative- full code  - As per pulm-  PleurX Drained 50 cc, Outpatient follow up  - Aspiration precaution  - Pain control   - Follow up bobby, retics, ldh, hapto  - continue with current diet with ensure    3. HTN/HLD/CAD s/p stents x2 in 2007 (follows Dr Avila)  -c/w home meds     4. Small hypodensity in segment 7 of the liver seen on prior CT.  - RUQ US as OP  -f/u outpt GI    5. Acute Grief vs MDD  - wife passed away a few months ago  - patient refusing medication sometimes and asks to be left alone, no suicide ideas, no ideas to hurt other people.    - Psychiatry eval admission recommended outpt fu     #DVT ppx-HSQ   #GI ppx- PPI   #Diet-dash/tlc | CC + ensure  #Activity-IAT  #Dispo-acute    Follow up: - Follow up bobby, retics, ldh, hapto, calorie count 10/21, Follow up CBC at 4    Son John [id number]   85 y/o man  PMHx of HLD, DM, CAD s/p stents x2 in 2007, pt of Dr Avila, hx  asbestosis of lung, diagnosed w malignant mesothelioma (8/2024) and s/p VATS pleurodesis, R sided PleurX, hx AVM/GIB and JASMIN, sent in by NH for low hemoglobin. Per NH, patient also refused Pleurx drainage today and is requesting to drain it inpatient (gets drained 2-3 times per week).    1. Acute on chronic anemia s/p 2 PRBC .   recurrent anemia   Hx JASMIN - no overt GI bleed . Hx AVM?  - Seen by GI last admission: regarding endoscopic work up. After discussing risks vs benefits given his advance age and co morbidities he would like to hold off. Offered VCE as outpatient .   - last admission received 5 days of Venofer   Plan:  - Active type and screen   - Switched plavix to aspirin for now  - c/w ferrous sulfate, IV venofovir for 5 days  - GI consult placed, Follow up, as per GI- Patient can follow  or  Follow up with our [facility] located at [address] [place] [address]  - Continue home PPI 40mg BID PO  - avoid Nsaids  - Hb 6.9> 1 unit PRBC (10/18)> repeat 7.8  - CBC twice daily    2. Malignant mesothelioma (diagnosed 8/2024 ) with recurrent pleural effusion . Hx Asbestos lung    Severe protein calorie malnutrition (calorie count started - result 10/21)  -s/p VATS pleurodesis, R sided PleurX  * Per NH, patient also refused Pleurx drainage and is requesting to drain it inpatient (gets drained 2-3 times per week)  - Currently on room air (target spo2 92-96)  - Fu heme onc- Dr. Ramírez OP  - Fu palliative- full code  - As per pulm-  PleurX Drained 50 cc, Outpatient follow up  - Aspiration precaution  - Pain control   - Follow up bobby, retics, ldh, hapto  - continue with current diet with ensure    3. HTN/HLD/CAD s/p stents x2 in 2007 (follows Dr Avila)  -c/w home meds     4. Small hypodensity in segment 7 of the liver seen on prior CT.  - RUQ US as OP  -f/u outpt GI    5. Acute Grief vs MDD  - wife passed away a few months ago  - patient refusing medication sometimes and asks to be left alone, no suicide ideas, no ideas to hurt other people.    - Psychiatry eval admission recommended outpt fu     #DVT ppx-HSQ   #GI ppx- PPI   #Diet-dash/tlc | CC + ensure  #Activity-IAT  #Dispo-acute    Follow up: - Follow up bobby, retics, ldh, hapto, calorie count 10/21, Follow up CBC at 4    Son John [id number]

## 2024-10-19 NOTE — PROGRESS NOTE ADULT - SUBJECTIVE AND OBJECTIVE BOX
CLARI BURGER 84y Male  MRN#: [id number]     Hospital Day: 4d    Pt is currently admitted with the primary diagnosis of  Anemia        SUBJECTIVE     Overnight events  None    Subjective complaints  Pt was evaluated this am. Patient denied any active complaints and per patient his symptoms are improving                                            ----------------------------------------------------------  OBJECTIVE  PAST MEDICAL & SURGICAL HISTORY  DM (diabetes mellitus)    MI (myocardial infarction)    History of CAD (coronary artery disease)    Dyslipidemia    Currently smokes tobacco    Mesothelioma, malignant    Coronary stent patent                                              -----------------------------------------------------------  ALLERGIES:  penicillin (Unknown)                                            ------------------------------------------------------------    HOME MEDICATIONS  Home Medications:  clopidogrel 75 mg oral tablet: 1 tab(s) orally once a day (21 Jul 2024 18:04)  ferrous sulfate 325 mg (65 mg elemental iron) oral tablet: 1 tab(s) orally once a day (25 Sep 2024 15:37)  gabapentin 300 mg oral tablet: 1 tab(s) orally 3 times a day (20 Sep 2024 01:37)  melatonin 3 mg oral tablet: 1 tab(s) orally once a day (at bedtime) (13 Aug 2024 09:23)  metFORMIN 500 mg oral tablet: 1 tab(s) orally 2 times a day (21 Jul 2024 18:04)  pantoprazole 40 mg oral delayed release tablet: 1 tab(s) orally every 12 hours (25 Sep 2024 15:37)  polyethylene glycol 3350 oral powder for reconstitution: 17 gram(s) orally once a day (13 Aug 2024 09:23)  pravastatin 20 mg oral tablet: 1 tab(s) orally (21 Jul 2024 18:04)  senna leaf extract oral tablet: 2 tab(s) orally once a day (at bedtime) (13 Aug 2024 09:23)  tamsulosin 0.4 mg oral capsule: 1 cap(s) orally once a day (at bedtime) (13 Aug 2024 09:23)                           MEDICATIONS:  STANDING MEDICATIONS  aspirin  chewable 81 milliGRAM(s) Oral daily  atorvastatin 10 milliGRAM(s) Oral at bedtime  ferrous    sulfate 325 milliGRAM(s) Oral daily  gabapentin 300 milliGRAM(s) Oral three times a day  heparin   Injectable 5000 Unit(s) SubCutaneous every 12 hours  iron sucrose IVPB 200 milliGRAM(s) IV Intermittent every 24 hours  lactated ringers. 1000 milliLiter(s) IV Continuous <Continuous>  melatonin 3 milliGRAM(s) Oral at bedtime  pantoprazole    Tablet 40 milliGRAM(s) Oral every 12 hours  polyethylene glycol 3350 17 Gram(s) Oral daily  senna 2 Tablet(s) Oral at bedtime  tamsulosin 0.4 milliGRAM(s) Oral at bedtime    PRN MEDICATIONS  acetaminophen     Tablet .. 650 milliGRAM(s) Oral every 6 hours PRN                                            ------------------------------------------------------------  VITAL SIGNS: Last 24 Hours  T(C): 36.3 (17 Oct 2024 13:23), Max: 36.5 (16 Oct 2024 19:34)  T(F): 97.3 (17 Oct 2024 13:23), Max: 97.7 (16 Oct 2024 19:34)  HR: 102 (17 Oct 2024 13:23) (78 - 102)  BP: 106/70 (17 Oct 2024 13:23) (106/70 - 129/64)  BP(mean): 86 (17 Oct 2024 05:21) (86 - 86)  RR: 18 (17 Oct 2024 13:23) (18 - 18)  SpO2: 98% (17 Oct 2024 13:23) (95% - 98%)      10-16-24 @ 07:01  -  10-17-24 @ 07:00  --------------------------------------------------------  IN: 0 mL / OUT: 850 mL / NET: -850 mL    10-17-24 @ 07:01  -  10-17-24 @ 15:50  --------------------------------------------------------  IN: 0 mL / OUT: 450 mL / NET: -450 mL                                             --------------------------------------------------------------  LABS:                        8.5    8.72  )-----------( 459      ( 16 Oct 2024 18:11 )             27.7     10-16    135  |  98  |  31[H]  ----------------------------<  135[H]  5.0   |  24  |  1.1    Ca    9.4      16 Oct 2024 08:36  Mg     1.9     10-16        Urinalysis Basic - ( 16 Oct 2024 08:36 )    Color: x / Appearance: x / SG: x / pH: x  Gluc: 135 mg/dL / Ketone: x  / Bili: x / Urobili: x   Blood: x / Protein: x / Nitrite: x   Leuk Esterase: x / RBC: x / WBC x   Sq Epi: x / Non Sq Epi: x / Bacteria: x                                                            -------------------------------------------------------------  RADIOLOGY:     Xray Chest 1 View- PORTABLE-Routine (Xray Chest 1 View- PORTABLE-Routine in AM.) (10.15.24 @ 06:56) >  Stable appearance of the thorax.                                            --------------------------------------------------------------    PHYSICAL EXAM:  GENERAL: NAD, lying in bed comfortably  HEAD:  Atraumatic, Normocephalic  EYES: EOMI, conjunctiva and sclera clear  ENT: Moist mucous membranes  NECK: Supple, No JVD  CHEST/LUNG: Clear to auscultation bilaterally; No rales, rhonchi, wheezing, or rubs. Unlabored respirations  HEART: regular rate and rhythm; No murmurs, rubs, or gallops  ABDOMEN: Bowel sounds present; Soft, Nontender, Nondistended.    EXTREMITIES: Warm. No clubbing, cyanosis, or edema  NERVOUS SYSTEM:  Alert & Oriented X3. No focal deficits   SKIN: No rashes or lesions                                           --------------------------------------------------------------

## 2024-10-19 NOTE — PROGRESS NOTE ADULT - ASSESSMENT
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     #Acute on chronic anemia with hx of AVM and JASMIN  r/o GI bleed  tfx to keep hb >7  1 unit today    venofer 200  mg qdaily x 3 days   -Seen by GI last admission>endoscopic work up as inpt to discuss  -GI f/u given worsening anemia  - On plavix   -check retic,hapto,LDH.DUKE       #Malignant mesothelioma (diagnosed 8/2024 )  #Hx Asbestos lung   -s/p VATS pleurodesis, R sided PleurX,s/p drainage  -he will f/u with Dr Epperson for  further [place] as outpt    cont other supportive care.  will f/u

## 2024-10-19 NOTE — PROGRESS NOTE ADULT - SUBJECTIVE AND OBJECTIVE BOX
Patient is a 84y old  Male who presents with a chief complaint of Anemia     (17 Oct 2024 12:14)    INTERVAL HPI/OVERNIGHT EVENTS: Patient was examined and seen at bedside. This morning pt is resting comfortably in bed and reports no new issues or overnight events. No complaints. Has been refusing blood draws, PT on and off.  ROS: Denies CP, SOB, AP, new weakness  All other systems reviewed and are within normal limits.  InitialHPI:  HPI: 83 y/o man  PMHx of HLD, DM, CAD s/p stents x2 in 2007, pt of Dr Avila, hx  asbestosis of lung, diagnosed w malignant mesothelioma (8/2024) and s/p VATS pleurodesis, R sided PleurX, hx AVM/GIB and JASMIN, sent in by NH for low hemoglobin. Per NH, patient also refused Pleurx drainage today and is requesting to drain it inpatient (gets drained 2-3 times per week). Of note, patient had a recent admission in September for anemia for which he received 2 units prbc, IV Venofer and was recommended EGD/[place] but given his advance age and co morbidities decided to hold off. Patient is a poor historian. Uses wheelchair at baseline. Denies fever chills, shortness of breath, cough, chest pain, leg swelling, hematemesis, hematochezia.     Vital Signs Last 24 Hrs  T(C): 36.4 (15 Oct 2024 01:09), Max: 36.9 (14 Oct 2024 22:51)  T(F): 97.5 (15 Oct 2024 01:09), Max: 98.4 (14 Oct 2024 22:51)  HR: 100 (15 Oct 2024 01:09) (89 - 105)  BP: 132/74 (15 Oct 2024 01:09) (104/64 - 132/74)  RR: 18 (15 Oct 2024 01:09) (17 - 19)  SpO2: 97% (15 Oct 2024 01:09) (93% - 97%)    Parameters below as of 15 Oct 2024 01:09  Patient On (Oxygen Delivery Method): room air    Labs: Hgb 6.4 , Platelets 500 , Cr 1.2 (baseline)   CXR: Worsening R pleural effusion   EKG: NSR with PACs    Admitted for anemia      (15 Oct 2024 01:16)    PAST MEDICAL & SURGICAL HISTORY:  DM (diabetes mellitus)      MI (myocardial infarction)      History of CAD (coronary artery disease)      Dyslipidemia      Currently smokes tobacco      Mesothelioma, malignant      Coronary stent patent          General: NAD, AAOx2, chronically ill appearing, b/l temp waisting  HEENT:  no LAD  CV: S1 S2  Resp: decreased breath sounds at bases  GI: NT/ND/S +BS  MS: no clubbing/cyanosis/edema, + pulses b/l  Neuro: nonfocal, +reflexes thruout  +Leg bag w/ clear yellow urine  +Rt sided ant pleurx cath w/ d/c/i dsg          Home Medications:  clopidogrel 75 mg oral tablet: 1 tab(s) orally once a day (21 Jul 2024 18:04)  ferrous sulfate 325 mg (65 mg elemental iron) oral tablet: 1 tab(s) orally once a day (25 Sep 2024 15:37)  gabapentin 300 mg oral tablet: 1 tab(s) orally 3 times a day (20 Sep 2024 01:37)  melatonin 3 mg oral tablet: 1 tab(s) orally once a day (at bedtime) (13 Aug 2024 09:23)  metFORMIN 500 mg oral tablet: 1 tab(s) orally 2 times a day (21 Jul 2024 18:04)  pantoprazole 40 mg oral delayed release tablet: 1 tab(s) orally every 12 hours (25 Sep 2024 15:37)  polyethylene glycol 3350 oral powder for reconstitution: 17 gram(s) orally once a day (13 Aug 2024 09:23)  pravastatin 20 mg oral tablet: 1 tab(s) orally (21 Jul 2024 18:04)  senna leaf extract oral tablet: 2 tab(s) orally once a day (at bedtime) (13 Aug 2024 09:23)  tamsulosin 0.4 mg oral capsule: 1 cap(s) orally once a day (at bedtime) (13 Aug 2024 09:23)    MEDICATIONS  (STANDING):  aspirin  chewable 81 milliGRAM(s) Oral daily  atorvastatin 10 milliGRAM(s) Oral at bedtime  ferrous    sulfate 325 milliGRAM(s) Oral daily  gabapentin 300 milliGRAM(s) Oral three times a day  heparin   Injectable 5000 Unit(s) SubCutaneous every 12 hours  iron sucrose IVPB 200 milliGRAM(s) IV Intermittent every 24 hours  melatonin 3 milliGRAM(s) Oral at bedtime  pantoprazole    Tablet 40 milliGRAM(s) Oral every 12 hours  polyethylene glycol 3350 17 Gram(s) Oral daily  senna 2 Tablet(s) Oral at bedtime  tamsulosin 0.4 milliGRAM(s) Oral at bedtime    MEDICATIONS  (PRN):  acetaminophen     Tablet .. 650 milliGRAM(s) Oral every 6 hours PRN Mild Pain (1 - 3)    Vital Signs Last 24 Hrs  T(C): 36.4 (19 Oct 2024 13:27), Max: 36.9 (18 Oct 2024 20:29)  T(F): 97.6 (19 Oct 2024 13:27), Max: 98.5 (18 Oct 2024 20:29)  HR: 85 (19 Oct 2024 13:27) (85 - 91)  BP: 122/70 (19 Oct 2024 13:27) (114/69 - 128/73)  BP(mean): 87 (19 Oct 2024 13:27) (84 - 92)  RR: 18 (19 Oct 2024 13:27) (18 - 18)  SpO2: 97% (19 Oct 2024 13:27) (97% - 99%)    Parameters below as of 19 Oct 2024 13:27  Patient On (Oxygen Delivery Method): room air      CAPILLARY BLOOD GLUCOSE        LABS:                        7.8    8.52  )-----------( 396      ( 19 Oct 2024 06:40 )             24.6     10-18    138  |  103  |  39[H]  ----------------------------<  208[H]  4.9   |  25  |  1.0    Ca    9.0      18 Oct 2024 11:19    TPro  5.6[L]  /  Alb  3.0[L]  /  TBili  <0.2  /  DBili  x   /  AST  6   /  ALT  <5  /  AlkPhos  87  10-18    LIVER FUNCTIONS - ( 18 Oct 2024 11:19 )  Alb: 3.0 g/dL / Pro: 5.6 g/dL / ALK PHOS: 87 U/L / ALT: <5 U/L / AST: 6 U/L / GGT: x                 Urinalysis Basic - ( 18 Oct 2024 11:19 )    Color: x / Appearance: x / SG: x / pH: x  Gluc: 208 mg/dL / Ketone: x  / Bili: x / Urobili: x   Blood: x / Protein: x / Nitrite: x   Leuk Esterase: x / RBC: x / WBC x   Sq Epi: x / Non Sq Epi: x / Bacteria: x              Consultant Notes Reviewed:  [x ] YES  [ ] NO  Care Discussed with Consultants/Other Providers/ Housestaff [ x] YES  [ ] NO  Radiology, labs, EKGs, new studies personally reviewed.

## 2024-10-19 NOTE — PROVIDER CONTACT NOTE (OTHER) - SITUATION
No apparent complications or complaints regarding anesthesia care at this time/All questions were answered
Pt had a chronic Walden from NH that was not exchanged in the ED upon admission. As per family, Walden was last changed 08/2024.

## 2024-10-19 NOTE — PROVIDER CONTACT NOTE (OTHER) - ASSESSMENT
Attempted to place new Walden. Due to pt's anatomy, Walden placement was difficult and not successful. Pt became agitated and would not let me try again.

## 2024-10-19 NOTE — PROGRESS NOTE ADULT - ASSESSMENT
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 Per NH, patient also refused Pleurx drainage today and is requesting to drain it inpatient (gets drained 2-3 times per week).    #Recurrent anemia  #Hx JASMIN - no overt GI bleed  #Hx AVM?  - Hemodynamically stable & no active bleeding  - Baseline hemoglobin - 8-9   - Hemoglobin on admission - 6.3 >s/p 2units prbc (received 2 units at the end of Spetember as well)  - On plavix   - last admission received 5 days of Venofer   -Seen by GI last admission> spoke to grandson (HCP)  regarding endoscopic work up. After discussing risks vs benefits given his advance age and co morbidities he would like to hold off. Offered VCE as outpatient .   -Can reconsider work up if within goals of care , will need to speak to grandson/son  -Will also need heme onc outpt for possible venofer infusions outpt   - Maintain active Type and screen  - Trend H&H  - Continue home PPI 40mg BID PO  - Target Hgb >8   - Avoid NSAIDs.  - give Venofer as %sat, ferritin remain low  - recall GI for possible intervention if family agrees in view of recurrent anemia requiring multiple PRBCs  6/18 convinced pt to give labs. Hgb 6.9. Transfuse 1uRPBCs. Spoke to GI and asked to speak to family. Serial CBC. Keep Hgb >7.5    #Malignant mesothelioma (diagnosed 8/2024 )  #Hx Asbestos lung   -s/p VATS pleurodesis, R sided PleurX  -pleurx drainage 2-3 times per week  -Fu heme onc outpatient. Plan was for possible Rx if pt's functional status improves.   -Fu palliative    #HTN/HLD  #CAD s/p stents x2 in 2007 (follows Dr Avila)  -c/w home meds   -changed Plavix to ASA    #Small hypodensity in segment 7 of the liver seen on prior CT.  - RUQ US as OP  -?f/u outpt GI    #Acute Grief vs MDD  #Baseline dementia  - wife passed away a few months ago  - patient refusing medication sometimes and asks to be left alone, no suicide ideas, no ideas to hurt other people.    - Psychiatry eval admission recommended outpt fu     #Nutrition/Fluids/Electrolytes   - replete K<4 and Mg <2  - ensure regular BMs  -  Miralax BID, Senna    #DVT Px  SCDs  Heparin or Lovenox    GOC: d/w son in details on 10/18. Asked the son his thoughts on EGD/c-scopy/hospice. Son wants to think about and let us know.    #Progress Note Handoff  Pending: Clinical improvement and stability__x___PT____x__CBC stability  Pt/Family discussion: Pt/family informed and agree with the current plan  Disposition: Nursing Home      My note supersedes the residents note should a discrepancy arise.    Chart and notes personally reviewed.  Care Discussed with Consultants/Other Providers/ Housestaff [ x] YES [ ] NO   Radiology, labs, old records personally reviewed.    discussed w/ housestaff, nursing, case management    Attestation Statements:    Attestation Statements:  Risk Statement (NON-critical care).     On this date of service, level of risk to patient is considered: High.     Due to: pt with illness causing risk to life or bodily fxn    Time-based billing (NON-critical care).     50 minutes spent on total encounter. The necessity of the time spent during the encounter on this date of service was due to:     time spent on review of labs, imaging studies, old records, obtaining history, personally examining patient, counselling and communicating with patient/ family, entering orders for medications/tests/etc, discussions with other health care providers, documentation in electronic health records, independent interpretation of labs, imaging/procedure results and care coordination.

## 2024-10-19 NOTE — PROGRESS NOTE ADULT - SUBJECTIVE AND OBJECTIVE BOX
Patient is a 84y old  Male who presents with a chief complaint of Acute on chronic anemia (17 Oct 2024 15:51)       Pt is seen and examined  pt is  lying in bed        ROS:  unable to obtain   MEDICATIONS  (STANDING):  aspirin  chewable 81 milliGRAM(s) Oral daily  atorvastatin 10 milliGRAM(s) Oral at bedtime  ferrous    sulfate 325 milliGRAM(s) Oral daily  gabapentin 300 milliGRAM(s) Oral three times a day  heparin   Injectable 5000 Unit(s) SubCutaneous every 12 hours  iron sucrose IVPB 200 milliGRAM(s) IV Intermittent every 24 hours  lactated ringers. 1000 milliLiter(s) (100 mL/Hr) IV Continuous <Continuous>  melatonin 3 milliGRAM(s) Oral at bedtime  pantoprazole    Tablet 40 milliGRAM(s) Oral every 12 hours  polyethylene glycol 3350 17 Gram(s) Oral daily  senna 2 Tablet(s) Oral at bedtime  tamsulosin 0.4 milliGRAM(s) Oral at bedtime    MEDICATIONS  (PRN):  acetaminophen     Tablet .. 650 milliGRAM(s) Oral every 6 hours PRN Mild Pain (1 - 3)      Allergies    penicillin (Unknown)    Intolerances        Vital Signs Last 24 Hrs  T(C): 37.1 (18 Oct 2024 13:15), Max: 37.1 (17 Oct 2024 20:24)  T(F): 98.8 (18 Oct 2024 13:15), Max: 98.8 (17 Oct 2024 20:24)  HR: 92 (18 Oct 2024 13:15) (80 - 92)  BP: 124/77 (18 Oct 2024 13:15) (101/49 - 124/77)  BP(mean): 93 (18 Oct 2024 13:15) (67 - 93)  RR: 18 (18 Oct 2024 13:15) (18 - 18)  SpO2: 97% (18 Oct 2024 13:15) (97% - 98%)    Parameters below as of 18 Oct 2024 13:15  Patient On (Oxygen Delivery Method): room air        PHYSICAL EXAM  General: lethargic   HEENT: clear oropharynx, anicteric sclera, pink conjunctiva  Neck: supple  CV: normal S1/S2 with no murmur rubs or gallops  Lungs: positive air movement b/l ant lungs,clear to auscultation, no wheezes, no rales  Abdomen: soft non-tender non-distended, no hepatosplenomegaly  Ext: no clubbing cyanosis or edema  Skin: no rashes and no petechiae  Neuro: alert and oriented X 4, no focal deficits  LABS:                          6.9    9.46  )-----------( 429      ( 18 Oct 2024 11:19 )             22.8     >> 7    Mean Cell Volume : 93.1 fL  Mean Cell Hemoglobin : 28.2 pg  Mean Cell Hemoglobin Concentration : 30.3 g/dL  Auto Neutrophil # : x  Auto Lymphocyte # : x  Auto Monocyte # : x  Auto Eosinophil # : x  Auto Basophil # : x  Auto Neutrophil % : x  Auto Lymphocyte % : x  Auto Monocyte % : x  Auto Eosinophil % : x  Auto Basophil % : x    Serial CBC's  10-18 @ 11:19  Hct-22.8 / Hgb-6.9 / Plat-429 / RBC-2.45 / WBC-9.46          Serial CBC's  10-16 @ 18:11  Hct-27.7 / Hgb-8.5 / Plat-459 / RBC-3.04 / WBC-8.72          Serial CBC's  10-16 @ 08:36  Hct-28.0 / Hgb-8.8 / Plat-480 / RBC-3.12 / WBC-9.10          Serial CBC's  10-15 @ 13:33  Hct-27.7 / Hgb-8.7 / Plat-456 / RBC-3.08 / WBC-8.84          Serial CBC's  10-14 @ 17:52  Hct-21.4 / Hgb-6.4 / Plat-500 / RBC-2.32 / WBC-8.64            10-18    138  |  103  |  39[H]  ----------------------------<  208[H]  4.9   |  25  |  1.0    Ca    9.0      18 Oct 2024 11:19    TPro  5.6[L]  /  Alb  3.0[L]  /  TBili  <0.2  /  DBili  x   /  AST  6   /  ALT  <5  /  AlkPhos  87  10-18          WBC Count: 9.46 K/uL (10-18-24 @ 11:19)  Hemoglobin: 6.9 g/dL (10-18-24 @ 11:19)  Hematocrit: 22.8 % (10-18-24 @ 11:19)  Platelet Count - Automated: 429 K/uL (10-18-24 @ 11:19)  WBC Count: 8.72 K/uL (10-16-24 @ 18:11)  Hemoglobin: 8.5 g/dL (10-16-24 @ 18:11)  Hematocrit: 27.7 % (10-16-24 @ 18:11)  Platelet Count - Automated: 459 K/uL (10-16-24 @ 18:11)  WBC Count: 9.10 K/uL (10-16-24 @ 08:36)  Hemoglobin: 8.8 g/dL (10-16-24 @ 08:36)  Hematocrit: 28.0 % (10-16-24 @ 08:36)  Platelet Count - Automated: 480 K/uL (10-16-24 @ 08:36)  WBC Count: 8.84 K/uL (10-15-24 @ 13:33)  Hemoglobin: 8.7 g/dL (10-15-24 @ 13:33)  Hematocrit: 27.7 % (10-15-24 @ 13:33)  Platelet Count - Automated: 456 K/uL (10-15-24 @ 13:33)  Ferritin: 142 ng/mL (10-14-24 @ 19:40)  Iron - Total Binding Capacity.: 250 ug/dL (10-14-24 @ 19:40)  WBC Count: 8.64 K/uL (10-14-24 @ 17:52)  Hemoglobin: 6.4 g/dL (10-14-24 @ 17:52)  Hematocrit: 21.4 % (10-14-24 @ 17:52)  Platelet Count - Automated: 500 K/uL (10-14-24 @ 17:52)                BLOOD SMEAR INTERPRETATION:       RADIOLOGY & ADDITIONAL STUDIES:

## 2024-10-20 LAB
HCT VFR BLD CALC: 25 % — LOW (ref 42–52)
HGB BLD-MCNC: 7.8 G/DL — LOW (ref 14–18)
MCHC RBC-ENTMCNC: 28.9 PG — SIGNIFICANT CHANGE UP (ref 27–31)
MCHC RBC-ENTMCNC: 31.2 G/DL — LOW (ref 32–37)
MCV RBC AUTO: 92.6 FL — SIGNIFICANT CHANGE UP (ref 80–94)
NRBC # BLD: 0 /100 WBCS — SIGNIFICANT CHANGE UP (ref 0–0)
PLATELET # BLD AUTO: 366 K/UL — SIGNIFICANT CHANGE UP (ref 130–400)
PMV BLD: 9.2 FL — SIGNIFICANT CHANGE UP (ref 7.4–10.4)
RBC # BLD: 2.7 M/UL — LOW (ref 4.7–6.1)
RBC # FLD: 17.1 % — HIGH (ref 11.5–14.5)
WBC # BLD: 9.2 K/UL — SIGNIFICANT CHANGE UP (ref 4.8–10.8)
WBC # FLD AUTO: 9.2 K/UL — SIGNIFICANT CHANGE UP (ref 4.8–10.8)

## 2024-10-20 PROCEDURE — 99233 SBSQ HOSP IP/OBS HIGH 50: CPT

## 2024-10-20 RX ADMIN — GABAPENTIN 300 MILLIGRAM(S): 300 CAPSULE ORAL at 05:56

## 2024-10-20 RX ADMIN — PANTOPRAZOLE SODIUM 40 MILLIGRAM(S): 40 TABLET, DELAYED RELEASE ORAL at 05:57

## 2024-10-20 RX ADMIN — GABAPENTIN 300 MILLIGRAM(S): 300 CAPSULE ORAL at 21:26

## 2024-10-20 RX ADMIN — Medication 81 MILLIGRAM(S): at 11:01

## 2024-10-20 RX ADMIN — HEPARIN SODIUM 5000 UNIT(S): 10000 INJECTION INTRAVENOUS; SUBCUTANEOUS at 04:00

## 2024-10-20 RX ADMIN — Medication 0.4 MILLIGRAM(S): at 21:29

## 2024-10-20 RX ADMIN — Medication 3 MILLIGRAM(S): at 21:28

## 2024-10-20 RX ADMIN — PANTOPRAZOLE SODIUM 40 MILLIGRAM(S): 40 TABLET, DELAYED RELEASE ORAL at 17:31

## 2024-10-20 RX ADMIN — Medication 10 MILLIGRAM(S): at 21:26

## 2024-10-20 RX ADMIN — Medication 2 TABLET(S): at 21:27

## 2024-10-20 RX ADMIN — Medication 325 MILLIGRAM(S): at 11:01

## 2024-10-20 RX ADMIN — GABAPENTIN 300 MILLIGRAM(S): 300 CAPSULE ORAL at 13:04

## 2024-10-20 RX ADMIN — HEPARIN SODIUM 5000 UNIT(S): 10000 INJECTION INTRAVENOUS; SUBCUTANEOUS at 17:30

## 2024-10-20 RX ADMIN — Medication 650 MILLIGRAM(S): at 09:45

## 2024-10-20 NOTE — PROGRESS NOTE ADULT - SUBJECTIVE AND OBJECTIVE BOX
Patient is a 84y old  Male who presents with a chief complaint of Anemia     (17 Oct 2024 12:14)    INTERVAL HPI/OVERNIGHT EVENTS: Patient was examined and seen at bedside. This morning pt is resting comfortably in bed and reports no new issues or overnight events. No complaints.   ROS: Denies CP, SOB, AP, new weakness  All other systems reviewed and are within normal limits.  InitialHPI:  HPI: 85 y/o man  PMHx of HLD, DM, CAD s/p stents x2 in 2007, pt of Dr Avila, hx  asbestosis of lung, diagnosed w malignant mesothelioma (8/2024) and s/p VATS pleurodesis, R sided PleurX, hx AVM/GIB and JASMIN, sent in by NH for low hemoglobin. Per NH, patient also refused Pleurx drainage today and is requesting to drain it inpatient (gets drained 2-3 times per week). Of note, patient had a recent admission in September for anemia for which he received 2 units prbc, IV Venofer and was recommended EGD/[place] but given his advance age and co morbidities decided to hold off. Patient is a poor historian. Uses wheelchair at baseline. Denies fever chills, shortness of breath, cough, chest pain, leg swelling, hematemesis, hematochezia.     Vital Signs Last 24 Hrs  T(C): 36.4 (15 Oct 2024 01:09), Max: 36.9 (14 Oct 2024 22:51)  T(F): 97.5 (15 Oct 2024 01:09), Max: 98.4 (14 Oct 2024 22:51)  HR: 100 (15 Oct 2024 01:09) (89 - 105)  BP: 132/74 (15 Oct 2024 01:09) (104/64 - 132/74)  RR: 18 (15 Oct 2024 01:09) (17 - 19)  SpO2: 97% (15 Oct 2024 01:09) (93% - 97%)    Parameters below as of 15 Oct 2024 01:09  Patient On (Oxygen Delivery Method): room air    Labs: Hgb 6.4 , Platelets 500 , Cr 1.2 (baseline)   CXR: Worsening R pleural effusion   EKG: NSR with PACs    Admitted for anemia      (15 Oct 2024 01:16)    PAST MEDICAL & SURGICAL HISTORY:  DM (diabetes mellitus)      MI (myocardial infarction)      History of CAD (coronary artery disease)      Dyslipidemia      Currently smokes tobacco      Mesothelioma, malignant      Coronary stent patent          General: NAD, AAOx2, chronically ill appearing, b/l temp waisting  HEENT:  no LAD  CV: S1 S2  Resp: decreased breath sounds at bases  GI: NT/ND/S +BS  MS: no clubbing/cyanosis/edema, + pulses b/l  Neuro: nonfocal, +reflexes thruout  +Leg bag w/ clear yellow urine  +Rt sided ant pleurx cath w/ d/c/i dsg          Home Medications:  clopidogrel 75 mg oral tablet: 1 tab(s) orally once a day (21 Jul 2024 18:04)  ferrous sulfate 325 mg (65 mg elemental iron) oral tablet: 1 tab(s) orally once a day (25 Sep 2024 15:37)  gabapentin 300 mg oral tablet: 1 tab(s) orally 3 times a day (20 Sep 2024 01:37)  melatonin 3 mg oral tablet: 1 tab(s) orally once a day (at bedtime) (13 Aug 2024 09:23)  metFORMIN 500 mg oral tablet: 1 tab(s) orally 2 times a day (21 Jul 2024 18:04)  pantoprazole 40 mg oral delayed release tablet: 1 tab(s) orally every 12 hours (25 Sep 2024 15:37)  polyethylene glycol 3350 oral powder for reconstitution: 17 gram(s) orally once a day (13 Aug 2024 09:23)  pravastatin 20 mg oral tablet: 1 tab(s) orally (21 Jul 2024 18:04)  senna leaf extract oral tablet: 2 tab(s) orally once a day (at bedtime) (13 Aug 2024 09:23)  tamsulosin 0.4 mg oral capsule: 1 cap(s) orally once a day (at bedtime) (13 Aug 2024 09:23)    MEDICATIONS  (STANDING):  aspirin  chewable 81 milliGRAM(s) Oral daily  atorvastatin 10 milliGRAM(s) Oral at bedtime  ferrous    sulfate 325 milliGRAM(s) Oral daily  gabapentin 300 milliGRAM(s) Oral three times a day  heparin   Injectable 5000 Unit(s) SubCutaneous every 12 hours  iron sucrose IVPB 200 milliGRAM(s) IV Intermittent every 24 hours  melatonin 3 milliGRAM(s) Oral at bedtime  pantoprazole    Tablet 40 milliGRAM(s) Oral every 12 hours  polyethylene glycol 3350 17 Gram(s) Oral daily  senna 2 Tablet(s) Oral at bedtime  tamsulosin 0.4 milliGRAM(s) Oral at bedtime    MEDICATIONS  (PRN):  acetaminophen     Tablet .. 650 milliGRAM(s) Oral every 6 hours PRN Mild Pain (1 - 3)  oxycodone    5 mG/acetaminophen 325 mG 1 Tablet(s) Oral every 6 hours PRN Moderate Pain (4 - 6)    Vital Signs Last 24 Hrs  T(C): 36.8 (20 Oct 2024 04:56), Max: 37.5 (19 Oct 2024 21:43)  T(F): 98.2 (20 Oct 2024 04:56), Max: 99.5 (19 Oct 2024 21:43)  HR: 92 (20 Oct 2024 04:56) (85 - 114)  BP: 112/65 (20 Oct 2024 04:56) (112/65 - 129/56)  BP(mean): 81 (20 Oct 2024 04:56) (80 - 87)  RR: 18 (20 Oct 2024 04:56) (18 - 18)  SpO2: 95% (20 Oct 2024 04:56) (95% - 97%)    Parameters below as of 20 Oct 2024 04:56  Patient On (Oxygen Delivery Method): room air      CAPILLARY BLOOD GLUCOSE      POCT Blood Glucose.: 138 mg/dL (19 Oct 2024 17:00)    LABS:                        7.8    9.20  )-----------( 366      ( 20 Oct 2024 06:26 )             25.0                             Consultant Notes Reviewed:  [x ] YES  [ ] NO  Care Discussed with Consultants/Other Providers/ Housestaff [ x] YES  [ ] NO  Radiology, labs, EKGs, new studies personally reviewed.

## 2024-10-20 NOTE — PROGRESS NOTE ADULT - ASSESSMENT
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 Per NH, patient also refused Pleurx drainage today and is requesting to drain it inpatient (gets drained 2-3 times per week).    #Recurrent anemia  #Hx JASMIN - no overt GI bleed  #Hx AVM?  - Hemodynamically stable & no active bleeding  - Baseline hemoglobin - 8-9   - Hemoglobin on admission - 6.3 >s/p 2units prbc (received 2 units at the end of Spetember as well)  - On plavix   - last admission received 5 days of Venofer   -Seen by GI last admission> spoke to grandson (HCP)  regarding endoscopic work up. After discussing risks vs benefits given his advance age and co morbidities he would like to hold off. Offered VCE as outpatient .   -Can reconsider work up if within goals of care , will need to speak to grandson/son  -Will also need heme onc outpt for possible venofer infusions outpt   - Maintain active Type and screen  - Trend H&H  - Continue home PPI 40mg BID PO  - Target Hgb >8   - Avoid NSAIDs.  - give Venofer as %sat, ferritin remain low  - recall GI for possible intervention if family agrees in view of recurrent anemia requiring multiple PRBCs  6/18 convinced pt to give labs. Hgb 6.9. Transfuse 1uRPBCs. Spoke to GI and asked to speak to family. Serial CBC. Keep Hgb >7.5    #Malignant mesothelioma (diagnosed 8/2024 )  #Hx Asbestos lung   -s/p VATS pleurodesis, R sided PleurX  -pleurx drainage 2-3 times per week or PRN  -Fu heme onc outpatient. Plan was for possible Rx if pt's functional status improves.   -Fu palliative  - currently declines draining and denies SOB    #HTN/HLD  #CAD s/p stents x2 in 2007 (follows Dr Avila)  -c/w home meds   -changed Plavix to ASA    #Small hypodensity in segment 7 of the liver seen on prior CT.  - RUQ US as OP  -?f/u outpt GI    #Acute Grief vs MDD  #Baseline dementia  - wife passed away a few months ago  - patient refusing medication sometimes and asks to be left alone, no suicide ideas, no ideas to hurt other people.    - Psychiatry eval admission recommended outpt fu     #Nutrition/Fluids/Electrolytes   - replete K<4 and Mg <2  - ensure regular BMs  -  Miralax BID, Senna    #DVT Px  SCDs  Heparin or Lovenox    GOC: d/w son in details on 10/18. Asked the son his thoughts on EGD/c-scopy/hospice. Son wants to think about and let us know.    #Progress Note Handoff  Pending: Clinical improvement and stability__x___PT____x__CBC stability  Pt/Family discussion: Pt/family informed and agree with the current plan  Disposition: Nursing Home      My note supersedes the residents note should a discrepancy arise.    Chart and notes personally reviewed.  Care Discussed with Consultants/Other Providers/ Housestaff [ x] YES [ ] NO   Radiology, labs, old records personally reviewed.    discussed w/ housestaff, nursing, case management    Attestation Statements:    Attestation Statements:  Risk Statement (NON-critical care).     On this date of service, level of risk to patient is considered: High.     Due to: pt with illness causing risk to life or bodily fxn    Time-based billing (NON-critical care).     35 minutes spent on total encounter. The necessity of the time spent during the encounter on this date of service was due to:     time spent on review of labs, imaging studies, old records, obtaining history, personally examining patient, counselling and communicating with patient/ family, entering orders for medications/tests/etc, discussions with other health care providers, documentation in electronic health records, independent interpretation of labs, imaging/procedure results and care coordination.

## 2024-10-20 NOTE — PROCEDURE NOTE - ADDITIONAL PROCEDURE DETAILS
Asked by medical team to assist in replacement of chronic laws catheter. Patient is a poor historian and uncooperative at times. Physical exam shows evidence of catheter induced meatal erosion. Under sterile technique, was able to place a 16Fr laws catheter into urinary bladder and placed 10cc water into balloon. Consider SPT placement in the setting of urethral meatal erosion to prevent further damage, as per patient/family's wishes. Recall prn

## 2024-10-20 NOTE — PROGRESS NOTE ADULT - ASSESSMENT
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     #Acute on chronic anemia with hx of AVM and JASMIN  r/o GI bleed  tfx to keep hb >7  1 unit transf   venofer 200  mg qdaily x 3 days   -Seen by GI last admission>endoscopic work up as inpt to discus  >> spoke to pt's son and recommended to proceed with endoscopy as inpt  -GI f/u given worsening anemia  - On plavix   -check retic,hapto,LDH.DUKE       #Malignant mesothelioma (diagnosed 8/2024 )  #Hx Asbestos lung   -s/p VATS pleurodesis, R sided PleurX,s/p drainage  -he will f/u with Dr Epperson for  further Mn as outpt    cont other supportive care.  will f/u

## 2024-10-21 LAB
HCT VFR BLD CALC: 24.2 % — LOW (ref 42–52)
HGB BLD-MCNC: 7.4 G/DL — LOW (ref 14–18)
MCHC RBC-ENTMCNC: 28.9 PG — SIGNIFICANT CHANGE UP (ref 27–31)
MCHC RBC-ENTMCNC: 30.6 G/DL — LOW (ref 32–37)
MCV RBC AUTO: 94.5 FL — HIGH (ref 80–94)
NRBC # BLD: 0 /100 WBCS — SIGNIFICANT CHANGE UP (ref 0–0)
PLATELET # BLD AUTO: 374 K/UL — SIGNIFICANT CHANGE UP (ref 130–400)
PMV BLD: 9.5 FL — SIGNIFICANT CHANGE UP (ref 7.4–10.4)
RBC # BLD: 2.56 M/UL — LOW (ref 4.7–6.1)
RBC # FLD: 17.2 % — HIGH (ref 11.5–14.5)
WBC # BLD: 6.74 K/UL — SIGNIFICANT CHANGE UP (ref 4.8–10.8)
WBC # FLD AUTO: 6.74 K/UL — SIGNIFICANT CHANGE UP (ref 4.8–10.8)

## 2024-10-21 PROCEDURE — 99233 SBSQ HOSP IP/OBS HIGH 50: CPT

## 2024-10-21 RX ORDER — CHLORHEXIDINE GLUCONATE 40 MG/ML
1 SOLUTION TOPICAL
Refills: 0 | Status: DISCONTINUED | OUTPATIENT
Start: 2024-10-21 | End: 2024-11-06

## 2024-10-21 RX ORDER — CHLORHEXIDINE GLUCONATE 40 MG/ML
1 SOLUTION TOPICAL DAILY
Refills: 0 | Status: DISCONTINUED | OUTPATIENT
Start: 2024-10-21 | End: 2024-11-06

## 2024-10-21 RX ADMIN — Medication 2 TABLET(S): at 21:24

## 2024-10-21 RX ADMIN — Medication 325 MILLIGRAM(S): at 11:19

## 2024-10-21 RX ADMIN — PANTOPRAZOLE SODIUM 40 MILLIGRAM(S): 40 TABLET, DELAYED RELEASE ORAL at 17:12

## 2024-10-21 RX ADMIN — PANTOPRAZOLE SODIUM 40 MILLIGRAM(S): 40 TABLET, DELAYED RELEASE ORAL at 05:34

## 2024-10-21 RX ADMIN — GABAPENTIN 300 MILLIGRAM(S): 300 CAPSULE ORAL at 21:23

## 2024-10-21 RX ADMIN — Medication 3 MILLIGRAM(S): at 21:23

## 2024-10-21 RX ADMIN — HEPARIN SODIUM 5000 UNIT(S): 10000 INJECTION INTRAVENOUS; SUBCUTANEOUS at 04:00

## 2024-10-21 RX ADMIN — CHLORHEXIDINE GLUCONATE 1 APPLICATION(S): 40 SOLUTION TOPICAL at 11:20

## 2024-10-21 RX ADMIN — GABAPENTIN 300 MILLIGRAM(S): 300 CAPSULE ORAL at 15:15

## 2024-10-21 RX ADMIN — Medication 0.4 MILLIGRAM(S): at 21:23

## 2024-10-21 RX ADMIN — Medication 650 MILLIGRAM(S): at 20:00

## 2024-10-21 RX ADMIN — Medication 10 MILLIGRAM(S): at 21:23

## 2024-10-21 RX ADMIN — Medication 650 MILLIGRAM(S): at 19:41

## 2024-10-21 RX ADMIN — IRON SUCROSE 100 MILLIGRAM(S): 20 INJECTION, SOLUTION INTRAVENOUS at 11:19

## 2024-10-21 RX ADMIN — HEPARIN SODIUM 5000 UNIT(S): 10000 INJECTION INTRAVENOUS; SUBCUTANEOUS at 17:11

## 2024-10-21 RX ADMIN — GABAPENTIN 300 MILLIGRAM(S): 300 CAPSULE ORAL at 05:34

## 2024-10-21 RX ADMIN — Medication 81 MILLIGRAM(S): at 11:19

## 2024-10-21 NOTE — CHART NOTE - NSCHARTNOTEFT_GEN_A_CORE
Calorie Count results 3 day Calorie Count from 10/18-10/20    Diet, DASH/TLC:   Sodium & Cholesterol Restricted  Consistent Carbohydrate {Evening Snack} (CSTCHOSN)  Supplement Feeding Modality:  Oral  Ensure Plus High Protein Cans or Servings Per Day:  1       Frequency:  Three Times a day (10-17-24 @ 16:01) [Active]    Estimated Calorie Needs:   4704-3944 kcal/day (30-35 kcal/kg) with consideration of malnutrition, ca, age and comorbidities  Estimated Protein Needs:   grams pro /day  - 1.5-1.7 gram pro/kg /day with consideration for catabolic state, malnutrition  Estimated Fluid Needs:  (1mL/kcal) or per MD recommendations     -----------------------------  Day one: No documentation   Day two:  600 kcal  + 4 grams Pro     Breakfast: 300 kcals + 4 grams PRO  Lunch : 150  Kcals  + 0 Grams PRO  Dinner : 150  kcals + 0 grams PRO    Day three: No documentation     Average 3 day intake :   Unable to estimate 3 day average intake d/t lack of documentation.

## 2024-10-21 NOTE — PROGRESS NOTE ADULT - ASSESSMENT
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     #Acute on chronic anemia with hx of AVM and JASMIN  r/o GI bleed  tfx to keep hb >7  s/p  venofer   seen by GI ,family refused w/u as inpt as per GI note   may need bmbx if GI  bleeding is ruled out   - On plavix       #Malignant mesothelioma (diagnosed 8/2024 )  #Hx Asbestos lung   -s/p VATS pleurodesis, R sided PleurX,s/p drainage  -he will f/u with Dr Epperson for  further Mn as outpt    cont other supportive care.  will f/u

## 2024-10-21 NOTE — PROGRESS NOTE ADULT - ASSESSMENT
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 Per NH, patient also refused Pleurx drainage today and is requesting to drain it inpatient (gets drained 2-3 times per week).    #Recurrent anemia  #Hx JASMIN - no overt GI bleed  #Hx AVM?  - Hemodynamically stable & no active bleeding  - Baseline hemoglobin - 8-9   - Hemoglobin on admission - 6.3 >s/p 2units prbc (received 2 units at the end of Spetember as well)  - On plavix   - last admission received 5 days of Venofer   -Seen by GI last admission> spoke to grandson (HCP)  regarding endoscopic work up. After discussing risks vs benefits given his advance age and co morbidities he would like to hold off. Offered VCE as outpatient .   -Can reconsider work up if within goals of care , will need to speak to grandson/son  -Will also need heme onc outpt for possible venofer infusions outpt   - Maintain active Type and screen  - Trend H&H  - Continue home PPI 40mg BID PO  - Target Hgb >8   - Avoid NSAIDs.  - give Venofer as %sat, ferritin remain low  - recall GI for possible intervention if family agrees in view of recurrent anemia requiring multiple PRBCs  6/18 convinced pt to give labs. Hgb 6.9. Transfuse 1uRPBCs. Spoke to GI and asked to speak to family. Serial CBC. Keep Hgb >7.5  6/21 again spoke to GI and asked to speak re: possible intervention. Transfuse another unit as Hgb again trending down.    #Malignant mesothelioma (diagnosed 8/2024 )  #Hx Asbestos lung   -s/p VATS pleurodesis, R sided PleurX  -pleurx drainage 2-3 times per week or PRN  -Fu heme onc outpatient. Plan was for possible Rx if pt's functional status improves.   -Fu palliative  - currently declines draining and denies SOB    #HTN/HLD  #CAD s/p stents x2 in 2007 (follows Dr Avila)  -c/w home meds   -changed Plavix to ASA    #Small hypodensity in segment 7 of the liver seen on prior CT.  - RUQ US as OP  -?f/u outpt GI    #Acute Grief vs MDD  #Baseline dementia  - wife passed away a few months ago  - patient refusing medication sometimes and asks to be left alone, no suicide ideas, no ideas to hurt other people.    - Psychiatry eval admission recommended outpt fu     #Nutrition/Fluids/Electrolytes   - replete K<4 and Mg <2  - ensure regular BMs  -  Miralax BID, Senna    #DVT Px  SCDs  Heparin or Lovenox    GOC: d/w son in details on 10/18. Asked the son his thoughts on EGD/c-scopy/hospice. Son wants to think about and let us know.     #Progress Note Handoff  Pending: Clinical improvement and stability__x___PT____x__CBC stability  Pt/Family discussion: Pt/family informed and agree with the current plan  Disposition: Nursing Home      My note supersedes the residents note should a discrepancy arise.    Chart and notes personally reviewed.  Care Discussed with Consultants/Other Providers/ Housestaff [ x] YES [ ] NO   Radiology, labs, old records personally reviewed.    discussed w/ housestaff, nursing, case management, GI    Attestation Statements:    Attestation Statements:  Risk Statement (NON-critical care).     On this date of service, level of risk to patient is considered: High.     Due to: pt with illness causing risk to life or bodily fxn    Time-based billing (NON-critical care).     50 minutes spent on total encounter. The necessity of the time spent during the encounter on this date of service was due to:     time spent on review of labs, imaging studies, old records, obtaining history, personally examining patient, counselling and communicating with patient/ family, entering orders for medications/tests/etc, discussions with other health care providers, documentation in electronic health records, independent interpretation of labs, imaging/procedure results and care coordination.

## 2024-10-21 NOTE — PROGRESS NOTE ADULT - SUBJECTIVE AND OBJECTIVE BOX
Gastroenterology progress note:     Patient is a 84y old  Male who presents with a chief complaint of Acute on chronic anemia (21 Oct 2024 13:36)       Admitted on: 10-14-24    We are following the patient for: anemia       Interval History:    No acute events overnight. Tolerating diet, denies abdominal pain, no bloody bm       PAST MEDICAL & SURGICAL HISTORY:  DM (diabetes mellitus)      MI (myocardial infarction)      History of CAD (coronary artery disease)      Dyslipidemia      Currently smokes tobacco      Mesothelioma, malignant      Coronary stent patent          MEDICATIONS  (STANDING):  aspirin  chewable 81 milliGRAM(s) Oral daily  atorvastatin 10 milliGRAM(s) Oral at bedtime  chlorhexidine 2% Cloths 1 Application(s) Topical daily  chlorhexidine 2% Cloths 1 Application(s) Topical <User Schedule>  ferrous    sulfate 325 milliGRAM(s) Oral daily  gabapentin 300 milliGRAM(s) Oral three times a day  heparin   Injectable 5000 Unit(s) SubCutaneous every 12 hours  melatonin 3 milliGRAM(s) Oral at bedtime  pantoprazole    Tablet 40 milliGRAM(s) Oral every 12 hours  polyethylene glycol 3350 17 Gram(s) Oral daily  senna 2 Tablet(s) Oral at bedtime  tamsulosin 0.4 milliGRAM(s) Oral at bedtime    MEDICATIONS  (PRN):  acetaminophen     Tablet .. 650 milliGRAM(s) Oral every 6 hours PRN Mild Pain (1 - 3)  oxycodone    5 mG/acetaminophen 325 mG 1 Tablet(s) Oral every 6 hours PRN Moderate Pain (4 - 6)      Allergies  penicillin (Unknown)      Review of Systems:   Cardiovascular:  No Chest Pain, No Palpitations  Respiratory:  No Cough, No Dyspnea  Gastrointestinal:  As described in HPI  Skin:  No Skin Lesions, No Jaundice  Neuro:  No Syncope, No Dizziness    Physical Examination:  T(C): 37.1 (10-21-24 @ 12:28), Max: 37.1 (10-21-24 @ 12:28)  HR: 86 (10-21-24 @ 12:28) (78 - 91)  BP: 103/63 (10-21-24 @ 12:28) (102/57 - 129/64)  RR: 18 (10-21-24 @ 12:28) (18 - 18)  SpO2: 98% (10-21-24 @ 12:28) (96% - 99%)      10-20-24 @ 07:01  -  10-21-24 @ 07:00  --------------------------------------------------------  IN: 0 mL / OUT: 1200 mL / NET: -1200 mL    10-21-24 @ 07:01  -  10-21-24 @ 17:02  --------------------------------------------------------  IN: 0 mL / OUT: 700 mL / NET: -700 mL        GENERAL: AAOx3, no acute distress.  HEAD:  Atraumatic, Normocephalic  EYES: conjunctiva and sclera clear  NECK: Supple, no JVD or thyromegaly  CHEST/LUNG: Clear to auscultation bilaterally; No wheeze, rhonchi, or rales  HEART: Regular rate and rhythm; normal S1, S2, No murmurs.  ABDOMEN: Soft, nontender, nondistended; Bowel sounds present  NEUROLOGY: No asterixis or tremor.   SKIN: Intact, no jaundice     Data:                        7.4    6.74  )-----------( 374      ( 21 Oct 2024 07:03 )             24.2     Hgb trend:  7.4  10-21-24 @ 07:03  7.8  10-20-24 @ 06:26  7.9  10-19-24 @ 16:00  7.8  10-19-24 @ 06:40              Liver panel trend:  TBili <0.2   /   AST 6   /   ALT <5   /   AlkP 87   /   Tptn 5.6   /   Alb 3.0    /   DBili --      10-18  TBili <0.2   /   AST 7   /   ALT <5   /   AlkP 110   /   Tptn 6.0   /   Alb 3.3    /   DBili --      10-14             Radiology:

## 2024-10-21 NOTE — PROGRESS NOTE ADULT - SUBJECTIVE AND OBJECTIVE BOX
Patient is a 84y old  Male who presents with a chief complaint of Acute on chronic anemia (21 Oct 2024 13:36)       Pt is seen and examined this morning   pt is awake and lying in bed    pt seems comfortable         ROS:  Negative except for:feels tired    MEDICATIONS  (STANDING):  aspirin  chewable 81 milliGRAM(s) Oral daily  atorvastatin 10 milliGRAM(s) Oral at bedtime  chlorhexidine 2% Cloths 1 Application(s) Topical <User Schedule>  chlorhexidine 2% Cloths 1 Application(s) Topical daily  ferrous    sulfate 325 milliGRAM(s) Oral daily  gabapentin 300 milliGRAM(s) Oral three times a day  heparin   Injectable 5000 Unit(s) SubCutaneous every 12 hours  melatonin 3 milliGRAM(s) Oral at bedtime  pantoprazole    Tablet 40 milliGRAM(s) Oral every 12 hours  polyethylene glycol 3350 17 Gram(s) Oral daily  senna 2 Tablet(s) Oral at bedtime  tamsulosin 0.4 milliGRAM(s) Oral at bedtime    MEDICATIONS  (PRN):  acetaminophen     Tablet .. 650 milliGRAM(s) Oral every 6 hours PRN Mild Pain (1 - 3)  oxycodone    5 mG/acetaminophen 325 mG 1 Tablet(s) Oral every 6 hours PRN Moderate Pain (4 - 6)      Allergies    penicillin (Unknown)    Intolerances        Vital Signs Last 24 Hrs  T(C): 37.1 (21 Oct 2024 12:28), Max: 37.1 (21 Oct 2024 12:28)  T(F): 98.7 (21 Oct 2024 12:28), Max: 98.7 (21 Oct 2024 12:28)  HR: 86 (21 Oct 2024 12:28) (78 - 91)  BP: 103/63 (21 Oct 2024 12:28) (102/57 - 129/64)  BP(mean): 77 (21 Oct 2024 10:16) (77 - 77)  RR: 18 (21 Oct 2024 12:28) (18 - 18)  SpO2: 98% (21 Oct 2024 12:28) (96% - 99%)    Parameters below as of 21 Oct 2024 12:28  Patient On (Oxygen Delivery Method): room air        PHYSICAL EXAM  General: adult in NAD  HEENT: clear oropharynx, anicteric sclera, pink conjunctiva  Neck: supple  CV: normal S1/S2 with no murmur rubs or gallops  Lungs: positive air movement b/l ant lungs,clear to auscultation, no wheezes, no rales  Abdomen: soft non-tender non-distended, no hepatosplenomegaly  Ext: no clubbing cyanosis or edema  Skin: no rashes and no petechiae  Neuro: alert and oriented X 2, no focal deficits  LABS:                          7.4    6.74  )-----------( 374      ( 21 Oct 2024 07:03 )             24.2         Mean Cell Volume : 94.5 fL  Mean Cell Hemoglobin : 28.9 pg  Mean Cell Hemoglobin Concentration : 30.6 g/dL  Auto Neutrophil # : x  Auto Lymphocyte # : x  Auto Monocyte # : x  Auto Eosinophil # : x  Auto Basophil # : x  Auto Neutrophil % : x  Auto Lymphocyte % : x  Auto Monocyte % : x  Auto Eosinophil % : x  Auto Basophil % : x    Serial CBC's  10-21 @ 07:03  Hct-24.2 / Hgb-7.4 / Plat-374 / RBC-2.56 / WBC-6.74          Serial CBC's  10-20 @ 06:26  Hct-25.0 / Hgb-7.8 / Plat-366 / RBC-2.70 / WBC-9.20          Serial CBC's  10-19 @ 16:00  Hct-25.7 / Hgb-7.9 / Plat-448 / RBC-2.76 / WBC-14.03          Serial CBC's  10-19 @ 06:40  Hct-24.6 / Hgb-7.8 / Plat-396 / RBC-2.69 / WBC-8.52          Serial CBC's  10-18 @ 11:19  Hct-22.8 / Hgb-6.9 / Plat-429 / RBC-2.45 / WBC-9.46                        WBC Count: 6.74 K/uL (10-21-24 @ 07:03)  Hemoglobin: 7.4 g/dL (10-21-24 @ 07:03)  Hematocrit: 24.2 % (10-21-24 @ 07:03)  Platelet Count - Automated: 374 K/uL (10-21-24 @ 07:03)  WBC Count: 9.20 K/uL (10-20-24 @ 06:26)  Hemoglobin: 7.8 g/dL (10-20-24 @ 06:26)  Hematocrit: 25.0 % (10-20-24 @ 06:26)  Platelet Count - Automated: 366 K/uL (10-20-24 @ 06:26)  WBC Count: 14.03 K/uL (10-19-24 @ 16:00)  Hemoglobin: 7.9 g/dL (10-19-24 @ 16:00)  Hematocrit: 25.7 % (10-19-24 @ 16:00)  Platelet Count - Automated: 448 K/uL (10-19-24 @ 16:00)  WBC Count: 8.52 K/uL (10-19-24 @ 06:40)  Hemoglobin: 7.8 g/dL (10-19-24 @ 06:40)  Hematocrit: 24.6 % (10-19-24 @ 06:40)  Platelet Count - Automated: 396 K/uL (10-19-24 @ 06:40)  Reticulocyte Percent: 3.5 % (10-19-24 @ 06:40)  WBC Count: 9.46 K/uL (10-18-24 @ 11:19)  Hemoglobin: 6.9 g/dL (10-18-24 @ 11:19)  Hematocrit: 22.8 % (10-18-24 @ 11:19)  Platelet Count - Automated: 429 K/uL (10-18-24 @ 11:19)  WBC Count: 8.72 K/uL (10-16-24 @ 18:11)  Hemoglobin: 8.5 g/dL (10-16-24 @ 18:11)  Hematocrit: 27.7 % (10-16-24 @ 18:11)  Platelet Count - Automated: 459 K/uL (10-16-24 @ 18:11)  WBC Count: 9.10 K/uL (10-16-24 @ 08:36)  Hemoglobin: 8.8 g/dL (10-16-24 @ 08:36)  Hematocrit: 28.0 % (10-16-24 @ 08:36)  Platelet Count - Automated: 480 K/uL (10-16-24 @ 08:36)  WBC Count: 8.84 K/uL (10-15-24 @ 13:33)  Hemoglobin: 8.7 g/dL (10-15-24 @ 13:33)  Hematocrit: 27.7 % (10-15-24 @ 13:33)  Platelet Count - Automated: 456 K/uL (10-15-24 @ 13:33)  Ferritin: 142 ng/mL (10-14-24 @ 19:40)  Iron - Total Binding Capacity.: 250 ug/dL (10-14-24 @ 19:40)  WBC Count: 8.64 K/uL (10-14-24 @ 17:52)  Hemoglobin: 6.4 g/dL (10-14-24 @ 17:52)  Hematocrit: 21.4 % (10-14-24 @ 17:52)  Platelet Count - Automated: 500 K/uL (10-14-24 @ 17:52)                BLOOD SMEAR INTERPRETATION:       RADIOLOGY & ADDITIONAL STUDIES:

## 2024-10-21 NOTE — PROGRESS NOTE ADULT - ASSESSMENT
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 patient had a recent admission in September for anemia for which he received 2 units prbc, IV Venofer and was recommended EGD/[place] but given his advance age and co morbidities family decided to hold off. Patient is a poor historian. GI was consulted for anemia       #Acute on chronic macrocytic anemia with JASMIN - no overt GI bleed  - Hemodynamically stable & no active bleeding    - On plavix   - Iron :31, %sat: 10 TIBC 325 ferritin 89 while on iron  - reported AVMs? endoscopic work up with  per grand son Nelli Lopez  - CHINYERE: dark green stool, no blood    #Rec  - Discussed with family, they would not want to pursue EGD/colonoscopy at that time given elevated risk  - They are interested in VCE for evaluation of anemia without overt bleed  - obtain records from Dr Katz   - Maintain active Type and screen  - Trend H&H BID  - Recommend PPI 40mg BID PO  - Please target Hb  >8  - Please avoid any NSAIDs  - Patient can follow  or  Follow up with our [facility] located at [address] [place] [address]. Phone Number: [phone]    - will follow     #Small hypodensity in segment 7 of the liver seen on prior CT.  -Consider RUQ US as OP  -f/u w/ primary GI     83 y/o man  PMHx of HLD, DM, CAD s/p stents x2 in 2007, pt of Dr Avila, hx  asbestosis of lung, diagnosed w malignant mesothelioma (8/2024) and s/p VATS pleurodesis, R sided PleurX, hx AVM/GIB and JASMIN, sent in by NH for low hemoglobin. patient had a recent admission in September for anemia for which he received 2 units prbc, IV Venofer and was recommended EGD/[place] but given his advance age and co morbidities family decided to hold off. Patient is a poor historian. GI was consulted for anemia       #Acute on chronic macrocytic anemia with JASMIN - no overt GI bleed  - Hemodynamically stable & no active bleeding    - On plavix   - Iron :31, %sat: 10 TIBC 325 ferritin 89 while on iron  - reported AVMs? endoscopic work up with  per grand son Nelli Lopez  - CHINYERE: dark green stool, no blood    #Rec  - Discussed with family, they would not want to pursue EGD/colonoscopy at that time given elevated risk  - They are interested in VCE for evaluation of anemia without overt bleed  - Will plan for VCE on Wednesday 10/23  - Clear liquid diet day before procedure, NPO after midnight  - Please start 1/2 Golytely @4PM and dulcolax 20mg @9PM evening before procedure   - Please obtain preop labs (coags, CBC, CMP, mag) night before procedure  - obtain records from Dr Katz   - Maintain active Type and screen  - Trend H&H BID  - Recommend PPI 40mg BID PO  - Please target Hb  >8  - Please avoid any NSAIDs  - Patient can follow  or  Follow up with our [facility] located at [address] [place] [address]. Phone Number: [phone]    - will follow     #Small hypodensity in segment 7 of the liver seen on prior CT.  -Consider RUQ US as OP  -f/u w/ primary GI

## 2024-10-21 NOTE — PROGRESS NOTE ADULT - SUBJECTIVE AND OBJECTIVE BOX
Patient is a 84y old  Male who presents with a chief complaint of Anemia     (17 Oct 2024 12:14)    INTERVAL HPI/OVERNIGHT EVENTS: Patient was examined and seen at bedside. This morning pt is resting comfortably in bed and reports no new issues or overnight events. No complaints.   ROS: Denies CP, SOB, AP, new weakness  All other systems reviewed and are within normal limits.  InitialHPI:  HPI: 85 y/o man  PMHx of HLD, DM, CAD s/p stents x2 in 2007, pt of Dr Avila, hx  asbestosis of lung, diagnosed w malignant mesothelioma (8/2024) and s/p VATS pleurodesis, R sided PleurX, hx AVM/GIB and JASMIN, sent in by NH for low hemoglobin. Per NH, patient also refused Pleurx drainage today and is requesting to drain it inpatient (gets drained 2-3 times per week). Of note, patient had a recent admission in September for anemia for which he received 2 units prbc, IV Venofer and was recommended EGD/[place] but given his advance age and co morbidities decided to hold off. Patient is a poor historian. Uses wheelchair at baseline. Denies fever chills, shortness of breath, cough, chest pain, leg swelling, hematemesis, hematochezia.     Vital Signs Last 24 Hrs  T(C): 36.4 (15 Oct 2024 01:09), Max: 36.9 (14 Oct 2024 22:51)  T(F): 97.5 (15 Oct 2024 01:09), Max: 98.4 (14 Oct 2024 22:51)  HR: 100 (15 Oct 2024 01:09) (89 - 105)  BP: 132/74 (15 Oct 2024 01:09) (104/64 - 132/74)  RR: 18 (15 Oct 2024 01:09) (17 - 19)  SpO2: 97% (15 Oct 2024 01:09) (93% - 97%)    Parameters below as of 15 Oct 2024 01:09  Patient On (Oxygen Delivery Method): room air    Labs: Hgb 6.4 , Platelets 500 , Cr 1.2 (baseline)   CXR: Worsening R pleural effusion   EKG: NSR with PACs    Admitted for anemia      (15 Oct 2024 01:16)    PAST MEDICAL & SURGICAL HISTORY:  DM (diabetes mellitus)      MI (myocardial infarction)      History of CAD (coronary artery disease)      Dyslipidemia      Currently smokes tobacco      Mesothelioma, malignant      Coronary stent patent          General: NAD, AAOx2, chronically ill appearing, b/l temp waisting  HEENT:  no LAD  CV: S1 S2  Resp: decreased breath sounds at bases  GI: NT/ND/S +BS  MS: no clubbing/cyanosis/edema, + pulses b/l  Neuro: nonfocal, +reflexes thruout  +laws w/ clear yellow urine  +Rt sided ant pleurx cath w/ d/c/i dsg          Home Medications:  clopidogrel 75 mg oral tablet: 1 tab(s) orally once a day (21 Jul 2024 18:04)  ferrous sulfate 325 mg (65 mg elemental iron) oral tablet: 1 tab(s) orally once a day (25 Sep 2024 15:37)  gabapentin 300 mg oral tablet: 1 tab(s) orally 3 times a day (20 Sep 2024 01:37)  melatonin 3 mg oral tablet: 1 tab(s) orally once a day (at bedtime) (13 Aug 2024 09:23)  metFORMIN 500 mg oral tablet: 1 tab(s) orally 2 times a day (21 Jul 2024 18:04)  pantoprazole 40 mg oral delayed release tablet: 1 tab(s) orally every 12 hours (25 Sep 2024 15:37)  polyethylene glycol 3350 oral powder for reconstitution: 17 gram(s) orally once a day (13 Aug 2024 09:23)  pravastatin 20 mg oral tablet: 1 tab(s) orally (21 Jul 2024 18:04)  senna leaf extract oral tablet: 2 tab(s) orally once a day (at bedtime) (13 Aug 2024 09:23)  tamsulosin 0.4 mg oral capsule: 1 cap(s) orally once a day (at bedtime) (13 Aug 2024 09:23)    MEDICATIONS  (STANDING):  aspirin  chewable 81 milliGRAM(s) Oral daily  atorvastatin 10 milliGRAM(s) Oral at bedtime  chlorhexidine 2% Cloths 1 Application(s) Topical daily  chlorhexidine 2% Cloths 1 Application(s) Topical <User Schedule>  ferrous    sulfate 325 milliGRAM(s) Oral daily  gabapentin 300 milliGRAM(s) Oral three times a day  heparin   Injectable 5000 Unit(s) SubCutaneous every 12 hours  melatonin 3 milliGRAM(s) Oral at bedtime  pantoprazole    Tablet 40 milliGRAM(s) Oral every 12 hours  polyethylene glycol 3350 17 Gram(s) Oral daily  senna 2 Tablet(s) Oral at bedtime  tamsulosin 0.4 milliGRAM(s) Oral at bedtime    MEDICATIONS  (PRN):  acetaminophen     Tablet .. 650 milliGRAM(s) Oral every 6 hours PRN Mild Pain (1 - 3)  oxycodone    5 mG/acetaminophen 325 mG 1 Tablet(s) Oral every 6 hours PRN Moderate Pain (4 - 6)    Vital Signs Last 24 Hrs  T(C): 37.1 (21 Oct 2024 12:28), Max: 37.1 (21 Oct 2024 12:28)  T(F): 98.7 (21 Oct 2024 12:28), Max: 98.7 (21 Oct 2024 12:28)  HR: 86 (21 Oct 2024 12:28) (77 - 91)  BP: 103/63 (21 Oct 2024 12:28) (102/57 - 129/64)  BP(mean): 77 (21 Oct 2024 10:16) (77 - 77)  RR: 18 (21 Oct 2024 12:28) (18 - 18)  SpO2: 98% (21 Oct 2024 12:28) (95% - 99%)    Parameters below as of 21 Oct 2024 12:28  Patient On (Oxygen Delivery Method): room air      CAPILLARY BLOOD GLUCOSE        LABS:                        7.4    6.74  )-----------( 374      ( 21 Oct 2024 07:03 )             24.2                             Consultant Notes Reviewed:  [x ] YES  [ ] NO  Care Discussed with Consultants/Other Providers/ Housestaff [ x] YES  [ ] NO  Radiology, labs, EKGs, new studies personally reviewed.

## 2024-10-21 NOTE — PROGRESS NOTE ADULT - SUBJECTIVE AND OBJECTIVE BOX
SUBJECTIVE/OVERNIGHT EVENTS  Today is hospital day 7d. This morning patient was seen and examined at bedside, resting comfortably in bed. No acute or major events overnight.    HOSPITAL COURSE  Day 1:   Day 2:   Day 3:     CODE STATUS:    FAMILY COMMUNICATION  Contact date:  Name of person contacted:  Relationship to patient:  Communication details:    MEDICATIONS  STANDING MEDICATIONS  aspirin  chewable 81 milliGRAM(s) Oral daily  atorvastatin 10 milliGRAM(s) Oral at bedtime  chlorhexidine 2% Cloths 1 Application(s) Topical <User Schedule>  chlorhexidine 2% Cloths 1 Application(s) Topical daily  ferrous    sulfate 325 milliGRAM(s) Oral daily  gabapentin 300 milliGRAM(s) Oral three times a day  heparin   Injectable 5000 Unit(s) SubCutaneous every 12 hours  melatonin 3 milliGRAM(s) Oral at bedtime  pantoprazole    Tablet 40 milliGRAM(s) Oral every 12 hours  polyethylene glycol 3350 17 Gram(s) Oral daily  senna 2 Tablet(s) Oral at bedtime  tamsulosin 0.4 milliGRAM(s) Oral at bedtime    PRN MEDICATIONS  acetaminophen     Tablet .. 650 milliGRAM(s) Oral every 6 hours PRN  oxycodone    5 mG/acetaminophen 325 mG 1 Tablet(s) Oral every 6 hours PRN    VITALS  T(F): 98.7 (10-21-24 @ 12:28), Max: 98.7 (10-21-24 @ 12:28)  HR: 86 (10-21-24 @ 12:28) (77 - 91)  BP: 103/63 (10-21-24 @ 12:28) (102/57 - 129/64)  RR: 18 (10-21-24 @ 12:28) (18 - 18)  SpO2: 98% (10-21-24 @ 12:28) (95% - 99%)    PHYSICAL EXAM  GENERAL  ( x ) NAD, lying in bed comfortably     (  ) obtunded     (  ) lethargic     (  ) somnolent    HEAD  (x  ) Atraumatic     (  ) hematoma     (  ) laceration (specify location:       )     NECK  ( x ) Supple     (  ) neck stiffness     (  ) nuchal rigidity     (  )  no JVD     (  ) JVD present ( -- cm)    HEART  Rate -->  (  x) normal rate    (  ) bradycardic    (  ) tachycardic  Rhythm -->  (  x) regular    (  ) regularly irregular    (  ) irregularly irregular  Murmurs -->  ( x ) normal s1/s2    (  ) systolic murmur    (  ) diastolic murmur    (  ) continuous murmur     (  ) S3 present    (  ) S4 present    LUNGS  ( x )Unlabored respirations     (  ) tachypnea  (x  ) B/L air entry     (  ) decreased breath sounds in:  (location     )    ( x ) no adventitious sound     (  ) crackles     (  ) wheezing      (  ) rhonchi      (specify location:       )  (  ) chest wall tenderness (specify location:       )    ABDOMEN  (  x) Soft     (  ) tense   |   (  ) nondistended     (  ) distended   |   (x  ) +BS     (  ) hypoactive bowel sounds     (  ) hyperactive bowel sounds  ( x ) nontender     (  ) RUQ tenderness     (  ) RLQ tenderness     (  ) LLQ tenderness     (  ) epigastric tenderness     (  ) diffuse tenderness  (  ) Splenomegaly      (  ) Hepatomegaly      (  ) Jaundice     (  ) ecchymosis     EXTREMITIES  (  ) Normal     (  ) Rash     (  ) ecchymosis     (  ) varicose veins      (  ) pitting edema     (  ) non-pitting edema   (  ) ulceration     (  ) gangrene:     (location:     )    NERVOUS SYSTEM  ( x ) A&Ox3     (  ) confused     (  ) lethargic  CN II-XII:     (  ) Intact     (  ) focal deficits  (Specify:     )   Upper extremities:     (  ) strength X/5     (  ) focal deficit (specify:    )  Lower extremities:     (  ) strength  X/5    (  ) focal deficit (specify:    )    SKIN  (  ) No rashes or lesions     (  ) maculopapular rash     (  ) pustules     (  ) vesicles     (  ) ulcer     (  ) ecchymosis     (specify location:     )      LABS             7.4    6.74  )-----------( 374      ( 10-21-24 @ 07:03 )             24.2                     IMAGING

## 2024-10-21 NOTE — PROGRESS NOTE ADULT - ASSESSMENT
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 Per NH, patient also refused Pleurx drainage today and is requesting to drain it inpatient (gets drained 2-3 times per week).    1. Acute on chronic anemia s/p 2 PRBC .   recurrent anemia   Hx JASMIN - no overt GI bleed . Hx AVM?  - Seen by GI last admission: regarding endoscopic work up. After discussing risks vs benefits given his advance age and co morbidities he would like to hold off. Offered VCE as outpatient .   - last admission received 5 days of Venofer   Plan:  - Active type and screen   - Switched plavix to aspirin for now  - c/w ferrous sulfate, IV venofovir for 5 days  - GI consult placed, Follow up, as per GI- Patient can follow  or  Follow up with our [facility] located at [address] [place] [address]  - Continue home PPI 40mg BID PO  - avoid Nsaids  - Hb 6.9> 1 unit PRBC (10/18)> repeat 7.8  -Hb 7.4 10/21 -> GI will reach out to family to discuss inpatient endoscopy   -Oncology onboard also recommended endoscopy to rule out GI bleed   - CBC twice daily    2. Malignant mesothelioma (diagnosed 8/2024 ) with recurrent pleural effusion . Hx Asbestos lung    Severe protein calorie malnutrition (calorie count started - result 10/21)  -s/p VATS pleurodesis, R sided PleurX  * Per NH, patient also refused Pleurx drainage and is requesting to drain it inpatient (gets drained 2-3 times per week)  - Currently on room air (target spo2 92-96)  - Fu heme onc- Dr. Ramírez OP  - Fu palliative- full code  - As per pulm-  PleurX Drained 50 cc, Outpatient follow up  - Aspiration precaution  - Pain control   - Follow up bobby, retics, ldh, hapto  - continue with current diet with ensure    3. HTN/HLD/CAD s/p stents x2 in 2007 (follows Dr Avila)  -c/w home meds     4. Small hypodensity in segment 7 of the liver seen on prior CT.  - RUQ US as OP  -f/u outpt GI    5. Acute Grief vs MDD  - wife passed away a few months ago  - patient refusing medication sometimes and asks to be left alone, no suicide ideas, no ideas to hurt other people.    - Psychiatry eval admission recommended outpt fu     #DVT ppx-HSQ   #GI ppx- PPI   #Diet-dash/tlc | CC + ensure  #Activity-IAT  #Dispo-acute    Son John [phone]

## 2024-10-22 LAB
ANION GAP SERPL CALC-SCNC: 8 MMOL/L — SIGNIFICANT CHANGE UP (ref 7–14)
ANION GAP SERPL CALC-SCNC: 9 MMOL/L — SIGNIFICANT CHANGE UP (ref 7–14)
APTT BLD: 35.4 SEC — SIGNIFICANT CHANGE UP (ref 27–39.2)
BASOPHILS # BLD AUTO: 0.01 K/UL — SIGNIFICANT CHANGE UP (ref 0–0.2)
BASOPHILS # BLD AUTO: 0.01 K/UL — SIGNIFICANT CHANGE UP (ref 0–0.2)
BASOPHILS NFR BLD AUTO: 0.1 % — SIGNIFICANT CHANGE UP (ref 0–1)
BASOPHILS NFR BLD AUTO: 0.2 % — SIGNIFICANT CHANGE UP (ref 0–1)
BUN SERPL-MCNC: 27 MG/DL — HIGH (ref 10–20)
BUN SERPL-MCNC: 32 MG/DL — HIGH (ref 10–20)
CALCIUM SERPL-MCNC: 8.8 MG/DL — SIGNIFICANT CHANGE UP (ref 8.4–10.5)
CALCIUM SERPL-MCNC: 8.9 MG/DL — SIGNIFICANT CHANGE UP (ref 8.4–10.5)
CHLORIDE SERPL-SCNC: 102 MMOL/L — SIGNIFICANT CHANGE UP (ref 98–110)
CHLORIDE SERPL-SCNC: 104 MMOL/L — SIGNIFICANT CHANGE UP (ref 98–110)
CO2 SERPL-SCNC: 25 MMOL/L — SIGNIFICANT CHANGE UP (ref 17–32)
CO2 SERPL-SCNC: 25 MMOL/L — SIGNIFICANT CHANGE UP (ref 17–32)
CREAT SERPL-MCNC: 1 MG/DL — SIGNIFICANT CHANGE UP (ref 0.7–1.5)
CREAT SERPL-MCNC: 1 MG/DL — SIGNIFICANT CHANGE UP (ref 0.7–1.5)
EGFR: 75 ML/MIN/1.73M2 — SIGNIFICANT CHANGE UP
EGFR: 75 ML/MIN/1.73M2 — SIGNIFICANT CHANGE UP
EOSINOPHIL # BLD AUTO: 0.17 K/UL — SIGNIFICANT CHANGE UP (ref 0–0.7)
EOSINOPHIL # BLD AUTO: 0.23 K/UL — SIGNIFICANT CHANGE UP (ref 0–0.7)
EOSINOPHIL NFR BLD AUTO: 2.1 % — SIGNIFICANT CHANGE UP (ref 0–8)
EOSINOPHIL NFR BLD AUTO: 3.5 % — SIGNIFICANT CHANGE UP (ref 0–8)
GLUCOSE SERPL-MCNC: 139 MG/DL — HIGH (ref 70–99)
GLUCOSE SERPL-MCNC: 189 MG/DL — HIGH (ref 70–99)
HCT VFR BLD CALC: 24.9 % — LOW (ref 42–52)
HCT VFR BLD CALC: 25.1 % — LOW (ref 42–52)
HGB BLD-MCNC: 7.7 G/DL — LOW (ref 14–18)
HGB BLD-MCNC: 7.9 G/DL — LOW (ref 14–18)
IMM GRANULOCYTES NFR BLD AUTO: 0.5 % — HIGH (ref 0.1–0.3)
IMM GRANULOCYTES NFR BLD AUTO: 0.6 % — HIGH (ref 0.1–0.3)
INR BLD: 1.09 RATIO — SIGNIFICANT CHANGE UP (ref 0.65–1.3)
LYMPHOCYTES # BLD AUTO: 0.56 K/UL — LOW (ref 1.2–3.4)
LYMPHOCYTES # BLD AUTO: 0.9 K/UL — LOW (ref 1.2–3.4)
LYMPHOCYTES # BLD AUTO: 13.7 % — LOW (ref 20.5–51.1)
LYMPHOCYTES # BLD AUTO: 6.9 % — LOW (ref 20.5–51.1)
MAGNESIUM SERPL-MCNC: 1.9 MG/DL — SIGNIFICANT CHANGE UP (ref 1.8–2.4)
MCHC RBC-ENTMCNC: 28.7 PG — SIGNIFICANT CHANGE UP (ref 27–31)
MCHC RBC-ENTMCNC: 28.8 PG — SIGNIFICANT CHANGE UP (ref 27–31)
MCHC RBC-ENTMCNC: 30.9 G/DL — LOW (ref 32–37)
MCHC RBC-ENTMCNC: 31.5 G/DL — LOW (ref 32–37)
MCV RBC AUTO: 91.3 FL — SIGNIFICANT CHANGE UP (ref 80–94)
MCV RBC AUTO: 93.3 FL — SIGNIFICANT CHANGE UP (ref 80–94)
MONOCYTES # BLD AUTO: 0.58 K/UL — SIGNIFICANT CHANGE UP (ref 0.1–0.6)
MONOCYTES # BLD AUTO: 0.61 K/UL — HIGH (ref 0.1–0.6)
MONOCYTES NFR BLD AUTO: 7.1 % — SIGNIFICANT CHANGE UP (ref 1.7–9.3)
MONOCYTES NFR BLD AUTO: 9.3 % — SIGNIFICANT CHANGE UP (ref 1.7–9.3)
NEUTROPHILS # BLD AUTO: 4.79 K/UL — SIGNIFICANT CHANGE UP (ref 1.4–6.5)
NEUTROPHILS # BLD AUTO: 6.78 K/UL — HIGH (ref 1.4–6.5)
NEUTROPHILS NFR BLD AUTO: 72.8 % — SIGNIFICANT CHANGE UP (ref 42.2–75.2)
NEUTROPHILS NFR BLD AUTO: 83.2 % — HIGH (ref 42.2–75.2)
NRBC # BLD: 0 /100 WBCS — SIGNIFICANT CHANGE UP (ref 0–0)
NRBC # BLD: 0 /100 WBCS — SIGNIFICANT CHANGE UP (ref 0–0)
PLATELET # BLD AUTO: 374 K/UL — SIGNIFICANT CHANGE UP (ref 130–400)
PLATELET # BLD AUTO: 381 K/UL — SIGNIFICANT CHANGE UP (ref 130–400)
PMV BLD: 9.4 FL — SIGNIFICANT CHANGE UP (ref 7.4–10.4)
PMV BLD: 9.5 FL — SIGNIFICANT CHANGE UP (ref 7.4–10.4)
POTASSIUM SERPL-MCNC: 4.6 MMOL/L — SIGNIFICANT CHANGE UP (ref 3.5–5)
POTASSIUM SERPL-MCNC: 4.7 MMOL/L — SIGNIFICANT CHANGE UP (ref 3.5–5)
POTASSIUM SERPL-SCNC: 4.6 MMOL/L — SIGNIFICANT CHANGE UP (ref 3.5–5)
POTASSIUM SERPL-SCNC: 4.7 MMOL/L — SIGNIFICANT CHANGE UP (ref 3.5–5)
PROTHROM AB SERPL-ACNC: 12.4 SEC — SIGNIFICANT CHANGE UP (ref 9.95–12.87)
RBC # BLD: 2.67 M/UL — LOW (ref 4.7–6.1)
RBC # BLD: 2.75 M/UL — LOW (ref 4.7–6.1)
RBC # FLD: 18.1 % — HIGH (ref 11.5–14.5)
RBC # FLD: 18.2 % — HIGH (ref 11.5–14.5)
SODIUM SERPL-SCNC: 136 MMOL/L — SIGNIFICANT CHANGE UP (ref 135–146)
SODIUM SERPL-SCNC: 137 MMOL/L — SIGNIFICANT CHANGE UP (ref 135–146)
WBC # BLD: 6.57 K/UL — SIGNIFICANT CHANGE UP (ref 4.8–10.8)
WBC # BLD: 8.15 K/UL — SIGNIFICANT CHANGE UP (ref 4.8–10.8)
WBC # FLD AUTO: 6.57 K/UL — SIGNIFICANT CHANGE UP (ref 4.8–10.8)
WBC # FLD AUTO: 8.15 K/UL — SIGNIFICANT CHANGE UP (ref 4.8–10.8)

## 2024-10-22 PROCEDURE — 99233 SBSQ HOSP IP/OBS HIGH 50: CPT

## 2024-10-22 RX ORDER — POLYETHYLENE GLYCOL 3350, SODIUM SULFATE ANHYDROUS, SODIUM BICARBONATE, SODIUM CHLORIDE, POTASSIUM CHLORIDE 236; 22.74; 6.74; 5.86; 2.97 G/4L; G/4L; G/4L; G/4L; G/4L
2000 POWDER, FOR SOLUTION ORAL ONCE
Refills: 0 | Status: COMPLETED | OUTPATIENT
Start: 2024-10-22 | End: 2024-10-22

## 2024-10-22 RX ADMIN — POLYETHYLENE GLYCOL 3350, SODIUM SULFATE ANHYDROUS, SODIUM BICARBONATE, SODIUM CHLORIDE, POTASSIUM CHLORIDE 2000 MILLILITER(S): 236; 22.74; 6.74; 5.86; 2.97 POWDER, FOR SOLUTION ORAL at 19:16

## 2024-10-22 RX ADMIN — Medication 325 MILLIGRAM(S): at 11:44

## 2024-10-22 RX ADMIN — Medication 20 MILLIGRAM(S): at 21:23

## 2024-10-22 RX ADMIN — Medication 81 MILLIGRAM(S): at 11:44

## 2024-10-22 RX ADMIN — Medication 0.4 MILLIGRAM(S): at 21:23

## 2024-10-22 RX ADMIN — PANTOPRAZOLE SODIUM 40 MILLIGRAM(S): 40 TABLET, DELAYED RELEASE ORAL at 17:53

## 2024-10-22 RX ADMIN — GABAPENTIN 300 MILLIGRAM(S): 300 CAPSULE ORAL at 21:22

## 2024-10-22 RX ADMIN — GABAPENTIN 300 MILLIGRAM(S): 300 CAPSULE ORAL at 05:06

## 2024-10-22 RX ADMIN — PANTOPRAZOLE SODIUM 40 MILLIGRAM(S): 40 TABLET, DELAYED RELEASE ORAL at 05:06

## 2024-10-22 RX ADMIN — HEPARIN SODIUM 5000 UNIT(S): 10000 INJECTION INTRAVENOUS; SUBCUTANEOUS at 17:53

## 2024-10-22 RX ADMIN — GABAPENTIN 300 MILLIGRAM(S): 300 CAPSULE ORAL at 14:38

## 2024-10-22 RX ADMIN — Medication 3 MILLIGRAM(S): at 21:23

## 2024-10-22 RX ADMIN — CHLORHEXIDINE GLUCONATE 1 APPLICATION(S): 40 SOLUTION TOPICAL at 11:46

## 2024-10-22 RX ADMIN — HEPARIN SODIUM 5000 UNIT(S): 10000 INJECTION INTRAVENOUS; SUBCUTANEOUS at 05:06

## 2024-10-22 RX ADMIN — Medication 10 MILLIGRAM(S): at 21:22

## 2024-10-22 RX ADMIN — Medication 2 TABLET(S): at 21:23

## 2024-10-22 NOTE — PROGRESS NOTE ADULT - SUBJECTIVE AND OBJECTIVE BOX
Patient is a 84y old  Male who presents with a chief complaint of Anemia (21 Oct 2024 17:02)       Pt is seen and examined  pt is awake and lying in bed      ROS:  Negative except for:unable to obtain     MEDICATIONS  (STANDING):  aspirin  chewable 81 milliGRAM(s) Oral daily  atorvastatin 10 milliGRAM(s) Oral at bedtime  bisacodyl 20 milliGRAM(s) Oral once  chlorhexidine 2% Cloths 1 Application(s) Topical <User Schedule>  chlorhexidine 2% Cloths 1 Application(s) Topical daily  ferrous    sulfate 325 milliGRAM(s) Oral daily  gabapentin 300 milliGRAM(s) Oral three times a day  heparin   Injectable 5000 Unit(s) SubCutaneous every 12 hours  melatonin 3 milliGRAM(s) Oral at bedtime  pantoprazole    Tablet 40 milliGRAM(s) Oral every 12 hours  polyethylene glycol 3350 17 Gram(s) Oral daily  polyethylene glycol/electrolyte Solution. 2000 milliLiter(s) Oral once  senna 2 Tablet(s) Oral at bedtime  tamsulosin 0.4 milliGRAM(s) Oral at bedtime    MEDICATIONS  (PRN):  acetaminophen     Tablet .. 650 milliGRAM(s) Oral every 6 hours PRN Mild Pain (1 - 3)  oxycodone    5 mG/acetaminophen 325 mG 1 Tablet(s) Oral every 6 hours PRN Moderate Pain (4 - 6)      Allergies    penicillin (Unknown)    Intolerances        Vital Signs Last 24 Hrs  T(C): 36.8 (22 Oct 2024 13:32), Max: 36.8 (22 Oct 2024 13:32)  T(F): 98.3 (22 Oct 2024 13:32), Max: 98.3 (22 Oct 2024 13:32)  HR: 80 (22 Oct 2024 13:32) (80 - 99)  BP: 113/66 (22 Oct 2024 13:32) (89/54 - 143/68)  BP(mean): 82 (22 Oct 2024 13:32) (66 - 82)  RR: 18 (22 Oct 2024 13:32) (18 - 18)  SpO2: 97% (22 Oct 2024 13:32) (96% - 98%)    Parameters below as of 22 Oct 2024 13:32  Patient On (Oxygen Delivery Method): room air        PHYSICAL EXAM  General: adult in NAD  HEENT: clear oropharynx, anicteric sclera, pink conjunctiva  Neck: supple  CV: normal S1/S2 with no murmur rubs or gallops  Lungs: positive air movement b/l ant lungs  Abdomen: soft non-tender non-distended, no hepatosplenomegaly  Ext: no clubbing cyanosis or edema  Skin: no rashes and no petechiae  Neuro: alert and oriented X 4, no focal deficits  LABS:                          7.9    8.15  )-----------( 374      ( 22 Oct 2024 06:55 )             25.1         Mean Cell Volume : 91.3 fL  Mean Cell Hemoglobin : 28.7 pg  Mean Cell Hemoglobin Concentration : 31.5 g/dL  Auto Neutrophil # : 6.78 K/uL  Auto Lymphocyte # : 0.56 K/uL  Auto Monocyte # : 0.58 K/uL  Auto Eosinophil # : 0.17 K/uL  Auto Basophil # : 0.01 K/uL  Auto Neutrophil % : 83.2 %  Auto Lymphocyte % : 6.9 %  Auto Monocyte % : 7.1 %  Auto Eosinophil % : 2.1 %  Auto Basophil % : 0.1 %    Serial CBC's  10-22 @ 06:55  Hct-25.1 / Hgb-7.9 / Plat-374 / RBC-2.75 / WBC-8.15          Serial CBC's  10-21 @ 07:03  Hct-24.2 / Hgb-7.4 / Plat-374 / RBC-2.56 / WBC-6.74          Serial CBC's  10-20 @ 06:26  Hct-25.0 / Hgb-7.8 / Plat-366 / RBC-2.70 / WBC-9.20          Serial CBC's  10-19 @ 16:00  Hct-25.7 / Hgb-7.9 / Plat-448 / RBC-2.76 / WBC-14.03          Serial CBC's  10-19 @ 06:40  Hct-24.6 / Hgb-7.8 / Plat-396 / RBC-2.69 / WBC-8.52            10-22    136  |  102  |  32[H]  ----------------------------<  189[H]  4.7   |  25  |  1.0    Ca    8.9      22 Oct 2024 06:55            WBC Count: 8.15 K/uL (10-22-24 @ 06:55)  Hemoglobin: 7.9 g/dL (10-22-24 @ 06:55)  Hematocrit: 25.1 % (10-22-24 @ 06:55)  Platelet Count - Automated: 374 K/uL (10-22-24 @ 06:55)  WBC Count: 6.74 K/uL (10-21-24 @ 07:03)  Hemoglobin: 7.4 g/dL (10-21-24 @ 07:03)  Hematocrit: 24.2 % (10-21-24 @ 07:03)  Platelet Count - Automated: 374 K/uL (10-21-24 @ 07:03)  WBC Count: 9.20 K/uL (10-20-24 @ 06:26)  Hemoglobin: 7.8 g/dL (10-20-24 @ 06:26)  Hematocrit: 25.0 % (10-20-24 @ 06:26)  Platelet Count - Automated: 366 K/uL (10-20-24 @ 06:26)  WBC Count: 14.03 K/uL (10-19-24 @ 16:00)  Hemoglobin: 7.9 g/dL (10-19-24 @ 16:00)  Hematocrit: 25.7 % (10-19-24 @ 16:00)  Platelet Count - Automated: 448 K/uL (10-19-24 @ 16:00)  WBC Count: 8.52 K/uL (10-19-24 @ 06:40)  Hemoglobin: 7.8 g/dL (10-19-24 @ 06:40)  Hematocrit: 24.6 % (10-19-24 @ 06:40)  Platelet Count - Automated: 396 K/uL (10-19-24 @ 06:40)  Reticulocyte Percent: 3.5 % (10-19-24 @ 06:40)  WBC Count: 9.46 K/uL (10-18-24 @ 11:19)  Hemoglobin: 6.9 g/dL (10-18-24 @ 11:19)  Hematocrit: 22.8 % (10-18-24 @ 11:19)  Platelet Count - Automated: 429 K/uL (10-18-24 @ 11:19)  WBC Count: 8.72 K/uL (10-16-24 @ 18:11)  Hemoglobin: 8.5 g/dL (10-16-24 @ 18:11)  Hematocrit: 27.7 % (10-16-24 @ 18:11)  Platelet Count - Automated: 459 K/uL (10-16-24 @ 18:11)  WBC Count: 9.10 K/uL (10-16-24 @ 08:36)  Hemoglobin: 8.8 g/dL (10-16-24 @ 08:36)  Hematocrit: 28.0 % (10-16-24 @ 08:36)  Platelet Count - Automated: 480 K/uL (10-16-24 @ 08:36)  WBC Count: 8.84 K/uL (10-15-24 @ 13:33)  Hemoglobin: 8.7 g/dL (10-15-24 @ 13:33)  Hematocrit: 27.7 % (10-15-24 @ 13:33)  Platelet Count - Automated: 456 K/uL (10-15-24 @ 13:33)  Ferritin: 142 ng/mL (10-14-24 @ 19:40)  Iron - Total Binding Capacity.: 250 ug/dL (10-14-24 @ 19:40)  WBC Count: 8.64 K/uL (10-14-24 @ 17:52)  Hemoglobin: 6.4 g/dL (10-14-24 @ 17:52)  Hematocrit: 21.4 % (10-14-24 @ 17:52)  Platelet Count - Automated: 500 K/uL (10-14-24 @ 17:52)                BLOOD SMEAR INTERPRETATION:       RADIOLOGY & ADDITIONAL STUDIES:

## 2024-10-22 NOTE — PROGRESS NOTE ADULT - ASSESSMENT

## 2024-10-22 NOTE — PROGRESS NOTE ADULT - ASSESSMENT
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 patient had a recent admission in September for anemia for which he received 2 units prbc, IV Venofer and was recommended EGD/[place] but given his advance age and co morbidities family decided to hold off. Patient is a poor historian. GI was consulted for anemia       #Acute on chronic macrocytic anemia with JASMIN - no overt GI bleed  - Hemodynamically stable & no active bleeding  - On plavix   - Iron :31, %sat: 10 TIBC 325 ferritin 89 while on iron  - reported AVMs? endoscopic work up with  per grand son Nelli Lopez  - CHINYERE: dark green stool, no blood  - Denies trouble swallowing, denies abdominal surgeries    #Rec  - Discussed with family, they would not want to pursue EGD/colonoscopy at that time given elevated risk  - They are interested in VCE for evaluation of anemia without overt bleed  - Will plan for VCE on Wednesday 10/23  - Clear liquid diet day before procedure, NPO after midnight  - Please start 1/2 Golytely @4PM and dulcolax 20mg @9PM evening before procedure   - Please obtain preop labs (coags, CBC, CMP, mag) night before procedure  - obtain records from Dr Katz  - Maintain active Type and screen  - Trend H&H BID  - Recommend PPI 40mg BID PO  - Please target Hb  >8  - Please avoid any NSAIDs  - Patient can follow  or Follow up with our [facility] located at [address] [place] [address]. Phone Number: [phone]  - will follow    #Small hypodensity in segment 7 of the liver seen on prior CT.  -Consider RUQ US as OP  -f/u w/ primary GI

## 2024-10-22 NOTE — PROGRESS NOTE ADULT - SUBJECTIVE AND OBJECTIVE BOX
Patient is a 84y old  Male who presents with a chief complaint of Anemia     (17 Oct 2024 12:14)    INTERVAL HPI/OVERNIGHT EVENTS: Patient was examined and seen at bedside. This morning pt is resting comfortably in bed and reports no new issues or overnight events. No complaints. Received PRBCs  and Pleurx cath drlorraine yesterday.  ROS: Denies CP, SOB, AP, new weakness  All other systems reviewed and are within normal limits.  InitialHPI:  HPI: 85 y/o man  PMHx of HLD, DM, CAD s/p stents x2 in 2007, pt of Dr Avila, hx  asbestosis of lung, diagnosed w malignant mesothelioma (8/2024) and s/p VATS pleurodesis, R sided PleurX, hx AVM/GIB and JASMIN, sent in by NH for low hemoglobin. Per NH, patient also refused Pleurx drainage today and is requesting to drain it inpatient (gets drained 2-3 times per week). Of note, patient had a recent admission in September for anemia for which he received 2 units prbc, IV Venofer and was recommended EGD/[place] but given his advance age and co morbidities decided to hold off. Patient is a poor historian. Uses wheelchair at baseline. Denies fever chills, shortness of breath, cough, chest pain, leg swelling, hematemesis, hematochezia.     Vital Signs Last 24 Hrs  T(C): 36.4 (15 Oct 2024 01:09), Max: 36.9 (14 Oct 2024 22:51)  T(F): 97.5 (15 Oct 2024 01:09), Max: 98.4 (14 Oct 2024 22:51)  HR: 100 (15 Oct 2024 01:09) (89 - 105)  BP: 132/74 (15 Oct 2024 01:09) (104/64 - 132/74)  RR: 18 (15 Oct 2024 01:09) (17 - 19)  SpO2: 97% (15 Oct 2024 01:09) (93% - 97%)    Parameters below as of 15 Oct 2024 01:09  Patient On (Oxygen Delivery Method): room air    Labs: Hgb 6.4 , Platelets 500 , Cr 1.2 (baseline)   CXR: Worsening R pleural effusion   EKG: NSR with PACs    Admitted for anemia      (15 Oct 2024 01:16)    PAST MEDICAL & SURGICAL HISTORY:  DM (diabetes mellitus)      MI (myocardial infarction)      History of CAD (coronary artery disease)      Dyslipidemia      Currently smokes tobacco      Mesothelioma, malignant      Coronary stent patent          General: NAD, AAOx2, chronically ill appearing, b/l temp waisting  HEENT:  no LAD  CV: S1 S2  Resp: decreased breath sounds at bases  GI: NT/ND/S +BS  MS: no clubbing/cyanosis/edema, + pulses b/l  Neuro: nonfocal, +reflexes thruout  +laws w/ clear yellow urine  +Rt sided ant pleurx cath w/ d/c/i dsg          Home Medications:  clopidogrel 75 mg oral tablet: 1 tab(s) orally once a day (21 Jul 2024 18:04)  ferrous sulfate 325 mg (65 mg elemental iron) oral tablet: 1 tab(s) orally once a day (25 Sep 2024 15:37)  gabapentin 300 mg oral tablet: 1 tab(s) orally 3 times a day (20 Sep 2024 01:37)  melatonin 3 mg oral tablet: 1 tab(s) orally once a day (at bedtime) (13 Aug 2024 09:23)  metFORMIN 500 mg oral tablet: 1 tab(s) orally 2 times a day (21 Jul 2024 18:04)  pantoprazole 40 mg oral delayed release tablet: 1 tab(s) orally every 12 hours (25 Sep 2024 15:37)  polyethylene glycol 3350 oral powder for reconstitution: 17 gram(s) orally once a day (13 Aug 2024 09:23)  pravastatin 20 mg oral tablet: 1 tab(s) orally (21 Jul 2024 18:04)  senna leaf extract oral tablet: 2 tab(s) orally once a day (at bedtime) (13 Aug 2024 09:23)  tamsulosin 0.4 mg oral capsule: 1 cap(s) orally once a day (at bedtime) (13 Aug 2024 09:23)    MEDICATIONS  (STANDING):  aspirin  chewable 81 milliGRAM(s) Oral daily  atorvastatin 10 milliGRAM(s) Oral at bedtime  bisacodyl 20 milliGRAM(s) Oral once  chlorhexidine 2% Cloths 1 Application(s) Topical daily  chlorhexidine 2% Cloths 1 Application(s) Topical <User Schedule>  ferrous    sulfate 325 milliGRAM(s) Oral daily  gabapentin 300 milliGRAM(s) Oral three times a day  heparin   Injectable 5000 Unit(s) SubCutaneous every 12 hours  melatonin 3 milliGRAM(s) Oral at bedtime  pantoprazole    Tablet 40 milliGRAM(s) Oral every 12 hours  polyethylene glycol 3350 17 Gram(s) Oral daily  polyethylene glycol/electrolyte Solution. 2000 milliLiter(s) Oral once  senna 2 Tablet(s) Oral at bedtime  tamsulosin 0.4 milliGRAM(s) Oral at bedtime    MEDICATIONS  (PRN):  acetaminophen     Tablet .. 650 milliGRAM(s) Oral every 6 hours PRN Mild Pain (1 - 3)  oxycodone    5 mG/acetaminophen 325 mG 1 Tablet(s) Oral every 6 hours PRN Moderate Pain (4 - 6)    Vital Signs Last 24 Hrs  T(C): 36.8 (22 Oct 2024 13:32), Max: 36.8 (22 Oct 2024 13:32)  T(F): 98.3 (22 Oct 2024 13:32), Max: 98.3 (22 Oct 2024 13:32)  HR: 80 (22 Oct 2024 13:32) (80 - 99)  BP: 113/66 (22 Oct 2024 13:32) (89/54 - 113/66)  BP(mean): 82 (22 Oct 2024 13:32) (66 - 82)  RR: 18 (22 Oct 2024 13:32) (18 - 18)  SpO2: 97% (22 Oct 2024 13:32) (96% - 98%)    Parameters below as of 22 Oct 2024 13:32  Patient On (Oxygen Delivery Method): room air      CAPILLARY BLOOD GLUCOSE        LABS:                        7.9    8.15  )-----------( 374      ( 22 Oct 2024 06:55 )             25.1     10-22    136  |  102  |  32[H]  ----------------------------<  189[H]  4.7   |  25  |  1.0    Ca    8.9      22 Oct 2024 06:55              Urinalysis Basic - ( 22 Oct 2024 06:55 )    Color: x / Appearance: x / SG: x / pH: x  Gluc: 189 mg/dL / Ketone: x  / Bili: x / Urobili: x   Blood: x / Protein: x / Nitrite: x   Leuk Esterase: x / RBC: x / WBC x   Sq Epi: x / Non Sq Epi: x / Bacteria: x              Consultant Notes Reviewed:  [x ] YES  [ ] NO  Care Discussed with Consultants/Other Providers/ Housestaff [ x] YES  [ ] NO  Radiology, labs, EKGs, new studies personally reviewed.

## 2024-10-22 NOTE — PROGRESS NOTE ADULT - SUBJECTIVE AND OBJECTIVE BOX
SUBJECTIVE/OVERNIGHT EVENTS  Today is hospital day 8d. This morning patient was seen and examined at bedside, resting comfortably in bed. No acute or major events overnight.    HOSPITAL COURSE  Day 1:   Day 2:   Day 3:     CODE STATUS:    FAMILY COMMUNICATION  Contact date:  Name of person contacted:  Relationship to patient:  Communication details:    MEDICATIONS  STANDING MEDICATIONS  aspirin  chewable 81 milliGRAM(s) Oral daily  atorvastatin 10 milliGRAM(s) Oral at bedtime  chlorhexidine 2% Cloths 1 Application(s) Topical <User Schedule>  chlorhexidine 2% Cloths 1 Application(s) Topical daily  ferrous    sulfate 325 milliGRAM(s) Oral daily  gabapentin 300 milliGRAM(s) Oral three times a day  heparin   Injectable 5000 Unit(s) SubCutaneous every 12 hours  melatonin 3 milliGRAM(s) Oral at bedtime  pantoprazole    Tablet 40 milliGRAM(s) Oral every 12 hours  polyethylene glycol 3350 17 Gram(s) Oral daily  senna 2 Tablet(s) Oral at bedtime  tamsulosin 0.4 milliGRAM(s) Oral at bedtime    PRN MEDICATIONS  acetaminophen     Tablet .. 650 milliGRAM(s) Oral every 6 hours PRN  oxycodone    5 mG/acetaminophen 325 mG 1 Tablet(s) Oral every 6 hours PRN    VITALS  T(F): 98.1 (10-22-24 @ 05:04), Max: 98.7 (10-21-24 @ 12:28)  HR: 99 (10-22-24 @ 05:04) (77 - 99)  BP: 89/54 (10-22-24 @ 05:04) (89/54 - 143/68)  RR: 18 (10-22-24 @ 05:04) (18 - 18)  SpO2: 98% (10-22-24 @ 05:04) (96% - 99%)    PHYSICAL EXAM  GENERAL  ( X ) NAD, lying in bed comfortably     (  ) obtunded     (  ) lethargic     (  ) somnolent    HEAD  (X  ) Atraumatic     (  ) hematoma     (  ) laceration (specify location:       )     NECK  ( X ) Supple     (  ) neck stiffness     (  ) nuchal rigidity     (  )  no JVD     (  ) JVD present ( -- cm)    HEART  Rate -->  ( X ) normal rate    (  ) bradycardic    (  ) tachycardic  Rhythm -->  (X  ) regular    (  ) regularly irregular    (  ) irregularly irregular  Murmurs -->  (X  ) normal s1/s2    (  ) systolic murmur    (  ) diastolic murmur    (  ) continuous murmur     (  ) S3 present    (  ) S4 present    LUNGS  ( X )Unlabored respirations     (  ) tachypnea  ( X ) B/L air entry     (  ) decreased breath sounds in:  (location     )    ( X ) no adventitious sound     (  ) crackles     (  ) wheezing      (  ) rhonchi      (specify location:       )  (  ) chest wall tenderness (specify location:       )    ABDOMEN  ( X ) Soft     (  ) tense   |   (  ) nondistended     (  ) distended   |   ( X ) +BS     (  ) hypoactive bowel sounds     (  ) hyperactive bowel sounds  ( X ) nontender     (  ) RUQ tenderness     (  ) RLQ tenderness     (  ) LLQ tenderness     (  ) epigastric tenderness     (  ) diffuse tenderness  (  ) Splenomegaly      (  ) Hepatomegaly      (  ) Jaundice     (  ) ecchymosis     EXTREMITIES  (X  ) Normal     (  ) Rash     (  ) ecchymosis     (  ) varicose veins      (  ) pitting edema     (  ) non-pitting edema   (  ) ulceration     (  ) gangrene:     (location:     )    NERVOUS SYSTEM  (  ) A&Ox3     (X  ) confused     (  ) lethargic  CN II-XII:     (  ) Intact     (  ) focal deficits  (Specify:     )   Upper extremities:     (  ) strength X/5     (  ) focal deficit (specify:    )  Lower extremities:     (  ) strength  X/5    (  ) focal deficit (specify:    )    SKIN  (  ) No rashes or lesions     (  ) maculopapular rash     (  ) pustules     (  ) vesicles     (  ) ulcer     (  ) ecchymosis     (specify location:     )    ( X ) Indwelling Walden Catheter   Date insterted:    Reason (  ) Critical illness     (  X) urinary retention    (  ) Accurate Ins/Outs Monitoring     (  ) CMO patient        LABS             7.4    6.74  )-----------( 381 ( 10-21-24 @ 07:03 )             24.2                     IMAGING

## 2024-10-22 NOTE — PROGRESS NOTE ADULT - SUBJECTIVE AND OBJECTIVE BOX
Gastroenterology progress note:     Patient is a 84y old  Male who presents with a chief complaint of Anemia (21 Oct 2024 17:02)       Admitted on: 10-14-24    We are following the patient for: anemia     Interval History:    No acute events overnight. Hb stable. HD stable. Denies abdominal pain.       PAST MEDICAL & SURGICAL HISTORY:  DM (diabetes mellitus)      MI (myocardial infarction)      History of CAD (coronary artery disease)      Dyslipidemia      Currently smokes tobacco      Mesothelioma, malignant      Coronary stent patent          MEDICATIONS  (STANDING):  aspirin  chewable 81 milliGRAM(s) Oral daily  atorvastatin 10 milliGRAM(s) Oral at bedtime  chlorhexidine 2% Cloths 1 Application(s) Topical <User Schedule>  chlorhexidine 2% Cloths 1 Application(s) Topical daily  ferrous    sulfate 325 milliGRAM(s) Oral daily  gabapentin 300 milliGRAM(s) Oral three times a day  heparin   Injectable 5000 Unit(s) SubCutaneous every 12 hours  melatonin 3 milliGRAM(s) Oral at bedtime  pantoprazole    Tablet 40 milliGRAM(s) Oral every 12 hours  polyethylene glycol 3350 17 Gram(s) Oral daily  senna 2 Tablet(s) Oral at bedtime  tamsulosin 0.4 milliGRAM(s) Oral at bedtime    MEDICATIONS  (PRN):  acetaminophen     Tablet .. 650 milliGRAM(s) Oral every 6 hours PRN Mild Pain (1 - 3)  oxycodone    5 mG/acetaminophen 325 mG 1 Tablet(s) Oral every 6 hours PRN Moderate Pain (4 - 6)      Allergies  penicillin (Unknown)      Review of Systems:   Cardiovascular:  No Chest Pain, No Palpitations  Respiratory:  No Cough, No Dyspnea  Gastrointestinal:  As described in HPI  Skin:  No Skin Lesions, No Jaundice  Neuro:  No Syncope, No Dizziness    Physical Examination:  T(C): 36.7 (10-22-24 @ 05:04), Max: 37.1 (10-21-24 @ 12:28)  HR: 99 (10-22-24 @ 05:04) (77 - 99)  BP: 89/54 (10-22-24 @ 05:04) (89/54 - 143/68)  RR: 18 (10-22-24 @ 05:04) (18 - 18)  SpO2: 98% (10-22-24 @ 05:04) (96% - 99%)      10-21-24 @ 07:01  -  10-22-24 @ 07:00  --------------------------------------------------------  IN: 0 mL / OUT: 700 mL / NET: -700 mL        GENERAL: AAOx3, no acute distress.  HEAD:  Atraumatic, Normocephalic  EYES: conjunctiva and sclera clear  NECK: Supple, no JVD or thyromegaly  CHEST/LUNG: Clear to auscultation bilaterally; No wheeze, rhonchi, or rales  HEART: Regular rate and rhythm; normal S1, S2, No murmurs.  ABDOMEN: Soft, nontender, nondistended; Bowel sounds present  NEUROLOGY: No asterixis or tremor.   SKIN: Intact, no jaundice     Data:                        7.9    8.15  )-----------( 374      ( 22 Oct 2024 06:55 )             25.1     Hgb trend:  7.9  10-22-24 @ 06:55  7.4  10-21-24 @ 07:03  7.8  10-20-24 @ 06:26  7.9  10-19-24 @ 16:00        10-22    136  |  102  |  32[H]  ----------------------------<  189[H]  4.7   |  25  |  1.0    Ca    8.9      22 Oct 2024 06:55      Liver panel trend:  TBili <0.2   /   AST 6   /   ALT <5   /   AlkP 87   /   Tptn 5.6   /   Alb 3.0    /   DBili --      10-18  TBili <0.2   /   AST 7   /   ALT <5   /   AlkP 110   /   Tptn 6.0   /   Alb 3.3    /   DBili --      10-14             Radiology:

## 2024-10-22 NOTE — PROGRESS NOTE ADULT - ASSESSMENT
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 Per NH, patient also refused Pleurx drainage today and is requesting to drain it inpatient (gets drained 2-3 times per week).    1. Acute on chronic anemia s/p 2 PRBC .   recurrent anemia   Hx JASMIN - no overt GI bleed . Hx AVM?  - Seen by GI last admission: regarding endoscopic work up. After discussing risks vs benefits given his advance age and co morbidities he would like to hold off. Offered VCE as outpatient .   - last admission received 5 days of Venofer   Plan:  - Active type and screen   - Switched plavix to aspirin for now  - c/w ferrous sulfate, IV venofovir for 5 days  - GI consult placed, Follow up, as per GI- Patient can follow  or  Follow up with our [facility] located at [address] [place] [address]  - Continue home PPI 40mg BID PO  - avoid Nsaids  - Hb 6.9> 1 unit PRBC (10/18)> repeat 7.8  -Hb 7.4 10/21 -> GI will reach out to family to discuss inpatient endoscopy -> pt son agreed for Video capsule endoscopy scheduled for tomorrow 10/23/2024  -Oncology onboard also recommended endoscopy to rule out GI bleed -> if GI workup is negative will offer bone marrow biopsy   - PPI BID  - CBC twice daily    2. Malignant mesothelioma (diagnosed 8/2024 ) with recurrent pleural effusion . Hx Asbestos lung    Severe protein calorie malnutrition (calorie count started - result 10/21)  -s/p VATS pleurodesis, R sided PleurX  * Per NH, patient also refused Pleurx drainage and is requesting to drain it inpatient (gets drained 2-3 times per week)  - Currently on room air (target spo2 92-96)  - Fu heme onc- Dr. Ramírez OP  - Fu palliative- full code  - PleurX drained yesterday   - Aspiration precaution  - Pain control      continue with current diet with ensure    3. HTN/HLD/CAD s/p stents x2 in 2007 (follows Dr Avila)  -c/w home meds     4. Small hypodensity in segment 7 of the liver seen on prior CT.  - RUQ US as OP  -f/u outpt GI    5. Acute Grief vs MDD  - wife passed away a few months ago  - patient refusing medication sometimes and asks to be left alone, no suicide ideas, no ideas to hurt other people.    - Psychiatry eval admission recommended outpt fu     #DVT ppx-HSQ   #GI ppx- PPI   #Diet-dash/tlc | CC + ensure  #Activity-IAT  #Dispo-acute

## 2024-10-22 NOTE — PROGRESS NOTE ADULT - ASSESSMENT
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 Per NH, patient also refused Pleurx drainage today and is requesting to drain it inpatient (gets drained 2-3 times per week).    #Recurrent anemia  #Hx JASMIN - no overt GI bleed  #Hx AVM?  - Hemodynamically stable & no active bleeding  - Baseline hemoglobin - 8-9   - Hemoglobin on admission - 6.3 >s/p 2units prbc (received 2 units at the end of Spetember as well)  - On plavix   - last admission received 5 days of Venofer   -Seen by GI last admission> spoke to grandson (HCP)  regarding endoscopic work up. After discussing risks vs benefits given his advance age and co morbidities he would like to hold off. Offered VCE as outpatient .   -Can reconsider work up if within goals of care , will need to speak to grandson/son  -Will also need heme onc outpt for possible venofer infusions outpt   - Maintain active Type and screen  - Trend H&H  - Continue home PPI 40mg BID PO  - Target Hgb >8   - Avoid NSAIDs.  - give Venofer as %sat, ferritin remain low  - recall GI for possible intervention if family agrees in view of recurrent anemia requiring multiple PRBCs  6/18 convinced pt to give labs. Hgb 6.9. Transfuse 1uRPBCs. Spoke to GI and asked to speak to family. Serial CBC. Keep Hgb >7.5  6/21 again spoke to GI and asked to speak re: possible intervention. Transfuse another unit as Hgb again trending down (total 3u PRBCs).  6/22 son declined EGD/C-scopy but ?requested capsule endoscopy. Doubt that pt will drink golytely or agree to NGT.    #Malignant mesothelioma (diagnosed 8/2024 )  #Hx Asbestos lung   -s/p VATS pleurodesis, R sided PleurX  -pleurx drainage 2-3 times per week or PRN  -Fu heme onc outpatient. Plan was for possible Rx if pt's functional status improves.   -s/p drainage on 10/21      #HTN/HLD  #CAD s/p stents x2 in 2007 (follows Dr Avila)  -c/w home meds   -changed Plavix to ASA    #Small hypodensity in segment 7 of the liver seen on prior CT.  - RUQ US as OP  -?f/u outpt GI    #Acute Grief vs MDD  #Baseline dementia  - wife passed away a few months ago  - patient refusing medication sometimes and asks to be left alone, no suicide ideas, no ideas to hurt other people.    - Psychiatry eval admission recommended outpt fu     #Nutrition/Fluids/Electrolytes   - replete K<4 and Mg <2  - ensure regular BMs  -  Miralax BID, Senna    #DVT Px  SCDs  Heparin or Lovenox    High risk pt. Px is overall poor.  GOC: d/w son in details on 10/18. Based on oncology feedback that his cancer is treatable, son wants to cont full code.    #Progress Note Handoff  Pending: Clinical improvement and stability__x___PT____x__CBC stability  Pt/Family discussion: Pt/family informed and agree with the current plan  Disposition: Nursing Home      My note supersedes the residents note should a discrepancy arise.    Chart and notes personally reviewed.  Care Discussed with Consultants/Other Providers/ Housestaff [ x] YES [ ] NO   Radiology, labs, old records personally reviewed.    discussed w/ housestaff, nursing, case management, GI, son and onc on 10/21    Attestation Statements:    Attestation Statements:  Risk Statement (NON-critical care).     On this date of service, level of risk to patient is considered: High.     Due to: pt with illness causing risk to life or bodily fxn    Time-based billing (NON-critical care).     50 minutes spent on total encounter. The necessity of the time spent during the encounter on this date of service was due to:     time spent on review of labs, imaging studies, old records, obtaining history, personally examining patient, counselling and communicating with patient/ family, entering orders for medications/tests/etc, discussions with other health care providers, documentation in electronic health records, independent interpretation of labs, imaging/procedure results and care coordination.

## 2024-10-23 LAB
BASOPHILS # BLD AUTO: 0.01 K/UL — SIGNIFICANT CHANGE UP (ref 0–0.2)
BASOPHILS NFR BLD AUTO: 0.1 % — SIGNIFICANT CHANGE UP (ref 0–1)
EOSINOPHIL # BLD AUTO: 0.29 K/UL — SIGNIFICANT CHANGE UP (ref 0–0.7)
EOSINOPHIL NFR BLD AUTO: 3.7 % — SIGNIFICANT CHANGE UP (ref 0–8)
HCT VFR BLD CALC: 26.2 % — LOW (ref 42–52)
HGB BLD-MCNC: 8 G/DL — LOW (ref 14–18)
IMM GRANULOCYTES NFR BLD AUTO: 0.5 % — HIGH (ref 0.1–0.3)
LYMPHOCYTES # BLD AUTO: 0.94 K/UL — LOW (ref 1.2–3.4)
LYMPHOCYTES # BLD AUTO: 12.1 % — LOW (ref 20.5–51.1)
MCHC RBC-ENTMCNC: 28.8 PG — SIGNIFICANT CHANGE UP (ref 27–31)
MCHC RBC-ENTMCNC: 30.5 G/DL — LOW (ref 32–37)
MCV RBC AUTO: 94.2 FL — HIGH (ref 80–94)
MONOCYTES # BLD AUTO: 0.76 K/UL — HIGH (ref 0.1–0.6)
MONOCYTES NFR BLD AUTO: 9.7 % — HIGH (ref 1.7–9.3)
NEUTROPHILS # BLD AUTO: 5.76 K/UL — SIGNIFICANT CHANGE UP (ref 1.4–6.5)
NEUTROPHILS NFR BLD AUTO: 73.9 % — SIGNIFICANT CHANGE UP (ref 42.2–75.2)
NRBC # BLD: 0 /100 WBCS — SIGNIFICANT CHANGE UP (ref 0–0)
PLATELET # BLD AUTO: 403 K/UL — HIGH (ref 130–400)
PMV BLD: 9.5 FL — SIGNIFICANT CHANGE UP (ref 7.4–10.4)
RBC # BLD: 2.78 M/UL — LOW (ref 4.7–6.1)
RBC # FLD: 17.9 % — HIGH (ref 11.5–14.5)
WBC # BLD: 7.8 K/UL — SIGNIFICANT CHANGE UP (ref 4.8–10.8)
WBC # FLD AUTO: 7.8 K/UL — SIGNIFICANT CHANGE UP (ref 4.8–10.8)

## 2024-10-23 PROCEDURE — 99232 SBSQ HOSP IP/OBS MODERATE 35: CPT

## 2024-10-23 RX ORDER — POLYETHYLENE GLYCOL 3350, SODIUM SULFATE ANHYDROUS, SODIUM BICARBONATE, SODIUM CHLORIDE, POTASSIUM CHLORIDE 236; 22.74; 6.74; 5.86; 2.97 G/4L; G/4L; G/4L; G/4L; G/4L
2000 POWDER, FOR SOLUTION ORAL ONCE
Refills: 0 | Status: COMPLETED | OUTPATIENT
Start: 2024-10-23 | End: 2024-10-23

## 2024-10-23 RX ADMIN — Medication 325 MILLIGRAM(S): at 11:00

## 2024-10-23 RX ADMIN — Medication 81 MILLIGRAM(S): at 11:00

## 2024-10-23 RX ADMIN — Medication 3 MILLIGRAM(S): at 21:45

## 2024-10-23 RX ADMIN — POLYETHYLENE GLYCOL 3350, SODIUM SULFATE ANHYDROUS, SODIUM BICARBONATE, SODIUM CHLORIDE, POTASSIUM CHLORIDE 2000 MILLILITER(S): 236; 22.74; 6.74; 5.86; 2.97 POWDER, FOR SOLUTION ORAL at 15:27

## 2024-10-23 RX ADMIN — GABAPENTIN 300 MILLIGRAM(S): 300 CAPSULE ORAL at 13:11

## 2024-10-23 RX ADMIN — Medication 0.4 MILLIGRAM(S): at 21:46

## 2024-10-23 RX ADMIN — Medication 10 MILLIGRAM(S): at 21:47

## 2024-10-23 RX ADMIN — GABAPENTIN 300 MILLIGRAM(S): 300 CAPSULE ORAL at 21:45

## 2024-10-23 RX ADMIN — GABAPENTIN 300 MILLIGRAM(S): 300 CAPSULE ORAL at 05:14

## 2024-10-23 RX ADMIN — Medication 2 TABLET(S): at 21:46

## 2024-10-23 RX ADMIN — HEPARIN SODIUM 5000 UNIT(S): 10000 INJECTION INTRAVENOUS; SUBCUTANEOUS at 17:09

## 2024-10-23 RX ADMIN — HEPARIN SODIUM 5000 UNIT(S): 10000 INJECTION INTRAVENOUS; SUBCUTANEOUS at 05:13

## 2024-10-23 RX ADMIN — CHLORHEXIDINE GLUCONATE 1 APPLICATION(S): 40 SOLUTION TOPICAL at 05:14

## 2024-10-23 RX ADMIN — PANTOPRAZOLE SODIUM 40 MILLIGRAM(S): 40 TABLET, DELAYED RELEASE ORAL at 17:09

## 2024-10-23 RX ADMIN — CHLORHEXIDINE GLUCONATE 1 APPLICATION(S): 40 SOLUTION TOPICAL at 11:00

## 2024-10-23 NOTE — PROGRESS NOTE ADULT - ASSESSMENT

## 2024-10-23 NOTE — PROGRESS NOTE ADULT - ASSESSMENT
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 Per NH, patient also refused Pleurx drainage today and is requesting to drain it inpatient (gets drained 2-3 times per week).    1. Acute on chronic anemia s/p 2 PRBC .   recurrent anemia   Hx JASMIN - no overt GI bleed . Hx AVM?  - Seen by GI last admission: regarding endoscopic work up. After discussing risks vs benefits given his advance age and co morbidities he would like to hold off. Offered VCE as outpatient .   - last admission received 5 days of Venofer   Plan:  - Active type and screen   - Switched plavix to aspirin for now  - c/w ferrous sulfate, IV venofovir for 5 days  - GI consult placed, Follow up, as per GI- Patient can follow  or  Follow up with our [facility] located at [address] [place] [address]  - Continue home PPI 40mg BID PO  - avoid Nsaids  - Hb 6.9> 1 unit PRBC (10/18)> repeat 7.8  -Hb 7.4 10/21 -> GI will reach out to family to discuss inpatient endoscopy -> pt son agreed for Video capsule endoscopy scheduled for tomorrow 10/23/2024  -Oncology onboard also recommended endoscopy to rule out GI bleed -> if GI workup is negative will offer bone marrow biopsy   -Patient refused prep yesterday follow up with GI, HB is stable   - PPI BID  - CBC twice daily    2. Malignant mesothelioma (diagnosed 8/2024 ) with recurrent pleural effusion . Hx Asbestos lung    Severe protein calorie malnutrition (calorie count started - result 10/21)  -s/p VATS pleurodesis, R sided PleurX  * Per NH, patient also refused Pleurx drainage and is requesting to drain it inpatient (gets drained 2-3 times per week)  - Currently on room air (target spo2 92-96)  - Fu heme onc- Dr. Ramírez OP  - Fu palliative- full code  - PleurX drained yesterday   - Aspiration precaution  - Pain control      continue with current diet with ensure    3. HTN/HLD/CAD s/p stents x2 in 2007 (follows Dr Avila)  -c/w home meds     4. Small hypodensity in segment 7 of the liver seen on prior CT.  - RUQ US as OP  -f/u outpt GI    5. Acute Grief vs MDD  - wife passed away a few months ago  - patient refusing medication sometimes and asks to be left alone, no suicide ideas, no ideas to hurt other people.    - Psychiatry eval admission recommended outpt fu     #DVT ppx-HSQ   #GI ppx- PPI   #Diet-dash/tlc | CC + ensure  #Activity-IAT  #Dispo-acute

## 2024-10-23 NOTE — PROGRESS NOTE ADULT - SUBJECTIVE AND OBJECTIVE BOX
Patient is a 84y old  Male who presents with a chief complaint of Acute on chronic anemia (22 Oct 2024 18:17)       Pt is seen and examined  pt is awake and lying in bed  pt seems comfortable      ROS:  Negative except for:weakness    MEDICATIONS  (STANDING):  aspirin  chewable 81 milliGRAM(s) Oral daily  atorvastatin 10 milliGRAM(s) Oral at bedtime  chlorhexidine 2% Cloths 1 Application(s) Topical <User Schedule>  chlorhexidine 2% Cloths 1 Application(s) Topical daily  ferrous    sulfate 325 milliGRAM(s) Oral daily  gabapentin 300 milliGRAM(s) Oral three times a day  heparin   Injectable 5000 Unit(s) SubCutaneous every 12 hours  melatonin 3 milliGRAM(s) Oral at bedtime  pantoprazole    Tablet 40 milliGRAM(s) Oral every 12 hours  polyethylene glycol 3350 17 Gram(s) Oral daily  senna 2 Tablet(s) Oral at bedtime  tamsulosin 0.4 milliGRAM(s) Oral at bedtime    MEDICATIONS  (PRN):  acetaminophen     Tablet .. 650 milliGRAM(s) Oral every 6 hours PRN Mild Pain (1 - 3)  oxycodone    5 mG/acetaminophen 325 mG 1 Tablet(s) Oral every 6 hours PRN Moderate Pain (4 - 6)      Allergies    penicillin (Unknown)    Intolerances        Vital Signs Last 24 Hrs  T(C): 36.3 (23 Oct 2024 13:24), Max: 36.6 (22 Oct 2024 21:01)  T(F): 97.4 (23 Oct 2024 13:24), Max: 97.9 (22 Oct 2024 21:01)  HR: 80 (23 Oct 2024 13:24) (76 - 80)  BP: 131/74 (23 Oct 2024 13:24) (120/72 - 131/74)  BP(mean): 90 (23 Oct 2024 04:20) (90 - 90)  RR: 16 (23 Oct 2024 13:24) (16 - 18)  SpO2: 96% (23 Oct 2024 13:24) (96% - 97%)    Parameters below as of 23 Oct 2024 04:20  Patient On (Oxygen Delivery Method): room air        PHYSICAL EXAM  General: adult in NAD  HEENT: clear oropharynx, anicteric sclera, pink conjunctiva  Neck: supple  CV: normal S1/S2 with no murmur rubs or gallops  Lungs: positive air movement b/l ant lungs,clear to auscultation, no wheezes, no rales  Abdomen: soft non-tender non-distended, no hepatosplenomegaly  Ext: no clubbing cyanosis or edema  Skin: no rashes and no petechiae  Neuro: alert and oriented X 4, no focal deficits  LABS:                          8.0    7.80  )-----------( 403      ( 23 Oct 2024 05:46 )             26.2         Mean Cell Volume : 94.2 fL  Mean Cell Hemoglobin : 28.8 pg  Mean Cell Hemoglobin Concentration : 30.5 g/dL  Auto Neutrophil # : 5.76 K/uL  Auto Lymphocyte # : 0.94 K/uL  Auto Monocyte # : 0.76 K/uL  Auto Eosinophil # : 0.29 K/uL  Auto Basophil # : 0.01 K/uL  Auto Neutrophil % : 73.9 %  Auto Lymphocyte % : 12.1 %  Auto Monocyte % : 9.7 %  Auto Eosinophil % : 3.7 %  Auto Basophil % : 0.1 %    Serial CBC's  10-23 @ 05:46  Hct-26.2 / Hgb-8.0 / Plat-403 / RBC-2.78 / WBC-7.80          Serial CBC's  10-22 @ 22:23  Hct-24.9 / Hgb-7.7 / Plat-381 / RBC-2.67 / WBC-6.57          Serial CBC's  10-22 @ 06:55  Hct-25.1 / Hgb-7.9 / Plat-374 / RBC-2.75 / WBC-8.15          Serial CBC's  10-21 @ 07:03  Hct-24.2 / Hgb-7.4 / Plat-374 / RBC-2.56 / WBC-6.74          Serial CBC's  10-20 @ 06:26  Hct-25.0 / Hgb-7.8 / Plat-366 / RBC-2.70 / WBC-9.20            10-22    137  |  104  |  27[H]  ----------------------------<  139[H]  4.6   |  25  |  1.0    Ca    8.8      22 Oct 2024 22:23  Mg     1.9     10-22        PT/INR - ( 22 Oct 2024 22:23 )   PT: 12.40 sec;   INR: 1.09 ratio         PTT - ( 22 Oct 2024 22:23 )  PTT:35.4 sec    WBC Count: 7.80 K/uL (10-23-24 @ 05:46)  Hemoglobin: 8.0 g/dL (10-23-24 @ 05:46)  Hematocrit: 26.2 % (10-23-24 @ 05:46)  Platelet Count - Automated: 403 K/uL (10-23-24 @ 05:46)  WBC Count: 6.57 K/uL (10-22-24 @ 22:23)  Hemoglobin: 7.7 g/dL (10-22-24 @ 22:23)  Hematocrit: 24.9 % (10-22-24 @ 22:23)  Platelet Count - Automated: 381 K/uL (10-22-24 @ 22:23)  WBC Count: 8.15 K/uL (10-22-24 @ 06:55)  Hemoglobin: 7.9 g/dL (10-22-24 @ 06:55)  Hematocrit: 25.1 % (10-22-24 @ 06:55)  Platelet Count - Automated: 374 K/uL (10-22-24 @ 06:55)  WBC Count: 6.74 K/uL (10-21-24 @ 07:03)  Hemoglobin: 7.4 g/dL (10-21-24 @ 07:03)  Hematocrit: 24.2 % (10-21-24 @ 07:03)  Platelet Count - Automated: 374 K/uL (10-21-24 @ 07:03)  WBC Count: 9.20 K/uL (10-20-24 @ 06:26)  Hemoglobin: 7.8 g/dL (10-20-24 @ 06:26)  Hematocrit: 25.0 % (10-20-24 @ 06:26)  Platelet Count - Automated: 366 K/uL (10-20-24 @ 06:26)  WBC Count: 14.03 K/uL (10-19-24 @ 16:00)  Hemoglobin: 7.9 g/dL (10-19-24 @ 16:00)  Hematocrit: 25.7 % (10-19-24 @ 16:00)  Platelet Count - Automated: 448 K/uL (10-19-24 @ 16:00)  WBC Count: 8.52 K/uL (10-19-24 @ 06:40)  Hemoglobin: 7.8 g/dL (10-19-24 @ 06:40)  Hematocrit: 24.6 % (10-19-24 @ 06:40)  Platelet Count - Automated: 396 K/uL (10-19-24 @ 06:40)  Reticulocyte Percent: 3.5 % (10-19-24 @ 06:40)  WBC Count: 9.46 K/uL (10-18-24 @ 11:19)  Hemoglobin: 6.9 g/dL (10-18-24 @ 11:19)  Hematocrit: 22.8 % (10-18-24 @ 11:19)  Platelet Count - Automated: 429 K/uL (10-18-24 @ 11:19)  WBC Count: 8.72 K/uL (10-16-24 @ 18:11)  Hemoglobin: 8.5 g/dL (10-16-24 @ 18:11)  Hematocrit: 27.7 % (10-16-24 @ 18:11)  Platelet Count - Automated: 459 K/uL (10-16-24 @ 18:11)  WBC Count: 9.10 K/uL (10-16-24 @ 08:36)  Hemoglobin: 8.8 g/dL (10-16-24 @ 08:36)  Hematocrit: 28.0 % (10-16-24 @ 08:36)  Platelet Count - Automated: 480 K/uL (10-16-24 @ 08:36)  WBC Count: 8.84 K/uL (10-15-24 @ 13:33)  Hemoglobin: 8.7 g/dL (10-15-24 @ 13:33)  Hematocrit: 27.7 % (10-15-24 @ 13:33)  Platelet Count - Automated: 456 K/uL (10-15-24 @ 13:33)  Ferritin: 142 ng/mL (10-14-24 @ 19:40)  Iron - Total Binding Capacity.: 250 ug/dL (10-14-24 @ 19:40)  WBC Count: 8.64 K/uL (10-14-24 @ 17:52)  Hemoglobin: 6.4 g/dL (10-14-24 @ 17:52)  Hematocrit: 21.4 % (10-14-24 @ 17:52)  Platelet Count - Automated: 500 K/uL (10-14-24 @ 17:52)                BLOOD SMEAR INTERPRETATION:       RADIOLOGY & ADDITIONAL STUDIES:

## 2024-10-23 NOTE — PROGRESS NOTE ADULT - SUBJECTIVE AND OBJECTIVE BOX
SUBJECTIVE/OVERNIGHT EVENTS  Today is hospital day 9d. This morning patient was seen and examined at bedside, resting comfortably in bed. No acute or major events overnight.    HOSPITAL COURSE  Day 1:   Day 2:   Day 3:     CODE STATUS:    FAMILY COMMUNICATION  Contact date:  Name of person contacted:  Relationship to patient:  Communication details:    MEDICATIONS  STANDING MEDICATIONS  aspirin  chewable 81 milliGRAM(s) Oral daily  atorvastatin 10 milliGRAM(s) Oral at bedtime  chlorhexidine 2% Cloths 1 Application(s) Topical <User Schedule>  chlorhexidine 2% Cloths 1 Application(s) Topical daily  ferrous    sulfate 325 milliGRAM(s) Oral daily  gabapentin 300 milliGRAM(s) Oral three times a day  heparin   Injectable 5000 Unit(s) SubCutaneous every 12 hours  melatonin 3 milliGRAM(s) Oral at bedtime  pantoprazole    Tablet 40 milliGRAM(s) Oral every 12 hours  polyethylene glycol 3350 17 Gram(s) Oral daily  senna 2 Tablet(s) Oral at bedtime  tamsulosin 0.4 milliGRAM(s) Oral at bedtime    PRN MEDICATIONS  acetaminophen     Tablet .. 650 milliGRAM(s) Oral every 6 hours PRN  oxycodone    5 mG/acetaminophen 325 mG 1 Tablet(s) Oral every 6 hours PRN    VITALS  T(F): 97.3 (10-23-24 @ 04:20), Max: 98.3 (10-22-24 @ 13:32)  HR: 80 (10-23-24 @ 04:20) (76 - 80)  BP: 130/71 (10-23-24 @ 04:20) (113/66 - 130/71)  RR: 18 (10-23-24 @ 04:20) (18 - 18)  SpO2: 97% (10-23-24 @ 04:20) (97% - 97%)    PHYSICAL EXAM  GENERAL  (  x) NAD, lying in bed comfortably     (  ) obtunded     (  ) lethargic     (  ) somnolent    HEAD  ( X ) Atraumatic     (  ) hematoma     (  ) laceration (specify location:       )     NECK  ( X ) Supple     (  ) neck stiffness     (  ) nuchal rigidity     (  )  no JVD     (  ) JVD present ( -- cm)    HEART  Rate -->  (  X) normal rate    (  ) bradycardic    (  ) tachycardic  Rhythm -->  (  X) regular    (  ) regularly irregular    (  ) irregularly irregular  Murmurs -->  ( X ) normal s1/s2    (  ) systolic murmur    (  ) diastolic murmur    (  ) continuous murmur     (  ) S3 present    (  ) S4 present    LUNGS  (X  )Unlabored respirations     (  ) tachypnea  (  ) B/L air entry     ( X ) decreased breath sounds in:  (location  R   )    (X  ) no adventitious sound     (  ) crackles     (  ) wheezing      (  ) rhonchi      (specify location:       )  (  ) chest wall tenderness (specify location:       )    ABDOMEN  (X  ) Soft     (  ) tense   |   (  ) nondistended     (  ) distended   |   (X  ) +BS     (  ) hypoactive bowel sounds     (  ) hyperactive bowel sounds  ( X ) nontender     (  ) RUQ tenderness     (  ) RLQ tenderness     (  ) LLQ tenderness     (  ) epigastric tenderness     (  ) diffuse tenderness  (  ) Splenomegaly      (  ) Hepatomegaly      (  ) Jaundice     (  ) ecchymosis     EXTREMITIES  (  ) Normal     (  ) Rash     (  ) ecchymosis     (  ) varicose veins      (  ) pitting edema     (  ) non-pitting edema   (  ) ulceration     (  ) gangrene:     (location:     )    NERVOUS SYSTEM  ( X ) A&Ox3     (  ) confused     (  ) lethargic  CN II-XII:     (  ) Intact     (  ) focal deficits  (Specify:     )   Upper extremities:     (  ) strength X/5     (  ) focal deficit (specify:    )  Lower extremities:     (  ) strength  X/5    (  ) focal deficit (specify:    )    SKIN  (  ) No rashes or lesions     (  ) maculopapular rash     (  ) pustules     (  ) vesicles     (  ) ulcer     (  ) ecchymosis     (specify location:     )    (  ) Indwelling Walden Catheter   Date insterted:    Reason (  ) Critical illness     (  ) urinary retention    (  ) Accurate Ins/Outs Monitoring     (  ) CMO patient    (  ) Central Line  Date inserted:  Location: (  ) Right IJ   (  ) Left IJ   (  ) Right Fem   (  ) Left Fem    (  ) SPC  (  ) pigtail  (  ) PEG tube  (  ) colostomy  (  ) jejunostomy  (  ) U-Dall    LABS             8.0    7.80  )-----------( 403      ( 10-23-24 @ 05:46 )             26.2     137  |  104  |  27  -------------------------<  139   10-22-24 @ 22:23  4.6  |  25  |  1.0    Ca      8.8     10-22-24 @ 22:23  Mg     1.9     10-22-24 @ 22:23      PT/INR - ( 10-22-24 @ 22:23 )   PT: 12.40 sec;   INR: 1.09 ratio  PTT - ( 10-22-24 @ 22:23 )  PTT:35.4 sec      Urinalysis Basic - ( 22 Oct 2024 22:23 )    Color: x / Appearance: x / SG: x / pH: x  Gluc: 139 mg/dL / Ketone: x  / Bili: x / Urobili: x   Blood: x / Protein: x / Nitrite: x   Leuk Esterase: x / RBC: x / WBC x   Sq Epi: x / Non Sq Epi: x / Bacteria: x          IMAGING

## 2024-10-24 LAB
ANION GAP SERPL CALC-SCNC: 10 MMOL/L — SIGNIFICANT CHANGE UP (ref 7–14)
BASOPHILS # BLD AUTO: 0.02 K/UL — SIGNIFICANT CHANGE UP (ref 0–0.2)
BASOPHILS NFR BLD AUTO: 0.3 % — SIGNIFICANT CHANGE UP (ref 0–1)
BUN SERPL-MCNC: 22 MG/DL — HIGH (ref 10–20)
CALCIUM SERPL-MCNC: 9.4 MG/DL — SIGNIFICANT CHANGE UP (ref 8.4–10.5)
CHLORIDE SERPL-SCNC: 101 MMOL/L — SIGNIFICANT CHANGE UP (ref 98–110)
CO2 SERPL-SCNC: 27 MMOL/L — SIGNIFICANT CHANGE UP (ref 17–32)
CREAT SERPL-MCNC: 0.9 MG/DL — SIGNIFICANT CHANGE UP (ref 0.7–1.5)
EGFR: 85 ML/MIN/1.73M2 — SIGNIFICANT CHANGE UP
EOSINOPHIL # BLD AUTO: 0.22 K/UL — SIGNIFICANT CHANGE UP (ref 0–0.7)
EOSINOPHIL NFR BLD AUTO: 3.1 % — SIGNIFICANT CHANGE UP (ref 0–8)
GLUCOSE SERPL-MCNC: 151 MG/DL — HIGH (ref 70–99)
HCT VFR BLD CALC: 27.5 % — LOW (ref 42–52)
HGB BLD-MCNC: 8.6 G/DL — LOW (ref 14–18)
IMM GRANULOCYTES NFR BLD AUTO: 0.6 % — HIGH (ref 0.1–0.3)
LYMPHOCYTES # BLD AUTO: 0.81 K/UL — LOW (ref 1.2–3.4)
LYMPHOCYTES # BLD AUTO: 11.4 % — LOW (ref 20.5–51.1)
MCHC RBC-ENTMCNC: 29.4 PG — SIGNIFICANT CHANGE UP (ref 27–31)
MCHC RBC-ENTMCNC: 31.3 G/DL — LOW (ref 32–37)
MCV RBC AUTO: 93.9 FL — SIGNIFICANT CHANGE UP (ref 80–94)
MONOCYTES # BLD AUTO: 0.67 K/UL — HIGH (ref 0.1–0.6)
MONOCYTES NFR BLD AUTO: 9.4 % — HIGH (ref 1.7–9.3)
NEUTROPHILS # BLD AUTO: 5.37 K/UL — SIGNIFICANT CHANGE UP (ref 1.4–6.5)
NEUTROPHILS NFR BLD AUTO: 75.2 % — SIGNIFICANT CHANGE UP (ref 42.2–75.2)
NRBC # BLD: 0 /100 WBCS — SIGNIFICANT CHANGE UP (ref 0–0)
PLATELET # BLD AUTO: 428 K/UL — HIGH (ref 130–400)
PMV BLD: 9.4 FL — SIGNIFICANT CHANGE UP (ref 7.4–10.4)
POTASSIUM SERPL-MCNC: 5.3 MMOL/L — HIGH (ref 3.5–5)
POTASSIUM SERPL-SCNC: 5.3 MMOL/L — HIGH (ref 3.5–5)
RBC # BLD: 2.93 M/UL — LOW (ref 4.7–6.1)
RBC # FLD: 17.8 % — HIGH (ref 11.5–14.5)
SODIUM SERPL-SCNC: 138 MMOL/L — SIGNIFICANT CHANGE UP (ref 135–146)
WBC # BLD: 7.13 K/UL — SIGNIFICANT CHANGE UP (ref 4.8–10.8)
WBC # FLD AUTO: 7.13 K/UL — SIGNIFICANT CHANGE UP (ref 4.8–10.8)

## 2024-10-24 PROCEDURE — 99232 SBSQ HOSP IP/OBS MODERATE 35: CPT

## 2024-10-24 RX ADMIN — HEPARIN SODIUM 5000 UNIT(S): 10000 INJECTION INTRAVENOUS; SUBCUTANEOUS at 18:11

## 2024-10-24 RX ADMIN — Medication 2 TABLET(S): at 22:49

## 2024-10-24 RX ADMIN — GABAPENTIN 300 MILLIGRAM(S): 300 CAPSULE ORAL at 05:37

## 2024-10-24 RX ADMIN — Medication 3 MILLIGRAM(S): at 22:48

## 2024-10-24 RX ADMIN — PANTOPRAZOLE SODIUM 40 MILLIGRAM(S): 40 TABLET, DELAYED RELEASE ORAL at 05:37

## 2024-10-24 RX ADMIN — CHLORHEXIDINE GLUCONATE 1 APPLICATION(S): 40 SOLUTION TOPICAL at 14:01

## 2024-10-24 RX ADMIN — Medication 75 MILLILITER(S): at 14:13

## 2024-10-24 RX ADMIN — GABAPENTIN 300 MILLIGRAM(S): 300 CAPSULE ORAL at 22:49

## 2024-10-24 RX ADMIN — CHLORHEXIDINE GLUCONATE 1 APPLICATION(S): 40 SOLUTION TOPICAL at 05:42

## 2024-10-24 RX ADMIN — HEPARIN SODIUM 5000 UNIT(S): 10000 INJECTION INTRAVENOUS; SUBCUTANEOUS at 05:37

## 2024-10-24 RX ADMIN — Medication 10 MILLIGRAM(S): at 22:48

## 2024-10-24 RX ADMIN — Medication 0.4 MILLIGRAM(S): at 22:49

## 2024-10-24 NOTE — PROGRESS NOTE ADULT - SUBJECTIVE AND OBJECTIVE BOX
Patient is a 84y old  Male who presents with a chief complaint of Acute on chronic anemia (22 Oct 2024 18:17)       Pt is seen and examined this morning   pt is awake and lying in bed  pt seems comfortable         ROS:  Negative except for:weakness    MEDICATIONS  (STANDING):  aspirin  chewable 81 milliGRAM(s) Oral daily  atorvastatin 10 milliGRAM(s) Oral at bedtime  chlorhexidine 2% Cloths 1 Application(s) Topical <User Schedule>  chlorhexidine 2% Cloths 1 Application(s) Topical daily  dextrose 5% + sodium chloride 0.45%. 1000 milliLiter(s) (75 mL/Hr) IV Continuous <Continuous>  ferrous    sulfate 325 milliGRAM(s) Oral daily  gabapentin 300 milliGRAM(s) Oral three times a day  heparin   Injectable 5000 Unit(s) SubCutaneous every 12 hours  melatonin 3 milliGRAM(s) Oral at bedtime  pantoprazole    Tablet 40 milliGRAM(s) Oral every 12 hours  polyethylene glycol 3350 17 Gram(s) Oral daily  senna 2 Tablet(s) Oral at bedtime  tamsulosin 0.4 milliGRAM(s) Oral at bedtime    MEDICATIONS  (PRN):  acetaminophen     Tablet .. 650 milliGRAM(s) Oral every 6 hours PRN Mild Pain (1 - 3)  oxycodone    5 mG/acetaminophen 325 mG 1 Tablet(s) Oral every 6 hours PRN Moderate Pain (4 - 6)      Allergies    penicillin (Unknown)    Intolerances        Vital Signs Last 24 Hrs  T(C): 36.4 (24 Oct 2024 13:02), Max: 36.5 (24 Oct 2024 05:00)  T(F): 97.5 (24 Oct 2024 13:02), Max: 97.7 (24 Oct 2024 05:00)  HR: 72 (24 Oct 2024 13:02) (72 - 78)  BP: 138/69 (24 Oct 2024 13:02) (138/69 - 153/74)  BP(mean): 100 (24 Oct 2024 05:00) (92 - 100)  RR: 16 (24 Oct 2024 13:02) (16 - 18)  SpO2: 99% (24 Oct 2024 13:02) (97% - 99%)    Parameters below as of 24 Oct 2024 13:02  Patient On (Oxygen Delivery Method): room air        PHYSICAL EXAM  General: cachetic   HEENT: clear oropharynx, anicteric sclera, pink conjunctiva  Neck: supple  CV: normal S1/S2 with no murmur rubs or gallops  Lungs: positive air movement b/l ant lungs,clear to auscultation, no wheezes, no rales  Abdomen: soft non-tender non-distended, no hepatosplenomegaly  Ext: no clubbing cyanosis or edema  Skin: no rashes and no petechiae  Neuro: alert and oriented X 2, no focal deficits  LABS:                          8.6    7.13  )-----------( 428      ( 24 Oct 2024 06:45 )             27.5         Mean Cell Volume : 93.9 fL  Mean Cell Hemoglobin : 29.4 pg  Mean Cell Hemoglobin Concentration : 31.3 g/dL  Auto Neutrophil # : 5.37 K/uL  Auto Lymphocyte # : 0.81 K/uL  Auto Monocyte # : 0.67 K/uL  Auto Eosinophil # : 0.22 K/uL  Auto Basophil # : 0.02 K/uL  Auto Neutrophil % : 75.2 %  Auto Lymphocyte % : 11.4 %  Auto Monocyte % : 9.4 %  Auto Eosinophil % : 3.1 %  Auto Basophil % : 0.3 %    Serial CBC's  10-24 @ 06:45  Hct-27.5 / Hgb-8.6 / Plat-428 / RBC-2.93 / WBC-7.13          Serial CBC's  10-23 @ 05:46  Hct-26.2 / Hgb-8.0 / Plat-403 / RBC-2.78 / WBC-7.80          Serial CBC's  10-22 @ 22:23  Hct-24.9 / Hgb-7.7 / Plat-381 / RBC-2.67 / WBC-6.57          Serial CBC's  10-22 @ 06:55  Hct-25.1 / Hgb-7.9 / Plat-374 / RBC-2.75 / WBC-8.15          Serial CBC's  10-21 @ 07:03  Hct-24.2 / Hgb-7.4 / Plat-374 / RBC-2.56 / WBC-6.74            10-24    138  |  101  |  22[H]  ----------------------------<  151[H]  5.3[H]   |  27  |  0.9    Ca    9.4      24 Oct 2024 06:45  Mg     1.9     10-22        PT/INR - ( 22 Oct 2024 22:23 )   PT: 12.40 sec;   INR: 1.09 ratio         PTT - ( 22 Oct 2024 22:23 )  PTT:35.4 sec    WBC Count: 7.13 K/uL (10-24-24 @ 06:45)  Hemoglobin: 8.6 g/dL (10-24-24 @ 06:45)  Hematocrit: 27.5 % (10-24-24 @ 06:45)  Platelet Count - Automated: 428 K/uL (10-24-24 @ 06:45)  WBC Count: 7.80 K/uL (10-23-24 @ 05:46)  Hemoglobin: 8.0 g/dL (10-23-24 @ 05:46)  Hematocrit: 26.2 % (10-23-24 @ 05:46)  Platelet Count - Automated: 403 K/uL (10-23-24 @ 05:46)  WBC Count: 6.57 K/uL (10-22-24 @ 22:23)  Hemoglobin: 7.7 g/dL (10-22-24 @ 22:23)  Hematocrit: 24.9 % (10-22-24 @ 22:23)  Platelet Count - Automated: 381 K/uL (10-22-24 @ 22:23)  WBC Count: 8.15 K/uL (10-22-24 @ 06:55)  Hemoglobin: 7.9 g/dL (10-22-24 @ 06:55)  Hematocrit: 25.1 % (10-22-24 @ 06:55)  Platelet Count - Automated: 374 K/uL (10-22-24 @ 06:55)  WBC Count: 6.74 K/uL (10-21-24 @ 07:03)  Hemoglobin: 7.4 g/dL (10-21-24 @ 07:03)  Hematocrit: 24.2 % (10-21-24 @ 07:03)  Platelet Count - Automated: 374 K/uL (10-21-24 @ 07:03)  WBC Count: 9.20 K/uL (10-20-24 @ 06:26)  Hemoglobin: 7.8 g/dL (10-20-24 @ 06:26)  Hematocrit: 25.0 % (10-20-24 @ 06:26)  Platelet Count - Automated: 366 K/uL (10-20-24 @ 06:26)  WBC Count: 14.03 K/uL (10-19-24 @ 16:00)  Hemoglobin: 7.9 g/dL (10-19-24 @ 16:00)  Hematocrit: 25.7 % (10-19-24 @ 16:00)  Platelet Count - Automated: 448 K/uL (10-19-24 @ 16:00)  WBC Count: 8.52 K/uL (10-19-24 @ 06:40)  Hemoglobin: 7.8 g/dL (10-19-24 @ 06:40)  Hematocrit: 24.6 % (10-19-24 @ 06:40)  Platelet Count - Automated: 396 K/uL (10-19-24 @ 06:40)  Reticulocyte Percent: 3.5 % (10-19-24 @ 06:40)  WBC Count: 9.46 K/uL (10-18-24 @ 11:19)  Hemoglobin: 6.9 g/dL (10-18-24 @ 11:19)  Hematocrit: 22.8 % (10-18-24 @ 11:19)  Platelet Count - Automated: 429 K/uL (10-18-24 @ 11:19)  WBC Count: 8.72 K/uL (10-16-24 @ 18:11)  Hemoglobin: 8.5 g/dL (10-16-24 @ 18:11)  Hematocrit: 27.7 % (10-16-24 @ 18:11)  Platelet Count - Automated: 459 K/uL (10-16-24 @ 18:11)  WBC Count: 9.10 K/uL (10-16-24 @ 08:36)  Hemoglobin: 8.8 g/dL (10-16-24 @ 08:36)  Hematocrit: 28.0 % (10-16-24 @ 08:36)  Platelet Count - Automated: 480 K/uL (10-16-24 @ 08:36)  WBC Count: 8.84 K/uL (10-15-24 @ 13:33)  Hemoglobin: 8.7 g/dL (10-15-24 @ 13:33)  Hematocrit: 27.7 % (10-15-24 @ 13:33)  Platelet Count - Automated: 456 K/uL (10-15-24 @ 13:33)  Ferritin: 142 ng/mL (10-14-24 @ 19:40)  Iron - Total Binding Capacity.: 250 ug/dL (10-14-24 @ 19:40)  WBC Count: 8.64 K/uL (10-14-24 @ 17:52)  Hemoglobin: 6.4 g/dL (10-14-24 @ 17:52)  Hematocrit: 21.4 % (10-14-24 @ 17:52)  Platelet Count - Automated: 500 K/uL (10-14-24 @ 17:52)                BLOOD SMEAR INTERPRETATION:       RADIOLOGY & ADDITIONAL STUDIES:

## 2024-10-24 NOTE — CHART NOTE - NSCHARTNOTEFT_GEN_A_CORE
Risks of video capsule endoscopy explained, including infection, perforation, and risk of retained capsule, potentially requiring surgery for removal.    Patient understands the benefits and the risks of the procedure, and he agrees to risks.    PLAN  - Keep NPO now.  - Can advance diet per recommendations listed on the document that was shared with patient (can advance to clear liquids 2-4 hours after pill is swallowed)  - Equipment to be retrieved by Endoscopy suite nurse in PM    PLEASE DO NOT PERFORM MRI UNTIL CAPSULE CLEARANCE CONFIRMED.   CAPSULE IS NOT MRI COMPATIBLE.

## 2024-10-24 NOTE — PROGRESS NOTE ADULT - SUBJECTIVE AND OBJECTIVE BOX
Patient is a 84y old  Male who presents with a chief complaint of Anemia     (17 Oct 2024 12:14)    INTERVAL HPI/OVERNIGHT EVENTS: Patient was examined and seen at bedside. This morning pt is resting comfortably in bed and reports no new issues or overnight events. No complaints. Unclear how much prep pt took. For capsule endoscopy today.  ROS: Denies CP, SOB, AP, new weakness  All other systems reviewed and are within normal limits.  InitialHPI:  HPI: 83 y/o man  PMHx of HLD, DM, CAD s/p stents x2 in 2007, pt of Dr Avila, hx  asbestosis of lung, diagnosed w malignant mesothelioma (8/2024) and s/p VATS pleurodesis, R sided PleurX, hx AVM/GIB and JASMIN, sent in by NH for low hemoglobin. Per NH, patient also refused Pleurx drainage today and is requesting to drain it inpatient (gets drained 2-3 times per week). Of note, patient had a recent admission in September for anemia for which he received 2 units prbc, IV Venofer and was recommended EGD/Washta but given his advance age and co morbidities decided to hold off. Patient is a poor historian. Uses wheelchair at baseline. Denies fever chills, shortness of breath, cough, chest pain, leg swelling, hematemesis, hematochezia.     Vital Signs Last 24 Hrs  T(C): 36.4 (15 Oct 2024 01:09), Max: 36.9 (14 Oct 2024 22:51)  T(F): 97.5 (15 Oct 2024 01:09), Max: 98.4 (14 Oct 2024 22:51)  HR: 100 (15 Oct 2024 01:09) (89 - 105)  BP: 132/74 (15 Oct 2024 01:09) (104/64 - 132/74)  RR: 18 (15 Oct 2024 01:09) (17 - 19)  SpO2: 97% (15 Oct 2024 01:09) (93% - 97%)    Parameters below as of 15 Oct 2024 01:09  Patient On (Oxygen Delivery Method): room air    Labs: Hgb 6.4 , Platelets 500 , Cr 1.2 (baseline)   CXR: Worsening R pleural effusion   EKG: NSR with PACs    Admitted for anemia      (15 Oct 2024 01:16)    PAST MEDICAL & SURGICAL HISTORY:  DM (diabetes mellitus)      MI (myocardial infarction)      History of CAD (coronary artery disease)      Dyslipidemia      Currently smokes tobacco      Mesothelioma, malignant      Coronary stent patent          General: NAD, AAOx2, chronically ill appearing, b/l temp waisting, cachectic  HEENT:  no LAD  CV: S1 S2  Resp: decreased breath sounds at bases  GI: NT/ND/S +BS  MS: no clubbing/cyanosis/edema, + pulses b/l  Neuro: nonfocal, +reflexes thruout  +laws w/ clear yellow urine  +Rt sided ant pleurx cath w/ d/c/i dsg            Home Medications:  clopidogrel 75 mg oral tablet: 1 tab(s) orally once a day (21 Jul 2024 18:04)  ferrous sulfate 325 mg (65 mg elemental iron) oral tablet: 1 tab(s) orally once a day (25 Sep 2024 15:37)  gabapentin 300 mg oral tablet: 1 tab(s) orally 3 times a day (20 Sep 2024 01:37)  melatonin 3 mg oral tablet: 1 tab(s) orally once a day (at bedtime) (13 Aug 2024 09:23)  metFORMIN 500 mg oral tablet: 1 tab(s) orally 2 times a day (21 Jul 2024 18:04)  pantoprazole 40 mg oral delayed release tablet: 1 tab(s) orally every 12 hours (25 Sep 2024 15:37)  polyethylene glycol 3350 oral powder for reconstitution: 17 gram(s) orally once a day (13 Aug 2024 09:23)  pravastatin 20 mg oral tablet: 1 tab(s) orally (21 Jul 2024 18:04)  senna leaf extract oral tablet: 2 tab(s) orally once a day (at bedtime) (13 Aug 2024 09:23)  tamsulosin 0.4 mg oral capsule: 1 cap(s) orally once a day (at bedtime) (13 Aug 2024 09:23)    MEDICATIONS  (STANDING):  aspirin  chewable 81 milliGRAM(s) Oral daily  atorvastatin 10 milliGRAM(s) Oral at bedtime  chlorhexidine 2% Cloths 1 Application(s) Topical <User Schedule>  chlorhexidine 2% Cloths 1 Application(s) Topical daily  dextrose 5% + sodium chloride 0.45%. 1000 milliLiter(s) (75 mL/Hr) IV Continuous <Continuous>  ferrous    sulfate 325 milliGRAM(s) Oral daily  gabapentin 300 milliGRAM(s) Oral three times a day  heparin   Injectable 5000 Unit(s) SubCutaneous every 12 hours  melatonin 3 milliGRAM(s) Oral at bedtime  pantoprazole    Tablet 40 milliGRAM(s) Oral every 12 hours  polyethylene glycol 3350 17 Gram(s) Oral daily  senna 2 Tablet(s) Oral at bedtime  tamsulosin 0.4 milliGRAM(s) Oral at bedtime    MEDICATIONS  (PRN):  acetaminophen     Tablet .. 650 milliGRAM(s) Oral every 6 hours PRN Mild Pain (1 - 3)  oxycodone    5 mG/acetaminophen 325 mG 1 Tablet(s) Oral every 6 hours PRN Moderate Pain (4 - 6)    Vital Signs Last 24 Hrs  T(C): 36.4 (24 Oct 2024 13:02), Max: 36.5 (24 Oct 2024 05:00)  T(F): 97.5 (24 Oct 2024 13:02), Max: 97.7 (24 Oct 2024 05:00)  HR: 72 (24 Oct 2024 13:02) (72 - 78)  BP: 138/69 (24 Oct 2024 13:02) (138/69 - 153/74)  BP(mean): 100 (24 Oct 2024 05:00) (92 - 100)  RR: 16 (24 Oct 2024 13:02) (16 - 18)  SpO2: 99% (24 Oct 2024 13:02) (97% - 99%)    Parameters below as of 24 Oct 2024 13:02  Patient On (Oxygen Delivery Method): room air      CAPILLARY BLOOD GLUCOSE        LABS:                        8.6    7.13  )-----------( 428      ( 24 Oct 2024 06:45 )             27.5     10-24    138  |  101  |  22[H]  ----------------------------<  151[H]  5.3[H]   |  27  |  0.9    Ca    9.4      24 Oct 2024 06:45  Mg     1.9     10-22            PT/INR - ( 22 Oct 2024 22:23 )   PT: 12.40 sec;   INR: 1.09 ratio         PTT - ( 22 Oct 2024 22:23 )  PTT:35.4 sec  Urinalysis Basic - ( 24 Oct 2024 06:45 )    Color: x / Appearance: x / SG: x / pH: x  Gluc: 151 mg/dL / Ketone: x  / Bili: x / Urobili: x   Blood: x / Protein: x / Nitrite: x   Leuk Esterase: x / RBC: x / WBC x   Sq Epi: x / Non Sq Epi: x / Bacteria: x              Consultant Notes Reviewed:  [x ] YES  [ ] NO  Care Discussed with Consultants/Other Providers/ Housestaff [ x] YES  [ ] NO  Radiology, labs, EKGs, new studies personally reviewed.

## 2024-10-24 NOTE — PROVIDER CONTACT NOTE (MEDICATION) - ACTION/TREATMENT ORDERED:
MD made aware. Pt cannot get meds at this time until the capsule is out. Endo instructions to be ordered as written on Endo capsule instruction sheet. Will endorse plan of care to night RN as well. Care plan updated.

## 2024-10-24 NOTE — PROVIDER CONTACT NOTE (MEDICATION) - RECOMMENDATIONS
Clarifying if pt can have meds with Endo capsule or if pt should remain NPO; pt needs Lokelma for elevated K+

## 2024-10-24 NOTE — PROGRESS NOTE ADULT - SUBJECTIVE AND OBJECTIVE BOX
SUBJECTIVE/OVERNIGHT EVENTS  Today is hospital day 10d. This morning patient was seen and examined at bedside, resting comfortably in bed. No acute or major events overnight.    HOSPITAL COURSE  Day 1:   Day 2:   Day 3:     CODE STATUS:    FAMILY COMMUNICATION  Contact date:  Name of person contacted:  Relationship to patient:  Communication details:    MEDICATIONS  STANDING MEDICATIONS  aspirin  chewable 81 milliGRAM(s) Oral daily  atorvastatin 10 milliGRAM(s) Oral at bedtime  chlorhexidine 2% Cloths 1 Application(s) Topical daily  chlorhexidine 2% Cloths 1 Application(s) Topical <User Schedule>  ferrous    sulfate 325 milliGRAM(s) Oral daily  gabapentin 300 milliGRAM(s) Oral three times a day  heparin   Injectable 5000 Unit(s) SubCutaneous every 12 hours  melatonin 3 milliGRAM(s) Oral at bedtime  pantoprazole    Tablet 40 milliGRAM(s) Oral every 12 hours  polyethylene glycol 3350 17 Gram(s) Oral daily  senna 2 Tablet(s) Oral at bedtime  tamsulosin 0.4 milliGRAM(s) Oral at bedtime    PRN MEDICATIONS  acetaminophen     Tablet .. 650 milliGRAM(s) Oral every 6 hours PRN  oxycodone    5 mG/acetaminophen 325 mG 1 Tablet(s) Oral every 6 hours PRN    VITALS  T(F): 97.7 (10-24-24 @ 05:00), Max: 97.7 (10-24-24 @ 05:00)  HR: 77 (10-24-24 @ 05:00) (77 - 80)  BP: 153/74 (10-24-24 @ 05:00) (131/74 - 153/74)  RR: 18 (10-24-24 @ 05:00) (16 - 18)  SpO2: 97% (10-24-24 @ 05:00) (96% - 97%)    PHYSICAL EXAM  GENERAL  ( x ) NAD, lying in bed comfortably     (  ) obtunded     (  ) lethargic     (  ) somnolent    HEAD  (x  ) Atraumatic     (  ) hematoma     (  ) laceration (specify location:       )     NECK  ( x ) Supple     (  ) neck stiffness     (  ) nuchal rigidity     (  )  no JVD     (  ) JVD present ( -- cm)    HEART  Rate -->  (x  ) normal rate    (  ) bradycardic    (  ) tachycardic  Rhythm -->  ( x ) regular    (  ) regularly irregular    (  ) irregularly irregular  Murmurs -->  (x  ) normal s1/s2    (  ) systolic murmur    (  ) diastolic murmur    (  ) continuous murmur     (  ) S3 present    (  ) S4 present    LUNGS  ( x )Unlabored respirations     (  ) tachypnea  ( x ) B/L air entry     (  ) decreased breath sounds in:  (location     )    ( x ) no adventitious sound     (  ) crackles     (  ) wheezing      (  ) rhonchi      (specify location:       )  (  ) chest wall tenderness (specify location:       )    ABDOMEN  x(  ) Soft     (  ) tense   |   (  ) nondistended     (  ) distended   |   (x  ) +BS     (  ) hypoactive bowel sounds     (  ) hyperactive bowel sounds  ( x ) nontender     (  ) RUQ tenderness     (  ) RLQ tenderness     (  ) LLQ tenderness     (  ) epigastric tenderness     (  ) diffuse tenderness  (  ) Splenomegaly      (  ) Hepatomegaly      (  ) Jaundice     (  ) ecchymosis     EXTREMITIES  (x  ) Normal     (  ) Rash     (  ) ecchymosis     (  ) varicose veins      (  ) pitting edema     (  ) non-pitting edema   (  ) ulceration     (  ) gangrene:     (location:     )    NERVOUS SYSTEM  (  x) A&Ox3     (  ) confused     (  ) lethargic  CN II-XII:     (  ) Intact     (  ) focal deficits  (Specify:     )   Upper extremities:     (  ) strength X/5     (  ) focal deficit (specify:    )  Lower extremities:     (  ) strength  X/5    (  ) focal deficit (specify:    )    SKIN  (  ) No rashes or lesions     (  ) maculopapular rash     (  ) pustules     (  ) vesicles     (  ) ulcer     (  ) ecchymosis     (specify location:     )    (  ) Indwelling Walden Catheter   Date insterted:    Reason (  ) Critical illness     (  ) urinary retention    (  ) Accurate Ins/Outs Monitoring     (  ) CMO patient    (  ) Central Line  Date inserted:  Location: (  ) Right IJ   (  ) Left IJ   (  ) Right Fem   (  ) Left Fem    (  ) SPC  (  ) pigtail  (  ) PEG tube  (  ) colostomy  (  ) jejunostomy  (  ) U-Dall    LABS             8.6    7.13  )-----------( 428      ( 10-24-24 @ 06:45 )             27.5     138  |  101  |  22  -------------------------<  151   10-24-24 @ 06:45  5.3  |  27  |  0.9    Ca      9.4     10-24-24 @ 06:45  Mg     1.9     10-22-24 @ 22:23      PT/INR - ( 10-22-24 @ 22:23 )   PT: 12.40 sec;   INR: 1.09 ratio  PTT - ( 10-22-24 @ 22:23 )  PTT:35.4 sec      Urinalysis Basic - ( 24 Oct 2024 06:45 )    Color: x / Appearance: x / SG: x / pH: x  Gluc: 151 mg/dL / Ketone: x  / Bili: x / Urobili: x   Blood: x / Protein: x / Nitrite: x   Leuk Esterase: x / RBC: x / WBC x   Sq Epi: x / Non Sq Epi: x / Bacteria: x          IMAGING

## 2024-10-24 NOTE — PROGRESS NOTE ADULT - ASSESSMENT
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 Per NH, patient also refused Pleurx drainage today and is requesting to drain it inpatient (gets drained 2-3 times per week).    #Recurrent anemia  #Hx JASMIN - no overt GI bleed  #Hx AVM?  - Hemodynamically stable & no active bleeding  - Baseline hemoglobin - 8-9   - Hemoglobin on admission - 6.3 >s/p 2units prbc (received 2 units at the end of Spetember as well)  - On plavix   - last admission received 5 days of Venofer   -Seen by GI last admission> spoke to grandson (HCP)  regarding endoscopic work up. After discussing risks vs benefits given his advance age and co morbidities he would like to hold off. Offered VCE as outpatient .   -Can reconsider work up if within goals of care , will need to speak to grandson/son  -Will also need heme onc outpt for possible venofer infusions outpt   - Maintain active Type and screen  - Trend H&H  - Continue home PPI 40mg BID PO  - Target Hgb >8   - Avoid NSAIDs.  - give Venofer as %sat, ferritin remain low  - recall GI for possible intervention if family agrees in view of recurrent anemia requiring multiple PRBCs  6/18 convinced pt to give labs. Hgb 6.9. Transfuse 1uRPBCs. Spoke to GI and asked to speak to family. Serial CBC. Keep Hgb >7.5  6/21 again spoke to GI and asked to speak re: possible intervention. Transfuse another unit as Hgb again trending down (total 3u PRBCs).  6/22 son declined EGD/C-scopy but ?requested capsule endoscopy. Doubt that pt will drink golytely or agree to NGT.      #Malignant mesothelioma (diagnosed 8/2024 )  #Hx Asbestos lung   -s/p VATS pleurodesis, R sided PleurX  -pleurx drainage 2-3 times per week or PRN  -Fu heme onc outpatient. Plan was for possible Rx if pt's functional status improves.   -s/p drainage on 10/21      #HTN/HLD  #CAD s/p stents x2 in 2007 (follows Dr Avila)  -c/w home meds   -changed Plavix to ASA    #Small hypodensity in segment 7 of the liver seen on prior CT.  - RUQ US as OP  -?f/u outpt GI    #Acute Grief vs MDD  #Baseline dementia  - wife passed away a few months ago  - patient refusing medication sometimes and asks to be left alone, no suicide ideas, no ideas to hurt other people.    - Psychiatry eval admission recommended outpt fu     #Nutrition/Fluids/Electrolytes   - replete K<4 and Mg <2  - ensure regular BMs  -  Miralax BID, Senna    #DVT Px  SCDs  Heparin or Lovenox    High risk pt. Px is overall poor.  GOC: d/w son in details on 10/18. Based on oncology feedback that his cancer is treatable, son wants to cont full code.    #Progress Note Handoff  Pending: Clinical improvement and stability__x___PT____x__CBC stability, capsule endoscopy  Pt/Family discussion: Pt/family informed and agree with the current plan  Disposition: Nursing Home      My note supersedes the residents note should a discrepancy arise.    Chart and notes personally reviewed.  Care Discussed with Consultants/Other Providers/ Housestaff [ x] YES [ ] NO   Radiology, labs, old records personally reviewed.    discussed w/ housestaff, nursing, case management, GI    Attestation Statements:    Attestation Statements:  Risk Statement (NON-critical care).     On this date of service, level of risk to patient is considered: High.     Due to: pt with illness causing risk to life or bodily fxn    Time-based billing (NON-critical care).     50 minutes spent on total encounter. The necessity of the time spent during the encounter on this date of service was due to:     time spent on review of labs, imaging studies, old records, obtaining history, personally examining patient, counselling and communicating with patient/ family, entering orders for medications/tests/etc, discussions with other health care providers, documentation in electronic health records, independent interpretation of labs, imaging/procedure results and care coordination.

## 2024-10-24 NOTE — PROGRESS NOTE ADULT - ASSESSMENT

## 2024-10-24 NOTE — PROGRESS NOTE ADULT - ASSESSMENT
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 Per NH, patient also refused Pleurx drainage today and is requesting to drain it inpatient (gets drained 2-3 times per week).    1. Acute on chronic anemia s/p 2 PRBC .   recurrent anemia   Hx JASMIN - no overt GI bleed . Hx AVM?  - Seen by GI last admission: regarding endoscopic work up. After discussing risks vs benefits given his advance age and co morbidities he would like to hold off. Offered VCE as outpatient .   - last admission received 5 days of Venofer   Plan:  - Active type and screen   - Switched plavix to aspirin for now  - c/w ferrous sulfate, IV venofovir for 5 days  - GI consult placed, Follow up, as per GI- Patient can follow  or  Follow up with our [facility] located at [address] [place] [address]  - Continue home PPI 40mg BID PO  - avoid Nsaids  - Hb 6.9> 1 unit PRBC (10/18)> repeat 7.8  -Hb 7.4 10/21 -> GI will reach out to family to discuss inpatient endoscopy -> pt son agreed for Video capsule endoscopy scheduled for tomorrow 10/23/2024  -Oncology onboard also recommended endoscopy to rule out GI bleed -> if GI workup is negative will offer bone marrow biopsy   -Patient refused prep yesterday follow up with GI, HB is stable   VCE on 10/24  - PPI BID  - CBC stable     2. Malignant mesothelioma (diagnosed 8/2024 ) with recurrent pleural effusion . Hx Asbestos lung    Severe protein calorie malnutrition (calorie count started - result 10/21)  -s/p VATS pleurodesis, R sided PleurX  * Per NH, patient also refused Pleurx drainage and is requesting to drain it inpatient (gets drained 2-3 times per week)  - Currently on room air (target spo2 92-96)  - Fu heme onc- Dr. Ramírez OP  - Fu palliative- full code  - PleurX drained yesterday   - Aspiration precaution  - Pain control      continue with current diet with ensure    3. HTN/HLD/CAD s/p stents x2 in 2007 (follows Dr Avila)  -c/w home meds     4. Small hypodensity in segment 7 of the liver seen on prior CT.  - RUQ US as OP  -f/u outpt GI    5. Acute Grief vs MDD  - wife passed away a few months ago  - patient refusing medication sometimes and asks to be left alone, no suicide ideas, no ideas to hurt other people.    - Psychiatry eval admission recommended outpt fu     #DVT ppx-HSQ   #GI ppx- PPI   #Diet-dash/tlc | CC + ensure  #Activity-IAT  #Dispo-acute

## 2024-10-24 NOTE — PROVIDER CONTACT NOTE (MEDICATION) - SITUATION
Pt is currently NPO for Endo capsule procedure done @ bedside. As per Endo, pt can have clear fluids @ 1515, light lunch @ 1715, and regular food @ 1915.

## 2024-10-25 ENCOUNTER — TRANSCRIPTION ENCOUNTER (OUTPATIENT)
Age: 84
End: 2024-10-25

## 2024-10-25 LAB
ANION GAP SERPL CALC-SCNC: 11 MMOL/L — SIGNIFICANT CHANGE UP (ref 7–14)
BASOPHILS # BLD AUTO: 0.01 K/UL — SIGNIFICANT CHANGE UP (ref 0–0.2)
BASOPHILS # BLD AUTO: 0.02 K/UL — SIGNIFICANT CHANGE UP (ref 0–0.2)
BASOPHILS NFR BLD AUTO: 0.1 % — SIGNIFICANT CHANGE UP (ref 0–1)
BASOPHILS NFR BLD AUTO: 0.2 % — SIGNIFICANT CHANGE UP (ref 0–1)
BUN SERPL-MCNC: 22 MG/DL — HIGH (ref 10–20)
CALCIUM SERPL-MCNC: 9.8 MG/DL — SIGNIFICANT CHANGE UP (ref 8.4–10.5)
CHLORIDE SERPL-SCNC: 100 MMOL/L — SIGNIFICANT CHANGE UP (ref 98–110)
CO2 SERPL-SCNC: 26 MMOL/L — SIGNIFICANT CHANGE UP (ref 17–32)
CREAT SERPL-MCNC: 0.9 MG/DL — SIGNIFICANT CHANGE UP (ref 0.7–1.5)
EGFR: 85 ML/MIN/1.73M2 — SIGNIFICANT CHANGE UP
EOSINOPHIL # BLD AUTO: 0.03 K/UL — SIGNIFICANT CHANGE UP (ref 0–0.7)
EOSINOPHIL # BLD AUTO: 0.09 K/UL — SIGNIFICANT CHANGE UP (ref 0–0.7)
EOSINOPHIL NFR BLD AUTO: 0.4 % — SIGNIFICANT CHANGE UP (ref 0–8)
EOSINOPHIL NFR BLD AUTO: 1.1 % — SIGNIFICANT CHANGE UP (ref 0–8)
GLUCOSE SERPL-MCNC: 170 MG/DL — HIGH (ref 70–99)
HCT VFR BLD CALC: 25.7 % — LOW (ref 42–52)
HCT VFR BLD CALC: 27.8 % — LOW (ref 42–52)
HGB BLD-MCNC: 8 G/DL — LOW (ref 14–18)
HGB BLD-MCNC: 8.7 G/DL — LOW (ref 14–18)
IMM GRANULOCYTES NFR BLD AUTO: 1 % — HIGH (ref 0.1–0.3)
IMM GRANULOCYTES NFR BLD AUTO: 1.5 % — HIGH (ref 0.1–0.3)
LYMPHOCYTES # BLD AUTO: 0.56 K/UL — LOW (ref 1.2–3.4)
LYMPHOCYTES # BLD AUTO: 0.74 K/UL — LOW (ref 1.2–3.4)
LYMPHOCYTES # BLD AUTO: 7.7 % — LOW (ref 20.5–51.1)
LYMPHOCYTES # BLD AUTO: 9 % — LOW (ref 20.5–51.1)
MAGNESIUM SERPL-MCNC: 2.1 MG/DL — SIGNIFICANT CHANGE UP (ref 1.8–2.4)
MCHC RBC-ENTMCNC: 29.1 PG — SIGNIFICANT CHANGE UP (ref 27–31)
MCHC RBC-ENTMCNC: 29.1 PG — SIGNIFICANT CHANGE UP (ref 27–31)
MCHC RBC-ENTMCNC: 31.1 G/DL — LOW (ref 32–37)
MCHC RBC-ENTMCNC: 31.3 G/DL — LOW (ref 32–37)
MCV RBC AUTO: 93 FL — SIGNIFICANT CHANGE UP (ref 80–94)
MCV RBC AUTO: 93.5 FL — SIGNIFICANT CHANGE UP (ref 80–94)
MONOCYTES # BLD AUTO: 0.45 K/UL — SIGNIFICANT CHANGE UP (ref 0.1–0.6)
MONOCYTES # BLD AUTO: 0.65 K/UL — HIGH (ref 0.1–0.6)
MONOCYTES NFR BLD AUTO: 6.2 % — SIGNIFICANT CHANGE UP (ref 1.7–9.3)
MONOCYTES NFR BLD AUTO: 7.9 % — SIGNIFICANT CHANGE UP (ref 1.7–9.3)
NEUTROPHILS # BLD AUTO: 6.19 K/UL — SIGNIFICANT CHANGE UP (ref 1.4–6.5)
NEUTROPHILS # BLD AUTO: 6.56 K/UL — HIGH (ref 1.4–6.5)
NEUTROPHILS NFR BLD AUTO: 80.3 % — HIGH (ref 42.2–75.2)
NEUTROPHILS NFR BLD AUTO: 84.6 % — HIGH (ref 42.2–75.2)
NRBC # BLD: 0 /100 WBCS — SIGNIFICANT CHANGE UP (ref 0–0)
NRBC # BLD: 0 /100 WBCS — SIGNIFICANT CHANGE UP (ref 0–0)
PLATELET # BLD AUTO: 415 K/UL — HIGH (ref 130–400)
PLATELET # BLD AUTO: 425 K/UL — HIGH (ref 130–400)
PMV BLD: 9.3 FL — SIGNIFICANT CHANGE UP (ref 7.4–10.4)
PMV BLD: 9.4 FL — SIGNIFICANT CHANGE UP (ref 7.4–10.4)
POTASSIUM SERPL-MCNC: 5.4 MMOL/L — HIGH (ref 3.5–5)
POTASSIUM SERPL-SCNC: 5.4 MMOL/L — HIGH (ref 3.5–5)
RBC # BLD: 2.75 M/UL — LOW (ref 4.7–6.1)
RBC # BLD: 2.99 M/UL — LOW (ref 4.7–6.1)
RBC # FLD: 17.7 % — HIGH (ref 11.5–14.5)
RBC # FLD: 17.7 % — HIGH (ref 11.5–14.5)
SODIUM SERPL-SCNC: 137 MMOL/L — SIGNIFICANT CHANGE UP (ref 135–146)
WBC # BLD: 7.31 K/UL — SIGNIFICANT CHANGE UP (ref 4.8–10.8)
WBC # BLD: 8.18 K/UL — SIGNIFICANT CHANGE UP (ref 4.8–10.8)
WBC # FLD AUTO: 7.31 K/UL — SIGNIFICANT CHANGE UP (ref 4.8–10.8)
WBC # FLD AUTO: 8.18 K/UL — SIGNIFICANT CHANGE UP (ref 4.8–10.8)

## 2024-10-25 PROCEDURE — 99233 SBSQ HOSP IP/OBS HIGH 50: CPT

## 2024-10-25 RX ORDER — PANTOPRAZOLE SODIUM 40 MG/1
40 TABLET, DELAYED RELEASE ORAL EVERY 12 HOURS
Refills: 0 | Status: DISCONTINUED | OUTPATIENT
Start: 2024-10-25 | End: 2024-11-02

## 2024-10-25 RX ORDER — ASPIRIN/MAG CARB/ALUMINUM AMIN 325 MG
1 TABLET ORAL
Qty: 0 | Refills: 0 | DISCHARGE
Start: 2024-10-25

## 2024-10-25 RX ORDER — SODIUM ZIRCONIUM CYCLOSILICATE 10 G/10G
5 POWDER, FOR SUSPENSION ORAL ONCE
Refills: 0 | Status: COMPLETED | OUTPATIENT
Start: 2024-10-25 | End: 2024-10-25

## 2024-10-25 RX ADMIN — GABAPENTIN 300 MILLIGRAM(S): 300 CAPSULE ORAL at 14:05

## 2024-10-25 RX ADMIN — POLYETHYLENE GLYCOL 3350 17 GRAM(S): 17 POWDER, FOR SOLUTION ORAL at 14:08

## 2024-10-25 RX ADMIN — Medication 0.4 MILLIGRAM(S): at 21:02

## 2024-10-25 RX ADMIN — GABAPENTIN 300 MILLIGRAM(S): 300 CAPSULE ORAL at 06:38

## 2024-10-25 RX ADMIN — Medication 3 MILLIGRAM(S): at 21:03

## 2024-10-25 RX ADMIN — SODIUM ZIRCONIUM CYCLOSILICATE 5 GRAM(S): 10 POWDER, FOR SUSPENSION ORAL at 14:07

## 2024-10-25 RX ADMIN — GABAPENTIN 300 MILLIGRAM(S): 300 CAPSULE ORAL at 21:02

## 2024-10-25 RX ADMIN — PANTOPRAZOLE SODIUM 40 MILLIGRAM(S): 40 TABLET, DELAYED RELEASE ORAL at 06:36

## 2024-10-25 RX ADMIN — Medication 325 MILLIGRAM(S): at 14:05

## 2024-10-25 RX ADMIN — HEPARIN SODIUM 5000 UNIT(S): 10000 INJECTION INTRAVENOUS; SUBCUTANEOUS at 18:06

## 2024-10-25 RX ADMIN — PANTOPRAZOLE SODIUM 40 MILLIGRAM(S): 40 TABLET, DELAYED RELEASE ORAL at 18:01

## 2024-10-25 RX ADMIN — CHLORHEXIDINE GLUCONATE 1 APPLICATION(S): 40 SOLUTION TOPICAL at 06:37

## 2024-10-25 RX ADMIN — Medication 81 MILLIGRAM(S): at 14:05

## 2024-10-25 RX ADMIN — HEPARIN SODIUM 5000 UNIT(S): 10000 INJECTION INTRAVENOUS; SUBCUTANEOUS at 06:36

## 2024-10-25 RX ADMIN — Medication 10 MILLIGRAM(S): at 21:03

## 2024-10-25 RX ADMIN — Medication 2 TABLET(S): at 21:02

## 2024-10-25 RX ADMIN — CHLORHEXIDINE GLUCONATE 1 APPLICATION(S): 40 SOLUTION TOPICAL at 14:08

## 2024-10-25 NOTE — DISCHARGE NOTE PROVIDER - DISCHARGE DIET
DASH Diet/Nutrition Supplements DASH Diet/Consistent Carbohydrate Diabetic Diets/Nutrition Supplements

## 2024-10-25 NOTE — DISCHARGE NOTE PROVIDER - NSDCFUADDAPPT_GEN_ALL_CORE_FT
Please follow-up with gastroenterology, psychiatry and your primary care doctors 1 week after discharge from the hospital. Please follow-up with gastroenterology, oncology, psychiatry and your primary care doctors 1 week after discharge from the hospital.

## 2024-10-25 NOTE — DISCHARGE NOTE PROVIDER - NPI NUMBER (FOR SYSADMIN USE ONLY) :
[[id number] [[id number]] [[id number]]

## 2024-10-25 NOTE — DISCHARGE NOTE PROVIDER - NSDCCPCAREPLAN_GEN_ALL_CORE_FT
PRINCIPAL DISCHARGE DIAGNOSIS  Diagnosis: Anemia  Assessment and Plan of Treatment: You were seen and admitted for management of anemia; during your stay, you required blood transfusions. GI were consulted and offered endoscopy and colonoscopy, which you refused due to anesthesia risk; video capsule endoscopy was done instead. Before your discharge, your hemoglobin was stable. It is important you follow up with GI and hematology as out Please take your medications as prescribed and follow the instructions given to you regarding your health. Keep follow-up appointments with your doctors as instructed.       SECONDARY DISCHARGE DIAGNOSES  Diagnosis: Pleural effusion  Assessment and Plan of Treatment:     Diagnosis: Mesothelioma  Assessment and Plan of Treatment:      PRINCIPAL DISCHARGE DIAGNOSIS  Diagnosis: Anemia  Assessment and Plan of Treatment: You were seen and admitted for management of anemia; during your stay, you required blood transfusions. GI were consulted and offered video capsule endoscopy and EGD which showed small bleeding vessels that were cauterized. Before your discharge, your hemoglobin was stable and we resumed your blood thinners. It is important you follow up with GI clinic after discharge. Please take your medications as prescribed and follow the instructions given to you regarding your health. Keep follow-up appointments with your doctors as instructed.   If you experience any recurrent bleeding, dizziness, nausea, vomiting, fever, chills or any other complaints, please call your doctor or come back to ED.     PRINCIPAL DISCHARGE DIAGNOSIS  Diagnosis: Anemia  Assessment and Plan of Treatment: You were seen and admitted for management of anemia; during your stay, you required blood transfusions. GI were consulted and offered video capsule endoscopy and EGD which showed small bleeding vessels that were cauterized. Continue Protonix 40 twice daily for 8wks, then daily. Before your discharge, your hemoglobin was stable and we resumed your blood thinners. It is important you follow up with GI clinic after discharge. Please take your medications as prescribed and follow the instructions given to you regarding your health. Keep follow-up appointments with your doctors as instructed.   If you experience any recurrent bleeding, dizziness, nausea, vomiting, fever, chills or any other complaints, please call your doctor or come back to ED.       PRINCIPAL DISCHARGE DIAGNOSIS  Diagnosis: Anemia  Assessment and Plan of Treatment: You were seen and admitted for management of anemia; during your stay, you required blood transfusions. GI were consulted and offered video capsule endoscopy and EGD which showed small bleeding vessels from angioectasias that were cauterized. Continue Protonix 40 twice daily for 8wks, then daily. Before your discharge, your hemoglobin was stable and we resumed your blood thinners. Monitor CBC at least once weekly as AVM tend to bleed from time to time and you might need more transfusions as needed.  It is important you follow up with GI clinic after discharge. Please take your medications as prescribed and follow the instructions given to you regarding your health. Keep follow-up appointments with your doctors as instructed.   If you experience any recurrent bleeding, dizziness, nausea, vomiting, fever, chills or any other complaints, please call your doctor or come back to ED.  Follow up with your oncologist Dr. Epperson  Drain pleurx catheter as needed.

## 2024-10-25 NOTE — DISCHARGE NOTE PROVIDER - NSDCMRMEDTOKEN_GEN_ALL_CORE_FT
aspirin 81 mg oral tablet, chewable: 1 tab(s) orally once a day  ferrous sulfate 325 mg (65 mg elemental iron) oral tablet: 1 tab(s) orally once a day  gabapentin 300 mg oral tablet: 1 tab(s) orally 3 times a day  melatonin 3 mg oral tablet: 1 tab(s) orally once a day (at bedtime)  metFORMIN 500 mg oral tablet: 1 tab(s) orally 2 times a day  pantoprazole 40 mg oral delayed release tablet: 1 tab(s) orally every 12 hours  polyethylene glycol 3350 oral powder for reconstitution: 17 gram(s) orally once a day  pravastatin 20 mg oral tablet: 1 tab(s) orally  senna leaf extract oral tablet: 2 tab(s) orally once a day (at bedtime)  tamsulosin 0.4 mg oral capsule: 1 cap(s) orally once a day (at bedtime)   aspirin 81 mg oral tablet, chewable: 1 tab(s) orally once a day  clopidogrel 75 mg oral tablet: 1 tab(s) orally once a day  ferrous sulfate 325 mg (65 mg elemental iron) oral tablet: 1 tab(s) orally once a day  gabapentin 300 mg oral tablet: 1 tab(s) orally 3 times a day  melatonin 3 mg oral tablet: 1 tab(s) orally once a day (at bedtime)  metFORMIN 500 mg oral tablet: 1 tab(s) orally 2 times a day  pantoprazole 40 mg oral delayed release tablet: 1 tab(s) orally every 12 hours  polyethylene glycol 3350 oral powder for reconstitution: 17 gram(s) orally once a day  pravastatin 20 mg oral tablet: 1 tab(s) orally  senna leaf extract oral tablet: 2 tab(s) orally once a day (at bedtime)  tamsulosin 0.4 mg oral capsule: 1 cap(s) orally once a day (at bedtime)   clopidogrel 75 mg oral tablet: 1 tab(s) orally once a day  ferrous sulfate 325 mg (65 mg elemental iron) oral tablet: 1 tab(s) orally once a day  gabapentin 300 mg oral tablet: 1 tab(s) orally 3 times a day  Lovenox 30 mg/0.3 mL injectable solution: 30 milligram(s) subcutaneously once a day  melatonin 3 mg oral tablet: 1 tab(s) orally once a day (at bedtime)  metFORMIN 500 mg oral tablet: 1 tab(s) orally 2 times a day  pantoprazole 40 mg oral delayed release tablet: 1 tab(s) orally every 12 hours  polyethylene glycol 3350 oral powder for reconstitution: 17 gram(s) orally once a day  pravastatin 20 mg oral tablet: 1 tab(s) orally  senna leaf extract oral tablet: 2 tab(s) orally once a day (at bedtime)  tamsulosin 0.4 mg oral capsule: 1 cap(s) orally once a day (at bedtime)

## 2024-10-25 NOTE — DISCHARGE NOTE PROVIDER - CARE PROVIDERS DIRECT ADDRESSES
,[email or web address],[email or web address] ,[email or web address],DirectAddress_Unknown,DirectAddress_Unknown ,[email or web address],DirectAddress_Unknown,DirectAddress_Unknown,[email or web address]

## 2024-10-25 NOTE — PROGRESS NOTE ADULT - SUBJECTIVE AND OBJECTIVE BOX
Patient is a 84y old  Male who presents with a chief complaint of Acute on chronic anemia (24 Oct 2024 18:01)       Pt is seen and examined this morning   pt is awake and lying in bed  pt seems comfortable and denies any complaints at this time          ROS:  Negative     MEDICATIONS  (STANDING):  aspirin  chewable 81 milliGRAM(s) Oral daily  atorvastatin 10 milliGRAM(s) Oral at bedtime  chlorhexidine 2% Cloths 1 Application(s) Topical <User Schedule>  chlorhexidine 2% Cloths 1 Application(s) Topical daily  dextrose 5% + sodium chloride 0.45%. 1000 milliLiter(s) (75 mL/Hr) IV Continuous <Continuous>  ferrous    sulfate 325 milliGRAM(s) Oral daily  gabapentin 300 milliGRAM(s) Oral three times a day  heparin   Injectable 5000 Unit(s) SubCutaneous every 12 hours  melatonin 3 milliGRAM(s) Oral at bedtime  pantoprazole    Tablet 40 milliGRAM(s) Oral every 12 hours  polyethylene glycol 3350 17 Gram(s) Oral daily  senna 2 Tablet(s) Oral at bedtime  sodium zirconium cyclosilicate 5 Gram(s) Oral once  tamsulosin 0.4 milliGRAM(s) Oral at bedtime    MEDICATIONS  (PRN):  acetaminophen     Tablet .. 650 milliGRAM(s) Oral every 6 hours PRN Mild Pain (1 - 3)  oxycodone    5 mG/acetaminophen 325 mG 1 Tablet(s) Oral every 6 hours PRN Moderate Pain (4 - 6)      Allergies    penicillin (Unknown)    Intolerances        Vital Signs Last 24 Hrs  T(C): 36.2 (25 Oct 2024 13:01), Max: 36.9 (24 Oct 2024 20:37)  T(F): 97.2 (25 Oct 2024 13:01), Max: 98.4 (24 Oct 2024 20:37)  HR: 88 (25 Oct 2024 13:01) (88 - 99)  BP: 104/57 (25 Oct 2024 13:01) (104/57 - 161/85)  BP(mean): 73 (25 Oct 2024 13:01) (73 - 110)  RR: 18 (25 Oct 2024 13:01) (18 - 18)  SpO2: 99% (25 Oct 2024 13:01) (98% - 99%)    Parameters below as of 25 Oct 2024 13:01  Patient On (Oxygen Delivery Method): room air        PHYSICAL EXAM  General: adult in NAD  HEENT: clear oropharynx, anicteric sclera, pink conjunctiva  Neck: supple  CV: normal S1/S2 with no murmur rubs or gallops  Lungs: positive air movement b/l ant lungs,clear to auscultation, no wheezes, no rales  Abdomen: soft non-tender non-distended, no hepatosplenomegaly  Ext: no clubbing cyanosis or edema  Skin: no rashes and no petechiae  Neuro: alert and oriented X 4, no focal deficits  LABS:                          8.7    7.31  )-----------( 425      ( 25 Oct 2024 06:33 )             27.8         Mean Cell Volume : 93.0 fL  Mean Cell Hemoglobin : 29.1 pg  Mean Cell Hemoglobin Concentration : 31.3 g/dL  Auto Neutrophil # : 6.19 K/uL  Auto Lymphocyte # : 0.56 K/uL  Auto Monocyte # : 0.45 K/uL  Auto Eosinophil # : 0.03 K/uL  Auto Basophil # : 0.01 K/uL  Auto Neutrophil % : 84.6 %  Auto Lymphocyte % : 7.7 %  Auto Monocyte % : 6.2 %  Auto Eosinophil % : 0.4 %  Auto Basophil % : 0.1 %    Serial CBC's  10-25 @ 06:33  Hct-27.8 / Hgb-8.7 / Plat-425 / RBC-2.99 / WBC-7.31          Serial CBC's  10-24 @ 06:45  Hct-27.5 / Hgb-8.6 / Plat-428 / RBC-2.93 / WBC-7.13          Serial CBC's  10-23 @ 05:46  Hct-26.2 / Hgb-8.0 / Plat-403 / RBC-2.78 / WBC-7.80          Serial CBC's  10-22 @ 22:23  Hct-24.9 / Hgb-7.7 / Plat-381 / RBC-2.67 / WBC-6.57          Serial CBC's  10-22 @ 06:55  Hct-25.1 / Hgb-7.9 / Plat-374 / RBC-2.75 / WBC-8.15            10-25    137  |  100  |  22[H]  ----------------------------<  170[H]  5.4[H]   |  26  |  0.9    Ca    9.8      25 Oct 2024 06:33  Mg     2.1     10-25            WBC Count: 7.31 K/uL (10-25-24 @ 06:33)  Hemoglobin: 8.7 g/dL (10-25-24 @ 06:33)  Hematocrit: 27.8 % (10-25-24 @ 06:33)  Platelet Count - Automated: 425 K/uL (10-25-24 @ 06:33)  WBC Count: 7.13 K/uL (10-24-24 @ 06:45)  Hemoglobin: 8.6 g/dL (10-24-24 @ 06:45)  Hematocrit: 27.5 % (10-24-24 @ 06:45)  Platelet Count - Automated: 428 K/uL (10-24-24 @ 06:45)  WBC Count: 7.80 K/uL (10-23-24 @ 05:46)  Hemoglobin: 8.0 g/dL (10-23-24 @ 05:46)  Hematocrit: 26.2 % (10-23-24 @ 05:46)  Platelet Count - Automated: 403 K/uL (10-23-24 @ 05:46)  WBC Count: 6.57 K/uL (10-22-24 @ 22:23)  Hemoglobin: 7.7 g/dL (10-22-24 @ 22:23)  Hematocrit: 24.9 % (10-22-24 @ 22:23)  Platelet Count - Automated: 381 K/uL (10-22-24 @ 22:23)  WBC Count: 8.15 K/uL (10-22-24 @ 06:55)  Hemoglobin: 7.9 g/dL (10-22-24 @ 06:55)  Hematocrit: 25.1 % (10-22-24 @ 06:55)  Platelet Count - Automated: 374 K/uL (10-22-24 @ 06:55)  WBC Count: 6.74 K/uL (10-21-24 @ 07:03)  Hemoglobin: 7.4 g/dL (10-21-24 @ 07:03)  Hematocrit: 24.2 % (10-21-24 @ 07:03)  Platelet Count - Automated: 374 K/uL (10-21-24 @ 07:03)  WBC Count: 9.20 K/uL (10-20-24 @ 06:26)  Hemoglobin: 7.8 g/dL (10-20-24 @ 06:26)  Hematocrit: 25.0 % (10-20-24 @ 06:26)  Platelet Count - Automated: 366 K/uL (10-20-24 @ 06:26)  WBC Count: 14.03 K/uL (10-19-24 @ 16:00)  Hemoglobin: 7.9 g/dL (10-19-24 @ 16:00)  Hematocrit: 25.7 % (10-19-24 @ 16:00)  Platelet Count - Automated: 448 K/uL (10-19-24 @ 16:00)  WBC Count: 8.52 K/uL (10-19-24 @ 06:40)  Hemoglobin: 7.8 g/dL (10-19-24 @ 06:40)  Hematocrit: 24.6 % (10-19-24 @ 06:40)  Platelet Count - Automated: 396 K/uL (10-19-24 @ 06:40)  Reticulocyte Percent: 3.5 % (10-19-24 @ 06:40)  WBC Count: 9.46 K/uL (10-18-24 @ 11:19)  Hemoglobin: 6.9 g/dL (10-18-24 @ 11:19)  Hematocrit: 22.8 % (10-18-24 @ 11:19)  Platelet Count - Automated: 429 K/uL (10-18-24 @ 11:19)  WBC Count: 8.72 K/uL (10-16-24 @ 18:11)  Hemoglobin: 8.5 g/dL (10-16-24 @ 18:11)  Hematocrit: 27.7 % (10-16-24 @ 18:11)  Platelet Count - Automated: 459 K/uL (10-16-24 @ 18:11)  WBC Count: 9.10 K/uL (10-16-24 @ 08:36)  Hemoglobin: 8.8 g/dL (10-16-24 @ 08:36)  Hematocrit: 28.0 % (10-16-24 @ 08:36)  Platelet Count - Automated: 480 K/uL (10-16-24 @ 08:36)                BLOOD SMEAR INTERPRETATION:       RADIOLOGY & ADDITIONAL STUDIES:

## 2024-10-25 NOTE — PROGRESS NOTE ADULT - SUBJECTIVE AND OBJECTIVE BOX
Gastroenterology progress note:     Patient is a 84y old  Male who presents with a chief complaint of Acute on chronic anemia (24 Oct 2024 18:01)       Admitted on: 10-14-24    We are following the patient for:anemia       Interval History:    VCE done yesterday showing active bleed in the duodenum      PAST MEDICAL & SURGICAL HISTORY:  DM (diabetes mellitus)      MI (myocardial infarction)      History of CAD (coronary artery disease)      Dyslipidemia      Currently smokes tobacco      Mesothelioma, malignant      Coronary stent patent          MEDICATIONS  (STANDING):  aspirin  chewable 81 milliGRAM(s) Oral daily  atorvastatin 10 milliGRAM(s) Oral at bedtime  chlorhexidine 2% Cloths 1 Application(s) Topical <User Schedule>  chlorhexidine 2% Cloths 1 Application(s) Topical daily  dextrose 5% + sodium chloride 0.45%. 1000 milliLiter(s) (75 mL/Hr) IV Continuous <Continuous>  ferrous    sulfate 325 milliGRAM(s) Oral daily  gabapentin 300 milliGRAM(s) Oral three times a day  heparin   Injectable 5000 Unit(s) SubCutaneous every 12 hours  melatonin 3 milliGRAM(s) Oral at bedtime  pantoprazole    Tablet 40 milliGRAM(s) Oral every 12 hours  polyethylene glycol 3350 17 Gram(s) Oral daily  senna 2 Tablet(s) Oral at bedtime  tamsulosin 0.4 milliGRAM(s) Oral at bedtime    MEDICATIONS  (PRN):  acetaminophen     Tablet .. 650 milliGRAM(s) Oral every 6 hours PRN Mild Pain (1 - 3)  oxycodone    5 mG/acetaminophen 325 mG 1 Tablet(s) Oral every 6 hours PRN Moderate Pain (4 - 6)      Allergies  penicillin (Unknown)      Review of Systems:   Cardiovascular:  No Chest Pain, No Palpitations  Respiratory:  No Cough, No Dyspnea  Gastrointestinal:  As described in HPI  Skin:  No Skin Lesions, No Jaundice  Neuro:  No Syncope, No Dizziness    Physical Examination:  T(C): 36.2 (10-25-24 @ 13:01), Max: 36.9 (10-24-24 @ 20:37)  HR: 88 (10-25-24 @ 13:01) (88 - 99)  BP: 104/57 (10-25-24 @ 13:01) (104/57 - 161/85)  RR: 18 (10-25-24 @ 13:01) (18 - 18)  SpO2: 99% (10-25-24 @ 13:01) (98% - 99%)      10-24-24 @ 07:01  -  10-25-24 @ 07:00  --------------------------------------------------------  IN: 600 mL / OUT: 1625 mL / NET: -1025 mL        GENERAL: AAOx3, no acute distress.  HEAD:  Atraumatic, Normocephalic  EYES: conjunctiva and sclera clear  NECK: Supple, no JVD or thyromegaly  CHEST/LUNG: Clear to auscultation bilaterally; No wheeze, rhonchi, or rales  HEART: Regular rate and rhythm; normal S1, S2, No murmurs.  ABDOMEN: Soft, nontender, nondistended; Bowel sounds present  NEUROLOGY: No asterixis or tremor.   SKIN: Intact, no jaundice     Data:                        8.7    7.31  )-----------( 425      ( 25 Oct 2024 06:33 )             27.8     Hgb trend:  8.7  10-25-24 @ 06:33  8.6  10-24-24 @ 06:45  8.0  10-23-24 @ 05:46  7.7  10-22-24 @ 22:23        10-25    137  |  100  |  22[H]  ----------------------------<  170[H]  5.4[H]   |  26  |  0.9    Ca    9.8      25 Oct 2024 06:33  Mg     2.1     10-25      Liver panel trend:  TBili <0.2   /   AST 6   /   ALT <5   /   AlkP 87   /   Tptn 5.6   /   Alb 3.0    /   DBili --      10-18             Radiology:

## 2024-10-25 NOTE — DISCHARGE NOTE PROVIDER - PROVIDER TOKENS
PROVIDER:[TOKEN:[[id number]]],PROVIDER:[TOKEN:[[id number]]],PROVIDER:[TOKEN:[[id number]]] PROVIDER:[TOKEN:[[id number]]],PROVIDER:[TOKEN:[[id number]]],PROVIDER:[TOKEN:[[id number]],FOLLOWUP:[1 week]] PROVIDER:[TOKEN:[[id number]],FOLLOWUP:[1 week]],PROVIDER:[TOKEN:[[id number]],FOLLOWUP:[1 month]],PROVIDER:[TOKEN:[[id number]],FOLLOWUP:[1 week]] PROVIDER:[TOKEN:[[id number]],FOLLOWUP:[1 week]],PROVIDER:[TOKEN:[[id number]],FOLLOWUP:[1 month]],PROVIDER:[TOKEN:[[id number]],FOLLOWUP:[1 week]],PROVIDER:[TOKEN:[[id number]],FOLLOWUP:[2 weeks]]

## 2024-10-25 NOTE — PROGRESS NOTE ADULT - ASSESSMENT
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 patient had a recent admission in September for anemia for which he received 2 units prbc, IV Venofer and was recommended EGD/[place] but given his advance age and co morbidities family decided to hold off. Patient is a poor historian. GI was consulted for anemia       #Acute on chronic macrocytic anemia with JASMIN - no overt GI bleed  - Hemodynamically stable & no active bleeding  - On plavix   - Iron :31, %sat: 10 TIBC 325 ferritin 89 while on iron  - reported AVMs? endoscopic work up with  per grand son Nelli Lopez    VCE done yesterday showing active bleed in the duodenum  Hb stable today  HD stable     #Rec  - Discussed findings of VCE with patient's son and HCP John. We explained that there is active bleeding in the duodenum seen on the capsule endoscopy and recommended endoscopic intervention. John is still concerned about the patient's elevated risk given underlying mesothelioma and does not consent to EGD at this time. We discussed that there is a possibility of death if this is left untreated. He would like to discuss with his family over the weekend and decide if they wish to pursue EGD  - Clear liquid diet  - Recommend PPI 40mg IV BID  - Please target Hb  >8  - Please avoid any NSAIDs  - will follow closely  - If unstable bleed please call GI STAT    #Small hypodensity in segment 7 of the liver seen on prior CT.  -Consider RUQ US as OP  -f/u w/ primary GI

## 2024-10-25 NOTE — DISCHARGE NOTE PROVIDER - HOSPITAL COURSE
HPI: 85 y/o man  PMHx of HLD, DM, CAD s/p stents x2 in 2007, pt of Dr Avila, hx  asbestosis of lung, diagnosed w malignant mesothelioma (8/2024) and s/p VATS pleurodesis, R sided PleurX, hx AVM/GIB and JASMIN, sent in by NH for low hemoglobin. Per NH, patient also refused Pleurx drainage today and is requesting to drain it inpatient (gets drained 2-3 times per week). Of note, patient had a recent admission in September for anemia for which he received 2 units prbc, IV Venofer and was recommended EGD/[place] but given his advance age and co morbidities decided to hold off. Patient is a poor historian. Uses wheelchair at baseline. Denies fever chills, shortness of breath, cough, chest pain, leg swelling, hematemesis, hematochezia.     Vital Signs Last 24 Hrs  T(C): 36.4 (15 Oct 2024 01:09), Max: 36.9 (14 Oct 2024 22:51)  T(F): 97.5 (15 Oct 2024 01:09), Max: 98.4 (14 Oct 2024 22:51)  HR: 100 (15 Oct 2024 01:09) (89 - 105)  BP: 132/74 (15 Oct 2024 01:09) (104/64 - 132/74)  RR: 18 (15 Oct 2024 01:09) (17 - 19)  SpO2: 97% (15 Oct 2024 01:09) (93% - 97%)    Parameters below as of 15 Oct 2024 01:09  Patient On (Oxygen Delivery Method): room air    Labs: Hgb 6.4 , Platelets 500 , Cr 1.2 (baseline)   CXR: Worsening R pleural effusion   EKG: NSR with PACs    Admitted for anemia     1. Acute on chronic anemia s/p 2 PRBC .   recurrent anemia   Hx JASMIN - no overt GI bleed . Hx AVM?  - Seen by GI last admission: regarding endoscopic work up. After discussing risks vs benefits given his advance age and co morbidities he would like to hold off. Offered VCE as outpatient .   - last admission received 5 days of Venofer   Plan:  - Active type and screen   - Switched plavix to aspirin for now  - c/w ferrous sulfate, IV venofovir for 5 days  - GI consult placed, Follow up, as per GI- Patient can follow  or  Follow up with our [facility] located at [address] [facility]  - Continue home PPI 40mg BID PO  - avoid Nsaids  - Hb 6.9> 1 unit PRBC (10/18)> repeat 7.8  -Hb 7.4 10/21 -> GI will reach out to family to discuss inpatient endoscopy -> pt son agreed for Video capsule endoscopy scheduled for tomorrow 10/23/2024  -Oncology onboard also recommended endoscopy to rule out GI bleed -> if GI workup is negative will offer bone marrow biopsy   -Patient refused prep yesterday follow up with GI, HB is stable   VCE done on 10/24   - PPI BID  - Hb stable     2. Malignant mesothelioma (diagnosed 8/2024 ) with recurrent pleural effusion . Hx Asbestos lung    Severe protein calorie malnutrition (calorie count started - result 10/21)  -s/p VATS pleurodesis, R sided PleurX  * Per NH, patient also refused Pleurx drainage and is requesting to drain it inpatient (gets drained 2-3 times per week)  - Currently on room air (target spo2 92-96)  - Fu heme onc- Dr. Ramírez OP  - Fu palliative- full code  - PleurX drained yesterday   - Aspiration precaution  - Pain control      continue with current diet with ensure    3. HTN/HLD/CAD s/p stents x2 in 2007 (follows Dr Avila)  -c/w home meds     4. Small hypodensity in segment 7 of the liver seen on prior CT.  - RUQ US as OP  -f/u outpt GI    5. Acute Grief vs MDD  - wife passed away a few months ago  - patient refusing medication sometimes and asks to be left alone, no suicide ideas, no ideas to hurt other people.    - Psychiatry eval admission recommended outpt fu      HPI: 85 y/o man  PMHx of HLD, DM, CAD s/p stents x2 in 2007, pt of Dr Avila, hx  asbestosis of lung, diagnosed w malignant mesothelioma (8/2024) and s/p VATS pleurodesis, R sided PleurX, hx AVM/GIB and JASMIN, sent in by NH for low hemoglobin. Per NH, patient also refused Pleurx drainage today and is requesting to drain it inpatient (gets drained 2-3 times per week). Of note, patient had a recent admission in September for anemia for which he received 2 units prbc, IV Venofer and was recommended EGD/[place] but given his advance age and co morbidities decided to hold off. Patient is a poor historian. Uses wheelchair at baseline. Denies fever chills, shortness of breath, cough, chest pain, leg swelling, hematemesis, hematochezia.     Vital Signs Last 24 Hrs  T(C): 36.4 (15 Oct 2024 01:09), Max: 36.9 (14 Oct 2024 22:51)  T(F): 97.5 (15 Oct 2024 01:09), Max: 98.4 (14 Oct 2024 22:51)  HR: 100 (15 Oct 2024 01:09) (89 - 105)  BP: 132/74 (15 Oct 2024 01:09) (104/64 - 132/74)  RR: 18 (15 Oct 2024 01:09) (17 - 19)  SpO2: 97% (15 Oct 2024 01:09) (93% - 97%)    Parameters below as of 15 Oct 2024 01:09  Patient On (Oxygen Delivery Method): room air    Labs: Hgb 6.4 , Platelets 500 , Cr 1.2 (baseline)   CXR: Worsening R pleural effusion   EKG: NSR with PACs    Admitted for anemia     1. Acute on chronic anemia s/p 2 PRBC .   recurrent anemia   Hx JASMIN - no overt GI bleed . Hx AVM?  - Seen by GI last admission: regarding endoscopic work up. After discussing risks vs benefits given his advance age and co morbidities he would like to hold off. Offered VCE as outpatient .   - last admission received 5 days of Venofer   Plan:  - Active type and screen   - Switched plavix to aspirin for now  - c/w ferrous sulfate, IV venofovir for 5 days  - GI consult placed, Follow up, as per GI- Patient can follow  or  Follow up with our [facility] located at [address] [facility]  - Continue home PPI 40mg BID PO  - avoid Nsaids  - Hb 6.9> 1 unit PRBC (10/18)> repeat 7.8  - Hb 7.4 10/21 -> GI will reach out to family to discuss inpatient endoscopy -> pt son agreed for Video capsule endoscopy scheduled for tomorrow 10/23/2024  - Oncology onboard also recommended endoscopy to rule out GI bleed -> if GI workup is negative will offer bone marrow biopsy   - Patient refused prep yesterday follow up with GI, HB is stable   - VCE done on 10/24   - PPI BID  - 6/28 son agreed to EGD. D/w pulm and cardio pt optimized for EGD. 2 bleeding angioectasias noted oozing blood in duodenum s/p cauterization.  - 10/29: DAPT resumed  - 10/30: patient is medically stable for dc      2. Malignant mesothelioma (diagnosed 8/2024 ) with recurrent pleural effusion . Hx Asbestos lung    Severe protein calorie malnutrition (calorie count started - result 10/21)  -s/p VATS pleurodesis, R sided PleurX  * Per NH, patient also refused Pleurx drainage and is requesting to drain it inpatient (gets drained 2-3 times per week)  - Currently on room air (target spo2 92-96)  - Fu heme onc- Dr. Ramírez OP  - Fu palliative- full code  - PleurX drained yesterday   - Aspiration precaution  - Pain control     3. HTN/HLD/CAD s/p stents x2 in 2007 (follows Dr Avila)  -c/w home meds     4. Small hypodensity in segment 7 of the liver seen on prior CT.  - RUQ US as OP  -f/u outpt GI    5. Acute Grief vs MDD  - wife passed away a few months ago  - patient refusing medication sometimes and asks to be left alone, no suicide ideas, no ideas to hurt other people.    - Psychiatry eval admission recommended outpt fu     The patient is medically clear for discharge. DC plan, including diagnosis, med red and f/u, was discussed with Dr. Womack on 10/30, and dc was approved. HPI: 83 y/o man  PMHx of HLD, DM, CAD s/p stents x2 in 2007, pt of Dr Avila, hx  asbestosis of lung, diagnosed w malignant mesothelioma (8/2024) and s/p VATS pleurodesis, R sided PleurX, hx AVM/GIB and JASMIN, sent in by NH for low hemoglobin. Per NH, patient also refused Pleurx drainage today and is requesting to drain it inpatient (gets drained 2-3 times per week). Of note, patient had a recent admission in September for anemia for which he received 2 units prbc, IV Venofer and was recommended EGD/[place] but given his advance age and co morbidities decided to hold off. Patient is a poor historian. Uses wheelchair at baseline. Denies fever chills, shortness of breath, cough, chest pain, leg swelling, hematemesis, hematochezia.     Vital Signs Last 24 Hrs  T(C): 36.4 (15 Oct 2024 01:09), Max: 36.9 (14 Oct 2024 22:51)  T(F): 97.5 (15 Oct 2024 01:09), Max: 98.4 (14 Oct 2024 22:51)  HR: 100 (15 Oct 2024 01:09) (89 - 105)  BP: 132/74 (15 Oct 2024 01:09) (104/64 - 132/74)  RR: 18 (15 Oct 2024 01:09) (17 - 19)  SpO2: 97% (15 Oct 2024 01:09) (93% - 97%)    Parameters below as of 15 Oct 2024 01:09  Patient On (Oxygen Delivery Method): room air    Labs: Hgb 6.4 , Platelets 500 , Cr 1.2 (baseline)   CXR: Worsening R pleural effusion   EKG: NSR with PACs    Admitted for anemia     1. Acute on chronic anemia s/p 2 PRBC .   recurrent anemia   Hx JASMIN - no overt GI bleed . Hx AVM?  - Seen by GI last admission: regarding endoscopic work up. After discussing risks vs benefits given his advance age and co morbidities he would like to hold off. Offered VCE as outpatient .   - last admission received 5 days of Venofer   Plan:  - Active type and screen   - Switched plavix to aspirin for now  - c/w ferrous sulfate, IV venofovir for 5 days  - GI consult placed, Follow up, as per GI- Patient can follow  or  Follow up with our [facility] located at [address] [facility]  - Continue home PPI 40mg BID PO  - avoid Nsaids  - Hb 6.9> 1 unit PRBC (10/18)> repeat 7.8  - Hb 7.4 10/21 -> GI will reach out to family to discuss inpatient endoscopy -> pt son agreed for Video capsule endoscopy scheduled for tomorrow 10/23/2024  - Oncology onboard also recommended endoscopy to rule out GI bleed -> if GI workup is negative will offer bone marrow biopsy   - Patient refused prep yesterday follow up with GI, HB is stable   - VCE done on 10/24   - PPI BID  - 6/28 son agreed to EGD. D/w pulm and cardio pt optimized for EGD. 2 bleeding angioectasias noted oozing blood in duodenum s/p cauterization.  - 10/29: DAPT resumed  - 10/31: patient given 1prbc, medically stable for DC      2. Malignant mesothelioma (diagnosed 8/2024 ) with recurrent pleural effusion . Hx Asbestos lung    Severe protein calorie malnutrition (calorie count started - result 10/21)  - s/p VATS pleurodesis, R sided PleurX  - Per NH, patient also refused Pleurx drainage and is requesting to drain it inpatient (gets drained 2-3 times per week)  - Currently on room air (target spo2 92-96)  - Fu heme onc- Dr. Ramírez OP  - Fu palliative- full code  - PleurX drained yesterday   - Aspiration precaution  - Pain control     3. HTN/HLD/CAD s/p stents x2 in 2007 (follows Dr Avila)  - c/w home meds     4. Small hypodensity in segment 7 of the liver seen on prior CT.  - RUQ US as OP  - f/u outpt GI    5. Acute Grief vs MDD  - wife passed away a few months ago  - patient refusing medication sometimes and asks to be left alone, no suicide ideas, no ideas to hurt other people.    - Psychiatry eval admission recommended outpt fu     The patient is medically clear for discharge. DC plan, including diagnosis, med red and f/u, was discussed with Dr. Wen on 11/01, and dc was approved. HPI: 85 y/o man  PMHx of HLD, DM, CAD s/p stents x2 in 2007, pt of Dr Avila, hx  asbestosis of lung, diagnosed w malignant mesothelioma (8/2024) and s/p VATS pleurodesis, R sided PleurX, hx AVM/GIB and JASMIN, sent in by NH for low hemoglobin. Per NH, patient also refused Pleurx drainage today and is requesting to drain it inpatient (gets drained 2-3 times per week). Of note, patient had a recent admission in September for anemia for which he received 2 units prbc, IV Venofer and was recommended EGD/[place] but given his advance age and co morbidities decided to hold off. Patient is a poor historian. Uses wheelchair at baseline. Denies fever chills, shortness of breath, cough, chest pain, leg swelling, hematemesis, hematochezia.     Vital Signs Last 24 Hrs  T(C): 36.4 (15 Oct 2024 01:09), Max: 36.9 (14 Oct 2024 22:51)  T(F): 97.5 (15 Oct 2024 01:09), Max: 98.4 (14 Oct 2024 22:51)  HR: 100 (15 Oct 2024 01:09) (89 - 105)  BP: 132/74 (15 Oct 2024 01:09) (104/64 - 132/74)  RR: 18 (15 Oct 2024 01:09) (17 - 19)  SpO2: 97% (15 Oct 2024 01:09) (93% - 97%)    Parameters below as of 15 Oct 2024 01:09  Patient On (Oxygen Delivery Method): room air    Labs: Hgb 6.4 , Platelets 500 , Cr 1.2 (baseline)   CXR: Worsening R pleural effusion   EKG: NSR with PACs    Admitted for anemia     1. Acute on chronic anemia s/p 2 PRBC .   recurrent anemia   Hx JASMIN - no overt GI bleed . Hx AVM?  - Seen by GI last admission: regarding endoscopic work up. After discussing risks vs benefits given his advance age and co morbidities he would like to hold off. Offered VCE as outpatient .   - last admission received 5 days of Venofer   Plan:  - Active type and screen   - Switched plavix to aspirin for now  - c/w ferrous sulfate, IV venofovir for 5 days  - GI consult placed, Follow up, as per GI- Patient can follow  or  Follow up with our [facility] located at [address] [facility]  - Continue home PPI 40mg BID PO  - avoid Nsaids  - Hb 6.9> 1 unit PRBC (10/18)> repeat 7.8  - Hb 7.4 10/21 -> GI will reach out to family to discuss inpatient endoscopy -> pt son agreed for Video capsule endoscopy scheduled for tomorrow 10/23/2024  - Oncology onboard also recommended endoscopy to rule out GI bleed -> if GI workup is negative will offer bone marrow biopsy   - Patient refused prep yesterday follow up with GI, HB is stable   - VCE done on 10/24   - PPI BID  - 6/28 son agreed to EGD. D/w pulm and cardio pt optimized for EGD. 2 bleeding angioectasias noted oozing blood in duodenum s/p cauterization.  - 10/29: Plavix resumed  - 10/31: patient given 1prbc, medically stable for DC      2. Malignant mesothelioma (diagnosed 8/2024 ) with recurrent pleural effusion . Hx Asbestos lung    Severe protein calorie malnutrition (calorie count started - result 10/21)  - s/p VATS pleurodesis, R sided PleurX  - Per NH, patient also refused Pleurx drainage and is requesting to drain it inpatient (gets drained 2-3 times per week)  - Currently on room air (target spo2 92-96)  - Fu heme onc- Dr. Ramírez OP  - Fu palliative- full code  - PleurX drained PRN   - Aspiration precaution  - Pain control     3. HTN/HLD/CAD s/p stents x2 in 2007 (follows Dr Avila)  - c/w home meds     4. Small hypodensity in segment 7 of the liver seen on prior CT.  - RUQ US as OP  - f/u outpt GI    5. Acute Grief vs MDD  - wife passed away a few months ago  - patient refusing medication sometimes and asks to be left alone, no suicide ideas, no ideas to hurt other people.    - Psychiatry eval admission recommended outpt fu     The patient is medically clear for discharge. DC plan, including diagnosis, med red and f/u, was discussed with Dr. Wen on 11/01, and dc was approved.

## 2024-10-25 NOTE — PROGRESS NOTE ADULT - ASSESSMENT
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     #Acute on chronic anemia with hx of AVM and JASMIN  r/o GI bleed  tfx to keep hb >7  s/p  venofer   seen by GI ,family refused egd as inpt as per GI note   awaiting for VCE  may need bmbx if GI  bleeding is ruled out   - On plavix       #Malignant mesothelioma (diagnosed 8/2024 )  #Hx Asbestos lung   -s/p VATS pleurodesis, R sided PleurX,s/p drainage  -he will f/u with Dr Epperson for  further Mn as outpt    cont other supportive care.  d/c plan as per primary team   will f/u  83 y/o man  PMHx of HLD, DM, CAD s/p stents x2 in 2007, pt of Dr Avila, hx  asbestosis of lung, diagnosed w malignant mesothelioma (8/2024) and s/p VATS pleurodesis, R sided PleurX, hx AVM/GIB and JASMIN, sent in by NH for low hemoglobin.     #Acute on chronic anemia with hx of AVM and JASMIN  r/o GI bleed  tfx to keep hb >7  s/p  venofer   seen by GI ,family refused egd as inpt as per GI note   VCE  may need bmbx if GI  bleeding is ruled out   - On plavix       #Malignant mesothelioma (diagnosed 8/2024 )  #Hx Asbestos lung   -s/p VATS pleurodesis, R sided PleurX,s/p drainage  -he will f/u with Dr Epperson for  further Mn as outpt    cont other supportive care.  d/c plan as per primary team   will f/u

## 2024-10-25 NOTE — PROGRESS NOTE ADULT - SUBJECTIVE AND OBJECTIVE BOX
Patient is a 84y old  Male who presents with a chief complaint of Anemia     (17 Oct 2024 12:14)    INTERVAL HPI/OVERNIGHT EVENTS: Patient was examined and seen at bedside. This morning pt is resting comfortably in bed and reports no new issues or overnight events. No complaints.     ROS: Denies CP, SOB, AP, new weakness  All other systems reviewed and are within normal limits.  InitialHPI:  HPI: 83 y/o man  PMHx of HLD, DM, CAD s/p stents x2 in 2007, pt of Dr vAila, hx  asbestosis of lung, diagnosed w malignant mesothelioma (8/2024) and s/p VATS pleurodesis, R sided PleurX, hx AVM/GIB and JASMIN, sent in by NH for low hemoglobin. Per NH, patient also refused Pleurx drainage today and is requesting to drain it inpatient (gets drained 2-3 times per week). Of note, patient had a recent admission in September for anemia for which he received 2 units prbc, IV Venofer and was recommended EGD/[place] but given his advance age and co morbidities decided to hold off. Patient is a poor historian. Uses wheelchair at baseline. Denies fever chills, shortness of breath, cough, chest pain, leg swelling, hematemesis, hematochezia.     Vital Signs Last 24 Hrs  T(C): 36.4 (15 Oct 2024 01:09), Max: 36.9 (14 Oct 2024 22:51)  T(F): 97.5 (15 Oct 2024 01:09), Max: 98.4 (14 Oct 2024 22:51)  HR: 100 (15 Oct 2024 01:09) (89 - 105)  BP: 132/74 (15 Oct 2024 01:09) (104/64 - 132/74)  RR: 18 (15 Oct 2024 01:09) (17 - 19)  SpO2: 97% (15 Oct 2024 01:09) (93% - 97%)    Parameters below as of 15 Oct 2024 01:09  Patient On (Oxygen Delivery Method): room air    Labs: Hgb 6.4 , Platelets 500 , Cr 1.2 (baseline)   CXR: Worsening R pleural effusion   EKG: NSR with PACs    Admitted for anemia      (15 Oct 2024 01:16)    PAST MEDICAL & SURGICAL HISTORY:  DM (diabetes mellitus)      MI (myocardial infarction)      History of CAD (coronary artery disease)      Dyslipidemia      Currently smokes tobacco      Mesothelioma, malignant      Coronary stent patent          General: NAD, AAOx2, chronically ill appearing, b/l temp waisting, cachectic  HEENT:  no LAD  CV: S1 S2  Resp: decreased breath sounds at bases  GI: NT/ND/S +BS  MS: no clubbing/cyanosis/edema, + pulses b/l  Neuro: nonfocal, +reflexes thruout  +laws w/ clear yellow urine  +Rt sided ant pleurx cath w/ d/c/i dsg            Home Medications:  aspirin 81 mg oral tablet, chewable: 1 tab(s) orally once a day (25 Oct 2024 14:42)  ferrous sulfate 325 mg (65 mg elemental iron) oral tablet: 1 tab(s) orally once a day (25 Sep 2024 15:37)  gabapentin 300 mg oral tablet: 1 tab(s) orally 3 times a day (20 Sep 2024 01:37)  melatonin 3 mg oral tablet: 1 tab(s) orally once a day (at bedtime) (13 Aug 2024 09:23)  metFORMIN 500 mg oral tablet: 1 tab(s) orally 2 times a day (21 Jul 2024 18:04)  pantoprazole 40 mg oral delayed release tablet: 1 tab(s) orally every 12 hours (25 Sep 2024 15:37)  polyethylene glycol 3350 oral powder for reconstitution: 17 gram(s) orally once a day (13 Aug 2024 09:23)  pravastatin 20 mg oral tablet: 1 tab(s) orally (21 Jul 2024 18:04)  senna leaf extract oral tablet: 2 tab(s) orally once a day (at bedtime) (13 Aug 2024 09:23)  tamsulosin 0.4 mg oral capsule: 1 cap(s) orally once a day (at bedtime) (13 Aug 2024 09:23)    MEDICATIONS  (STANDING):  aspirin  chewable 81 milliGRAM(s) Oral daily  atorvastatin 10 milliGRAM(s) Oral at bedtime  chlorhexidine 2% Cloths 1 Application(s) Topical <User Schedule>  chlorhexidine 2% Cloths 1 Application(s) Topical daily  dextrose 5% + sodium chloride 0.45%. 1000 milliLiter(s) (75 mL/Hr) IV Continuous <Continuous>  ferrous    sulfate 325 milliGRAM(s) Oral daily  gabapentin 300 milliGRAM(s) Oral three times a day  heparin   Injectable 5000 Unit(s) SubCutaneous every 12 hours  melatonin 3 milliGRAM(s) Oral at bedtime  pantoprazole    Tablet 40 milliGRAM(s) Oral every 12 hours  polyethylene glycol 3350 17 Gram(s) Oral daily  senna 2 Tablet(s) Oral at bedtime  tamsulosin 0.4 milliGRAM(s) Oral at bedtime    MEDICATIONS  (PRN):  acetaminophen     Tablet .. 650 milliGRAM(s) Oral every 6 hours PRN Mild Pain (1 - 3)  oxycodone    5 mG/acetaminophen 325 mG 1 Tablet(s) Oral every 6 hours PRN Moderate Pain (4 - 6)    Vital Signs Last 24 Hrs  T(C): 36.2 (25 Oct 2024 13:01), Max: 36.9 (24 Oct 2024 20:37)  T(F): 97.2 (25 Oct 2024 13:01), Max: 98.4 (24 Oct 2024 20:37)  HR: 88 (25 Oct 2024 13:01) (88 - 99)  BP: 104/57 (25 Oct 2024 13:01) (104/57 - 161/85)  BP(mean): 73 (25 Oct 2024 13:01) (73 - 110)  RR: 18 (25 Oct 2024 13:01) (18 - 18)  SpO2: 99% (25 Oct 2024 13:01) (98% - 99%)    Parameters below as of 25 Oct 2024 13:01  Patient On (Oxygen Delivery Method): room air      CAPILLARY BLOOD GLUCOSE        LABS:                        8.7    7.31  )-----------( 425      ( 25 Oct 2024 06:33 )             27.8     10-25    137  |  100  |  22[H]  ----------------------------<  170[H]  5.4[H]   |  26  |  0.9    Ca    9.8      25 Oct 2024 06:33  Mg     2.1     10-25              Urinalysis Basic - ( 25 Oct 2024 06:33 )    Color: x / Appearance: x / SG: x / pH: x  Gluc: 170 mg/dL / Ketone: x  / Bili: x / Urobili: x   Blood: x / Protein: x / Nitrite: x   Leuk Esterase: x / RBC: x / WBC x   Sq Epi: x / Non Sq Epi: x / Bacteria: x              Consultant Notes Reviewed:  [x ] YES  [ ] NO  Care Discussed with Consultants/Other Providers/ Housestaff [ x] YES  [ ] NO  Radiology, labs, EKGs, new studies personally reviewed.

## 2024-10-25 NOTE — DISCHARGE NOTE PROVIDER - CARE PROVIDER_API CALL
Pascual Stevens .  Internal Medicine  [address] [place] [address]  Phone: [phone]  Fax: [phone]  Follow Up Time:     Jane Epperson  Hematology  [address]  [place] [address]  Phone: [phone]  Fax: [phone]  Follow Up Time:     Kimmy Wood  Gastroenterology  [address] [place] [address]  Phone: [phone]  Fax: [phone]  Follow Up Time:    Pascual Stevens .  Internal Medicine  [address] [place] [address]  Phone: [phone]  Fax: [phone]  Follow Up Time:     Kimmy Wood  Gastroenterology  [address] [place] [address]  Phone: [phone]  Fax: [phone]  Follow Up Time:     Augie Rowe  Psychiatry  [address] [place] [address]  Phone: [phone]  Fax: [phone]  Follow Up Time: 1 week   Pascual Stevens .  Internal Medicine  [address] [place] [address]  Phone: [phone]  Fax: [phone]  Follow Up Time: 1 week    Kimmy Wood  Gastroenterology  [address] [place] [address]  Phone: [phone]  Fax: [phone]  Follow Up Time: 1 month    Augie Rowe  Psychiatry  [address] [place] [address]  Phone: [phone]  Fax: [phone]  Follow Up Time: 1 week   Pascual Stevens .  Internal Medicine  [address] [place] [address]  Phone: [phone]  Fax: [phone]  Follow Up Time: 1 week    Kimmy Wood  Gastroenterology  [address] [place] [address]  Phone: [phone]  Fax: [phone]  Follow Up Time: 1 month    Augie Rowe  Psychiatry  [address] [place] [address]  Phone: [phone]  Fax: [phone]  Follow Up Time: 1 week    Jane Epperson  Hematology  [address]  [place] [address]  Phone: [phone]  Fax: [phone]  Follow Up Time: 2 weeks

## 2024-10-25 NOTE — PROGRESS NOTE ADULT - ASSESSMENT
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 Per NH, patient also refused Pleurx drainage today and is requesting to drain it inpatient (gets drained 2-3 times per week).    #Recurrent anemia  #Hx JASMIN   #Hx AVM?  #Active duodenal bleeding on capsule endoscopy  - Hemodynamically stable & no active bleeding  - Baseline hemoglobin - 8-9   - Hemoglobin on admission - 6.3 >s/p 2units prbc (received 2 units at the end of Spetember as well)  - On plavix   - last admission received 5 days of Venofer   -Seen by GI last admission> spoke to grandson (HCP)  regarding endoscopic work up. After discussing risks vs benefits given his advance age and co morbidities he would like to hold off. Offered VCE as outpatient .   -Can reconsider work up if within goals of care , will need to speak to grandson/son  -Will also need heme onc outpt for possible venofer infusions outpt   - Maintain active Type and screen  - Trend H&H  - Continue home PPI 40mg BID PO  - Target Hgb >8   - Avoid NSAIDs.  - give Venofer as %sat, ferritin remain low  - recall GI for possible intervention if family agrees in view of recurrent anemia requiring multiple PRBCs  6/18 convinced pt to give labs. Hgb 6.9. Transfuse 1uRPBCs. Spoke to GI and asked to speak to family. Serial CBC. Keep Hgb >7.5  6/21 again spoke to GI and asked to speak re: possible intervention. Transfuse another unit as Hgb again trending down (total 3u PRBCs).  6/22 son declined EGD/C-scopy but ?requested capsule endoscopy. Doubt that pt will drink golytely or agree to NGT.  6/26 d/w GI. Active duodenal bleeding on capsule endoscopy. Son refused urgent EGD and wants to think over the weekend. Change Protonix to 40mg IV q12, clears, CBC BID, keep Hgb>8. Hold ASA    #Malignant mesothelioma (diagnosed 8/2024 )  #Hx Asbestos lung   -s/p VATS pleurodesis, R sided PleurX  -pleurx drainage 2-3 times per week or PRN  -Fu heme onc outpatient. Plan was for possible Rx if pt's functional status improves.   -s/p drainage on 10/21  monitor for Sx    #HTN/HLD  #CAD s/p stents x2 in 2007 (follows Dr Avila)  -c/w home meds   -changed Plavix to ASA    #Small hypodensity in segment 7 of the liver seen on prior CT.  - RUQ US as OP  -?f/u outpt GI    #Acute Grief vs MDD  #Baseline dementia  - wife passed away a few months ago  - patient refusing medication sometimes and asks to be left alone, no suicide ideas, no ideas to hurt other people.    - Psychiatry eval  recommended outpt fu     #Nutrition/Fluids/Electrolytes   - replete K<4 and Mg <2  - ensure regular BMs  -  Miralax BID, Senna    #DVT Px  SCDs  Heparin SQ    High risk pt. Px is overall poor.  GOC: d/w son in details on 10/18. Based on oncology feedback that his cancer is treatable, son wants to cont full code.    #Progress Note Handoff  Pending: Clinical improvement and stability__x___PT____x__CBC stability, ?EGD Monday  Pt/Family discussion: Pt/family informed and agree with the current plan  Disposition: Nursing Home      My note supersedes the residents note should a discrepancy arise.    Chart and notes personally reviewed.  Care Discussed with Consultants/Other Providers/ Housestaff [ x] YES [ ] NO   Radiology, labs, old records personally reviewed.    discussed w/ housestaff, nursing, case management, GI    Attestation Statements:    Attestation Statements:  Risk Statement (NON-critical care).     On this date of service, level of risk to patient is considered: High.     Due to: pt with illness causing risk to life or bodily fxn    Time-based billing (NON-critical care).     50 minutes spent on total encounter. The necessity of the time spent during the encounter on this date of service was due to:     time spent on review of labs, imaging studies, old records, obtaining history, personally examining patient, counselling and communicating with patient/ family, entering orders for medications/tests/etc, discussions with other health care providers, documentation in electronic health records, independent interpretation of labs, imaging/procedure results and care coordination.

## 2024-10-26 LAB
ANION GAP SERPL CALC-SCNC: 9 MMOL/L — SIGNIFICANT CHANGE UP (ref 7–14)
BASOPHILS # BLD AUTO: 0.02 K/UL — SIGNIFICANT CHANGE UP (ref 0–0.2)
BASOPHILS # BLD AUTO: 0.02 K/UL — SIGNIFICANT CHANGE UP (ref 0–0.2)
BASOPHILS NFR BLD AUTO: 0.3 % — SIGNIFICANT CHANGE UP (ref 0–1)
BASOPHILS NFR BLD AUTO: 0.3 % — SIGNIFICANT CHANGE UP (ref 0–1)
BUN SERPL-MCNC: 29 MG/DL — HIGH (ref 10–20)
CALCIUM SERPL-MCNC: 9.4 MG/DL — SIGNIFICANT CHANGE UP (ref 8.4–10.4)
CHLORIDE SERPL-SCNC: 103 MMOL/L — SIGNIFICANT CHANGE UP (ref 98–110)
CO2 SERPL-SCNC: 28 MMOL/L — SIGNIFICANT CHANGE UP (ref 17–32)
CREAT SERPL-MCNC: 1 MG/DL — SIGNIFICANT CHANGE UP (ref 0.7–1.5)
EGFR: 75 ML/MIN/1.73M2 — SIGNIFICANT CHANGE UP
EOSINOPHIL # BLD AUTO: 0.16 K/UL — SIGNIFICANT CHANGE UP (ref 0–0.7)
EOSINOPHIL # BLD AUTO: 0.17 K/UL — SIGNIFICANT CHANGE UP (ref 0–0.7)
EOSINOPHIL NFR BLD AUTO: 2.4 % — SIGNIFICANT CHANGE UP (ref 0–8)
EOSINOPHIL NFR BLD AUTO: 2.8 % — SIGNIFICANT CHANGE UP (ref 0–8)
GLUCOSE BLDC GLUCOMTR-MCNC: 150 MG/DL — HIGH (ref 70–99)
GLUCOSE BLDC GLUCOMTR-MCNC: 154 MG/DL — HIGH (ref 70–99)
GLUCOSE BLDC GLUCOMTR-MCNC: 172 MG/DL — HIGH (ref 70–99)
GLUCOSE SERPL-MCNC: 142 MG/DL — HIGH (ref 70–99)
HCT VFR BLD CALC: 25.8 % — LOW (ref 42–52)
HCT VFR BLD CALC: 28.1 % — LOW (ref 42–52)
HGB BLD-MCNC: 8 G/DL — LOW (ref 14–18)
HGB BLD-MCNC: 8.6 G/DL — LOW (ref 14–18)
IMM GRANULOCYTES NFR BLD AUTO: 0.4 % — HIGH (ref 0.1–0.3)
IMM GRANULOCYTES NFR BLD AUTO: 0.5 % — HIGH (ref 0.1–0.3)
LYMPHOCYTES # BLD AUTO: 0.81 K/UL — LOW (ref 1.2–3.4)
LYMPHOCYTES # BLD AUTO: 0.93 K/UL — LOW (ref 1.2–3.4)
LYMPHOCYTES # BLD AUTO: 13.4 % — LOW (ref 20.5–51.1)
LYMPHOCYTES # BLD AUTO: 14.1 % — LOW (ref 20.5–51.1)
MCHC RBC-ENTMCNC: 29.4 PG — SIGNIFICANT CHANGE UP (ref 27–31)
MCHC RBC-ENTMCNC: 29.5 PG — SIGNIFICANT CHANGE UP (ref 27–31)
MCHC RBC-ENTMCNC: 30.6 G/DL — LOW (ref 32–37)
MCHC RBC-ENTMCNC: 31 G/DL — LOW (ref 32–37)
MCV RBC AUTO: 95.2 FL — HIGH (ref 80–94)
MCV RBC AUTO: 95.9 FL — HIGH (ref 80–94)
MONOCYTES # BLD AUTO: 0.58 K/UL — SIGNIFICANT CHANGE UP (ref 0.1–0.6)
MONOCYTES # BLD AUTO: 0.67 K/UL — HIGH (ref 0.1–0.6)
MONOCYTES NFR BLD AUTO: 10.1 % — HIGH (ref 1.7–9.3)
MONOCYTES NFR BLD AUTO: 9.6 % — HIGH (ref 1.7–9.3)
NEUTROPHILS # BLD AUTO: 4.14 K/UL — SIGNIFICANT CHANGE UP (ref 1.4–6.5)
NEUTROPHILS # BLD AUTO: 5.14 K/UL — SIGNIFICANT CHANGE UP (ref 1.4–6.5)
NEUTROPHILS NFR BLD AUTO: 72.2 % — SIGNIFICANT CHANGE UP (ref 42.2–75.2)
NEUTROPHILS NFR BLD AUTO: 73.9 % — SIGNIFICANT CHANGE UP (ref 42.2–75.2)
NRBC # BLD: 0 /100 WBCS — SIGNIFICANT CHANGE UP (ref 0–0)
NRBC # BLD: 0 /100 WBCS — SIGNIFICANT CHANGE UP (ref 0–0)
PLATELET # BLD AUTO: 415 K/UL — HIGH (ref 130–400)
PLATELET # BLD AUTO: 427 K/UL — HIGH (ref 130–400)
PMV BLD: 9.6 FL — SIGNIFICANT CHANGE UP (ref 7.4–10.4)
PMV BLD: 9.8 FL — SIGNIFICANT CHANGE UP (ref 7.4–10.4)
POTASSIUM SERPL-MCNC: 4.7 MMOL/L — SIGNIFICANT CHANGE UP (ref 3.5–5)
POTASSIUM SERPL-SCNC: 4.7 MMOL/L — SIGNIFICANT CHANGE UP (ref 3.5–5)
RBC # BLD: 2.71 M/UL — LOW (ref 4.7–6.1)
RBC # BLD: 2.93 M/UL — LOW (ref 4.7–6.1)
RBC # FLD: 17.8 % — HIGH (ref 11.5–14.5)
RBC # FLD: 17.9 % — HIGH (ref 11.5–14.5)
SODIUM SERPL-SCNC: 140 MMOL/L — SIGNIFICANT CHANGE UP (ref 135–146)
WBC # BLD: 5.74 K/UL — SIGNIFICANT CHANGE UP (ref 4.8–10.8)
WBC # BLD: 6.96 K/UL — SIGNIFICANT CHANGE UP (ref 4.8–10.8)
WBC # FLD AUTO: 5.74 K/UL — SIGNIFICANT CHANGE UP (ref 4.8–10.8)
WBC # FLD AUTO: 6.96 K/UL — SIGNIFICANT CHANGE UP (ref 4.8–10.8)

## 2024-10-26 PROCEDURE — 99232 SBSQ HOSP IP/OBS MODERATE 35: CPT

## 2024-10-26 PROCEDURE — 99233 SBSQ HOSP IP/OBS HIGH 50: CPT

## 2024-10-26 RX ORDER — INSULIN LISPRO 100/ML
VIAL (ML) SUBCUTANEOUS
Refills: 0 | Status: DISCONTINUED | OUTPATIENT
Start: 2024-10-26 | End: 2024-11-06

## 2024-10-26 RX ORDER — GLUCAGON INJECTION, SOLUTION 1 MG/.2ML
1 INJECTION, SOLUTION SUBCUTANEOUS ONCE
Refills: 0 | Status: DISCONTINUED | OUTPATIENT
Start: 2024-10-26 | End: 2024-11-06

## 2024-10-26 RX ADMIN — PANTOPRAZOLE SODIUM 40 MILLIGRAM(S): 40 TABLET, DELAYED RELEASE ORAL at 17:01

## 2024-10-26 RX ADMIN — GABAPENTIN 300 MILLIGRAM(S): 300 CAPSULE ORAL at 13:59

## 2024-10-26 RX ADMIN — CHLORHEXIDINE GLUCONATE 1 APPLICATION(S): 40 SOLUTION TOPICAL at 11:23

## 2024-10-26 RX ADMIN — CHLORHEXIDINE GLUCONATE 1 APPLICATION(S): 40 SOLUTION TOPICAL at 05:08

## 2024-10-26 RX ADMIN — Medication 81 MILLIGRAM(S): at 11:23

## 2024-10-26 RX ADMIN — POLYETHYLENE GLYCOL 3350 17 GRAM(S): 17 POWDER, FOR SOLUTION ORAL at 11:23

## 2024-10-26 RX ADMIN — PANTOPRAZOLE SODIUM 40 MILLIGRAM(S): 40 TABLET, DELAYED RELEASE ORAL at 05:08

## 2024-10-26 RX ADMIN — Medication 0.4 MILLIGRAM(S): at 21:09

## 2024-10-26 RX ADMIN — Medication 10 MILLIGRAM(S): at 21:09

## 2024-10-26 RX ADMIN — HEPARIN SODIUM 5000 UNIT(S): 10000 INJECTION INTRAVENOUS; SUBCUTANEOUS at 05:08

## 2024-10-26 RX ADMIN — Medication 3 MILLIGRAM(S): at 21:09

## 2024-10-26 RX ADMIN — Medication 325 MILLIGRAM(S): at 11:23

## 2024-10-26 RX ADMIN — Medication 1: at 18:22

## 2024-10-26 RX ADMIN — Medication 2 TABLET(S): at 21:09

## 2024-10-26 RX ADMIN — GABAPENTIN 300 MILLIGRAM(S): 300 CAPSULE ORAL at 21:09

## 2024-10-26 RX ADMIN — GABAPENTIN 300 MILLIGRAM(S): 300 CAPSULE ORAL at 05:08

## 2024-10-26 NOTE — PROGRESS NOTE ADULT - ASSESSMENT
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 patient had a recent admission in September for anemia for which he received 2 units prbc, IV Venofer and was recommended EGD/[place] but given his advance age and co morbidities family decided to hold off. Patient is a poor historian. GI was consulted for anemia       #Acute on chronic macrocytic anemia with JASMIN - no overt GI bleed  - Hemodynamically stable & no active bleeding  - On plavix   - Iron :31, %sat: 10 TIBC 325 ferritin 89 while on iron  - reported AVMs? endoscopic work up with  per grand son Nelli Lopez    VCE done 10/24 showing active bleed in the duodenum  Hb stable today  HD stable     #Rec  - Discussed findings of VCE with patient's son and HCP John. We explained that there is active bleeding in the duodenum seen on the capsule endoscopy and recommended endoscopic intervention. John is still concerned about the patient's elevated risk given underlying mesothelioma and does not consent to EGD at this time. We discussed that there is a possibility of death if this is left untreated. He would like to discuss with his family over the weekend and decide if they wish to pursue EGD  - Clear liquid diet  - Recommend PPI 40mg IV BID  - Please target Hb  >8  - Please avoid any NSAIDs  - will follow closely  - If unstable bleed please call GI STAT    #Small hypodensity in segment 7 of the liver seen on prior CT.  -Consider RUQ US as OP  -f/u w/ primary GI

## 2024-10-26 NOTE — PROGRESS NOTE ADULT - SUBJECTIVE AND OBJECTIVE BOX
Patient is a 84y old  Male who presents with a chief complaint of Acute on chronic anemia (25 Oct 2024 19:13)       Pt is seen and examined  pt is awake and lying in bed        ROS:  Negative except for:weakness    MEDICATIONS  (STANDING):  aspirin  chewable 81 milliGRAM(s) Oral daily  atorvastatin 10 milliGRAM(s) Oral at bedtime  chlorhexidine 2% Cloths 1 Application(s) Topical <User Schedule>  chlorhexidine 2% Cloths 1 Application(s) Topical daily  dextrose 5% + sodium chloride 0.45%. 1000 milliLiter(s) (75 mL/Hr) IV Continuous <Continuous>  dextrose 5%. 1000 milliLiter(s) (100 mL/Hr) IV Continuous <Continuous>  dextrose 5%. 1000 milliLiter(s) (50 mL/Hr) IV Continuous <Continuous>  dextrose 50% Injectable 12.5 Gram(s) IV Push once  dextrose 50% Injectable 25 Gram(s) IV Push once  dextrose 50% Injectable 25 Gram(s) IV Push once  ferrous    sulfate 325 milliGRAM(s) Oral daily  gabapentin 300 milliGRAM(s) Oral three times a day  glucagon  Injectable 1 milliGRAM(s) IntraMuscular once  insulin lispro (ADMELOG) corrective regimen sliding scale   SubCutaneous three times a day before meals  melatonin 3 milliGRAM(s) Oral at bedtime  pantoprazole  Injectable 40 milliGRAM(s) IV Push every 12 hours  polyethylene glycol 3350 17 Gram(s) Oral daily  senna 2 Tablet(s) Oral at bedtime  tamsulosin 0.4 milliGRAM(s) Oral at bedtime    MEDICATIONS  (PRN):  acetaminophen     Tablet .. 650 milliGRAM(s) Oral every 6 hours PRN Mild Pain (1 - 3)  dextrose Oral Gel 15 Gram(s) Oral once PRN Blood Glucose LESS THAN 70 milliGRAM(s)/deciliter  oxycodone    5 mG/acetaminophen 325 mG 1 Tablet(s) Oral every 6 hours PRN Moderate Pain (4 - 6)      Allergies    penicillin (Unknown)    Intolerances        Vital Signs Last 24 Hrs  T(C): 36.3 (26 Oct 2024 04:58), Max: 36.7 (25 Oct 2024 20:00)  T(F): 97.3 (26 Oct 2024 04:58), Max: 98 (25 Oct 2024 20:00)  HR: 77 (26 Oct 2024 04:58) (77 - 85)  BP: 109/67 (26 Oct 2024 04:58) (109/67 - 129/69)  BP(mean): 81 (26 Oct 2024 04:58) (81 - 89)  RR: 18 (26 Oct 2024 04:58) (16 - 18)  SpO2: 97% (26 Oct 2024 04:58) (97% - 98%)    Parameters below as of 26 Oct 2024 04:58  Patient On (Oxygen Delivery Method): room air        PHYSICAL EXAM  General:cachetic   HEENT: clear oropharynx, anicteric sclera, pink conjunctiva  Neck: supple  CV: normal S1/S2 with no murmur rubs or gallops  Lungs: positive air movement b/l ant lungs,clear to auscultation, no wheezes, no rales  Abdomen: soft non-tender non-distended, no hepatosplenomegaly  Ext: no clubbing cyanosis or edema  Skin: no rashes and no petechiae  Neuro: alert and oriented X 2, no focal deficits  LABS:                          8.0    6.96  )-----------( 415      ( 26 Oct 2024 07:04 )             25.8         Mean Cell Volume : 95.2 fL  Mean Cell Hemoglobin : 29.5 pg  Mean Cell Hemoglobin Concentration : 31.0 g/dL  Auto Neutrophil # : 5.14 K/uL  Auto Lymphocyte # : 0.93 K/uL  Auto Monocyte # : 0.67 K/uL  Auto Eosinophil # : 0.17 K/uL  Auto Basophil # : 0.02 K/uL  Auto Neutrophil % : 73.9 %  Auto Lymphocyte % : 13.4 %  Auto Monocyte % : 9.6 %  Auto Eosinophil % : 2.4 %  Auto Basophil % : 0.3 %    Serial CBC's  10-26 @ 07:04  Hct-25.8 / Hgb-8.0 / Plat-415 / RBC-2.71 / WBC-6.96          Serial CBC's  10-25 @ 20:47  Hct-25.7 / Hgb-8.0 / Plat-415 / RBC-2.75 / WBC-8.18          Serial CBC's  10-25 @ 06:33  Hct-27.8 / Hgb-8.7 / Plat-425 / RBC-2.99 / WBC-7.31          Serial CBC's  10-24 @ 06:45  Hct-27.5 / Hgb-8.6 / Plat-428 / RBC-2.93 / WBC-7.13          Serial CBC's  10-23 @ 05:46  Hct-26.2 / Hgb-8.0 / Plat-403 / RBC-2.78 / WBC-7.80            10-26    140  |  103  |  29[H]  ----------------------------<  142[H]  4.7   |  28  |  1.0    Ca    9.4      26 Oct 2024 07:04  Mg     2.1     10-25            WBC Count: 6.96 K/uL (10-26-24 @ 07:04)  Hemoglobin: 8.0 g/dL (10-26-24 @ 07:04)  Hematocrit: 25.8 % (10-26-24 @ 07:04)  Platelet Count - Automated: 415 K/uL (10-26-24 @ 07:04)  WBC Count: 8.18 K/uL (10-25-24 @ 20:47)  Hemoglobin: 8.0 g/dL (10-25-24 @ 20:47)  Hematocrit: 25.7 % (10-25-24 @ 20:47)  Platelet Count - Automated: 415 K/uL (10-25-24 @ 20:47)  WBC Count: 7.31 K/uL (10-25-24 @ 06:33)  Hemoglobin: 8.7 g/dL (10-25-24 @ 06:33)  Hematocrit: 27.8 % (10-25-24 @ 06:33)  Platelet Count - Automated: 425 K/uL (10-25-24 @ 06:33)  WBC Count: 7.13 K/uL (10-24-24 @ 06:45)  Hemoglobin: 8.6 g/dL (10-24-24 @ 06:45)  Hematocrit: 27.5 % (10-24-24 @ 06:45)  Platelet Count - Automated: 428 K/uL (10-24-24 @ 06:45)  WBC Count: 7.80 K/uL (10-23-24 @ 05:46)  Hemoglobin: 8.0 g/dL (10-23-24 @ 05:46)  Hematocrit: 26.2 % (10-23-24 @ 05:46)  Platelet Count - Automated: 403 K/uL (10-23-24 @ 05:46)  WBC Count: 6.57 K/uL (10-22-24 @ 22:23)  Hemoglobin: 7.7 g/dL (10-22-24 @ 22:23)  Hematocrit: 24.9 % (10-22-24 @ 22:23)  Platelet Count - Automated: 381 K/uL (10-22-24 @ 22:23)  WBC Count: 8.15 K/uL (10-22-24 @ 06:55)  Hemoglobin: 7.9 g/dL (10-22-24 @ 06:55)  Hematocrit: 25.1 % (10-22-24 @ 06:55)  Platelet Count - Automated: 374 K/uL (10-22-24 @ 06:55)  WBC Count: 6.74 K/uL (10-21-24 @ 07:03)  Hemoglobin: 7.4 g/dL (10-21-24 @ 07:03)  Hematocrit: 24.2 % (10-21-24 @ 07:03)  Platelet Count - Automated: 374 K/uL (10-21-24 @ 07:03)  WBC Count: 9.20 K/uL (10-20-24 @ 06:26)  Hemoglobin: 7.8 g/dL (10-20-24 @ 06:26)  Hematocrit: 25.0 % (10-20-24 @ 06:26)  Platelet Count - Automated: 366 K/uL (10-20-24 @ 06:26)  WBC Count: 14.03 K/uL (10-19-24 @ 16:00)  Hemoglobin: 7.9 g/dL (10-19-24 @ 16:00)  Hematocrit: 25.7 % (10-19-24 @ 16:00)  Platelet Count - Automated: 448 K/uL (10-19-24 @ 16:00)  WBC Count: 8.52 K/uL (10-19-24 @ 06:40)  Hemoglobin: 7.8 g/dL (10-19-24 @ 06:40)  Hematocrit: 24.6 % (10-19-24 @ 06:40)  Platelet Count - Automated: 396 K/uL (10-19-24 @ 06:40)  Reticulocyte Percent: 3.5 % (10-19-24 @ 06:40)  WBC Count: 9.46 K/uL (10-18-24 @ 11:19)  Hemoglobin: 6.9 g/dL (10-18-24 @ 11:19)  Hematocrit: 22.8 % (10-18-24 @ 11:19)  Platelet Count - Automated: 429 K/uL (10-18-24 @ 11:19)  WBC Count: 8.72 K/uL (10-16-24 @ 18:11)  Hemoglobin: 8.5 g/dL (10-16-24 @ 18:11)  Hematocrit: 27.7 % (10-16-24 @ 18:11)  Platelet Count - Automated: 459 K/uL (10-16-24 @ 18:11)                BLOOD SMEAR INTERPRETATION:       RADIOLOGY & ADDITIONAL STUDIES:

## 2024-10-26 NOTE — PROGRESS NOTE ADULT - SUBJECTIVE AND OBJECTIVE BOX
SUBJECTIVE/OVERNIGHT EVENTS  Today is hospital day 12d. This morning patient was seen and examined at bedside, resting comfortably in bed. No acute or major events overnight.    HOSPITAL COURSE  Day 1:   Day 2:   Day 3:     CODE STATUS:    FAMILY COMMUNICATION  Contact date:  Name of person contacted:  Relationship to patient:  Communication details:    MEDICATIONS  STANDING MEDICATIONS  aspirin  chewable 81 milliGRAM(s) Oral daily  atorvastatin 10 milliGRAM(s) Oral at bedtime  chlorhexidine 2% Cloths 1 Application(s) Topical daily  chlorhexidine 2% Cloths 1 Application(s) Topical <User Schedule>  dextrose 5% + sodium chloride 0.45%. 1000 milliLiter(s) IV Continuous <Continuous>  ferrous    sulfate 325 milliGRAM(s) Oral daily  gabapentin 300 milliGRAM(s) Oral three times a day  heparin   Injectable 5000 Unit(s) SubCutaneous every 12 hours  melatonin 3 milliGRAM(s) Oral at bedtime  pantoprazole  Injectable 40 milliGRAM(s) IV Push every 12 hours  polyethylene glycol 3350 17 Gram(s) Oral daily  senna 2 Tablet(s) Oral at bedtime  tamsulosin 0.4 milliGRAM(s) Oral at bedtime    PRN MEDICATIONS  acetaminophen     Tablet .. 650 milliGRAM(s) Oral every 6 hours PRN  oxycodone    5 mG/acetaminophen 325 mG 1 Tablet(s) Oral every 6 hours PRN    VITALS  T(F): 97.3 (10-26-24 @ 04:58), Max: 98 (10-25-24 @ 20:00)  HR: 77 (10-26-24 @ 04:58) (77 - 88)  BP: 109/67 (10-26-24 @ 04:58) (104/57 - 129/69)  RR: 18 (10-26-24 @ 04:58) (16 - 18)  SpO2: 97% (10-26-24 @ 04:58) (97% - 99%)    PHYSICAL EXAM  GENERAL  ( x ) NAD, lying in bed comfortably     (  ) obtunded     (  ) lethargic     (  ) somnolent    HEAD  ( x ) Atraumatic     (  ) hematoma     (  ) laceration (specify location:       )     NECK  ( x ) Supple     (  ) neck stiffness     (  ) nuchal rigidity     (  )  no JVD     (  ) JVD present ( -- cm)    HEART  Rate -->  ( x ) normal rate    (  ) bradycardic    (  ) tachycardic  Rhythm -->  ( x ) regular    (  ) regularly irregular    (  ) irregularly irregular  Murmurs -->  ( x ) normal s1/s2    (  ) systolic murmur    (  ) diastolic murmur    (  ) continuous murmur     (  ) S3 present    (  ) S4 present    LUNGS  ( X )Unlabored respirations     (  ) tachypnea  (  ) B/L air entry     ( x ) decreased breath sounds in:  (location   R  )    (X  ) no adventitious sound     (  ) crackles     (  ) wheezing      (  ) rhonchi      (specify location:       )  (  ) chest wall tenderness (specify location:       )    ABDOMEN  ( X ) Soft     (  ) tense   |   (  ) nondistended     (  ) distended   |   ( X ) +BS     (  ) hypoactive bowel sounds     (  ) hyperactive bowel sounds  (X  ) nontender     (  ) RUQ tenderness     (  ) RLQ tenderness     (  ) LLQ tenderness     (  ) epigastric tenderness     (  ) diffuse tenderness  (  ) Splenomegaly      (  ) Hepatomegaly      (  ) Jaundice     (  ) ecchymosis     EXTREMITIES  (X  ) Normal     (  ) Rash     ( X ) ecchymosis     (  ) varicose veins      (  ) pitting edema     (  ) non-pitting edema   (  ) ulceration     (  ) gangrene:     (location:     )    NERVOUS SYSTEM  (  X) A&Ox3     (  ) confused     (  ) lethargic  CN II-XII:     (  ) Intact     (  ) focal deficits  (Specify:     )   Upper extremities:     (  ) strength X/5     (  ) focal deficit (specify:    )  Lower extremities:     (  ) strength  X/5    (  ) focal deficit (specify:    )    SKIN  ( X ) No rashes or lesions     (  ) maculopapular rash     (  ) pustules     (  ) vesicles     (  ) ulcer     (  ) ecchymosis     (specify location:     )    (  ) Indwelling Walden Catheter   Date insterted:    Reason (  ) Critical illness     (  ) urinary retention    (  ) Accurate Ins/Outs Monitoring     (  ) CMO patient    (  ) Central Line  Date inserted:  Location: (  ) Right IJ   (  ) Left IJ   (  ) Right Fem   (  ) Left Fem    (  ) SPC  (  ) pigtail  (  ) PEG tube  (  ) colostomy  (  ) jejunostomy  (  ) U-Dall    LABS             8.0    8.18  )-----------( 415      ( 10-25-24 @ 20:47 )             25.7     137  |  100  |  22  -------------------------<  170   10-25-24 @ 06:33  5.4  |  26  |  0.9    Ca      9.8     10-25-24 @ 06:33  Mg     2.1     10-25-24 @ 06:33          Urinalysis Basic - ( 25 Oct 2024 06:33 )    Color: x / Appearance: x / SG: x / pH: x  Gluc: 170 mg/dL / Ketone: x  / Bili: x / Urobili: x   Blood: x / Protein: x / Nitrite: x   Leuk Esterase: x / RBC: x / WBC x   Sq Epi: x / Non Sq Epi: x / Bacteria: x          IMAGING

## 2024-10-26 NOTE — PROGRESS NOTE ADULT - ASSESSMENT
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     #Acute on chronic anemia with hx of AVM and JASMIN  tfx to keep hb >7  s/p  venofer   seen by GI   active duodenal bleeding on VCE  awaiting family's decision regarding egd   monitor hb closely        #Malignant mesothelioma (diagnosed 8/2024 )  #Hx Asbestos lung   -s/p VATS pleurodesis, R sided PleurX,s/p drainage  -he will f/u with Dr Epperson for  further Mn as outpt    cont other supportive care.  will f/u

## 2024-10-26 NOTE — PROGRESS NOTE ADULT - SUBJECTIVE AND OBJECTIVE BOX
Gastroenterology progress note:     Patient is a 84y old  Male who presents with a chief complaint of Acute on chronic anemia (25 Oct 2024 19:13)       Admitted on: 10-14-24    We are following the patient for: anemia       Interval History:    No acute events overnight. No bloody BM or melena Hb stable. HD stable. denies abdominal pain    PAST MEDICAL & SURGICAL HISTORY:  DM (diabetes mellitus)      MI (myocardial infarction)      History of CAD (coronary artery disease)      Dyslipidemia      Currently smokes tobacco      Mesothelioma, malignant      Coronary stent patent          MEDICATIONS  (STANDING):  aspirin  chewable 81 milliGRAM(s) Oral daily  atorvastatin 10 milliGRAM(s) Oral at bedtime  chlorhexidine 2% Cloths 1 Application(s) Topical <User Schedule>  chlorhexidine 2% Cloths 1 Application(s) Topical daily  dextrose 5% + sodium chloride 0.45%. 1000 milliLiter(s) (75 mL/Hr) IV Continuous <Continuous>  ferrous    sulfate 325 milliGRAM(s) Oral daily  gabapentin 300 milliGRAM(s) Oral three times a day  melatonin 3 milliGRAM(s) Oral at bedtime  pantoprazole  Injectable 40 milliGRAM(s) IV Push every 12 hours  polyethylene glycol 3350 17 Gram(s) Oral daily  senna 2 Tablet(s) Oral at bedtime  tamsulosin 0.4 milliGRAM(s) Oral at bedtime    MEDICATIONS  (PRN):  acetaminophen     Tablet .. 650 milliGRAM(s) Oral every 6 hours PRN Mild Pain (1 - 3)  oxycodone    5 mG/acetaminophen 325 mG 1 Tablet(s) Oral every 6 hours PRN Moderate Pain (4 - 6)      Allergies  penicillin (Unknown)      Review of Systems:   Cardiovascular:  No Chest Pain, No Palpitations  Respiratory:  No Cough, No Dyspnea  Gastrointestinal:  As described in HPI  Skin:  No Skin Lesions, No Jaundice  Neuro:  No Syncope, No Dizziness    Physical Examination:  T(C): 36.3 (10-26-24 @ 04:58), Max: 36.7 (10-25-24 @ 20:00)  HR: 77 (10-26-24 @ 04:58) (77 - 88)  BP: 109/67 (10-26-24 @ 04:58) (104/57 - 129/69)  RR: 18 (10-26-24 @ 04:58) (16 - 18)  SpO2: 97% (10-26-24 @ 04:58) (97% - 99%)      10-25-24 @ 07:01  -  10-26-24 @ 07:00  --------------------------------------------------------  IN: 1000 mL / OUT: 920 mL / NET: 80 mL        GENERAL: AAOx3, no acute distress.  HEAD:  Atraumatic, Normocephalic  EYES: conjunctiva and sclera clear  NECK: Supple, no JVD or thyromegaly  CHEST/LUNG: Clear to auscultation bilaterally; No wheeze, rhonchi, or rales  HEART: Regular rate and rhythm; normal S1, S2, No murmurs.  ABDOMEN: Soft, nontender, nondistended; Bowel sounds present  NEUROLOGY: No asterixis or tremor.   SKIN: Intact, no jaundice     Data:                        8.0    6.96  )-----------( 415      ( 26 Oct 2024 07:04 )             25.8     Hgb trend:  8.0  10-26-24 @ 07:04  8.0  10-25-24 @ 20:47  8.7  10-25-24 @ 06:33  8.6  10-24-24 @ 06:45        10-26    140  |  103  |  29[H]  ----------------------------<  142[H]  4.7   |  28  |  1.0    Ca    9.4      26 Oct 2024 07:04  Mg     2.1     10-25      Liver panel trend:  TBili <0.2   /   AST 6   /   ALT <5   /   AlkP 87   /   Tptn 5.6   /   Alb 3.0    /   DBili --      10-18             Radiology:

## 2024-10-26 NOTE — PROGRESS NOTE ADULT - ASSESSMENT
1. Acute on chronic anemia s/p 2 PRBC .   recurrent anemia   Hx JASMIN - no overt GI bleed . Hx AVM?  - Seen by GI last admission: regarding endoscopic work up. After discussing risks vs benefits given his advance age and co morbidities he would like to hold off. Offered VCE as outpatient .   - last admission received 5 days of Venofer   Plan:  - Active type and screen   - Switched plavix to aspirin for now  - c/w ferrous sulfate, IV venofovir for 5 days  - GI consult placed, Follow up, as per GI- Patient can follow  or  Follow up with our [facility] located at [address] [place] [address]  - Continue home PPI 40mg BID PO  - avoid Nsaids  - Hb 6.9> 1 unit PRBC (10/18)> repeat 7.8  -Hb 7.4 10/21 -> GI will reach out to family to discuss inpatient endoscopy -> pt son agreed for Video capsule endoscopy scheduled for tomorrow 10/23/2024  -Oncology onboard also recommended endoscopy to rule out GI bleed -> if GI workup is negative will offer bone marrow biopsy   -Patient refused prep yesterday follow up with GI, HB is stable   VCE done on 10/24 ->Duodenal bleed, put on clear liquid diet, PPI BID IV   GI is discussing importance of endoscopy with son     2. Malignant mesothelioma (diagnosed 8/2024 ) with recurrent pleural effusion . Hx Asbestos lung    Severe protein calorie malnutrition (calorie count started - result 10/21)  -s/p VATS pleurodesis, R sided PleurX  * Per NH, patient also refused Pleurx drainage and is requesting to drain it inpatient (gets drained 2-3 times per week)  - Currently on room air (target spo2 92-96)  - Fu heme onc- Dr. Ramírez OP  - Fu palliative- full code  - PleurX drained yesterday   - Aspiration precaution  - Pain control      continue with current diet with ensure    3. HTN/HLD/CAD s/p stents x2 in 2007 (follows Dr Avila)  -c/w home meds     4. Small hypodensity in segment 7 of the liver seen on prior CT.  - RUQ US as OP  -f/u outpt GI    5. Acute Grief vs MDD  - wife passed away a few months ago  - patient refusing medication sometimes and asks to be left alone, no suicide ideas, no ideas to hurt other people.    - Psychiatry eval admission recommended outpt fu

## 2024-10-27 LAB
ALBUMIN SERPL ELPH-MCNC: 3.3 G/DL — LOW (ref 3.5–5.2)
ALP SERPL-CCNC: 106 U/L — SIGNIFICANT CHANGE UP (ref 30–115)
ANION GAP SERPL CALC-SCNC: 10 MMOL/L — SIGNIFICANT CHANGE UP (ref 7–14)
ANION GAP SERPL CALC-SCNC: 10 MMOL/L — SIGNIFICANT CHANGE UP (ref 7–14)
APTT BLD: 31.9 SEC — SIGNIFICANT CHANGE UP (ref 27–39.2)
AST SERPL-CCNC: 7 U/L — SIGNIFICANT CHANGE UP (ref 0–41)
BASOPHILS # BLD AUTO: 0.01 K/UL — SIGNIFICANT CHANGE UP (ref 0–0.2)
BASOPHILS NFR BLD AUTO: 0.1 % — SIGNIFICANT CHANGE UP (ref 0–1)
BILIRUB SERPL-MCNC: <0.2 MG/DL — SIGNIFICANT CHANGE UP (ref 0.2–1.2)
BUN SERPL-MCNC: 23 MG/DL — HIGH (ref 10–20)
BUN SERPL-MCNC: 25 MG/DL — HIGH (ref 10–20)
CALCIUM SERPL-MCNC: 9.3 MG/DL — SIGNIFICANT CHANGE UP (ref 8.4–10.5)
CALCIUM SERPL-MCNC: 9.7 MG/DL — SIGNIFICANT CHANGE UP (ref 8.4–10.5)
CHLORIDE SERPL-SCNC: 102 MMOL/L — SIGNIFICANT CHANGE UP (ref 98–110)
CHLORIDE SERPL-SCNC: 98 MMOL/L — SIGNIFICANT CHANGE UP (ref 98–110)
CO2 SERPL-SCNC: 26 MMOL/L — SIGNIFICANT CHANGE UP (ref 17–32)
CO2 SERPL-SCNC: 26 MMOL/L — SIGNIFICANT CHANGE UP (ref 17–32)
CREAT SERPL-MCNC: 0.9 MG/DL — SIGNIFICANT CHANGE UP (ref 0.7–1.5)
CREAT SERPL-MCNC: 0.9 MG/DL — SIGNIFICANT CHANGE UP (ref 0.7–1.5)
EGFR: 85 ML/MIN/1.73M2 — SIGNIFICANT CHANGE UP
EGFR: 85 ML/MIN/1.73M2 — SIGNIFICANT CHANGE UP
EOSINOPHIL # BLD AUTO: 0.19 K/UL — SIGNIFICANT CHANGE UP (ref 0–0.7)
EOSINOPHIL NFR BLD AUTO: 2.8 % — SIGNIFICANT CHANGE UP (ref 0–8)
GLUCOSE BLDC GLUCOMTR-MCNC: 127 MG/DL — HIGH (ref 70–99)
GLUCOSE BLDC GLUCOMTR-MCNC: 136 MG/DL — HIGH (ref 70–99)
GLUCOSE BLDC GLUCOMTR-MCNC: 159 MG/DL — HIGH (ref 70–99)
GLUCOSE BLDC GLUCOMTR-MCNC: 218 MG/DL — HIGH (ref 70–99)
GLUCOSE SERPL-MCNC: 125 MG/DL — HIGH (ref 70–99)
GLUCOSE SERPL-MCNC: 131 MG/DL — HIGH (ref 70–99)
HCT VFR BLD CALC: 26.4 % — LOW (ref 42–52)
HCT VFR BLD CALC: 28.2 % — LOW (ref 42–52)
HGB BLD-MCNC: 8 G/DL — LOW (ref 14–18)
HGB BLD-MCNC: 8.6 G/DL — LOW (ref 14–18)
IMM GRANULOCYTES NFR BLD AUTO: 0.4 % — HIGH (ref 0.1–0.3)
INR BLD: 1.06 RATIO — SIGNIFICANT CHANGE UP (ref 0.65–1.3)
LYMPHOCYTES # BLD AUTO: 0.76 K/UL — LOW (ref 1.2–3.4)
LYMPHOCYTES # BLD AUTO: 11.1 % — LOW (ref 20.5–51.1)
MCHC RBC-ENTMCNC: 28.9 PG — SIGNIFICANT CHANGE UP (ref 27–31)
MCHC RBC-ENTMCNC: 29.1 PG — SIGNIFICANT CHANGE UP (ref 27–31)
MCHC RBC-ENTMCNC: 30.3 G/DL — LOW (ref 32–37)
MCHC RBC-ENTMCNC: 30.5 G/DL — LOW (ref 32–37)
MCV RBC AUTO: 95.3 FL — HIGH (ref 80–94)
MCV RBC AUTO: 95.3 FL — HIGH (ref 80–94)
MONOCYTES # BLD AUTO: 0.62 K/UL — HIGH (ref 0.1–0.6)
MONOCYTES NFR BLD AUTO: 9 % — SIGNIFICANT CHANGE UP (ref 1.7–9.3)
NEUTROPHILS # BLD AUTO: 5.26 K/UL — SIGNIFICANT CHANGE UP (ref 1.4–6.5)
NEUTROPHILS NFR BLD AUTO: 76.6 % — HIGH (ref 42.2–75.2)
NRBC # BLD: 0 /100 WBCS — SIGNIFICANT CHANGE UP (ref 0–0)
NRBC # BLD: 0 /100 WBCS — SIGNIFICANT CHANGE UP (ref 0–0)
PLATELET # BLD AUTO: 398 K/UL — SIGNIFICANT CHANGE UP (ref 130–400)
PLATELET # BLD AUTO: 436 K/UL — HIGH (ref 130–400)
PMV BLD: 9.6 FL — SIGNIFICANT CHANGE UP (ref 7.4–10.4)
PMV BLD: 9.8 FL — SIGNIFICANT CHANGE UP (ref 7.4–10.4)
POTASSIUM SERPL-MCNC: 5 MMOL/L — SIGNIFICANT CHANGE UP (ref 3.5–5)
POTASSIUM SERPL-MCNC: 5 MMOL/L — SIGNIFICANT CHANGE UP (ref 3.5–5)
POTASSIUM SERPL-SCNC: 5 MMOL/L — SIGNIFICANT CHANGE UP (ref 3.5–5)
POTASSIUM SERPL-SCNC: 5 MMOL/L — SIGNIFICANT CHANGE UP (ref 3.5–5)
PROT SERPL-MCNC: 6.2 G/DL — SIGNIFICANT CHANGE UP (ref 6–8)
PROTHROM AB SERPL-ACNC: 12.1 SEC — SIGNIFICANT CHANGE UP (ref 9.95–12.87)
RBC # BLD: 2.77 M/UL — LOW (ref 4.7–6.1)
RBC # BLD: 2.96 M/UL — LOW (ref 4.7–6.1)
RBC # FLD: 17.6 % — HIGH (ref 11.5–14.5)
RBC # FLD: 17.8 % — HIGH (ref 11.5–14.5)
SODIUM SERPL-SCNC: 134 MMOL/L — LOW (ref 135–146)
SODIUM SERPL-SCNC: 138 MMOL/L — SIGNIFICANT CHANGE UP (ref 135–146)
WBC # BLD: 6.36 K/UL — SIGNIFICANT CHANGE UP (ref 4.8–10.8)
WBC # BLD: 6.87 K/UL — SIGNIFICANT CHANGE UP (ref 4.8–10.8)
WBC # FLD AUTO: 6.36 K/UL — SIGNIFICANT CHANGE UP (ref 4.8–10.8)
WBC # FLD AUTO: 6.87 K/UL — SIGNIFICANT CHANGE UP (ref 4.8–10.8)

## 2024-10-27 PROCEDURE — 99233 SBSQ HOSP IP/OBS HIGH 50: CPT

## 2024-10-27 PROCEDURE — 99232 SBSQ HOSP IP/OBS MODERATE 35: CPT

## 2024-10-27 PROCEDURE — 71045 X-RAY EXAM CHEST 1 VIEW: CPT | Mod: 26

## 2024-10-27 RX ADMIN — Medication 325 MILLIGRAM(S): at 11:35

## 2024-10-27 RX ADMIN — Medication 81 MILLIGRAM(S): at 11:35

## 2024-10-27 RX ADMIN — Medication 2 TABLET(S): at 22:26

## 2024-10-27 RX ADMIN — GABAPENTIN 300 MILLIGRAM(S): 300 CAPSULE ORAL at 05:36

## 2024-10-27 RX ADMIN — Medication 3 MILLIGRAM(S): at 22:25

## 2024-10-27 RX ADMIN — Medication 0.4 MILLIGRAM(S): at 22:25

## 2024-10-27 RX ADMIN — CHLORHEXIDINE GLUCONATE 1 APPLICATION(S): 40 SOLUTION TOPICAL at 05:36

## 2024-10-27 RX ADMIN — PANTOPRAZOLE SODIUM 40 MILLIGRAM(S): 40 TABLET, DELAYED RELEASE ORAL at 17:14

## 2024-10-27 RX ADMIN — Medication 10 MILLIGRAM(S): at 22:25

## 2024-10-27 RX ADMIN — PANTOPRAZOLE SODIUM 40 MILLIGRAM(S): 40 TABLET, DELAYED RELEASE ORAL at 05:41

## 2024-10-27 RX ADMIN — Medication 2: at 11:36

## 2024-10-27 RX ADMIN — GABAPENTIN 300 MILLIGRAM(S): 300 CAPSULE ORAL at 22:25

## 2024-10-27 RX ADMIN — CHLORHEXIDINE GLUCONATE 1 APPLICATION(S): 40 SOLUTION TOPICAL at 11:35

## 2024-10-27 RX ADMIN — GABAPENTIN 300 MILLIGRAM(S): 300 CAPSULE ORAL at 13:00

## 2024-10-27 RX ADMIN — Medication 650 MILLIGRAM(S): at 23:00

## 2024-10-27 RX ADMIN — POLYETHYLENE GLYCOL 3350 17 GRAM(S): 17 POWDER, FOR SOLUTION ORAL at 11:36

## 2024-10-27 NOTE — PROGRESS NOTE ADULT - ASSESSMENT
· Assessment	  85 y/o man  PMHx of HLD, DM, CAD s/p stents x2 in 2007, pt of Dr Avila, hx  asbestosis of lung, diagnosed w malignant mesothelioma (8/2024) and s/p VATS pleurodesis, R sided PleurX, hx AVM/GIB and JASMIN, sent in by NH for low hemoglobin.     #Acute on chronic anemia with hx of AVM and JASMIN  tfx to keep hb >7  s/p  venofer   seen by GI   active duodenal bleeding on VCE  awaiting for egd    monitor hb closely        #Malignant mesothelioma (diagnosed 8/2024 )  #Hx Asbestos lung   -s/p VATS pleurodesis, R sided PleurX,s/p drainage  -he will f/u with Dr Epperson for  further Mn as outpt    cont other supportive care.  will f/u

## 2024-10-27 NOTE — CONSULT NOTE ADULT - SUBJECTIVE AND OBJECTIVE BOX
Patient is a 84y old  Male who presents with a chief complaint of Acute on chronic anemia (25 Oct 2024 19:13)      HPI:  HPI: 83 y/o man  PMHx of HLD, DM, CAD s/p stents x2 in 2007, pt of Dr Avila, hx  asbestosis of lung, diagnosed w malignant mesothelioma (8/2024) and s/p VATS pleurodesis, R sided PleurX, hx AVM/GIB and JASMIN, sent in by NH for low hemoglobin. Per NH, patient also refused Pleurx drainage today and is requesting to drain it inpatient (gets drained 2-3 times per week). Of note, patient had a recent admission in September for anemia for which he received 2 units prbc, IV Venofer and was recommended EGD/[place] but given his advance age and co morbidities decided to hold off. Patient is a poor historian. Uses wheelchair at baseline. Denies fever chills, shortness of breath, cough, chest pain, leg swelling, hematemesis, hematochezia.     Vital Signs Last 24 Hrs  T(C): 36.4 (15 Oct 2024 01:09), Max: 36.9 (14 Oct 2024 22:51)  T(F): 97.5 (15 Oct 2024 01:09), Max: 98.4 (14 Oct 2024 22:51)  HR: 100 (15 Oct 2024 01:09) (89 - 105)  BP: 132/74 (15 Oct 2024 01:09) (104/64 - 132/74)  RR: 18 (15 Oct 2024 01:09) (17 - 19)  SpO2: 97% (15 Oct 2024 01:09) (93% - 97%)    Parameters below as of 15 Oct 2024 01:09  Patient On (Oxygen Delivery Method): room air    Labs: Hgb 6.4 , Platelets 500 , Cr 1.2 (baseline)   CXR: Worsening R pleural effusion   EKG: NSR with PACs    Admitted for anemia      (15 Oct 2024 01:16)      PAST MEDICAL & SURGICAL HISTORY:  DM (diabetes mellitus)      MI (myocardial infarction)      History of CAD (coronary artery disease)      Dyslipidemia      Currently smokes tobacco      Mesothelioma, malignant      Coronary stent patent          PREVIOUS DIAGNOSTIC TESTING:      ECHO  FINDINGS:    STRESS  FINDINGS:    CATHETERIZATION  FINDINGS:    MEDICATIONS  (STANDING):  aspirin  chewable 81 milliGRAM(s) Oral daily  atorvastatin 10 milliGRAM(s) Oral at bedtime  chlorhexidine 2% Cloths 1 Application(s) Topical <User Schedule>  chlorhexidine 2% Cloths 1 Application(s) Topical daily  dextrose 5% + sodium chloride 0.45%. 1000 milliLiter(s) (75 mL/Hr) IV Continuous <Continuous>  dextrose 5%. 1000 milliLiter(s) (100 mL/Hr) IV Continuous <Continuous>  dextrose 5%. 1000 milliLiter(s) (50 mL/Hr) IV Continuous <Continuous>  dextrose 50% Injectable 12.5 Gram(s) IV Push once  dextrose 50% Injectable 25 Gram(s) IV Push once  dextrose 50% Injectable 25 Gram(s) IV Push once  ferrous    sulfate 325 milliGRAM(s) Oral daily  gabapentin 300 milliGRAM(s) Oral three times a day  glucagon  Injectable 1 milliGRAM(s) IntraMuscular once  insulin lispro (ADMELOG) corrective regimen sliding scale   SubCutaneous three times a day before meals  melatonin 3 milliGRAM(s) Oral at bedtime  pantoprazole  Injectable 40 milliGRAM(s) IV Push every 12 hours  polyethylene glycol 3350 17 Gram(s) Oral daily  senna 2 Tablet(s) Oral at bedtime  tamsulosin 0.4 milliGRAM(s) Oral at bedtime    MEDICATIONS  (PRN):  acetaminophen     Tablet .. 650 milliGRAM(s) Oral every 6 hours PRN Mild Pain (1 - 3)  dextrose Oral Gel 15 Gram(s) Oral once PRN Blood Glucose LESS THAN 70 milliGRAM(s)/deciliter  oxycodone    5 mG/acetaminophen 325 mG 1 Tablet(s) Oral every 6 hours PRN Moderate Pain (4 - 6)      FAMILY HISTORY:      SOCIAL HISTORY:  CIGARETTES:    ALCOHOL:    REVIEW OF SYSTEMS:  CONSTITUTIONAL: No fever, weight loss, or fatigue  NECK: No pain or stiffness  RESPIRATORY: No cough, wheezing, chills or hemoptysis; No shortness of breath  CARDIOVASCULAR: No chest pain, palpitations, dizziness, or leg swelling  GASTROINTESTINAL: No abdominal or epigastric pain. No nausea, vomiting, or hematemesis; No diarrhea or constipation. No melena or hematochezia.  GENITOURINARY: No dysuria, frequency, hematuria, or incontinence  NEUROLOGICAL: No headaches, memory loss, loss of strength, numbness, or tremors  SKIN: No itching, burning, rashes, or lesions   ENDOCRINE: No heat or cold intolerance; No hair loss  MUSCULOSKELETAL: No joint pain or swelling; No muscle, back, or extremity pain  HEME/LYMPH: No easy bruising, or bleeding gums          Vital Signs Last 24 Hrs  T(C): 36.4 (27 Oct 2024 13:10), Max: 36.4 (26 Oct 2024 20:07)  T(F): 97.5 (27 Oct 2024 13:10), Max: 97.6 (27 Oct 2024 04:47)  HR: 74 (27 Oct 2024 13:10) (69 - 78)  BP: 129/66 (27 Oct 2024 13:10) (121/70 - 129/66)  BP(mean): 87 (27 Oct 2024 04:47) (87 - 87)  RR: 18 (27 Oct 2024 13:10) (16 - 18)  SpO2: 96% (27 Oct 2024 04:47) (96% - 98%)    Parameters below as of 27 Oct 2024 04:47  Patient On (Oxygen Delivery Method): room air            PHYSICAL EXAM:  GENERAL: NAD, well-groomed, well-developed  HEAD:  Atraumatic, Normocephalic  NECK: Supple, No JVD, Normal thyroid  NERVOUS SYSTEM:  Alert & Oriented X3, Good concentration  CHEST/LUNG: Clear to percussion bilaterally; No rales, rhonchi, wheezing, or rubs  HEART: Regular rate and rhythm; No murmurs, rubs, or gallops  ABDOMEN: Soft, Nontender, Nondistended; Bowel sounds present  EXTREMITIES:  2+ Peripheral Pulses, No clubbing, cyanosis, or edema  SKIN: No rashes or lesions    INTERPRETATION OF TELEMETRY:    ECG:    I&O's Detail    26 Oct 2024 07:01  -  27 Oct 2024 07:00  --------------------------------------------------------  IN:  Total IN: 0 mL    OUT:    Indwelling Catheter - Urethral (mL): 625 mL  Total OUT: 625 mL    Total NET: -625 mL          LABS:                        8.6    6.87  )-----------( 398      ( 27 Oct 2024 06:46 )             28.2     10-27    138  |  102  |  25[H]  ----------------------------<  125[H]  5.0   |  26  |  0.9    Ca    9.3      27 Oct 2024 06:46            Urinalysis Basic - ( 27 Oct 2024 06:46 )    Color: x / Appearance: x / SG: x / pH: x  Gluc: 125 mg/dL / Ketone: x  / Bili: x / Urobili: x   Blood: x / Protein: x / Nitrite: x   Leuk Esterase: x / RBC: x / WBC x   Sq Epi: x / Non Sq Epi: x / Bacteria: x      I&O's Summary    26 Oct 2024 07:01  -  27 Oct 2024 07:00  --------------------------------------------------------  IN: 0 mL / OUT: 625 mL / NET: -625 mL        RADIOLOGY & ADDITIONAL STUDIES:           Patient is a 84y old  Male who presents with a chief complaint of Acute on chronic anemia (25 Oct 2024 19:13)      HPI:  HPI: 85 y/o man  PMHx of HLD, DM, CAD s/p stents x2 in 2007, pt of Dr Avila, hx  asbestosis of lung, diagnosed w malignant mesothelioma (8/2024) and s/p VATS pleurodesis, R sided PleurX, hx AVM/GIB and JASMIN, sent in by NH for low hemoglobin. Per NH, patient also refused Pleurx drainage today and is requesting to drain it inpatient (gets drained 2-3 times per week). Of note, patient had a recent admission in September for anemia for which he received 2 units prbc, IV Venofer and was recommended EGD/[place] but given his advance age and co morbidities decided to hold off. Patient is a poor historian. Uses wheelchair at baseline. Denies fever chills, shortness of breath, cough, chest pain, leg swelling, hematemesis, hematochezia.     Vital Signs Last 24 Hrs  T(C): 36.4 (15 Oct 2024 01:09), Max: 36.9 (14 Oct 2024 22:51)  T(F): 97.5 (15 Oct 2024 01:09), Max: 98.4 (14 Oct 2024 22:51)  HR: 100 (15 Oct 2024 01:09) (89 - 105)  BP: 132/74 (15 Oct 2024 01:09) (104/64 - 132/74)  RR: 18 (15 Oct 2024 01:09) (17 - 19)  SpO2: 97% (15 Oct 2024 01:09) (93% - 97%)    Parameters below as of 15 Oct 2024 01:09  Patient On (Oxygen Delivery Method): room air    Labs: Hgb 6.4 , Platelets 500 , Cr 1.2 (baseline)   CXR: Worsening R pleural effusion   EKG: NSR with PACs    Admitted for anemia      (15 Oct 2024 01:16)      PAST MEDICAL & SURGICAL HISTORY:  DM (diabetes mellitus)      MI (myocardial infarction)      History of CAD (coronary artery disease)      Dyslipidemia      Currently smokes tobacco      Mesothelioma, malignant      Coronary stent patent          PREVIOUS DIAGNOSTIC TESTING:      ECHO  FINDINGS:    STRESS  FINDINGS:    CATHETERIZATION  FINDINGS:    MEDICATIONS  (STANDING):  aspirin  chewable 81 milliGRAM(s) Oral daily  atorvastatin 10 milliGRAM(s) Oral at bedtime  chlorhexidine 2% Cloths 1 Application(s) Topical <User Schedule>  chlorhexidine 2% Cloths 1 Application(s) Topical daily  dextrose 5% + sodium chloride 0.45%. 1000 milliLiter(s) (75 mL/Hr) IV Continuous <Continuous>  dextrose 5%. 1000 milliLiter(s) (100 mL/Hr) IV Continuous <Continuous>  dextrose 5%. 1000 milliLiter(s) (50 mL/Hr) IV Continuous <Continuous>  dextrose 50% Injectable 12.5 Gram(s) IV Push once  dextrose 50% Injectable 25 Gram(s) IV Push once  dextrose 50% Injectable 25 Gram(s) IV Push once  ferrous    sulfate 325 milliGRAM(s) Oral daily  gabapentin 300 milliGRAM(s) Oral three times a day  glucagon  Injectable 1 milliGRAM(s) IntraMuscular once  insulin lispro (ADMELOG) corrective regimen sliding scale   SubCutaneous three times a day before meals  melatonin 3 milliGRAM(s) Oral at bedtime  pantoprazole  Injectable 40 milliGRAM(s) IV Push every 12 hours  polyethylene glycol 3350 17 Gram(s) Oral daily  senna 2 Tablet(s) Oral at bedtime  tamsulosin 0.4 milliGRAM(s) Oral at bedtime    MEDICATIONS  (PRN):  acetaminophen     Tablet .. 650 milliGRAM(s) Oral every 6 hours PRN Mild Pain (1 - 3)  dextrose Oral Gel 15 Gram(s) Oral once PRN Blood Glucose LESS THAN 70 milliGRAM(s)/deciliter  oxycodone    5 mG/acetaminophen 325 mG 1 Tablet(s) Oral every 6 hours PRN Moderate Pain (4 - 6)      FAMILY HISTORY:      SOCIAL HISTORY:  CIGARETTES:    ALCOHOL:    REVIEW OF SYSTEMS:  CONSTITUTIONAL: No fever, weight loss, or fatigue  NECK: No pain or stiffness  RESPIRATORY: No cough, wheezing, chills or hemoptysis; No shortness of breath  CARDIOVASCULAR: No chest pain, palpitations, dizziness, or leg swelling  GASTROINTESTINAL: No abdominal or epigastric pain. No nausea, vomiting, or hematemesis; No diarrhea or constipation. No melena or hematochezia.  GENITOURINARY: No dysuria, frequency, hematuria, or incontinence  NEUROLOGICAL: No headaches, memory loss, loss of strength, numbness, or tremors  SKIN: No itching, burning, rashes, or lesions   ENDOCRINE: No heat or cold intolerance; No hair loss  MUSCULOSKELETAL: No joint pain or swelling; No muscle, back, or extremity pain  HEME/LYMPH: No easy bruising, or bleeding gums          Vital Signs Last 24 Hrs  T(C): 36.4 (27 Oct 2024 13:10), Max: 36.4 (26 Oct 2024 20:07)  T(F): 97.5 (27 Oct 2024 13:10), Max: 97.6 (27 Oct 2024 04:47)  HR: 74 (27 Oct 2024 13:10) (69 - 78)  BP: 129/66 (27 Oct 2024 13:10) (121/70 - 129/66)  BP(mean): 87 (27 Oct 2024 04:47) (87 - 87)  RR: 18 (27 Oct 2024 13:10) (16 - 18)  SpO2: 96% (27 Oct 2024 04:47) (96% - 98%)    Parameters below as of 27 Oct 2024 04:47  Patient On (Oxygen Delivery Method): room air            PHYSICAL EXAM:  GENERAL: NAD, well-groomed, well-developed  HEAD:  Atraumatic, Normocephalic  NECK: Supple, No JVD, Normal thyroid  NERVOUS SYSTEM:  Alert & Oriented X3, Good concentration  CHEST/LUNG: Clear to percussion bilaterally; No rales, rhonchi, wheezing, or rubs  HEART: Regular rate and rhythm. + SE murmur   ABDOMEN: Soft, Nontender, Nondistended; Bowel sounds present  EXTREMITIES:  No clubbing, cyanosis, or edema  SKIN: No rashes or lesions      ECG: Sinus rhythm     I&O's Detail    26 Oct 2024 07:01  -  27 Oct 2024 07:00  --------------------------------------------------------  IN:  Total IN: 0 mL    OUT:    Indwelling Catheter - Urethral (mL): 625 mL  Total OUT: 625 mL    Total NET: -625 mL          LABS:                        8.6    6.87  )-----------( 398      ( 27 Oct 2024 06:46 )             28.2     10-27    138  |  102  |  25[H]  ----------------------------<  125[H]  5.0   |  26  |  0.9    Ca    9.3      27 Oct 2024 06:46            Urinalysis Basic - ( 27 Oct 2024 06:46 )    Color: x / Appearance: x / SG: x / pH: x  Gluc: 125 mg/dL / Ketone: x  / Bili: x / Urobili: x   Blood: x / Protein: x / Nitrite: x   Leuk Esterase: x / RBC: x / WBC x   Sq Epi: x / Non Sq Epi: x / Bacteria: x      I&O's Summary    26 Oct 2024 07:01  -  27 Oct 2024 07:00  --------------------------------------------------------  IN: 0 mL / OUT: 625 mL / NET: -625 mL        RADIOLOGY & ADDITIONAL STUDIES:

## 2024-10-27 NOTE — PROGRESS NOTE ADULT - ASSESSMENT
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       Acute on chronic microcytic anemia with h/o iron deficiency.  VCE done 10/24 showing active bleeding in duodenum.  H/H remained stable.  Continue PPI 40 mg IV twice daily for now.  Keep hemoglobin more than 8–PRBC as needed.  History of mesothelioma/Hx Asbestos lung -chronic Pleurx which needs to be drained weekly. Pt to will f/u with Dr Epperson for  further Management as outpt  Severe protein-calorie malnutrition.  Continue current medical management for active chronic comorbid conditions.  DVT Prophylaxis as appropriate  Supportive care including activity, diet, goals of care discussed in AM rounds.  Discussed with  / in AM rounds  Handoff:- Patient's son agreeable to EGD.Plan for EGD tomorrow 10/28 by GI

## 2024-10-27 NOTE — PROGRESS NOTE ADULT - SUBJECTIVE AND OBJECTIVE BOX
Gastroenterology progress note:     Patient is a 84y old  Male who presents with a chief complaint of Acute on chronic anemia (25 Oct 2024 19:13)       Admitted on: 10-14-24    We are following the patient for: anemia       Interval History:    No acute events overnight. Hb stable. HD stable. Denies abdominal pain.       PAST MEDICAL & SURGICAL HISTORY:  DM (diabetes mellitus)      MI (myocardial infarction)      History of CAD (coronary artery disease)      Dyslipidemia      Currently smokes tobacco      Mesothelioma, malignant      Coronary stent patent          MEDICATIONS  (STANDING):  aspirin  chewable 81 milliGRAM(s) Oral daily  atorvastatin 10 milliGRAM(s) Oral at bedtime  chlorhexidine 2% Cloths 1 Application(s) Topical <User Schedule>  chlorhexidine 2% Cloths 1 Application(s) Topical daily  dextrose 5% + sodium chloride 0.45%. 1000 milliLiter(s) (75 mL/Hr) IV Continuous <Continuous>  dextrose 5%. 1000 milliLiter(s) (50 mL/Hr) IV Continuous <Continuous>  dextrose 5%. 1000 milliLiter(s) (100 mL/Hr) IV Continuous <Continuous>  dextrose 50% Injectable 12.5 Gram(s) IV Push once  dextrose 50% Injectable 25 Gram(s) IV Push once  dextrose 50% Injectable 25 Gram(s) IV Push once  ferrous    sulfate 325 milliGRAM(s) Oral daily  gabapentin 300 milliGRAM(s) Oral three times a day  glucagon  Injectable 1 milliGRAM(s) IntraMuscular once  insulin lispro (ADMELOG) corrective regimen sliding scale   SubCutaneous three times a day before meals  melatonin 3 milliGRAM(s) Oral at bedtime  pantoprazole  Injectable 40 milliGRAM(s) IV Push every 12 hours  polyethylene glycol 3350 17 Gram(s) Oral daily  senna 2 Tablet(s) Oral at bedtime  tamsulosin 0.4 milliGRAM(s) Oral at bedtime    MEDICATIONS  (PRN):  acetaminophen     Tablet .. 650 milliGRAM(s) Oral every 6 hours PRN Mild Pain (1 - 3)  dextrose Oral Gel 15 Gram(s) Oral once PRN Blood Glucose LESS THAN 70 milliGRAM(s)/deciliter  oxycodone    5 mG/acetaminophen 325 mG 1 Tablet(s) Oral every 6 hours PRN Moderate Pain (4 - 6)      Allergies  penicillin (Unknown)      Review of Systems:   Cardiovascular:  No Chest Pain, No Palpitations  Respiratory:  No Cough, No Dyspnea  Gastrointestinal:  As described in HPI  Skin:  No Skin Lesions, No Jaundice  Neuro:  No Syncope, No Dizziness    Physical Examination:  T(C): 36.4 (10-27-24 @ 04:47), Max: 36.4 (10-26-24 @ 13:15)  HR: 78 (10-27-24 @ 04:47) (69 - 79)  BP: 121/70 (10-27-24 @ 04:47) (121/70 - 134/78)  RR: 18 (10-27-24 @ 04:47) (16 - 18)  SpO2: 96% (10-27-24 @ 04:47) (96% - 98%)      10-26-24 @ 07:01  -  10-27-24 @ 07:00  --------------------------------------------------------  IN: 0 mL / OUT: 625 mL / NET: -625 mL        GENERAL: AAOx3, no acute distress.  HEAD:  Atraumatic, Normocephalic  EYES: conjunctiva and sclera clear  NECK: Supple, no JVD or thyromegaly  CHEST/LUNG: Clear to auscultation bilaterally; No wheeze, rhonchi, or rales  HEART: Regular rate and rhythm; normal S1, S2, No murmurs.  ABDOMEN: Soft, nontender, nondistended; Bowel sounds present  NEUROLOGY: No asterixis or tremor.   SKIN: Intact, no jaundice     Data:                        8.6    6.87  )-----------( 398      ( 27 Oct 2024 06:46 )             28.2     Hgb trend:  8.6  10-27-24 @ 06:46  8.6  10-26-24 @ 17:00  8.0  10-26-24 @ 07:04  8.0  10-25-24 @ 20:47  8.7  10-25-24 @ 06:33        10-27    138  |  102  |  25[H]  ----------------------------<  125[H]  5.0   |  26  |  0.9    Ca    9.3      27 Oct 2024 06:46      Liver panel trend:  TBili <0.2   /   AST 6   /   ALT <5   /   AlkP 87   /   Tptn 5.6   /   Alb 3.0    /   DBili --      10-18             Radiology:

## 2024-10-27 NOTE — PROGRESS NOTE ADULT - ASSESSMENT
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 patient had a recent admission in September for anemia for which he received 2 units prbc, IV Venofer and was recommended EGD/[place] but given his advance age and co morbidities family decided to hold off. Patient is a poor historian. GI was consulted for anemia       #Acute on chronic macrocytic anemia with JASMIN - no overt GI bleed  - Hemodynamically stable & no active bleeding  - On plavix   - Iron :31, %sat: 10 TIBC 325 ferritin 89 while on iron  - reported AVMs? endoscopic work up with  per grand son Nelli Lopez    VCE done 10/24 showing active bleed in the duodenum  Hb stable today  HD stable     #Rec  - Patient's son agreeable to EGD. Will plan for EGD tomorrow 10/28  - Request cardiology and pulmonology clearance   - NPO after midnight  - Please obtain preop labs (coags, CBC, CMP, mag) night before procedure   - Clear liquid diet  - Recommend PPI 40mg IV BID  - Please target Hb  >8  - Please avoid any NSAIDs  - will follow closely  - If unstable bleed please call GI STAT    #Small hypodensity in segment 7 of the liver seen on prior CT.  -Consider RUQ US as OP  -f/u w/ primary GI

## 2024-10-27 NOTE — PROGRESS NOTE ADULT - SUBJECTIVE AND OBJECTIVE BOX
Patient is a 84y old  Male who presents with a chief complaint of Acute on chronic anemia (25 Oct 2024 19:13)       Pt is seen and examined  pt is awake and lying in bed  pt seems comfortable and denies any complaints at this time          ROS:  Negative except for:weakness    MEDICATIONS  (STANDING):  aspirin  chewable 81 milliGRAM(s) Oral daily  atorvastatin 10 milliGRAM(s) Oral at bedtime  chlorhexidine 2% Cloths 1 Application(s) Topical <User Schedule>  chlorhexidine 2% Cloths 1 Application(s) Topical daily  dextrose 5% + sodium chloride 0.45%. 1000 milliLiter(s) (75 mL/Hr) IV Continuous <Continuous>  dextrose 5%. 1000 milliLiter(s) (50 mL/Hr) IV Continuous <Continuous>  dextrose 5%. 1000 milliLiter(s) (100 mL/Hr) IV Continuous <Continuous>  dextrose 50% Injectable 12.5 Gram(s) IV Push once  dextrose 50% Injectable 25 Gram(s) IV Push once  dextrose 50% Injectable 25 Gram(s) IV Push once  ferrous    sulfate 325 milliGRAM(s) Oral daily  gabapentin 300 milliGRAM(s) Oral three times a day  glucagon  Injectable 1 milliGRAM(s) IntraMuscular once  insulin lispro (ADMELOG) corrective regimen sliding scale   SubCutaneous three times a day before meals  melatonin 3 milliGRAM(s) Oral at bedtime  pantoprazole  Injectable 40 milliGRAM(s) IV Push every 12 hours  polyethylene glycol 3350 17 Gram(s) Oral daily  senna 2 Tablet(s) Oral at bedtime  tamsulosin 0.4 milliGRAM(s) Oral at bedtime    MEDICATIONS  (PRN):  acetaminophen     Tablet .. 650 milliGRAM(s) Oral every 6 hours PRN Mild Pain (1 - 3)  dextrose Oral Gel 15 Gram(s) Oral once PRN Blood Glucose LESS THAN 70 milliGRAM(s)/deciliter  oxycodone    5 mG/acetaminophen 325 mG 1 Tablet(s) Oral every 6 hours PRN Moderate Pain (4 - 6)      Allergies    penicillin (Unknown)    Intolerances        Vital Signs Last 24 Hrs  T(C): 36.4 (27 Oct 2024 13:10), Max: 36.4 (26 Oct 2024 20:07)  T(F): 97.5 (27 Oct 2024 13:10), Max: 97.6 (27 Oct 2024 04:47)  HR: 74 (27 Oct 2024 13:10) (69 - 78)  BP: 129/66 (27 Oct 2024 13:10) (121/70 - 129/66)  BP(mean): 87 (27 Oct 2024 04:47) (87 - 87)  RR: 18 (27 Oct 2024 13:10) (16 - 18)  SpO2: 96% (27 Oct 2024 04:47) (96% - 98%)    Parameters below as of 27 Oct 2024 04:47  Patient On (Oxygen Delivery Method): room air        PHYSICAL EXAM  General: adult in NAD  HEENT: clear oropharynx, anicteric sclera, pink conjunctiva  Neck: supple  CV: normal S1/S2 with no murmur rubs or gallops  Lungs: positive air movement b/l ant lungs,clear to auscultation, no wheezes, no rales  Abdomen: soft non-tender non-distended, no hepatosplenomegaly  Ext: no clubbing cyanosis or edema  Skin: no rashes and no petechiae  Neuro: alert and oriented X 2, no focal deficits  LABS:                          8.6    6.87  )-----------( 398      ( 27 Oct 2024 06:46 )             28.2         Mean Cell Volume : 95.3 fL  Mean Cell Hemoglobin : 29.1 pg  Mean Cell Hemoglobin Concentration : 30.5 g/dL  Auto Neutrophil # : 5.26 K/uL  Auto Lymphocyte # : 0.76 K/uL  Auto Monocyte # : 0.62 K/uL  Auto Eosinophil # : 0.19 K/uL  Auto Basophil # : 0.01 K/uL  Auto Neutrophil % : 76.6 %  Auto Lymphocyte % : 11.1 %  Auto Monocyte % : 9.0 %  Auto Eosinophil % : 2.8 %  Auto Basophil % : 0.1 %    Serial CBC's  10-27 @ 06:46  Hct-28.2 / Hgb-8.6 / Plat-398 / RBC-2.96 / WBC-6.87          Serial CBC's  10-26 @ 17:00  Hct-28.1 / Hgb-8.6 / Plat-427 / RBC-2.93 / WBC-5.74          Serial CBC's  10-26 @ 07:04  Hct-25.8 / Hgb-8.0 / Plat-415 / RBC-2.71 / WBC-6.96          Serial CBC's  10-25 @ 20:47  Hct-25.7 / Hgb-8.0 / Plat-415 / RBC-2.75 / WBC-8.18          Serial CBC's  10-25 @ 06:33  Hct-27.8 / Hgb-8.7 / Plat-425 / RBC-2.99 / WBC-7.31            10-27    138  |  102  |  25[H]  ----------------------------<  125[H]  5.0   |  26  |  0.9    Ca    9.3      27 Oct 2024 06:46            WBC Count: 6.87 K/uL (10-27-24 @ 06:46)  Hemoglobin: 8.6 g/dL (10-27-24 @ 06:46)  Hematocrit: 28.2 % (10-27-24 @ 06:46)  Platelet Count - Automated: 398 K/uL (10-27-24 @ 06:46)  WBC Count: 5.74 K/uL (10-26-24 @ 17:00)  Hemoglobin: 8.6 g/dL (10-26-24 @ 17:00)  Hematocrit: 28.1 % (10-26-24 @ 17:00)  Platelet Count - Automated: 427 K/uL (10-26-24 @ 17:00)  WBC Count: 6.96 K/uL (10-26-24 @ 07:04)  Hemoglobin: 8.0 g/dL (10-26-24 @ 07:04)  Hematocrit: 25.8 % (10-26-24 @ 07:04)  Platelet Count - Automated: 415 K/uL (10-26-24 @ 07:04)  WBC Count: 8.18 K/uL (10-25-24 @ 20:47)  Hemoglobin: 8.0 g/dL (10-25-24 @ 20:47)  Hematocrit: 25.7 % (10-25-24 @ 20:47)  Platelet Count - Automated: 415 K/uL (10-25-24 @ 20:47)  WBC Count: 7.31 K/uL (10-25-24 @ 06:33)  Hemoglobin: 8.7 g/dL (10-25-24 @ 06:33)  Hematocrit: 27.8 % (10-25-24 @ 06:33)  Platelet Count - Automated: 425 K/uL (10-25-24 @ 06:33)  WBC Count: 7.13 K/uL (10-24-24 @ 06:45)  Hemoglobin: 8.6 g/dL (10-24-24 @ 06:45)  Hematocrit: 27.5 % (10-24-24 @ 06:45)  Platelet Count - Automated: 428 K/uL (10-24-24 @ 06:45)  WBC Count: 7.80 K/uL (10-23-24 @ 05:46)  Hemoglobin: 8.0 g/dL (10-23-24 @ 05:46)  Hematocrit: 26.2 % (10-23-24 @ 05:46)  Platelet Count - Automated: 403 K/uL (10-23-24 @ 05:46)  WBC Count: 6.57 K/uL (10-22-24 @ 22:23)  Hemoglobin: 7.7 g/dL (10-22-24 @ 22:23)  Hematocrit: 24.9 % (10-22-24 @ 22:23)  Platelet Count - Automated: 381 K/uL (10-22-24 @ 22:23)  WBC Count: 8.15 K/uL (10-22-24 @ 06:55)  Hemoglobin: 7.9 g/dL (10-22-24 @ 06:55)  Hematocrit: 25.1 % (10-22-24 @ 06:55)  Platelet Count - Automated: 374 K/uL (10-22-24 @ 06:55)  WBC Count: 6.74 K/uL (10-21-24 @ 07:03)  Hemoglobin: 7.4 g/dL (10-21-24 @ 07:03)  Hematocrit: 24.2 % (10-21-24 @ 07:03)  Platelet Count - Automated: 374 K/uL (10-21-24 @ 07:03)  WBC Count: 9.20 K/uL (10-20-24 @ 06:26)  Hemoglobin: 7.8 g/dL (10-20-24 @ 06:26)  Hematocrit: 25.0 % (10-20-24 @ 06:26)  Platelet Count - Automated: 366 K/uL (10-20-24 @ 06:26)  WBC Count: 14.03 K/uL (10-19-24 @ 16:00)  Hemoglobin: 7.9 g/dL (10-19-24 @ 16:00)  Hematocrit: 25.7 % (10-19-24 @ 16:00)  Platelet Count - Automated: 448 K/uL (10-19-24 @ 16:00)  WBC Count: 8.52 K/uL (10-19-24 @ 06:40)  Hemoglobin: 7.8 g/dL (10-19-24 @ 06:40)  Hematocrit: 24.6 % (10-19-24 @ 06:40)  Platelet Count - Automated: 396 K/uL (10-19-24 @ 06:40)  Reticulocyte Percent: 3.5 % (10-19-24 @ 06:40)  WBC Count: 9.46 K/uL (10-18-24 @ 11:19)  Hemoglobin: 6.9 g/dL (10-18-24 @ 11:19)  Hematocrit: 22.8 % (10-18-24 @ 11:19)  Platelet Count - Automated: 429 K/uL (10-18-24 @ 11:19)                BLOOD SMEAR INTERPRETATION:       RADIOLOGY & ADDITIONAL STUDIES:

## 2024-10-27 NOTE — CONSULT NOTE ADULT - ASSESSMENT
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 Per NH, patient also refused Pleurx drainage today and is requesting to drain it inpatient (gets drained 2-3 times per week).    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  Dementia         Continue current care as per medicine and GI   Ok to hold Plavix but continue ASA 81 EC   Physical therapy/Rehab   GI and Pulmonary follow up   DVT/GI prophylaxis   Maitain Hb > 8  Will F/U

## 2024-10-28 ENCOUNTER — TRANSCRIPTION ENCOUNTER (OUTPATIENT)
Age: 84
End: 2024-10-28

## 2024-10-28 LAB
A1C WITH ESTIMATED AVERAGE GLUCOSE RESULT: 6.1 % — HIGH (ref 4–5.6)
ALT FLD-CCNC: <5 U/L — SIGNIFICANT CHANGE UP (ref 0–41)
ESTIMATED AVERAGE GLUCOSE: 128 MG/DL — HIGH (ref 68–114)
GLUCOSE BLDC GLUCOMTR-MCNC: 126 MG/DL — HIGH (ref 70–99)
GLUCOSE BLDC GLUCOMTR-MCNC: 142 MG/DL — HIGH (ref 70–99)
GLUCOSE BLDC GLUCOMTR-MCNC: 146 MG/DL — HIGH (ref 70–99)

## 2024-10-28 PROCEDURE — 99232 SBSQ HOSP IP/OBS MODERATE 35: CPT

## 2024-10-28 PROCEDURE — 43270 EGD LESION ABLATION: CPT

## 2024-10-28 PROCEDURE — 99233 SBSQ HOSP IP/OBS HIGH 50: CPT

## 2024-10-28 PROCEDURE — 71045 X-RAY EXAM CHEST 1 VIEW: CPT | Mod: 26

## 2024-10-28 RX ORDER — TRAMADOL HYDROCHLORIDE 50 MG/1
50 TABLET, COATED ORAL EVERY 6 HOURS
Refills: 0 | Status: DISCONTINUED | OUTPATIENT
Start: 2024-10-28 | End: 2024-10-30

## 2024-10-28 RX ORDER — METHOCARBAMOL 500 MG/1
500 TABLET ORAL ONCE
Refills: 0 | Status: COMPLETED | OUTPATIENT
Start: 2024-10-28 | End: 2024-10-28

## 2024-10-28 RX ADMIN — Medication 10 MILLIGRAM(S): at 21:06

## 2024-10-28 RX ADMIN — Medication 650 MILLIGRAM(S): at 11:36

## 2024-10-28 RX ADMIN — TRAMADOL HYDROCHLORIDE 50 MILLIGRAM(S): 50 TABLET, COATED ORAL at 12:28

## 2024-10-28 RX ADMIN — Medication 3 MILLIGRAM(S): at 21:05

## 2024-10-28 RX ADMIN — CHLORHEXIDINE GLUCONATE 1 APPLICATION(S): 40 SOLUTION TOPICAL at 06:07

## 2024-10-28 RX ADMIN — CHLORHEXIDINE GLUCONATE 1 APPLICATION(S): 40 SOLUTION TOPICAL at 11:40

## 2024-10-28 RX ADMIN — TRAMADOL HYDROCHLORIDE 50 MILLIGRAM(S): 50 TABLET, COATED ORAL at 13:30

## 2024-10-28 RX ADMIN — Medication 325 MILLIGRAM(S): at 11:37

## 2024-10-28 RX ADMIN — GABAPENTIN 300 MILLIGRAM(S): 300 CAPSULE ORAL at 21:05

## 2024-10-28 RX ADMIN — Medication 650 MILLIGRAM(S): at 12:27

## 2024-10-28 RX ADMIN — PANTOPRAZOLE SODIUM 40 MILLIGRAM(S): 40 TABLET, DELAYED RELEASE ORAL at 06:07

## 2024-10-28 RX ADMIN — Medication 0.4 MILLIGRAM(S): at 21:05

## 2024-10-28 RX ADMIN — Medication 81 MILLIGRAM(S): at 11:36

## 2024-10-28 RX ADMIN — Medication 2 TABLET(S): at 21:05

## 2024-10-28 NOTE — PROGRESS NOTE ADULT - ASSESSMENT
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 Per NH, patient also refused Pleurx drainage today and is requesting to drain it inpatient (gets drained 2-3 times per week).    #Recurrent anemia  #Hx JASMIN   #Hx AVM?  #Active duodenal bleeding on capsule endoscopy  - Hemodynamically stable & no active bleeding  - Baseline hemoglobin - 8-9   - Hemoglobin on admission - 6.3 >s/p 2units prbc (received 2 units at the end of Spetember as well)  - On plavix   - last admission received 5 days of Venofer   -Seen by GI last admission> spoke to grandson (HCP)  regarding endoscopic work up. After discussing risks vs benefits given his advance age and co morbidities he would like to hold off. Offered VCE as outpatient .   -Can reconsider work up if within goals of care , will need to speak to grandson/son  -Will also need heme onc outpt for possible venofer infusions outpt   - Maintain active Type and screen  - Trend H&H  - Continue home PPI 40mg BID PO  - Target Hgb >8   - Avoid NSAIDs.  - give Venofer as %sat, ferritin remain low  - recall GI for possible intervention if family agrees in view of recurrent anemia requiring multiple PRBCs  6/18 convinced pt to give labs. Hgb 6.9. Transfuse 1uRPBCs. Spoke to GI and asked to speak to family. Serial CBC. Keep Hgb >7.5  6/21 again spoke to GI and asked to speak re: possible intervention. Transfuse another unit as Hgb again trending down (total 3u PRBCs).  6/22 son declined EGD/C-scopy but ?requested capsule endoscopy. Doubt that pt will drink golytely or agree to NGT.  6/26 d/w GI. Active duodenal bleeding on capsule endoscopy. Son refused urgent EGD and wants to think over the weekend. Change Protonix to 40mg IV q12, clears, CBC BID, keep Hgb>8. Hold ASA  6/28 son agreed to EGD. D/w pulm and cardio pt optimized for EGD.     #Malignant mesothelioma (diagnosed 8/2024 )  #Hx Asbestos lung   -s/p VATS pleurodesis, R sided PleurX  -pleurx drainage 2-3 times per week or PRN  -Fu heme onc outpatient. Plan was for possible Rx if pt's functional status improves.   -s/p drainage on 10/21 of 750cc  monitor for Sx  - s/p drainage on 10/28 375 cc    #HTN/HLD  #CAD s/p stents x2 in 2007 (follows Dr Avila)  -c/w home meds   -changed Plavix to ASA    #Small hypodensity in segment 7 of the liver seen on prior CT.  - RUQ US as OP  -?f/u outpt GI    #Acute Grief vs MDD  #Baseline dementia  - wife passed away a few months ago  - patient refusing medication sometimes and asks to be left alone, no suicide ideas, no ideas to hurt other people.    - Psychiatry eval  recommended outpt fu     #Nutrition/Fluids/Electrolytes   - replete K<4 and Mg <2  - ensure regular BMs  -  Miralax BID, Senna    #DVT Px  SCDs  Heparin SQ    High risk pt. Px is overall poor.  GOC: d/w son in details on 10/18. Based on oncology feedback that his cancer is treatable, son wants to cont full code.    #Progress Note Handoff  Pending: Clinical improvement and stability__x___PT____x__CBC stability, EGD   Pt/Family discussion: Pt/family informed and agree with the current plan  Disposition: Nursing Home      My note supersedes the residents note should a discrepancy arise.    Chart and notes personally reviewed.  Care Discussed with Consultants/Other Providers/ Housestaff [ x] YES [ ] NO   Radiology, labs, old records personally reviewed.    discussed w/ housestaff, nursing, case management, cardio, pulm, son John    Attestation Statements:    Attestation Statements:  Risk Statement (NON-critical care).     On this date of service, level of risk to patient is considered: High.     Due to: pt with illness causing risk to life or bodily fxn    Time-based billing (NON-critical care).     50 minutes spent on total encounter. The necessity of the time spent during the encounter on this date of service was due to:     time spent on review of labs, imaging studies, old records, obtaining history, personally examining patient, counselling and communicating with patient/ family, entering orders for medications/tests/etc, discussions with other health care providers, documentation in electronic health records, independent interpretation of labs, imaging/procedure results and care coordination.

## 2024-10-28 NOTE — PROGRESS NOTE ADULT - SUBJECTIVE AND OBJECTIVE BOX
Patient is a 84y old  Male who presents with a chief complaint of Anemia     (17 Oct 2024 12:14)    INTERVAL HPI/OVERNIGHT EVENTS: Patient was examined and seen at bedside. This morning pt is resting comfortably in bed and reports Rt lower back discomfort without radiation. No other complaints. Breathing is stable. Son John at bedside.    ROS: Denies CP, SOB, AP, new weakness  All other systems reviewed and are within normal limits.  InitialHPI:  HPI: 85 y/o man  PMHx of HLD, DM, CAD s/p stents x2 in 2007, pt of Dr Avila, hx  asbestosis of lung, diagnosed w malignant mesothelioma (8/2024) and s/p VATS pleurodesis, R sided PleurX, hx AVM/GIB and JASMIN, sent in by NH for low hemoglobin. Per NH, patient also refused Pleurx drainage today and is requesting to drain it inpatient (gets drained 2-3 times per week). Of note, patient had a recent admission in September for anemia for which he received 2 units prbc, IV Venofer and was recommended EGD/[place] but given his advance age and co morbidities decided to hold off. Patient is a poor historian. Uses wheelchair at baseline. Denies fever chills, shortness of breath, cough, chest pain, leg swelling, hematemesis, hematochezia.     Vital Signs Last 24 Hrs  T(C): 36.4 (15 Oct 2024 01:09), Max: 36.9 (14 Oct 2024 22:51)  T(F): 97.5 (15 Oct 2024 01:09), Max: 98.4 (14 Oct 2024 22:51)  HR: 100 (15 Oct 2024 01:09) (89 - 105)  BP: 132/74 (15 Oct 2024 01:09) (104/64 - 132/74)  RR: 18 (15 Oct 2024 01:09) (17 - 19)  SpO2: 97% (15 Oct 2024 01:09) (93% - 97%)    Parameters below as of 15 Oct 2024 01:09  Patient On (Oxygen Delivery Method): room air    Labs: Hgb 6.4 , Platelets 500 , Cr 1.2 (baseline)   CXR: Worsening R pleural effusion   EKG: NSR with PACs    Admitted for anemia      (15 Oct 2024 01:16)    PAST MEDICAL & SURGICAL HISTORY:  DM (diabetes mellitus)      MI (myocardial infarction)      History of CAD (coronary artery disease)      Dyslipidemia      Currently smokes tobacco      Mesothelioma, malignant      Coronary stent patent          General: NAD, AAOx2, chronically ill appearing, b/l temp waisting, cachectic  HEENT:  no LAD  CV: S1 S2  Resp: decreased breath sounds at bases  GI: NT/ND/S +BS  MS: no clubbing/cyanosis/edema, + pulses b/l. TTP Rt lower back  Neuro: nonfocal, +reflexes thruout  +laws w/ clear yellow urine  +Rt sided ant pleurx cath w/ d/c/i dsg          Home Medications:  aspirin 81 mg oral tablet, chewable: 1 tab(s) orally once a day (25 Oct 2024 14:42)  ferrous sulfate 325 mg (65 mg elemental iron) oral tablet: 1 tab(s) orally once a day (25 Sep 2024 15:37)  gabapentin 300 mg oral tablet: 1 tab(s) orally 3 times a day (20 Sep 2024 01:37)  melatonin 3 mg oral tablet: 1 tab(s) orally once a day (at bedtime) (13 Aug 2024 09:23)  metFORMIN 500 mg oral tablet: 1 tab(s) orally 2 times a day (21 Jul 2024 18:04)  pantoprazole 40 mg oral delayed release tablet: 1 tab(s) orally every 12 hours (25 Sep 2024 15:37)  polyethylene glycol 3350 oral powder for reconstitution: 17 gram(s) orally once a day (13 Aug 2024 09:23)  pravastatin 20 mg oral tablet: 1 tab(s) orally (21 Jul 2024 18:04)  senna leaf extract oral tablet: 2 tab(s) orally once a day (at bedtime) (13 Aug 2024 09:23)  tamsulosin 0.4 mg oral capsule: 1 cap(s) orally once a day (at bedtime) (13 Aug 2024 09:23)    MEDICATIONS  (STANDING):  aspirin  chewable 81 milliGRAM(s) Oral daily  atorvastatin 10 milliGRAM(s) Oral at bedtime  chlorhexidine 2% Cloths 1 Application(s) Topical <User Schedule>  chlorhexidine 2% Cloths 1 Application(s) Topical daily  dextrose 5% + sodium chloride 0.45%. 1000 milliLiter(s) (75 mL/Hr) IV Continuous <Continuous>  dextrose 5%. 1000 milliLiter(s) (100 mL/Hr) IV Continuous <Continuous>  dextrose 5%. 1000 milliLiter(s) (50 mL/Hr) IV Continuous <Continuous>  dextrose 50% Injectable 12.5 Gram(s) IV Push once  dextrose 50% Injectable 25 Gram(s) IV Push once  dextrose 50% Injectable 25 Gram(s) IV Push once  ferrous    sulfate 325 milliGRAM(s) Oral daily  gabapentin 300 milliGRAM(s) Oral three times a day  glucagon  Injectable 1 milliGRAM(s) IntraMuscular once  insulin lispro (ADMELOG) corrective regimen sliding scale   SubCutaneous three times a day before meals  melatonin 3 milliGRAM(s) Oral at bedtime  methocarbamol 500 milliGRAM(s) Oral once  pantoprazole  Injectable 40 milliGRAM(s) IV Push every 12 hours  polyethylene glycol 3350 17 Gram(s) Oral daily  senna 2 Tablet(s) Oral at bedtime  tamsulosin 0.4 milliGRAM(s) Oral at bedtime    MEDICATIONS  (PRN):  acetaminophen     Tablet .. 650 milliGRAM(s) Oral every 6 hours PRN Mild Pain (1 - 3)  dextrose Oral Gel 15 Gram(s) Oral once PRN Blood Glucose LESS THAN 70 milliGRAM(s)/deciliter  traMADol 50 milliGRAM(s) Oral every 6 hours PRN Severe Pain (7 - 10)    Vital Signs Last 24 Hrs  T(C): 36.2 (28 Oct 2024 16:34), Max: 36.8 (27 Oct 2024 20:29)  T(F): 97.2 (28 Oct 2024 16:34), Max: 98.3 (27 Oct 2024 20:29)  HR: 68 (28 Oct 2024 16:34) (67 - 82)  BP: 146/73 (28 Oct 2024 16:34) (111/67 - 157/69)  BP(mean): 82 (28 Oct 2024 14:18) (82 - 97)  RR: 18 (28 Oct 2024 16:34) (16 - 19)  SpO2: 98% (28 Oct 2024 14:18) (97% - 98%)    Parameters below as of 28 Oct 2024 13:00  Patient On (Oxygen Delivery Method): room air      CAPILLARY BLOOD GLUCOSE      POCT Blood Glucose.: 146 mg/dL (28 Oct 2024 11:44)  POCT Blood Glucose.: 142 mg/dL (28 Oct 2024 07:58)  POCT Blood Glucose.: 159 mg/dL (27 Oct 2024 21:22)  POCT Blood Glucose.: 127 mg/dL (27 Oct 2024 16:53)    LABS:                        8.0    6.36  )-----------( 436      ( 27 Oct 2024 22:48 )             26.4     10-27    134[L]  |  98  |  23[H]  ----------------------------<  131[H]  5.0   |  26  |  0.9    Ca    9.7      27 Oct 2024 22:48    TPro  6.2  /  Alb  3.3[L]  /  TBili  <0.2  /  DBili  x   /  AST  7   /  ALT  <5  /  AlkPhos  106  10-27    LIVER FUNCTIONS - ( 27 Oct 2024 22:48 )  Alb: 3.3 g/dL / Pro: 6.2 g/dL / ALK PHOS: 106 U/L / ALT: <5 U/L / AST: 7 U/L / GGT: x               PT/INR - ( 27 Oct 2024 22:48 )   PT: 12.10 sec;   INR: 1.06 ratio         PTT - ( 27 Oct 2024 22:48 )  PTT:31.9 sec  Urinalysis Basic - ( 27 Oct 2024 22:48 )    Color: x / Appearance: x / SG: x / pH: x  Gluc: 131 mg/dL / Ketone: x  / Bili: x / Urobili: x   Blood: x / Protein: x / Nitrite: x   Leuk Esterase: x / RBC: x / WBC x   Sq Epi: x / Non Sq Epi: x / Bacteria: x              Consultant Notes Reviewed:  [x ] YES  [ ] NO  Care Discussed with Consultants/Other Providers/ Housestaff [ x] YES  [ ] NO  Radiology, labs, EKGs, new studies personally reviewed.

## 2024-10-28 NOTE — PROGRESS NOTE ADULT - ASSESSMENT
What Type Of Note Output Would You Prefer (Optional)?: Bullet Format Hpi Title: Evaluation of Skin Lesions 83 y/o man  PMHx of HLD, DM, CAD s/p stents x2 in 2007, pt of Dr Avila, hx  asbestosis of lung, diagnosed w malignant mesothelioma (8/2024) and s/p VATS pleurodesis, R sided PleurX, hx AVM/GIB and JASMIN, sent in by NH for low hemoglobin.     #Acute on chronic anemia with hx of AVM and JASMIN  tfx to keep hb >7  s/p  venofer   seen by GI   active duodenal bleeding on VCE  awaiting for egd    monitor hb closely        #Malignant mesothelioma (diagnosed 8/2024 )  #Hx Asbestos lung   -s/p VATS pleurodesis, R sided PleurX,s/p drainage  -he will f/u with Dr Epperson for  further [place] as outpt    cont other supportive care.  will f/u  How Severe Are Your Spot(S)?: mild Have Your Spot(S) Been Treated In The Past?: has not been treated

## 2024-10-28 NOTE — PROGRESS NOTE ADULT - SUBJECTIVE AND OBJECTIVE BOX
SUBJECTIVE/OVERNIGHT EVENTS  Today is hospital day 14d. This morning patient was seen and examined at bedside, resting comfortably in bed. No acute or major events overnight.  Patient has no complaints today, no active bleeding and Hgb stable, he is scheduled for EGD with GI today.      MEDICATIONS  STANDING MEDICATIONS  aspirin  chewable 81 milliGRAM(s) Oral daily  atorvastatin 10 milliGRAM(s) Oral at bedtime  chlorhexidine 2% Cloths 1 Application(s) Topical <User Schedule>  chlorhexidine 2% Cloths 1 Application(s) Topical daily  dextrose 5% + sodium chloride 0.45%. 1000 milliLiter(s) IV Continuous <Continuous>  dextrose 5%. 1000 milliLiter(s) IV Continuous <Continuous>  dextrose 5%. 1000 milliLiter(s) IV Continuous <Continuous>  dextrose 50% Injectable 12.5 Gram(s) IV Push once  dextrose 50% Injectable 25 Gram(s) IV Push once  dextrose 50% Injectable 25 Gram(s) IV Push once  ferrous    sulfate 325 milliGRAM(s) Oral daily  gabapentin 300 milliGRAM(s) Oral three times a day  glucagon  Injectable 1 milliGRAM(s) IntraMuscular once  insulin lispro (ADMELOG) corrective regimen sliding scale   SubCutaneous three times a day before meals  melatonin 3 milliGRAM(s) Oral at bedtime  pantoprazole  Injectable 40 milliGRAM(s) IV Push every 12 hours  polyethylene glycol 3350 17 Gram(s) Oral daily  senna 2 Tablet(s) Oral at bedtime  tamsulosin 0.4 milliGRAM(s) Oral at bedtime    PRN MEDICATIONS  acetaminophen     Tablet .. 650 milliGRAM(s) Oral every 6 hours PRN  dextrose Oral Gel 15 Gram(s) Oral once PRN    VITALS  T(F): 97.4 (10-28-24 @ 05:02), Max: 98.3 (10-27-24 @ 20:29)  HR: 67 (10-28-24 @ 05:02) (67 - 74)  BP: 125/72 (10-28-24 @ 05:02) (124/71 - 129/66)  RR: 19 (10-28-24 @ 05:02) (16 - 19)  SpO2: 97% (10-28-24 @ 05:02) (97% - 97%)  POCT Blood Glucose.: 142 mg/dL (10-28-24 @ 07:58)  POCT Blood Glucose.: 159 mg/dL (10-27-24 @ 21:22)  POCT Blood Glucose.: 127 mg/dL (10-27-24 @ 16:53)  POCT Blood Glucose.: 218 mg/dL (10-27-24 @ 11:18)    PHYSICAL EXAM  GENERAL  ( x ) NAD, lying in bed comfortably     (  ) obtunded     (  ) lethargic     (  ) somnolent    HEAD  ( x ) Atraumatic     (  ) hematoma     (  ) laceration (specify location:       )     NECK  ( x ) Supple     (  ) neck stiffness     (  ) nuchal rigidity     (  )  no JVD     (  ) JVD present ( -- cm)    HEART  Rate -->  ( x ) normal rate    (  ) bradycardic    (  ) tachycardic  Rhythm -->  ( x ) regular    (  ) regularly irregular    (  ) irregularly irregular  Murmurs -->  ( x ) normal s1/s2    (  ) systolic murmur    (  ) diastolic murmur    (  ) continuous murmur     (  ) S3 present    (  ) S4 present    LUNGS  ( x ) Unlabored respirations     (  ) tachypnea  ( x ) B/L air entry     (  ) decreased breath sounds in:  (location     )    (  ) no adventitious sound     (  ) crackles     (  ) wheezing      (  ) rhonchi      (specify location:       )  (  ) chest wall tenderness (specify location:       )    ABDOMEN  ( x ) Soft     (  ) tense   |   ( x ) nondistended     (  ) distended   |   ( x ) +BS     (  ) hypoactive bowel sounds     (  ) hyperactive bowel sounds  ( x ) nontender     (  ) RUQ tenderness     (  ) RLQ tenderness     (  ) LLQ tenderness     (  ) epigastric tenderness     (  ) diffuse tenderness  (  ) Splenomegaly      (  ) Hepatomegaly      (  ) Jaundice     (  ) ecchymosis     EXTREMITIES  ( x ) Normal     (  ) Rash     (  ) ecchymosis     (  ) varicose veins      (  ) pitting edema     (  ) non-pitting edema   (  ) ulceration     (  ) gangrene:     (location:     )    NERVOUS SYSTEM  ( x ) A&Ox3     (  ) confused     (  ) lethargic  CN II-XII:     (  ) Intact     (  ) focal deficits  (Specify:     )   Upper extremities:     (  ) strength X/5     (  ) focal deficit (specify:    )  Lower extremities:     (  ) strength  X/5    (  ) focal deficit (specify:    )        LABS             8.0    6.36  )-----------( 436      ( 10-27-24 @ 22:48 )             26.4     134  |  98  |  23  -------------------------<  131   10-27-24 @ 22:48  5.0  |  26  |  0.9    Ca      9.7     10-27-24 @ 22:48    TPro  6.2  /  Alb  3.3  /  TBili  <0.2  /  DBili  x   /  AST  7   /  ALT  <5  /  AlkPhos  106  /  GGT  x     10-27-24 @ 22:48    PT/INR - ( 10-27-24 @ 22:48 )   PT: 12.10 sec;   INR: 1.06 ratio  PTT - ( 10-27-24 @ 22:48 )  PTT:31.9 sec      Urinalysis Basic - ( 27 Oct 2024 22:48 )    Color: x / Appearance: x / SG: x / pH: x  Gluc: 131 mg/dL / Ketone: x  / Bili: x / Urobili: x   Blood: x / Protein: x / Nitrite: x   Leuk Esterase: x / RBC: x / WBC x   Sq Epi: x / Non Sq Epi: x / Bacteria: x          IMAGING

## 2024-10-28 NOTE — CHART NOTE - NSCHARTNOTEFT_GEN_A_CORE
S/p EGD    Otherwise normal esophagus.  Erythema in the stomach compatible with non-erosive gastritis.  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 (APC Hemostasis).  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 (APC Hemostasis).  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

## 2024-10-28 NOTE — PROGRESS NOTE ADULT - SUBJECTIVE AND OBJECTIVE BOX
Patient is a 84y old  Male who presents with a chief complaint of Acute on chronic anemia (28 Oct 2024 16:40)       Pt is seen and examined this morning   pt is awake and lying in bed        ROS:  Negative except for:weakness    MEDICATIONS  (STANDING):  aspirin  chewable 81 milliGRAM(s) Oral daily  atorvastatin 10 milliGRAM(s) Oral at bedtime  chlorhexidine 2% Cloths 1 Application(s) Topical <User Schedule>  chlorhexidine 2% Cloths 1 Application(s) Topical daily  dextrose 5% + sodium chloride 0.45%. 1000 milliLiter(s) (75 mL/Hr) IV Continuous <Continuous>  dextrose 5%. 1000 milliLiter(s) (100 mL/Hr) IV Continuous <Continuous>  dextrose 5%. 1000 milliLiter(s) (50 mL/Hr) IV Continuous <Continuous>  dextrose 50% Injectable 25 Gram(s) IV Push once  dextrose 50% Injectable 12.5 Gram(s) IV Push once  dextrose 50% Injectable 25 Gram(s) IV Push once  ferrous    sulfate 325 milliGRAM(s) Oral daily  gabapentin 300 milliGRAM(s) Oral three times a day  glucagon  Injectable 1 milliGRAM(s) IntraMuscular once  insulin lispro (ADMELOG) corrective regimen sliding scale   SubCutaneous three times a day before meals  melatonin 3 milliGRAM(s) Oral at bedtime  methocarbamol 500 milliGRAM(s) Oral once  pantoprazole  Injectable 40 milliGRAM(s) IV Push every 12 hours  polyethylene glycol 3350 17 Gram(s) Oral daily  senna 2 Tablet(s) Oral at bedtime  tamsulosin 0.4 milliGRAM(s) Oral at bedtime    MEDICATIONS  (PRN):  acetaminophen     Tablet .. 650 milliGRAM(s) Oral every 6 hours PRN Mild Pain (1 - 3)  dextrose Oral Gel 15 Gram(s) Oral once PRN Blood Glucose LESS THAN 70 milliGRAM(s)/deciliter  traMADol 50 milliGRAM(s) Oral every 6 hours PRN Severe Pain (7 - 10)      Allergies    penicillin (Unknown)    Intolerances        Vital Signs Last 24 Hrs  T(C): 36.2 (28 Oct 2024 16:34), Max: 36.8 (27 Oct 2024 20:29)  T(F): 97.2 (28 Oct 2024 16:34), Max: 98.3 (27 Oct 2024 20:29)  HR: 68 (28 Oct 2024 16:34) (67 - 82)  BP: 146/73 (28 Oct 2024 16:34) (111/67 - 157/69)  BP(mean): 82 (28 Oct 2024 14:18) (82 - 97)  RR: 18 (28 Oct 2024 16:34) (16 - 19)  SpO2: 98% (28 Oct 2024 14:18) (97% - 98%)    Parameters below as of 28 Oct 2024 13:00  Patient On (Oxygen Delivery Method): room air        PHYSICAL EXAM  General: adult in NAD  HEENT: clear oropharynx, anicteric sclera, pink conjunctiva  Neck: supple  CV: normal S1/S2 with no murmur rubs or gallops  Lungs: positive air movement b/l ant lungs,clear to auscultation, no wheezes, no rales  Abdomen: soft non-tender non-distended, no hepatosplenomegaly  Ext: no clubbing cyanosis or edema  Skin: no rashes and no petechiae  Neuro: alert and oriented X 4, no focal deficits  LABS:                          8.0    6.36  )-----------( 436      ( 27 Oct 2024 22:48 )             26.4         Mean Cell Volume : 95.3 fL  Mean Cell Hemoglobin : 28.9 pg  Mean Cell Hemoglobin Concentration : 30.3 g/dL  Auto Neutrophil # : x  Auto Lymphocyte # : x  Auto Monocyte # : x  Auto Eosinophil # : x  Auto Basophil # : x  Auto Neutrophil % : x  Auto Lymphocyte % : x  Auto Monocyte % : x  Auto Eosinophil % : x  Auto Basophil % : x    Serial CBC's  10-27 @ 22:48  Hct-26.4 / Hgb-8.0 / Plat-436 / RBC-2.77 / WBC-6.36          Serial CBC's  10-27 @ 06:46  Hct-28.2 / Hgb-8.6 / Plat-398 / RBC-2.96 / WBC-6.87          Serial CBC's  10-26 @ 17:00  Hct-28.1 / Hgb-8.6 / Plat-427 / RBC-2.93 / WBC-5.74          Serial CBC's  10-26 @ 07:04  Hct-25.8 / Hgb-8.0 / Plat-415 / RBC-2.71 / WBC-6.96          Serial CBC's  10-25 @ 20:47  Hct-25.7 / Hgb-8.0 / Plat-415 / RBC-2.75 / WBC-8.18            10-27    134[L]  |  98  |  23[H]  ----------------------------<  131[H]  5.0   |  26  |  0.9    Ca    9.7      27 Oct 2024 22:48    TPro  6.2  /  Alb  3.3[L]  /  TBili  <0.2  /  DBili  x   /  AST  7   /  ALT  <5  /  AlkPhos  106  10-27      PT/INR - ( 27 Oct 2024 22:48 )   PT: 12.10 sec;   INR: 1.06 ratio         PTT - ( 27 Oct 2024 22:48 )  PTT:31.9 sec    WBC Count: 6.36 K/uL (10-27-24 @ 22:48)  Hemoglobin: 8.0 g/dL (10-27-24 @ 22:48)  Hematocrit: 26.4 % (10-27-24 @ 22:48)  Platelet Count - Automated: 436 K/uL (10-27-24 @ 22:48)  WBC Count: 6.87 K/uL (10-27-24 @ 06:46)  Hemoglobin: 8.6 g/dL (10-27-24 @ 06:46)  Hematocrit: 28.2 % (10-27-24 @ 06:46)  Platelet Count - Automated: 398 K/uL (10-27-24 @ 06:46)  WBC Count: 5.74 K/uL (10-26-24 @ 17:00)  Hemoglobin: 8.6 g/dL (10-26-24 @ 17:00)  Hematocrit: 28.1 % (10-26-24 @ 17:00)  Platelet Count - Automated: 427 K/uL (10-26-24 @ 17:00)  WBC Count: 6.96 K/uL (10-26-24 @ 07:04)  Hemoglobin: 8.0 g/dL (10-26-24 @ 07:04)  Hematocrit: 25.8 % (10-26-24 @ 07:04)  Platelet Count - Automated: 415 K/uL (10-26-24 @ 07:04)  WBC Count: 8.18 K/uL (10-25-24 @ 20:47)  Hemoglobin: 8.0 g/dL (10-25-24 @ 20:47)  Hematocrit: 25.7 % (10-25-24 @ 20:47)  Platelet Count - Automated: 415 K/uL (10-25-24 @ 20:47)  WBC Count: 7.31 K/uL (10-25-24 @ 06:33)  Hemoglobin: 8.7 g/dL (10-25-24 @ 06:33)  Hematocrit: 27.8 % (10-25-24 @ 06:33)  Platelet Count - Automated: 425 K/uL (10-25-24 @ 06:33)  WBC Count: 7.13 K/uL (10-24-24 @ 06:45)  Hemoglobin: 8.6 g/dL (10-24-24 @ 06:45)  Hematocrit: 27.5 % (10-24-24 @ 06:45)  Platelet Count - Automated: 428 K/uL (10-24-24 @ 06:45)  WBC Count: 7.80 K/uL (10-23-24 @ 05:46)  Hemoglobin: 8.0 g/dL (10-23-24 @ 05:46)  Hematocrit: 26.2 % (10-23-24 @ 05:46)  Platelet Count - Automated: 403 K/uL (10-23-24 @ 05:46)  WBC Count: 6.57 K/uL (10-22-24 @ 22:23)  Hemoglobin: 7.7 g/dL (10-22-24 @ 22:23)  Hematocrit: 24.9 % (10-22-24 @ 22:23)  Platelet Count - Automated: 381 K/uL (10-22-24 @ 22:23)  WBC Count: 8.15 K/uL (10-22-24 @ 06:55)  Hemoglobin: 7.9 g/dL (10-22-24 @ 06:55)  Hematocrit: 25.1 % (10-22-24 @ 06:55)  Platelet Count - Automated: 374 K/uL (10-22-24 @ 06:55)  WBC Count: 6.74 K/uL (10-21-24 @ 07:03)  Hemoglobin: 7.4 g/dL (10-21-24 @ 07:03)  Hematocrit: 24.2 % (10-21-24 @ 07:03)  Platelet Count - Automated: 374 K/uL (10-21-24 @ 07:03)  WBC Count: 9.20 K/uL (10-20-24 @ 06:26)  Hemoglobin: 7.8 g/dL (10-20-24 @ 06:26)  Hematocrit: 25.0 % (10-20-24 @ 06:26)  Platelet Count - Automated: 366 K/uL (10-20-24 @ 06:26)  WBC Count: 14.03 K/uL (10-19-24 @ 16:00)  Hemoglobin: 7.9 g/dL (10-19-24 @ 16:00)  Hematocrit: 25.7 % (10-19-24 @ 16:00)  Platelet Count - Automated: 448 K/uL (10-19-24 @ 16:00)  WBC Count: 8.52 K/uL (10-19-24 @ 06:40)  Hemoglobin: 7.8 g/dL (10-19-24 @ 06:40)  Hematocrit: 24.6 % (10-19-24 @ 06:40)  Platelet Count - Automated: 396 K/uL (10-19-24 @ 06:40)  Reticulocyte Percent: 3.5 % (10-19-24 @ 06:40)                BLOOD SMEAR INTERPRETATION:       RADIOLOGY & ADDITIONAL STUDIES:

## 2024-10-28 NOTE — PROGRESS NOTE ADULT - ASSESSMENT
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  Oncology follow up   DVT ppx  Monitor CBC   Pain control  Recall PRN

## 2024-10-28 NOTE — PROGRESS NOTE ADULT - ASSESSMENT
Acute on chronic anemia s/p multiple transfusions   Malignant mesothelioma (diagnosed 8/2024 ) with recurrent pleural effusion s/p VATS and Pleurodesis . Hx Asbestos lung   Severe protein calorie malnutrition (calorie count started - result 10/21)  HTN/HLD/CAD s/p stents x2 in 2007 (follows Dr Avila)  Preoperative risk assessment on EGD       Continue current care as per Hem/Onc, GI and medicine teams   Serial H/H and keep Hb > 8   DVT/GI prophylaxis   2Decho results were reviewed       The patient is considered to be a moderate risk patient going for a low risk procedure   Will F/U post op

## 2024-10-28 NOTE — PROGRESS NOTE ADULT - ASSESSMENT
1. Acute on chronic anemia s/p 2 PRBC .   recurrent anemia   Hx JASMIN - no overt GI bleed . Hx AVM?  - Seen by GI last admission: regarding endoscopic work up. After discussing risks vs benefits given his advance age and co morbidities he would like to hold off. Offered VCE as outpatient .   - last admission received 5 days of Venofer   - Active type and screen   - Switched plavix to aspirin for now  - c/w ferrous sulfate, IV venofovir for 5 days  - GI consult placed, Follow up, as per GI- Patient can follow  or  Follow up with our [facility] located at [address] [place] [address]  - Continue home PPI 40mg BID PO  - avoid Nsaids  - Hb 6.9> 1 unit PRBC (10/18)> repeat 7.8  - Hb 7.4 10/21 -> GI will reach out to family to discuss inpatient endoscopy -> pt son agreed for Video capsule endoscopy scheduled for tomorrow 10/23/2024  - Oncology onboard also recommended endoscopy to rule out GI bleed -> if GI workup is negative will offer bone marrow biopsy   - Patient refused prep yesterday follow up with GI, HB is stable   - VCE done on 10/24 ->Duodenal bleed, put on clear liquid diet, PPI BID IV   - 10/26: family agreed on inpatient EGD  - GI requested pulm and cardio clearance prior to EGD  - 10/28: patient scheduled for EGD with GI today, f/u cardio clearance with Dr. Avila    #Malignant mesothelioma (diagnosed 8/2024 ) with recurrent pleural effusion . Hx Asbestos lung    Severe protein calorie malnutrition (calorie count started - result 10/21)  -s/p VATS pleurodesis, R sided PleurX  - Per NH, patient also refused Pleurx drainage and is requesting to drain it inpatient (gets drained 2-3 times per week)  - Currently on room air (target spo2 92-96)  - Fu heme onc- Dr. Ramírez OP  - Fu palliative- full code  - PleurX drained on 10/21  - Aspiration precaution  - Pain control   - 10/28: will attempt to drain pleurx today before EGD    #HTN/HLD/CAD s/p stents x2 in 2007 (follows Dr Avila)  - c/w home meds     #Small hypodensity in segment 7 of the liver seen on prior CT.  - RUQ US as OP  - f/u outpt GI    #Acute Grief vs MDD  - wife passed away a few months ago  - patient refusing medication sometimes and asks to be left alone, no suicide ideas, no ideas to hurt other people.    - Psychiatry eval admission recommended outpt fu     Pendings: EGD with GI today

## 2024-10-28 NOTE — PROGRESS NOTE ADULT - SUBJECTIVE AND OBJECTIVE BOX
Patient is a 84y old  Male who presents with a chief complaint of anemia (28 Oct 2024 10:44)      Overnight events: on RA. off pressor. no overnight events. denies any complaints             ROS: as in HPI; All other systems reviewed are negative        PHYSICAL EXAM  Vital Signs Last 24 Hrs  T(C): 36.3 (28 Oct 2024 05:02), Max: 36.8 (27 Oct 2024 20:29)  T(F): 97.4 (28 Oct 2024 05:02), Max: 98.3 (27 Oct 2024 20:29)  HR: 67 (28 Oct 2024 05:02) (67 - 74)  BP: 125/72 (28 Oct 2024 05:02) (124/71 - 129/66)  BP(mean): 88 (27 Oct 2024 20:29) (88 - 88)  RR: 19 (28 Oct 2024 05:02) (16 - 19)  SpO2: 97% (28 Oct 2024 05:02) (97% - 97%)    Parameters below as of 28 Oct 2024 05:02  Patient On (Oxygen Delivery Method): room air          CONSTITUTIONAL:    NAD    ENT:   Airway patent,     EYES:   Clear bilaterally,   pupils equal,   round and reactive to light.    CARDIAC:   Normal rate,   regular rhythm.    no edema    RESPIRATORY:   No wheezing   Decreased BS on right  Rt pleurX noted  Normal chest expansion  Not tachypneic,    GASTROINTESTINAL:  Abdomen soft, non-tender,   No guarding,   Positive BS    MUSCULOSKELETAL:   Range of motion is not limited,    NEUROLOGICAL:   Alert and oriented   No motor deficits.    SKIN:   Skin normal color for race,   No evidence of rash.                I&O's Detail    27 Oct 2024 07:01  -  28 Oct 2024 07:00  --------------------------------------------------------  IN:  Total IN: 0 mL    OUT:    Indwelling Catheter - Urethral (mL): 725 mL  Total OUT: 725 mL    Total NET: -725 mL            LABS:                        8.0    6.36  )-----------( 436      ( 27 Oct 2024 22:48 )             26.4     27 Oct 2024 22:48    134    |  98     |  23     ----------------------------<  131    5.0     |  26     |  0.9      Ca    9.7        27 Oct 2024 22:48    TPro  6.2    /  Alb  3.3    /  TBili  <0.2   /  DBili  x      /  AST  7      /  ALT  <5     /  AlkPhos  106    27 Oct 2024 22:48  Amylase x     lipase x              CAPILLARY BLOOD GLUCOSE      POCT Blood Glucose.: 142 mg/dL (28 Oct 2024 07:58)    PT/INR - ( 27 Oct 2024 22:48 )   PT: 12.10 sec;   INR: 1.06 ratio         PTT - ( 27 Oct 2024 22:48 )  PTT:31.9 sec  Urinalysis Basic - ( 27 Oct 2024 22:48 )    Color: x / Appearance: x / SG: x / pH: x  Gluc: 131 mg/dL / Ketone: x  / Bili: x / Urobili: x   Blood: x / Protein: x / Nitrite: x   Leuk Esterase: x / RBC: x / WBC x   Sq Epi: x / Non Sq Epi: x / Bacteria: x      Culture        MEDICATIONS  (STANDING):  aspirin  chewable 81 milliGRAM(s) Oral daily  atorvastatin 10 milliGRAM(s) Oral at bedtime  chlorhexidine 2% Cloths 1 Application(s) Topical daily  chlorhexidine 2% Cloths 1 Application(s) Topical <User Schedule>  dextrose 5% + sodium chloride 0.45%. 1000 milliLiter(s) (75 mL/Hr) IV Continuous <Continuous>  dextrose 5%. 1000 milliLiter(s) (100 mL/Hr) IV Continuous <Continuous>  dextrose 5%. 1000 milliLiter(s) (50 mL/Hr) IV Continuous <Continuous>  dextrose 50% Injectable 12.5 Gram(s) IV Push once  dextrose 50% Injectable 25 Gram(s) IV Push once  dextrose 50% Injectable 25 Gram(s) IV Push once  ferrous    sulfate 325 milliGRAM(s) Oral daily  gabapentin 300 milliGRAM(s) Oral three times a day  glucagon  Injectable 1 milliGRAM(s) IntraMuscular once  insulin lispro (ADMELOG) corrective regimen sliding scale   SubCutaneous three times a day before meals  melatonin 3 milliGRAM(s) Oral at bedtime  pantoprazole  Injectable 40 milliGRAM(s) IV Push every 12 hours  polyethylene glycol 3350 17 Gram(s) Oral daily  senna 2 Tablet(s) Oral at bedtime  tamsulosin 0.4 milliGRAM(s) Oral at bedtime    MEDICATIONS  (PRN):  acetaminophen     Tablet .. 650 milliGRAM(s) Oral every 6 hours PRN Mild Pain (1 - 3)  dextrose Oral Gel 15 Gram(s) Oral once PRN Blood Glucose LESS THAN 70 milliGRAM(s)/deciliter                 Patient is a 84y old  Male who presents with a chief complaint of anemia (28 Oct 2024 10:44)      Overnight events: On RA. Off pressor.  No overnight events. Denies any complaints             ROS: as in HPI; All other systems reviewed are negative        PHYSICAL EXAM  Vital Signs Last 24 Hrs  T(C): 36.3 (28 Oct 2024 05:02), Max: 36.8 (27 Oct 2024 20:29)  T(F): 97.4 (28 Oct 2024 05:02), Max: 98.3 (27 Oct 2024 20:29)  HR: 67 (28 Oct 2024 05:02) (67 - 74)  BP: 125/72 (28 Oct 2024 05:02) (124/71 - 129/66)  BP(mean): 88 (27 Oct 2024 20:29) (88 - 88)  RR: 19 (28 Oct 2024 05:02) (16 - 19)  SpO2: 97% (28 Oct 2024 05:02) (97% - 97%)    Parameters below as of 28 Oct 2024 05:02  Patient On (Oxygen Delivery Method): room air          CONSTITUTIONAL:    NAD    ENT:   Airway patent,     EYES:   Clear bilaterally,   pupils equal,   round and reactive to light.    CARDIAC:   Normal rate,   regular rhythm.    no edema    RESPIRATORY:   No wheezing   Decreased BS on right  Rt pleurX noted  Normal chest expansion  Not tachypneic,    GASTROINTESTINAL:  Abdomen soft, non-tender,   No guarding,   Positive BS    MUSCULOSKELETAL:   Range of motion is not limited,    NEUROLOGICAL:   Alert and oriented   No motor deficits.    SKIN:   Skin normal color for race,   No evidence of rash.                I&O's Detail    27 Oct 2024 07:01  -  28 Oct 2024 07:00  --------------------------------------------------------  IN:  Total IN: 0 mL    OUT:    Indwelling Catheter - Urethral (mL): 725 mL  Total OUT: 725 mL    Total NET: -725 mL            LABS:                        8.0    6.36  )-----------( 436      ( 27 Oct 2024 22:48 )             26.4     27 Oct 2024 22:48    134    |  98     |  23     ----------------------------<  131    5.0     |  26     |  0.9      Ca    9.7        27 Oct 2024 22:48    TPro  6.2    /  Alb  3.3    /  TBili  <0.2   /  DBili  x      /  AST  7      /  ALT  <5     /  AlkPhos  106    27 Oct 2024 22:48  Amylase x     lipase x              CAPILLARY BLOOD GLUCOSE      POCT Blood Glucose.: 142 mg/dL (28 Oct 2024 07:58)    PT/INR - ( 27 Oct 2024 22:48 )   PT: 12.10 sec;   INR: 1.06 ratio         PTT - ( 27 Oct 2024 22:48 )  PTT:31.9 sec  Urinalysis Basic - ( 27 Oct 2024 22:48 )    Color: x / Appearance: x / SG: x / pH: x  Gluc: 131 mg/dL / Ketone: x  / Bili: x / Urobili: x   Blood: x / Protein: x / Nitrite: x   Leuk Esterase: x / RBC: x / WBC x   Sq Epi: x / Non Sq Epi: x / Bacteria: x      Culture        MEDICATIONS  (STANDING):  aspirin  chewable 81 milliGRAM(s) Oral daily  atorvastatin 10 milliGRAM(s) Oral at bedtime  chlorhexidine 2% Cloths 1 Application(s) Topical daily  chlorhexidine 2% Cloths 1 Application(s) Topical <User Schedule>  dextrose 5% + sodium chloride 0.45%. 1000 milliLiter(s) (75 mL/Hr) IV Continuous <Continuous>  dextrose 5%. 1000 milliLiter(s) (100 mL/Hr) IV Continuous <Continuous>  dextrose 5%. 1000 milliLiter(s) (50 mL/Hr) IV Continuous <Continuous>  dextrose 50% Injectable 12.5 Gram(s) IV Push once  dextrose 50% Injectable 25 Gram(s) IV Push once  dextrose 50% Injectable 25 Gram(s) IV Push once  ferrous    sulfate 325 milliGRAM(s) Oral daily  gabapentin 300 milliGRAM(s) Oral three times a day  glucagon  Injectable 1 milliGRAM(s) IntraMuscular once  insulin lispro (ADMELOG) corrective regimen sliding scale   SubCutaneous three times a day before meals  melatonin 3 milliGRAM(s) Oral at bedtime  pantoprazole  Injectable 40 milliGRAM(s) IV Push every 12 hours  polyethylene glycol 3350 17 Gram(s) Oral daily  senna 2 Tablet(s) Oral at bedtime  tamsulosin 0.4 milliGRAM(s) Oral at bedtime    MEDICATIONS  (PRN):  acetaminophen     Tablet .. 650 milliGRAM(s) Oral every 6 hours PRN Mild Pain (1 - 3)  dextrose Oral Gel 15 Gram(s) Oral once PRN Blood Glucose LESS THAN 70 milliGRAM(s)/deciliter

## 2024-10-28 NOTE — PROGRESS NOTE ADULT - SUBJECTIVE AND OBJECTIVE BOX
The patient was seen and examined   Laying flat in bed   Denies any chest pain, SOB or palpitations   No orthopnea or PND  No acute events over last 24 hours   Going for Endoscopy today     MEDICATIONS  (STANDING):  aspirin  chewable 81 milliGRAM(s) Oral daily  atorvastatin 10 milliGRAM(s) Oral at bedtime  chlorhexidine 2% Cloths 1 Application(s) Topical <User Schedule>  chlorhexidine 2% Cloths 1 Application(s) Topical daily  dextrose 5% + sodium chloride 0.45%. 1000 milliLiter(s) (75 mL/Hr) IV Continuous <Continuous>  dextrose 5%. 1000 milliLiter(s) (100 mL/Hr) IV Continuous <Continuous>  dextrose 5%. 1000 milliLiter(s) (50 mL/Hr) IV Continuous <Continuous>  dextrose 50% Injectable 12.5 Gram(s) IV Push once  dextrose 50% Injectable 25 Gram(s) IV Push once  dextrose 50% Injectable 25 Gram(s) IV Push once  ferrous    sulfate 325 milliGRAM(s) Oral daily  gabapentin 300 milliGRAM(s) Oral three times a day  glucagon  Injectable 1 milliGRAM(s) IntraMuscular once  insulin lispro (ADMELOG) corrective regimen sliding scale   SubCutaneous three times a day before meals  melatonin 3 milliGRAM(s) Oral at bedtime  methocarbamol 500 milliGRAM(s) Oral once  pantoprazole  Injectable 40 milliGRAM(s) IV Push every 12 hours  polyethylene glycol 3350 17 Gram(s) Oral daily  senna 2 Tablet(s) Oral at bedtime  tamsulosin 0.4 milliGRAM(s) Oral at bedtime    MEDICATIONS  (PRN):  acetaminophen     Tablet .. 650 milliGRAM(s) Oral every 6 hours PRN Mild Pain (1 - 3)  dextrose Oral Gel 15 Gram(s) Oral once PRN Blood Glucose LESS THAN 70 milliGRAM(s)/deciliter  traMADol 50 milliGRAM(s) Oral every 6 hours PRN Severe Pain (7 - 10)            Vital Signs Last 24 Hrs  T(C): 36.3 (28 Oct 2024 05:02), Max: 36.8 (27 Oct 2024 20:29)  T(F): 97.4 (28 Oct 2024 05:02), Max: 98.3 (27 Oct 2024 20:29)  HR: 74 (28 Oct 2024 11:09) (67 - 74)  BP: 147/72 (28 Oct 2024 11:09) (124/71 - 157/69)  BP(mean): 97 (28 Oct 2024 11:09) (88 - 97)  RR: 16 (28 Oct 2024 11:09) (16 - 19)  SpO2: 98% (28 Oct 2024 11:09) (97% - 98%)    Parameters below as of 28 Oct 2024 11:09  Patient On (Oxygen Delivery Method): room air         REVIEW OF SYSTEMS:    CONSTITUTIONAL: No fever, weight loss, or fatigue  CARDIOLOGY: Patient denies chest pain, shortness of breath or syncopal episodes   RESPIRATORY: denies shortness of breath, wheezeing   NEUROLOGICAL: NO weakness, no focal deficits to report   GI: no BRBPR, no N,V,diarrhea.    PSYCHIATRY: normal mood and affect  HEENT: no nasal discharge, no ecchymosis  SKIN: no ecchymosis, no breakdown  MUSCULOSKELETAL: Full range of motion x 4       PHYSICAL EXAM:  · CONSTITUTIONAL:	Well-developed, well nourished     ·RESPIRATORY:   airway patent; breath sounds equal; good air movement; respirations non-labored; clear to auscultation bilaterally   · CARDIOVASCULAR	regular rate and rhythm  + SE murmur    · EXTREMITIES: No cyanosis, clubbing or edema  · VASCULAR: No JVD       TTE: EF 40-45%     LABS:                        8.0    6.36  )-----------( 436      ( 27 Oct 2024 22:48 )             26.4     10-27    134[L]  |  98  |  23[H]  ----------------------------<  131[H]  5.0   |  26  |  0.9    Ca    9.7      27 Oct 2024 22:48    TPro  6.2  /  Alb  3.3[L]  /  TBili  <0.2  /  DBili  x   /  AST  7   /  ALT  <5  /  AlkPhos  106  10-27        PT/INR - ( 27 Oct 2024 22:48 )   PT: 12.10 sec;   INR: 1.06 ratio         PTT - ( 27 Oct 2024 22:48 )  PTT:31.9 sec    I&O's Summary    27 Oct 2024 07:01  -  28 Oct 2024 07:00  --------------------------------------------------------  IN: 0 mL / OUT: 725 mL / NET: -725 mL    28 Oct 2024 07:01  -  28 Oct 2024 12:17  --------------------------------------------------------  IN: 0 mL / OUT: 350 mL / NET: -350 mL      BNP  RADIOLOGY & ADDITIONAL STUDIES:    IMPRESSION AND PLAN:

## 2024-10-29 LAB
ALBUMIN SERPL ELPH-MCNC: 3.4 G/DL — LOW (ref 3.5–5.2)
ALP SERPL-CCNC: 120 U/L — HIGH (ref 30–115)
ALT FLD-CCNC: <5 U/L — SIGNIFICANT CHANGE UP (ref 0–41)
ANION GAP SERPL CALC-SCNC: 16 MMOL/L — HIGH (ref 7–14)
AST SERPL-CCNC: 9 U/L — SIGNIFICANT CHANGE UP (ref 0–41)
BASOPHILS # BLD AUTO: 0.02 K/UL — SIGNIFICANT CHANGE UP (ref 0–0.2)
BASOPHILS NFR BLD AUTO: 0.3 % — SIGNIFICANT CHANGE UP (ref 0–1)
BILIRUB SERPL-MCNC: 0.2 MG/DL — SIGNIFICANT CHANGE UP (ref 0.2–1.2)
BUN SERPL-MCNC: 24 MG/DL — HIGH (ref 10–20)
CALCIUM SERPL-MCNC: 9.8 MG/DL — SIGNIFICANT CHANGE UP (ref 8.4–10.4)
CHLORIDE SERPL-SCNC: 99 MMOL/L — SIGNIFICANT CHANGE UP (ref 98–110)
CO2 SERPL-SCNC: 22 MMOL/L — SIGNIFICANT CHANGE UP (ref 17–32)
CREAT SERPL-MCNC: 1.1 MG/DL — SIGNIFICANT CHANGE UP (ref 0.7–1.5)
EGFR: 67 ML/MIN/1.73M2 — SIGNIFICANT CHANGE UP
EOSINOPHIL # BLD AUTO: 0.12 K/UL — SIGNIFICANT CHANGE UP (ref 0–0.7)
EOSINOPHIL NFR BLD AUTO: 2 % — SIGNIFICANT CHANGE UP (ref 0–8)
GLUCOSE BLDC GLUCOMTR-MCNC: 110 MG/DL — HIGH (ref 70–99)
GLUCOSE BLDC GLUCOMTR-MCNC: 190 MG/DL — HIGH (ref 70–99)
GLUCOSE BLDC GLUCOMTR-MCNC: 202 MG/DL — HIGH (ref 70–99)
GLUCOSE SERPL-MCNC: 111 MG/DL — HIGH (ref 70–99)
HCT VFR BLD CALC: 28.8 % — LOW (ref 42–52)
HGB BLD-MCNC: 8.7 G/DL — LOW (ref 14–18)
IMM GRANULOCYTES NFR BLD AUTO: 0.3 % — SIGNIFICANT CHANGE UP (ref 0.1–0.3)
LYMPHOCYTES # BLD AUTO: 0.74 K/UL — LOW (ref 1.2–3.4)
LYMPHOCYTES # BLD AUTO: 12.5 % — LOW (ref 20.5–51.1)
MAGNESIUM SERPL-MCNC: 2.1 MG/DL — SIGNIFICANT CHANGE UP (ref 1.8–2.4)
MCHC RBC-ENTMCNC: 29.4 PG — SIGNIFICANT CHANGE UP (ref 27–31)
MCHC RBC-ENTMCNC: 30.2 G/DL — LOW (ref 32–37)
MCV RBC AUTO: 97.3 FL — HIGH (ref 80–94)
MONOCYTES # BLD AUTO: 0.47 K/UL — SIGNIFICANT CHANGE UP (ref 0.1–0.6)
MONOCYTES NFR BLD AUTO: 7.9 % — SIGNIFICANT CHANGE UP (ref 1.7–9.3)
NEUTROPHILS # BLD AUTO: 4.57 K/UL — SIGNIFICANT CHANGE UP (ref 1.4–6.5)
NEUTROPHILS NFR BLD AUTO: 77 % — HIGH (ref 42.2–75.2)
NRBC # BLD: 0 /100 WBCS — SIGNIFICANT CHANGE UP (ref 0–0)
PLATELET # BLD AUTO: 378 K/UL — SIGNIFICANT CHANGE UP (ref 130–400)
PMV BLD: 10.5 FL — HIGH (ref 7.4–10.4)
POTASSIUM SERPL-MCNC: 5.4 MMOL/L — HIGH (ref 3.5–5)
POTASSIUM SERPL-SCNC: 5.4 MMOL/L — HIGH (ref 3.5–5)
PROT SERPL-MCNC: 6.5 G/DL — SIGNIFICANT CHANGE UP (ref 6–8)
RBC # BLD: 2.96 M/UL — LOW (ref 4.7–6.1)
RBC # FLD: 17.7 % — HIGH (ref 11.5–14.5)
SODIUM SERPL-SCNC: 137 MMOL/L — SIGNIFICANT CHANGE UP (ref 135–146)
WBC # BLD: 5.94 K/UL — SIGNIFICANT CHANGE UP (ref 4.8–10.8)
WBC # FLD AUTO: 5.94 K/UL — SIGNIFICANT CHANGE UP (ref 4.8–10.8)

## 2024-10-29 PROCEDURE — 99233 SBSQ HOSP IP/OBS HIGH 50: CPT

## 2024-10-29 RX ORDER — HEPARIN SODIUM 10000 [USP'U]/ML
5000 INJECTION INTRAVENOUS; SUBCUTANEOUS EVERY 12 HOURS
Refills: 0 | Status: DISCONTINUED | OUTPATIENT
Start: 2024-10-29 | End: 2024-11-03

## 2024-10-29 RX ORDER — SODIUM ZIRCONIUM CYCLOSILICATE 10 G/10G
10 POWDER, FOR SUSPENSION ORAL ONCE
Refills: 0 | Status: COMPLETED | OUTPATIENT
Start: 2024-10-29 | End: 2024-10-29

## 2024-10-29 RX ADMIN — Medication 1: at 11:46

## 2024-10-29 RX ADMIN — Medication 325 MILLIGRAM(S): at 11:42

## 2024-10-29 RX ADMIN — Medication 3 MILLIGRAM(S): at 21:02

## 2024-10-29 RX ADMIN — Medication 0.4 MILLIGRAM(S): at 21:02

## 2024-10-29 RX ADMIN — PANTOPRAZOLE SODIUM 40 MILLIGRAM(S): 40 TABLET, DELAYED RELEASE ORAL at 17:06

## 2024-10-29 RX ADMIN — PANTOPRAZOLE SODIUM 40 MILLIGRAM(S): 40 TABLET, DELAYED RELEASE ORAL at 05:26

## 2024-10-29 RX ADMIN — GABAPENTIN 300 MILLIGRAM(S): 300 CAPSULE ORAL at 05:26

## 2024-10-29 RX ADMIN — CHLORHEXIDINE GLUCONATE 1 APPLICATION(S): 40 SOLUTION TOPICAL at 05:28

## 2024-10-29 RX ADMIN — Medication 2: at 17:06

## 2024-10-29 RX ADMIN — Medication 10 MILLIGRAM(S): at 21:02

## 2024-10-29 RX ADMIN — Medication 2 TABLET(S): at 21:02

## 2024-10-29 RX ADMIN — Medication 81 MILLIGRAM(S): at 11:43

## 2024-10-29 RX ADMIN — HEPARIN SODIUM 5000 UNIT(S): 10000 INJECTION INTRAVENOUS; SUBCUTANEOUS at 13:40

## 2024-10-29 RX ADMIN — GABAPENTIN 300 MILLIGRAM(S): 300 CAPSULE ORAL at 21:02

## 2024-10-29 RX ADMIN — HEPARIN SODIUM 5000 UNIT(S): 10000 INJECTION INTRAVENOUS; SUBCUTANEOUS at 23:33

## 2024-10-29 RX ADMIN — GABAPENTIN 300 MILLIGRAM(S): 300 CAPSULE ORAL at 13:40

## 2024-10-29 NOTE — PROGRESS NOTE ADULT - ASSESSMENT
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     #Acute on chronic anemia with hx of AVM and JASMIN  tfx to keep hb >7  s/p  venofer   seen by GI   active duodenal bleeding on VCE  s/p  egd  with hemostasis        #Malignant mesothelioma (diagnosed 8/2024 )  #Hx Asbestos lung   -s/p VATS pleurodesis, R sided PleurX,s/p drainage  -he will f/u with Dr Epperson for  further [place] as outpt    cont other supportive care.  spoke with son   d/c plan as per primary team   will f/u

## 2024-10-29 NOTE — PROGRESS NOTE ADULT - SUBJECTIVE AND OBJECTIVE BOX
Patient is a 84y old  Male who presents with a chief complaint of Anemia     (17 Oct 2024 12:14)    INTERVAL HPI/OVERNIGHT EVENTS: Patient was examined and seen at bedside. This morning pt is resting comfortably in bed and reports Rt lower back discomfort resolved. No other complaints. Breathing is stable. Son John at bedside.    ROS: Denies CP, SOB, AP, new weakness  All other systems reviewed and are within normal limits.  InitialHPI:  HPI: 83 y/o man  PMHx of HLD, DM, CAD s/p stents x2 in 2007, pt of Dr Avila, hx  asbestosis of lung, diagnosed w malignant mesothelioma (8/2024) and s/p VATS pleurodesis, R sided PleurX, hx AVM/GIB and JASMIN, sent in by NH for low hemoglobin. Per NH, patient also refused Pleurx drainage today and is requesting to drain it inpatient (gets drained 2-3 times per week). Of note, patient had a recent admission in September for anemia for which he received 2 units prbc, IV Venofer and was recommended EGD/[place] but given his advance age and co morbidities decided to hold off. Patient is a poor historian. Uses wheelchair at baseline. Denies fever chills, shortness of breath, cough, chest pain, leg swelling, hematemesis, hematochezia.     Vital Signs Last 24 Hrs  T(C): 36.4 (15 Oct 2024 01:09), Max: 36.9 (14 Oct 2024 22:51)  T(F): 97.5 (15 Oct 2024 01:09), Max: 98.4 (14 Oct 2024 22:51)  HR: 100 (15 Oct 2024 01:09) (89 - 105)  BP: 132/74 (15 Oct 2024 01:09) (104/64 - 132/74)  RR: 18 (15 Oct 2024 01:09) (17 - 19)  SpO2: 97% (15 Oct 2024 01:09) (93% - 97%)    Parameters below as of 15 Oct 2024 01:09  Patient On (Oxygen Delivery Method): room air    Labs: Hgb 6.4 , Platelets 500 , Cr 1.2 (baseline)   CXR: Worsening R pleural effusion   EKG: NSR with PACs    Admitted for anemia      (15 Oct 2024 01:16)    PAST MEDICAL & SURGICAL HISTORY:  DM (diabetes mellitus)      MI (myocardial infarction)      History of CAD (coronary artery disease)      Dyslipidemia      Currently smokes tobacco      Mesothelioma, malignant      Coronary stent patent          General: NAD, AAOx2, chronically ill appearing, b/l temp waisting, cachectic  HEENT:  no LAD  CV: S1 S2  Resp: decreased breath sounds at bases  GI: NT/ND/S +BS  MS: no clubbing/cyanosis/edema, + pulses b/l. TTP Rt lower back  Neuro: nonfocal, +reflexes thruout  +laws w/ clear yellow urine  +Rt sided ant pleurx cath w/ d/c/i dsg          Home Medications:  aspirin 81 mg oral tablet, chewable: 1 tab(s) orally once a day (25 Oct 2024 14:42)  ferrous sulfate 325 mg (65 mg elemental iron) oral tablet: 1 tab(s) orally once a day (25 Sep 2024 15:37)  gabapentin 300 mg oral tablet: 1 tab(s) orally 3 times a day (20 Sep 2024 01:37)  melatonin 3 mg oral tablet: 1 tab(s) orally once a day (at bedtime) (13 Aug 2024 09:23)  metFORMIN 500 mg oral tablet: 1 tab(s) orally 2 times a day (21 Jul 2024 18:04)  pantoprazole 40 mg oral delayed release tablet: 1 tab(s) orally every 12 hours (25 Sep 2024 15:37)  polyethylene glycol 3350 oral powder for reconstitution: 17 gram(s) orally once a day (13 Aug 2024 09:23)  pravastatin 20 mg oral tablet: 1 tab(s) orally (21 Jul 2024 18:04)  senna leaf extract oral tablet: 2 tab(s) orally once a day (at bedtime) (13 Aug 2024 09:23)  tamsulosin 0.4 mg oral capsule: 1 cap(s) orally once a day (at bedtime) (13 Aug 2024 09:23)    MEDICATIONS  (STANDING):  aspirin  chewable 81 milliGRAM(s) Oral daily  atorvastatin 10 milliGRAM(s) Oral at bedtime  chlorhexidine 2% Cloths 1 Application(s) Topical <User Schedule>  chlorhexidine 2% Cloths 1 Application(s) Topical daily  dextrose 5% + sodium chloride 0.45%. 1000 milliLiter(s) (75 mL/Hr) IV Continuous <Continuous>  dextrose 5%. 1000 milliLiter(s) (100 mL/Hr) IV Continuous <Continuous>  dextrose 5%. 1000 milliLiter(s) (50 mL/Hr) IV Continuous <Continuous>  dextrose 50% Injectable 12.5 Gram(s) IV Push once  dextrose 50% Injectable 25 Gram(s) IV Push once  dextrose 50% Injectable 25 Gram(s) IV Push once  ferrous    sulfate 325 milliGRAM(s) Oral daily  gabapentin 300 milliGRAM(s) Oral three times a day  glucagon  Injectable 1 milliGRAM(s) IntraMuscular once  heparin   Injectable 5000 Unit(s) SubCutaneous every 12 hours  insulin lispro (ADMELOG) corrective regimen sliding scale   SubCutaneous three times a day before meals  melatonin 3 milliGRAM(s) Oral at bedtime  pantoprazole  Injectable 40 milliGRAM(s) IV Push every 12 hours  polyethylene glycol 3350 17 Gram(s) Oral daily  senna 2 Tablet(s) Oral at bedtime  tamsulosin 0.4 milliGRAM(s) Oral at bedtime    MEDICATIONS  (PRN):  acetaminophen     Tablet .. 650 milliGRAM(s) Oral every 6 hours PRN Mild Pain (1 - 3)  dextrose Oral Gel 15 Gram(s) Oral once PRN Blood Glucose LESS THAN 70 milliGRAM(s)/deciliter  traMADol 50 milliGRAM(s) Oral every 6 hours PRN Severe Pain (7 - 10)    Vital Signs Last 24 Hrs  T(C): 36.3 (29 Oct 2024 14:25), Max: 36.7 (29 Oct 2024 04:25)  T(F): 97.3 (29 Oct 2024 14:25), Max: 98.1 (29 Oct 2024 04:25)  HR: 96 (29 Oct 2024 14:25) (73 - 96)  BP: 109/64 (29 Oct 2024 14:25) (109/64 - 152/68)  BP(mean): 79 (29 Oct 2024 14:25) (79 - 79)  RR: 18 (29 Oct 2024 04:25) (18 - 19)  SpO2: 97% (29 Oct 2024 14:25) (96% - 98%)    Parameters below as of 29 Oct 2024 14:25  Patient On (Oxygen Delivery Method): room air      CAPILLARY BLOOD GLUCOSE      POCT Blood Glucose.: 202 mg/dL (29 Oct 2024 16:40)  POCT Blood Glucose.: 190 mg/dL (29 Oct 2024 11:43)  POCT Blood Glucose.: 110 mg/dL (29 Oct 2024 08:05)  POCT Blood Glucose.: 126 mg/dL (28 Oct 2024 21:30)    LABS:                        8.7    5.94  )-----------( 378      ( 29 Oct 2024 06:26 )             28.8     10-29    137  |  99  |  24[H]  ----------------------------<  111[H]  5.4[H]   |  22  |  1.1    Ca    9.8      29 Oct 2024 06:26  Mg     2.1     10-29    TPro  6.5  /  Alb  3.4[L]  /  TBili  0.2  /  DBili  x   /  AST  9   /  ALT  <5  /  AlkPhos  120[H]  10-29    LIVER FUNCTIONS - ( 29 Oct 2024 06:26 )  Alb: 3.4 g/dL / Pro: 6.5 g/dL / ALK PHOS: 120 U/L / ALT: <5 U/L / AST: 9 U/L / GGT: x               PT/INR - ( 27 Oct 2024 22:48 )   PT: 12.10 sec;   INR: 1.06 ratio         PTT - ( 27 Oct 2024 22:48 )  PTT:31.9 sec  Urinalysis Basic - ( 29 Oct 2024 06:26 )    Color: x / Appearance: x / SG: x / pH: x  Gluc: 111 mg/dL / Ketone: x  / Bili: x / Urobili: x   Blood: x / Protein: x / Nitrite: x   Leuk Esterase: x / RBC: x / WBC x   Sq Epi: x / Non Sq Epi: x / Bacteria: x              Consultant Notes Reviewed:  [x ] YES  [ ] NO  Care Discussed with Consultants/Other Providers/ Housestaff [ x] YES  [ ] NO  Radiology, labs, EKGs, new studies personally reviewed.                                                                                                                                                         Patient is a 84y old  Male who presents with a chief complaint of Anemia     (17 Oct 2024 12:14)    INTERVAL HPI/OVERNIGHT EVENTS: Patient was examined and seen at bedside. This morning pt is resting comfortably in bed and reports Rt lower back discomfort resolved. No other complaints. Breathing is stable. Son John at bedside. S/p ED yesterday.    ROS: Denies CP, SOB, AP, new weakness  All other systems reviewed and are within normal limits.  InitialHPI:  HPI: 85 y/o man  PMHx of HLD, DM, CAD s/p stents x2 in 2007, pt of Dr Avila, hx  asbestosis of lung, diagnosed w malignant mesothelioma (8/2024) and s/p VATS pleurodesis, R sided PleurX, hx AVM/GIB and JASMIN, sent in by NH for low hemoglobin. Per NH, patient also refused Pleurx drainage today and is requesting to drain it inpatient (gets drained 2-3 times per week). Of note, patient had a recent admission in September for anemia for which he received 2 units prbc, IV Venofer and was recommended EGD/[place] but given his advance age and co morbidities decided to hold off. Patient is a poor historian. Uses wheelchair at baseline. Denies fever chills, shortness of breath, cough, chest pain, leg swelling, hematemesis, hematochezia.     Vital Signs Last 24 Hrs  T(C): 36.4 (15 Oct 2024 01:09), Max: 36.9 (14 Oct 2024 22:51)  T(F): 97.5 (15 Oct 2024 01:09), Max: 98.4 (14 Oct 2024 22:51)  HR: 100 (15 Oct 2024 01:09) (89 - 105)  BP: 132/74 (15 Oct 2024 01:09) (104/64 - 132/74)  RR: 18 (15 Oct 2024 01:09) (17 - 19)  SpO2: 97% (15 Oct 2024 01:09) (93% - 97%)    Parameters below as of 15 Oct 2024 01:09  Patient On (Oxygen Delivery Method): room air    Labs: Hgb 6.4 , Platelets 500 , Cr 1.2 (baseline)   CXR: Worsening R pleural effusion   EKG: NSR with PACs    Admitted for anemia      (15 Oct 2024 01:16)    PAST MEDICAL & SURGICAL HISTORY:  DM (diabetes mellitus)      MI (myocardial infarction)      History of CAD (coronary artery disease)      Dyslipidemia      Currently smokes tobacco      Mesothelioma, malignant      Coronary stent patent          General: NAD, AAOx2, chronically ill appearing, b/l temp waisting, cachectic  HEENT:  no LAD  CV: S1 S2  Resp: decreased breath sounds at bases  GI: NT/ND/S +BS  MS: no clubbing/cyanosis/edema, + pulses b/l. TTP Rt lower back  Neuro: nonfocal, +reflexes thruout  +laws w/ clear yellow urine  +Rt sided ant pleurx cath w/ d/c/i dsg          Home Medications:  aspirin 81 mg oral tablet, chewable: 1 tab(s) orally once a day (25 Oct 2024 14:42)  ferrous sulfate 325 mg (65 mg elemental iron) oral tablet: 1 tab(s) orally once a day (25 Sep 2024 15:37)  gabapentin 300 mg oral tablet: 1 tab(s) orally 3 times a day (20 Sep 2024 01:37)  melatonin 3 mg oral tablet: 1 tab(s) orally once a day (at bedtime) (13 Aug 2024 09:23)  metFORMIN 500 mg oral tablet: 1 tab(s) orally 2 times a day (21 Jul 2024 18:04)  pantoprazole 40 mg oral delayed release tablet: 1 tab(s) orally every 12 hours (25 Sep 2024 15:37)  polyethylene glycol 3350 oral powder for reconstitution: 17 gram(s) orally once a day (13 Aug 2024 09:23)  pravastatin 20 mg oral tablet: 1 tab(s) orally (21 Jul 2024 18:04)  senna leaf extract oral tablet: 2 tab(s) orally once a day (at bedtime) (13 Aug 2024 09:23)  tamsulosin 0.4 mg oral capsule: 1 cap(s) orally once a day (at bedtime) (13 Aug 2024 09:23)    MEDICATIONS  (STANDING):  aspirin  chewable 81 milliGRAM(s) Oral daily  atorvastatin 10 milliGRAM(s) Oral at bedtime  chlorhexidine 2% Cloths 1 Application(s) Topical <User Schedule>  chlorhexidine 2% Cloths 1 Application(s) Topical daily  dextrose 5% + sodium chloride 0.45%. 1000 milliLiter(s) (75 mL/Hr) IV Continuous <Continuous>  dextrose 5%. 1000 milliLiter(s) (100 mL/Hr) IV Continuous <Continuous>  dextrose 5%. 1000 milliLiter(s) (50 mL/Hr) IV Continuous <Continuous>  dextrose 50% Injectable 12.5 Gram(s) IV Push once  dextrose 50% Injectable 25 Gram(s) IV Push once  dextrose 50% Injectable 25 Gram(s) IV Push once  ferrous    sulfate 325 milliGRAM(s) Oral daily  gabapentin 300 milliGRAM(s) Oral three times a day  glucagon  Injectable 1 milliGRAM(s) IntraMuscular once  heparin   Injectable 5000 Unit(s) SubCutaneous every 12 hours  insulin lispro (ADMELOG) corrective regimen sliding scale   SubCutaneous three times a day before meals  melatonin 3 milliGRAM(s) Oral at bedtime  pantoprazole  Injectable 40 milliGRAM(s) IV Push every 12 hours  polyethylene glycol 3350 17 Gram(s) Oral daily  senna 2 Tablet(s) Oral at bedtime  tamsulosin 0.4 milliGRAM(s) Oral at bedtime    MEDICATIONS  (PRN):  acetaminophen     Tablet .. 650 milliGRAM(s) Oral every 6 hours PRN Mild Pain (1 - 3)  dextrose Oral Gel 15 Gram(s) Oral once PRN Blood Glucose LESS THAN 70 milliGRAM(s)/deciliter  traMADol 50 milliGRAM(s) Oral every 6 hours PRN Severe Pain (7 - 10)    Vital Signs Last 24 Hrs  T(C): 36.3 (29 Oct 2024 14:25), Max: 36.7 (29 Oct 2024 04:25)  T(F): 97.3 (29 Oct 2024 14:25), Max: 98.1 (29 Oct 2024 04:25)  HR: 96 (29 Oct 2024 14:25) (73 - 96)  BP: 109/64 (29 Oct 2024 14:25) (109/64 - 152/68)  BP(mean): 79 (29 Oct 2024 14:25) (79 - 79)  RR: 18 (29 Oct 2024 04:25) (18 - 19)  SpO2: 97% (29 Oct 2024 14:25) (96% - 98%)    Parameters below as of 29 Oct 2024 14:25  Patient On (Oxygen Delivery Method): room air      CAPILLARY BLOOD GLUCOSE      POCT Blood Glucose.: 202 mg/dL (29 Oct 2024 16:40)  POCT Blood Glucose.: 190 mg/dL (29 Oct 2024 11:43)  POCT Blood Glucose.: 110 mg/dL (29 Oct 2024 08:05)  POCT Blood Glucose.: 126 mg/dL (28 Oct 2024 21:30)    LABS:                        8.7    5.94  )-----------( 378      ( 29 Oct 2024 06:26 )             28.8     10-29    137  |  99  |  24[H]  ----------------------------<  111[H]  5.4[H]   |  22  |  1.1    Ca    9.8      29 Oct 2024 06:26  Mg     2.1     10-29    TPro  6.5  /  Alb  3.4[L]  /  TBili  0.2  /  DBili  x   /  AST  9   /  ALT  <5  /  AlkPhos  120[H]  10-29    LIVER FUNCTIONS - ( 29 Oct 2024 06:26 )  Alb: 3.4 g/dL / Pro: 6.5 g/dL / ALK PHOS: 120 U/L / ALT: <5 U/L / AST: 9 U/L / GGT: x               PT/INR - ( 27 Oct 2024 22:48 )   PT: 12.10 sec;   INR: 1.06 ratio         PTT - ( 27 Oct 2024 22:48 )  PTT:31.9 sec  Urinalysis Basic - ( 29 Oct 2024 06:26 )    Color: x / Appearance: x / SG: x / pH: x  Gluc: 111 mg/dL / Ketone: x  / Bili: x / Urobili: x   Blood: x / Protein: x / Nitrite: x   Leuk Esterase: x / RBC: x / WBC x   Sq Epi: x / Non Sq Epi: x / Bacteria: x              Consultant Notes Reviewed:  [x ] YES  [ ] NO  Care Discussed with Consultants/Other Providers/ Housestaff [ x] YES  [ ] NO  Radiology, labs, EKGs, new studies personally reviewed.

## 2024-10-29 NOTE — PROGRESS NOTE ADULT - SUBJECTIVE AND OBJECTIVE BOX
24H events:    Patient is a 84y old Male who presents with a chief complaint of Acute on chronic anemia (28 Oct 2024 16:40)    Primary diagnosis of Anemia      Today is hospital day 15d. This morning patient was seen and examined at bedside, resting comfortably in bed.    No acute or major events overnight.    Code Status: Full code      PAST MEDICAL & SURGICAL HISTORY  DM (diabetes mellitus)    MI (myocardial infarction)    History of CAD (coronary artery disease)    Dyslipidemia    Currently smokes tobacco    Mesothelioma, malignant    Coronary stent patent      SOCIAL HISTORY:  Social History:      ALLERGIES:  penicillin (Unknown)    MEDICATIONS:  STANDING MEDICATIONS  aspirin  chewable 81 milliGRAM(s) Oral daily  atorvastatin 10 milliGRAM(s) Oral at bedtime  chlorhexidine 2% Cloths 1 Application(s) Topical daily  chlorhexidine 2% Cloths 1 Application(s) Topical <User Schedule>  dextrose 5% + sodium chloride 0.45%. 1000 milliLiter(s) IV Continuous <Continuous>  dextrose 5%. 1000 milliLiter(s) IV Continuous <Continuous>  dextrose 5%. 1000 milliLiter(s) IV Continuous <Continuous>  dextrose 50% Injectable 12.5 Gram(s) IV Push once  dextrose 50% Injectable 25 Gram(s) IV Push once  dextrose 50% Injectable 25 Gram(s) IV Push once  ferrous    sulfate 325 milliGRAM(s) Oral daily  gabapentin 300 milliGRAM(s) Oral three times a day  glucagon  Injectable 1 milliGRAM(s) IntraMuscular once  heparin   Injectable 5000 Unit(s) SubCutaneous every 12 hours  insulin lispro (ADMELOG) corrective regimen sliding scale   SubCutaneous three times a day before meals  melatonin 3 milliGRAM(s) Oral at bedtime  pantoprazole  Injectable 40 milliGRAM(s) IV Push every 12 hours  polyethylene glycol 3350 17 Gram(s) Oral daily  senna 2 Tablet(s) Oral at bedtime  tamsulosin 0.4 milliGRAM(s) Oral at bedtime    PRN MEDICATIONS  acetaminophen     Tablet .. 650 milliGRAM(s) Oral every 6 hours PRN  dextrose Oral Gel 15 Gram(s) Oral once PRN  traMADol 50 milliGRAM(s) Oral every 6 hours PRN    VITALS:   T(F): 97.3  HR: 96  BP: 109/64  RR: 18  SpO2: 97%    PHYSICAL EXAM:  LOS: 15d    VITALS:   T(C): 36.3 (10-29-24 @ 14:25), Max: 36.7 (10-28-24 @ 18:10)  HR: 96 (10-29-24 @ 14:25) (68 - 96)  BP: 109/64 (10-29-24 @ 14:25) (107/65 - 152/68)  RR: 18 (10-29-24 @ 04:25) (18 - 19)  SpO2: 97% (10-29-24 @ 14:25) (96% - 99%)    GENERAL: NAD, lying in bed comfortably  HEAD:  Atraumatic, Normocephalic  EYES: EOMI, PERRLA, conjunctiva and sclera clear  ENT: Moist mucous membranes  NECK: Supple, No JVD  CHEST/LUNG: Clear to auscultation bilaterally; No rales, rhonchi, wheezing, or rubs. Unlabored respirations  HEART: Regular rate and rhythm; No murmurs, rubs, or gallops  ABDOMEN: BSx4; Soft, nontender, nondistended  EXTREMITIES:  2+ Peripheral Pulses, brisk capillary refill. No clubbing, cyanosis, or edema  NERVOUS SYSTEM:  A&Ox3, no focal deficits   SKIN: No rashes or lesions    LABS:                        8.7    5.94  )-----------( 378      ( 29 Oct 2024 06:26 )             28.8     10-29    137  |  99  |  24[H]  ----------------------------<  111[H]  5.4[H]   |  22  |  1.1    Ca    9.8      29 Oct 2024 06:26  Mg     2.1     10-29    TPro  6.5  /  Alb  3.4[L]  /  TBili  0.2  /  DBili  x   /  AST  9   /  ALT  <5  /  AlkPhos  120[H]  10-29    PT/INR - ( 27 Oct 2024 22:48 )   PT: 12.10 sec;   INR: 1.06 ratio         PTT - ( 27 Oct 2024 22:48 )  PTT:31.9 sec  Urinalysis Basic - ( 29 Oct 2024 06:26 )    Color: x / Appearance: x / SG: x / pH: x  Gluc: 111 mg/dL / Ketone: x  / Bili: x / Urobili: x   Blood: x / Protein: x / Nitrite: x   Leuk Esterase: x / RBC: x / WBC x   Sq Epi: x / Non Sq Epi: x / Bacteria: x                RADIOLOGY:

## 2024-10-29 NOTE — PROGRESS NOTE ADULT - SUBJECTIVE AND OBJECTIVE BOX
Gastroenterology progress note:     Patient is a 84y old  Male who presents with a chief complaint of Acute on chronic anemia (28 Oct 2024 16:40)       Admitted on: 10-14-24    We are following the patient for: AVM       Interval History:    No acute events overnight. Denies abd pain. No bloody BM. Tolerating diet      PAST MEDICAL & SURGICAL HISTORY:  DM (diabetes mellitus)      MI (myocardial infarction)      History of CAD (coronary artery disease)      Dyslipidemia      Currently smokes tobacco      Mesothelioma, malignant      Coronary stent patent          MEDICATIONS  (STANDING):  aspirin  chewable 81 milliGRAM(s) Oral daily  atorvastatin 10 milliGRAM(s) Oral at bedtime  chlorhexidine 2% Cloths 1 Application(s) Topical <User Schedule>  chlorhexidine 2% Cloths 1 Application(s) Topical daily  dextrose 5% + sodium chloride 0.45%. 1000 milliLiter(s) (75 mL/Hr) IV Continuous <Continuous>  dextrose 5%. 1000 milliLiter(s) (100 mL/Hr) IV Continuous <Continuous>  dextrose 5%. 1000 milliLiter(s) (50 mL/Hr) IV Continuous <Continuous>  dextrose 50% Injectable 25 Gram(s) IV Push once  dextrose 50% Injectable 12.5 Gram(s) IV Push once  dextrose 50% Injectable 25 Gram(s) IV Push once  ferrous    sulfate 325 milliGRAM(s) Oral daily  gabapentin 300 milliGRAM(s) Oral three times a day  glucagon  Injectable 1 milliGRAM(s) IntraMuscular once  insulin lispro (ADMELOG) corrective regimen sliding scale   SubCutaneous three times a day before meals  melatonin 3 milliGRAM(s) Oral at bedtime  pantoprazole  Injectable 40 milliGRAM(s) IV Push every 12 hours  polyethylene glycol 3350 17 Gram(s) Oral daily  senna 2 Tablet(s) Oral at bedtime  tamsulosin 0.4 milliGRAM(s) Oral at bedtime    MEDICATIONS  (PRN):  acetaminophen     Tablet .. 650 milliGRAM(s) Oral every 6 hours PRN Mild Pain (1 - 3)  dextrose Oral Gel 15 Gram(s) Oral once PRN Blood Glucose LESS THAN 70 milliGRAM(s)/deciliter  traMADol 50 milliGRAM(s) Oral every 6 hours PRN Severe Pain (7 - 10)      Allergies  penicillin (Unknown)      Review of Systems:   Cardiovascular:  No Chest Pain, No Palpitations  Respiratory:  No Cough, No Dyspnea  Gastrointestinal:  As described in HPI  Skin:  No Skin Lesions, No Jaundice  Neuro:  No Syncope, No Dizziness    Physical Examination:  T(C): 36.7 (10-29-24 @ 04:25), Max: 36.7 (10-28-24 @ 18:10)  HR: 79 (10-29-24 @ 04:25) (68 - 82)  BP: 152/68 (10-29-24 @ 04:25) (107/65 - 157/69)  RR: 18 (10-29-24 @ 04:25) (16 - 19)  SpO2: 96% (10-29-24 @ 04:25) (96% - 99%)  Weight (kg): 60.9 (10-28-24 @ 17:49)    10-28-24 @ 07:01  -  10-29-24 @ 07:00  --------------------------------------------------------  IN: 0 mL / OUT: 350 mL / NET: -350 mL        GENERAL: AAOx3, no acute distress.  HEAD:  Atraumatic, Normocephalic  EYES: conjunctiva and sclera clear  NECK: Supple, no JVD or thyromegaly  CHEST/LUNG: Clear to auscultation bilaterally; No wheeze, rhonchi, or rales  HEART: Regular rate and rhythm; normal S1, S2, No murmurs.  ABDOMEN: Soft, nontender, nondistended; Bowel sounds present  NEUROLOGY: No asterixis or tremor.   SKIN: Intact, no jaundice     Data:                        8.7    5.94  )-----------( 378      ( 29 Oct 2024 06:26 )             28.8     Hgb trend:  8.7  10-29-24 @ 06:26  8.0  10-27-24 @ 22:48  8.6  10-27-24 @ 06:46  8.6  10-26-24 @ 17:00        10-27    134[L]  |  98  |  23[H]  ----------------------------<  131[H]  5.0   |  26  |  0.9    Ca    9.7      27 Oct 2024 22:48    TPro  6.2  /  Alb  3.3[L]  /  TBili  <0.2  /  DBili  x   /  AST  7   /  ALT  <5  /  AlkPhos  106  10-27    Liver panel trend:  TBili <0.2   /   AST 7   /   ALT <5   /   AlkP 106   /   Tptn 6.2   /   Alb 3.3    /   DBili --      10-27      PT/INR - ( 27 Oct 2024 22:48 )   PT: 12.10 sec;   INR: 1.06 ratio         PTT - ( 27 Oct 2024 22:48 )  PTT:31.9 sec       Radiology:

## 2024-10-29 NOTE — PROGRESS NOTE ADULT - ASSESSMENT
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 patient had a recent admission in September for anemia for which he received 2 units prbc, IV Venofer and was recommended EGD/[place] but given his advance age and co morbidities family decided to hold off. Patient is a poor historian. GI was consulted for anemia       #Acute on chronic macrocytic anemia with JASMIN - no overt GI bleed  - Hemodynamically stable & no active bleeding  - On plavix   - Iron :31, %sat: 10 TIBC 325 ferritin 89 while on iron  - reported AVMs? endoscopic work up with  per grand son Nelli Lopez    VCE done 10/24 showing active bleed in the duodenum    S/p EGD 10/28  Otherwise normal esophagus.  Erythema in the stomach compatible with non-erosive gastritis.  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 (APC Hemostasis).  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 (APC Hemostasis).  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  -Consider RUQ US as OP  -f/u w/ primary GI 83 y/o man  PMHx of HLD, DM, CAD s/p stents x2 in 2007, pt of Dr Avila, hx  asbestosis of lung, diagnosed w malignant mesothelioma (8/2024) and s/p VATS pleurodesis, R sided PleurX, hx AVM/GIB and JASMIN, sent in by NH for low hemoglobin. patient had a recent admission in September for anemia for which he received 2 units prbc, IV Venofer and was recommended EGD/[place] but given his advance age and co morbidities family decided to hold off. Patient is a poor historian. GI was consulted for anemia       #Acute on chronic macrocytic anemia with JASMIN - no overt GI bleed  - Hemodynamically stable & no active bleeding  - On plavix   - Iron :31, %sat: 10 TIBC 325 ferritin 89 while on iron  - reported AVMs? endoscopic work up with  per grand son Nelli Lopez    VCE done 10/24 showing active bleed in the duodenum    S/p EGD 10/28  Otherwise normal esophagus.  Erythema in the stomach compatible with non-erosive gastritis.  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 (APC Hemostasis).  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 (APC Hemostasis).  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  -Consider RUQ US as OP  -f/u w/ primary GI

## 2024-10-29 NOTE — PROGRESS NOTE ADULT - SUBJECTIVE AND OBJECTIVE BOX
Patient is a 84y old  Male who presents with a chief complaint of Acute on chronic anemia (28 Oct 2024 16:40)       Pt is seen and examined  pt is awake and lying in bed        ROS:  Negative except for:weakness    MEDICATIONS  (STANDING):  aspirin  chewable 81 milliGRAM(s) Oral daily  atorvastatin 10 milliGRAM(s) Oral at bedtime  chlorhexidine 2% Cloths 1 Application(s) Topical <User Schedule>  chlorhexidine 2% Cloths 1 Application(s) Topical daily  dextrose 5% + sodium chloride 0.45%. 1000 milliLiter(s) (75 mL/Hr) IV Continuous <Continuous>  dextrose 5%. 1000 milliLiter(s) (100 mL/Hr) IV Continuous <Continuous>  dextrose 5%. 1000 milliLiter(s) (50 mL/Hr) IV Continuous <Continuous>  dextrose 50% Injectable 12.5 Gram(s) IV Push once  dextrose 50% Injectable 25 Gram(s) IV Push once  dextrose 50% Injectable 25 Gram(s) IV Push once  ferrous    sulfate 325 milliGRAM(s) Oral daily  gabapentin 300 milliGRAM(s) Oral three times a day  glucagon  Injectable 1 milliGRAM(s) IntraMuscular once  heparin   Injectable 5000 Unit(s) SubCutaneous every 12 hours  insulin lispro (ADMELOG) corrective regimen sliding scale   SubCutaneous three times a day before meals  melatonin 3 milliGRAM(s) Oral at bedtime  pantoprazole  Injectable 40 milliGRAM(s) IV Push every 12 hours  polyethylene glycol 3350 17 Gram(s) Oral daily  senna 2 Tablet(s) Oral at bedtime  tamsulosin 0.4 milliGRAM(s) Oral at bedtime    MEDICATIONS  (PRN):  acetaminophen     Tablet .. 650 milliGRAM(s) Oral every 6 hours PRN Mild Pain (1 - 3)  dextrose Oral Gel 15 Gram(s) Oral once PRN Blood Glucose LESS THAN 70 milliGRAM(s)/deciliter  traMADol 50 milliGRAM(s) Oral every 6 hours PRN Severe Pain (7 - 10)      Allergies    penicillin (Unknown)    Intolerances        Vital Signs Last 24 Hrs  T(C): 36.7 (29 Oct 2024 04:25), Max: 36.7 (28 Oct 2024 18:10)  T(F): 98.1 (29 Oct 2024 04:25), Max: 98.1 (29 Oct 2024 04:25)  HR: 79 (29 Oct 2024 04:25) (68 - 82)  BP: 152/68 (29 Oct 2024 04:25) (107/65 - 152/68)  BP(mean): 82 (28 Oct 2024 17:49) (82 - 82)  RR: 18 (29 Oct 2024 04:25) (18 - 19)  SpO2: 96% (29 Oct 2024 04:25) (96% - 99%)    Parameters below as of 29 Oct 2024 04:25  Patient On (Oxygen Delivery Method): room air        PHYSICAL EXAM  General: adult in NAD  HEENT: clear oropharynx, anicteric sclera, pink conjunctiva  Neck: supple  CV: normal S1/S2 with no murmur rubs or gallops  Lungs: positive air movement b/l ant lungs,clear to auscultation, no wheezes, no rales  Abdomen: soft non-tender non-distended, no hepatosplenomegaly  Ext: no clubbing cyanosis or edema  Skin: no rashes and no petechiae  Neuro: alert and oriented X 2, no focal deficits  LABS:                          8.7    5.94  )-----------( 378      ( 29 Oct 2024 06:26 )             28.8         Mean Cell Volume : 97.3 fL  Mean Cell Hemoglobin : 29.4 pg  Mean Cell Hemoglobin Concentration : 30.2 g/dL  Auto Neutrophil # : 4.57 K/uL  Auto Lymphocyte # : 0.74 K/uL  Auto Monocyte # : 0.47 K/uL  Auto Eosinophil # : 0.12 K/uL  Auto Basophil # : 0.02 K/uL  Auto Neutrophil % : 77.0 %  Auto Lymphocyte % : 12.5 %  Auto Monocyte % : 7.9 %  Auto Eosinophil % : 2.0 %  Auto Basophil % : 0.3 %    Serial CBC's  10-29 @ 06:26  Hct-28.8 / Hgb-8.7 / Plat-378 / RBC-2.96 / WBC-5.94          Serial CBC's  10-27 @ 22:48  Hct-26.4 / Hgb-8.0 / Plat-436 / RBC-2.77 / WBC-6.36          Serial CBC's  10-27 @ 06:46  Hct-28.2 / Hgb-8.6 / Plat-398 / RBC-2.96 / WBC-6.87          Serial CBC's  10-26 @ 17:00  Hct-28.1 / Hgb-8.6 / Plat-427 / RBC-2.93 / WBC-5.74          Serial CBC's  10-26 @ 07:04  Hct-25.8 / Hgb-8.0 / Plat-415 / RBC-2.71 / WBC-6.96            10-29    137  |  99  |  24[H]  ----------------------------<  111[H]  5.4[H]   |  22  |  1.1    Ca    9.8      29 Oct 2024 06:26  Mg     2.1     10-29    TPro  6.5  /  Alb  3.4[L]  /  TBili  0.2  /  DBili  x   /  AST  9   /  ALT  <5  /  AlkPhos  120[H]  10-29      PT/INR - ( 27 Oct 2024 22:48 )   PT: 12.10 sec;   INR: 1.06 ratio         PTT - ( 27 Oct 2024 22:48 )  PTT:31.9 sec    WBC Count: 5.94 K/uL (10-29-24 @ 06:26)  Hemoglobin: 8.7 g/dL (10-29-24 @ 06:26)  Hematocrit: 28.8 % (10-29-24 @ 06:26)  Platelet Count - Automated: 378 K/uL (10-29-24 @ 06:26)  WBC Count: 6.36 K/uL (10-27-24 @ 22:48)  Hemoglobin: 8.0 g/dL (10-27-24 @ 22:48)  Hematocrit: 26.4 % (10-27-24 @ 22:48)  Platelet Count - Automated: 436 K/uL (10-27-24 @ 22:48)  WBC Count: 6.87 K/uL (10-27-24 @ 06:46)  Hemoglobin: 8.6 g/dL (10-27-24 @ 06:46)  Hematocrit: 28.2 % (10-27-24 @ 06:46)  Platelet Count - Automated: 398 K/uL (10-27-24 @ 06:46)  WBC Count: 5.74 K/uL (10-26-24 @ 17:00)  Hemoglobin: 8.6 g/dL (10-26-24 @ 17:00)  Hematocrit: 28.1 % (10-26-24 @ 17:00)  Platelet Count - Automated: 427 K/uL (10-26-24 @ 17:00)  WBC Count: 6.96 K/uL (10-26-24 @ 07:04)  Hemoglobin: 8.0 g/dL (10-26-24 @ 07:04)  Hematocrit: 25.8 % (10-26-24 @ 07:04)  Platelet Count - Automated: 415 K/uL (10-26-24 @ 07:04)  WBC Count: 8.18 K/uL (10-25-24 @ 20:47)  Hemoglobin: 8.0 g/dL (10-25-24 @ 20:47)  Hematocrit: 25.7 % (10-25-24 @ 20:47)  Platelet Count - Automated: 415 K/uL (10-25-24 @ 20:47)  WBC Count: 7.31 K/uL (10-25-24 @ 06:33)  Hemoglobin: 8.7 g/dL (10-25-24 @ 06:33)  Hematocrit: 27.8 % (10-25-24 @ 06:33)  Platelet Count - Automated: 425 K/uL (10-25-24 @ 06:33)  WBC Count: 7.13 K/uL (10-24-24 @ 06:45)  Hemoglobin: 8.6 g/dL (10-24-24 @ 06:45)  Hematocrit: 27.5 % (10-24-24 @ 06:45)  Platelet Count - Automated: 428 K/uL (10-24-24 @ 06:45)  WBC Count: 7.80 K/uL (10-23-24 @ 05:46)  Hemoglobin: 8.0 g/dL (10-23-24 @ 05:46)  Hematocrit: 26.2 % (10-23-24 @ 05:46)  Platelet Count - Automated: 403 K/uL (10-23-24 @ 05:46)  WBC Count: 6.57 K/uL (10-22-24 @ 22:23)  Hemoglobin: 7.7 g/dL (10-22-24 @ 22:23)  Hematocrit: 24.9 % (10-22-24 @ 22:23)  Platelet Count - Automated: 381 K/uL (10-22-24 @ 22:23)  WBC Count: 8.15 K/uL (10-22-24 @ 06:55)  Hemoglobin: 7.9 g/dL (10-22-24 @ 06:55)  Hematocrit: 25.1 % (10-22-24 @ 06:55)  Platelet Count - Automated: 374 K/uL (10-22-24 @ 06:55)  WBC Count: 6.74 K/uL (10-21-24 @ 07:03)  Hemoglobin: 7.4 g/dL (10-21-24 @ 07:03)  Hematocrit: 24.2 % (10-21-24 @ 07:03)  Platelet Count - Automated: 374 K/uL (10-21-24 @ 07:03)  WBC Count: 9.20 K/uL (10-20-24 @ 06:26)  Hemoglobin: 7.8 g/dL (10-20-24 @ 06:26)  Hematocrit: 25.0 % (10-20-24 @ 06:26)  Platelet Count - Automated: 366 K/uL (10-20-24 @ 06:26)  WBC Count: 14.03 K/uL (10-19-24 @ 16:00)  Hemoglobin: 7.9 g/dL (10-19-24 @ 16:00)  Hematocrit: 25.7 % (10-19-24 @ 16:00)  Platelet Count - Automated: 448 K/uL (10-19-24 @ 16:00)                BLOOD SMEAR INTERPRETATION:       RADIOLOGY & ADDITIONAL STUDIES:

## 2024-10-29 NOTE — PROGRESS NOTE ADULT - ASSESSMENT
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 Per NH, patient also refused Pleurx drainage today and is requesting to drain it inpatient (gets drained 2-3 times per week).    #Recurrent anemia  #Hx JASMIN   #Hx AVM?  #Active duodenal bleeding on capsule endoscopy  - Hemodynamically stable & no active bleeding  - Baseline hemoglobin - 8-9   - Hemoglobin on admission - 6.3 >s/p 2units prbc (received 2 units at the end of Spetember as well)  - On plavix   - last admission received 5 days of Venofer   -Seen by GI last admission> spoke to grandson (HCP)  regarding endoscopic work up. After discussing risks vs benefits given his advance age and co morbidities he would like to hold off. Offered VCE as outpatient .   -Can reconsider work up if within goals of care , will need to speak to grandson/son  -Will also need heme onc outpt for possible venofer infusions outpt   - Maintain active Type and screen  - Trend H&H  - Continue home PPI 40mg BID PO  - Target Hgb >8   - Avoid NSAIDs.  - give Venofer as %sat, ferritin remain low  - recall GI for possible intervention if family agrees in view of recurrent anemia requiring multiple PRBCs  6/18 convinced pt to give labs. Hgb 6.9. Transfuse 1uRPBCs. Spoke to GI and asked to speak to family. Serial CBC. Keep Hgb >7.5  6/21 again spoke to GI and asked to speak re: possible intervention. Transfuse another unit as Hgb again trending down (total 3u PRBCs).  6/22 son declined EGD/C-scopy but ?requested capsule endoscopy. Doubt that pt will drink golytely or agree to NGT.  6/26 d/w GI. Active duodenal bleeding on capsule endoscopy. Son refused urgent EGD and wants to think over the weekend. Change Protonix to 40mg IV q12, clears, CBC BID, keep Hgb>8. Hold ASA  6/28 son agreed to EGD. D/w pulm and cardio pt optimized for EGD.     #Malignant mesothelioma (diagnosed 8/2024 )  #Hx Asbestos lung   -s/p VATS pleurodesis, R sided PleurX  -pleurx drainage 2-3 times per week or PRN  -Fu heme onc outpatient. Plan was for possible Rx if pt's functional status improves.   -s/p drainage on 10/21 of 750cc  monitor for Sx  - s/p drainage on 10/28 375 cc    #HTN/HLD  #CAD s/p stents x2 in 2007 (follows Dr Avila)  -c/w home meds   -changed Plavix to ASA    #Small hypodensity in segment 7 of the liver seen on prior CT.  - RUQ US as OP  -?f/u outpt GI    #Acute Grief vs MDD  #Baseline dementia  - wife passed away a few months ago  - patient refusing medication sometimes and asks to be left alone, no suicide ideas, no ideas to hurt other people.    - Psychiatry eval  recommended outpt fu     #Nutrition/Fluids/Electrolytes   - replete K<4 and Mg <2  - ensure regular BMs  -  Miralax BID, Senna    #DVT Px  SCDs  Heparin SQ    High risk pt. Px is overall poor.  GOC: d/w son in details on 10/18. Based on oncology feedback that his cancer is treatable, son wants to cont full code.    #Progress Note Handoff  Pending: Clinical improvement and stability__x___PT____x__CBC stability, EGD   Pt/Family discussion: Pt/family informed and agree with the current plan  Disposition: Nursing Home      My note supersedes the residents note should a discrepancy arise.    Chart and notes personally reviewed.  Care Discussed with Consultants/Other Providers/ Housestaff [ x] YES [ ] NO   Radiology, labs, old records personally reviewed.    discussed w/ housestaff, nursing, case management, cardio, pulm, son John    Attestation Statements:    Attestation Statements:  Risk Statement (NON-critical care).     On this date of service, level of risk to patient is considered: High.     Due to: pt with illness causing risk to life or bodily fxn    Time-based billing (NON-critical care).     50 minutes spent on total encounter. The necessity of the time spent during the encounter on this date of service was due to:     time spent on review of labs, imaging studies, old records, obtaining history, personally examining patient, counselling and communicating with patient/ family, entering orders for medications/tests/etc, discussions with other health care providers, documentation in electronic health records, independent interpretation of labs, imaging/procedure results and care coordination.         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 Per NH, patient also refused Pleurx drainage today and is requesting to drain it inpatient (gets drained 2-3 times per week).    #Recurrent anemia  #Hx JASMIN   #Hx AVM?  #Active duodenal bleeding on capsule endoscopy  - Hemodynamically stable & no active bleeding  - Baseline hemoglobin - 8-9   - Hemoglobin on admission - 6.3 >s/p 2units prbc (received 2 units at the end of Spetember as well)  - On plavix   - last admission received 5 days of Venofer   -Seen by GI last admission> spoke to grandson (HCP)  regarding endoscopic work up. After discussing risks vs benefits given his advance age and co morbidities he would like to hold off. Offered VCE as outpatient .   -Can reconsider work up if within goals of care , will need to speak to grandson/son  -Will also need heme onc outpt for possible venofer infusions outpt   - Maintain active Type and screen  - Trend H&H  - Continue home PPI 40mg BID PO  - Target Hgb >8   - Avoid NSAIDs.  - give Venofer as %sat, ferritin remain low  - recall GI for possible intervention if family agrees in view of recurrent anemia requiring multiple PRBCs  6/18 convinced pt to give labs. Hgb 6.9. Transfuse 1uRPBCs. Spoke to GI and asked to speak to family. Serial CBC. Keep Hgb >7.5  6/21 again spoke to GI and asked to speak re: possible intervention. Transfuse another unit as Hgb again trending down (total 3u PRBCs).  6/22 son declined EGD/C-scopy but ?requested capsule endoscopy. Doubt that pt will drink golytely or agree to NGT.  6/26 d/w GI. Active duodenal bleeding on capsule endoscopy. Son refused urgent EGD and wants to think over the weekend. Change Protonix to 40mg IV q12, clears, CBC BID, keep Hgb>8. Hold ASA  6/28 son agreed to EGD. D/w pulm and cardio pt optimized for EGD. 2 bleeding angioectasias noted oozing blood in duodenum s/p cauterization.    #Malignant mesothelioma (diagnosed 8/2024 )  #Hx Asbestos lung   -s/p VATS pleurodesis, R sided PleurX  -pleurx drainage 2-3 times per week or PRN  -Fu heme onc outpatient. Plan was for possible Rx if pt's functional status improves.   -s/p drainage on 10/21 of 750cc  monitor for Sx  - s/p drainage on 10/28 375 cc    #HTN/HLD  #CAD s/p stents x2 in 2007 (follows Dr Avila)  -c/w home meds   -changed Plavix to ASA    #Small hypodensity in segment 7 of the liver seen on prior CT.  - RUQ US as OP  -?f/u outpt GI    #Acute Grief vs MDD  #Baseline dementia  - wife passed away a few months ago  - patient refusing medication sometimes and asks to be left alone, no suicide ideas, no ideas to hurt other people.    - Psychiatry eval  recommended outpt fu     #Nutrition/Fluids/Electrolytes   - replete K<4 and Mg <2  - ensure regular BMs  -  Miralax BID, Senna    #DVT Px  SCDs  Heparin SQ    High risk pt. Px is overall poor.  GOC: d/w son in details on 10/18. Based on oncology feedback that his cancer is treatable, son wants to cont full code.    #Progress Note Handoff  Pending: Clinical improvement and stability__x___PT____x__CBC stability,  Pt/Family discussion: Pt/family informed and agree with the current plan  Disposition: Nursing Home      My note supersedes the residents note should a discrepancy arise.    Chart and notes personally reviewed.  Care Discussed with Consultants/Other Providers/ Housestaff [ x] YES [ ] NO   Radiology, labs, old records personally reviewed.    discussed w/ housestaff, nursing, case management, GI, son John    Attestation Statements:    Attestation Statements:  Risk Statement (NON-critical care).     On this date of service, level of risk to patient is considered: High.     Due to: pt with illness causing risk to life or bodily fxn    Time-based billing (NON-critical care).     50 minutes spent on total encounter. The necessity of the time spent during the encounter on this date of service was due to:     time spent on review of labs, imaging studies, old records, obtaining history, personally examining patient, counselling and communicating with patient/ family, entering orders for medications/tests/etc, discussions with other health care providers, documentation in electronic health records, independent interpretation of labs, imaging/procedure results and care coordination.

## 2024-10-29 NOTE — PROGRESS NOTE ADULT - ASSESSMENT
1. Acute on chronic anemia s/p 2 PRBC .   recurrent anemia   Hx JASMIN - no overt GI bleed . Hx AVM?  - Seen by GI last admission: regarding endoscopic work up. After discussing risks vs benefits given his advance age and co morbidities he would like to hold off. Offered VCE as outpatient .   - last admission received 5 days of Venofer   - Active type and screen   - Switched plavix to aspirin for now  - c/w ferrous sulfate, IV venofovir for 5 days  - GI consult placed, Follow up, as per GI- Patient can follow  or  Follow up with our [facility] located at [address] [place] [address]  - Continue home PPI 40mg BID PO  - avoid Nsaids  - Hb 6.9> 1 unit PRBC (10/18)> repeat 7.8  - Hb 7.4 10/21 -> GI will reach out to family to discuss inpatient endoscopy -> pt son agreed for Video capsule endoscopy scheduled for tomorrow 10/23/2024  - Oncology onboard also recommended endoscopy to rule out GI bleed -> if GI workup is negative will offer bone marrow biopsy   - Patient refused prep yesterday follow up with GI, HB is stable   - VCE done on 10/24 ->Duodenal bleed, put on clear liquid diet, PPI BID IV   - 10/26: family agreed on inpatient EGD  - 10/28: s/p EGD significant for Angioectasia in the second part of the duodenum with bleeding on contact. (APC Hemostasis). 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 ([facility]C Hemostasis). PPI BID for 2 weeks and then q24h thereafter     #Malignant mesothelioma (diagnosed 8/2024 ) with recurrent pleural effusion . Hx Asbestos lung    Severe protein calorie malnutrition (calorie count started - result 10/21)  -s/p VATS pleurodesis, R sided PleurX  - Per NH, patient also refused Pleurx drainage and is requesting to drain it inpatient (gets drained 2-3 times per week)  - Currently on room air (target spo2 92-96)  - Fu heme onc- Dr. Ramírez OP  - Fu palliative- full code  - PleurX drained on 10/21  - Aspiration precaution  - Pain control   - 10/28: pleurx drained 350cc    #HTN/HLD/CAD s/p stents x2 in 2007 (follows Dr Avila)  - c/w home meds     #Small hypodensity in segment 7 of the liver seen on prior CT.  - RUQ US as OP  - f/u outpt GI    #Acute Grief vs MDD  - wife passed away a few months ago  - patient refusing medication sometimes and asks to be left alone, no suicide ideas, no ideas to hurt other people.    - Psychiatry eval admission recommended outpt fu     Pendings: dc planning once hgb stable

## 2024-10-30 PROCEDURE — 99232 SBSQ HOSP IP/OBS MODERATE 35: CPT

## 2024-10-30 RX ORDER — CLOPIDOGREL 75 MG/1
1 TABLET ORAL
Qty: 30 | Refills: 0
Start: 2024-10-30 | End: 2024-11-28

## 2024-10-30 RX ORDER — CLOPIDOGREL 75 MG/1
75 TABLET ORAL DAILY
Refills: 0 | Status: DISCONTINUED | OUTPATIENT
Start: 2024-10-30 | End: 2024-11-03

## 2024-10-30 RX ADMIN — Medication 81 MILLIGRAM(S): at 11:22

## 2024-10-30 RX ADMIN — CHLORHEXIDINE GLUCONATE 1 APPLICATION(S): 40 SOLUTION TOPICAL at 05:47

## 2024-10-30 RX ADMIN — Medication 2 TABLET(S): at 21:32

## 2024-10-30 RX ADMIN — CHLORHEXIDINE GLUCONATE 1 APPLICATION(S): 40 SOLUTION TOPICAL at 11:24

## 2024-10-30 RX ADMIN — HEPARIN SODIUM 5000 UNIT(S): 10000 INJECTION INTRAVENOUS; SUBCUTANEOUS at 17:21

## 2024-10-30 RX ADMIN — HEPARIN SODIUM 5000 UNIT(S): 10000 INJECTION INTRAVENOUS; SUBCUTANEOUS at 11:24

## 2024-10-30 RX ADMIN — Medication 10 MILLIGRAM(S): at 21:33

## 2024-10-30 RX ADMIN — Medication 3 MILLIGRAM(S): at 21:32

## 2024-10-30 RX ADMIN — Medication 0.4 MILLIGRAM(S): at 21:33

## 2024-10-30 RX ADMIN — GABAPENTIN 300 MILLIGRAM(S): 300 CAPSULE ORAL at 13:42

## 2024-10-30 RX ADMIN — TRAMADOL HYDROCHLORIDE 50 MILLIGRAM(S): 50 TABLET, COATED ORAL at 11:23

## 2024-10-30 RX ADMIN — GABAPENTIN 300 MILLIGRAM(S): 300 CAPSULE ORAL at 21:32

## 2024-10-30 RX ADMIN — Medication 325 MILLIGRAM(S): at 11:22

## 2024-10-30 RX ADMIN — PANTOPRAZOLE SODIUM 40 MILLIGRAM(S): 40 TABLET, DELAYED RELEASE ORAL at 17:22

## 2024-10-30 RX ADMIN — Medication 3: at 17:21

## 2024-10-30 RX ADMIN — Medication 2: at 11:51

## 2024-10-30 RX ADMIN — POLYETHYLENE GLYCOL 3350 17 GRAM(S): 17 POWDER, FOR SOLUTION ORAL at 11:23

## 2024-10-30 NOTE — PROGRESS NOTE ADULT - SUBJECTIVE AND OBJECTIVE BOX
Patient is a 84y old  Male who presents with a chief complaint of Acute on chronic anemia (29 Oct 2024 18:48)       Pt is seen and examined  pt is awake and lying in bed        ROS:  Negative   MEDICATIONS  (STANDING):  aspirin  chewable 81 milliGRAM(s) Oral daily  atorvastatin 10 milliGRAM(s) Oral at bedtime  chlorhexidine 2% Cloths 1 Application(s) Topical <User Schedule>  chlorhexidine 2% Cloths 1 Application(s) Topical daily  clopidogrel Tablet 75 milliGRAM(s) Oral daily  dextrose 5% + sodium chloride 0.45%. 1000 milliLiter(s) (75 mL/Hr) IV Continuous <Continuous>  dextrose 5%. 1000 milliLiter(s) (100 mL/Hr) IV Continuous <Continuous>  dextrose 5%. 1000 milliLiter(s) (50 mL/Hr) IV Continuous <Continuous>  dextrose 50% Injectable 12.5 Gram(s) IV Push once  dextrose 50% Injectable 25 Gram(s) IV Push once  dextrose 50% Injectable 25 Gram(s) IV Push once  ferrous    sulfate 325 milliGRAM(s) Oral daily  gabapentin 300 milliGRAM(s) Oral three times a day  glucagon  Injectable 1 milliGRAM(s) IntraMuscular once  heparin   Injectable 5000 Unit(s) SubCutaneous every 12 hours  insulin lispro (ADMELOG) corrective regimen sliding scale   SubCutaneous three times a day before meals  melatonin 3 milliGRAM(s) Oral at bedtime  pantoprazole  Injectable 40 milliGRAM(s) IV Push every 12 hours  polyethylene glycol 3350 17 Gram(s) Oral daily  senna 2 Tablet(s) Oral at bedtime  tamsulosin 0.4 milliGRAM(s) Oral at bedtime    MEDICATIONS  (PRN):  acetaminophen     Tablet .. 650 milliGRAM(s) Oral every 6 hours PRN Mild Pain (1 - 3)  dextrose Oral Gel 15 Gram(s) Oral once PRN Blood Glucose LESS THAN 70 milliGRAM(s)/deciliter  traMADol 50 milliGRAM(s) Oral every 6 hours PRN Severe Pain (7 - 10)      Allergies    penicillin (Unknown)    Intolerances        Vital Signs Last 24 Hrs  T(C): 36.9 (30 Oct 2024 13:30), Max: 36.9 (30 Oct 2024 04:33)  T(F): 98.4 (30 Oct 2024 13:30), Max: 98.5 (30 Oct 2024 04:33)  HR: 80 (30 Oct 2024 13:30) (79 - 80)  BP: 110/86 (30 Oct 2024 13:30) (110/86 - 115/63)  BP(mean): 96 (30 Oct 2024 13:30) (80 - 96)  RR: 16 (30 Oct 2024 13:30) (16 - 18)  SpO2: 100% (30 Oct 2024 13:30) (96% - 100%)    Parameters below as of 30 Oct 2024 13:30  Patient On (Oxygen Delivery Method): room air        PHYSICAL EXAM  General: cachectic   HEENT: clear oropharynx, anicteric sclera, pink conjunctiva  Neck: supple  CV: normal S1/S2 with no murmur rubs or gallops  Lungs: positive air movement b/l ant lungs,clear to auscultation, no wheezes, no rales  Abdomen: soft non-tender non-distended, no hepatosplenomegaly  Ext: no clubbing cyanosis or edema  Skin: no rashes and no petechiae  Neuro: alert and oriented X 2, no focal deficits  LABS:                          8.7    5.94  )-----------( 378      ( 29 Oct 2024 06:26 )             28.8         Mean Cell Volume : 97.3 fL  Mean Cell Hemoglobin : 29.4 pg  Mean Cell Hemoglobin Concentration : 30.2 g/dL  Auto Neutrophil # : 4.57 K/uL  Auto Lymphocyte # : 0.74 K/uL  Auto Monocyte # : 0.47 K/uL  Auto Eosinophil # : 0.12 K/uL  Auto Basophil # : 0.02 K/uL  Auto Neutrophil % : 77.0 %  Auto Lymphocyte % : 12.5 %  Auto Monocyte % : 7.9 %  Auto Eosinophil % : 2.0 %  Auto Basophil % : 0.3 %    Serial CBC's  10-29 @ 06:26  Hct-28.8 / Hgb-8.7 / Plat-378 / RBC-2.96 / WBC-5.94          Serial CBC's  10-27 @ 22:48  Hct-26.4 / Hgb-8.0 / Plat-436 / RBC-2.77 / WBC-6.36          Serial CBC's  10-27 @ 06:46  Hct-28.2 / Hgb-8.6 / Plat-398 / RBC-2.96 / WBC-6.87            10-29    137  |  99  |  24[H]  ----------------------------<  111[H]  5.4[H]   |  22  |  1.1    Ca    9.8      29 Oct 2024 06:26  Mg     2.1     10-29    TPro  6.5  /  Alb  3.4[L]  /  TBili  0.2  /  DBili  x   /  AST  9   /  ALT  <5  /  AlkPhos  120[H]  10-29          WBC Count: 5.94 K/uL (10-29-24 @ 06:26)  Hemoglobin: 8.7 g/dL (10-29-24 @ 06:26)  Hematocrit: 28.8 % (10-29-24 @ 06:26)  Platelet Count - Automated: 378 K/uL (10-29-24 @ 06:26)  WBC Count: 6.36 K/uL (10-27-24 @ 22:48)  Hemoglobin: 8.0 g/dL (10-27-24 @ 22:48)  Hematocrit: 26.4 % (10-27-24 @ 22:48)  Platelet Count - Automated: 436 K/uL (10-27-24 @ 22:48)  WBC Count: 6.87 K/uL (10-27-24 @ 06:46)  Hemoglobin: 8.6 g/dL (10-27-24 @ 06:46)  Hematocrit: 28.2 % (10-27-24 @ 06:46)  Platelet Count - Automated: 398 K/uL (10-27-24 @ 06:46)  WBC Count: 5.74 K/uL (10-26-24 @ 17:00)  Hemoglobin: 8.6 g/dL (10-26-24 @ 17:00)  Hematocrit: 28.1 % (10-26-24 @ 17:00)  Platelet Count - Automated: 427 K/uL (10-26-24 @ 17:00)  WBC Count: 6.96 K/uL (10-26-24 @ 07:04)  Hemoglobin: 8.0 g/dL (10-26-24 @ 07:04)  Hematocrit: 25.8 % (10-26-24 @ 07:04)  Platelet Count - Automated: 415 K/uL (10-26-24 @ 07:04)  WBC Count: 8.18 K/uL (10-25-24 @ 20:47)  Hemoglobin: 8.0 g/dL (10-25-24 @ 20:47)  Hematocrit: 25.7 % (10-25-24 @ 20:47)  Platelet Count - Automated: 415 K/uL (10-25-24 @ 20:47)  WBC Count: 7.31 K/uL (10-25-24 @ 06:33)  Hemoglobin: 8.7 g/dL (10-25-24 @ 06:33)  Hematocrit: 27.8 % (10-25-24 @ 06:33)  Platelet Count - Automated: 425 K/uL (10-25-24 @ 06:33)  WBC Count: 7.13 K/uL (10-24-24 @ 06:45)  Hemoglobin: 8.6 g/dL (10-24-24 @ 06:45)  Hematocrit: 27.5 % (10-24-24 @ 06:45)  Platelet Count - Automated: 428 K/uL (10-24-24 @ 06:45)  WBC Count: 7.80 K/uL (10-23-24 @ 05:46)  Hemoglobin: 8.0 g/dL (10-23-24 @ 05:46)  Hematocrit: 26.2 % (10-23-24 @ 05:46)  Platelet Count - Automated: 403 K/uL (10-23-24 @ 05:46)  WBC Count: 6.57 K/uL (10-22-24 @ 22:23)  Hemoglobin: 7.7 g/dL (10-22-24 @ 22:23)  Hematocrit: 24.9 % (10-22-24 @ 22:23)  Platelet Count - Automated: 381 K/uL (10-22-24 @ 22:23)  WBC Count: 8.15 K/uL (10-22-24 @ 06:55)  Hemoglobin: 7.9 g/dL (10-22-24 @ 06:55)  Hematocrit: 25.1 % (10-22-24 @ 06:55)  Platelet Count - Automated: 374 K/uL (10-22-24 @ 06:55)  WBC Count: 6.74 K/uL (10-21-24 @ 07:03)  Hemoglobin: 7.4 g/dL (10-21-24 @ 07:03)  Hematocrit: 24.2 % (10-21-24 @ 07:03)  Platelet Count - Automated: 374 K/uL (10-21-24 @ 07:03)                BLOOD SMEAR INTERPRETATION:       RADIOLOGY & ADDITIONAL STUDIES:

## 2024-10-30 NOTE — PROGRESS NOTE ADULT - ASSESSMENT
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     #Acute on chronic anemia with hx of AVM and JASMIN  tfx to keep hb >7  s/p  venofer   seen by GI   active duodenal bleeding on VCE  s/p  egd  with hemostasis        #Malignant mesothelioma (diagnosed 8/2024 )  #Hx Asbestos lung   -s/p VATS pleurodesis, R sided PleurX,s/p drainage  -he will f/u with Dr Epperson for  further [place] as outpt    cont other supportive care.  spoke with son   d/c plan as per primary team   will f/u

## 2024-10-30 NOTE — PROGRESS NOTE ADULT - SUBJECTIVE AND OBJECTIVE BOX
SUBJECTIVE/OVERNIGHT EVENTS  Today is hospital day 16d. This morning patient was seen and examined at bedside, resting comfortably in bed. No acute or major events overnight.  No new complaints, patient ready for DC today.    MEDICATIONS  STANDING MEDICATIONS  aspirin  chewable 81 milliGRAM(s) Oral daily  atorvastatin 10 milliGRAM(s) Oral at bedtime  chlorhexidine 2% Cloths 1 Application(s) Topical <User Schedule>  chlorhexidine 2% Cloths 1 Application(s) Topical daily  dextrose 5% + sodium chloride 0.45%. 1000 milliLiter(s) IV Continuous <Continuous>  dextrose 5%. 1000 milliLiter(s) IV Continuous <Continuous>  dextrose 5%. 1000 milliLiter(s) IV Continuous <Continuous>  dextrose 50% Injectable 12.5 Gram(s) IV Push once  dextrose 50% Injectable 25 Gram(s) IV Push once  dextrose 50% Injectable 25 Gram(s) IV Push once  ferrous    sulfate 325 milliGRAM(s) Oral daily  gabapentin 300 milliGRAM(s) Oral three times a day  glucagon  Injectable 1 milliGRAM(s) IntraMuscular once  heparin   Injectable 5000 Unit(s) SubCutaneous every 12 hours  insulin lispro (ADMELOG) corrective regimen sliding scale   SubCutaneous three times a day before meals  melatonin 3 milliGRAM(s) Oral at bedtime  pantoprazole  Injectable 40 milliGRAM(s) IV Push every 12 hours  polyethylene glycol 3350 17 Gram(s) Oral daily  senna 2 Tablet(s) Oral at bedtime  tamsulosin 0.4 milliGRAM(s) Oral at bedtime    PRN MEDICATIONS  acetaminophen     Tablet .. 650 milliGRAM(s) Oral every 6 hours PRN  dextrose Oral Gel 15 Gram(s) Oral once PRN  traMADol 50 milliGRAM(s) Oral every 6 hours PRN    VITALS  T(F): 98.4 (10-30-24 @ 13:30), Max: 98.5 (10-30-24 @ 04:33)  HR: 80 (10-30-24 @ 13:30) (79 - 96)  BP: 110/86 (10-30-24 @ 13:30) (95/57 - 115/63)  RR: 16 (10-30-24 @ 13:30) (16 - 18)  SpO2: 100% (10-30-24 @ 13:30) (96% - 100%)  POCT Blood Glucose.: 221 mg/dL (10-30-24 @ 11:45)  POCT Blood Glucose.: 190 mg/dL (10-30-24 @ 09:20)  POCT Blood Glucose.: 202 mg/dL (10-29-24 @ 16:40)    PHYSICAL EXAM  GENERAL  ( x ) NAD, lying in bed comfortably     (  ) obtunded     (  ) lethargic     (  ) somnolent    HEAD  ( x ) Atraumatic     (  ) hematoma     (  ) laceration (specify location:       )     NECK  ( x ) Supple     (  ) neck stiffness     (  ) nuchal rigidity     (  )  no JVD     (  ) JVD present ( -- cm)    HEART  Rate -->  ( x ) normal rate    (  ) bradycardic    (  ) tachycardic  Rhythm -->  ( x ) regular    (  ) regularly irregular    (  ) irregularly irregular  Murmurs -->  ( x ) normal s1/s2    (  ) systolic murmur    (  ) diastolic murmur    (  ) continuous murmur     (  ) S3 present    (  ) S4 present    LUNGS  ( x ) Unlabored respirations     (  ) tachypnea  ( x ) B/L air entry     (  ) decreased breath sounds in:  (location     )    (  ) no adventitious sound     (  ) crackles     (  ) wheezing      (  ) rhonchi      (specify location:       )  (  ) chest wall tenderness (specify location:       )    ABDOMEN  ( x ) Soft     (  ) tense   |   (  ) nondistended     (  ) distended   |   (  ) +BS     (  ) hypoactive bowel sounds     (  ) hyperactive bowel sounds  ( x ) nontender     (  ) RUQ tenderness     (  ) RLQ tenderness     (  ) LLQ tenderness     (  ) epigastric tenderness     (  ) diffuse tenderness  (  ) Splenomegaly      (  ) Hepatomegaly      (  ) Jaundice     (  ) ecchymosis     EXTREMITIES  ( x ) Normal     (  ) Rash     (  ) ecchymosis     (  ) varicose veins      (  ) pitting edema     (  ) non-pitting edema   (  ) ulceration     (  ) gangrene:     (location:     )    NERVOUS SYSTEM  ( x ) A&Ox3     (  ) confused     (  ) lethargic  CN II-XII:     (  ) Intact     (  ) focal deficits  (Specify:     )   Upper extremities:     (  ) strength X/5     (  ) focal deficit (specify:    )  Lower extremities:     (  ) strength  X/5    (  ) focal deficit (specify:    )      LABS             8.7    5.94  )-----------( 378      ( 10-29-24 @ 06:26 )             28.8     137  |  99  |  24  -------------------------<  111   10-29-24 @ 06:26  5.4  |  22  |  1.1    Ca      9.8     10-29-24 @ 06:26  Mg     2.1     10-29-24 @ 06:26    TPro  6.5  /  Alb  3.4  /  TBili  0.2  /  DBili  x   /  AST  9   /  ALT  <5  /  AlkPhos  120  /  GGT  x     10-29-24 @ 06:26        Urinalysis Basic - ( 29 Oct 2024 06:26 )    Color: x / Appearance: x / SG: x / pH: x  Gluc: 111 mg/dL / Ketone: x  / Bili: x / Urobili: x   Blood: x / Protein: x / Nitrite: x   Leuk Esterase: x / RBC: x / WBC x   Sq Epi: x / Non Sq Epi: x / Bacteria: x          IMAGING

## 2024-10-30 NOTE — CHART NOTE - NSCHARTNOTEFT_GEN_A_CORE
Registered Dietitian Follow-Up     Patient Profile Reviewed                           Yes [x]   No []     Nutrition History Previously Obtained        Yes [x]  No []       Pertinent Subjective Information: Hospital Day 16. Pt with poor PO intak. Reports that he's been eating more lately. Pt reports Last BM a couple days ago.  Denies N. Bedscale 58.4 kg (128 lbs). Pt appears severely malnourished. May benefit from appetite stimulant.      Pertinent Medical Interventions: 85 y/o man  PMHx of HLD, DM, CAD s/p stents x2 in 2007, pt of Dr Avila, hx  asbestosis of lung, diagnosed w malignant mesothelioma (8/2024) #Acute Grief vs MDD     Diet order: Diet, DASH/TLC:   Sodium & Cholesterol Restricted  Consistent Carbohydrate {Evening Snack} (CSTCHOSN)  Supplement Feeding Modality:  Oral  Ensure Plus High Protein Cans or Servings Per Day:  3       Frequency:  Three Times a day (10-29-24 @ 11:55) [Active]    Anthropometrics:  Height (cm): 172.7 (10-28-24 @ 17:49)  Weight (kg): 60.9 (10-28-24 @ 17:49)  BMI (kg/m2): 20.4 (10-28-24 @ 17:49)  IBW:   UBW:     Daily 60.9 kg 10/28 ; 58.4 10/30 ;   % Weight Change    MEDICATIONS  (STANDING):  aspirin  chewable 81 milliGRAM(s) Oral daily  atorvastatin 10 milliGRAM(s) Oral at bedtime  ferrous    sulfate 325 milliGRAM(s) Oral daily  gabapentin 300 milliGRAM(s) Oral three times a day  glucagon  Injectable 1 milliGRAM(s) IntraMuscular once  heparin   Injectable 5000 Unit(s) SubCutaneous every 12 hours  insulin lispro (ADMELOG) corrective regimen sliding scale   SubCutaneous three times a day before meals  melatonin 3 milliGRAM(s) Oral at bedtime  pantoprazole  Injectable 40 milliGRAM(s) IV Push every 12 hours  polyethylene glycol 3350 17 Gram(s) Oral daily  senna 2 Tablet(s) Oral at bedtime  tamsulosin 0.4 milliGRAM(s) Oral at bedtime    Pertinent Labs:   Finger Sticks:  POCT Blood Glucose.: 253 mg/dL (10-30 @ 16:50)  POCT Blood Glucose.: 221 mg/dL (10-30 @ 11:45)  POCT Blood Glucose.: 190 mg/dL (10-30 @ 09:20)    Physical Findings:  - Appearance: AOx4  - GI function: Denies N/D/V x 24 hours.   - Tubes: N/A  - Oral/Mouth cavity:   - Skin: intact with no signs of pressure injuries  - Edema: no edema documented in flowsheet     Nutrition Requirements:  Weight Used: 60.9 kg     Estimated Energy Needs    0155-2180   kcal/day  Estimated Protein Needs       gm/day   Estimated Fluid Needs          1.8-2.1 L/day or per MD recommendations     Nutrient Intake: Poor to Fair    [x] Previous Nutrition Diagnosis: severe malnutrition             [x] Ongoing          [] Resolved    [] No active nutrition diagnosis identified at this time     Nutrition Education:  RD encouraged PO intake     Goal/Expected Outcome: Pt to consume and tolerate >75% of meals/snacks and PO supplements in 3-5 days.        Indicator/Monitoring:  Anthroprometrics, GI S/S, -Lytes (Phos, Mag, K+), BM, I/Os, Labs, NFPF, GI fxn. Energy intake and tolerance.     Recommendation: 1. COntinue current diet order and assist w/ supervision as needed. 2. consider appetite stimulant 3. continue bowel regimen    Khushi Shafer x [phone] or Via TEAMS    high Risk Follow UP Registered Dietitian Follow-Up     Patient Profile Reviewed                           Yes [x]   No []     Nutrition History Previously Obtained        Yes [x]  No []       Pertinent Subjective Information: Hospital Day 16. Pt with poor PO intak. Reports that he's been eating more lately. Pt reports Last BM a couple days ago.  Denies N. Bedscale 58.4 kg (128 lbs). Pt appears severely malnourished. May benefit from appetite stimulant.      Pertinent Medical Interventions: 83 y/o man  PMHx of HLD, DM, CAD s/p stents x2 in 2007, pt of Dr Avila, hx  asbestosis of lung, diagnosed w malignant mesothelioma (8/2024) #Acute Grief vs MDD     Diet order: Diet, DASH/TLC:   Sodium & Cholesterol Restricted  Consistent Carbohydrate {Evening Snack} (CSTCHOSN)  Supplement Feeding Modality:  Oral  Ensure Plus High Protein Cans or Servings Per Day:  3       Frequency:  Three Times a day (10-29-24 @ 11:55) [Active]    Anthropometrics:  Height (cm): 172.7 (10-28-24 @ 17:49)  Weight (kg): 60.9 (10-28-24 @ 17:49)  BMI (kg/m2): 20.4 (10-28-24 @ 17:49)   IBW: 154 lbs (70 kg)    Daily 60.9 kg 10/28 ; 58.4 10/30 ;   % Weight Change    MEDICATIONS  (STANDING):  aspirin  chewable 81 milliGRAM(s) Oral daily  atorvastatin 10 milliGRAM(s) Oral at bedtime  ferrous    sulfate 325 milliGRAM(s) Oral daily  gabapentin 300 milliGRAM(s) Oral three times a day  glucagon  Injectable 1 milliGRAM(s) IntraMuscular once  heparin   Injectable 5000 Unit(s) SubCutaneous every 12 hours  insulin lispro (ADMELOG) corrective regimen sliding scale   SubCutaneous three times a day before meals  melatonin 3 milliGRAM(s) Oral at bedtime  pantoprazole  Injectable 40 milliGRAM(s) IV Push every 12 hours  polyethylene glycol 3350 17 Gram(s) Oral daily  senna 2 Tablet(s) Oral at bedtime  tamsulosin 0.4 milliGRAM(s) Oral at bedtime    Pertinent Labs:   Finger Sticks:  POCT Blood Glucose.: 253 mg/dL (10-30 @ 16:50)  POCT Blood Glucose.: 221 mg/dL (10-30 @ 11:45)  POCT Blood Glucose.: 190 mg/dL (10-30 @ 09:20)    Physical Findings:  - Appearance: AOx4  - GI function: Denies N/D/V x 24 hours.   - Tubes: N/A  - Oral/Mouth cavity:   - Skin: intact with no signs of pressure injuries  - Edema: no edema documented in flowsheet     Nutrition Requirements:  Weight Used: 60.9 kg     Estimated Energy Needs    8604-2867   kcal/day  Estimated Protein Needs       gm/day   Estimated Fluid Needs          1.8-2.1 L/day or per MD recommendations     Nutrient Intake: Poor to Fair    [x] Previous Nutrition Diagnosis: severe malnutrition             [x] Ongoing          [] Resolved    [] No active nutrition diagnosis identified at this time     Nutrition Education:  RD encouraged PO intake     Goal/Expected Outcome: Pt to consume and tolerate >75% of meals/snacks and PO supplements in 3-5 days.        Indicator/Monitoring:  Anthroprometrics, GI S/S, -Lytes (Phos, Mag, K+), BM, I/Os, Labs, NFPF, GI fxn. Energy intake and tolerance.     Recommendation: 1. COntinue current diet order and assist w/ supervision as needed. 2. consider appetite stimulant 3. continue bowel regimen    Khushi Shafer x [phone] or Via TEAMS    high Risk Follow UP

## 2024-10-30 NOTE — PROGRESS NOTE ADULT - ASSESSMENT
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 Per NH, patient also refused Pleurx drainage today and is requesting to drain it inpatient (gets drained 2-3 times per week).    #Acute on chronic anemia s/p multiple transfusions  - Seen by GI last admission: regarding endoscopic work up. After discussing risks vs benefits given his advance age and co morbidities he would like to hold off. Offered VCE as outpatient .   - last admission received 5 days of Venofer   - Active type and screen   - Switched plavix to aspirin for now  - c/w ferrous sulfate, IV venofovir for 5 days  - GI consult placed, Follow up, as per GI- Patient can follow  or  Follow up with our [facility] located at [address] [place] [address]  - Continue home PPI 40mg BID PO  - avoid Nsaids  - Hb 6.9> 1 unit PRBC (10/18)> repeat 7.8  - Hb 7.4 10/21 -> GI will reach out to family to discuss inpatient endoscopy -> pt son agreed for Video capsule endoscopy scheduled for tomorrow 10/23/2024  - Oncology onboard also recommended endoscopy to rule out GI bleed -> if GI workup is negative will offer bone marrow biopsy   - Patient refused prep yesterday follow up with GI, HB is stable   - VCE done on 10/24 ->Duodenal bleed, put on clear liquid diet, PPI BID IV   - 10/26: family agreed on inpatient EGD  - 10/28: s/p EGD significant for Angioectasia in the second part of the duodenum with bleeding on contact. (APC Hemostasis). 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 (APC Hemostasis). PPI BID for 2 weeks and then q24h thereafter   - 10/30: DAPT resumed, Hgb stable, patient medically stable for DC    #Malignant mesothelioma (diagnosed 8/2024 ) with recurrent pleural effusion . Hx Asbestos lung   #Severe protein calorie malnutrition (calorie count started - result 10/21)  -s/p VATS pleurodesis, R sided PleurX  - Per NH, patient also refused Pleurx drainage and is requesting to drain it inpatient (gets drained 2-3 times per week)  - Currently on room air (target spo2 92-96)  - Fu heme onc- Dr. Ramírez OP  - Fu palliative- full code  - PleurX drained on 10/21  - Aspiration precaution  - Pain control   - 10/28: pleurx drained 350cc    #HTN/HLD/CAD s/p stents x2 in 2007 (follows Dr Avila)  - c/w home meds     #Small hypodensity in segment 7 of the liver seen on prior CT.  - RUQ US as OP  - f/u outpt GI    #Acute Grief vs MDD  - wife passed away a few months ago  - patient refusing medication sometimes and asks to be left alone, no suicide ideas, no ideas to hurt other people.    - Psychiatry eval admission recommended outpt fu     Pendings: DC today

## 2024-10-31 LAB
ALBUMIN SERPL ELPH-MCNC: 3.2 G/DL — LOW (ref 3.5–5.2)
ALP SERPL-CCNC: 103 U/L — SIGNIFICANT CHANGE UP (ref 30–115)
ALT FLD-CCNC: <5 U/L — SIGNIFICANT CHANGE UP (ref 0–41)
ANION GAP SERPL CALC-SCNC: 11 MMOL/L — SIGNIFICANT CHANGE UP (ref 7–14)
AST SERPL-CCNC: 7 U/L — SIGNIFICANT CHANGE UP (ref 0–41)
BASOPHILS # BLD AUTO: 0.01 K/UL — SIGNIFICANT CHANGE UP (ref 0–0.2)
BASOPHILS NFR BLD AUTO: 0.2 % — SIGNIFICANT CHANGE UP (ref 0–1)
BILIRUB SERPL-MCNC: <0.2 MG/DL — SIGNIFICANT CHANGE UP (ref 0.2–1.2)
BUN SERPL-MCNC: 30 MG/DL — HIGH (ref 10–20)
CALCIUM SERPL-MCNC: 9.1 MG/DL — SIGNIFICANT CHANGE UP (ref 8.4–10.5)
CHLORIDE SERPL-SCNC: 102 MMOL/L — SIGNIFICANT CHANGE UP (ref 98–110)
CO2 SERPL-SCNC: 27 MMOL/L — SIGNIFICANT CHANGE UP (ref 17–32)
CREAT SERPL-MCNC: 1 MG/DL — SIGNIFICANT CHANGE UP (ref 0.7–1.5)
EGFR: 75 ML/MIN/1.73M2 — SIGNIFICANT CHANGE UP
EOSINOPHIL # BLD AUTO: 0.11 K/UL — SIGNIFICANT CHANGE UP (ref 0–0.7)
EOSINOPHIL NFR BLD AUTO: 1.7 % — SIGNIFICANT CHANGE UP (ref 0–8)
GLUCOSE BLDC GLUCOMTR-MCNC: 267 MG/DL — HIGH (ref 70–99)
GLUCOSE BLDC GLUCOMTR-MCNC: 270 MG/DL — HIGH (ref 70–99)
GLUCOSE BLDC GLUCOMTR-MCNC: 274 MG/DL — HIGH (ref 70–99)
GLUCOSE SERPL-MCNC: 240 MG/DL — HIGH (ref 70–99)
HCT VFR BLD CALC: 25.4 % — LOW (ref 42–52)
HGB BLD-MCNC: 7.6 G/DL — LOW (ref 14–18)
IMM GRANULOCYTES NFR BLD AUTO: 0.8 % — HIGH (ref 0.1–0.3)
LYMPHOCYTES # BLD AUTO: 0.63 K/UL — LOW (ref 1.2–3.4)
LYMPHOCYTES # BLD AUTO: 9.5 % — LOW (ref 20.5–51.1)
MAGNESIUM SERPL-MCNC: 2.1 MG/DL — SIGNIFICANT CHANGE UP (ref 1.8–2.4)
MCHC RBC-ENTMCNC: 29.2 PG — SIGNIFICANT CHANGE UP (ref 27–31)
MCHC RBC-ENTMCNC: 29.9 G/DL — LOW (ref 32–37)
MCV RBC AUTO: 97.7 FL — HIGH (ref 80–94)
MONOCYTES # BLD AUTO: 0.47 K/UL — SIGNIFICANT CHANGE UP (ref 0.1–0.6)
MONOCYTES NFR BLD AUTO: 7.1 % — SIGNIFICANT CHANGE UP (ref 1.7–9.3)
NEUTROPHILS # BLD AUTO: 5.37 K/UL — SIGNIFICANT CHANGE UP (ref 1.4–6.5)
NEUTROPHILS NFR BLD AUTO: 80.7 % — HIGH (ref 42.2–75.2)
NRBC # BLD: 0 /100 WBCS — SIGNIFICANT CHANGE UP (ref 0–0)
PLATELET # BLD AUTO: 408 K/UL — HIGH (ref 130–400)
PMV BLD: 9.8 FL — SIGNIFICANT CHANGE UP (ref 7.4–10.4)
POTASSIUM SERPL-MCNC: 4.9 MMOL/L — SIGNIFICANT CHANGE UP (ref 3.5–5)
POTASSIUM SERPL-SCNC: 4.9 MMOL/L — SIGNIFICANT CHANGE UP (ref 3.5–5)
PROT SERPL-MCNC: 5.7 G/DL — LOW (ref 6–8)
RBC # BLD: 2.6 M/UL — LOW (ref 4.7–6.1)
RBC # FLD: 18.2 % — HIGH (ref 11.5–14.5)
SODIUM SERPL-SCNC: 140 MMOL/L — SIGNIFICANT CHANGE UP (ref 135–146)
WBC # BLD: 6.64 K/UL — SIGNIFICANT CHANGE UP (ref 4.8–10.8)
WBC # FLD AUTO: 6.64 K/UL — SIGNIFICANT CHANGE UP (ref 4.8–10.8)

## 2024-10-31 PROCEDURE — 99233 SBSQ HOSP IP/OBS HIGH 50: CPT

## 2024-10-31 RX ADMIN — GABAPENTIN 300 MILLIGRAM(S): 300 CAPSULE ORAL at 05:26

## 2024-10-31 RX ADMIN — POLYETHYLENE GLYCOL 3350 17 GRAM(S): 17 POWDER, FOR SOLUTION ORAL at 15:06

## 2024-10-31 RX ADMIN — HEPARIN SODIUM 5000 UNIT(S): 10000 INJECTION INTRAVENOUS; SUBCUTANEOUS at 05:25

## 2024-10-31 RX ADMIN — Medication 1: at 08:10

## 2024-10-31 RX ADMIN — Medication 3: at 12:23

## 2024-10-31 RX ADMIN — Medication 325 MILLIGRAM(S): at 15:07

## 2024-10-31 RX ADMIN — GABAPENTIN 300 MILLIGRAM(S): 300 CAPSULE ORAL at 15:06

## 2024-10-31 RX ADMIN — CHLORHEXIDINE GLUCONATE 1 APPLICATION(S): 40 SOLUTION TOPICAL at 15:04

## 2024-10-31 RX ADMIN — Medication 10 MILLIGRAM(S): at 22:04

## 2024-10-31 RX ADMIN — CHLORHEXIDINE GLUCONATE 1 APPLICATION(S): 40 SOLUTION TOPICAL at 05:30

## 2024-10-31 RX ADMIN — Medication 3 MILLIGRAM(S): at 22:04

## 2024-10-31 RX ADMIN — PANTOPRAZOLE SODIUM 40 MILLIGRAM(S): 40 TABLET, DELAYED RELEASE ORAL at 05:25

## 2024-10-31 RX ADMIN — Medication 3: at 17:30

## 2024-10-31 RX ADMIN — Medication 0.4 MILLIGRAM(S): at 22:05

## 2024-10-31 RX ADMIN — PANTOPRAZOLE SODIUM 40 MILLIGRAM(S): 40 TABLET, DELAYED RELEASE ORAL at 18:08

## 2024-10-31 RX ADMIN — GABAPENTIN 300 MILLIGRAM(S): 300 CAPSULE ORAL at 22:04

## 2024-10-31 RX ADMIN — CLOPIDOGREL 75 MILLIGRAM(S): 75 TABLET ORAL at 15:07

## 2024-10-31 RX ADMIN — HEPARIN SODIUM 5000 UNIT(S): 10000 INJECTION INTRAVENOUS; SUBCUTANEOUS at 18:08

## 2024-10-31 NOTE — PROGRESS NOTE ADULT - ASSESSMENT
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 Per NH, patient also refused Pleurx drainage today and is requesting to drain it inpatient (gets drained 2-3 times per week).    #Acute on chronic anemia s/p multiple transfusions  - Seen by GI last admission: regarding endoscopic work up. After discussing risks vs benefits given his advance age and co morbidities he would like to hold off. Offered VCE as outpatient .   - last admission received 5 days of Venofer   - Active type and screen   - Switched plavix to aspirin for now  - c/w ferrous sulfate, IV venofovir for 5 days  - GI consult placed, Follow up, as per GI- Patient can follow  or  Follow up with our [facility] located at [address] [place] [address]  - Continue home PPI 40mg BID PO  - avoid Nsaids  - Hb 6.9> 1 unit PRBC (10/18)> repeat 7.8  - Hb 7.4 10/21 -> GI will reach out to family to discuss inpatient endoscopy -> pt son agreed for Video capsule endoscopy scheduled for tomorrow 10/23/2024  - Oncology onboard also recommended endoscopy to rule out GI bleed -> if GI workup is negative will offer bone marrow biopsy   - Patient refused prep yesterday follow up with GI, HB is stable   - VCE done on 10/24 ->Duodenal bleed, put on clear liquid diet, PPI BID IV   - 10/26: family agreed on inpatient EGD  - 10/28: s/p EGD significant for Angioectasia in the second part of the duodenum with bleeding on contact. (APC Hemostasis). 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 (APC Hemostasis). PPI BID for 2 weeks and then q24h thereafter   - 10/30: DAPT resumed, Hgb stable, patient medically stable for DC    #Malignant mesothelioma (diagnosed 8/2024 ) with recurrent pleural effusion . Hx Asbestos lung   #Severe protein calorie malnutrition (calorie count started - result 10/21)  -s/p VATS pleurodesis, R sided PleurX  - Per NH, patient also refused Pleurx drainage and is requesting to drain it inpatient (gets drained 2-3 times per week)  - Currently on room air (target spo2 92-96)  - Fu heme onc- Dr. Ramírez OP  - Fu palliative- full code  - PleurX drained on 10/21  - Aspiration precaution  - Pain control   - 10/28: pleurx drained 350cc    #HTN/HLD/CAD s/p stents x2 in 2007 (follows Dr Aivla)  - c/w home meds     #Small hypodensity in segment 7 of the liver seen on prior CT.  - RUQ US as OP  - f/u outpt GI    #Acute Grief vs MDD  - wife passed away a few months ago  - patient refusing medication sometimes and asks to be left alone, no suicide ideas, no ideas to hurt other people.    - Psychiatry eval admission recommended outpt fu     Pendings: DC today

## 2024-10-31 NOTE — PROGRESS NOTE ADULT - SUBJECTIVE AND OBJECTIVE BOX
SUBJECTIVE/OVERNIGHT EVENTS  Today is hospital day 17d. This morning patient was seen and examined at bedside, resting comfortably in bed. No acute or major events overnight.  No new complaints, patient ready for DC today.    MEDICATIONS  STANDING MEDICATIONS  aspirin  chewable 81 milliGRAM(s) Oral daily  atorvastatin 10 milliGRAM(s) Oral at bedtime  chlorhexidine 2% Cloths 1 Application(s) Topical <User Schedule>  chlorhexidine 2% Cloths 1 Application(s) Topical daily  dextrose 5% + sodium chloride 0.45%. 1000 milliLiter(s) IV Continuous <Continuous>  dextrose 5%. 1000 milliLiter(s) IV Continuous <Continuous>  dextrose 5%. 1000 milliLiter(s) IV Continuous <Continuous>  dextrose 50% Injectable 12.5 Gram(s) IV Push once  dextrose 50% Injectable 25 Gram(s) IV Push once  dextrose 50% Injectable 25 Gram(s) IV Push once  ferrous    sulfate 325 milliGRAM(s) Oral daily  gabapentin 300 milliGRAM(s) Oral three times a day  glucagon  Injectable 1 milliGRAM(s) IntraMuscular once  heparin   Injectable 5000 Unit(s) SubCutaneous every 12 hours  insulin lispro (ADMELOG) corrective regimen sliding scale   SubCutaneous three times a day before meals  melatonin 3 milliGRAM(s) Oral at bedtime  pantoprazole  Injectable 40 milliGRAM(s) IV Push every 12 hours  polyethylene glycol 3350 17 Gram(s) Oral daily  senna 2 Tablet(s) Oral at bedtime  tamsulosin 0.4 milliGRAM(s) Oral at bedtime    PRN MEDICATIONS  acetaminophen     Tablet .. 650 milliGRAM(s) Oral every 6 hours PRN  dextrose Oral Gel 15 Gram(s) Oral once PRN  traMADol 50 milliGRAM(s) Oral every 6 hours PRN    VITALS  T(F): 98.4 (10-30-24 @ 13:30), Max: 98.5 (10-30-24 @ 04:33)  HR: 80 (10-30-24 @ 13:30) (79 - 96)  BP: 110/86 (10-30-24 @ 13:30) (95/57 - 115/63)  RR: 16 (10-30-24 @ 13:30) (16 - 18)  SpO2: 100% (10-30-24 @ 13:30) (96% - 100%)  POCT Blood Glucose.: 221 mg/dL (10-30-24 @ 11:45)  POCT Blood Glucose.: 190 mg/dL (10-30-24 @ 09:20)  POCT Blood Glucose.: 202 mg/dL (10-29-24 @ 16:40)    PHYSICAL EXAM  GENERAL  ( x ) NAD, lying in bed comfortably     (  ) obtunded     (  ) lethargic     (  ) somnolent    HEAD  ( x ) Atraumatic     (  ) hematoma     (  ) laceration (specify location:       )     NECK  ( x ) Supple     (  ) neck stiffness     (  ) nuchal rigidity     (  )  no JVD     (  ) JVD present ( -- cm)    HEART  Rate -->  ( x ) normal rate    (  ) bradycardic    (  ) tachycardic  Rhythm -->  ( x ) regular    (  ) regularly irregular    (  ) irregularly irregular  Murmurs -->  ( x ) normal s1/s2    (  ) systolic murmur    (  ) diastolic murmur    (  ) continuous murmur     (  ) S3 present    (  ) S4 present    LUNGS  ( x ) Unlabored respirations     (  ) tachypnea  ( x ) B/L air entry     (  ) decreased breath sounds in:  (location     )    (  ) no adventitious sound     (  ) crackles     (  ) wheezing      (  ) rhonchi      (specify location:       )  (  ) chest wall tenderness (specify location:       )    ABDOMEN  ( x ) Soft     (  ) tense   |   (  ) nondistended     (  ) distended   |   (  ) +BS     (  ) hypoactive bowel sounds     (  ) hyperactive bowel sounds  ( x ) nontender     (  ) RUQ tenderness     (  ) RLQ tenderness     (  ) LLQ tenderness     (  ) epigastric tenderness     (  ) diffuse tenderness  (  ) Splenomegaly      (  ) Hepatomegaly      (  ) Jaundice     (  ) ecchymosis     EXTREMITIES  ( x ) Normal     (  ) Rash     (  ) ecchymosis     (  ) varicose veins      (  ) pitting edema     (  ) non-pitting edema   (  ) ulceration     (  ) gangrene:     (location:     )    NERVOUS SYSTEM  ( x ) A&Ox3     (  ) confused     (  ) lethargic  CN II-XII:     (  ) Intact     (  ) focal deficits  (Specify:     )   Upper extremities:     (  ) strength X/5     (  ) focal deficit (specify:    )  Lower extremities:     (  ) strength  X/5    (  ) focal deficit (specify:    )      LABS             8.7    5.94  )-----------( 378      ( 10-29-24 @ 06:26 )             28.8     137  |  99  |  24  -------------------------<  111   10-29-24 @ 06:26  5.4  |  22  |  1.1    Ca      9.8     10-29-24 @ 06:26  Mg     2.1     10-29-24 @ 06:26    TPro  6.5  /  Alb  3.4  /  TBili  0.2  /  DBili  x   /  AST  9   /  ALT  <5  /  AlkPhos  120  /  GGT  x     10-29-24 @ 06:26        Urinalysis Basic - ( 29 Oct 2024 06:26 )    Color: x / Appearance: x / SG: x / pH: x  Gluc: 111 mg/dL / Ketone: x  / Bili: x / Urobili: x   Blood: x / Protein: x / Nitrite: x   Leuk Esterase: x / RBC: x / WBC x   Sq Epi: x / Non Sq Epi: x / Bacteria: x          IMAGING

## 2024-10-31 NOTE — PROGRESS NOTE ADULT - ASSESSMENT
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     #Acute on chronic anemia with hx of AVM and JASMIN  tfx to keep hb >7  s/p  venofer   seen by GI   active duodenal bleeding on VCE  s/p  egd  with hemostasis        #Malignant mesothelioma (diagnosed 8/2024 )  #Hx Asbestos lung   -s/p VATS pleurodesis, R sided PleurX,s/p drainage  -he will f/u with Dr Epperson for  further [place] as outpt    cont other supportive care.  spoke with son   d/c plan as per primary team   awaiting placement   will f/u

## 2024-10-31 NOTE — PROGRESS NOTE ADULT - SUBJECTIVE AND OBJECTIVE BOX
Patient is a 84y old  Male who presents with a chief complaint of Anemia     (17 Oct 2024 12:14)    INTERVAL HPI/OVERNIGHT EVENTS: Patient was examined and seen at bedside. This morning pt is resting comfortably in bed and reports Rt lower back discomfort resolved. No other complaints. Breathing is stable. Has been refusing labs but I was able to convince him to get labs for 11am. Denies need for pleurx draiange.    ROS: Denies CP, SOB, AP, new weakness  All other systems reviewed and are within normal limits.  InitialHPI:  HPI: 83 y/o man  PMHx of HLD, DM, CAD s/p stents x2 in 2007, pt of Dr Avila, hx  asbestosis of lung, diagnosed w malignant mesothelioma (8/2024) and s/p VATS pleurodesis, R sided PleurX, hx AVM/GIB and JASMIN, sent in by NH for low hemoglobin. Per NH, patient also refused Pleurx drainage today and is requesting to drain it inpatient (gets drained 2-3 times per week). Of note, patient had a recent admission in September for anemia for which he received 2 units prbc, IV Venofer and was recommended EGD/[place] but given his advance age and co morbidities decided to hold off. Patient is a poor historian. Uses wheelchair at baseline. Denies fever chills, shortness of breath, cough, chest pain, leg swelling, hematemesis, hematochezia.     Vital Signs Last 24 Hrs  T(C): 36.4 (15 Oct 2024 01:09), Max: 36.9 (14 Oct 2024 22:51)  T(F): 97.5 (15 Oct 2024 01:09), Max: 98.4 (14 Oct 2024 22:51)  HR: 100 (15 Oct 2024 01:09) (89 - 105)  BP: 132/74 (15 Oct 2024 01:09) (104/64 - 132/74)  RR: 18 (15 Oct 2024 01:09) (17 - 19)  SpO2: 97% (15 Oct 2024 01:09) (93% - 97%)    Parameters below as of 15 Oct 2024 01:09  Patient On (Oxygen Delivery Method): room air    Labs: Hgb 6.4 , Platelets 500 , Cr 1.2 (baseline)   CXR: Worsening R pleural effusion   EKG: NSR with PACs    Admitted for anemia      (15 Oct 2024 01:16)    PAST MEDICAL & SURGICAL HISTORY:  DM (diabetes mellitus)      MI (myocardial infarction)      History of CAD (coronary artery disease)      Dyslipidemia      Currently smokes tobacco      Mesothelioma, malignant      Coronary stent patent          General: NAD, AAOx2, chronically ill appearing, b/l temp waisting, cachectic  HEENT:  no LAD  CV: S1 S2  Resp: decreased breath sounds at bases  GI: NT/ND/S +BS  MS: no clubbing/cyanosis/edema, + pulses b/l. TTP Rt lower back  Neuro: nonfocal, +reflexes thruout  +laws w/ clear yellow urine  +Rt sided ant pleurx cath w/ d/c/i dsg            Home Medications:  aspirin 81 mg oral tablet, chewable: 1 tab(s) orally once a day (25 Oct 2024 14:42)  ferrous sulfate 325 mg (65 mg elemental iron) oral tablet: 1 tab(s) orally once a day (25 Sep 2024 15:37)  gabapentin 300 mg oral tablet: 1 tab(s) orally 3 times a day (20 Sep 2024 01:37)  melatonin 3 mg oral tablet: 1 tab(s) orally once a day (at bedtime) (13 Aug 2024 09:23)  metFORMIN 500 mg oral tablet: 1 tab(s) orally 2 times a day (21 Jul 2024 18:04)  pantoprazole 40 mg oral delayed release tablet: 1 tab(s) orally every 12 hours (25 Sep 2024 15:37)  polyethylene glycol 3350 oral powder for reconstitution: 17 gram(s) orally once a day (13 Aug 2024 09:23)  pravastatin 20 mg oral tablet: 1 tab(s) orally (21 Jul 2024 18:04)  senna leaf extract oral tablet: 2 tab(s) orally once a day (at bedtime) (13 Aug 2024 09:23)  tamsulosin 0.4 mg oral capsule: 1 cap(s) orally once a day (at bedtime) (13 Aug 2024 09:23)    MEDICATIONS  (STANDING):  atorvastatin 10 milliGRAM(s) Oral at bedtime  chlorhexidine 2% Cloths 1 Application(s) Topical <User Schedule>  chlorhexidine 2% Cloths 1 Application(s) Topical daily  clopidogrel Tablet 75 milliGRAM(s) Oral daily  dextrose 5% + sodium chloride 0.45%. 1000 milliLiter(s) (75 mL/Hr) IV Continuous <Continuous>  dextrose 5%. 1000 milliLiter(s) (100 mL/Hr) IV Continuous <Continuous>  dextrose 5%. 1000 milliLiter(s) (50 mL/Hr) IV Continuous <Continuous>  dextrose 50% Injectable 25 Gram(s) IV Push once  dextrose 50% Injectable 12.5 Gram(s) IV Push once  dextrose 50% Injectable 25 Gram(s) IV Push once  ferrous    sulfate 325 milliGRAM(s) Oral daily  gabapentin 300 milliGRAM(s) Oral three times a day  glucagon  Injectable 1 milliGRAM(s) IntraMuscular once  heparin   Injectable 5000 Unit(s) SubCutaneous every 12 hours  insulin lispro (ADMELOG) corrective regimen sliding scale   SubCutaneous three times a day before meals  melatonin 3 milliGRAM(s) Oral at bedtime  pantoprazole  Injectable 40 milliGRAM(s) IV Push every 12 hours  polyethylene glycol 3350 17 Gram(s) Oral daily  senna 2 Tablet(s) Oral at bedtime  tamsulosin 0.4 milliGRAM(s) Oral at bedtime    MEDICATIONS  (PRN):  acetaminophen     Tablet .. 650 milliGRAM(s) Oral every 6 hours PRN Mild Pain (1 - 3)  dextrose Oral Gel 15 Gram(s) Oral once PRN Blood Glucose LESS THAN 70 milliGRAM(s)/deciliter  traMADol 50 milliGRAM(s) Oral every 6 hours PRN Severe Pain (7 - 10)    Vital Signs Last 24 Hrs  T(C): 36.5 (31 Oct 2024 13:43), Max: 36.6 (31 Oct 2024 04:56)  T(F): 97.7 (31 Oct 2024 13:43), Max: 97.9 (31 Oct 2024 04:56)  HR: 91 (31 Oct 2024 13:43) (77 - 91)  BP: 122/68 (31 Oct 2024 13:43) (105/51 - 122/68)  BP(mean): 69 (31 Oct 2024 04:56) (69 - 80)  RR: 18 (31 Oct 2024 13:43) (16 - 18)  SpO2: 98% (31 Oct 2024 13:43) (97% - 100%)    Parameters below as of 31 Oct 2024 13:43  Patient On (Oxygen Delivery Method): room air      CAPILLARY BLOOD GLUCOSE      POCT Blood Glucose.: 270 mg/dL (31 Oct 2024 11:41)  POCT Blood Glucose.: 154 mg/dL (31 Oct 2024 07:28)  POCT Blood Glucose.: 171 mg/dL (30 Oct 2024 21:59)  POCT Blood Glucose.: 253 mg/dL (30 Oct 2024 16:50)    LABS:                        7.6    6.64  )-----------( 408      ( 31 Oct 2024 11:36 )             25.4     10-31    140  |  102  |  30[H]  ----------------------------<  240[H]  4.9   |  27  |  1.0    Ca    9.1      31 Oct 2024 11:36  Mg     2.1     10-31    TPro  5.7[L]  /  Alb  3.2[L]  /  TBili  <0.2  /  DBili  x   /  AST  7   /  ALT  <5  /  AlkPhos  103  10-31    LIVER FUNCTIONS - ( 31 Oct 2024 11:36 )  Alb: 3.2 g/dL / Pro: 5.7 g/dL / ALK PHOS: 103 U/L / ALT: <5 U/L / AST: 7 U/L / GGT: x                 Urinalysis Basic - ( 31 Oct 2024 11:36 )    Color: x / Appearance: x / SG: x / pH: x  Gluc: 240 mg/dL / Ketone: x  / Bili: x / Urobili: x   Blood: x / Protein: x / Nitrite: x   Leuk Esterase: x / RBC: x / WBC x   Sq Epi: x / Non Sq Epi: x / Bacteria: x              Consultant Notes Reviewed:  [x ] YES  [ ] NO  Care Discussed with Consultants/Other Providers/ Housestaff [ x] YES  [ ] NO  Radiology, labs, EKGs, new studies personally reviewed.

## 2024-10-31 NOTE — PROGRESS NOTE ADULT - SUBJECTIVE AND OBJECTIVE BOX
Patient is a 84y old  Male who presents with a chief complaint of Acute on chronic anemia (31 Oct 2024 15:45)       Pt is seen and examined  pt is awake and lying in bed            ROS:  Negative except for:weakness    MEDICATIONS  (STANDING):  atorvastatin 10 milliGRAM(s) Oral at bedtime  chlorhexidine 2% Cloths 1 Application(s) Topical <User Schedule>  chlorhexidine 2% Cloths 1 Application(s) Topical daily  clopidogrel Tablet 75 milliGRAM(s) Oral daily  dextrose 5% + sodium chloride 0.45%. 1000 milliLiter(s) (75 mL/Hr) IV Continuous <Continuous>  dextrose 5%. 1000 milliLiter(s) (100 mL/Hr) IV Continuous <Continuous>  dextrose 5%. 1000 milliLiter(s) (50 mL/Hr) IV Continuous <Continuous>  dextrose 50% Injectable 12.5 Gram(s) IV Push once  dextrose 50% Injectable 25 Gram(s) IV Push once  dextrose 50% Injectable 25 Gram(s) IV Push once  ferrous    sulfate 325 milliGRAM(s) Oral daily  gabapentin 300 milliGRAM(s) Oral three times a day  glucagon  Injectable 1 milliGRAM(s) IntraMuscular once  heparin   Injectable 5000 Unit(s) SubCutaneous every 12 hours  insulin lispro (ADMELOG) corrective regimen sliding scale   SubCutaneous three times a day before meals  melatonin 3 milliGRAM(s) Oral at bedtime  pantoprazole  Injectable 40 milliGRAM(s) IV Push every 12 hours  polyethylene glycol 3350 17 Gram(s) Oral daily  senna 2 Tablet(s) Oral at bedtime  tamsulosin 0.4 milliGRAM(s) Oral at bedtime    MEDICATIONS  (PRN):  acetaminophen     Tablet .. 650 milliGRAM(s) Oral every 6 hours PRN Mild Pain (1 - 3)  dextrose Oral Gel 15 Gram(s) Oral once PRN Blood Glucose LESS THAN 70 milliGRAM(s)/deciliter  traMADol 50 milliGRAM(s) Oral every 6 hours PRN Severe Pain (7 - 10)      Allergies    penicillin (Unknown)    Intolerances        Vital Signs Last 24 Hrs  T(C): 36.5 (31 Oct 2024 13:43), Max: 36.6 (31 Oct 2024 04:56)  T(F): 97.7 (31 Oct 2024 13:43), Max: 97.9 (31 Oct 2024 04:56)  HR: 91 (31 Oct 2024 13:43) (77 - 91)  BP: 122/68 (31 Oct 2024 13:43) (105/51 - 122/68)  BP(mean): 69 (31 Oct 2024 04:56) (69 - 80)  RR: 18 (31 Oct 2024 13:43) (16 - 18)  SpO2: 98% (31 Oct 2024 13:43) (97% - 100%)    Parameters below as of 31 Oct 2024 13:43  Patient On (Oxygen Delivery Method): room air        PHYSICAL EXAM  General: adult in NAD  HEENT: clear oropharynx, anicteric sclera, pink conjunctiva  Neck: supple  CV: normal S1/S2 with no murmur rubs or gallops  Lungs: positive air movement b/l ant lungs,clear to auscultation, no wheezes, no rales  Abdomen: soft non-tender non-distended, no hepatosplenomegaly  Ext: no clubbing cyanosis or edema  Skin: no rashes and no petechiae  Neuro: alert and oriented X 4, no focal deficits  LABS:                          7.6    6.64  )-----------( 408      ( 31 Oct 2024 11:36 )             25.4         Mean Cell Volume : 97.7 fL  Mean Cell Hemoglobin : 29.2 pg  Mean Cell Hemoglobin Concentration : 29.9 g/dL  Auto Neutrophil # : 5.37 K/uL  Auto Lymphocyte # : 0.63 K/uL  Auto Monocyte # : 0.47 K/uL  Auto Eosinophil # : 0.11 K/uL  Auto Basophil # : 0.01 K/uL  Auto Neutrophil % : 80.7 %  Auto Lymphocyte % : 9.5 %  Auto Monocyte % : 7.1 %  Auto Eosinophil % : 1.7 %  Auto Basophil % : 0.2 %    Serial CBC's  10-31 @ 11:36  Hct-25.4 / Hgb-7.6 / Plat-408 / RBC-2.60 / WBC-6.64          Serial CBC's  10-29 @ 06:26  Hct-28.8 / Hgb-8.7 / Plat-378 / RBC-2.96 / WBC-5.94          Serial CBC's  10-27 @ 22:48  Hct-26.4 / Hgb-8.0 / Plat-436 / RBC-2.77 / WBC-6.36            10-31    140  |  102  |  30[H]  ----------------------------<  240[H]  4.9   |  27  |  1.0    Ca    9.1      31 Oct 2024 11:36  Mg     2.1     10-31    TPro  5.7[L]  /  Alb  3.2[L]  /  TBili  <0.2  /  DBili  x   /  AST  7   /  ALT  <5  /  AlkPhos  103  10-31          WBC Count: 6.64 K/uL (10-31-24 @ 11:36)  Hemoglobin: 7.6 g/dL (10-31-24 @ 11:36)  Hematocrit: 25.4 % (10-31-24 @ 11:36)  Platelet Count - Automated: 408 K/uL (10-31-24 @ 11:36)  WBC Count: 5.94 K/uL (10-29-24 @ 06:26)  Hemoglobin: 8.7 g/dL (10-29-24 @ 06:26)  Hematocrit: 28.8 % (10-29-24 @ 06:26)  Platelet Count - Automated: 378 K/uL (10-29-24 @ 06:26)  WBC Count: 6.36 K/uL (10-27-24 @ 22:48)  Hemoglobin: 8.0 g/dL (10-27-24 @ 22:48)  Hematocrit: 26.4 % (10-27-24 @ 22:48)  Platelet Count - Automated: 436 K/uL (10-27-24 @ 22:48)  WBC Count: 6.87 K/uL (10-27-24 @ 06:46)  Hemoglobin: 8.6 g/dL (10-27-24 @ 06:46)  Hematocrit: 28.2 % (10-27-24 @ 06:46)  Platelet Count - Automated: 398 K/uL (10-27-24 @ 06:46)  WBC Count: 5.74 K/uL (10-26-24 @ 17:00)  Hemoglobin: 8.6 g/dL (10-26-24 @ 17:00)  Hematocrit: 28.1 % (10-26-24 @ 17:00)  Platelet Count - Automated: 427 K/uL (10-26-24 @ 17:00)  WBC Count: 6.96 K/uL (10-26-24 @ 07:04)  Hemoglobin: 8.0 g/dL (10-26-24 @ 07:04)  Hematocrit: 25.8 % (10-26-24 @ 07:04)  Platelet Count - Automated: 415 K/uL (10-26-24 @ 07:04)  WBC Count: 8.18 K/uL (10-25-24 @ 20:47)  Hemoglobin: 8.0 g/dL (10-25-24 @ 20:47)  Hematocrit: 25.7 % (10-25-24 @ 20:47)  Platelet Count - Automated: 415 K/uL (10-25-24 @ 20:47)  WBC Count: 7.31 K/uL (10-25-24 @ 06:33)  Hemoglobin: 8.7 g/dL (10-25-24 @ 06:33)  Hematocrit: 27.8 % (10-25-24 @ 06:33)  Platelet Count - Automated: 425 K/uL (10-25-24 @ 06:33)  WBC Count: 7.13 K/uL (10-24-24 @ 06:45)  Hemoglobin: 8.6 g/dL (10-24-24 @ 06:45)  Hematocrit: 27.5 % (10-24-24 @ 06:45)  Platelet Count - Automated: 428 K/uL (10-24-24 @ 06:45)  WBC Count: 7.80 K/uL (10-23-24 @ 05:46)  Hemoglobin: 8.0 g/dL (10-23-24 @ 05:46)  Hematocrit: 26.2 % (10-23-24 @ 05:46)  Platelet Count - Automated: 403 K/uL (10-23-24 @ 05:46)  WBC Count: 6.57 K/uL (10-22-24 @ 22:23)  Hemoglobin: 7.7 g/dL (10-22-24 @ 22:23)  Hematocrit: 24.9 % (10-22-24 @ 22:23)  Platelet Count - Automated: 381 K/uL (10-22-24 @ 22:23)  WBC Count: 8.15 K/uL (10-22-24 @ 06:55)  Hemoglobin: 7.9 g/dL (10-22-24 @ 06:55)  Hematocrit: 25.1 % (10-22-24 @ 06:55)  Platelet Count - Automated: 374 K/uL (10-22-24 @ 06:55)                BLOOD SMEAR INTERPRETATION:       RADIOLOGY & ADDITIONAL STUDIES:

## 2024-10-31 NOTE — PROGRESS NOTE ADULT - ASSESSMENT
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 Per NH, patient also refused Pleurx drainage today and is requesting to drain it inpatient (gets drained 2-3 times per week).    #Recurrent anemia  #Hx JASMIN   #Hx AVM?  #Active duodenal bleeding on capsule endoscopy  - Hemodynamically stable & no active bleeding  - Baseline hemoglobin - 8-9   - Hemoglobin on admission - 6.3 >s/p 2units prbc (received 2 units at the end of Spetember as well)  - On plavix   - last admission received 5 days of Venofer   -Seen by GI last admission> spoke to grandson (HCP)  regarding endoscopic work up. After discussing risks vs benefits given his advance age and co morbidities he would like to hold off. Offered VCE as outpatient .   -Can reconsider work up if within goals of care , will need to speak to grandson/son  -Will also need heme onc outpt for possible venofer infusions outpt   - Maintain active Type and screen  - Trend H&H  - Continue home PPI 40mg BID PO  - Target Hgb >8   - Avoid NSAIDs.  - give Venofer as %sat, ferritin remain low  - recall GI for possible intervention if family agrees in view of recurrent anemia requiring multiple PRBCs  6/18 convinced pt to give labs. Hgb 6.9. Transfuse 1uRPBCs. Spoke to GI and asked to speak to family. Serial CBC. Keep Hgb >7.5  6/21 again spoke to GI and asked to speak re: possible intervention. Transfuse another unit as Hgb again trending down (total 3u PRBCs).  6/22 son declined EGD/C-scopy but ?requested capsule endoscopy. Doubt that pt will drink golytely or agree to NGT.  6/26 d/w GI. Active duodenal bleeding on capsule endoscopy. Son refused urgent EGD and wants to think over the weekend. Change Protonix to 40mg IV q12, clears, CBC BID, keep Hgb>8. Hold ASA  6/28 son agreed to EGD. D/w pulm and cardio pt optimized for EGD. 2 bleeding angioectasias noted oozing blood in duodenum s/p cauterization.  6/31 transfuse 1u PRBCs. Repeat in am    #Malignant mesothelioma (diagnosed 8/2024 )  #Hx Asbestos lung   -s/p VATS pleurodesis, R sided PleurX  -pleurx drainage 2-3 times per week or PRN  -Fu heme onc outpatient. Plan was for possible Rx if pt's functional status improves.   -s/p drainage on 10/21 of 750cc  monitor for Sx  - s/p drainage on 10/28 375 cc    #HTN/HLD  #CAD s/p stents x2 in 2007 (follows Dr Avila)  -c/w home meds   -changed Plavix to ASA    #Small hypodensity in segment 7 of the liver seen on prior CT.  - RUQ US as OP  -?f/u outpt GI    #Acute Grief vs MDD  #Baseline dementia  - wife passed away a few months ago  - patient refusing medication sometimes and asks to be left alone, no suicide ideas, no ideas to hurt other people.    - Psychiatry eval  recommended outpt fu     #Nutrition/Fluids/Electrolytes   - replete K<4 and Mg <2  - ensure regular BMs  -  Miralax BID, Senna    #DVT Px  SCDs  Heparin SQ    High risk pt. Px is overall poor.  GOC: d/w son in details on 10/18. Based on oncology feedback that his cancer is treatable, son wants to cont full code.    #Progress Note Handoff  Pending: Clinical improvement and stability__x___PT____x__CBC stability,  Pt/Family discussion: Pt/family informed and agree with the current plan  Disposition: Nursing Home      My note supersedes the residents note should a discrepancy arise.    Chart and notes personally reviewed.  Care Discussed with Consultants/Other Providers/ Housestaff [ x] YES [ ] NO   Radiology, labs, old records personally reviewed.    discussed w/ housestaff, nursing, case management    Attestation Statements:    Attestation Statements:  Risk Statement (NON-critical care).     On this date of service, level of risk to patient is considered: High.     Due to: pt with illness causing risk to life or bodily fxn    Time-based billing (NON-critical care).     50 minutes spent on total encounter. The necessity of the time spent during the encounter on this date of service was due to:     time spent on review of labs, imaging studies, old records, obtaining history, personally examining patient, counselling and communicating with patient/ family, entering orders for medications/tests/etc, discussions with other health care providers, documentation in electronic health records, independent interpretation of labs, imaging/procedure results and care coordination.

## 2024-11-01 LAB
ALBUMIN SERPL ELPH-MCNC: 3.2 G/DL — LOW (ref 3.5–5.2)
ALP SERPL-CCNC: 99 U/L — SIGNIFICANT CHANGE UP (ref 30–115)
ALT FLD-CCNC: <5 U/L — SIGNIFICANT CHANGE UP (ref 0–41)
ANION GAP SERPL CALC-SCNC: 13 MMOL/L — SIGNIFICANT CHANGE UP (ref 7–14)
AST SERPL-CCNC: 7 U/L — SIGNIFICANT CHANGE UP (ref 0–41)
BASOPHILS # BLD AUTO: 0.02 K/UL — SIGNIFICANT CHANGE UP (ref 0–0.2)
BASOPHILS NFR BLD AUTO: 0.3 % — SIGNIFICANT CHANGE UP (ref 0–1)
BILIRUB SERPL-MCNC: 0.3 MG/DL — SIGNIFICANT CHANGE UP (ref 0.2–1.2)
BUN SERPL-MCNC: 33 MG/DL — HIGH (ref 10–20)
CALCIUM SERPL-MCNC: 9.2 MG/DL — SIGNIFICANT CHANGE UP (ref 8.4–10.5)
CHLORIDE SERPL-SCNC: 103 MMOL/L — SIGNIFICANT CHANGE UP (ref 98–110)
CO2 SERPL-SCNC: 23 MMOL/L — SIGNIFICANT CHANGE UP (ref 17–32)
CREAT SERPL-MCNC: 0.9 MG/DL — SIGNIFICANT CHANGE UP (ref 0.7–1.5)
EGFR: 85 ML/MIN/1.73M2 — SIGNIFICANT CHANGE UP
EOSINOPHIL # BLD AUTO: 0.2 K/UL — SIGNIFICANT CHANGE UP (ref 0–0.7)
EOSINOPHIL NFR BLD AUTO: 2.5 % — SIGNIFICANT CHANGE UP (ref 0–8)
GLUCOSE BLDC GLUCOMTR-MCNC: 150 MG/DL — HIGH (ref 70–99)
GLUCOSE BLDC GLUCOMTR-MCNC: 250 MG/DL — HIGH (ref 70–99)
GLUCOSE BLDC GLUCOMTR-MCNC: 278 MG/DL — HIGH (ref 70–99)
GLUCOSE SERPL-MCNC: 165 MG/DL — HIGH (ref 70–99)
HCT VFR BLD CALC: 27.7 % — LOW (ref 42–52)
HGB BLD-MCNC: 8.7 G/DL — LOW (ref 14–18)
IMM GRANULOCYTES NFR BLD AUTO: 0.5 % — HIGH (ref 0.1–0.3)
LYMPHOCYTES # BLD AUTO: 0.9 K/UL — LOW (ref 1.2–3.4)
LYMPHOCYTES # BLD AUTO: 11.4 % — LOW (ref 20.5–51.1)
MAGNESIUM SERPL-MCNC: 2.1 MG/DL — SIGNIFICANT CHANGE UP (ref 1.8–2.4)
MCHC RBC-ENTMCNC: 30.2 PG — SIGNIFICANT CHANGE UP (ref 27–31)
MCHC RBC-ENTMCNC: 31.4 G/DL — LOW (ref 32–37)
MCV RBC AUTO: 96.2 FL — HIGH (ref 80–94)
MONOCYTES # BLD AUTO: 0.65 K/UL — HIGH (ref 0.1–0.6)
MONOCYTES NFR BLD AUTO: 8.2 % — SIGNIFICANT CHANGE UP (ref 1.7–9.3)
NEUTROPHILS # BLD AUTO: 6.1 K/UL — SIGNIFICANT CHANGE UP (ref 1.4–6.5)
NEUTROPHILS NFR BLD AUTO: 77.1 % — HIGH (ref 42.2–75.2)
NRBC # BLD: 0 /100 WBCS — SIGNIFICANT CHANGE UP (ref 0–0)
PLATELET # BLD AUTO: 378 K/UL — SIGNIFICANT CHANGE UP (ref 130–400)
PMV BLD: 9.7 FL — SIGNIFICANT CHANGE UP (ref 7.4–10.4)
POTASSIUM SERPL-MCNC: 4.7 MMOL/L — SIGNIFICANT CHANGE UP (ref 3.5–5)
POTASSIUM SERPL-SCNC: 4.7 MMOL/L — SIGNIFICANT CHANGE UP (ref 3.5–5)
PROT SERPL-MCNC: 5.7 G/DL — LOW (ref 6–8)
RBC # BLD: 2.88 M/UL — LOW (ref 4.7–6.1)
RBC # FLD: 17.4 % — HIGH (ref 11.5–14.5)
SODIUM SERPL-SCNC: 139 MMOL/L — SIGNIFICANT CHANGE UP (ref 135–146)
WBC # BLD: 7.91 K/UL — SIGNIFICANT CHANGE UP (ref 4.8–10.8)
WBC # FLD AUTO: 7.91 K/UL — SIGNIFICANT CHANGE UP (ref 4.8–10.8)

## 2024-11-01 PROCEDURE — 99232 SBSQ HOSP IP/OBS MODERATE 35: CPT

## 2024-11-01 RX ADMIN — GABAPENTIN 300 MILLIGRAM(S): 300 CAPSULE ORAL at 21:23

## 2024-11-01 RX ADMIN — GABAPENTIN 300 MILLIGRAM(S): 300 CAPSULE ORAL at 15:03

## 2024-11-01 RX ADMIN — Medication 3 MILLIGRAM(S): at 21:23

## 2024-11-01 RX ADMIN — GABAPENTIN 300 MILLIGRAM(S): 300 CAPSULE ORAL at 05:59

## 2024-11-01 RX ADMIN — Medication 10 MILLIGRAM(S): at 21:23

## 2024-11-01 RX ADMIN — Medication 2 TABLET(S): at 21:23

## 2024-11-01 RX ADMIN — CHLORHEXIDINE GLUCONATE 1 APPLICATION(S): 40 SOLUTION TOPICAL at 11:48

## 2024-11-01 RX ADMIN — Medication 0.4 MILLIGRAM(S): at 21:24

## 2024-11-01 RX ADMIN — CHLORHEXIDINE GLUCONATE 1 APPLICATION(S): 40 SOLUTION TOPICAL at 05:54

## 2024-11-01 RX ADMIN — Medication 325 MILLIGRAM(S): at 11:41

## 2024-11-01 RX ADMIN — HEPARIN SODIUM 5000 UNIT(S): 10000 INJECTION INTRAVENOUS; SUBCUTANEOUS at 05:53

## 2024-11-01 RX ADMIN — CLOPIDOGREL 75 MILLIGRAM(S): 75 TABLET ORAL at 11:41

## 2024-11-01 RX ADMIN — PANTOPRAZOLE SODIUM 40 MILLIGRAM(S): 40 TABLET, DELAYED RELEASE ORAL at 05:46

## 2024-11-01 NOTE — PROGRESS NOTE ADULT - SUBJECTIVE AND OBJECTIVE BOX
SUBJECTIVE/OVERNIGHT EVENTS  Today is hospital day 18d. This morning patient was seen and examined at bedside, resting comfortably in bed. No acute or major events overnight.  No new complaints, patient ready for DC today.    MEDICATIONS  STANDING MEDICATIONS  aspirin  chewable 81 milliGRAM(s) Oral daily  atorvastatin 10 milliGRAM(s) Oral at bedtime  chlorhexidine 2% Cloths 1 Application(s) Topical <User Schedule>  chlorhexidine 2% Cloths 1 Application(s) Topical daily  dextrose 5% + sodium chloride 0.45%. 1000 milliLiter(s) IV Continuous <Continuous>  dextrose 5%. 1000 milliLiter(s) IV Continuous <Continuous>  dextrose 5%. 1000 milliLiter(s) IV Continuous <Continuous>  dextrose 50% Injectable 12.5 Gram(s) IV Push once  dextrose 50% Injectable 25 Gram(s) IV Push once  dextrose 50% Injectable 25 Gram(s) IV Push once  ferrous    sulfate 325 milliGRAM(s) Oral daily  gabapentin 300 milliGRAM(s) Oral three times a day  glucagon  Injectable 1 milliGRAM(s) IntraMuscular once  heparin   Injectable 5000 Unit(s) SubCutaneous every 12 hours  insulin lispro (ADMELOG) corrective regimen sliding scale   SubCutaneous three times a day before meals  melatonin 3 milliGRAM(s) Oral at bedtime  pantoprazole  Injectable 40 milliGRAM(s) IV Push every 12 hours  polyethylene glycol 3350 17 Gram(s) Oral daily  senna 2 Tablet(s) Oral at bedtime  tamsulosin 0.4 milliGRAM(s) Oral at bedtime    PRN MEDICATIONS  acetaminophen     Tablet .. 650 milliGRAM(s) Oral every 6 hours PRN  dextrose Oral Gel 15 Gram(s) Oral once PRN  traMADol 50 milliGRAM(s) Oral every 6 hours PRN    VITALS  T(F): 98.4 (10-30-24 @ 13:30), Max: 98.5 (10-30-24 @ 04:33)  HR: 80 (10-30-24 @ 13:30) (79 - 96)  BP: 110/86 (10-30-24 @ 13:30) (95/57 - 115/63)  RR: 16 (10-30-24 @ 13:30) (16 - 18)  SpO2: 100% (10-30-24 @ 13:30) (96% - 100%)  POCT Blood Glucose.: 221 mg/dL (10-30-24 @ 11:45)  POCT Blood Glucose.: 190 mg/dL (10-30-24 @ 09:20)  POCT Blood Glucose.: 202 mg/dL (10-29-24 @ 16:40)    PHYSICAL EXAM  GENERAL  ( x ) NAD, lying in bed comfortably     (  ) obtunded     (  ) lethargic     (  ) somnolent    HEAD  ( x ) Atraumatic     (  ) hematoma     (  ) laceration (specify location:       )     NECK  ( x ) Supple     (  ) neck stiffness     (  ) nuchal rigidity     (  )  no JVD     (  ) JVD present ( -- cm)    HEART  Rate -->  ( x ) normal rate    (  ) bradycardic    (  ) tachycardic  Rhythm -->  ( x ) regular    (  ) regularly irregular    (  ) irregularly irregular  Murmurs -->  ( x ) normal s1/s2    (  ) systolic murmur    (  ) diastolic murmur    (  ) continuous murmur     (  ) S3 present    (  ) S4 present    LUNGS  ( x ) Unlabored respirations     (  ) tachypnea  ( x ) B/L air entry     (  ) decreased breath sounds in:  (location     )    (  ) no adventitious sound     (  ) crackles     (  ) wheezing      (  ) rhonchi      (specify location:       )  (  ) chest wall tenderness (specify location:       )    ABDOMEN  ( x ) Soft     (  ) tense   |   (  ) nondistended     (  ) distended   |   (  ) +BS     (  ) hypoactive bowel sounds     (  ) hyperactive bowel sounds  ( x ) nontender     (  ) RUQ tenderness     (  ) RLQ tenderness     (  ) LLQ tenderness     (  ) epigastric tenderness     (  ) diffuse tenderness  (  ) Splenomegaly      (  ) Hepatomegaly      (  ) Jaundice     (  ) ecchymosis     EXTREMITIES  ( x ) Normal     (  ) Rash     (  ) ecchymosis     (  ) varicose veins      (  ) pitting edema     (  ) non-pitting edema   (  ) ulceration     (  ) gangrene:     (location:     )    NERVOUS SYSTEM  ( x ) A&Ox3     (  ) confused     (  ) lethargic  CN II-XII:     (  ) Intact     (  ) focal deficits  (Specify:     )   Upper extremities:     (  ) strength X/5     (  ) focal deficit (specify:    )  Lower extremities:     (  ) strength  X/5    (  ) focal deficit (specify:    )      LABS             8.7    5.94  )-----------( 378      ( 10-29-24 @ 06:26 )             28.8     137  |  99  |  24  -------------------------<  111   10-29-24 @ 06:26  5.4  |  22  |  1.1    Ca      9.8     10-29-24 @ 06:26  Mg     2.1     10-29-24 @ 06:26    TPro  6.5  /  Alb  3.4  /  TBili  0.2  /  DBili  x   /  AST  9   /  ALT  <5  /  AlkPhos  120  /  GGT  x     10-29-24 @ 06:26        Urinalysis Basic - ( 29 Oct 2024 06:26 )    Color: x / Appearance: x / SG: x / pH: x  Gluc: 111 mg/dL / Ketone: x  / Bili: x / Urobili: x   Blood: x / Protein: x / Nitrite: x   Leuk Esterase: x / RBC: x / WBC x   Sq Epi: x / Non Sq Epi: x / Bacteria: x          IMAGING

## 2024-11-01 NOTE — PROGRESS NOTE ADULT - ASSESSMENT
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 Per NH, patient also refused Pleurx drainage today and is requesting to drain it inpatient (gets drained 2-3 times per week).    #Acute on chronic anemia s/p multiple transfusions  - Seen by GI last admission: regarding endoscopic work up. After discussing risks vs benefits given his advance age and co morbidities he would like to hold off. Offered VCE as outpatient .   - last admission received 5 days of Venofer   - Active type and screen   - Switched plavix to aspirin for now  - c/w ferrous sulfate, IV venofovir for 5 days  - GI consult placed, Follow up, as per GI- Patient can follow  or  Follow up with our [facility] located at [address] [place] [address]  - Continue home PPI 40mg BID PO  - avoid Nsaids  - Hb 6.9> 1 unit PRBC (10/18)> repeat 7.8  - Hb 7.4 10/21 -> GI will reach out to family to discuss inpatient endoscopy -> pt son agreed for Video capsule endoscopy scheduled for tomorrow 10/23/2024  - Oncology onboard also recommended endoscopy to rule out GI bleed -> if GI workup is negative will offer bone marrow biopsy   - Patient refused prep yesterday follow up with GI, HB is stable   - VCE done on 10/24 ->Duodenal bleed, put on clear liquid diet, PPI BID IV   - 10/26: family agreed on inpatient EGD  - 10/28: s/p EGD significant for Angioectasia in the second part of the duodenum with bleeding on contact. (APC Hemostasis). 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 (APC Hemostasis). PPI BID for 2 weeks and then q24h thereafter   - 10/30: DAPT resumed, Hgb stable  - 10/31: patient given 1prbc  - 11/01: DC today    #Malignant mesothelioma (diagnosed 8/2024 ) with recurrent pleural effusion . Hx Asbestos lung   #Severe protein calorie malnutrition (calorie count started - result 10/21)  -s/p VATS pleurodesis, R sided PleurX  - Per NH, patient also refused Pleurx drainage and is requesting to drain it inpatient (gets drained 2-3 times per week)  - Currently on room air (target spo2 92-96)  - Fu heme onc- Dr. Ramírez OP  - Fu palliative- full code  - PleurX drained on 10/21  - Aspiration precaution  - Pain control   - 10/28: pleurx drained 350cc    #HTN/HLD/CAD s/p stents x2 in 2007 (follows Dr Avila)  - c/w home meds     #Small hypodensity in segment 7 of the liver seen on prior CT.  - RUQ US as OP  - f/u outpt GI    #Acute Grief vs MDD  - wife passed away a few months ago  - patient refusing medication sometimes and asks to be left alone, no suicide ideas, no ideas to hurt other people.    - Psychiatry eval admission recommended outpt fu     Pendings: DC today

## 2024-11-01 NOTE — PROGRESS NOTE ADULT - ASSESSMENT
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     #Acute on chronic anemia with hx of AVM and JASMIN  tfx to keep hb >7  s/p  venofer   seen by GI   active duodenal bleeding on VCE  s/p  egd  with hemostasis        #Malignant mesothelioma (diagnosed 8/2024 )  #Hx Asbestos lung   -s/p VATS pleurodesis, R sided PleurX,s/p drainage  -he will f/u with Dr Epperson for  further [place] as outpt    cont other supportive care.  spoke with son   d/c plan as per primary team   awaiting placement   will f/u

## 2024-11-01 NOTE — PROGRESS NOTE ADULT - SUBJECTIVE AND OBJECTIVE BOX
SUBJ:      MEDICATIONS  (STANDING):  atorvastatin 10 milliGRAM(s) Oral at bedtime  chlorhexidine 2% Cloths 1 Application(s) Topical <User Schedule>  chlorhexidine 2% Cloths 1 Application(s) Topical daily  clopidogrel Tablet 75 milliGRAM(s) Oral daily  dextrose 5% + sodium chloride 0.45%. 1000 milliLiter(s) (75 mL/Hr) IV Continuous <Continuous>  dextrose 5%. 1000 milliLiter(s) (100 mL/Hr) IV Continuous <Continuous>  dextrose 5%. 1000 milliLiter(s) (50 mL/Hr) IV Continuous <Continuous>  dextrose 50% Injectable 12.5 Gram(s) IV Push once  dextrose 50% Injectable 25 Gram(s) IV Push once  dextrose 50% Injectable 25 Gram(s) IV Push once  ferrous    sulfate 325 milliGRAM(s) Oral daily  gabapentin 300 milliGRAM(s) Oral three times a day  glucagon  Injectable 1 milliGRAM(s) IntraMuscular once  heparin   Injectable 5000 Unit(s) SubCutaneous every 12 hours  insulin lispro (ADMELOG) corrective regimen sliding scale   SubCutaneous three times a day before meals  melatonin 3 milliGRAM(s) Oral at bedtime  pantoprazole  Injectable 40 milliGRAM(s) IV Push every 12 hours  polyethylene glycol 3350 17 Gram(s) Oral daily  senna 2 Tablet(s) Oral at bedtime  tamsulosin 0.4 milliGRAM(s) Oral at bedtime    MEDICATIONS  (PRN):  acetaminophen     Tablet .. 650 milliGRAM(s) Oral every 6 hours PRN Mild Pain (1 - 3)  dextrose Oral Gel 15 Gram(s) Oral once PRN Blood Glucose LESS THAN 70 milliGRAM(s)/deciliter  traMADol 50 milliGRAM(s) Oral every 6 hours PRN Severe Pain (7 - 10)            Vital Signs Last 24 Hrs  T(C): 36.4 (01 Nov 2024 13:17), Max: 36.9 (31 Oct 2024 18:45)  T(F): 97.5 (01 Nov 2024 13:17), Max: 98.4 (31 Oct 2024 18:45)  HR: 82 (01 Nov 2024 13:17) (75 - 99)  BP: 125/64 (01 Nov 2024 13:17) (110/60 - 125/64)  BP(mean): 86 (01 Nov 2024 05:10) (86 - 86)  RR: 18 (01 Nov 2024 13:17) (18 - 18)  SpO2: 97% (01 Nov 2024 05:10) (97% - 99%)    Parameters below as of 01 Nov 2024 05:10  Patient On (Oxygen Delivery Method): room air         REVIEW OF SYSTEMS:  CONSTITUTIONAL: No fever, weight loss, or fatigue  CARDIOLOGY: PAtient denies chest pain, shortness of breath or syncopal episodes.   RESPIRATORY: denies shortness of breath, wheezeing.   NEUROLOGICAL: NO weakness, no focal deficits to report.  GI: no BRBPR, no N,V,diarrhea.    PSYCHIATRY: normal mood and affect  HEENT: no nasal discharge, no ecchymosis  SKIN: no ecchymosis, no breakdown  MUSCULOSKELETAL: Full range of motion x4.        PHYSICAL EXAM:  · CONSTITUTIONAL:	Well-developed, well nourished    BMI-  ·RESPIRATORY:   airway patent; breath sounds equal; good air movement; respirations non-labored; clear to auscultation bilaterally; no chest wall tenderness; no intercostal retractions; no rales,rhonchi or wheeze  · CARDIOVASCULAR	regular rate and rhythm  no rub  no murmur  normal PMI  · EXTREMITIES: No cyanosis, clubbing or edema  · VASCULAR: 	Equal and normal pulses (carotid, femoral, dorsalis pedis)  	  TELEMETRY:    ECG:    TTE:    LABS:                        8.7    7.91  )-----------( 378      ( 01 Nov 2024 06:38 )             27.7     11-01    139  |  103  |  33[H]  ----------------------------<  165[H]  4.7   |  23  |  0.9    Ca    9.2      01 Nov 2024 06:38  Mg     2.1     11-01    TPro  5.7[L]  /  Alb  3.2[L]  /  TBili  0.3  /  DBili  x   /  AST  7   /  ALT  <5  /  AlkPhos  99  11-01            I&O's Summary    31 Oct 2024 07:01  -  01 Nov 2024 07:00  --------------------------------------------------------  IN: 331 mL / OUT: 300 mL / NET: 31 mL      BNP  RADIOLOGY & ADDITIONAL STUDIES:    IMPRESSION AND PLAN:         Denies any chest pain, SOB or palpitations   No orthopnea or PND   No acute events over last 24 hours       MEDICATIONS  (STANDING):  atorvastatin 10 milliGRAM(s) Oral at bedtime  chlorhexidine 2% Cloths 1 Application(s) Topical <User Schedule>  chlorhexidine 2% Cloths 1 Application(s) Topical daily  clopidogrel Tablet 75 milliGRAM(s) Oral daily  dextrose 5% + sodium chloride 0.45%. 1000 milliLiter(s) (75 mL/Hr) IV Continuous <Continuous>  dextrose 5%. 1000 milliLiter(s) (100 mL/Hr) IV Continuous <Continuous>  dextrose 5%. 1000 milliLiter(s) (50 mL/Hr) IV Continuous <Continuous>  dextrose 50% Injectable 12.5 Gram(s) IV Push once  dextrose 50% Injectable 25 Gram(s) IV Push once  dextrose 50% Injectable 25 Gram(s) IV Push once  ferrous    sulfate 325 milliGRAM(s) Oral daily  gabapentin 300 milliGRAM(s) Oral three times a day  glucagon  Injectable 1 milliGRAM(s) IntraMuscular once  heparin   Injectable 5000 Unit(s) SubCutaneous every 12 hours  insulin lispro (ADMELOG) corrective regimen sliding scale   SubCutaneous three times a day before meals  melatonin 3 milliGRAM(s) Oral at bedtime  pantoprazole  Injectable 40 milliGRAM(s) IV Push every 12 hours  polyethylene glycol 3350 17 Gram(s) Oral daily  senna 2 Tablet(s) Oral at bedtime  tamsulosin 0.4 milliGRAM(s) Oral at bedtime    MEDICATIONS  (PRN):  acetaminophen     Tablet .. 650 milliGRAM(s) Oral every 6 hours PRN Mild Pain (1 - 3)  dextrose Oral Gel 15 Gram(s) Oral once PRN Blood Glucose LESS THAN 70 milliGRAM(s)/deciliter  traMADol 50 milliGRAM(s) Oral every 6 hours PRN Severe Pain (7 - 10)            Vital Signs Last 24 Hrs  T(C): 36.4 (01 Nov 2024 13:17), Max: 36.9 (31 Oct 2024 18:45)  T(F): 97.5 (01 Nov 2024 13:17), Max: 98.4 (31 Oct 2024 18:45)  HR: 82 (01 Nov 2024 13:17) (75 - 99)  BP: 125/64 (01 Nov 2024 13:17) (110/60 - 125/64)  BP(mean): 86 (01 Nov 2024 05:10) (86 - 86)  RR: 18 (01 Nov 2024 13:17) (18 - 18)  SpO2: 97% (01 Nov 2024 05:10) (97% - 99%)    Parameters below as of 01 Nov 2024 05:10  Patient On (Oxygen Delivery Method): room air         REVIEW OF SYSTEMS:  CONSTITUTIONAL: No fever, weight loss, or fatigue  CARDIOLOGY: PAtient denies chest pain, shortness of breath or syncopal episodes.   RESPIRATORY: denies shortness of breath, wheezeing.   NEUROLOGICAL: NO weakness, no focal deficits to report.  GI: no BRBPR, no N,V,diarrhea.    PSYCHIATRY: normal mood and affect  HEENT: no nasal discharge, no ecchymosis  SKIN: no ecchymosis, no breakdown  MUSCULOSKELETAL: Full range of motion x4.        PHYSICAL EXAM:  · CONSTITUTIONAL:	Well-developed, well nourished    BMI-  ·RESPIRATORY:   airway patent; breath sounds equal; good air movement; respirations non-labored; clear to auscultation bilaterally; no chest wall tenderness; no intercostal retractions; no rales,rhonchi or wheeze  · CARDIOVASCULAR	regular rate and rhythm  no rub  no murmur  normal PMI  · EXTREMITIES: No cyanosis, clubbing or edema  · VASCULAR: 	Equal and normal pulses (carotid, femoral, dorsalis pedis)  	  TELEMETRY:    ECG:    TTE:    LABS:                        8.7    7.91  )-----------( 378      ( 01 Nov 2024 06:38 )             27.7     11-01    139  |  103  |  33[H]  ----------------------------<  165[H]  4.7   |  23  |  0.9    Ca    9.2      01 Nov 2024 06:38  Mg     2.1     11-01    TPro  5.7[L]  /  Alb  3.2[L]  /  TBili  0.3  /  DBili  x   /  AST  7   /  ALT  <5  /  AlkPhos  99  11-01            I&O's Summary    31 Oct 2024 07:01  -  01 Nov 2024 07:00  --------------------------------------------------------  IN: 331 mL / OUT: 300 mL / NET: 31 mL      BNP  RADIOLOGY & ADDITIONAL STUDIES:    IMPRESSION AND PLAN:

## 2024-11-01 NOTE — PROGRESS NOTE ADULT - ASSESSMENT
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 Per NH, patient also refused Pleurx drainage today and is requesting to drain it inpatient (gets drained 2-3 times per week).    #Recurrent anemia  #Hx JASMIN   #Hx AVM?  #Active duodenal bleeding on capsule endoscopy  - Hemodynamically stable & no active bleeding  - Baseline hemoglobin - 8-9   - Hemoglobin on admission - 6.3 >s/p 2units prbc (received 2 units at the end of Spetember as well)  - On plavix   - last admission received 5 days of Venofer   -Seen by GI last admission> spoke to grandson (HCP)  regarding endoscopic work up. After discussing risks vs benefits given his advance age and co morbidities he would like to hold off. Offered VCE as outpatient .   -Can reconsider work up if within goals of care , will need to speak to grandson/son  -Will also need heme onc outpt for possible venofer infusions outpt   - Maintain active Type and screen  - Trend H&H  - Continue home PPI 40mg BID PO  - Target Hgb >8   - Avoid NSAIDs.  - give Venofer as %sat, ferritin remain low  - recall GI for possible intervention if family agrees in view of recurrent anemia requiring multiple PRBCs  6/18 convinced pt to give labs. Hgb 6.9. Transfuse 1uRPBCs. Spoke to GI and asked to speak to family. Serial CBC. Keep Hgb >7.5  6/21 again spoke to GI and asked to speak re: possible intervention. Transfuse another unit as Hgb again trending down (total 3u PRBCs).  6/22 son declined EGD/C-scopy but ?requested capsule endoscopy. Doubt that pt will drink golytely or agree to NGT.  6/26 d/w GI. Active duodenal bleeding on capsule endoscopy. Son refused urgent EGD and wants to think over the weekend. Change Protonix to 40mg IV q12, clears, CBC BID, keep Hgb>8. Hold ASA  6/28 son agreed to EGD. D/w pulm and cardio pt optimized for EGD. 2 bleeding angioectasias noted oozing blood in duodenum s/p cauterization.  6/31 transfused 1u PRBCs. Repeat in am    #Malignant mesothelioma (diagnosed 8/2024 )  #Hx Asbestos lung   -s/p VATS pleurodesis, R sided PleurX  -pleurx drainage 2-3 times per week or PRN  -Fu heme onc outpatient. Plan was for possible Rx if pt's functional status improves.   -s/p drainage on 10/21 of 750cc  monitor for Sx  - s/p drainage on 10/28 375 cc    #HTN/HLD  #CAD s/p stents x2 in 2007 (follows Dr Avila)  -c/w home meds   -changed Plavix to ASA    #Small hypodensity in segment 7 of the liver seen on prior CT.  - RUQ US as OP  -?f/u outpt GI    #Acute Grief vs MDD  #Baseline dementia  - wife passed away a few months ago  - patient refusing medication sometimes and asks to be left alone, no suicide ideas, no ideas to hurt other people.    - Psychiatry eval  recommended outpt fu     #Nutrition/Fluids/Electrolytes   - replete K<4 and Mg <2  - ensure regular BMs  -  Miralax BID, Senna    #DVT Px  SCDs  Heparin SQ    High risk pt. Px is overall poor.  GOC: d/w son in details on 10/18. Based on oncology feedback that his cancer is treatable, son wants to cont full code.      Disposition: Nursing Home      My note supersedes the residents note should a discrepancy arise.    Chart and notes personally reviewed.  Care Discussed with Consultants/Other Providers/ Housestaff [ x] YES [ ] NO   Radiology, labs, old records personally reviewed.    discussed w/ housestaff, nursing, case management    Time-based billing (NON-critical care).     35 minutes spent on total encounter. The necessity of the time spent during the encounter on this date of service was due to:     time spent on review of labs, imaging studies, old records, obtaining history, personally examining patient, counselling and communicating with patient/ family, entering orders for medications/tests/etc, discussions with other health care providers, documentation in electronic health records, independent interpretation of labs, imaging/procedure results and care coordination.

## 2024-11-01 NOTE — PROGRESS NOTE ADULT - ASSESSMENT
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 Per NH, patient also refused Pleurx drainage today and is requesting to drain it inpatient (gets drained 2-3 times per week).    Recurrent anemia due duodenal bleed due AVM s/p EGD s/p multiple blood transfusions   Anemia secondary to bleed   Malignant mesothelioma (diagnosed 8/2024 )  Asbestos lung s/p VATS pleurodesis, R sided PleurX  HTN/HLD  CAD s/p stents x2 (2007)   Small hypodensity in segment 7 of the liver seen on prior CT.  Dementia         Continue current care as per medicine and GI   Ok to hold Plavix but continue ASA 81 EC   GI and Pulmonary follow up   DVT/GI prophylaxis   Maitain Hb > 8  Will F/U

## 2024-11-01 NOTE — PROGRESS NOTE ADULT - SUBJECTIVE AND OBJECTIVE BOX
Patient is a 84y old  Male who presents with a chief complaint of SOB (01 Nov 2024 17:59)       Pt is seen and examined this morning   pt is awake and lying in bed      ROS:  Negative except for:weakness    MEDICATIONS  (STANDING):  atorvastatin 10 milliGRAM(s) Oral at bedtime  chlorhexidine 2% Cloths 1 Application(s) Topical <User Schedule>  chlorhexidine 2% Cloths 1 Application(s) Topical daily  clopidogrel Tablet 75 milliGRAM(s) Oral daily  dextrose 5% + sodium chloride 0.45%. 1000 milliLiter(s) (75 mL/Hr) IV Continuous <Continuous>  dextrose 5%. 1000 milliLiter(s) (100 mL/Hr) IV Continuous <Continuous>  dextrose 5%. 1000 milliLiter(s) (50 mL/Hr) IV Continuous <Continuous>  dextrose 50% Injectable 25 Gram(s) IV Push once  dextrose 50% Injectable 12.5 Gram(s) IV Push once  dextrose 50% Injectable 25 Gram(s) IV Push once  ferrous    sulfate 325 milliGRAM(s) Oral daily  gabapentin 300 milliGRAM(s) Oral three times a day  glucagon  Injectable 1 milliGRAM(s) IntraMuscular once  heparin   Injectable 5000 Unit(s) SubCutaneous every 12 hours  insulin lispro (ADMELOG) corrective regimen sliding scale   SubCutaneous three times a day before meals  melatonin 3 milliGRAM(s) Oral at bedtime  pantoprazole  Injectable 40 milliGRAM(s) IV Push every 12 hours  polyethylene glycol 3350 17 Gram(s) Oral daily  senna 2 Tablet(s) Oral at bedtime  tamsulosin 0.4 milliGRAM(s) Oral at bedtime    MEDICATIONS  (PRN):  acetaminophen     Tablet .. 650 milliGRAM(s) Oral every 6 hours PRN Mild Pain (1 - 3)  dextrose Oral Gel 15 Gram(s) Oral once PRN Blood Glucose LESS THAN 70 milliGRAM(s)/deciliter  traMADol 50 milliGRAM(s) Oral every 6 hours PRN Severe Pain (7 - 10)      Allergies    penicillin (Unknown)    Intolerances        Vital Signs Last 24 Hrs  T(C): 36.8 (01 Nov 2024 20:26), Max: 36.8 (31 Oct 2024 20:50)  T(F): 98.2 (01 Nov 2024 20:26), Max: 98.2 (31 Oct 2024 20:50)  HR: 101 (01 Nov 2024 20:26) (75 - 101)  BP: 134/70 (01 Nov 2024 20:26) (112/53 - 134/70)  BP(mean): 91 (01 Nov 2024 20:26) (86 - 91)  RR: 18 (01 Nov 2024 20:26) (18 - 18)  SpO2: 96% (01 Nov 2024 20:26) (96% - 98%)    Parameters below as of 01 Nov 2024 20:26  Patient On (Oxygen Delivery Method): room air        PHYSICAL EXAM  General: cachectic  HEENT: clear oropharynx, anicteric sclera, pink conjunctiva  Neck: supple  CV: normal S1/S2 with no murmur rubs or gallops  Lungs: positive air movement b/l ant lungs,clear to auscultation, no wheezes, no rales  Abdomen: soft non-tender non-distended, no hepatosplenomegaly  Ext: no clubbing cyanosis or edema  Skin: no rashes and no petechiae  Neuro: alert and oriented X 2, no focal deficits  LABS:                          8.7    7.91  )-----------( 378      ( 01 Nov 2024 06:38 )             27.7         Mean Cell Volume : 96.2 fL  Mean Cell Hemoglobin : 30.2 pg  Mean Cell Hemoglobin Concentration : 31.4 g/dL  Auto Neutrophil # : 6.10 K/uL  Auto Lymphocyte # : 0.90 K/uL  Auto Monocyte # : 0.65 K/uL  Auto Eosinophil # : 0.20 K/uL  Auto Basophil # : 0.02 K/uL  Auto Neutrophil % : 77.1 %  Auto Lymphocyte % : 11.4 %  Auto Monocyte % : 8.2 %  Auto Eosinophil % : 2.5 %  Auto Basophil % : 0.3 %    Serial CBC's  11-01 @ 06:38  Hct-27.7 / Hgb-8.7 / Plat-378 / RBC-2.88 / WBC-7.91          Serial CBC's  10-31 @ 11:36  Hct-25.4 / Hgb-7.6 / Plat-408 / RBC-2.60 / WBC-6.64          Serial CBC's  10-29 @ 06:26  Hct-28.8 / Hgb-8.7 / Plat-378 / RBC-2.96 / WBC-5.94            11-01    139  |  103  |  33[H]  ----------------------------<  165[H]  4.7   |  23  |  0.9    Ca    9.2      01 Nov 2024 06:38  Mg     2.1     11-01    TPro  5.7[L]  /  Alb  3.2[L]  /  TBili  0.3  /  DBili  x   /  AST  7   /  ALT  <5  /  AlkPhos  99  11-01          WBC Count: 7.91 K/uL (11-01-24 @ 06:38)  Hemoglobin: 8.7 g/dL (11-01-24 @ 06:38)  Hematocrit: 27.7 % (11-01-24 @ 06:38)  Platelet Count - Automated: 378 K/uL (11-01-24 @ 06:38)  Platelet Count - Automated: 408 K/uL (10-31-24 @ 11:36)  WBC Count: 6.64 K/uL (10-31-24 @ 11:36)  Hemoglobin: 7.6 g/dL (10-31-24 @ 11:36)  Hematocrit: 25.4 % (10-31-24 @ 11:36)  WBC Count: 5.94 K/uL (10-29-24 @ 06:26)  Hemoglobin: 8.7 g/dL (10-29-24 @ 06:26)  Hematocrit: 28.8 % (10-29-24 @ 06:26)  Platelet Count - Automated: 378 K/uL (10-29-24 @ 06:26)  WBC Count: 6.36 K/uL (10-27-24 @ 22:48)  Hemoglobin: 8.0 g/dL (10-27-24 @ 22:48)  Hematocrit: 26.4 % (10-27-24 @ 22:48)  Platelet Count - Automated: 436 K/uL (10-27-24 @ 22:48)  WBC Count: 6.87 K/uL (10-27-24 @ 06:46)  Hemoglobin: 8.6 g/dL (10-27-24 @ 06:46)  Hematocrit: 28.2 % (10-27-24 @ 06:46)  Platelet Count - Automated: 398 K/uL (10-27-24 @ 06:46)  WBC Count: 5.74 K/uL (10-26-24 @ 17:00)  Hemoglobin: 8.6 g/dL (10-26-24 @ 17:00)  Hematocrit: 28.1 % (10-26-24 @ 17:00)  Platelet Count - Automated: 427 K/uL (10-26-24 @ 17:00)  WBC Count: 6.96 K/uL (10-26-24 @ 07:04)  Hemoglobin: 8.0 g/dL (10-26-24 @ 07:04)  Hematocrit: 25.8 % (10-26-24 @ 07:04)  Platelet Count - Automated: 415 K/uL (10-26-24 @ 07:04)  WBC Count: 8.18 K/uL (10-25-24 @ 20:47)  Hemoglobin: 8.0 g/dL (10-25-24 @ 20:47)  Hematocrit: 25.7 % (10-25-24 @ 20:47)  Platelet Count - Automated: 415 K/uL (10-25-24 @ 20:47)  WBC Count: 7.31 K/uL (10-25-24 @ 06:33)  Hemoglobin: 8.7 g/dL (10-25-24 @ 06:33)  Hematocrit: 27.8 % (10-25-24 @ 06:33)  Platelet Count - Automated: 425 K/uL (10-25-24 @ 06:33)  WBC Count: 7.13 K/uL (10-24-24 @ 06:45)  Hemoglobin: 8.6 g/dL (10-24-24 @ 06:45)  Hematocrit: 27.5 % (10-24-24 @ 06:45)  Platelet Count - Automated: 428 K/uL (10-24-24 @ 06:45)  WBC Count: 7.80 K/uL (10-23-24 @ 05:46)  Hemoglobin: 8.0 g/dL (10-23-24 @ 05:46)  Hematocrit: 26.2 % (10-23-24 @ 05:46)  Platelet Count - Automated: 403 K/uL (10-23-24 @ 05:46)  WBC Count: 6.57 K/uL (10-22-24 @ 22:23)  Hemoglobin: 7.7 g/dL (10-22-24 @ 22:23)  Hematocrit: 24.9 % (10-22-24 @ 22:23)  Platelet Count - Automated: 381 K/uL (10-22-24 @ 22:23)                BLOOD SMEAR INTERPRETATION:       RADIOLOGY & ADDITIONAL STUDIES:

## 2024-11-01 NOTE — PROGRESS NOTE ADULT - SUBJECTIVE AND OBJECTIVE BOX
Patient is a 84y old  Male who presents with a chief complaint of Anemia     (17 Oct 2024 12:14)    INTERVAL HPI/OVERNIGHT EVENTS: Patient was examined and seen at bedside. This morning pt is resting comfortably in bed and reports no complaints. Breathing is stable. Denies need for pleurx draiange.    ROS: Denies CP, SOB, AP, new weakness  All other systems reviewed and are within normal limits.  InitialHPI:  HPI: 83 y/o man  PMHx of HLD, DM, CAD s/p stents x2 in 2007, pt of Dr Avila, hx  asbestosis of lung, diagnosed w malignant mesothelioma (8/2024) and s/p VATS pleurodesis, R sided PleurX, hx AVM/GIB and JASMIN, sent in by NH for low hemoglobin. Per NH, patient also refused Pleurx drainage today and is requesting to drain it inpatient (gets drained 2-3 times per week). Of note, patient had a recent admission in September for anemia for which he received 2 units prbc, IV Venofer and was recommended EGD/[place] but given his advance age and co morbidities decided to hold off. Patient is a poor historian. Uses wheelchair at baseline. Denies fever chills, shortness of breath, cough, chest pain, leg swelling, hematemesis, hematochezia.     Vital Signs Last 24 Hrs  T(C): 36.4 (15 Oct 2024 01:09), Max: 36.9 (14 Oct 2024 22:51)  T(F): 97.5 (15 Oct 2024 01:09), Max: 98.4 (14 Oct 2024 22:51)  HR: 100 (15 Oct 2024 01:09) (89 - 105)  BP: 132/74 (15 Oct 2024 01:09) (104/64 - 132/74)  RR: 18 (15 Oct 2024 01:09) (17 - 19)  SpO2: 97% (15 Oct 2024 01:09) (93% - 97%)    Parameters below as of 15 Oct 2024 01:09  Patient On (Oxygen Delivery Method): room air    Labs: Hgb 6.4 , Platelets 500 , Cr 1.2 (baseline)   CXR: Worsening R pleural effusion   EKG: NSR with PACs    Admitted for anemia      (15 Oct 2024 01:16)    PAST MEDICAL & SURGICAL HISTORY:  DM (diabetes mellitus)      MI (myocardial infarction)      History of CAD (coronary artery disease)      Dyslipidemia      Currently smokes tobacco      Mesothelioma, malignant      Coronary stent patent          General: NAD, AAOx2, chronically ill appearing, b/l temp waisting, cachectic  HEENT:  no LAD  CV: S1 S2  Resp: decreased breath sounds at bases  GI: NT/ND/S +BS  MS: no clubbing/cyanosis/edema, + pulses b/l. TTP Rt lower back  Neuro: nonfocal, +reflexes thruout  +laws w/ clear yellow urine  +Rt sided ant pleurx cath w/ d/c/i dsg          Home Medications:  ferrous sulfate 325 mg (65 mg elemental iron) oral tablet: 1 tab(s) orally once a day (25 Sep 2024 15:37)  gabapentin 300 mg oral tablet: 1 tab(s) orally 3 times a day (20 Sep 2024 01:37)  Lovenox 30 mg/0.3 mL injectable solution: 30 milligram(s) subcutaneously once a day (01 Nov 2024 12:53)  melatonin 3 mg oral tablet: 1 tab(s) orally once a day (at bedtime) (13 Aug 2024 09:23)  metFORMIN 500 mg oral tablet: 1 tab(s) orally 2 times a day (21 Jul 2024 18:04)  pantoprazole 40 mg oral delayed release tablet: 1 tab(s) orally every 12 hours (25 Sep 2024 15:37)  polyethylene glycol 3350 oral powder for reconstitution: 17 gram(s) orally once a day (13 Aug 2024 09:23)  pravastatin 20 mg oral tablet: 1 tab(s) orally (21 Jul 2024 18:04)  senna leaf extract oral tablet: 2 tab(s) orally once a day (at bedtime) (13 Aug 2024 09:23)  tamsulosin 0.4 mg oral capsule: 1 cap(s) orally once a day (at bedtime) (13 Aug 2024 09:23)    MEDICATIONS  (STANDING):  atorvastatin 10 milliGRAM(s) Oral at bedtime  chlorhexidine 2% Cloths 1 Application(s) Topical <User Schedule>  chlorhexidine 2% Cloths 1 Application(s) Topical daily  clopidogrel Tablet 75 milliGRAM(s) Oral daily  dextrose 5% + sodium chloride 0.45%. 1000 milliLiter(s) (75 mL/Hr) IV Continuous <Continuous>  dextrose 5%. 1000 milliLiter(s) (50 mL/Hr) IV Continuous <Continuous>  dextrose 5%. 1000 milliLiter(s) (100 mL/Hr) IV Continuous <Continuous>  dextrose 50% Injectable 12.5 Gram(s) IV Push once  dextrose 50% Injectable 25 Gram(s) IV Push once  dextrose 50% Injectable 25 Gram(s) IV Push once  ferrous    sulfate 325 milliGRAM(s) Oral daily  gabapentin 300 milliGRAM(s) Oral three times a day  glucagon  Injectable 1 milliGRAM(s) IntraMuscular once  heparin   Injectable 5000 Unit(s) SubCutaneous every 12 hours  insulin lispro (ADMELOG) corrective regimen sliding scale   SubCutaneous three times a day before meals  melatonin 3 milliGRAM(s) Oral at bedtime  pantoprazole  Injectable 40 milliGRAM(s) IV Push every 12 hours  polyethylene glycol 3350 17 Gram(s) Oral daily  senna 2 Tablet(s) Oral at bedtime  tamsulosin 0.4 milliGRAM(s) Oral at bedtime    MEDICATIONS  (PRN):  acetaminophen     Tablet .. 650 milliGRAM(s) Oral every 6 hours PRN Mild Pain (1 - 3)  dextrose Oral Gel 15 Gram(s) Oral once PRN Blood Glucose LESS THAN 70 milliGRAM(s)/deciliter  traMADol 50 milliGRAM(s) Oral every 6 hours PRN Severe Pain (7 - 10)    Vital Signs Last 24 Hrs  T(C): 36.4 (01 Nov 2024 13:17), Max: 36.9 (31 Oct 2024 18:45)  T(F): 97.5 (01 Nov 2024 13:17), Max: 98.4 (31 Oct 2024 18:45)  HR: 82 (01 Nov 2024 13:17) (75 - 99)  BP: 125/64 (01 Nov 2024 13:17) (110/60 - 125/64)  BP(mean): 86 (01 Nov 2024 05:10) (86 - 86)  RR: 18 (01 Nov 2024 13:17) (18 - 18)  SpO2: 97% (01 Nov 2024 05:10) (97% - 99%)    Parameters below as of 01 Nov 2024 05:10  Patient On (Oxygen Delivery Method): room air      CAPILLARY BLOOD GLUCOSE      POCT Blood Glucose.: 250 mg/dL (01 Nov 2024 12:54)  POCT Blood Glucose.: 150 mg/dL (01 Nov 2024 08:36)  POCT Blood Glucose.: 274 mg/dL (31 Oct 2024 22:45)    LABS:                        8.7    7.91  )-----------( 378      ( 01 Nov 2024 06:38 )             27.7     11-01    139  |  103  |  33[H]  ----------------------------<  165[H]  4.7   |  23  |  0.9    Ca    9.2      01 Nov 2024 06:38  Mg     2.1     11-01    TPro  5.7[L]  /  Alb  3.2[L]  /  TBili  0.3  /  DBili  x   /  AST  7   /  ALT  <5  /  AlkPhos  99  11-01    LIVER FUNCTIONS - ( 01 Nov 2024 06:38 )  Alb: 3.2 g/dL / Pro: 5.7 g/dL / ALK PHOS: 99 U/L / ALT: <5 U/L / AST: 7 U/L / GGT: x                 Urinalysis Basic - ( 01 Nov 2024 06:38 )    Color: x / Appearance: x / SG: x / pH: x  Gluc: 165 mg/dL / Ketone: x  / Bili: x / Urobili: x   Blood: x / Protein: x / Nitrite: x   Leuk Esterase: x / RBC: x / WBC x   Sq Epi: x / Non Sq Epi: x / Bacteria: x              Consultant Notes Reviewed:  [x ] YES  [ ] NO  Care Discussed with Consultants/Other Providers/ Housestaff [ x] YES  [ ] NO  Radiology, labs, EKGs, new studies personally reviewed.

## 2024-11-02 LAB
ALBUMIN SERPL ELPH-MCNC: 3.1 G/DL — LOW (ref 3.5–5.2)
ALP SERPL-CCNC: 98 U/L — SIGNIFICANT CHANGE UP (ref 30–115)
ALT FLD-CCNC: <5 U/L — SIGNIFICANT CHANGE UP (ref 0–41)
ANION GAP SERPL CALC-SCNC: 10 MMOL/L — SIGNIFICANT CHANGE UP (ref 7–14)
AST SERPL-CCNC: 8 U/L — SIGNIFICANT CHANGE UP (ref 0–41)
BASOPHILS # BLD AUTO: 0.01 K/UL — SIGNIFICANT CHANGE UP (ref 0–0.2)
BASOPHILS # BLD AUTO: 0.01 K/UL — SIGNIFICANT CHANGE UP (ref 0–0.2)
BASOPHILS # BLD AUTO: 0.02 K/UL — SIGNIFICANT CHANGE UP (ref 0–0.2)
BASOPHILS NFR BLD AUTO: 0.1 % — SIGNIFICANT CHANGE UP (ref 0–1)
BASOPHILS NFR BLD AUTO: 0.1 % — SIGNIFICANT CHANGE UP (ref 0–1)
BASOPHILS NFR BLD AUTO: 0.2 % — SIGNIFICANT CHANGE UP (ref 0–1)
BILIRUB SERPL-MCNC: 0.2 MG/DL — SIGNIFICANT CHANGE UP (ref 0.2–1.2)
BUN SERPL-MCNC: 36 MG/DL — HIGH (ref 10–20)
CALCIUM SERPL-MCNC: 8.9 MG/DL — SIGNIFICANT CHANGE UP (ref 8.4–10.4)
CHLORIDE SERPL-SCNC: 105 MMOL/L — SIGNIFICANT CHANGE UP (ref 98–110)
CO2 SERPL-SCNC: 25 MMOL/L — SIGNIFICANT CHANGE UP (ref 17–32)
CREAT SERPL-MCNC: 1 MG/DL — SIGNIFICANT CHANGE UP (ref 0.7–1.5)
EGFR: 75 ML/MIN/1.73M2 — SIGNIFICANT CHANGE UP
EOSINOPHIL # BLD AUTO: 0.23 K/UL — SIGNIFICANT CHANGE UP (ref 0–0.7)
EOSINOPHIL # BLD AUTO: 0.24 K/UL — SIGNIFICANT CHANGE UP (ref 0–0.7)
EOSINOPHIL # BLD AUTO: 0.27 K/UL — SIGNIFICANT CHANGE UP (ref 0–0.7)
EOSINOPHIL NFR BLD AUTO: 2.5 % — SIGNIFICANT CHANGE UP (ref 0–8)
EOSINOPHIL NFR BLD AUTO: 2.6 % — SIGNIFICANT CHANGE UP (ref 0–8)
EOSINOPHIL NFR BLD AUTO: 3.2 % — SIGNIFICANT CHANGE UP (ref 0–8)
GLUCOSE BLDC GLUCOMTR-MCNC: 188 MG/DL — HIGH (ref 70–99)
GLUCOSE BLDC GLUCOMTR-MCNC: 419 MG/DL — HIGH (ref 70–99)
GLUCOSE SERPL-MCNC: 158 MG/DL — HIGH (ref 70–99)
HCT VFR BLD CALC: 25.1 % — LOW (ref 42–52)
HCT VFR BLD CALC: 26.1 % — LOW (ref 42–52)
HCT VFR BLD CALC: 26.1 % — LOW (ref 42–52)
HGB BLD-MCNC: 7.7 G/DL — LOW (ref 14–18)
HGB BLD-MCNC: 8 G/DL — LOW (ref 14–18)
HGB BLD-MCNC: 8 G/DL — LOW (ref 14–18)
IMM GRANULOCYTES NFR BLD AUTO: 0.5 % — HIGH (ref 0.1–0.3)
IMM GRANULOCYTES NFR BLD AUTO: 0.8 % — HIGH (ref 0.1–0.3)
IMM GRANULOCYTES NFR BLD AUTO: 0.8 % — HIGH (ref 0.1–0.3)
LYMPHOCYTES # BLD AUTO: 0.77 K/UL — LOW (ref 1.2–3.4)
LYMPHOCYTES # BLD AUTO: 0.86 K/UL — LOW (ref 1.2–3.4)
LYMPHOCYTES # BLD AUTO: 0.95 K/UL — LOW (ref 1.2–3.4)
LYMPHOCYTES # BLD AUTO: 11.4 % — LOW (ref 20.5–51.1)
LYMPHOCYTES # BLD AUTO: 8.3 % — LOW (ref 20.5–51.1)
LYMPHOCYTES # BLD AUTO: 9.4 % — LOW (ref 20.5–51.1)
MAGNESIUM SERPL-MCNC: 2.2 MG/DL — SIGNIFICANT CHANGE UP (ref 1.8–2.4)
MCHC RBC-ENTMCNC: 29.4 PG — SIGNIFICANT CHANGE UP (ref 27–31)
MCHC RBC-ENTMCNC: 29.4 PG — SIGNIFICANT CHANGE UP (ref 27–31)
MCHC RBC-ENTMCNC: 29.9 PG — SIGNIFICANT CHANGE UP (ref 27–31)
MCHC RBC-ENTMCNC: 30.7 G/DL — LOW (ref 32–37)
MCV RBC AUTO: 95.8 FL — HIGH (ref 80–94)
MCV RBC AUTO: 96 FL — HIGH (ref 80–94)
MCV RBC AUTO: 97.4 FL — HIGH (ref 80–94)
MONOCYTES # BLD AUTO: 0.76 K/UL — HIGH (ref 0.1–0.6)
MONOCYTES # BLD AUTO: 0.76 K/UL — HIGH (ref 0.1–0.6)
MONOCYTES # BLD AUTO: 0.8 K/UL — HIGH (ref 0.1–0.6)
MONOCYTES NFR BLD AUTO: 8.2 % — SIGNIFICANT CHANGE UP (ref 1.7–9.3)
MONOCYTES NFR BLD AUTO: 8.3 % — SIGNIFICANT CHANGE UP (ref 1.7–9.3)
MONOCYTES NFR BLD AUTO: 9.6 % — HIGH (ref 1.7–9.3)
NEUTROPHILS # BLD AUTO: 6.2 K/UL — SIGNIFICANT CHANGE UP (ref 1.4–6.5)
NEUTROPHILS # BLD AUTO: 7.2 K/UL — HIGH (ref 1.4–6.5)
NEUTROPHILS # BLD AUTO: 7.47 K/UL — HIGH (ref 1.4–6.5)
NEUTROPHILS NFR BLD AUTO: 74.8 % — SIGNIFICANT CHANGE UP (ref 42.2–75.2)
NEUTROPHILS NFR BLD AUTO: 78.9 % — HIGH (ref 42.2–75.2)
NEUTROPHILS NFR BLD AUTO: 80.3 % — HIGH (ref 42.2–75.2)
NRBC # BLD: 0 /100 WBCS — SIGNIFICANT CHANGE UP (ref 0–0)
PLATELET # BLD AUTO: 376 K/UL — SIGNIFICANT CHANGE UP (ref 130–400)
PLATELET # BLD AUTO: 384 K/UL — SIGNIFICANT CHANGE UP (ref 130–400)
PLATELET # BLD AUTO: 385 K/UL — SIGNIFICANT CHANGE UP (ref 130–400)
PMV BLD: 9.6 FL — SIGNIFICANT CHANGE UP (ref 7.4–10.4)
PMV BLD: 9.6 FL — SIGNIFICANT CHANGE UP (ref 7.4–10.4)
PMV BLD: 9.8 FL — SIGNIFICANT CHANGE UP (ref 7.4–10.4)
POTASSIUM SERPL-MCNC: 4.5 MMOL/L — SIGNIFICANT CHANGE UP (ref 3.5–5)
POTASSIUM SERPL-SCNC: 4.5 MMOL/L — SIGNIFICANT CHANGE UP (ref 3.5–5)
PROT SERPL-MCNC: 5.7 G/DL — LOW (ref 6–8)
RBC # BLD: 2.62 M/UL — LOW (ref 4.7–6.1)
RBC # BLD: 2.68 M/UL — LOW (ref 4.7–6.1)
RBC # BLD: 2.72 M/UL — LOW (ref 4.7–6.1)
RBC # FLD: 17.1 % — HIGH (ref 11.5–14.5)
RBC # FLD: 17.2 % — HIGH (ref 11.5–14.5)
RBC # FLD: 17.3 % — HIGH (ref 11.5–14.5)
SODIUM SERPL-SCNC: 140 MMOL/L — SIGNIFICANT CHANGE UP (ref 135–146)
WBC # BLD: 8.31 K/UL — SIGNIFICANT CHANGE UP (ref 4.8–10.8)
WBC # BLD: 9.13 K/UL — SIGNIFICANT CHANGE UP (ref 4.8–10.8)
WBC # BLD: 9.3 K/UL — SIGNIFICANT CHANGE UP (ref 4.8–10.8)
WBC # FLD AUTO: 8.31 K/UL — SIGNIFICANT CHANGE UP (ref 4.8–10.8)
WBC # FLD AUTO: 9.13 K/UL — SIGNIFICANT CHANGE UP (ref 4.8–10.8)
WBC # FLD AUTO: 9.3 K/UL — SIGNIFICANT CHANGE UP (ref 4.8–10.8)

## 2024-11-02 PROCEDURE — 99233 SBSQ HOSP IP/OBS HIGH 50: CPT

## 2024-11-02 RX ORDER — PANTOPRAZOLE SODIUM 40 MG/1
40 TABLET, DELAYED RELEASE ORAL EVERY 12 HOURS
Refills: 0 | Status: DISCONTINUED | OUTPATIENT
Start: 2024-11-02 | End: 2024-11-06

## 2024-11-02 RX ADMIN — Medication 325 MILLIGRAM(S): at 13:10

## 2024-11-02 RX ADMIN — Medication 1: at 08:01

## 2024-11-02 RX ADMIN — CHLORHEXIDINE GLUCONATE 1 APPLICATION(S): 40 SOLUTION TOPICAL at 05:11

## 2024-11-02 RX ADMIN — HEPARIN SODIUM 5000 UNIT(S): 10000 INJECTION INTRAVENOUS; SUBCUTANEOUS at 05:12

## 2024-11-02 RX ADMIN — Medication 3 MILLIGRAM(S): at 21:56

## 2024-11-02 RX ADMIN — Medication 0.4 MILLIGRAM(S): at 21:56

## 2024-11-02 RX ADMIN — GABAPENTIN 300 MILLIGRAM(S): 300 CAPSULE ORAL at 05:10

## 2024-11-02 RX ADMIN — GABAPENTIN 300 MILLIGRAM(S): 300 CAPSULE ORAL at 13:10

## 2024-11-02 RX ADMIN — CLOPIDOGREL 75 MILLIGRAM(S): 75 TABLET ORAL at 13:10

## 2024-11-02 RX ADMIN — PANTOPRAZOLE SODIUM 40 MILLIGRAM(S): 40 TABLET, DELAYED RELEASE ORAL at 18:53

## 2024-11-02 RX ADMIN — CHLORHEXIDINE GLUCONATE 1 APPLICATION(S): 40 SOLUTION TOPICAL at 13:10

## 2024-11-02 RX ADMIN — PANTOPRAZOLE SODIUM 40 MILLIGRAM(S): 40 TABLET, DELAYED RELEASE ORAL at 05:11

## 2024-11-02 RX ADMIN — Medication 10 MILLIGRAM(S): at 21:56

## 2024-11-02 RX ADMIN — Medication 2 TABLET(S): at 21:59

## 2024-11-02 RX ADMIN — GABAPENTIN 300 MILLIGRAM(S): 300 CAPSULE ORAL at 21:56

## 2024-11-02 NOTE — PROGRESS NOTE ADULT - SUBJECTIVE AND OBJECTIVE BOX
SUBJ:      MEDICATIONS  (STANDING):  atorvastatin 10 milliGRAM(s) Oral at bedtime  chlorhexidine 2% Cloths 1 Application(s) Topical <User Schedule>  chlorhexidine 2% Cloths 1 Application(s) Topical daily  clopidogrel Tablet 75 milliGRAM(s) Oral daily  dextrose 5%. 1000 milliLiter(s) (50 mL/Hr) IV Continuous <Continuous>  dextrose 5%. 1000 milliLiter(s) (100 mL/Hr) IV Continuous <Continuous>  dextrose 50% Injectable 12.5 Gram(s) IV Push once  dextrose 50% Injectable 25 Gram(s) IV Push once  dextrose 50% Injectable 25 Gram(s) IV Push once  ferrous    sulfate 325 milliGRAM(s) Oral daily  gabapentin 300 milliGRAM(s) Oral three times a day  glucagon  Injectable 1 milliGRAM(s) IntraMuscular once  heparin   Injectable 5000 Unit(s) SubCutaneous every 12 hours  insulin lispro (ADMELOG) corrective regimen sliding scale   SubCutaneous three times a day before meals  melatonin 3 milliGRAM(s) Oral at bedtime  pantoprazole    Tablet 40 milliGRAM(s) Oral every 12 hours  polyethylene glycol 3350 17 Gram(s) Oral daily  senna 2 Tablet(s) Oral at bedtime  tamsulosin 0.4 milliGRAM(s) Oral at bedtime    MEDICATIONS  (PRN):  dextrose Oral Gel 15 Gram(s) Oral once PRN Blood Glucose LESS THAN 70 milliGRAM(s)/deciliter  traMADol 50 milliGRAM(s) Oral every 6 hours PRN Severe Pain (7 - 10)            Vital Signs Last 24 Hrs  T(C): 36.3 (02 Nov 2024 20:43), Max: 36.7 (02 Nov 2024 13:10)  T(F): 97.3 (02 Nov 2024 20:43), Max: 98 (02 Nov 2024 13:10)  HR: 82 (02 Nov 2024 20:43) (74 - 82)  BP: 142/63 (02 Nov 2024 20:43) (126/70 - 142/63)  BP(mean): 84 (02 Nov 2024 20:43) (84 - 89)  RR: 18 (02 Nov 2024 20:43) (18 - 18)  SpO2: 98% (02 Nov 2024 20:43) (98% - 99%)    Parameters below as of 02 Nov 2024 20:43  Patient On (Oxygen Delivery Method): room air         REVIEW OF SYSTEMS:  CONSTITUTIONAL: No fever, weight loss, or fatigue  CARDIOLOGY: PAtient denies chest pain, shortness of breath or syncopal episodes.   RESPIRATORY: denies shortness of breath, wheezeing.   NEUROLOGICAL: NO weakness, no focal deficits to report.  GI: no BRBPR, no N,V,diarrhea.    PSYCHIATRY: normal mood and affect  HEENT: no nasal discharge, no ecchymosis  SKIN: no ecchymosis, no breakdown  MUSCULOSKELETAL: Full range of motion x4.        PHYSICAL EXAM:  · CONSTITUTIONAL:	Well-developed, well nourished    BMI-  ·RESPIRATORY:   airway patent; breath sounds equal; good air movement; respirations non-labored; clear to auscultation bilaterally; no chest wall tenderness; no intercostal retractions; no rales,rhonchi or wheeze  · CARDIOVASCULAR	regular rate and rhythm  no rub  no murmur  normal PMI  · EXTREMITIES: No cyanosis, clubbing or edema  · VASCULAR: 	Equal and normal pulses (carotid, femoral, dorsalis pedis)  	  TELEMETRY:    ECG:    TTE:    LABS:                        7.7    9.13  )-----------( 384      ( 02 Nov 2024 11:03 )             25.1     11-02    140  |  105  |  36[H]  ----------------------------<  158[H]  4.5   |  25  |  1.0    Ca    8.9      02 Nov 2024 06:39  Mg     2.2     11-02    TPro  5.7[L]  /  Alb  3.1[L]  /  TBili  0.2  /  DBili  x   /  AST  8   /  ALT  <5  /  AlkPhos  98  11-02            I&O's Summary    02 Nov 2024 08:01  -  02 Nov 2024 23:27  --------------------------------------------------------  IN: 0 mL / OUT: 1400 mL / NET: -1400 mL      BNP  RADIOLOGY & ADDITIONAL STUDIES:    IMPRESSION AND PLAN:         Denies any chest pain, SOB or palpitations   No orthopnea or PND   No acute events over last 24 hours       MEDICATIONS  (STANDING):  atorvastatin 10 milliGRAM(s) Oral at bedtime  chlorhexidine 2% Cloths 1 Application(s) Topical <User Schedule>  chlorhexidine 2% Cloths 1 Application(s) Topical daily  clopidogrel Tablet 75 milliGRAM(s) Oral daily  dextrose 5%. 1000 milliLiter(s) (50 mL/Hr) IV Continuous <Continuous>  dextrose 5%. 1000 milliLiter(s) (100 mL/Hr) IV Continuous <Continuous>  dextrose 50% Injectable 12.5 Gram(s) IV Push once  dextrose 50% Injectable 25 Gram(s) IV Push once  dextrose 50% Injectable 25 Gram(s) IV Push once  ferrous    sulfate 325 milliGRAM(s) Oral daily  gabapentin 300 milliGRAM(s) Oral three times a day  glucagon  Injectable 1 milliGRAM(s) IntraMuscular once  heparin   Injectable 5000 Unit(s) SubCutaneous every 12 hours  insulin lispro (ADMELOG) corrective regimen sliding scale   SubCutaneous three times a day before meals  melatonin 3 milliGRAM(s) Oral at bedtime  pantoprazole    Tablet 40 milliGRAM(s) Oral every 12 hours  polyethylene glycol 3350 17 Gram(s) Oral daily  senna 2 Tablet(s) Oral at bedtime  tamsulosin 0.4 milliGRAM(s) Oral at bedtime    MEDICATIONS  (PRN):  dextrose Oral Gel 15 Gram(s) Oral once PRN Blood Glucose LESS THAN 70 milliGRAM(s)/deciliter  traMADol 50 milliGRAM(s) Oral every 6 hours PRN Severe Pain (7 - 10)            Vital Signs Last 24 Hrs  T(C): 36.3 (02 Nov 2024 20:43), Max: 36.7 (02 Nov 2024 13:10)  T(F): 97.3 (02 Nov 2024 20:43), Max: 98 (02 Nov 2024 13:10)  HR: 82 (02 Nov 2024 20:43) (74 - 82)  BP: 142/63 (02 Nov 2024 20:43) (126/70 - 142/63)  BP(mean): 84 (02 Nov 2024 20:43) (84 - 89)  RR: 18 (02 Nov 2024 20:43) (18 - 18)  SpO2: 98% (02 Nov 2024 20:43) (98% - 99%)    Parameters below as of 02 Nov 2024 20:43  Patient On (Oxygen Delivery Method): room air         REVIEW OF SYSTEMS:  CONSTITUTIONAL: No fever, weight loss, or fatigue  CARDIOLOGY: Patient denies chest pain, shortness of breath or syncopal episodes   RESPIRATORY: denies shortness of breath, wheezeing    NEUROLOGICAL: NO weakness, no focal deficits to report   GI: no BRBPR, no N,V,diarrhea  PSYCHIATRY: normal mood and affect  HEENT: no nasal discharge, no ecchymosis  SKIN: no ecchymosis, no breakdown  MUSCULOSKELETAL: Full range of motion x 4       PHYSICAL EXAM:  · CONSTITUTIONAL:	Well-developed, well nourished      ·RESPIRATORY:   airway patent; breath sounds equal; good air movement; respirations non-labored; clear to auscultation bilaterally   · CARDIOVASCULAR	regular rate and rhythm  + SE murmur   · EXTREMITIES: No cyanosis, clubbing or edema  · VASCULAR: No JVD   	    LABS:                        7.7    9.13  )-----------( 384      ( 02 Nov 2024 11:03 )             25.1     11-02    140  |  105  |  36[H]  ----------------------------<  158[H]  4.5   |  25  |  1.0    Ca    8.9      02 Nov 2024 06:39  Mg     2.2     11-02    TPro  5.7[L]  /  Alb  3.1[L]  /  TBili  0.2  /  DBili  x   /  AST  8   /  ALT  <5  /  AlkPhos  98  11-02            I&O's Summary    02 Nov 2024 08:01  -  02 Nov 2024 23:27  --------------------------------------------------------  IN: 0 mL / OUT: 1400 mL / NET: -1400 mL      BNP  RADIOLOGY & ADDITIONAL STUDIES:    IMPRESSION AND PLAN:

## 2024-11-02 NOTE — PROGRESS NOTE ADULT - ASSESSMENT
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 Per NH, patient also refused Pleurx drainage today and is requesting to drain it inpatient (gets drained 2-3 times per week).    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 Plan was for possible Rx if pt's functional status improves.   -s/p drainage on 10/21 of 750cc  monitor for Sx  - s/p drainage on 10/28 375 cc    #HTN/HLD  #CAD s/p stents x2 in 2007 (follows Dr Avila)  -c/w home meds   - on plavix     #Small hypodensity in segment 7 of the liver seen on prior CT.  - RUQ US as OP  -?f/u outpt GI    #Acute Grief vs MDD  #Baseline dementia  - wife passed away a few months ago  - patient refusing medication sometimes and asks to be left alone, no suicide ideas, no ideas to hurt other people.    - Psychiatry eval  recommended outpt fu     #Nutrition/Fluids/Electrolytes   - replete K<4 and Mg <2  - ensure regular BMs  -  Miralax BID, Senna    #DVT Px  SCDs  Heparin SQ    High risk pt. Px is overall poor.  GOC: d/w son in details on 10/18. Based on oncology feedback that his cancer is treatable, son wants to cont full code.      Disposition: Nursing Home

## 2024-11-02 NOTE — PROGRESS NOTE ADULT - ASSESSMENT
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 Per NH, patient also refused Pleurx drainage today and is requesting to drain it inpatient (gets drained 2-3 times per week).    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  Dementia         Continue current care as per medicine and GI   Ok to hold Plavix but continue ASA 81 EC   GI and Pulmonary follow up   DVT/GI prophylaxis   Maitain Hb > 8  Will F/U

## 2024-11-02 NOTE — PROGRESS NOTE ADULT - SUBJECTIVE AND OBJECTIVE BOX
SUBJECTIVE:    Patient is a 84y old Male who presents with a chief complaint of SOB (01 Nov 2024 17:59)      Today is hospital day 19d.     Overnight Events:     melana overnight. hgb slowly downtrending     PAST MEDICAL & SURGICAL HISTORY  DM (diabetes mellitus)    MI (myocardial infarction)    History of CAD (coronary artery disease)    Dyslipidemia    Currently smokes tobacco    Mesothelioma, malignant    Coronary stent patent          ALLERGIES:  penicillin (Unknown)    MEDICATIONS:  STANDING MEDICATIONS  atorvastatin 10 milliGRAM(s) Oral at bedtime  chlorhexidine 2% Cloths 1 Application(s) Topical <User Schedule>  chlorhexidine 2% Cloths 1 Application(s) Topical daily  clopidogrel Tablet 75 milliGRAM(s) Oral daily  dextrose 5% + sodium chloride 0.45%. 1000 milliLiter(s) IV Continuous <Continuous>  dextrose 5%. 1000 milliLiter(s) IV Continuous <Continuous>  dextrose 5%. 1000 milliLiter(s) IV Continuous <Continuous>  dextrose 50% Injectable 12.5 Gram(s) IV Push once  dextrose 50% Injectable 25 Gram(s) IV Push once  dextrose 50% Injectable 25 Gram(s) IV Push once  ferrous    sulfate 325 milliGRAM(s) Oral daily  gabapentin 300 milliGRAM(s) Oral three times a day  glucagon  Injectable 1 milliGRAM(s) IntraMuscular once  heparin   Injectable 5000 Unit(s) SubCutaneous every 12 hours  insulin lispro (ADMELOG) corrective regimen sliding scale   SubCutaneous three times a day before meals  melatonin 3 milliGRAM(s) Oral at bedtime  pantoprazole    Tablet 40 milliGRAM(s) Oral every 12 hours  polyethylene glycol 3350 17 Gram(s) Oral daily  senna 2 Tablet(s) Oral at bedtime  tamsulosin 0.4 milliGRAM(s) Oral at bedtime    PRN MEDICATIONS  acetaminophen     Tablet .. 650 milliGRAM(s) Oral every 6 hours PRN  dextrose Oral Gel 15 Gram(s) Oral once PRN  traMADol 50 milliGRAM(s) Oral every 6 hours PRN    VITALS:   ICU Vital Signs Last 24 Hrs  T(C): 36.7 (02 Nov 2024 13:10), Max: 36.8 (01 Nov 2024 20:26)  T(F): 98 (02 Nov 2024 13:10), Max: 98.2 (01 Nov 2024 20:26)  HR: 74 (02 Nov 2024 13:10) (74 - 101)  BP: 130/67 (02 Nov 2024 13:10) (126/70 - 134/70)  BP(mean): 88 (02 Nov 2024 13:10) (88 - 91)  RR: 18 (02 Nov 2024 13:10) (18 - 18)  SpO2: 99% (02 Nov 2024 13:10) (96% - 99%)    O2 Parameters below as of 02 Nov 2024 13:10  Patient On (Oxygen Delivery Method): room air            LABS:                        7.7    9.13  )-----------( 384      ( 02 Nov 2024 11:03 )             25.1     11-02    140  |  105  |  36[H]  ----------------------------<  158[H]  4.5   |  25  |  1.0    Ca    8.9      02 Nov 2024 06:39  Mg     2.2     11-02    TPro  5.7[L]  /  Alb  3.1[L]  /  TBili  0.2  /  DBili  x   /  AST  8   /  ALT  <5  /  AlkPhos  98  11-02      Urinalysis Basic - ( 02 Nov 2024 06:39 )    Color: x / Appearance: x / SG: x / pH: x  Gluc: 158 mg/dL / Ketone: x  / Bili: x / Urobili: x   Blood: x / Protein: x / Nitrite: x   Leuk Esterase: x / RBC: x / WBC x   Sq Epi: x / Non Sq Epi: x / Bacteria: x                  RADIOLOGY:      Lines:  Central line:              Date placed:             Indication:   Intravenous Access:   NG tube:   [place] Catheter:       Diagnositic orders     pantoprazole    Tablet (11-02-24 @ 09:29) [Active]

## 2024-11-02 NOTE — PROGRESS NOTE ADULT - ASSESSMENT
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 Per NH, patient also refused Pleurx drainage today and is requesting to drain it inpatient (gets drained 2-3 times per week).    #Acute on chronic anemia s/p multiple transfusions  - Seen by GI last admission: regarding endoscopic work up. After discussing risks vs benefits given his advance age and co morbidities he would like to hold off. Offered VCE as outpatient .   - last admission received 5 days of Venofer   - Active type and screen   - Switched plavix to aspirin for now  - c/w ferrous sulfate, IV venofovir for 5 days  - GI consult placed, Follow up, as per GI- Patient can follow  or  Follow up with our [facility] located at [address] [place] [address]  - Continue home PPI 40mg BID PO  - avoid Nsaids  - Hb 6.9> 1 unit PRBC (10/18)> repeat 7.8  - Hb 7.4 10/21 -> GI will reach out to family to discuss inpatient endoscopy -> pt son agreed for Video capsule endoscopy scheduled for tomorrow 10/23/2024  - Oncology onboard also recommended endoscopy to rule out GI bleed -> if GI workup is negative will offer bone marrow biopsy   - Patient refused prep yesterday follow up with GI, HB is stable   - VCE done on 10/24 ->Duodenal bleed, put on clear liquid diet, PPI BID IV   - 10/26: family agreed on inpatient EGD  - 10/28: s/p EGD significant for Angioectasia in the second part of the duodenum with bleeding on contact. ([facility]C Hemostasis). 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 ([facility] Hemostasis). PPI BID for 2 weeks and then q24h thereafter   - 10/30: DAPT resumed, Hgb stable  - 10/31: patient given 1prbc  - 11/02: f/u repeat CBC at 11 and possible DC if Hgb stable    #Malignant mesothelioma (diagnosed 8/2024 ) with recurrent pleural effusion . Hx Asbestos lung   #Severe protein calorie malnutrition (calorie count started - result 10/21)  -s/p VATS pleurodesis, R sided PleurX  - Per NH, patient also refused Pleurx drainage and is requesting to drain it inpatient (gets drained 2-3 times per week)  - Currently on room air (target spo2 92-96)  - Fu heme onc- Dr. Ramírez OP  - Fu palliative- full code  - PleurX drained on 10/21  - Aspiration precaution  - Pain control   - 10/28: pleurx drained 350cc    #HTN/HLD/CAD s/p stents x2 in 2007 (follows Dr Avila)  - c/w home meds     #Small hypodensity in segment 7 of the liver seen on prior CT.  - RUQ US as OP  - f/u outpt GI    #Acute Grief vs MDD  - wife passed away a few months ago  - patient refusing medication sometimes and asks to be left alone, no suicide ideas, no ideas to hurt other people.    - Psychiatry eval admission recommended outpt fu     Pendings: f/u repeat CBC at 11 and possible DC if Hgb stable

## 2024-11-02 NOTE — PROGRESS NOTE ADULT - SUBJECTIVE AND OBJECTIVE BOX
Patient is a 84y old  Male who presents with a chief complaint of SOB (01 Nov 2024 17:59)       Pt is seen and examined this morning   pt is awake and lying in bed  pt awake and oriented      ROS:  Negative except for:weakness    MEDICATIONS  (STANDING):  atorvastatin 10 milliGRAM(s) Oral at bedtime  chlorhexidine 2% Cloths 1 Application(s) Topical <User Schedule>  chlorhexidine 2% Cloths 1 Application(s) Topical daily  clopidogrel Tablet 75 milliGRAM(s) Oral daily  dextrose 5% + sodium chloride 0.45%. 1000 milliLiter(s) (75 mL/Hr) IV Continuous <Continuous>  dextrose 5%. 1000 milliLiter(s) (100 mL/Hr) IV Continuous <Continuous>  dextrose 5%. 1000 milliLiter(s) (50 mL/Hr) IV Continuous <Continuous>  dextrose 50% Injectable 25 Gram(s) IV Push once  dextrose 50% Injectable 12.5 Gram(s) IV Push once  dextrose 50% Injectable 25 Gram(s) IV Push once  ferrous    sulfate 325 milliGRAM(s) Oral daily  gabapentin 300 milliGRAM(s) Oral three times a day  glucagon  Injectable 1 milliGRAM(s) IntraMuscular once  heparin   Injectable 5000 Unit(s) SubCutaneous every 12 hours  insulin lispro (ADMELOG) corrective regimen sliding scale   SubCutaneous three times a day before meals  melatonin 3 milliGRAM(s) Oral at bedtime  pantoprazole  Injectable 40 milliGRAM(s) IV Push every 12 hours  polyethylene glycol 3350 17 Gram(s) Oral daily  senna 2 Tablet(s) Oral at bedtime  tamsulosin 0.4 milliGRAM(s) Oral at bedtime    MEDICATIONS  (PRN):  acetaminophen     Tablet .. 650 milliGRAM(s) Oral every 6 hours PRN Mild Pain (1 - 3)  dextrose Oral Gel 15 Gram(s) Oral once PRN Blood Glucose LESS THAN 70 milliGRAM(s)/deciliter  traMADol 50 milliGRAM(s) Oral every 6 hours PRN Severe Pain (7 - 10)      Allergies    penicillin (Unknown)    Intolerances        Vital Signs Last 24 Hrs  T(C): 36.8 (01 Nov 2024 20:26), Max: 36.8 (31 Oct 2024 20:50)  T(F): 98.2 (01 Nov 2024 20:26), Max: 98.2 (31 Oct 2024 20:50)  HR: 101 (01 Nov 2024 20:26) (75 - 101)  BP: 134/70 (01 Nov 2024 20:26) (112/53 - 134/70)  BP(mean): 91 (01 Nov 2024 20:26) (86 - 91)  RR: 18 (01 Nov 2024 20:26) (18 - 18)  SpO2: 96% (01 Nov 2024 20:26) (96% - 98%)    Parameters below as of 01 Nov 2024 20:26  Patient On (Oxygen Delivery Method): room air        PHYSICAL EXAM  General: cachectic  HEENT: clear oropharynx, anicteric sclera, pink conjunctiva  Neck: supple  CV: normal S1/S2 with no murmur rubs or gallops  Lungs: positive air movement b/l ant lungs,clear to auscultation, no wheezes, no rales  Abdomen: soft non-tender non-distended, no hepatosplenomegaly  Ext: no clubbing cyanosis or edema  Skin: no rashes and no petechiae  Neuro: alert and oriented X 2, no focal deficits  LABS:                          8.7    7.91  )-----------( 378      ( 01 Nov 2024 06:38 )             27.7       >> hb 7.7             Serial CBC's  10-31 @ 11:36  Hct-25.4 / Hgb-7.6 / Plat-408 / RBC-2.60 / WBC-6.64          Serial CBC's  10-29 @ 06:26  Hct-28.8 / Hgb-8.7 / Plat-378 / RBC-2.96 / WBC-5.94            11-01    139  |  103  |  33[H]  ----------------------------<  165[H]  4.7   |  23  |  0.9    Ca    9.2      01 Nov 2024 06:38  Mg     2.1     11-01    TPro  5.7[L]  /  Alb  3.2[L]  /  TBili  0.3  /  DBili  x   /  AST  7   /  ALT  <5  /  AlkPhos  99  11-01          WBC Count: 7.91 K/uL (11-01-24 @ 06:38)  Hemoglobin: 8.7 g/dL (11-01-24 @ 06:38)  Hematocrit: 27.7 % (11-01-24 @ 06:38)  Platelet Count - Automated: 378 K/uL (11-01-24 @ 06:38)  Platelet Count - Automated: 408 K/uL (10-31-24 @ 11:36)  WBC Count: 6.64 K/uL (10-31-24 @ 11:36)  Hemoglobin: 7.6 g/dL (10-31-24 @ 11:36)  Hematocrit: 25.4 % (10-31-24 @ 11:36)  WBC Count: 5.94 K/uL (10-29-24 @ 06:26)  Hemoglobin: 8.7 g/dL (10-29-24 @ 06:26)  Hematocrit: 28.8 % (10-29-24 @ 06:26)  Platelet Count - Automated: 378 K/uL (10-29-24 @ 06:26)  WBC Count: 6.36 K/uL (10-27-24 @ 22:48)  Hemoglobin: 8.0 g/dL (10-27-24 @ 22:48)  Hematocrit: 26.4 % (10-27-24 @ 22:48)  Platelet Count - Automated: 436 K/uL (10-27-24 @ 22:48)  WBC Count: 6.87 K/uL (10-27-24 @ 06:46)  Hemoglobin: 8.6 g/dL (10-27-24 @ 06:46)  Hematocrit: 28.2 % (10-27-24 @ 06:46)  Platelet Count - Automated: 398 K/uL (10-27-24 @ 06:46)  WBC Count: 5.74 K/uL (10-26-24 @ 17:00)  Hemoglobin: 8.6 g/dL (10-26-24 @ 17:00)  Hematocrit: 28.1 % (10-26-24 @ 17:00)  Platelet Count - Automated: 427 K/uL (10-26-24 @ 17:00)  WBC Count: 6.96 K/uL (10-26-24 @ 07:04)  Hemoglobin: 8.0 g/dL (10-26-24 @ 07:04)  Hematocrit: 25.8 % (10-26-24 @ 07:04)  Platelet Count - Automated: 415 K/uL (10-26-24 @ 07:04)  WBC Count: 8.18 K/uL (10-25-24 @ 20:47)  Hemoglobin: 8.0 g/dL (10-25-24 @ 20:47)  Hematocrit: 25.7 % (10-25-24 @ 20:47)  Platelet Count - Automated: 415 K/uL (10-25-24 @ 20:47)  WBC Count: 7.31 K/uL (10-25-24 @ 06:33)  Hemoglobin: 8.7 g/dL (10-25-24 @ 06:33)  Hematocrit: 27.8 % (10-25-24 @ 06:33)  Platelet Count - Automated: 425 K/uL (10-25-24 @ 06:33)  WBC Count: 7.13 K/uL (10-24-24 @ 06:45)  Hemoglobin: 8.6 g/dL (10-24-24 @ 06:45)  Hematocrit: 27.5 % (10-24-24 @ 06:45)  Platelet Count - Automated: 428 K/uL (10-24-24 @ 06:45)  WBC Count: 7.80 K/uL (10-23-24 @ 05:46)  Hemoglobin: 8.0 g/dL (10-23-24 @ 05:46)  Hematocrit: 26.2 % (10-23-24 @ 05:46)  Platelet Count - Automated: 403 K/uL (10-23-24 @ 05:46)  WBC Count: 6.57 K/uL (10-22-24 @ 22:23)  Hemoglobin: 7.7 g/dL (10-22-24 @ 22:23)  Hematocrit: 24.9 % (10-22-24 @ 22:23)  Platelet Count - Automated: 381 K/uL (10-22-24 @ 22:23)                BLOOD SMEAR INTERPRETATION:       RADIOLOGY & ADDITIONAL STUDIES:

## 2024-11-02 NOTE — PROGRESS NOTE ADULT - ASSESSMENT
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     #Acute on chronic anemia with hx of AVM and JASMIN  tfx to keep hb >7  s/p  venofer   seen by GI   active duodenal bleeding on VCE  s/p  egd  with hemostasis        #Malignant mesothelioma (diagnosed 8/2024 ) path c/w epitheliod   #Hx Asbestos lung   -s/p VATS pleurodesis, R sided PleurX,s/p drainage  .. plan for immuno chemo as outpt    d/c planning

## 2024-11-02 NOTE — PROGRESS NOTE ADULT - SUBJECTIVE AND OBJECTIVE BOX
SUBJECTIVE/OVERNIGHT EVENTS  Today is hospital day 18d. This morning patient was seen and examined at bedside, resting comfortably in bed. No acute or major events overnight.  Patient had an episode of melena overnight, will repeat CBC at 11 and DC today if patient stable.    MEDICATIONS  STANDING MEDICATIONS  aspirin  chewable 81 milliGRAM(s) Oral daily  atorvastatin 10 milliGRAM(s) Oral at bedtime  chlorhexidine 2% Cloths 1 Application(s) Topical <User Schedule>  chlorhexidine 2% Cloths 1 Application(s) Topical daily  dextrose 5% + sodium chloride 0.45%. 1000 milliLiter(s) IV Continuous <Continuous>  dextrose 5%. 1000 milliLiter(s) IV Continuous <Continuous>  dextrose 5%. 1000 milliLiter(s) IV Continuous <Continuous>  dextrose 50% Injectable 12.5 Gram(s) IV Push once  dextrose 50% Injectable 25 Gram(s) IV Push once  dextrose 50% Injectable 25 Gram(s) IV Push once  ferrous    sulfate 325 milliGRAM(s) Oral daily  gabapentin 300 milliGRAM(s) Oral three times a day  glucagon  Injectable 1 milliGRAM(s) IntraMuscular once  heparin   Injectable 5000 Unit(s) SubCutaneous every 12 hours  insulin lispro (ADMELOG) corrective regimen sliding scale   SubCutaneous three times a day before meals  melatonin 3 milliGRAM(s) Oral at bedtime  pantoprazole  Injectable 40 milliGRAM(s) IV Push every 12 hours  polyethylene glycol 3350 17 Gram(s) Oral daily  senna 2 Tablet(s) Oral at bedtime  tamsulosin 0.4 milliGRAM(s) Oral at bedtime    PRN MEDICATIONS  acetaminophen     Tablet .. 650 milliGRAM(s) Oral every 6 hours PRN  dextrose Oral Gel 15 Gram(s) Oral once PRN  traMADol 50 milliGRAM(s) Oral every 6 hours PRN    VITALS  T(F): 98.4 (10-30-24 @ 13:30), Max: 98.5 (10-30-24 @ 04:33)  HR: 80 (10-30-24 @ 13:30) (79 - 96)  BP: 110/86 (10-30-24 @ 13:30) (95/57 - 115/63)  RR: 16 (10-30-24 @ 13:30) (16 - 18)  SpO2: 100% (10-30-24 @ 13:30) (96% - 100%)  POCT Blood Glucose.: 221 mg/dL (10-30-24 @ 11:45)  POCT Blood Glucose.: 190 mg/dL (10-30-24 @ 09:20)  POCT Blood Glucose.: 202 mg/dL (10-29-24 @ 16:40)    PHYSICAL EXAM  GENERAL  ( x ) NAD, lying in bed comfortably     (  ) obtunded     (  ) lethargic     (  ) somnolent    HEAD  ( x ) Atraumatic     (  ) hematoma     (  ) laceration (specify location:       )     NECK  ( x ) Supple     (  ) neck stiffness     (  ) nuchal rigidity     (  )  no JVD     (  ) JVD present ( -- cm)    HEART  Rate -->  ( x ) normal rate    (  ) bradycardic    (  ) tachycardic  Rhythm -->  ( x ) regular    (  ) regularly irregular    (  ) irregularly irregular  Murmurs -->  ( x ) normal s1/s2    (  ) systolic murmur    (  ) diastolic murmur    (  ) continuous murmur     (  ) S3 present    (  ) S4 present    LUNGS  ( x ) Unlabored respirations     (  ) tachypnea  ( x ) B/L air entry     (  ) decreased breath sounds in:  (location     )    (  ) no adventitious sound     (  ) crackles     (  ) wheezing      (  ) rhonchi      (specify location:       )  (  ) chest wall tenderness (specify location:       )    ABDOMEN  ( x ) Soft     (  ) tense   |   (  ) nondistended     (  ) distended   |   (  ) +BS     (  ) hypoactive bowel sounds     (  ) hyperactive bowel sounds  ( x ) nontender     (  ) RUQ tenderness     (  ) RLQ tenderness     (  ) LLQ tenderness     (  ) epigastric tenderness     (  ) diffuse tenderness  (  ) Splenomegaly      (  ) Hepatomegaly      (  ) Jaundice     (  ) ecchymosis     EXTREMITIES  ( x ) Normal     (  ) Rash     (  ) ecchymosis     (  ) varicose veins      (  ) pitting edema     (  ) non-pitting edema   (  ) ulceration     (  ) gangrene:     (location:     )    NERVOUS SYSTEM  ( x ) A&Ox3     (  ) confused     (  ) lethargic  CN II-XII:     (  ) Intact     (  ) focal deficits  (Specify:     )   Upper extremities:     (  ) strength X/5     (  ) focal deficit (specify:    )  Lower extremities:     (  ) strength  X/5    (  ) focal deficit (specify:    )      LABS             8.7    5.94  )-----------( 378      ( 10-29-24 @ 06:26 )             28.8     137  |  99  |  24  -------------------------<  111   10-29-24 @ 06:26  5.4  |  22  |  1.1    Ca      9.8     10-29-24 @ 06:26  Mg     2.1     10-29-24 @ 06:26    TPro  6.5  /  Alb  3.4  /  TBili  0.2  /  DBili  x   /  AST  9   /  ALT  <5  /  AlkPhos  120  /  GGT  x     10-29-24 @ 06:26        Urinalysis Basic - ( 29 Oct 2024 06:26 )    Color: x / Appearance: x / SG: x / pH: x  Gluc: 111 mg/dL / Ketone: x  / Bili: x / Urobili: x   Blood: x / Protein: x / Nitrite: x   Leuk Esterase: x / RBC: x / WBC x   Sq Epi: x / Non Sq Epi: x / Bacteria: x          IMAGING

## 2024-11-03 LAB
ALBUMIN SERPL ELPH-MCNC: 3.1 G/DL — LOW (ref 3.5–5.2)
ALP SERPL-CCNC: 103 U/L — SIGNIFICANT CHANGE UP (ref 30–115)
ALT FLD-CCNC: 6 U/L — SIGNIFICANT CHANGE UP (ref 0–41)
ANION GAP SERPL CALC-SCNC: 10 MMOL/L — SIGNIFICANT CHANGE UP (ref 7–14)
AST SERPL-CCNC: 7 U/L — SIGNIFICANT CHANGE UP (ref 0–41)
BASOPHILS # BLD AUTO: 0.02 K/UL — SIGNIFICANT CHANGE UP (ref 0–0.2)
BASOPHILS NFR BLD AUTO: 0.2 % — SIGNIFICANT CHANGE UP (ref 0–1)
BILIRUB SERPL-MCNC: <0.2 MG/DL — SIGNIFICANT CHANGE UP (ref 0.2–1.2)
BUN SERPL-MCNC: 35 MG/DL — HIGH (ref 10–20)
CALCIUM SERPL-MCNC: 9.2 MG/DL — SIGNIFICANT CHANGE UP (ref 8.4–10.5)
CHLORIDE SERPL-SCNC: 103 MMOL/L — SIGNIFICANT CHANGE UP (ref 98–110)
CO2 SERPL-SCNC: 26 MMOL/L — SIGNIFICANT CHANGE UP (ref 17–32)
CREAT SERPL-MCNC: 0.9 MG/DL — SIGNIFICANT CHANGE UP (ref 0.7–1.5)
EGFR: 85 ML/MIN/1.73M2 — SIGNIFICANT CHANGE UP
EOSINOPHIL # BLD AUTO: 0.23 K/UL — SIGNIFICANT CHANGE UP (ref 0–0.7)
EOSINOPHIL NFR BLD AUTO: 2.9 % — SIGNIFICANT CHANGE UP (ref 0–8)
GLUCOSE BLDC GLUCOMTR-MCNC: 188 MG/DL — HIGH (ref 70–99)
GLUCOSE SERPL-MCNC: 208 MG/DL — HIGH (ref 70–99)
HCT VFR BLD CALC: 24.2 % — LOW (ref 42–52)
HGB BLD-MCNC: 7.5 G/DL — LOW (ref 14–18)
IMM GRANULOCYTES NFR BLD AUTO: 0.6 % — HIGH (ref 0.1–0.3)
LYMPHOCYTES # BLD AUTO: 0.86 K/UL — LOW (ref 1.2–3.4)
LYMPHOCYTES # BLD AUTO: 10.7 % — LOW (ref 20.5–51.1)
MAGNESIUM SERPL-MCNC: 2.1 MG/DL — SIGNIFICANT CHANGE UP (ref 1.8–2.4)
MCHC RBC-ENTMCNC: 29.9 PG — SIGNIFICANT CHANGE UP (ref 27–31)
MCHC RBC-ENTMCNC: 31 G/DL — LOW (ref 32–37)
MCV RBC AUTO: 96.4 FL — HIGH (ref 80–94)
MONOCYTES # BLD AUTO: 0.6 K/UL — SIGNIFICANT CHANGE UP (ref 0.1–0.6)
MONOCYTES NFR BLD AUTO: 7.5 % — SIGNIFICANT CHANGE UP (ref 1.7–9.3)
NEUTROPHILS # BLD AUTO: 6.29 K/UL — SIGNIFICANT CHANGE UP (ref 1.4–6.5)
NEUTROPHILS NFR BLD AUTO: 78.1 % — HIGH (ref 42.2–75.2)
NRBC # BLD: 0 /100 WBCS — SIGNIFICANT CHANGE UP (ref 0–0)
PLATELET # BLD AUTO: 386 K/UL — SIGNIFICANT CHANGE UP (ref 130–400)
PMV BLD: 9.6 FL — SIGNIFICANT CHANGE UP (ref 7.4–10.4)
POTASSIUM SERPL-MCNC: 4.5 MMOL/L — SIGNIFICANT CHANGE UP (ref 3.5–5)
POTASSIUM SERPL-SCNC: 4.5 MMOL/L — SIGNIFICANT CHANGE UP (ref 3.5–5)
PROT SERPL-MCNC: 5.7 G/DL — LOW (ref 6–8)
RBC # BLD: 2.51 M/UL — LOW (ref 4.7–6.1)
RBC # FLD: 17.2 % — HIGH (ref 11.5–14.5)
SODIUM SERPL-SCNC: 139 MMOL/L — SIGNIFICANT CHANGE UP (ref 135–146)
WBC # BLD: 8.05 K/UL — SIGNIFICANT CHANGE UP (ref 4.8–10.8)
WBC # FLD AUTO: 8.05 K/UL — SIGNIFICANT CHANGE UP (ref 4.8–10.8)

## 2024-11-03 PROCEDURE — 99233 SBSQ HOSP IP/OBS HIGH 50: CPT

## 2024-11-03 RX ORDER — IRON SUCROSE 20 MG/ML
100 INJECTION, SOLUTION INTRAVENOUS EVERY 24 HOURS
Refills: 0 | Status: DISCONTINUED | OUTPATIENT
Start: 2024-11-03 | End: 2024-11-04

## 2024-11-03 RX ORDER — IRON SUCROSE 20 MG/ML
100 INJECTION, SOLUTION INTRAVENOUS EVERY 24 HOURS
Refills: 0 | Status: DISCONTINUED | OUTPATIENT
Start: 2024-11-03 | End: 2024-11-03

## 2024-11-03 RX ADMIN — Medication 1: at 08:37

## 2024-11-03 RX ADMIN — PANTOPRAZOLE SODIUM 40 MILLIGRAM(S): 40 TABLET, DELAYED RELEASE ORAL at 17:02

## 2024-11-03 RX ADMIN — HEPARIN SODIUM 5000 UNIT(S): 10000 INJECTION INTRAVENOUS; SUBCUTANEOUS at 06:27

## 2024-11-03 RX ADMIN — PANTOPRAZOLE SODIUM 40 MILLIGRAM(S): 40 TABLET, DELAYED RELEASE ORAL at 06:26

## 2024-11-03 RX ADMIN — GABAPENTIN 300 MILLIGRAM(S): 300 CAPSULE ORAL at 21:26

## 2024-11-03 RX ADMIN — CLOPIDOGREL 75 MILLIGRAM(S): 75 TABLET ORAL at 11:10

## 2024-11-03 RX ADMIN — Medication 10 MILLIGRAM(S): at 21:26

## 2024-11-03 RX ADMIN — Medication 3 MILLIGRAM(S): at 21:27

## 2024-11-03 RX ADMIN — GABAPENTIN 300 MILLIGRAM(S): 300 CAPSULE ORAL at 13:14

## 2024-11-03 RX ADMIN — GABAPENTIN 300 MILLIGRAM(S): 300 CAPSULE ORAL at 06:28

## 2024-11-03 RX ADMIN — Medication 325 MILLIGRAM(S): at 11:10

## 2024-11-03 RX ADMIN — Medication 0.4 MILLIGRAM(S): at 21:26

## 2024-11-03 NOTE — PROGRESS NOTE ADULT - ASSESSMENT
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     #Acute on chronic anemia with hx of AVM and JASMIN  tfx to keep hb >7  s/p  venofer   seen by GI   active duodenal bleeding on VCE  s/p  egd  with hemostasis        #Malignant mesothelioma (diagnosed 8/2024 ) path c/w epitheliod   #Hx Asbestos lung   -s/p VATS pleurodesis, R sided PleurX,s/p drainage  .. plan for immuno chemo as outpt    d/c planning

## 2024-11-03 NOTE — PROGRESS NOTE ADULT - NS ATTEST RISK PROBLEM GEN_ALL_CORE FT
Reviewed prior external records [ ]  Considering/ Ordered a unique test:  Labs [x ] Imaging [x ] Stress Test  [ ] Other: Specify [ ]  Reviewed each unique test result [x ]   Assessment requiring an independent historian [kody ]  Independent interpretation of:   X-Ray [ ] EKG [ ]  Other: Specify  [ ]  Discussion of management of tests with clinician outside of my group [GI]

## 2024-11-03 NOTE — PROGRESS NOTE ADULT - SUBJECTIVE AND OBJECTIVE BOX
The patient was seen and examined   No acute events over last 24 hours   Denies any chest pain, SOB or palpitations   Still dropping his H/H with melena       MEDICATIONS  (STANDING):  atorvastatin 10 milliGRAM(s) Oral at bedtime  chlorhexidine 2% Cloths 1 Application(s) Topical daily  chlorhexidine 2% Cloths 1 Application(s) Topical <User Schedule>  dextrose 5%. 1000 milliLiter(s) (100 mL/Hr) IV Continuous <Continuous>  dextrose 5%. 1000 milliLiter(s) (50 mL/Hr) IV Continuous <Continuous>  dextrose 50% Injectable 12.5 Gram(s) IV Push once  dextrose 50% Injectable 25 Gram(s) IV Push once  dextrose 50% Injectable 25 Gram(s) IV Push once  gabapentin 300 milliGRAM(s) Oral three times a day  glucagon  Injectable 1 milliGRAM(s) IntraMuscular once  insulin lispro (ADMELOG) corrective regimen sliding scale   SubCutaneous three times a day before meals  iron sucrose IVPB 100 milliGRAM(s) IV Intermittent every 24 hours  melatonin 3 milliGRAM(s) Oral at bedtime  pantoprazole    Tablet 40 milliGRAM(s) Oral every 12 hours  tamsulosin 0.4 milliGRAM(s) Oral at bedtime    MEDICATIONS  (PRN):  dextrose Oral Gel 15 Gram(s) Oral once PRN Blood Glucose LESS THAN 70 milliGRAM(s)/deciliter  traMADol 50 milliGRAM(s) Oral every 6 hours PRN Severe Pain (7 - 10)            Vital Signs Last 24 Hrs  T(C): 36.6 (03 Nov 2024 13:00), Max: 36.8 (03 Nov 2024 05:01)  T(F): 97.9 (03 Nov 2024 13:00), Max: 98.2 (03 Nov 2024 05:01)  HR: 81 (03 Nov 2024 13:00) (80 - 82)  BP: 123/65 (03 Nov 2024 13:00) (123/65 - 142/63)  BP(mean): 89 (03 Nov 2024 13:00) (84 - 90)  RR: 18 (03 Nov 2024 13:00) (18 - 18)  SpO2: 98% (03 Nov 2024 13:00) (98% - 98%)    Parameters below as of 03 Nov 2024 13:00  Patient On (Oxygen Delivery Method): room air         REVIEW OF SYSTEMS:  CONSTITUTIONAL: No fever, weight loss, or fatigue  CARDIOLOGY: Patient denies chest pain, shortness of breath or syncopal episodes   RESPIRATORY: denies shortness of breath, wheezeing    NEUROLOGICAL: NO weakness, no focal deficits to report   GI: no BRBPR, no N,V,diarrhea.    PSYCHIATRY: normal mood and affect  HEENT: no nasal discharge, no ecchymosis  SKIN: no ecchymosis, no breakdown  MUSCULOSKELETAL: Full range of motion x 4        PHYSICAL EXAM:  · CONSTITUTIONAL:	Well-developed, well nourished      ·RESPIRATORY:   airway patent; breath sounds equal; good air movement; respirations non-labored; clear to auscultation bilaterally   · CARDIOVASCULAR	regular rate and rhythm  + SE murmur    · EXTREMITIES: No cyanosis, clubbing or edema  · VASCULAR: absent PT bilaterally   	      LABS:                        7.5    8.05  )-----------( 386      ( 03 Nov 2024 06:40 )             24.2     11-03    139  |  103  |  35[H]  ----------------------------<  208[H]  4.5   |  26  |  0.9    Ca    9.2      03 Nov 2024 06:40  Mg     2.1     11-03    TPro  5.7[L]  /  Alb  3.1[L]  /  TBili  <0.2  /  DBili  x   /  AST  7   /  ALT  6   /  AlkPhos  103  11-03            I&O's Summary    02 Nov 2024 08:01  -  03 Nov 2024 07:00  --------------------------------------------------------  IN: 0 mL / OUT: 1800 mL / NET: -1800 mL      BNP  RADIOLOGY & ADDITIONAL STUDIES:    IMPRESSION AND PLAN:

## 2024-11-03 NOTE — PROGRESS NOTE ADULT - SUBJECTIVE AND OBJECTIVE BOX
SUBJECTIVE:    Patient is a 84y old Male who presents with a chief complaint of SOB (02 Nov 2024 23:27)      Today is hospital day 20d.     Overnight Events:     having diarrhea     PAST MEDICAL & SURGICAL HISTORY  DM (diabetes mellitus)    MI (myocardial infarction)    History of CAD (coronary artery disease)    Dyslipidemia    Currently smokes tobacco    Mesothelioma, malignant    Coronary stent patent          ALLERGIES:  penicillin (Unknown)    MEDICATIONS:  STANDING MEDICATIONS  atorvastatin 10 milliGRAM(s) Oral at bedtime  chlorhexidine 2% Cloths 1 Application(s) Topical daily  chlorhexidine 2% Cloths 1 Application(s) Topical <User Schedule>  dextrose 5%. 1000 milliLiter(s) IV Continuous <Continuous>  dextrose 5%. 1000 milliLiter(s) IV Continuous <Continuous>  dextrose 50% Injectable 25 Gram(s) IV Push once  dextrose 50% Injectable 12.5 Gram(s) IV Push once  dextrose 50% Injectable 25 Gram(s) IV Push once  ferrous    sulfate 325 milliGRAM(s) Oral daily  gabapentin 300 milliGRAM(s) Oral three times a day  glucagon  Injectable 1 milliGRAM(s) IntraMuscular once  insulin lispro (ADMELOG) corrective regimen sliding scale   SubCutaneous three times a day before meals  melatonin 3 milliGRAM(s) Oral at bedtime  pantoprazole    Tablet 40 milliGRAM(s) Oral every 12 hours  polyethylene glycol 3350 17 Gram(s) Oral daily  senna 2 Tablet(s) Oral at bedtime  tamsulosin 0.4 milliGRAM(s) Oral at bedtime    PRN MEDICATIONS  dextrose Oral Gel 15 Gram(s) Oral once PRN  traMADol 50 milliGRAM(s) Oral every 6 hours PRN    VITALS:   ICU Vital Signs Last 24 Hrs  T(C): 36.6 (03 Nov 2024 13:00), Max: 36.8 (03 Nov 2024 05:01)  T(F): 97.9 (03 Nov 2024 13:00), Max: 98.2 (03 Nov 2024 05:01)  HR: 81 (03 Nov 2024 13:00) (80 - 82)  BP: 123/65 (03 Nov 2024 13:00) (123/65 - 142/63)  BP(mean): 89 (03 Nov 2024 13:00) (84 - 90)  ABP: --  ABP(mean): --  RR: 18 (03 Nov 2024 13:00) (18 - 18)  SpO2: 98% (03 Nov 2024 13:00) (98% - 98%)    O2 Parameters below as of 03 Nov 2024 13:00  Patient On (Oxygen Delivery Method): room air            LABS:                        7.5    8.05  )-----------( 386      ( 03 Nov 2024 06:40 )             24.2     11-03    139  |  103  |  35[H]  ----------------------------<  208[H]  4.5   |  26  |  0.9    Ca    9.2      03 Nov 2024 06:40  Mg     2.1     11-03    TPro  5.7[L]  /  Alb  3.1[L]  /  TBili  <0.2  /  DBili  x   /  AST  7   /  ALT  6   /  AlkPhos  103  11-03      Urinalysis Basic - ( 03 Nov 2024 06:40 )    Color: x / Appearance: x / SG: x / pH: x  Gluc: 208 mg/dL / Ketone: x  / Bili: x / Urobili: x   Blood: x / Protein: x / Nitrite: x   Leuk Esterase: x / RBC: x / WBC x   Sq Epi: x / Non Sq Epi: x / Bacteria: x                  RADIOLOGY:      Lines:  Central line:              Date placed:             Indication:   Intravenous Access:   NG tube:   Walden Catheter:       Diagnositic orders     Continue Present on Admission Indwelling Urethral Catheter (11-03-24 @ 11:45) [Active]     SUBJECTIVE:    Patient is a 84y old Male who presents with a chief complaint of SOB (02 Nov 2024 23:27)      Today is hospital day 20d.     Overnight Events:     having diarrhea black stools     PAST MEDICAL & SURGICAL HISTORY  DM (diabetes mellitus)    MI (myocardial infarction)    History of CAD (coronary artery disease)    Dyslipidemia    Currently smokes tobacco    Mesothelioma, malignant    Coronary stent patent          ALLERGIES:  penicillin (Unknown)    MEDICATIONS:  STANDING MEDICATIONS  atorvastatin 10 milliGRAM(s) Oral at bedtime  chlorhexidine 2% Cloths 1 Application(s) Topical daily  chlorhexidine 2% Cloths 1 Application(s) Topical <User Schedule>  dextrose 5%. 1000 milliLiter(s) IV Continuous <Continuous>  dextrose 5%. 1000 milliLiter(s) IV Continuous <Continuous>  dextrose 50% Injectable 25 Gram(s) IV Push once  dextrose 50% Injectable 12.5 Gram(s) IV Push once  dextrose 50% Injectable 25 Gram(s) IV Push once  ferrous    sulfate 325 milliGRAM(s) Oral daily  gabapentin 300 milliGRAM(s) Oral three times a day  glucagon  Injectable 1 milliGRAM(s) IntraMuscular once  insulin lispro (ADMELOG) corrective regimen sliding scale   SubCutaneous three times a day before meals  melatonin 3 milliGRAM(s) Oral at bedtime  pantoprazole    Tablet 40 milliGRAM(s) Oral every 12 hours  polyethylene glycol 3350 17 Gram(s) Oral daily  senna 2 Tablet(s) Oral at bedtime  tamsulosin 0.4 milliGRAM(s) Oral at bedtime    PRN MEDICATIONS  dextrose Oral Gel 15 Gram(s) Oral once PRN  traMADol 50 milliGRAM(s) Oral every 6 hours PRN    VITALS:   ICU Vital Signs Last 24 Hrs  T(C): 36.6 (03 Nov 2024 13:00), Max: 36.8 (03 Nov 2024 05:01)  T(F): 97.9 (03 Nov 2024 13:00), Max: 98.2 (03 Nov 2024 05:01)  HR: 81 (03 Nov 2024 13:00) (80 - 82)  BP: 123/65 (03 Nov 2024 13:00) (123/65 - 142/63)  BP(mean): 89 (03 Nov 2024 13:00) (84 - 90)  ABP: --  ABP(mean): --  RR: 18 (03 Nov 2024 13:00) (18 - 18)  SpO2: 98% (03 Nov 2024 13:00) (98% - 98%)    O2 Parameters below as of 03 Nov 2024 13:00  Patient On (Oxygen Delivery Method): room air            LABS:                        7.5    8.05  )-----------( 386      ( 03 Nov 2024 06:40 )             24.2     11-03    139  |  103  |  35[H]  ----------------------------<  208[H]  4.5   |  26  |  0.9    Ca    9.2      03 Nov 2024 06:40  Mg     2.1     11-03    TPro  5.7[L]  /  Alb  3.1[L]  /  TBili  <0.2  /  DBili  x   /  AST  7   /  ALT  6   /  AlkPhos  103  11-03      Urinalysis Basic - ( 03 Nov 2024 06:40 )    Color: x / Appearance: x / SG: x / pH: x  Gluc: 208 mg/dL / Ketone: x  / Bili: x / Urobili: x   Blood: x / Protein: x / Nitrite: x   Leuk Esterase: x / RBC: x / WBC x   Sq Epi: x / Non Sq Epi: x / Bacteria: x                  RADIOLOGY:      Lines:  Central line:              Date placed:             Indication:   Intravenous Access:   NG tube:   Walden Catheter:       Diagnositic orders     Continue Present on Admission Indwelling Urethral Catheter (11-03-24 @ 11:45) [Active]

## 2024-11-03 NOTE — PROGRESS NOTE ADULT - ASSESSMENT
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 Per NH, patient also refused Pleurx drainage today and is requesting to drain it inpatient (gets drained 2-3 times per week).    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  Dementia         Continue current care as per medicine and GI   Ok to hold Plavix but continue ASA 81 EC   GI and Pulmonary follow up   DVT/GI prophylaxis   Maitain Hb > 8  Will F/U

## 2024-11-04 LAB
ALBUMIN SERPL ELPH-MCNC: 3.2 G/DL — LOW (ref 3.5–5.2)
ALP SERPL-CCNC: 103 U/L — SIGNIFICANT CHANGE UP (ref 30–115)
ALT FLD-CCNC: 7 U/L — SIGNIFICANT CHANGE UP (ref 0–41)
ANION GAP SERPL CALC-SCNC: 13 MMOL/L — SIGNIFICANT CHANGE UP (ref 7–14)
AST SERPL-CCNC: 9 U/L — SIGNIFICANT CHANGE UP (ref 0–41)
BASOPHILS # BLD AUTO: 0.02 K/UL — SIGNIFICANT CHANGE UP (ref 0–0.2)
BASOPHILS NFR BLD AUTO: 0.3 % — SIGNIFICANT CHANGE UP (ref 0–1)
BILIRUB SERPL-MCNC: <0.2 MG/DL — SIGNIFICANT CHANGE UP (ref 0.2–1.2)
BUN SERPL-MCNC: 36 MG/DL — HIGH (ref 10–20)
CALCIUM SERPL-MCNC: 9.5 MG/DL — SIGNIFICANT CHANGE UP (ref 8.4–10.5)
CHLORIDE SERPL-SCNC: 104 MMOL/L — SIGNIFICANT CHANGE UP (ref 98–110)
CO2 SERPL-SCNC: 23 MMOL/L — SIGNIFICANT CHANGE UP (ref 17–32)
CREAT SERPL-MCNC: 0.9 MG/DL — SIGNIFICANT CHANGE UP (ref 0.7–1.5)
EGFR: 85 ML/MIN/1.73M2 — SIGNIFICANT CHANGE UP
EOSINOPHIL # BLD AUTO: 0.23 K/UL — SIGNIFICANT CHANGE UP (ref 0–0.7)
EOSINOPHIL NFR BLD AUTO: 3 % — SIGNIFICANT CHANGE UP (ref 0–8)
GLUCOSE BLDC GLUCOMTR-MCNC: 181 MG/DL — HIGH (ref 70–99)
GLUCOSE BLDC GLUCOMTR-MCNC: 204 MG/DL — HIGH (ref 70–99)
GLUCOSE SERPL-MCNC: 158 MG/DL — HIGH (ref 70–99)
HCT VFR BLD CALC: 23.3 % — LOW (ref 42–52)
HCT VFR BLD CALC: 27.1 % — LOW (ref 42–52)
HGB BLD-MCNC: 7.3 G/DL — LOW (ref 14–18)
HGB BLD-MCNC: 8.5 G/DL — LOW (ref 14–18)
IMM GRANULOCYTES NFR BLD AUTO: 0.5 % — HIGH (ref 0.1–0.3)
LYMPHOCYTES # BLD AUTO: 1.04 K/UL — LOW (ref 1.2–3.4)
LYMPHOCYTES # BLD AUTO: 13.6 % — LOW (ref 20.5–51.1)
MAGNESIUM SERPL-MCNC: 2.2 MG/DL — SIGNIFICANT CHANGE UP (ref 1.8–2.4)
MCHC RBC-ENTMCNC: 30 PG — SIGNIFICANT CHANGE UP (ref 27–31)
MCHC RBC-ENTMCNC: 30 PG — SIGNIFICANT CHANGE UP (ref 27–31)
MCHC RBC-ENTMCNC: 31.3 G/DL — LOW (ref 32–37)
MCHC RBC-ENTMCNC: 31.4 G/DL — LOW (ref 32–37)
MCV RBC AUTO: 95.8 FL — HIGH (ref 80–94)
MCV RBC AUTO: 95.9 FL — HIGH (ref 80–94)
MONOCYTES # BLD AUTO: 0.63 K/UL — HIGH (ref 0.1–0.6)
MONOCYTES NFR BLD AUTO: 8.2 % — SIGNIFICANT CHANGE UP (ref 1.7–9.3)
NEUTROPHILS # BLD AUTO: 5.7 K/UL — SIGNIFICANT CHANGE UP (ref 1.4–6.5)
NEUTROPHILS NFR BLD AUTO: 74.4 % — SIGNIFICANT CHANGE UP (ref 42.2–75.2)
NRBC # BLD: 0 /100 WBCS — SIGNIFICANT CHANGE UP (ref 0–0)
NRBC # BLD: 0 /100 WBCS — SIGNIFICANT CHANGE UP (ref 0–0)
PLATELET # BLD AUTO: 401 K/UL — HIGH (ref 130–400)
PLATELET # BLD AUTO: 404 K/UL — HIGH (ref 130–400)
PMV BLD: 9.5 FL — SIGNIFICANT CHANGE UP (ref 7.4–10.4)
PMV BLD: 9.6 FL — SIGNIFICANT CHANGE UP (ref 7.4–10.4)
POTASSIUM SERPL-MCNC: 4.9 MMOL/L — SIGNIFICANT CHANGE UP (ref 3.5–5)
POTASSIUM SERPL-SCNC: 4.9 MMOL/L — SIGNIFICANT CHANGE UP (ref 3.5–5)
PROT SERPL-MCNC: 5.9 G/DL — LOW (ref 6–8)
RBC # BLD: 2.43 M/UL — LOW (ref 4.7–6.1)
RBC # BLD: 2.83 M/UL — LOW (ref 4.7–6.1)
RBC # FLD: 16 % — HIGH (ref 11.5–14.5)
RBC # FLD: 16.6 % — HIGH (ref 11.5–14.5)
SODIUM SERPL-SCNC: 140 MMOL/L — SIGNIFICANT CHANGE UP (ref 135–146)
WBC # BLD: 7.55 K/UL — SIGNIFICANT CHANGE UP (ref 4.8–10.8)
WBC # BLD: 7.66 K/UL — SIGNIFICANT CHANGE UP (ref 4.8–10.8)
WBC # FLD AUTO: 7.55 K/UL — SIGNIFICANT CHANGE UP (ref 4.8–10.8)
WBC # FLD AUTO: 7.66 K/UL — SIGNIFICANT CHANGE UP (ref 4.8–10.8)

## 2024-11-04 PROCEDURE — 99233 SBSQ HOSP IP/OBS HIGH 50: CPT

## 2024-11-04 RX ORDER — HEPARIN SODIUM 10000 [USP'U]/ML
5000 INJECTION INTRAVENOUS; SUBCUTANEOUS EVERY 12 HOURS
Refills: 0 | Status: DISCONTINUED | OUTPATIENT
Start: 2024-11-04 | End: 2024-11-06

## 2024-11-04 RX ADMIN — PANTOPRAZOLE SODIUM 40 MILLIGRAM(S): 40 TABLET, DELAYED RELEASE ORAL at 05:25

## 2024-11-04 RX ADMIN — PANTOPRAZOLE SODIUM 40 MILLIGRAM(S): 40 TABLET, DELAYED RELEASE ORAL at 17:59

## 2024-11-04 RX ADMIN — HEPARIN SODIUM 5000 UNIT(S): 10000 INJECTION INTRAVENOUS; SUBCUTANEOUS at 13:06

## 2024-11-04 RX ADMIN — CHLORHEXIDINE GLUCONATE 1 APPLICATION(S): 40 SOLUTION TOPICAL at 05:29

## 2024-11-04 RX ADMIN — Medication 3 MILLIGRAM(S): at 21:30

## 2024-11-04 RX ADMIN — GABAPENTIN 300 MILLIGRAM(S): 300 CAPSULE ORAL at 13:06

## 2024-11-04 RX ADMIN — Medication 2: at 13:01

## 2024-11-04 RX ADMIN — Medication 0.4 MILLIGRAM(S): at 21:31

## 2024-11-04 RX ADMIN — GABAPENTIN 300 MILLIGRAM(S): 300 CAPSULE ORAL at 21:30

## 2024-11-04 RX ADMIN — CHLORHEXIDINE GLUCONATE 1 APPLICATION(S): 40 SOLUTION TOPICAL at 13:08

## 2024-11-04 RX ADMIN — Medication 10 MILLIGRAM(S): at 21:31

## 2024-11-04 RX ADMIN — GABAPENTIN 300 MILLIGRAM(S): 300 CAPSULE ORAL at 05:27

## 2024-11-04 RX ADMIN — IRON SUCROSE 200 MILLIGRAM(S): 20 INJECTION, SOLUTION INTRAVENOUS at 13:06

## 2024-11-04 RX ADMIN — Medication 1: at 08:13

## 2024-11-04 NOTE — PROGRESS NOTE ADULT - ASSESSMENT
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 Per NH, patient also refused Pleurx drainage today and is requesting to drain it inpatient (gets drained 2-3 times per week).    #Recurrent anemia  #Hx JASMIN   #Hx AVM?  #Active duodenal bleeding on capsule endoscopy  - Hemodynamically stable & no active bleeding  - Baseline hemoglobin - 8-9   - Hemoglobin on admission - 6.3 >s/p 2units prbc (received 2 units at the end of Spetember as well)  - On plavix   - last admission received 5 days of Venofer   -Seen by GI last admission> spoke to grandson (HCP)  regarding endoscopic work up. After discussing risks vs benefits given his advance age and co morbidities he would like to hold off. Offered VCE as outpatient .   -Can reconsider work up if within goals of care , will need to speak to grandson/son  -Will also need heme onc outpt for possible venofer infusions outpt   - Maintain active Type and screen  - Trend H&H  - Continue home PPI 40mg BID PO  - Target Hgb >8   - Avoid NSAIDs.  - give Venofer as %sat, ferritin remain low  - recall GI for possible intervention if family agrees in view of recurrent anemia requiring multiple PRBCs  6/18 convinced pt to give labs. Hgb 6.9. Transfuse 1uRPBCs. Spoke to GI and asked to speak to family. Serial CBC. Keep Hgb >7.5  6/21 again spoke to GI and asked to speak re: possible intervention. Transfuse another unit as Hgb again trending down (total 3u PRBCs).  6/22 son declined EGD/C-scopy but ?requested capsule endoscopy. Doubt that pt will drink golytely or agree to NGT.  6/26 d/w GI. Active duodenal bleeding on capsule endoscopy. Son refused urgent EGD and wants to think over the weekend. Change Protonix to 40mg IV q12, clears, CBC BID, keep Hgb>8. Hold ASA  6/28 son agreed to EGD. D/w pulm and cardio pt optimized for EGD. 2 bleeding angioectasias noted oozing blood in duodenum s/p cauterization.  6/31 transfused 1u PRBCs.   11/4 Reportedly melena 11/1-2. H/h trending down. Asked GI for f/u ?repeat EGD. Transfuse 1u PRBCs.    #Malignant mesothelioma (diagnosed 8/2024 )  #Hx Asbestos lung   -s/p VATS pleurodesis, R sided PleurX  -pleurx drainage 2-3 times per week or PRN  -Fu heme onc outpatient. Plan was for possible Rx if pt's functional status improves.   -s/p drainage on 10/21 of 750cc  monitor for Sx  - s/p drainage on 10/28 375 cc    #HTN/HLD  #CAD s/p stents x2 in 2007 (follows Dr Avila)  -c/w home meds   -changed Plavix to ASA    #Small hypodensity in segment 7 of the liver seen on prior CT.  - RUQ US as OP  -?f/u outpt GI    #Acute Grief vs MDD  #Baseline dementia  - wife passed away a few months ago  - patient refusing medication sometimes and asks to be left alone, no suicide ideas, no ideas to hurt other people.    - Psychiatry eval  recommended outpt fu     #Nutrition/Fluids/Electrolytes   - replete K<4 and Mg <2  - ensure regular BMs  -  Miralax BID, Senna    #DVT Px  SCDs  Heparin SQ    High risk pt. Px is overall poor.  GOC: d/w son in details on 10/18. Based on oncology feedback that his cancer is treatable, son wants to cont full code.      Disposition: Nursing Home      My note supersedes the residents note should a discrepancy arise.    Chart and notes personally reviewed.  Care Discussed with Consultants/Other Providers/ Housestaff [ x] YES [ ] NO   Radiology, labs, old records personally reviewed.    discussed w/ housestaff, nursing, case management, son    Time-based billing (NON-critical care).     50 minutes spent on total encounter. The necessity of the time spent during the encounter on this date of service was due to:     time spent on review of labs, imaging studies, old records, obtaining history, personally examining patient, counselling and communicating with patient/ family, entering orders for medications/tests/etc, discussions with other health care providers, documentation in electronic health records, independent interpretation of labs, imaging/procedure results and care coordination.

## 2024-11-04 NOTE — PROGRESS NOTE ADULT - ASSESSMENT
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 Patient had a recent admission in September for anemia for which he received 2 units prbc, IV Venofer and was recommended EGD/[place] but given his advance age and co morbidities family decided to hold off. Patient is a poor historian. GI was consulted for anemia.     #Acute on chronic macrocytic anemia with JASMIN - no overt GI bleed    - Iron :31, %sat: 10 TIBC 325 ferritin 89 while on iron  - reported AVMs? endoscopic work up with  per grand son Nelli Lopez    VCE done 10/24 showing active bleed in the duodenum    S/p EGD 10/28  Otherwise normal esophagus.  Erythema in the stomach compatible with non-erosive gastritis.  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 (APC Hemostasis).  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 (APC Hemostasis).  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     Plan  Repeat CBC   Can advance diet  Start PPI BID for total  2 weeks and then q24h thereafter   Can resume necessary DAPT  Can follow up with private GI      #Small hypodensity in segment 7 of the liver seen on prior CT.  -Consider RUQ US as OP  -f/u w/ primary GI   85 y/o man  PMHx of HLD, DM, CAD s/p stents x2 in 2007, pt of Dr Avila, hx  asbestosis of lung, diagnosed w malignant mesothelioma (8/2024) and s/p VATS pleurodesis, R sided PleurX, hx AVM/GIB and JASMIN, sent in by NH for low hemoglobin. Patient had a recent admission in September for anemia for which he received 2 units prbc, IV Venofer and was recommended EGD/[place] but given his advance age and co morbidities family decided to hold off. Patient is a poor historian. GI was consulted for anemia.     #Acute on chronic macrocytic anemia with JASMIN - no overt GI bleed    - Iron :31, %sat: 10 TIBC 325 ferritin 89 while on iron  - reported AVMs? endoscopic work up with  per grand son Nelli Lopez    VCE done 10/24 showing active bleed in the duodenum    S/p EGD 10/28  Otherwise normal esophagus.  Erythema in the stomach compatible with non-erosive gastritis.  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 (APC Hemostasis).  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 (APC Hemostasis).  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     Plan  Repeat CBC   Can advance diet  Start PPI BID for total  2 weeks and then q24h thereafter   Can resume necessary DAPT as deemed indicated per team  Can follow up with private GI  If has recurrent dark stools or drop in Hb would reassess for repeat endoscopy      #Small hypodensity in segment 7 of the liver seen on prior CT.  -Consider RUQ US as OP  -f/u w/ primary GI

## 2024-11-04 NOTE — PROGRESS NOTE ADULT - ASSESSMENT
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 Per NH, patient also refused Pleurx drainage today and is requesting to drain it inpatient (gets drained 2-3 times per week).    #Acute on chronic anemia s/p multiple transfusions  - Seen by GI last admission: regarding endoscopic work up. After discussing risks vs benefits given his advance age and co morbidities he would like to hold off. Offered VCE as outpatient .   - last admission received 5 days of Venofer   - Active type and screen   - Switched plavix to aspirin for now  - c/w ferrous sulfate, IV venofovir for 5 days  - GI consult placed, Follow up, as per GI- Patient can follow  or  Follow up with our [facility] located at [address] [place] [address]  - Continue home PPI 40mg BID PO  - avoid Nsaids  - Hb 6.9> 1 unit PRBC (10/18)> repeat 7.8  - Hb 7.4 10/21 -> GI will reach out to family to discuss inpatient endoscopy -> pt son agreed for Video capsule endoscopy scheduled for tomorrow 10/23/2024  - Oncology onboard also recommended endoscopy to rule out GI bleed -> if GI workup is negative will offer bone marrow biopsy   - Patient refused prep yesterday follow up with GI, HB is stable   - VCE done on 10/24 ->Duodenal bleed, put on clear liquid diet, PPI BID IV   - 10/26: family agreed on inpatient EGD  - 10/28: s/p EGD significant for Angioectasia in the second part of the duodenum with bleeding on contact. (APC Hemostasis). 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 (APC Hemostasis). PPI BID for 2 weeks and then q24h thereafter   - 10/30: DAPT resumed, Hgb stable  - 10/31: patient given 1prbc  - 11/02: f/u repeat CBC at 11 and possible DC if Hgb stable  - 11/04: Hgb still downtrending, patient had melena episode on Saturday, asymptomatic currently, f/u GI recs for further evaluation of anemia    #Malignant mesothelioma (diagnosed 8/2024 ) with recurrent pleural effusion . Hx Asbestos lung   #Severe protein calorie malnutrition (calorie count started - result 10/21)  -s/p VATS pleurodesis, R sided PleurX  - Per NH, patient also refused Pleurx drainage and is requesting to drain it inpatient (gets drained 2-3 times per week)  - Currently on room air (target spo2 92-96)  - Fu heme onc- Dr. Ramírez OP  - Fu palliative- full code  - PleurX drained on 10/21  - Aspiration precaution  - Pain control   - 10/28: pleurx drained 350cc  - 11/04: LE Duplex ordered to r/o DVT as patient hypercoagulable and not moving, will resume Heparin ppx    #HTN/HLD/CAD s/p stents x2 in 2007 (follows Dr Avila)  - c/w home meds     #Small hypodensity in segment 7 of the liver seen on prior CT.  - RUQ US as OP  - f/u outpt GI    #Acute Grief vs MDD  - wife passed away a few months ago  - patient refusing medication sometimes and asks to be left alone, no suicide ideas, no ideas to hurt other people.    - Psychiatry eval admission recommended outpt fu     Pendings: f/u GI, f/u Duplex, will resume Heparin ppx

## 2024-11-04 NOTE — CHART NOTE - NSCHARTNOTESELECT_GEN_ALL_CORE
CC put up/Nutrition Services
EGD/Event Note
Event Note
CalCount results/Nutrition Services
Event Note
Follow Up/Nutrition Services
Follow up/Nutrition Services

## 2024-11-04 NOTE — PROGRESS NOTE ADULT - SUBJECTIVE AND OBJECTIVE BOX
Patient is a 84y old  Male who presents with a chief complaint of Anemia     (17 Oct 2024 12:14)    INTERVAL HPI/OVERNIGHT EVENTS: Patient was examined and seen at bedside. This morning pt is resting comfortably in bed and reports no complaints. Breathing is stable. Denies need for pleurx draiange. As per staff, had melena Friday afternoon and over the weekend.     ROS: Denies CP, SOB, AP, new weakness  All other systems reviewed and are within normal limits.  InitialHPI:  HPI: 83 y/o man  PMHx of HLD, DM, CAD s/p stents x2 in 2007, pt of Dr Avila, hx  asbestosis of lung, diagnosed w malignant mesothelioma (8/2024) and s/p VATS pleurodesis, R sided PleurX, hx AVM/GIB and JASMIN, sent in by NH for low hemoglobin. Per NH, patient also refused Pleurx drainage today and is requesting to drain it inpatient (gets drained 2-3 times per week). Of note, patient had a recent admission in September for anemia for which he received 2 units prbc, IV Venofer and was recommended EGD/[place] but given his advance age and co morbidities decided to hold off. Patient is a poor historian. Uses wheelchair at baseline. Denies fever chills, shortness of breath, cough, chest pain, leg swelling, hematemesis, hematochezia.     Vital Signs Last 24 Hrs  T(C): 36.4 (15 Oct 2024 01:09), Max: 36.9 (14 Oct 2024 22:51)  T(F): 97.5 (15 Oct 2024 01:09), Max: 98.4 (14 Oct 2024 22:51)  HR: 100 (15 Oct 2024 01:09) (89 - 105)  BP: 132/74 (15 Oct 2024 01:09) (104/64 - 132/74)  RR: 18 (15 Oct 2024 01:09) (17 - 19)  SpO2: 97% (15 Oct 2024 01:09) (93% - 97%)    Parameters below as of 15 Oct 2024 01:09  Patient On (Oxygen Delivery Method): room air    Labs: Hgb 6.4 , Platelets 500 , Cr 1.2 (baseline)   CXR: Worsening R pleural effusion   EKG: NSR with PACs    Admitted for anemia      (15 Oct 2024 01:16)    PAST MEDICAL & SURGICAL HISTORY:  DM (diabetes mellitus)      MI (myocardial infarction)      History of CAD (coronary artery disease)      Dyslipidemia      Currently smokes tobacco      Mesothelioma, malignant      Coronary stent patent          General: NAD, AAOx2, chronically ill appearing, b/l temp waisting, cachectic  HEENT:  no LAD  CV: S1 S2  Resp: decreased breath sounds at bases  GI: NT/ND/S +BS  MS: no clubbing/cyanosis/edema, + pulses b/l. TTP Rt lower back  Neuro: nonfocal, +reflexes thruout  +laws w/ clear yellow urine  +Rt sided ant pleurx cath w/ d/c/i dsg            Home Medications:  ferrous sulfate 325 mg (65 mg elemental iron) oral tablet: 1 tab(s) orally once a day (25 Sep 2024 15:37)  gabapentin 300 mg oral tablet: 1 tab(s) orally 3 times a day (20 Sep 2024 01:37)  Lovenox 30 mg/0.3 mL injectable solution: 30 milligram(s) subcutaneously once a day (01 Nov 2024 12:53)  melatonin 3 mg oral tablet: 1 tab(s) orally once a day (at bedtime) (13 Aug 2024 09:23)  metFORMIN 500 mg oral tablet: 1 tab(s) orally 2 times a day (21 Jul 2024 18:04)  pantoprazole 40 mg oral delayed release tablet: 1 tab(s) orally every 12 hours (25 Sep 2024 15:37)  polyethylene glycol 3350 oral powder for reconstitution: 17 gram(s) orally once a day (13 Aug 2024 09:23)  pravastatin 20 mg oral tablet: 1 tab(s) orally (21 Jul 2024 18:04)  senna leaf extract oral tablet: 2 tab(s) orally once a day (at bedtime) (13 Aug 2024 09:23)  tamsulosin 0.4 mg oral capsule: 1 cap(s) orally once a day (at bedtime) (13 Aug 2024 09:23)    MEDICATIONS  (STANDING):  atorvastatin 10 milliGRAM(s) Oral at bedtime  chlorhexidine 2% Cloths 1 Application(s) Topical <User Schedule>  chlorhexidine 2% Cloths 1 Application(s) Topical daily  dextrose 5%. 1000 milliLiter(s) (100 mL/Hr) IV Continuous <Continuous>  dextrose 5%. 1000 milliLiter(s) (50 mL/Hr) IV Continuous <Continuous>  dextrose 50% Injectable 12.5 Gram(s) IV Push once  dextrose 50% Injectable 25 Gram(s) IV Push once  dextrose 50% Injectable 25 Gram(s) IV Push once  gabapentin 300 milliGRAM(s) Oral three times a day  glucagon  Injectable 1 milliGRAM(s) IntraMuscular once  heparin   Injectable 5000 Unit(s) SubCutaneous every 12 hours  insulin lispro (ADMELOG) corrective regimen sliding scale   SubCutaneous three times a day before meals  iron sucrose IVPB 100 milliGRAM(s) IV Intermittent every 24 hours  melatonin 3 milliGRAM(s) Oral at bedtime  pantoprazole    Tablet 40 milliGRAM(s) Oral every 12 hours  tamsulosin 0.4 milliGRAM(s) Oral at bedtime    MEDICATIONS  (PRN):  dextrose Oral Gel 15 Gram(s) Oral once PRN Blood Glucose LESS THAN 70 milliGRAM(s)/deciliter  traMADol 50 milliGRAM(s) Oral every 6 hours PRN Severe Pain (7 - 10)    Vital Signs Last 24 Hrs  T(C): 36.3 (04 Nov 2024 13:21), Max: 36.5 (03 Nov 2024 20:17)  T(F): 97.4 (04 Nov 2024 13:21), Max: 97.7 (03 Nov 2024 20:17)  HR: 79 (04 Nov 2024 13:21) (69 - 81)  BP: 122/78 (04 Nov 2024 13:21) (120/61 - 124/70)  BP(mean): 93 (04 Nov 2024 13:21) (88 - 95)  RR: 18 (04 Nov 2024 13:21) (18 - 18)  SpO2: 99% (04 Nov 2024 13:21) (97% - 99%)    Parameters below as of 04 Nov 2024 13:21  Patient On (Oxygen Delivery Method): room air      CAPILLARY BLOOD GLUCOSE      POCT Blood Glucose.: 204 mg/dL (04 Nov 2024 12:05)  POCT Blood Glucose.: 181 mg/dL (04 Nov 2024 07:44)    LABS:                        7.3    7.66  )-----------( 404      ( 04 Nov 2024 06:48 )             23.3     11-04    140  |  104  |  36[H]  ----------------------------<  158[H]  4.9   |  23  |  0.9    Ca    9.5      04 Nov 2024 06:48  Mg     2.2     11-04    TPro  5.9[L]  /  Alb  3.2[L]  /  TBili  <0.2  /  DBili  x   /  AST  9   /  ALT  7   /  AlkPhos  103  11-04    LIVER FUNCTIONS - ( 04 Nov 2024 06:48 )  Alb: 3.2 g/dL / Pro: 5.9 g/dL / ALK PHOS: 103 U/L / ALT: 7 U/L / AST: 9 U/L / GGT: x                 Urinalysis Basic - ( 04 Nov 2024 06:48 )    Color: x / Appearance: x / SG: x / pH: x  Gluc: 158 mg/dL / Ketone: x  / Bili: x / Urobili: x   Blood: x / Protein: x / Nitrite: x   Leuk Esterase: x / RBC: x / WBC x   Sq Epi: x / Non Sq Epi: x / Bacteria: x              Consultant Notes Reviewed:  [x ] YES  [ ] NO  Care Discussed with Consultants/Other Providers/ Housestaff [ x] YES  [ ] NO  Radiology, labs, EKGs, new studies personally reviewed.

## 2024-11-04 NOTE — PROGRESS NOTE ADULT - SUBJECTIVE AND OBJECTIVE BOX
Gastroenterology progress note:     Patient is a 84y old  Male who presents with a chief complaint of anemia (04 Nov 2024 11:13)       Admitted on: 10-14-24    We are following the patient for anemia   Reported dark stool, gI was recalled for reported dark stool     PAST MEDICAL & SURGICAL HISTORY:  DM (diabetes mellitus)  MI (myocardial infarction)  History of CAD (coronary artery disease)  Dyslipidemia  Currently smokes tobacco  Mesothelioma, malignant  Coronary stent patent      MEDICATIONS  (STANDING):  atorvastatin 10 milliGRAM(s) Oral at bedtime  chlorhexidine 2% Cloths 1 Application(s) Topical <User Schedule>  chlorhexidine 2% Cloths 1 Application(s) Topical daily  dextrose 5%. 1000 milliLiter(s) (100 mL/Hr) IV Continuous <Continuous>  dextrose 5%. 1000 milliLiter(s) (50 mL/Hr) IV Continuous <Continuous>  dextrose 50% Injectable 12.5 Gram(s) IV Push once  dextrose 50% Injectable 25 Gram(s) IV Push once  dextrose 50% Injectable 25 Gram(s) IV Push once  gabapentin 300 milliGRAM(s) Oral three times a day  glucagon  Injectable 1 milliGRAM(s) IntraMuscular once  heparin   Injectable 5000 Unit(s) SubCutaneous every 12 hours  insulin lispro (ADMELOG) corrective regimen sliding scale   SubCutaneous three times a day before meals  iron sucrose IVPB 100 milliGRAM(s) IV Intermittent every 24 hours  melatonin 3 milliGRAM(s) Oral at bedtime  pantoprazole    Tablet 40 milliGRAM(s) Oral every 12 hours  tamsulosin 0.4 milliGRAM(s) Oral at bedtime    MEDICATIONS  (PRN):  dextrose Oral Gel 15 Gram(s) Oral once PRN Blood Glucose LESS THAN 70 milliGRAM(s)/deciliter  traMADol 50 milliGRAM(s) Oral every 6 hours PRN Severe Pain (7 - 10)      Allergies  penicillin (Unknown)      Review of Systems:   unable to obtain     Physical Examination:  T(C): 36.4 (11-04-24 @ 04:42), Max: 36.6 (11-03-24 @ 13:00)  HR: 81 (11-04-24 @ 04:42) (69 - 81)  BP: 124/70 (11-04-24 @ 04:42) (120/61 - 124/70)  RR: 18 (11-04-24 @ 04:42) (18 - 18)  SpO2: 99% (11-04-24 @ 04:42) (97% - 99%)      11-03-24 @ 07:01  -  11-04-24 @ 07:00  --------------------------------------------------------  IN: 0 mL / OUT: 900 mL / NET: -900 mL    11-04-24 @ 07:01  -  11-04-24 @ 11:54  --------------------------------------------------------  IN: 0 mL / OUT: 300 mL / NET: -300 mL        GENERAL: no acute distress.  HEAD:  Atraumatic, Normocephalic  EYES: conjunctiva and sclera clear  NECK: Supple, no JVD or thyromegaly  CHEST/LUNG: Clear to auscultation bilaterally  HEART: Regular rate and rhythm  ABDOMEN: Soft, nontender   NEUROLOGY: No asterixis or tremor.   SKIN: Intact, no jaundice     Data:                        7.3    7.66  )-----------( 404      ( 04 Nov 2024 06:48 )             23.3     Hgb trend:  7.3  11-04-24 @ 06:48  7.5  11-03-24 @ 06:40  7.7  11-02-24 @ 11:03  8.0  11-02-24 @ 06:39  8.0  11-02-24 @ 02:08        11-04    140  |  104  |  36[H]  ----------------------------<  158[H]  4.9   |  23  |  0.9    Ca    9.5      04 Nov 2024 06:48  Mg     2.2     11-04    TPro  5.9[L]  /  Alb  3.2[L]  /  TBili  <0.2  /  DBili  x   /  AST  9   /  ALT  7   /  AlkPhos  103  11-04    Liver panel trend:  TBili <0.2   /   AST 9   /   ALT 7   /   AlkP 103   /   Tptn 5.9   /   Alb 3.2    /   DBili --      11-04  TBili <0.2   /   AST 7   /   ALT 6   /   AlkP 103   /   Tptn 5.7   /   Alb 3.1    /   DBili --      11-03  TBili 0.2   /   AST 8   /   ALT <5   /   AlkP 98   /   Tptn 5.7   /   Alb 3.1    /   DBili --      11-02  TBili 0.3   /   AST 7   /   ALT <5   /   AlkP 99   /   Tptn 5.7   /   Alb 3.2    /   DBili --      11-01  TBili <0.2   /   AST 7   /   ALT <5   /   AlkP 103   /   Tptn 5.7   /   Alb 3.2    /   DBili --      10-31  TBili 0.2   /   AST 9   /   ALT <5   /   AlkP 120   /   Tptn 6.5   /   Alb 3.4    /   DBili --      10-29  TBili <0.2   /   AST 7   /   ALT <5   /   AlkP 106   /   Tptn 6.2   /   Alb 3.3    /   DBili --      10-27

## 2024-11-04 NOTE — CHART NOTE - NSCHARTNOTEFT_GEN_A_CORE
Registered Dietitian Follow-Up     Patient Profile Reviewed                           Yes [x]   No []     Nutrition History Previously Obtained        Yes [x]  No []       Pertinent Subjective Information: RD observed <50% of meal eaten for lunch. Pt denies any N/V/D x 24 hours. Does not recall last BM.      Pertinent Medical Interventions:   83 y/o man  PMHx of HLD, DM, CAD s/p stents x2 in 2007,  hx  asbestosis of lung, diagnosed w malignant mesothelioma (8/2024) #Acute Grief vs MDD     Diet order: Diet, DASH/TLC:   Sodium & Cholesterol Restricted  Consistent Carbohydrate {Evening Snack} (CSTCHOSN)  Supplement Feeding Modality:  Oral  Ensure Plus High Protein Cans or Servings Per Day:  3       Frequency:  Three Times a day (10-29-24 @ 11:55) [Active]    Anthropometrics:  Height (cm): 172.7 (10-28-24 @ 17:49)  Weight (kg): 60.9 (10-28-24 @ 17:49)  BMI (kg/m2): 20.4 (10-28-24 @ 17:49)  IBW: 154 lbs (70 kg)    Daily 60.7 ( 10/15 )60.9 kg 10/28 ; 58.4 10/30 ; 60.5 kg (11/4)   +2.1 kg  Weight Change --not significant     MEDICATIONS  (STANDING):  atorvastatin 10 milliGRAM(s) Oral at bedtime  gabapentin 300 milliGRAM(s) Oral three times a day  glucagon  Injectable 1 milliGRAM(s) IntraMuscular once  heparin   Injectable 5000 Unit(s) SubCutaneous every 12 hours  insulin lispro (ADMELOG) corrective regimen sliding scale   SubCutaneous three times a day before meals  iron sucrose IVPB 100 milliGRAM(s) IV Intermittent every 24 hours  melatonin 3 milliGRAM(s) Oral at bedtime  pantoprazole    Tablet 40 milliGRAM(s) Oral every 12 hours  tamsulosin 0.4 milliGRAM(s) Oral at bedtime    Pertinent Labs: 11-04 @ 06:48: Na 140, BUN 36[H], Cr 0.9, [H], K+ 4.9, Phos --, Mg 2.2, Alk Phos 103, ALT/SGPT 7, AST/SGOT 9, HbA1c --    Finger Sticks:  POCT Blood Glucose.: 204 mg/dL (11-04 @ 12:05)  POCT Blood Glucose.: 181 mg/dL (11-04 @ 07:44)    Physical Findings:  - Appearance: AOx4, Anhedonic.   - GI function:   Last BM 11/3 per Flowsheet   - Tubes: n/a  - Oral/Mouth cavity: regular consistency diet   - Skin: intact  - Edema: none noted.      Nutrition Requirements:  Weight Used: 60.9 kg     Estimated Energy Needs   30-35kcal/kg= 1258-6144   kcal/day  Estimated Protein Needs     1.5-1.7 g/kg protein=   gm protein/day   Estimated Fluid Needs       1mL/kcal=   1.8-2.1 L/day or per MD recommendations     Nutrient Intake: Poor to Fair    [x] Previous Nutrition Diagnosis: severe malnutrition             [x] Ongoing          [] Resolved     Nutrition Education:  RD encouraged PO intake and oral nutrition supplement intake.      Goal/Expected Outcome: Pt to consume and tolerate >75% of meals/snacks and PO supplements in 3-5 days.     Indicator/Monitoring:  Anthroprometrics, GI S/S, -Lytes (Phos, Mag, K+), BM, I/Os, Labs, NFPF, GI fxn. Energy intake and tolerance.     Recommendation: 1. Continue current diet order and assist w/ supervision as needed. 2. Please consider appetite stimulant to improve PO intake --resident contacted for recommendation. 3. c/w current bowel regimen     Khushi Shafer x [phone] or Via TEAMS    high Risk Follow UP.

## 2024-11-04 NOTE — PROGRESS NOTE ADULT - SUBJECTIVE AND OBJECTIVE BOX
SUBJECTIVE/OVERNIGHT EVENTS  Today is hospital day 21d. This morning patient was seen and examined at bedside, resting comfortably in bed. No acute or major events overnight.  Patient had melena episode on Saturday and hgb downtrending, will call GI back, patient denies any dizziness, palpitations, CP.      MEDICATIONS  STANDING MEDICATIONS  atorvastatin 10 milliGRAM(s) Oral at bedtime  chlorhexidine 2% Cloths 1 Application(s) Topical <User Schedule>  chlorhexidine 2% Cloths 1 Application(s) Topical daily  dextrose 5%. 1000 milliLiter(s) IV Continuous <Continuous>  dextrose 5%. 1000 milliLiter(s) IV Continuous <Continuous>  dextrose 50% Injectable 25 Gram(s) IV Push once  dextrose 50% Injectable 12.5 Gram(s) IV Push once  dextrose 50% Injectable 25 Gram(s) IV Push once  gabapentin 300 milliGRAM(s) Oral three times a day  glucagon  Injectable 1 milliGRAM(s) IntraMuscular once  heparin   Injectable 5000 Unit(s) SubCutaneous every 12 hours  insulin lispro (ADMELOG) corrective regimen sliding scale   SubCutaneous three times a day before meals  iron sucrose IVPB 100 milliGRAM(s) IV Intermittent every 24 hours  melatonin 3 milliGRAM(s) Oral at bedtime  pantoprazole    Tablet 40 milliGRAM(s) Oral every 12 hours  tamsulosin 0.4 milliGRAM(s) Oral at bedtime    PRN MEDICATIONS  dextrose Oral Gel 15 Gram(s) Oral once PRN  traMADol 50 milliGRAM(s) Oral every 6 hours PRN    VITALS  T(F): 97.5 (11-04-24 @ 04:42), Max: 97.9 (11-03-24 @ 13:00)  HR: 81 (11-04-24 @ 04:42) (69 - 81)  BP: 124/70 (11-04-24 @ 04:42) (120/61 - 124/70)  RR: 18 (11-04-24 @ 04:42) (18 - 18)  SpO2: 99% (11-04-24 @ 04:42) (97% - 99%)  POCT Blood Glucose.: 181 mg/dL (11-04-24 @ 07:44)    PHYSICAL EXAM  GENERAL  ( x ) NAD, lying in bed comfortably     (  ) obtunded     (  ) lethargic     (  ) somnolent    HEAD  ( x ) Atraumatic     (  ) hematoma     (  ) laceration (specify location:       )     NECK  ( x ) Supple     (  ) neck stiffness     (  ) nuchal rigidity     (  )  no JVD     (  ) JVD present ( -- cm)    HEART  Rate -->  ( x ) normal rate    (  ) bradycardic    (  ) tachycardic  Rhythm -->  ( x ) regular    (  ) regularly irregular    (  ) irregularly irregular  Murmurs -->  ( x ) normal s1/s2    (  ) systolic murmur    (  ) diastolic murmur    (  ) continuous murmur     (  ) S3 present    (  ) S4 present    LUNGS  ( x ) Unlabored respirations     (  ) tachypnea  ( x ) B/L air entry     (  ) decreased breath sounds in:  (location     )    (  ) no adventitious sound     (  ) crackles     (  ) wheezing      (  ) rhonchi      (specify location:       )  (  ) chest wall tenderness (specify location:       )    ABDOMEN  ( x ) Soft     (  ) tense   |   (  ) nondistended     (  ) distended   |   (  ) +BS     (  ) hypoactive bowel sounds     (  ) hyperactive bowel sounds  ( x ) nontender     (  ) RUQ tenderness     (  ) RLQ tenderness     (  ) LLQ tenderness     (  ) epigastric tenderness     (  ) diffuse tenderness  (  ) Splenomegaly      (  ) Hepatomegaly      (  ) Jaundice     (  ) ecchymosis     EXTREMITIES  ( x ) Normal     (  ) Rash     (  ) ecchymosis     (  ) varicose veins      (  ) pitting edema     (  ) non-pitting edema   (  ) ulceration     (  ) gangrene:     (location:     )    NERVOUS SYSTEM  (  ) A&Ox3     (  ) confused     (  ) lethargic  CN II-XII:     (  ) Intact     (  ) focal deficits  (Specify:     )   Upper extremities:     (  ) strength X/5     (  ) focal deficit (specify:    )  Lower extremities:     (  ) strength  X/5    (  ) focal deficit (specify:    )      LABS             7.3    7.66  )-----------( 404      ( 11-04-24 @ 06:48 )             23.3     140  |  104  |  36  -------------------------<  158   11-04-24 @ 06:48  4.9  |  23  |  0.9    Ca      9.5     11-04-24 @ 06:48  Mg     2.2     11-04-24 @ 06:48    TPro  5.9  /  Alb  3.2  /  TBili  <0.2  /  DBili  x   /  AST  9   /  ALT  7   /  AlkPhos  103  /  GGT  x     11-04-24 @ 06:48        Urinalysis Basic - ( 04 Nov 2024 06:48 )    Color: x / Appearance: x / SG: x / pH: x  Gluc: 158 mg/dL / Ketone: x  / Bili: x / Urobili: x   Blood: x / Protein: x / Nitrite: x   Leuk Esterase: x / RBC: x / WBC x   Sq Epi: x / Non Sq Epi: x / Bacteria: x          IMAGING

## 2024-11-04 NOTE — PROGRESS NOTE ADULT - ASSESSMENT
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     #Acute on chronic anemia with hx of AVM and JASMIN  tfx to keep hb >7  s/p  venofer   seen by GI   active duodenal bleeding on VCE  s/p  egd  with hemostasis   worsening anemia now  r/o active bleeding   GI on board   repeat iron panel ,ferritin        #Malignant mesothelioma (diagnosed 8/2024 ) path c/w epitheliod   #Hx Asbestos lung   -s/p VATS pleurodesis, R sided PleurX,s/p drainage  .. plan for immuno chemo as outpt    cont other supportive care.  will f/u

## 2024-11-04 NOTE — PROGRESS NOTE ADULT - SUBJECTIVE AND OBJECTIVE BOX
Patient is a 84y old  Male who presents with a chief complaint of anemia (04 Nov 2024 11:13)       Pt is seen and examined this morning   pt is awake and lying in bed        ROS:  Negative   MEDICATIONS  (STANDING):  atorvastatin 10 milliGRAM(s) Oral at bedtime  chlorhexidine 2% Cloths 1 Application(s) Topical daily  chlorhexidine 2% Cloths 1 Application(s) Topical <User Schedule>  dextrose 5%. 1000 milliLiter(s) (100 mL/Hr) IV Continuous <Continuous>  dextrose 5%. 1000 milliLiter(s) (50 mL/Hr) IV Continuous <Continuous>  dextrose 50% Injectable 12.5 Gram(s) IV Push once  dextrose 50% Injectable 25 Gram(s) IV Push once  dextrose 50% Injectable 25 Gram(s) IV Push once  gabapentin 300 milliGRAM(s) Oral three times a day  glucagon  Injectable 1 milliGRAM(s) IntraMuscular once  heparin   Injectable 5000 Unit(s) SubCutaneous every 12 hours  insulin lispro (ADMELOG) corrective regimen sliding scale   SubCutaneous three times a day before meals  iron sucrose IVPB 100 milliGRAM(s) IV Intermittent every 24 hours  melatonin 3 milliGRAM(s) Oral at bedtime  pantoprazole    Tablet 40 milliGRAM(s) Oral every 12 hours  tamsulosin 0.4 milliGRAM(s) Oral at bedtime    MEDICATIONS  (PRN):  dextrose Oral Gel 15 Gram(s) Oral once PRN Blood Glucose LESS THAN 70 milliGRAM(s)/deciliter  traMADol 50 milliGRAM(s) Oral every 6 hours PRN Severe Pain (7 - 10)      Allergies    penicillin (Unknown)    Intolerances        Vital Signs Last 24 Hrs  T(C): 36.3 (04 Nov 2024 13:21), Max: 36.5 (03 Nov 2024 20:17)  T(F): 97.4 (04 Nov 2024 13:21), Max: 97.7 (03 Nov 2024 20:17)  HR: 79 (04 Nov 2024 13:21) (69 - 81)  BP: 122/78 (04 Nov 2024 13:21) (120/61 - 124/70)  BP(mean): 93 (04 Nov 2024 13:21) (88 - 95)  RR: 18 (04 Nov 2024 13:21) (18 - 18)  SpO2: 99% (04 Nov 2024 13:21) (97% - 99%)    Parameters below as of 04 Nov 2024 13:21  Patient On (Oxygen Delivery Method): room air        PHYSICAL EXAM  General: adult in NAD  HEENT: clear oropharynx, anicteric sclera, pink conjunctiva  Neck: supple  CV: normal S1/S2 with no murmur rubs or gallops  Lungs: positive air movement b/l ant lungs,clear to auscultation, no wheezes, no rales  Abdomen: soft non-tender non-distended, no hepatosplenomegaly  Ext: no clubbing cyanosis or edema  Skin: no rashes and no petechiae  Neuro: alert and oriented X 2, no focal deficits  LABS:                          7.3    7.66  )-----------( 404      ( 04 Nov 2024 06:48 )             23.3         Mean Cell Volume : 95.9 fL  Mean Cell Hemoglobin : 30.0 pg  Mean Cell Hemoglobin Concentration : 31.3 g/dL  Auto Neutrophil # : 5.70 K/uL  Auto Lymphocyte # : 1.04 K/uL  Auto Monocyte # : 0.63 K/uL  Auto Eosinophil # : 0.23 K/uL  Auto Basophil # : 0.02 K/uL  Auto Neutrophil % : 74.4 %  Auto Lymphocyte % : 13.6 %  Auto Monocyte % : 8.2 %  Auto Eosinophil % : 3.0 %  Auto Basophil % : 0.3 %    Serial CBC's  11-04 @ 06:48  Hct-23.3 / Hgb-7.3 / Plat-404 / RBC-2.43 / WBC-7.66          Serial CBC's  11-03 @ 06:40  Hct-24.2 / Hgb-7.5 / Plat-386 / RBC-2.51 / WBC-8.05          Serial CBC's  11-02 @ 11:03  Hct-25.1 / Hgb-7.7 / Plat-384 / RBC-2.62 / WBC-9.13          Serial CBC's  11-02 @ 06:39  Hct-26.1 / Hgb-8.0 / Plat-385 / RBC-2.72 / WBC-8.31          Serial CBC's  11-02 @ 02:08  Hct-26.1 / Hgb-8.0 / Plat-376 / RBC-2.68 / WBC-9.30            11-04    140  |  104  |  36[H]  ----------------------------<  158[H]  4.9   |  23  |  0.9    Ca    9.5      04 Nov 2024 06:48  Mg     2.2     11-04    TPro  5.9[L]  /  Alb  3.2[L]  /  TBili  <0.2  /  DBili  x   /  AST  9   /  ALT  7   /  AlkPhos  103  11-04          WBC Count: 7.66 K/uL (11-04-24 @ 06:48)  Hemoglobin: 7.3 g/dL (11-04-24 @ 06:48)  Hematocrit: 23.3 % (11-04-24 @ 06:48)  Platelet Count - Automated: 404 K/uL (11-04-24 @ 06:48)  WBC Count: 8.05 K/uL (11-03-24 @ 06:40)  Hemoglobin: 7.5 g/dL (11-03-24 @ 06:40)  Hematocrit: 24.2 % (11-03-24 @ 06:40)  Platelet Count - Automated: 386 K/uL (11-03-24 @ 06:40)  WBC Count: 9.13 K/uL (11-02-24 @ 11:03)  Hemoglobin: 7.7 g/dL (11-02-24 @ 11:03)  Hematocrit: 25.1 % (11-02-24 @ 11:03)  Platelet Count - Automated: 384 K/uL (11-02-24 @ 11:03)  WBC Count: 8.31 K/uL (11-02-24 @ 06:39)  Hemoglobin: 8.0 g/dL (11-02-24 @ 06:39)  Hematocrit: 26.1 % (11-02-24 @ 06:39)  Platelet Count - Automated: 385 K/uL (11-02-24 @ 06:39)  WBC Count: 9.30 K/uL (11-02-24 @ 02:08)  Hemoglobin: 8.0 g/dL (11-02-24 @ 02:08)  Hematocrit: 26.1 % (11-02-24 @ 02:08)  Platelet Count - Automated: 376 K/uL (11-02-24 @ 02:08)  WBC Count: 7.91 K/uL (11-01-24 @ 06:38)  Hemoglobin: 8.7 g/dL (11-01-24 @ 06:38)  Hematocrit: 27.7 % (11-01-24 @ 06:38)  Platelet Count - Automated: 378 K/uL (11-01-24 @ 06:38)  WBC Count: 6.64 K/uL (10-31-24 @ 11:36)  Hemoglobin: 7.6 g/dL (10-31-24 @ 11:36)  Hematocrit: 25.4 % (10-31-24 @ 11:36)  Platelet Count - Automated: 408 K/uL (10-31-24 @ 11:36)  WBC Count: 5.94 K/uL (10-29-24 @ 06:26)  Hemoglobin: 8.7 g/dL (10-29-24 @ 06:26)  Hematocrit: 28.8 % (10-29-24 @ 06:26)  Platelet Count - Automated: 378 K/uL (10-29-24 @ 06:26)  WBC Count: 6.36 K/uL (10-27-24 @ 22:48)  Hemoglobin: 8.0 g/dL (10-27-24 @ 22:48)  Hematocrit: 26.4 % (10-27-24 @ 22:48)  Platelet Count - Automated: 436 K/uL (10-27-24 @ 22:48)  WBC Count: 6.87 K/uL (10-27-24 @ 06:46)  Hemoglobin: 8.6 g/dL (10-27-24 @ 06:46)  Hematocrit: 28.2 % (10-27-24 @ 06:46)  Platelet Count - Automated: 398 K/uL (10-27-24 @ 06:46)  WBC Count: 5.74 K/uL (10-26-24 @ 17:00)  Hemoglobin: 8.6 g/dL (10-26-24 @ 17:00)  Hematocrit: 28.1 % (10-26-24 @ 17:00)  Platelet Count - Automated: 427 K/uL (10-26-24 @ 17:00)  WBC Count: 6.96 K/uL (10-26-24 @ 07:04)  Hemoglobin: 8.0 g/dL (10-26-24 @ 07:04)  Hematocrit: 25.8 % (10-26-24 @ 07:04)  Platelet Count - Automated: 415 K/uL (10-26-24 @ 07:04)  WBC Count: 8.18 K/uL (10-25-24 @ 20:47)  Hemoglobin: 8.0 g/dL (10-25-24 @ 20:47)  Hematocrit: 25.7 % (10-25-24 @ 20:47)  Platelet Count - Automated: 415 K/uL (10-25-24 @ 20:47)                BLOOD SMEAR INTERPRETATION:       RADIOLOGY & ADDITIONAL STUDIES:

## 2024-11-05 LAB
ALBUMIN SERPL ELPH-MCNC: 3 G/DL — LOW (ref 3.5–5.2)
ALP SERPL-CCNC: 95 U/L — SIGNIFICANT CHANGE UP (ref 30–115)
ALT FLD-CCNC: 11 U/L — SIGNIFICANT CHANGE UP (ref 0–41)
ANION GAP SERPL CALC-SCNC: 9 MMOL/L — SIGNIFICANT CHANGE UP (ref 7–14)
AST SERPL-CCNC: 12 U/L — SIGNIFICANT CHANGE UP (ref 0–41)
BASOPHILS # BLD AUTO: 0.02 K/UL — SIGNIFICANT CHANGE UP (ref 0–0.2)
BASOPHILS NFR BLD AUTO: 0.3 % — SIGNIFICANT CHANGE UP (ref 0–1)
BILIRUB SERPL-MCNC: 0.2 MG/DL — SIGNIFICANT CHANGE UP (ref 0.2–1.2)
BUN SERPL-MCNC: 37 MG/DL — HIGH (ref 10–20)
CALCIUM SERPL-MCNC: 8.8 MG/DL — SIGNIFICANT CHANGE UP (ref 8.4–10.5)
CHLORIDE SERPL-SCNC: 103 MMOL/L — SIGNIFICANT CHANGE UP (ref 98–110)
CO2 SERPL-SCNC: 27 MMOL/L — SIGNIFICANT CHANGE UP (ref 17–32)
CREAT SERPL-MCNC: 0.9 MG/DL — SIGNIFICANT CHANGE UP (ref 0.7–1.5)
EGFR: 85 ML/MIN/1.73M2 — SIGNIFICANT CHANGE UP
EOSINOPHIL # BLD AUTO: 0.21 K/UL — SIGNIFICANT CHANGE UP (ref 0–0.7)
EOSINOPHIL NFR BLD AUTO: 3.1 % — SIGNIFICANT CHANGE UP (ref 0–8)
GLUCOSE BLDC GLUCOMTR-MCNC: 110 MG/DL — HIGH (ref 70–99)
GLUCOSE BLDC GLUCOMTR-MCNC: 157 MG/DL — HIGH (ref 70–99)
GLUCOSE BLDC GLUCOMTR-MCNC: 250 MG/DL — HIGH (ref 70–99)
GLUCOSE BLDC GLUCOMTR-MCNC: 322 MG/DL — HIGH (ref 70–99)
GLUCOSE SERPL-MCNC: 141 MG/DL — HIGH (ref 70–99)
HAPTOGLOB SERPL-MCNC: 330 MG/DL — HIGH (ref 34–200)
HCT VFR BLD CALC: 25.8 % — LOW (ref 42–52)
HGB BLD-MCNC: 8.1 G/DL — LOW (ref 14–18)
IMM GRANULOCYTES NFR BLD AUTO: 0.6 % — HIGH (ref 0.1–0.3)
LDH SERPL L TO P-CCNC: 122 U/L — SIGNIFICANT CHANGE UP (ref 50–242)
LYMPHOCYTES # BLD AUTO: 0.76 K/UL — LOW (ref 1.2–3.4)
LYMPHOCYTES # BLD AUTO: 11.2 % — LOW (ref 20.5–51.1)
MAGNESIUM SERPL-MCNC: 2.2 MG/DL — SIGNIFICANT CHANGE UP (ref 1.8–2.4)
MCHC RBC-ENTMCNC: 30.2 PG — SIGNIFICANT CHANGE UP (ref 27–31)
MCHC RBC-ENTMCNC: 31.4 G/DL — LOW (ref 32–37)
MCV RBC AUTO: 96.3 FL — HIGH (ref 80–94)
MONOCYTES # BLD AUTO: 0.55 K/UL — SIGNIFICANT CHANGE UP (ref 0.1–0.6)
MONOCYTES NFR BLD AUTO: 8.1 % — SIGNIFICANT CHANGE UP (ref 1.7–9.3)
NEUTROPHILS # BLD AUTO: 5.2 K/UL — SIGNIFICANT CHANGE UP (ref 1.4–6.5)
NEUTROPHILS NFR BLD AUTO: 76.7 % — HIGH (ref 42.2–75.2)
NRBC # BLD: 0 /100 WBCS — SIGNIFICANT CHANGE UP (ref 0–0)
PLATELET # BLD AUTO: 393 K/UL — SIGNIFICANT CHANGE UP (ref 130–400)
PMV BLD: 9.4 FL — SIGNIFICANT CHANGE UP (ref 7.4–10.4)
POTASSIUM SERPL-MCNC: 4.8 MMOL/L — SIGNIFICANT CHANGE UP (ref 3.5–5)
POTASSIUM SERPL-SCNC: 4.8 MMOL/L — SIGNIFICANT CHANGE UP (ref 3.5–5)
PROT SERPL-MCNC: 5.6 G/DL — LOW (ref 6–8)
RBC # BLD: 2.68 M/UL — LOW (ref 4.7–6.1)
RBC # BLD: 2.68 M/UL — LOW (ref 4.7–6.1)
RBC # FLD: 16 % — HIGH (ref 11.5–14.5)
RETICS #: 85 K/UL — SIGNIFICANT CHANGE UP (ref 25–125)
RETICS/RBC NFR: 3.2 % — HIGH (ref 0.5–1.5)
SODIUM SERPL-SCNC: 139 MMOL/L — SIGNIFICANT CHANGE UP (ref 135–146)
WBC # BLD: 6.78 K/UL — SIGNIFICANT CHANGE UP (ref 4.8–10.8)
WBC # FLD AUTO: 6.78 K/UL — SIGNIFICANT CHANGE UP (ref 4.8–10.8)

## 2024-11-05 PROCEDURE — 93970 EXTREMITY STUDY: CPT | Mod: 26

## 2024-11-05 PROCEDURE — 99232 SBSQ HOSP IP/OBS MODERATE 35: CPT

## 2024-11-05 RX ORDER — ASPIRIN/MAG CARB/ALUMINUM AMIN 325 MG
81 TABLET ORAL DAILY
Refills: 0 | Status: DISCONTINUED | OUTPATIENT
Start: 2024-11-05 | End: 2024-11-06

## 2024-11-05 RX ADMIN — Medication 10 MILLIGRAM(S): at 21:40

## 2024-11-05 RX ADMIN — CHLORHEXIDINE GLUCONATE 1 APPLICATION(S): 40 SOLUTION TOPICAL at 13:09

## 2024-11-05 RX ADMIN — HEPARIN SODIUM 5000 UNIT(S): 10000 INJECTION INTRAVENOUS; SUBCUTANEOUS at 06:02

## 2024-11-05 RX ADMIN — GABAPENTIN 300 MILLIGRAM(S): 300 CAPSULE ORAL at 21:39

## 2024-11-05 RX ADMIN — GABAPENTIN 300 MILLIGRAM(S): 300 CAPSULE ORAL at 06:02

## 2024-11-05 RX ADMIN — Medication 0.4 MILLIGRAM(S): at 21:39

## 2024-11-05 RX ADMIN — Medication 81 MILLIGRAM(S): at 13:06

## 2024-11-05 RX ADMIN — Medication 2: at 13:37

## 2024-11-05 RX ADMIN — HEPARIN SODIUM 5000 UNIT(S): 10000 INJECTION INTRAVENOUS; SUBCUTANEOUS at 17:55

## 2024-11-05 RX ADMIN — CHLORHEXIDINE GLUCONATE 1 APPLICATION(S): 40 SOLUTION TOPICAL at 06:02

## 2024-11-05 RX ADMIN — GABAPENTIN 300 MILLIGRAM(S): 300 CAPSULE ORAL at 13:07

## 2024-11-05 RX ADMIN — PANTOPRAZOLE SODIUM 40 MILLIGRAM(S): 40 TABLET, DELAYED RELEASE ORAL at 17:55

## 2024-11-05 RX ADMIN — Medication 1: at 08:49

## 2024-11-05 RX ADMIN — Medication 3 MILLIGRAM(S): at 21:40

## 2024-11-05 RX ADMIN — PANTOPRAZOLE SODIUM 40 MILLIGRAM(S): 40 TABLET, DELAYED RELEASE ORAL at 06:02

## 2024-11-05 NOTE — PROGRESS NOTE ADULT - ATTENDING COMMENTS
Pending EGD tomorrow pending pulm eval. No drop in hgb no melena however positive VCE in the bulb.
Patient and son offered video capsule endoscopy, oncology requesting GI workup and potential BM biopsy if needed.   At this time Hb stable and no overt bleeding , will attempt again the prep to see if able to get video capsule tomorrow since patient was unable to drink the prep. We will involve the son in helping with prep based on his request.  DW team in details
Patient was seen independently by attending. Latest vital signs and labs were reviewed today. Case was discussed with house staff including resident , nursing & . Following additions/modifications to assessment & plan override resident note .       ASSESSMENT & PLAN:  Acute on chronic microcytic anemia with iron deficiency.  VCE done 10/24 showing active bleeding in duodenum.  H/H remained stable.  Continue PPI 40 mg IV twice daily for now.  Keep hemoglobin more than 8–PRBC as needed.  History of mesothelioma–chronic Pleurx which needs to be drained weekly  Continue current medical management for active chronic comorbid conditions.  DVT Prophylaxis as appropriate  Supportive care including activity, diet, goals of care discussed in AM rounds.  Discussed with  / in AM rounds  Handoff: Family still indecisive about EGD which has been recommended by gastroenterology
Agree with the above.  Patient with iron deficiency anemia was initially refusing endoscopic intervention, status post VCE revealing bleeding AVMs, and status post EGD on 10/28/2024 with APC of AVMs at the duodenal sweep and the second part of the duodenum.    No further bleeding at this time and hemoglobin is stable.  No further GI intervention.  Patient will need to follow-up with primary GI as outpatient for colonoscopy for completion of JASMIN workup if within goals of care. oncology recs appreciated.  Rest of recommendations per the note above.    Time-based billing (NON-critical care).   50 minutes spent on total encounter; more than 50% of the visit was spent counseling and / or coordinating care by the attending physician.  The necessity of the time spent during the encounter on this date of service was due to: Coordination of care.
Cleared by GI for resuming anticoagulation   fu with GI as outpatient   dc planning likely tomorrow am per alessandra and payton   dw team in details
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
Pending EGD. No drop in hgb no melena however positive VCE in the bulb.
Discussed with the son findings of +ve bleeding on VCE in the bulb. Son prefers deffering EGD for now despite explaining that GI Bleeding could be a concerning diagnosis. Will continue to follow
Note reviewed and edited
Pending VCE.
Note reviewed and edited
Will schedule VCE.

## 2024-11-05 NOTE — PROGRESS NOTE ADULT - ASSESSMENT
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 Patient had a recent admission in September for anemia for which he received 2 units prbc, IV Venofer and was recommended EGD/[place] but given his advance age and co morbidities family decided to hold off. Patient is a poor historian. GI was consulted for anemia.     #Acute on chronic macrocytic anemia with AJSMIN - no overt GI bleed    - Iron :31, %sat: 10 TIBC 325 ferritin 89 while on iron  - reported AVMs? endoscopic work up with  per grand son Nelli Lopez    VCE done 10/24 showing active bleed in the duodenum    S/p EGD 10/28  Otherwise normal esophagus.  Erythema in the stomach compatible with non-erosive gastritis.  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 (APC Hemostasis).  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 (APC Hemostasis).  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     Plan  Repeat CBC   Can advance diet  Start PPI BID for total  2 weeks and then q24h thereafter   Can resume necessary DAPT as deemed indicated per team  Can follow up with private GI  If has recurrent dark stools or drop in Hb would reassess for repeat endoscopy      #Small hypodensity in segment 7 of the liver seen on prior CT.  -Consider RUQ US as OP  -f/u w/ primary GI

## 2024-11-05 NOTE — PROGRESS NOTE ADULT - ASSESSMENT
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 Per NH, patient also refused Pleurx drainage today and is requesting to drain it inpatient (gets drained 2-3 times per week).    #Acute on chronic anemia s/p multiple transfusions  - Seen by GI last admission: regarding endoscopic work up. After discussing risks vs benefits given his advance age and co morbidities he would like to hold off. Offered VCE as outpatient .   - last admission received 5 days of Venofer   - Active type and screen   - Switched plavix to aspirin for now  - c/w ferrous sulfate, IV venofovir for 5 days  - GI consult placed, Follow up, as per GI- Patient can follow  or  Follow up with our [facility] located at [address] [place] [address]  - Continue home PPI 40mg BID PO  - avoid Nsaids  - Hb 6.9> 1 unit PRBC (10/18)> repeat 7.8  - Hb 7.4 10/21 -> GI will reach out to family to discuss inpatient endoscopy -> pt son agreed for Video capsule endoscopy scheduled for tomorrow 10/23/2024  - Oncology onboard also recommended endoscopy to rule out GI bleed -> if GI workup is negative will offer bone marrow biopsy   - Patient refused prep yesterday follow up with GI, HB is stable   - VCE done on 10/24 ->Duodenal bleed, put on clear liquid diet, PPI BID IV   - 10/26: family agreed on inpatient EGD  - 10/28: s/p EGD significant for 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 (APC Hemostasis). 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 (APC Hemostasis). PPI BID for 2 weeks and then q24h thereafter   - 10/30: DAPT resumed, Hgb stable  - 10/31: patient given 1prbc  - 11/02: f/u repeat CBC at 11 and possible DC if Hgb stable  - 11/04: Hgb still downtrending, patient had melena episode on Saturday, asymptomatic currently, f/u GI recs for further evaluation of anemia  - 11/05: will monitor Hgb and repeat scope as outpatient as per GI, DC planning    #Malignant mesothelioma (diagnosed 8/2024 ) with recurrent pleural effusion . Hx Asbestos lung   #Severe protein calorie malnutrition (calorie count started - result 10/21)  -s/p VATS pleurodesis, R sided PleurX  - Per NH, patient also refused Pleurx drainage and is requesting to drain it inpatient (gets drained 2-3 times per week)  - Currently on room air (target spo2 92-96)  - Fu heme onc- Dr. Ramírez OP  - Fu palliative- full code  - PleurX drained on 10/21  - Aspiration precaution  - Pain control   - 10/28: pleurx drained 350cc  - 11/04: LE Duplex ordered to r/o DVT as patient hypercoagulable and not moving, will resume Heparin ppx    #HTN/HLD/CAD s/p stents x2 in 2007 (follows Dr Avila)  - c/w home meds     #Small hypodensity in segment 7 of the liver seen on prior CT.  - RUQ US as OP  - f/u outpt GI    #Acute Grief vs MDD  - wife passed away a few months ago  - patient refusing medication sometimes and asks to be left alone, no suicide ideas, no ideas to hurt other people.    - Psychiatry eval admission recommended outpt fu     Pendings: monitor Hgb, DC planning

## 2024-11-05 NOTE — PROGRESS NOTE ADULT - SUBJECTIVE AND OBJECTIVE BOX
Patient is a 84y old  Male who presents with a chief complaint of Anemia     (17 Oct 2024 12:14)    INTERVAL HPI/OVERNIGHT EVENTS: Patient was examined and seen at bedside. This morning pt is resting comfortably in bed and reports no complaints. Breathing is stable. Denies need for pleurx draiange. No further melena.  ROS: Denies CP, SOB, AP, new weakness  All other systems reviewed and are within normal limits.  InitialHPI:  HPI: 85 y/o man  PMHx of HLD, DM, CAD s/p stents x2 in 2007, pt of Dr Avila, hx  asbestosis of lung, diagnosed w malignant mesothelioma (8/2024) and s/p VATS pleurodesis, R sided PleurX, hx AVM/GIB and JASMIN, sent in by NH for low hemoglobin. Per NH, patient also refused Pleurx drainage today and is requesting to drain it inpatient (gets drained 2-3 times per week). Of note, patient had a recent admission in September for anemia for which he received 2 units prbc, IV Venofer and was recommended EGD/[facility] but given his advance age and co morbidities decided to hold off. Patient is a poor historian. Uses wheelchair at baseline. Denies fever chills, shortness of breath, cough, chest pain, leg swelling, hematemesis, hematochezia.     Vital Signs Last 24 Hrs  T(C): 36.4 (15 Oct 2024 01:09), Max: 36.9 (14 Oct 2024 22:51)  T(F): 97.5 (15 Oct 2024 01:09), Max: 98.4 (14 Oct 2024 22:51)  HR: 100 (15 Oct 2024 01:09) (89 - 105)  BP: 132/74 (15 Oct 2024 01:09) (104/64 - 132/74)  RR: 18 (15 Oct 2024 01:09) (17 - 19)  SpO2: 97% (15 Oct 2024 01:09) (93% - 97%)    Parameters below as of 15 Oct 2024 01:09  Patient On (Oxygen Delivery Method): room air    Labs: Hgb 6.4 , Platelets 500 , Cr 1.2 (baseline)   CXR: Worsening R pleural effusion   EKG: NSR with PACs    Admitted for anemia      (15 Oct 2024 01:16)    PAST MEDICAL & SURGICAL HISTORY:  DM (diabetes mellitus)      MI (myocardial infarction)      History of CAD (coronary artery disease)      Dyslipidemia      Currently smokes tobacco      Mesothelioma, malignant      Coronary stent patent          General: NAD, AAOx2, chronically ill appearing, b/l temp waisting, cachectic  HEENT:  no LAD  CV: S1 S2  Resp: decreased breath sounds at bases  GI: NT/ND/S +BS  MS: no clubbing/cyanosis/edema, + pulses b/l. TTP Rt lower back  Neuro: nonfocal, +reflexes thruout  +laws w/ clear yellow urine  +Rt sided ant pleurx cath w/ d/c/i dsg            Home Medications:  ferrous sulfate 325 mg (65 mg elemental iron) oral tablet: 1 tab(s) orally once a day (25 Sep 2024 15:37)  gabapentin 300 mg oral tablet: 1 tab(s) orally 3 times a day (20 Sep 2024 01:37)  Lovenox 30 mg/0.3 mL injectable solution: 30 milligram(s) subcutaneously once a day (01 Nov 2024 12:53)  melatonin 3 mg oral tablet: 1 tab(s) orally once a day (at bedtime) (13 Aug 2024 09:23)  metFORMIN 500 mg oral tablet: 1 tab(s) orally 2 times a day (21 Jul 2024 18:04)  pantoprazole 40 mg oral delayed release tablet: 1 tab(s) orally every 12 hours (25 Sep 2024 15:37)  polyethylene glycol 3350 oral powder for reconstitution: 17 gram(s) orally once a day (13 Aug 2024 09:23)  pravastatin 20 mg oral tablet: 1 tab(s) orally (21 Jul 2024 18:04)  senna leaf extract oral tablet: 2 tab(s) orally once a day (at bedtime) (13 Aug 2024 09:23)  tamsulosin 0.4 mg oral capsule: 1 cap(s) orally once a day (at bedtime) (13 Aug 2024 09:23)    MEDICATIONS  (STANDING):  aspirin  chewable 81 milliGRAM(s) Oral daily  atorvastatin 10 milliGRAM(s) Oral at bedtime  chlorhexidine 2% Cloths 1 Application(s) Topical daily  chlorhexidine 2% Cloths 1 Application(s) Topical <User Schedule>  dextrose 5%. 1000 milliLiter(s) (100 mL/Hr) IV Continuous <Continuous>  dextrose 5%. 1000 milliLiter(s) (50 mL/Hr) IV Continuous <Continuous>  dextrose 50% Injectable 12.5 Gram(s) IV Push once  dextrose 50% Injectable 25 Gram(s) IV Push once  dextrose 50% Injectable 25 Gram(s) IV Push once  gabapentin 300 milliGRAM(s) Oral three times a day  glucagon  Injectable 1 milliGRAM(s) IntraMuscular once  heparin   Injectable 5000 Unit(s) SubCutaneous every 12 hours  insulin lispro (ADMELOG) corrective regimen sliding scale   SubCutaneous three times a day before meals  melatonin 3 milliGRAM(s) Oral at bedtime  pantoprazole    Tablet 40 milliGRAM(s) Oral every 12 hours  tamsulosin 0.4 milliGRAM(s) Oral at bedtime    MEDICATIONS  (PRN):  dextrose Oral Gel 15 Gram(s) Oral once PRN Blood Glucose LESS THAN 70 milliGRAM(s)/deciliter    Vital Signs Last 24 Hrs  T(C): 36.5 (05 Nov 2024 13:14), Max: 36.5 (05 Nov 2024 13:14)  T(F): 97.7 (05 Nov 2024 13:14), Max: 97.7 (05 Nov 2024 13:14)  HR: 79 (05 Nov 2024 13:14) (79 - 80)  BP: 147/69 (05 Nov 2024 13:14) (121/62 - 147/69)  BP(mean): 95 (05 Nov 2024 13:14) (82 - 95)  RR: 18 (05 Nov 2024 13:14) (18 - 18)  SpO2: 100% (05 Nov 2024 13:14) (95% - 100%)    Parameters below as of 05 Nov 2024 13:14  Patient On (Oxygen Delivery Method): room air      CAPILLARY BLOOD GLUCOSE      POCT Blood Glucose.: 110 mg/dL (05 Nov 2024 17:06)  POCT Blood Glucose.: 250 mg/dL (05 Nov 2024 11:08)  POCT Blood Glucose.: 157 mg/dL (05 Nov 2024 07:52)    LABS:                        8.1    6.78  )-----------( 393      ( 05 Nov 2024 07:13 )             25.8     11-05    139  |  103  |  37[H]  ----------------------------<  141[H]  4.8   |  27  |  0.9    Ca    8.8      05 Nov 2024 07:13  Mg     2.2     11-05    TPro  5.6[L]  /  Alb  3.0[L]  /  TBili  0.2  /  DBili  x   /  AST  12  /  ALT  11  /  AlkPhos  95  11-05    LIVER FUNCTIONS - ( 05 Nov 2024 07:13 )  Alb: 3.0 g/dL / Pro: 5.6 g/dL / ALK PHOS: 95 U/L / ALT: 11 U/L / AST: 12 U/L / GGT: x                 Urinalysis Basic - ( 05 Nov 2024 07:13 )    Color: x / Appearance: x / SG: x / pH: x  Gluc: 141 mg/dL / Ketone: x  / Bili: x / Urobili: x   Blood: x / Protein: x / Nitrite: x   Leuk Esterase: x / RBC: x / WBC x   Sq Epi: x / Non Sq Epi: x / Bacteria: x              Consultant Notes Reviewed:  [x ] YES  [ ] NO  Care Discussed with Consultants/Other Providers/ Housestaff [ x] YES  [ ] NO  Radiology, labs, EKGs, new studies personally reviewed.

## 2024-11-05 NOTE — PROGRESS NOTE ADULT - TIME BILLING
Patient seen at bedside, time spent evaluating and treating the patient's acute illness as well as time spent reviewing labs, radiology, discussing with patient and/or patient's family and discussing the case with a multidisciplinary team.
50 minutes spent on total encounter; more than 50% of the visit was spent counseling and / or coordinating care by the attending physician.  The necessity of the time spent during the encounter on this date of service was due to: Coordination of care.
Coordination of care
Coordination of care
50 minutes spent on total encounter; more than 50% of the visit was spent counseling and / or coordinating care by the attending physician.  The necessity of the time spent during the encounter on this date of service was due to: Coordination of care.

## 2024-11-05 NOTE — PROGRESS NOTE ADULT - SUBJECTIVE AND OBJECTIVE BOX
SUBJECTIVE/OVERNIGHT EVENTS  Today is hospital day 22d. This morning patient was seen and examined at bedside, resting comfortably in bed. No acute or major events overnight.  Patient medically stable for DC.    MEDICATIONS  STANDING MEDICATIONS  atorvastatin 10 milliGRAM(s) Oral at bedtime  chlorhexidine 2% Cloths 1 Application(s) Topical <User Schedule>  chlorhexidine 2% Cloths 1 Application(s) Topical daily  dextrose 5%. 1000 milliLiter(s) IV Continuous <Continuous>  dextrose 5%. 1000 milliLiter(s) IV Continuous <Continuous>  dextrose 50% Injectable 25 Gram(s) IV Push once  dextrose 50% Injectable 12.5 Gram(s) IV Push once  dextrose 50% Injectable 25 Gram(s) IV Push once  gabapentin 300 milliGRAM(s) Oral three times a day  glucagon  Injectable 1 milliGRAM(s) IntraMuscular once  heparin   Injectable 5000 Unit(s) SubCutaneous every 12 hours  insulin lispro (ADMELOG) corrective regimen sliding scale   SubCutaneous three times a day before meals  iron sucrose IVPB 100 milliGRAM(s) IV Intermittent every 24 hours  melatonin 3 milliGRAM(s) Oral at bedtime  pantoprazole    Tablet 40 milliGRAM(s) Oral every 12 hours  tamsulosin 0.4 milliGRAM(s) Oral at bedtime    PRN MEDICATIONS  dextrose Oral Gel 15 Gram(s) Oral once PRN  traMADol 50 milliGRAM(s) Oral every 6 hours PRN    VITALS  T(F): 97.5 (11-04-24 @ 04:42), Max: 97.9 (11-03-24 @ 13:00)  HR: 81 (11-04-24 @ 04:42) (69 - 81)  BP: 124/70 (11-04-24 @ 04:42) (120/61 - 124/70)  RR: 18 (11-04-24 @ 04:42) (18 - 18)  SpO2: 99% (11-04-24 @ 04:42) (97% - 99%)  POCT Blood Glucose.: 181 mg/dL (11-04-24 @ 07:44)    PHYSICAL EXAM  GENERAL  ( x ) NAD, lying in bed comfortably     (  ) obtunded     (  ) lethargic     (  ) somnolent    HEAD  ( x ) Atraumatic     (  ) hematoma     (  ) laceration (specify location:       )     NECK  ( x ) Supple     (  ) neck stiffness     (  ) nuchal rigidity     (  )  no JVD     (  ) JVD present ( -- cm)    HEART  Rate -->  ( x ) normal rate    (  ) bradycardic    (  ) tachycardic  Rhythm -->  ( x ) regular    (  ) regularly irregular    (  ) irregularly irregular  Murmurs -->  ( x ) normal s1/s2    (  ) systolic murmur    (  ) diastolic murmur    (  ) continuous murmur     (  ) S3 present    (  ) S4 present    LUNGS  ( x ) Unlabored respirations     (  ) tachypnea  ( x ) B/L air entry     (  ) decreased breath sounds in:  (location     )    (  ) no adventitious sound     (  ) crackles     (  ) wheezing      (  ) rhonchi      (specify location:       )  (  ) chest wall tenderness (specify location:       )    ABDOMEN  ( x ) Soft     (  ) tense   |   (  ) nondistended     (  ) distended   |   (  ) +BS     (  ) hypoactive bowel sounds     (  ) hyperactive bowel sounds  ( x ) nontender     (  ) RUQ tenderness     (  ) RLQ tenderness     (  ) LLQ tenderness     (  ) epigastric tenderness     (  ) diffuse tenderness  (  ) Splenomegaly      (  ) Hepatomegaly      (  ) Jaundice     (  ) ecchymosis     EXTREMITIES  ( x ) Normal     (  ) Rash     (  ) ecchymosis     (  ) varicose veins      (  ) pitting edema     (  ) non-pitting edema   (  ) ulceration     (  ) gangrene:     (location:     )    NERVOUS SYSTEM  (  ) A&Ox3     (  ) confused     (  ) lethargic  CN II-XII:     (  ) Intact     (  ) focal deficits  (Specify:     )   Upper extremities:     (  ) strength X/5     (  ) focal deficit (specify:    )  Lower extremities:     (  ) strength  X/5    (  ) focal deficit (specify:    )      LABS             7.3    7.66  )-----------( 404      ( 11-04-24 @ 06:48 )             23.3     140  |  104  |  36  -------------------------<  158   11-04-24 @ 06:48  4.9  |  23  |  0.9    Ca      9.5     11-04-24 @ 06:48  Mg     2.2     11-04-24 @ 06:48    TPro  5.9  /  Alb  3.2  /  TBili  <0.2  /  DBili  x   /  AST  9   /  ALT  7   /  AlkPhos  103  /  GGT  x     11-04-24 @ 06:48        Urinalysis Basic - ( 04 Nov 2024 06:48 )    Color: x / Appearance: x / SG: x / pH: x  Gluc: 158 mg/dL / Ketone: x  / Bili: x / Urobili: x   Blood: x / Protein: x / Nitrite: x   Leuk Esterase: x / RBC: x / WBC x   Sq Epi: x / Non Sq Epi: x / Bacteria: x          IMAGING

## 2024-11-05 NOTE — PROGRESS NOTE ADULT - ASSESSMENT
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 Per NH, patient also refused Pleurx drainage today and is requesting to drain it inpatient (gets drained 2-3 times per week).    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  Dementia         Continue current care as per medicine and GI   The patient needs to be on ASA or Plavix in view of his CAD/PCI   GI and Pulmonary follow up as out patient   DVT/GI prophylaxis   Physical therapy/Rehab   Maitain Hb > 8  Will F/U

## 2024-11-05 NOTE — PROGRESS NOTE ADULT - SUBJECTIVE AND OBJECTIVE BOX
Patient is a 84y old  Male who presents with a chief complaint of anemia (05 Nov 2024 13:19)       Pt is seen and examined  pt is awake and lying in bed        ROS:  Negative except for:weakness    MEDICATIONS  (STANDING):  aspirin  chewable 81 milliGRAM(s) Oral daily  atorvastatin 10 milliGRAM(s) Oral at bedtime  chlorhexidine 2% Cloths 1 Application(s) Topical <User Schedule>  chlorhexidine 2% Cloths 1 Application(s) Topical daily  dextrose 5%. 1000 milliLiter(s) (100 mL/Hr) IV Continuous <Continuous>  dextrose 5%. 1000 milliLiter(s) (50 mL/Hr) IV Continuous <Continuous>  dextrose 50% Injectable 25 Gram(s) IV Push once  dextrose 50% Injectable 12.5 Gram(s) IV Push once  dextrose 50% Injectable 25 Gram(s) IV Push once  gabapentin 300 milliGRAM(s) Oral three times a day  glucagon  Injectable 1 milliGRAM(s) IntraMuscular once  heparin   Injectable 5000 Unit(s) SubCutaneous every 12 hours  insulin lispro (ADMELOG) corrective regimen sliding scale   SubCutaneous three times a day before meals  melatonin 3 milliGRAM(s) Oral at bedtime  pantoprazole    Tablet 40 milliGRAM(s) Oral every 12 hours  tamsulosin 0.4 milliGRAM(s) Oral at bedtime    MEDICATIONS  (PRN):  dextrose Oral Gel 15 Gram(s) Oral once PRN Blood Glucose LESS THAN 70 milliGRAM(s)/deciliter      Allergies    penicillin (Unknown)    Intolerances        Vital Signs Last 24 Hrs  T(C): 36.5 (05 Nov 2024 13:14), Max: 36.5 (05 Nov 2024 13:14)  T(F): 97.7 (05 Nov 2024 13:14), Max: 97.7 (05 Nov 2024 13:14)  HR: 79 (05 Nov 2024 13:14) (79 - 80)  BP: 147/69 (05 Nov 2024 13:14) (121/62 - 147/69)  BP(mean): 95 (05 Nov 2024 13:14) (82 - 95)  RR: 18 (05 Nov 2024 13:14) (18 - 18)  SpO2: 100% (05 Nov 2024 13:14) (95% - 100%)    Parameters below as of 05 Nov 2024 13:14  Patient On (Oxygen Delivery Method): room air        PHYSICAL EXAM  General: cachectic   HEENT: clear oropharynx, anicteric sclera, pink conjunctiva  Neck: supple  CV: normal S1/S2 with no murmur rubs or gallops  Lungs: positive air movement b/l ant lungs,clear to auscultation, no wheezes, no rales  Abdomen: soft non-tender non-distended, no hepatosplenomegaly  Ext: no clubbing cyanosis or edema  Skin: no rashes and no petechiae  Neuro: alert and oriented X 2, no focal deficits  LABS:                          8.1    6.78  )-----------( 393      ( 05 Nov 2024 07:13 )             25.8         Mean Cell Volume : 96.3 fL  Mean Cell Hemoglobin : 30.2 pg  Mean Cell Hemoglobin Concentration : 31.4 g/dL  Auto Neutrophil # : 5.20 K/uL  Auto Lymphocyte # : 0.76 K/uL  Auto Monocyte # : 0.55 K/uL  Auto Eosinophil # : 0.21 K/uL  Auto Basophil # : 0.02 K/uL  Auto Neutrophil % : 76.7 %  Auto Lymphocyte % : 11.2 %  Auto Monocyte % : 8.1 %  Auto Eosinophil % : 3.1 %  Auto Basophil % : 0.3 %    Serial CBC's  11-05 @ 07:13  Hct-25.8 / Hgb-8.1 / Plat-393 / RBC-2.68 / WBC-6.78          Serial CBC's  11-04 @ 21:05  Hct-27.1 / Hgb-8.5 / Plat-401 / RBC-2.83 / WBC-7.55          Serial CBC's  11-04 @ 06:48  Hct-23.3 / Hgb-7.3 / Plat-404 / RBC-2.43 / WBC-7.66          Serial CBC's  11-03 @ 06:40  Hct-24.2 / Hgb-7.5 / Plat-386 / RBC-2.51 / WBC-8.05          Serial CBC's  11-02 @ 11:03  Hct-25.1 / Hgb-7.7 / Plat-384 / RBC-2.62 / WBC-9.13            11-05    139  |  103  |  37[H]  ----------------------------<  141[H]  4.8   |  27  |  0.9    Ca    8.8      05 Nov 2024 07:13  Mg     2.2     11-05    TPro  5.6[L]  /  Alb  3.0[L]  /  TBili  0.2  /  DBili  x   /  AST  12  /  ALT  11  /  AlkPhos  95  11-05          WBC Count: 6.78 K/uL (11-05-24 @ 07:13)  Hemoglobin: 8.1 g/dL (11-05-24 @ 07:13)  Hematocrit: 25.8 % (11-05-24 @ 07:13)  Platelet Count - Automated: 393 K/uL (11-05-24 @ 07:13)  Reticulocyte Percent: 3.2 % (11-05-24 @ 07:13)  WBC Count: 7.55 K/uL (11-04-24 @ 21:05)  Hemoglobin: 8.5 g/dL (11-04-24 @ 21:05)  Hematocrit: 27.1 % (11-04-24 @ 21:05)  Platelet Count - Automated: 401 K/uL (11-04-24 @ 21:05)  WBC Count: 7.66 K/uL (11-04-24 @ 06:48)  Hemoglobin: 7.3 g/dL (11-04-24 @ 06:48)  Hematocrit: 23.3 % (11-04-24 @ 06:48)  Platelet Count - Automated: 404 K/uL (11-04-24 @ 06:48)  WBC Count: 8.05 K/uL (11-03-24 @ 06:40)  Hemoglobin: 7.5 g/dL (11-03-24 @ 06:40)  Hematocrit: 24.2 % (11-03-24 @ 06:40)  Platelet Count - Automated: 386 K/uL (11-03-24 @ 06:40)  WBC Count: 9.13 K/uL (11-02-24 @ 11:03)  Hemoglobin: 7.7 g/dL (11-02-24 @ 11:03)  Hematocrit: 25.1 % (11-02-24 @ 11:03)  Platelet Count - Automated: 384 K/uL (11-02-24 @ 11:03)  WBC Count: 8.31 K/uL (11-02-24 @ 06:39)  Hemoglobin: 8.0 g/dL (11-02-24 @ 06:39)  Hematocrit: 26.1 % (11-02-24 @ 06:39)  Platelet Count - Automated: 385 K/uL (11-02-24 @ 06:39)  WBC Count: 9.30 K/uL (11-02-24 @ 02:08)  Hemoglobin: 8.0 g/dL (11-02-24 @ 02:08)  Hematocrit: 26.1 % (11-02-24 @ 02:08)  Platelet Count - Automated: 376 K/uL (11-02-24 @ 02:08)  WBC Count: 7.91 K/uL (11-01-24 @ 06:38)  Hemoglobin: 8.7 g/dL (11-01-24 @ 06:38)  Hematocrit: 27.7 % (11-01-24 @ 06:38)  Platelet Count - Automated: 378 K/uL (11-01-24 @ 06:38)  WBC Count: 6.64 K/uL (10-31-24 @ 11:36)  Hemoglobin: 7.6 g/dL (10-31-24 @ 11:36)  Hematocrit: 25.4 % (10-31-24 @ 11:36)  Platelet Count - Automated: 408 K/uL (10-31-24 @ 11:36)  WBC Count: 5.94 K/uL (10-29-24 @ 06:26)  Hemoglobin: 8.7 g/dL (10-29-24 @ 06:26)  Hematocrit: 28.8 % (10-29-24 @ 06:26)  Platelet Count - Automated: 378 K/uL (10-29-24 @ 06:26)  WBC Count: 6.36 K/uL (10-27-24 @ 22:48)  Hemoglobin: 8.0 g/dL (10-27-24 @ 22:48)  Hematocrit: 26.4 % (10-27-24 @ 22:48)  Platelet Count - Automated: 436 K/uL (10-27-24 @ 22:48)  WBC Count: 6.87 K/uL (10-27-24 @ 06:46)  Hemoglobin: 8.6 g/dL (10-27-24 @ 06:46)  Hematocrit: 28.2 % (10-27-24 @ 06:46)  Platelet Count - Automated: 398 K/uL (10-27-24 @ 06:46)                BLOOD SMEAR INTERPRETATION:       RADIOLOGY & ADDITIONAL STUDIES:

## 2024-11-05 NOTE — PROGRESS NOTE ADULT - SUBJECTIVE AND OBJECTIVE BOX
The patient was seen and examined   Denies any chest pain, SOB or palpitations   No orthopnea or PND  No acute events over last 24 hours       MEDICATIONS  (STANDING):  aspirin  chewable 81 milliGRAM(s) Oral daily  atorvastatin 10 milliGRAM(s) Oral at bedtime  chlorhexidine 2% Cloths 1 Application(s) Topical <User Schedule>  chlorhexidine 2% Cloths 1 Application(s) Topical daily  dextrose 5%. 1000 milliLiter(s) (100 mL/Hr) IV Continuous <Continuous>  dextrose 5%. 1000 milliLiter(s) (50 mL/Hr) IV Continuous <Continuous>  dextrose 50% Injectable 25 Gram(s) IV Push once  dextrose 50% Injectable 12.5 Gram(s) IV Push once  dextrose 50% Injectable 25 Gram(s) IV Push once  gabapentin 300 milliGRAM(s) Oral three times a day  glucagon  Injectable 1 milliGRAM(s) IntraMuscular once  heparin   Injectable 5000 Unit(s) SubCutaneous every 12 hours  insulin lispro (ADMELOG) corrective regimen sliding scale   SubCutaneous three times a day before meals  melatonin 3 milliGRAM(s) Oral at bedtime  pantoprazole    Tablet 40 milliGRAM(s) Oral every 12 hours  tamsulosin 0.4 milliGRAM(s) Oral at bedtime    MEDICATIONS  (PRN):  dextrose Oral Gel 15 Gram(s) Oral once PRN Blood Glucose LESS THAN 70 milliGRAM(s)/deciliter            Vital Signs Last 24 Hrs  T(C): 36.5 (05 Nov 2024 13:14), Max: 36.5 (05 Nov 2024 13:14)  T(F): 97.7 (05 Nov 2024 13:14), Max: 97.7 (05 Nov 2024 13:14)  HR: 79 (05 Nov 2024 13:14) (79 - 80)  BP: 147/69 (05 Nov 2024 13:14) (121/62 - 147/69)  BP(mean): 95 (05 Nov 2024 13:14) (82 - 95)  RR: 18 (05 Nov 2024 13:14) (18 - 18)  SpO2: 100% (05 Nov 2024 13:14) (95% - 100%)    Parameters below as of 05 Nov 2024 13:14  Patient On (Oxygen Delivery Method): room air         REVIEW OF SYSTEMS:  CONSTITUTIONAL: No fever, weight loss, or fatigue  CARDIOLOGY: Patient denies chest pain, shortness of breath or syncopal episodes    RESPIRATORY: denies shortness of breath, wheezeing    NEUROLOGICAL: NO weakness, no focal deficits to report   GI: no BRBPR, no N,V,diarrhea    PSYCHIATRY: normal mood and affect  HEENT: no nasal discharge, no ecchymosis  SKIN: no ecchymosis, no breakdown  MUSCULOSKELETAL: Full range of motion x4.        PHYSICAL EXAM:  · CONSTITUTIONAL:	Well-developed, well nourished     ·RESPIRATORY:   airway patent; breath sounds equal; good air movement; respirations non-labored; clear to auscultation bilaterally   · CARDIOVASCULAR	regular rate and rhythm  + SE murmur    · EXTREMITIES: No cyanosis, clubbing or edema  · VASCULAR: no edema   	    LABS:                        8.1    6.78  )-----------( 393      ( 05 Nov 2024 07:13 )             25.8     11-05    139  |  103  |  37[H]  ----------------------------<  141[H]  4.8   |  27  |  0.9    Ca    8.8      05 Nov 2024 07:13  Mg     2.2     11-05    TPro  5.6[L]  /  Alb  3.0[L]  /  TBili  0.2  /  DBili  x   /  AST  12  /  ALT  11  /  AlkPhos  95  11-05            I&O's Summary    04 Nov 2024 07:01  -  05 Nov 2024 07:00  --------------------------------------------------------  IN: 100 mL / OUT: 1175 mL / NET: -1075 mL      BNP  RADIOLOGY & ADDITIONAL STUDIES:    IMPRESSION AND PLAN:

## 2024-11-05 NOTE — PROGRESS NOTE ADULT - SUBJECTIVE AND OBJECTIVE BOX
Gastroenterology progress note:     Patient is a 84y old  Male who presents with a chief complaint of anemia (05 Nov 2024 13:19)       Admitted on: 10-14-24    We are following the patient for anemia     no overnight events     PAST MEDICAL & SURGICAL HISTORY:  DM (diabetes mellitus)      MI (myocardial infarction)      History of CAD (coronary artery disease)      Dyslipidemia      Currently smokes tobacco      Mesothelioma, malignant      Coronary stent patent          MEDICATIONS  (STANDING):  aspirin  chewable 81 milliGRAM(s) Oral daily  atorvastatin 10 milliGRAM(s) Oral at bedtime  chlorhexidine 2% Cloths 1 Application(s) Topical <User Schedule>  chlorhexidine 2% Cloths 1 Application(s) Topical daily  dextrose 5%. 1000 milliLiter(s) (100 mL/Hr) IV Continuous <Continuous>  dextrose 5%. 1000 milliLiter(s) (50 mL/Hr) IV Continuous <Continuous>  dextrose 50% Injectable 12.5 Gram(s) IV Push once  dextrose 50% Injectable 25 Gram(s) IV Push once  dextrose 50% Injectable 25 Gram(s) IV Push once  gabapentin 300 milliGRAM(s) Oral three times a day  glucagon  Injectable 1 milliGRAM(s) IntraMuscular once  heparin   Injectable 5000 Unit(s) SubCutaneous every 12 hours  insulin lispro (ADMELOG) corrective regimen sliding scale   SubCutaneous three times a day before meals  melatonin 3 milliGRAM(s) Oral at bedtime  pantoprazole    Tablet 40 milliGRAM(s) Oral every 12 hours  tamsulosin 0.4 milliGRAM(s) Oral at bedtime    MEDICATIONS  (PRN):  dextrose Oral Gel 15 Gram(s) Oral once PRN Blood Glucose LESS THAN 70 milliGRAM(s)/deciliter      Allergies  penicillin (Unknown)      Review of Systems:   unable obtain     Physical Examination:  T(C): 36.5 (11-05-24 @ 13:14), Max: 36.5 (11-05-24 @ 13:14)  HR: 79 (11-05-24 @ 13:14) (79 - 80)  BP: 147/69 (11-05-24 @ 13:14) (121/62 - 147/69)  RR: 18 (11-05-24 @ 13:14) (18 - 18)  SpO2: 100% (11-05-24 @ 13:14) (95% - 100%)      11-04-24 @ 07:01  -  11-05-24 @ 07:00  --------------------------------------------------------  IN: 100 mL / OUT: 1175 mL / NET: -1075 mL        GENERAL:  no acute distress.  HEAD:  Atraumatic, Normocephalic  EYES: conjunctiva and sclera clear  NECK: Supple, no JVD or thyromegaly  CHEST/LUNG: Clear to auscultation bilaterall  HEART: Regular rate and rhythm  ABDOMEN: Soft, nontender, nondistended  NEUROLOGY: No asterixis or tremor.   SKIN: Intact, no jaundice     Data:                        8.1    6.78  )-----------( 393      ( 05 Nov 2024 07:13 )             25.8     Hgb trend:  8.1  11-05-24 @ 07:13  8.5  11-04-24 @ 21:05  7.3  11-04-24 @ 06:48  7.5  11-03-24 @ 06:40        11-05    139  |  103  |  37[H]  ----------------------------<  141[H]  4.8   |  27  |  0.9    Ca    8.8      05 Nov 2024 07:13  Mg     2.2     11-05    TPro  5.6[L]  /  Alb  3.0[L]  /  TBili  0.2  /  DBili  x   /  AST  12  /  ALT  11  /  AlkPhos  95  11-05    Liver panel trend:  TBili 0.2   /   AST 12   /   ALT 11   /   AlkP 95   /   Tptn 5.6   /   Alb 3.0    /   DBili --      11-05  TBili <0.2   /   AST 9   /   ALT 7   /   AlkP 103   /   Tptn 5.9   /   Alb 3.2    /   DBili --      11-04  TBili <0.2   /   AST 7   /   ALT 6   /   AlkP 103   /   Tptn 5.7   /   Alb 3.1    /   DBili --      11-03  TBili 0.2   /   AST 8   /   ALT <5   /   AlkP 98   /   Tptn 5.7   /   Alb 3.1    /   DBili --      11-02  TBili 0.3   /   AST 7   /   ALT <5   /   AlkP 99   /   Tptn 5.7   /   Alb 3.2    /   DBili --      11-01  TBili <0.2   /   AST 7   /   ALT <5   /   AlkP 103   /   Tptn 5.7   /   Alb 3.2    /   DBili --      10-31  TBili 0.2   /   AST 9   /   ALT <5   /   AlkP 120   /   Tptn 6.5   /   Alb 3.4    /   DBili --      10-29  TBili <0.2   /   AST 7   /   ALT <5   /   AlkP 106   /   Tptn 6.2   /   Alb 3.3    /   DBili --      10-27             Radiology:

## 2024-11-05 NOTE — PROGRESS NOTE ADULT - ASSESSMENT
· Assessment	  83 y/o man  PMHx of HLD, DM, CAD s/p stents x2 in 2007, pt of Dr Avila, hx  asbestosis of lung, diagnosed w malignant mesothelioma (8/2024) and s/p VATS pleurodesis, R sided PleurX, hx AVM/GIB and JASMIN, sent in by NH for low hemoglobin.     #Acute on chronic anemia with hx of AVM and JASMIN  tfx to keep hb >7  s/p  venofer   seen by GI   active duodenal bleeding on VCE  s/p  egd  with hemostasis   r/o active bleeding if worsening anemia   GI on board   repeat iron panel ,ferritin        #Malignant mesothelioma (diagnosed 8/2024 ) path c/w epitheliod   #Hx Asbestos lung   -s/p VATS pleurodesis, R sided PleurX,s/p drainage  .. plan for immuno chemo as outpt    cont other supportive care.  will f/u

## 2024-11-05 NOTE — PROGRESS NOTE ADULT - ASSESSMENT
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 Per NH, patient also refused Pleurx drainage today and is requesting to drain it inpatient (gets drained 2-3 times per week).    #Recurrent anemia  #Hx JASMIN   #Hx AVM?  #Active duodenal bleeding on capsule endoscopy  - Hemodynamically stable & no active bleeding  - Baseline hemoglobin - 8-9   - Hemoglobin on admission - 6.3 >s/p 2units prbc (received 2 units at the end of Spetember as well)  - On plavix   - last admission received 5 days of Venofer   -Seen by GI last admission> spoke to grandson (HCP)  regarding endoscopic work up. After discussing risks vs benefits given his advance age and co morbidities he would like to hold off. Offered VCE as outpatient .   -Can reconsider work up if within goals of care , will need to speak to grandson/son  -Will also need heme onc outpt for possible venofer infusions outpt   - Maintain active Type and screen  - Trend H&H  - Continue home PPI 40mg BID PO  - Target Hgb >8   - Avoid NSAIDs.  - give Venofer as %sat, ferritin remain low  - recall GI for possible intervention if family agrees in view of recurrent anemia requiring multiple PRBCs  6/18 convinced pt to give labs. Hgb 6.9. Transfuse 1uRPBCs. Spoke to GI and asked to speak to family. Serial CBC. Keep Hgb >7.5  6/21 again spoke to GI and asked to speak re: possible intervention. Transfuse another unit as Hgb again trending down (total 3u PRBCs).  6/22 son declined EGD/C-scopy but ?requested capsule endoscopy. Doubt that pt will drink golytely or agree to NGT.  6/26 d/w GI. Active duodenal bleeding on capsule endoscopy. Son refused urgent EGD and wants to think over the weekend. Change Protonix to 40mg IV q12, clears, CBC BID, keep Hgb>8. Hold ASA  6/28 son agreed to EGD. D/w pulm and cardio pt optimized for EGD. 2 bleeding angioectasias noted oozing blood in duodenum s/p cauterization.  6/31 transfused 1u PRBCs.   11/4 Reportedly melena 11/1-2. H/h trending down. Asked GI for f/u ?repeat EGD. Transfused 1u PRBCs.  11/5 d/w GI. Will monitor for another day to see Hgb trend before decision on EGD vs possible d/c to SNF.    #Malignant mesothelioma (diagnosed 8/2024 )  #Hx Asbestos lung   -s/p VATS pleurodesis, R sided PleurX  -pleurx drainage 2-3 times per week or PRN  -Fu heme onc outpatient. Plan was for possible Rx if pt's functional status improves.   -s/p drainage on 10/21 of 750cc  monitor for Sx  - s/p drainage on 10/28 375 cc    #HTN/HLD  #CAD s/p stents x2 in 2007 (follows Dr Avila)  -c/w home meds   -changed Plavix to ASA    #Small hypodensity in segment 7 of the liver seen on prior CT.  - RUQ US as OP  -?f/u outpt GI    #Acute Grief vs MDD  #Baseline dementia  - wife passed away a few months ago  - patient refusing medication sometimes and asks to be left alone, no suicide ideas, no ideas to hurt other people.    - Psychiatry eval  recommended outpt fu     Severe malnutrition  nutrition support    #Nutrition/Fluids/Electrolytes   - replete K<4 and Mg <2  - ensure regular BMs  -  Miralax BID, Senna    #DVT Px  SCDs  Heparin SQ    High risk pt. Px is overall poor.  GOC: d/w son in details on 10/18. Based on oncology feedback that his cancer is treatable, son wants to cont full code.      Disposition: Nursing Home      My note supersedes the residents note should a discrepancy arise.    Chart and notes personally reviewed.  Care Discussed with Consultants/Other Providers/ Housestaff [ x] YES [ ] NO   Radiology, labs, old records personally reviewed.    discussed w/ housestaff, nursing, case management, son    Time-based billing (NON-critical care).     50 minutes spent on total encounter. The necessity of the time spent during the encounter on this date of service was due to:     time spent on review of labs, imaging studies, old records, obtaining history, personally examining patient, counselling and communicating with patient/ family, entering orders for medications/tests/etc, discussions with other health care providers, documentation in electronic health records, independent interpretation of labs, imaging/procedure results and care coordination.

## 2024-11-06 ENCOUNTER — TRANSCRIPTION ENCOUNTER (OUTPATIENT)
Age: 84
End: 2024-11-06

## 2024-11-06 VITALS
DIASTOLIC BLOOD PRESSURE: 71 MMHG | RESPIRATION RATE: 18 BRPM | SYSTOLIC BLOOD PRESSURE: 125 MMHG | TEMPERATURE: 97 F | OXYGEN SATURATION: 97 % | HEART RATE: 79 BPM

## 2024-11-06 LAB
GLUCOSE BLDC GLUCOMTR-MCNC: 165 MG/DL — HIGH (ref 70–99)
GLUCOSE BLDC GLUCOMTR-MCNC: 182 MG/DL — HIGH (ref 70–99)
GLUCOSE BLDC GLUCOMTR-MCNC: 289 MG/DL — HIGH (ref 70–99)
GLUCOSE BLDC GLUCOMTR-MCNC: 351 MG/DL — HIGH (ref 70–99)
GLUCOSE BLDC GLUCOMTR-MCNC: 480 MG/DL — CRITICAL HIGH (ref 70–99)
HCT VFR BLD CALC: 26.8 % — LOW (ref 42–52)
HGB BLD-MCNC: 8.2 G/DL — LOW (ref 14–18)
MCHC RBC-ENTMCNC: 29.9 PG — SIGNIFICANT CHANGE UP (ref 27–31)
MCHC RBC-ENTMCNC: 30.6 G/DL — LOW (ref 32–37)
MCV RBC AUTO: 97.8 FL — HIGH (ref 80–94)
NRBC # BLD: 0 /100 WBCS — SIGNIFICANT CHANGE UP (ref 0–0)
PLATELET # BLD AUTO: 394 K/UL — SIGNIFICANT CHANGE UP (ref 130–400)
PMV BLD: 9.4 FL — SIGNIFICANT CHANGE UP (ref 7.4–10.4)
RBC # BLD: 2.74 M/UL — LOW (ref 4.7–6.1)
RBC # FLD: 15.7 % — HIGH (ref 11.5–14.5)
WBC # BLD: 8.39 K/UL — SIGNIFICANT CHANGE UP (ref 4.8–10.8)
WBC # FLD AUTO: 8.39 K/UL — SIGNIFICANT CHANGE UP (ref 4.8–10.8)

## 2024-11-06 PROCEDURE — 99239 HOSP IP/OBS DSCHRG MGMT >30: CPT

## 2024-11-06 RX ORDER — INSULIN GLARGINE,HUM.REC.ANLOG 100/ML
4 VIAL (ML) SUBCUTANEOUS EVERY MORNING
Refills: 0 | Status: DISCONTINUED | OUTPATIENT
Start: 2024-11-06 | End: 2024-11-06

## 2024-11-06 RX ORDER — INSULIN LISPRO 100/ML
3 VIAL (ML) SUBCUTANEOUS
Refills: 0 | Status: DISCONTINUED | OUTPATIENT
Start: 2024-11-06 | End: 2024-11-06

## 2024-11-06 RX ADMIN — CHLORHEXIDINE GLUCONATE 1 APPLICATION(S): 40 SOLUTION TOPICAL at 06:21

## 2024-11-06 RX ADMIN — Medication 81 MILLIGRAM(S): at 11:25

## 2024-11-06 RX ADMIN — Medication 4 UNIT(S): at 15:47

## 2024-11-06 RX ADMIN — Medication 3 UNIT(S): at 17:33

## 2024-11-06 RX ADMIN — GABAPENTIN 300 MILLIGRAM(S): 300 CAPSULE ORAL at 13:15

## 2024-11-06 RX ADMIN — Medication 1: at 17:33

## 2024-11-06 RX ADMIN — PANTOPRAZOLE SODIUM 40 MILLIGRAM(S): 40 TABLET, DELAYED RELEASE ORAL at 17:34

## 2024-11-06 RX ADMIN — PANTOPRAZOLE SODIUM 40 MILLIGRAM(S): 40 TABLET, DELAYED RELEASE ORAL at 06:14

## 2024-11-06 RX ADMIN — HEPARIN SODIUM 5000 UNIT(S): 10000 INJECTION INTRAVENOUS; SUBCUTANEOUS at 06:15

## 2024-11-06 RX ADMIN — Medication 1: at 08:47

## 2024-11-06 RX ADMIN — CHLORHEXIDINE GLUCONATE 1 APPLICATION(S): 40 SOLUTION TOPICAL at 11:25

## 2024-11-06 RX ADMIN — Medication 5: at 12:52

## 2024-11-06 RX ADMIN — GABAPENTIN 300 MILLIGRAM(S): 300 CAPSULE ORAL at 06:14

## 2024-11-06 NOTE — PROGRESS NOTE ADULT - REASON FOR ADMISSION
Acute on chronic anemia
CAD
Acute on chronic anemia
SOB
anemia
Acute on chronic anemia
Acute on chronic anemia
CAD
anemia
Acute on chronic anemia

## 2024-11-06 NOTE — PROGRESS NOTE ADULT - ASSESSMENT
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 Per NH, patient also refused Pleurx drainage today and is requesting to drain it inpatient (gets drained 2-3 times per week).    #Recurrent anemia  #Hx JASMIN   #Hx AVM?  #Active duodenal bleeding on capsule endoscopy  - Hemodynamically stable & no active bleeding  - Baseline hemoglobin - 8-9   - Hemoglobin on admission - 6.3 >s/p 2units prbc (received 2 units at the end of Spetember as well)  - On plavix   - last admission received 5 days of Venofer   -Seen by GI last admission> spoke to grandson (HCP)  regarding endoscopic work up. After discussing risks vs benefits given his advance age and co morbidities he would like to hold off. Offered VCE as outpatient .   -Can reconsider work up if within goals of care , will need to speak to grandson/son  -Will also need heme onc outpt for possible venofer infusions outpt   - Maintain active Type and screen  - Trend H&H  - Continue home PPI 40mg BID PO  - Target Hgb >8   - Avoid NSAIDs.  - give Venofer as %sat, ferritin remain low  - recall GI for possible intervention if family agrees in view of recurrent anemia requiring multiple PRBCs  6/18 convinced pt to give labs. Hgb 6.9. Transfuse 1uRPBCs. Spoke to GI and asked to speak to family. Serial CBC. Keep Hgb >7.5  6/21 again spoke to GI and asked to speak re: possible intervention. Transfuse another unit as Hgb again trending down (total 3u PRBCs).  6/22 son declined EGD/C-scopy but ?requested capsule endoscopy. Doubt that pt will drink golytely or agree to NGT.  6/26 d/w GI. Active duodenal bleeding on capsule endoscopy. Son refused urgent EGD and wants to think over the weekend. Change Protonix to 40mg IV q12, clears, CBC BID, keep Hgb>8. Hold ASA  6/28 son agreed to EGD. D/w pulm and cardio pt optimized for EGD. 2 bleeding angioectasias noted oozing blood in duodenum s/p cauterization.  6/31 transfused 1u PRBCs.   11/4 Reportedly melena 11/1-2. H/h trending down. Asked GI for f/u ?repeat EGD. Transfused 1u PRBCs.  11/5 d/w GI. Will monitor for another day to see Hgb trend before decision on EGD vs possible d/c to SNF.  11/6 H/h stable.     #Malignant mesothelioma (diagnosed 8/2024 )  #Hx Asbestos lung   -s/p VATS pleurodesis, R sided PleurX  -pleurx drainage 2-3 times per week or PRN  -Fu heme onc outpatient. Plan was for possible Rx if pt's functional status improves.   -s/p drainage on 10/21 of 750cc  monitor for Sx  - s/p drainage on 10/28 375 cc    #HTN/HLD  #CAD s/p stents x2 in 2007 (follows Dr Avila)  -c/w home meds   -changed Plavix to ASA    #Small hypodensity in segment 7 of the liver seen on prior CT.  - RUQ US as OP  -?f/u outpt GI    #Acute Grief vs MDD  #Baseline dementia  - wife passed away a few months ago  - patient refusing medication sometimes and asks to be left alone, no suicide ideas, no ideas to hurt other people.    - Psychiatry eval  recommended outpt fu     Severe malnutrition  nutrition support    #Nutrition/Fluids/Electrolytes   - replete K<4 and Mg <2  - ensure regular BMs  -  Miralax BID, Senna    #DVT Px  SCDs  Heparin SQ    High risk pt. Px is overall poor.  GOC: d/w son in details on 10/18. Based on oncology feedback that his cancer is treatable, son wants to cont full code.      Disposition: Nursing Home (d/w son today, he is aware that pt might require further transfusions PRN due to AVMs)    My note supersedes the residents note should a discrepancy arise.    Chart and notes personally reviewed.  Care Discussed with Consultants/Other Providers/ Housestaff [ x] YES [ ] NO   Radiology, labs, old records personally reviewed.    discussed w/ housestaff, nursing, case management, son    Time-based billing (NON-critical care).     35 minutes spent on total encounter. The necessity of the time spent during the encounter on this date of service was due to:     time spent on review of labs, imaging studies, old records, obtaining history, personally examining patient, counselling and communicating with patient/ family, entering orders for medications/tests/etc, discussions with other health care providers, documentation in electronic health records, independent interpretation of labs, imaging/procedure results and care coordination.

## 2024-11-06 NOTE — PROGRESS NOTE ADULT - ASSESSMENT
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     #Acute on chronic anemia with hx of AVM and JASMIN  tfx to keep hb >7  s/p  venofer   seen by GI   active duodenal bleeding on VCE  s/p  egd  with hemostasis   r/o active bleeding if worsening anemia   GI on board   repeat iron panel ,ferritin        #Malignant mesothelioma (diagnosed 8/2024 ) path c/w epitheliod   #Hx Asbestos lung   -s/p VATS pleurodesis, R sided PleurX,s/p drainage  .. plan for immuno chemo as outpt    cont other supportive care.  will f/u

## 2024-11-06 NOTE — DISCHARGE NOTE NURSING/CASE MANAGEMENT/SOCIAL WORK - HAS THE PATIENT USED TOBACCO IN THE PAST 30 DAYS?
Cardiology Consult Note   [facility]    [facility]  [address]  [address] [place] [address]    TEL  [phone]  [email or web address]       Patient: Laureen Persaud  MRN: [id number]  : 1943  Admission Date: 3/18/2023   Consult Date: 23  Reason for Consult: HFpEF, pHTN  Outpatient Cardiologist: Dr. Scottie Fitzgerald ([place])      Laureen Persaud is a 79 y.o. female with PMH Afib (apixan), DCCV x 3, HFpEF, HTN, HLD, DM II (non-insulin dep), anxiety, Severe AS, mod- sev MS and mod-sev MR, Fe def anemia, JAK2 gene mutation, ASHLI w/ CPAP, CVA/TIA , PAH who was admitted for ADHF & PNA with superimposed worsening AS and moderate-severe MS/MR (2/2 rheumatic disease).     She reports a subacute process of dyspnea on exertion prior to her presentation to [facility]. She reports having difficulty ambulating far distances with her walker. She states that when it got bad, she called her cardiologist who told to her to present to the ED as he was on call. She states that she has been told she has little bit of heart failure but nothing was being done for this as an outpatient. She has never been told about pHTN until this hospitalization. She reports following with her cardiologist initially for HTN and later for AF (for which she is on amiodarone) and had CV previously and for monitoring of her valve disease.      Per chart review, the last documentation from Dr. Fitzgerald office is from Oct 223. All that is visible is her medication list at that time included torsemide  and metolazone, amiodarone and Eliquis, and atorvastatin.    Currently, she reports pain all over but denies any dyspnea. States that her lower extremity swelling has improved. Reports that she lies flat in bed on her side to sleep at night. She reports that her abdomen is always large and denies that it had gotten bigger prior to her hospitalization; currently denies any  abdominal distension.    Per chart review, she presented to [facility] on 3/10 with reported weakness, fatigue, fever, falls at home, and heart failure. She was treated for PNA and volume overload with IV antibiotics and IV diuretics, respectively. A TTE demonstrated worsening AS and moderate to severe mitral stenosis and mitral regurgitation; demonstrating worsening gradients compared to TTE from June 2022. Cardiac catheterization which confirmed significant AS and MV disease and reported pHTN. She was noted to have ascites and underwent an ultrasound guided paracentesis with removal of 1100 cc on 3/16. Dr. Padgett was notified about the patient, who was then transferred to [facility] for further surgical evaluation.      No past medical history on file.     No past surgical history on file.    Social History     Socioeconomic History   • Marital status:      Spouse name: Not on file   • Number of children: Not on file   • Years of education: Not on file   • Highest education level: Not on file   Occupational History   • Not on file   Tobacco Use   • Smoking status: Not on file   • Smokeless tobacco: Not on file   Substance and Sexual Activity   • Alcohol use: Not on file   • Drug use: Not on file   • Sexual activity: Not on file   Other Topics Concern   • Not on file   Social History Narrative   • Not on file     Social Determinants of Health     Financial Resource Strain: Not on file   Food Insecurity: Not on file   Transportation Needs: Not on file   Physical Activity: Not on file   Stress: Not on file   Social Connections: Not on file   Intimate Partner Violence: Not on file   Housing Stability: Not on file        No family history on file.     Neomycin, Polymyxin b, Procaine, and Bacitracin    No current outpatient medications    Scheduled Meds:  • amiodarone  200 mg oral Daily   • [Provider Managed Hold] apixaban  5 mg oral BID   • [START ON 3/23/2023] atorvastatin  40 mg oral Daily (6p)   •  "atorvastatin  80 mg oral Daily (6p)   • [Provider Managed Hold] furosemide  40 mg intravenous BID (am, 4p)   • insulin lispro U-100  3 Units subcutaneous TID with meals   • magnesium oxide  400 mg oral Daily   • [Provider Managed Hold] potassium chloride  20 mEq oral BID (am, 4p)     Continuous Infusions:  PRN Meds:.•  atropine  •  glucose **OR** dextrose **OR** glucagon **OR** dextrose 50 % in water (D50)  •  nitroglycerin      Intake/Output Summary (Last 24 hours) at 3/20/2023 0941  Last data filed at 3/19/2023 1600  Gross per 24 hour   Intake 450 ml   Output 550 ml   Net -100 ml        Weights (last 7 days)     Date/Time Weight    03/19/23 0414 64.3 kg (141 lb 12.8 oz)    03/18/23 1700 65 kg (143 lb 4.8 oz)           Wt Readings from Last 3 Encounters:   03/19/23 64.3 kg (141 lb 12.8 oz)        REVIEW OF SYSTEMS:   Aside from what is mentioned above, a 14 point review of systems was performed and was negative.    PHYSICAL EXAMINATION:  Visit Vitals  BP (!) 123/58 (BP Location: Left upper arm, Patient Position: Lying)   Pulse 76   Temp 37.1 °C (98.7 °F) (Temporal)   Resp 18   Ht 1.461 m (4' 9.5\")   Wt 64.3 kg (141 lb 12.8 oz)   SpO2 94%   BMI 30.15 kg/m²     Body surface area is 1.62 meters squared.  Body mass index is 30.15 kg/m².  General: No acute distress. Frail elderly female.  HEENT: No corneal arcus or xanthelasmas. Sclerae are anicteric.   Neck: JVP is normal.  Heart: Regular rate and rhythm. No murmurs.  Chest: Symmetrical.  Lungs: Crackles bilaterally.  Abdomen: Minimally distended.  Extremities: No cyanosis or clubbing. No lower extremity edema. Distal pulses are easily palpable.  Skin: Warm and dry and well perfused.  Neurologic: Alert and appropriate.  Psychiatric: Normal affect.    Labs   Results from last 7 days   Lab Units 03/20/23  0324 03/19/23  0414 03/18/23  1751   SODIUM mEQ/L 136 137 135*   POTASSIUM mEQ/L 4.9 4.7 4.5   CHLORIDE mEQ/L 100 103 99   CO2 mEQ/L 24 26 24   BUN mg/dL 67* 68* 70* "   CREATININE mg/dL 1.3* 1.2* 1.1   CALCIUM mg/dL 8.1* 8.1* 8.2*   ALBUMIN g/dL  --   --  2.1*   BILIRUBIN TOTAL mg/dL  --   --  0.8   ALK PHOS IU/L  --   --  121   ALT IU/L  --   --  20   AST IU/L  --   --  29   GLUCOSE mg/dL 75 113* 123*             Results from last 7 days   Lab Units 03/20/23  0324 03/19/23  0414 03/18/23  1751   WBC K/uL 31.38* 29.85* 34.01*   HEMOGLOBIN g/dL 10.9* 10.7* 11.3*   HEMATOCRIT % 36.7 35.6 36.9   PLATELETS K/uL 375* 380* 406*       Lab Results   Component Value Date    TSH 1.41 03/18/2023    HGBA1C 6.4 (H) 03/18/2023       Outside records reviewed..    Imaging  CXR: The cardiac silhouette and mediastinal contour are stable demonstrating  cardiomegaly. No significant interval change in pulmonary vascular congestion  and diffuse bilateral interstitial prominence due to edema and/or infiltrates.  There is probably unchanged left-sided pleural parenchymal disease. No evidence  of a pneumothorax.  IMPRESSION:  No significant interval change.    Cardiac Studies  Transthoracic Echo:   3/11/23: EF 55-60%, Severe AS with pgradient 86.9, mean gradient 46.4, trace AI.   Moderate MS, mean gradient 7.6, moderate MR  Mild TR  PAPs: 54/4     Complete Cardiac Cath 3/15/23:  Hemodynamics:  RA 22/17, /14, /44, PCWP: 39  PA sat 79 %  CO CI 4.5/2.9  AV peak to peak grad 34, mean 26, MICHAEL 0.9     MV mean grad 15, valve area 1.1     Coronary angiography:mild  Non-obstructive CAD    ECG   NSR    Telemetry  SR     Assessment:  This is a 79 y.o. female being with PMH Afib (apixan), DCCV x 3, HFpEF, HTN, HLD, DM II (non-insulin dep), anxiety, Severe AS, mod- sev MS and mod-sev MR, Fe def anemia, JAK2 gene mutation, ASHLI w/ CPAP, CVA/TIA 2020, PAH consulted for HFpEF, pHTN.    # HFpEF (EF 55-60%%)   # pHTN  # Severe AS  # Moderate MS  # Moderate MR  - Etiology for exacerbation: most likely progressive valvular disease (AS), MS/MR  - OSH Cath with RA 22/17, /14, /44, PCWP: 39 and CO CI  4.5/2.9  - Volume status: warm & minimally wet  -   - Dry weight = unknown  - TTE (last 3/11/23): showed EF55-60%   Recommend  - Strict I/Os, Daily Weights, daily electrolytes replete for goal K > 4.0, Mg > 2.0, Tele  - Supplemental O2 to maintain SpO2 >90%, wean as able  - Admission meds:    - BB: not a home med, not indicated  - ACEi/ARB/ARNi: not a home med, not indicated  - Spironolactone: not a home med, not indicated  - SGLT2i: recommend initiation prior to discharge, but would hold off on initiation pending OR planning  - Diuretic: lasix 40 mg IV BID, held today for CTA TAVR (home med torsemide & metolazone). Reassess volume status & Cr tomorrow and consider further IV diuretic therapy vs RHC (below).  - Recommend pharmacy consultation for home medication regimen clarification  - Would benefit from RHC once euvolemic as her pulmonary artery pressures on RHC from OSH seem out of proportion to what would be expected from TTE. RHC could be as early as tomorrow. Keep NPOpMN just in case.   - F/u LINDY  - F/u CTA TAVR  - Cardiac rehab on discharge; 7-day HF follow up appointment on discharge    # C/f subacute MV endocarditis, resolved  - F/u LINDY    Case discussed with Dr. Barros and with the primary team. Final recommendations are pending attending co-signature. Please page Cardiology at [phone] with any questions.     Azra Rivero MD  3/20/2023     Patient declined to answer

## 2024-11-06 NOTE — PROGRESS NOTE ADULT - SUBJECTIVE AND OBJECTIVE BOX
Patient is a 84y old  Male who presents with a chief complaint of Anemia     (17 Oct 2024 12:14)    INTERVAL HPI/OVERNIGHT EVENTS: Patient was examined and seen at bedside. This morning pt is resting comfortably in bed and reports no complaints. Breathing is stable. Denies need for pleurx draiange. No further melena.  ROS: Denies CP, SOB, AP, new weakness  All other systems reviewed and are within normal limits.  InitialHPI:  HPI: 85 y/o man  PMHx of HLD, DM, CAD s/p stents x2 in 2007, pt of Dr Avila, hx  asbestosis of lung, diagnosed w malignant mesothelioma (8/2024) and s/p VATS pleurodesis, R sided PleurX, hx AVM/GIB and JASMIN, sent in by NH for low hemoglobin. Per NH, patient also refused Pleurx drainage today and is requesting to drain it inpatient (gets drained 2-3 times per week). Of note, patient had a recent admission in September for anemia for which he received 2 units prbc, IV Venofer and was recommended EGD/[facility] but given his advance age and co morbidities decided to hold off. Patient is a poor historian. Uses wheelchair at baseline. Denies fever chills, shortness of breath, cough, chest pain, leg swelling, hematemesis, hematochezia.     Vital Signs Last 24 Hrs  T(C): 36.4 (15 Oct 2024 01:09), Max: 36.9 (14 Oct 2024 22:51)  T(F): 97.5 (15 Oct 2024 01:09), Max: 98.4 (14 Oct 2024 22:51)  HR: 100 (15 Oct 2024 01:09) (89 - 105)  BP: 132/74 (15 Oct 2024 01:09) (104/64 - 132/74)  RR: 18 (15 Oct 2024 01:09) (17 - 19)  SpO2: 97% (15 Oct 2024 01:09) (93% - 97%)    Parameters below as of 15 Oct 2024 01:09  Patient On (Oxygen Delivery Method): room air    Labs: Hgb 6.4 , Platelets 500 , Cr 1.2 (baseline)   CXR: Worsening R pleural effusion   EKG: NSR with PACs    Admitted for anemia      (15 Oct 2024 01:16)    PAST MEDICAL & SURGICAL HISTORY:  DM (diabetes mellitus)      MI (myocardial infarction)      History of CAD (coronary artery disease)      Dyslipidemia      Currently smokes tobacco      Mesothelioma, malignant      Coronary stent patent          General: NAD, AAOx2, chronically ill appearing, b/l temp waisting, cachectic  HEENT:  no LAD  CV: S1 S2  Resp: decreased breath sounds at bases  GI: NT/ND/S +BS  MS: no clubbing/cyanosis/edema, + pulses b/l. TTP Rt lower back  Neuro: nonfocal, +reflexes thruout  +laws w/ clear yellow urine  +Rt sided ant pleurx cath w/ d/c/i dsg            Home Medications:  ferrous sulfate 325 mg (65 mg elemental iron) oral tablet: 1 tab(s) orally once a day (25 Sep 2024 15:37)  gabapentin 300 mg oral tablet: 1 tab(s) orally 3 times a day (20 Sep 2024 01:37)  Lovenox 30 mg/0.3 mL injectable solution: 30 milligram(s) subcutaneously once a day (01 Nov 2024 12:53)  melatonin 3 mg oral tablet: 1 tab(s) orally once a day (at bedtime) (13 Aug 2024 09:23)  metFORMIN 500 mg oral tablet: 1 tab(s) orally 2 times a day (21 Jul 2024 18:04)  pantoprazole 40 mg oral delayed release tablet: 1 tab(s) orally every 12 hours (25 Sep 2024 15:37)  polyethylene glycol 3350 oral powder for reconstitution: 17 gram(s) orally once a day (13 Aug 2024 09:23)  pravastatin 20 mg oral tablet: 1 tab(s) orally (21 Jul 2024 18:04)  senna leaf extract oral tablet: 2 tab(s) orally once a day (at bedtime) (13 Aug 2024 09:23)  tamsulosin 0.4 mg oral capsule: 1 cap(s) orally once a day (at bedtime) (13 Aug 2024 09:23)    MEDICATIONS  (STANDING):  aspirin  chewable 81 milliGRAM(s) Oral daily  atorvastatin 10 milliGRAM(s) Oral at bedtime  chlorhexidine 2% Cloths 1 Application(s) Topical <User Schedule>  chlorhexidine 2% Cloths 1 Application(s) Topical daily  dextrose 5%. 1000 milliLiter(s) (100 mL/Hr) IV Continuous <Continuous>  dextrose 5%. 1000 milliLiter(s) (50 mL/Hr) IV Continuous <Continuous>  dextrose 50% Injectable 12.5 Gram(s) IV Push once  dextrose 50% Injectable 25 Gram(s) IV Push once  dextrose 50% Injectable 25 Gram(s) IV Push once  gabapentin 300 milliGRAM(s) Oral three times a day  glucagon  Injectable 1 milliGRAM(s) IntraMuscular once  heparin   Injectable 5000 Unit(s) SubCutaneous every 12 hours  insulin glargine Injectable (LANTUS) 4 Unit(s) SubCutaneous every morning  insulin lispro (ADMELOG) corrective regimen sliding scale   SubCutaneous three times a day before meals  insulin lispro Injectable (ADMELOG) 3 Unit(s) SubCutaneous three times a day before meals  melatonin 3 milliGRAM(s) Oral at bedtime  pantoprazole    Tablet 40 milliGRAM(s) Oral every 12 hours  tamsulosin 0.4 milliGRAM(s) Oral at bedtime    MEDICATIONS  (PRN):  dextrose Oral Gel 15 Gram(s) Oral once PRN Blood Glucose LESS THAN 70 milliGRAM(s)/deciliter  oxycodone    5 mG/acetaminophen 325 mG 1 Tablet(s) Oral every 6 hours PRN Moderate Pain (4 - 6)    Vital Signs Last 24 Hrs  T(C): 36.3 (06 Nov 2024 13:38), Max: 36.8 (05 Nov 2024 20:21)  T(F): 97.3 (06 Nov 2024 13:38), Max: 98.2 (05 Nov 2024 20:21)  HR: 79 (06 Nov 2024 13:38) (79 - 83)  BP: 125/71 (06 Nov 2024 13:38) (115/69 - 125/71)  BP(mean): 89 (06 Nov 2024 13:38) (84 - 89)  RR: 18 (06 Nov 2024 13:38) (18 - 18)  SpO2: 97% (06 Nov 2024 13:38) (97% - 98%)    Parameters below as of 06 Nov 2024 13:38  Patient On (Oxygen Delivery Method): room air      CAPILLARY BLOOD GLUCOSE      POCT Blood Glucose.: 351 mg/dL (06 Nov 2024 12:50)  POCT Blood Glucose.: 289 mg/dL (06 Nov 2024 11:47)  POCT Blood Glucose.: 480 mg/dL (06 Nov 2024 11:45)  POCT Blood Glucose.: 165 mg/dL (06 Nov 2024 08:19)  POCT Blood Glucose.: 322 mg/dL (05 Nov 2024 21:20)  POCT Blood Glucose.: 110 mg/dL (05 Nov 2024 17:06)    LABS:                        8.2    8.39  )-----------( 394      ( 06 Nov 2024 12:52 )             26.8     11-05    139  |  103  |  37[H]  ----------------------------<  141[H]  4.8   |  27  |  0.9    Ca    8.8      05 Nov 2024 07:13  Mg     2.2     11-05    TPro  5.6[L]  /  Alb  3.0[L]  /  TBili  0.2  /  DBili  x   /  AST  12  /  ALT  11  /  AlkPhos  95  11-05    LIVER FUNCTIONS - ( 05 Nov 2024 07:13 )  Alb: 3.0 g/dL / Pro: 5.6 g/dL / ALK PHOS: 95 U/L / ALT: 11 U/L / AST: 12 U/L / GGT: x                 Urinalysis Basic - ( 05 Nov 2024 07:13 )    Color: x / Appearance: x / SG: x / pH: x  Gluc: 141 mg/dL / Ketone: x  / Bili: x / Urobili: x   Blood: x / Protein: x / Nitrite: x   Leuk Esterase: x / RBC: x / WBC x   Sq Epi: x / Non Sq Epi: x / Bacteria: x              Consultant Notes Reviewed:  [x ] YES  [ ] NO  Care Discussed with Consultants/Other Providers/ Housestaff [ x] YES  [ ] NO  Radiology, labs, EKGs, new studies personally reviewed.

## 2024-11-06 NOTE — DISCHARGE NOTE NURSING/CASE MANAGEMENT/SOCIAL WORK - FINANCIAL ASSISTANCE
[facility] provides services at a reduced cost to those who are determined to be eligible through [facility]’s financial assistance program. Information regarding [facility]’s financial assistance program can be found by going to [email or web address] or by calling [phone].

## 2024-11-06 NOTE — PROGRESS NOTE ADULT - SUBJECTIVE AND OBJECTIVE BOX
SUBJECTIVE/OVERNIGHT EVENTS  Today is hospital day 23d. This morning patient was seen and examined at bedside, resting comfortably in bed. No acute or major events overnight.  Patient medically stable for DC if Hgb stable.     MEDICATIONS  STANDING MEDICATIONS  atorvastatin 10 milliGRAM(s) Oral at bedtime  chlorhexidine 2% Cloths 1 Application(s) Topical <User Schedule>  chlorhexidine 2% Cloths 1 Application(s) Topical daily  dextrose 5%. 1000 milliLiter(s) IV Continuous <Continuous>  dextrose 5%. 1000 milliLiter(s) IV Continuous <Continuous>  dextrose 50% Injectable 25 Gram(s) IV Push once  dextrose 50% Injectable 12.5 Gram(s) IV Push once  dextrose 50% Injectable 25 Gram(s) IV Push once  gabapentin 300 milliGRAM(s) Oral three times a day  glucagon  Injectable 1 milliGRAM(s) IntraMuscular once  heparin   Injectable 5000 Unit(s) SubCutaneous every 12 hours  insulin lispro (ADMELOG) corrective regimen sliding scale   SubCutaneous three times a day before meals  iron sucrose IVPB 100 milliGRAM(s) IV Intermittent every 24 hours  melatonin 3 milliGRAM(s) Oral at bedtime  pantoprazole    Tablet 40 milliGRAM(s) Oral every 12 hours  tamsulosin 0.4 milliGRAM(s) Oral at bedtime    PRN MEDICATIONS  dextrose Oral Gel 15 Gram(s) Oral once PRN  traMADol 50 milliGRAM(s) Oral every 6 hours PRN    VITALS  T(F): 97.5 (11-04-24 @ 04:42), Max: 97.9 (11-03-24 @ 13:00)  HR: 81 (11-04-24 @ 04:42) (69 - 81)  BP: 124/70 (11-04-24 @ 04:42) (120/61 - 124/70)  RR: 18 (11-04-24 @ 04:42) (18 - 18)  SpO2: 99% (11-04-24 @ 04:42) (97% - 99%)  POCT Blood Glucose.: 181 mg/dL (11-04-24 @ 07:44)    PHYSICAL EXAM  GENERAL  ( x ) NAD, lying in bed comfortably     (  ) obtunded     (  ) lethargic     (  ) somnolent    HEAD  ( x ) Atraumatic     (  ) hematoma     (  ) laceration (specify location:       )     NECK  ( x ) Supple     (  ) neck stiffness     (  ) nuchal rigidity     (  )  no JVD     (  ) JVD present ( -- cm)    HEART  Rate -->  ( x ) normal rate    (  ) bradycardic    (  ) tachycardic  Rhythm -->  ( x ) regular    (  ) regularly irregular    (  ) irregularly irregular  Murmurs -->  ( x ) normal s1/s2    (  ) systolic murmur    (  ) diastolic murmur    (  ) continuous murmur     (  ) S3 present    (  ) S4 present    LUNGS  ( x ) Unlabored respirations     (  ) tachypnea  ( x ) B/L air entry     (  ) decreased breath sounds in:  (location     )    (  ) no adventitious sound     (  ) crackles     (  ) wheezing      (  ) rhonchi      (specify location:       )  (  ) chest wall tenderness (specify location:       )    ABDOMEN  ( x ) Soft     (  ) tense   |   (  ) nondistended     (  ) distended   |   (  ) +BS     (  ) hypoactive bowel sounds     (  ) hyperactive bowel sounds  ( x ) nontender     (  ) RUQ tenderness     (  ) RLQ tenderness     (  ) LLQ tenderness     (  ) epigastric tenderness     (  ) diffuse tenderness  (  ) Splenomegaly      (  ) Hepatomegaly      (  ) Jaundice     (  ) ecchymosis     EXTREMITIES  ( x ) Normal     (  ) Rash     (  ) ecchymosis     (  ) varicose veins      (  ) pitting edema     (  ) non-pitting edema   (  ) ulceration     (  ) gangrene:     (location:     )    NERVOUS SYSTEM  (  ) A&Ox3     (  ) confused     (  ) lethargic  CN II-XII:     (  ) Intact     (  ) focal deficits  (Specify:     )   Upper extremities:     (  ) strength X/5     (  ) focal deficit (specify:    )  Lower extremities:     (  ) strength  X/5    (  ) focal deficit (specify:    )      LABS             7.3    7.66  )-----------( 404      ( 11-04-24 @ 06:48 )             23.3     140  |  104  |  36  -------------------------<  158   11-04-24 @ 06:48  4.9  |  23  |  0.9    Ca      9.5     11-04-24 @ 06:48  Mg     2.2     11-04-24 @ 06:48    TPro  5.9  /  Alb  3.2  /  TBili  <0.2  /  DBili  x   /  AST  9   /  ALT  7   /  AlkPhos  103  /  GGT  x     11-04-24 @ 06:48        Urinalysis Basic - ( 04 Nov 2024 06:48 )    Color: x / Appearance: x / SG: x / pH: x  Gluc: 158 mg/dL / Ketone: x  / Bili: x / Urobili: x   Blood: x / Protein: x / Nitrite: x   Leuk Esterase: x / RBC: x / WBC x   Sq Epi: x / Non Sq Epi: x / Bacteria: x          IMAGING

## 2024-11-06 NOTE — PROGRESS NOTE ADULT - ASSESSMENT
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 Per NH, patient also refused Pleurx drainage today and is requesting to drain it inpatient (gets drained 2-3 times per week).    #Acute on chronic anemia s/p multiple transfusions  - Seen by GI last admission: regarding endoscopic work up. After discussing risks vs benefits given his advance age and co morbidities he would like to hold off. Offered VCE as outpatient .   - last admission received 5 days of Venofer   - Active type and screen   - Switched plavix to aspirin for now  - c/w ferrous sulfate, IV venofovir for 5 days  - GI consult placed, Follow up, as per GI- Patient can follow  or  Follow up with our [facility] located at [address] [place] [address]  - Continue home PPI 40mg BID PO  - avoid Nsaids  - Hb 6.9> 1 unit PRBC (10/18)> repeat 7.8  - Hb 7.4 10/21 -> GI will reach out to family to discuss inpatient endoscopy -> pt son agreed for Video capsule endoscopy scheduled for tomorrow 10/23/2024  - Oncology onboard also recommended endoscopy to rule out GI bleed -> if GI workup is negative will offer bone marrow biopsy   - Patient refused prep yesterday follow up with GI, HB is stable   - VCE done on 10/24 ->Duodenal bleed, put on clear liquid diet, PPI BID IV   - 10/26: family agreed on inpatient EGD  - 10/28: s/p EGD significant for Angioectasia in the second part of the duodenum with bleeding on contact. (APC Hemostasis). 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 (APC Hemostasis). PPI BID for 2 weeks and then q24h thereafter   - 10/30: DAPT resumed, Hgb stable  - 10/31: patient given 1prbc  - 11/02: f/u repeat CBC at 11 and possible DC if Hgb stable  - 11/04: Hgb still downtrending, patient had melena episode on Saturday, asymptomatic currently, f/u GI recs for further evaluation of anemia  - 11/05: will monitor Hgb and repeat scope as outpatient as per GI, DC planning    #Malignant mesothelioma (diagnosed 8/2024 ) with recurrent pleural effusion . Hx Asbestos lung   #Severe protein calorie malnutrition (calorie count started - result 10/21)  -s/p VATS pleurodesis, R sided PleurX  - Per NH, patient also refused Pleurx drainage and is requesting to drain it inpatient (gets drained 2-3 times per week)  - Currently on room air (target spo2 92-96)  - Fu heme onc- Dr. Ramírez OP  - Fu palliative- full code  - PleurX drained on 10/21  - Aspiration precaution  - Pain control   - 10/28: pleurx drained 350cc  - 11/04: LE Duplex ordered to r/o DVT as patient hypercoagulable and not moving, will resume Heparin ppx    #HTN/HLD/CAD s/p stents x2 in 2007 (follows Dr Avila)  - c/w home meds     #Small hypodensity in segment 7 of the liver seen on prior CT.  - RUQ US as OP  - f/u outpt GI    #Acute Grief vs MDD  - wife passed away a few months ago  - patient refusing medication sometimes and asks to be left alone, no suicide ideas, no ideas to hurt other people.    - Psychiatry eval admission recommended outpt fu     Pendings: f/u 11am cbc, possible DC today

## 2024-11-06 NOTE — DISCHARGE NOTE NURSING/CASE MANAGEMENT/SOCIAL WORK - NSDCFUADDAPPT_GEN_ALL_CORE_FT
Please follow-up with gastroenterology, oncology, psychiatry and your primary care doctors 1 week after discharge from the hospital.

## 2024-11-06 NOTE — PROGRESS NOTE ADULT - NUTRITIONAL ASSESSMENT
This patient has been assessed with a concern for Malnutrition and has been determined to have a diagnosis/diagnoses of Severe protein-calorie malnutrition.    This patient is being managed with:   Diet Clear Liquid-  Entered: Oct 22 2024  5:58AM    Diet NPO after Midnight-     NPO Start Date: 22-Oct-2024   NPO Start Time: 23:59  Entered: Oct 22 2024  5:58AM  
This patient has been assessed with a concern for Malnutrition and has been determined to have a diagnosis/diagnoses of Severe protein-calorie malnutrition.    This patient is being managed with:   Diet DASH/TLC-  Sodium & Cholesterol Restricted  Consistent Carbohydrate {Evening Snack} (CSTCHOSN)  Supplement Feeding Modality:  Oral  Ensure Plus High Protein Cans or Servings Per Day:  3       Frequency:  Three Times a day  Entered: Oct 29 2024 11:55AM  
This patient has been assessed with a concern for Malnutrition and has been determined to have a diagnosis/diagnoses of Severe protein-calorie malnutrition.    This patient is being managed with:   Diet Clear Liquid-  Entered: Oct 25 2024  7:27PM  
This patient has been assessed with a concern for Malnutrition and has been determined to have a diagnosis/diagnoses of Severe protein-calorie malnutrition.    This patient is being managed with:   Diet Clear Liquid-  Supplement Feeding Modality:  Oral  Ensure Clear Cans or Servings Per Day:  1       Frequency:  Two Times a day  Entered: Oct 26 2024 11:01AM  
This patient has been assessed with a concern for Malnutrition and has been determined to have a diagnosis/diagnoses of Severe protein-calorie malnutrition.    This patient is being managed with:   Diet DASH/TLC-  Sodium & Cholesterol Restricted  Consistent Carbohydrate {Evening Snack} (CSTCHOSN)  Supplement Feeding Modality:  Oral  Ensure Plus High Protein Cans or Servings Per Day:  3       Frequency:  Three Times a day  Entered: Oct 29 2024 11:55AM  
This patient has been assessed with a concern for Malnutrition and has been determined to have a diagnosis/diagnoses of Severe protein-calorie malnutrition.    This patient is being managed with:   Diet Clear Liquid-  Entered: Oct 22 2024  5:58AM    Diet NPO after Midnight-     NPO Start Date: 22-Oct-2024   NPO Start Time: 23:59  Entered: Oct 22 2024  5:58AM  
This patient has been assessed with a concern for Malnutrition and has been determined to have a diagnosis/diagnoses of Severe protein-calorie malnutrition.    This patient is being managed with:   Diet DASH/TLC-  Sodium & Cholesterol Restricted  Consistent Carbohydrate {Evening Snack} (CSTCHOSN)  Supplement Feeding Modality:  Oral  Ensure Plus High Protein Cans or Servings Per Day:  1       Frequency:  Three Times a day  Entered: Oct 17 2024  4:00PM  
This patient has been assessed with a concern for Malnutrition and has been determined to have a diagnosis/diagnoses of Severe protein-calorie malnutrition.    This patient is being managed with:   Diet DASH/TLC-  Sodium & Cholesterol Restricted  Consistent Carbohydrate {Evening Snack} (CSTCHOSN)  Supplement Feeding Modality:  Oral  Ensure Plus High Protein Cans or Servings Per Day:  3       Frequency:  Three Times a day  Entered: Oct 29 2024 11:55AM  
This patient has been assessed with a concern for Malnutrition and has been determined to have a diagnosis/diagnoses of Severe protein-calorie malnutrition.    This patient is being managed with:   Diet DASH/TLC-  Sodium & Cholesterol Restricted  Consistent Carbohydrate {Evening Snack} (CSTCHOSN)  Supplement Feeding Modality:  Oral  Ensure Plus High Protein Cans or Servings Per Day:  3       Frequency:  Three Times a day  Entered: Oct 29 2024 11:55AM  
This patient has been assessed with a concern for Malnutrition and has been determined to have a diagnosis/diagnoses of Severe protein-calorie malnutrition.    This patient is being managed with:   Diet NPO after Midnight-     NPO Start Date: 23-Oct-2024   NPO Start Time: 23:59  Entered: Oct 23 2024  3:01PM    Diet Clear Liquid-  Entered: Oct 22 2024  5:58AM  
This patient has been assessed with a concern for Malnutrition and has been determined to have a diagnosis/diagnoses of Severe protein-calorie malnutrition.    This patient is being managed with:   Diet DASH/TLC-  Sodium & Cholesterol Restricted  Consistent Carbohydrate {Evening Snack} (CSTCHOSN)  Supplement Feeding Modality:  Oral  Ensure Plus High Protein Cans or Servings Per Day:  3       Frequency:  Three Times a day  Entered: Oct 29 2024 11:55AM  
This patient has been assessed with a concern for Malnutrition and has been determined to have a diagnosis/diagnoses of Severe protein-calorie malnutrition.    This patient is being managed with:   Diet NPO after Midnight-     NPO Start Date: 27-Oct-2024   NPO Start Time: 23:59  Entered: Oct 27 2024 10:54AM    Diet Clear Liquid-  Supplement Feeding Modality:  Oral  Ensure Clear Cans or Servings Per Day:  1       Frequency:  Two Times a day  Entered: Oct 26 2024 11:01AM  
This patient has been assessed with a concern for Malnutrition and has been determined to have a diagnosis/diagnoses of Severe protein-calorie malnutrition.    This patient is being managed with:   Diet DASH/TLC-  Sodium & Cholesterol Restricted  Consistent Carbohydrate {Evening Snack} (CSTCHOSN)  Supplement Feeding Modality:  Oral  Ensure Plus High Protein Cans or Servings Per Day:  3       Frequency:  Three Times a day  Entered: Oct 29 2024 11:55AM

## 2024-11-06 NOTE — PROGRESS NOTE ADULT - PROVIDER SPECIALTY LIST ADULT
Cardiology
Gastroenterology
Heme/Onc
Internal Medicine
Pulmonology
Cardiology
Gastroenterology
Gastroenterology
Heme/Onc
Internal Medicine
Gastroenterology
Heme/Onc
Hospitalist
Hospitalist
Internal Medicine
Cardiology
Internal Medicine

## 2024-11-06 NOTE — DISCHARGE NOTE NURSING/CASE MANAGEMENT/SOCIAL WORK - PATIENT PORTAL LINK FT
You can access the FollowMyHealth Patient Portal offered by [facility] by registering at the following website: [email or web address]. By joining [employer] [facility]’s FollowMyHealth portal, you will also be able to view your health information using other applications (apps) compatible with our system.

## 2024-11-17 DIAGNOSIS — C45.7 MESOTHELIOMA OF OTHER SITES: ICD-10-CM

## 2024-11-17 DIAGNOSIS — I25.10 ATHEROSCLEROTIC HEART DISEASE OF NATIVE CORONARY ARTERY WITHOUT ANGINA PECTORIS: ICD-10-CM

## 2024-11-17 DIAGNOSIS — I25.2 OLD MYOCARDIAL INFARCTION: ICD-10-CM

## 2024-11-17 DIAGNOSIS — K31.811 ANGIODYSPLASIA OF STOMACH AND DUODENUM WITH BLEEDING: ICD-10-CM

## 2024-11-17 DIAGNOSIS — E11.9 TYPE 2 DIABETES MELLITUS WITHOUT COMPLICATIONS: ICD-10-CM

## 2024-11-17 DIAGNOSIS — E43 UNSPECIFIED SEVERE PROTEIN-CALORIE MALNUTRITION: ICD-10-CM

## 2024-11-17 DIAGNOSIS — J91.0 MALIGNANT PLEURAL EFFUSION: ICD-10-CM

## 2024-11-17 DIAGNOSIS — E78.5 HYPERLIPIDEMIA, UNSPECIFIED: ICD-10-CM

## 2024-11-17 DIAGNOSIS — Z95.5 PRESENCE OF CORONARY ANGIOPLASTY IMPLANT AND GRAFT: ICD-10-CM

## 2024-11-17 DIAGNOSIS — Z79.84 LONG TERM (CURRENT) USE OF ORAL HYPOGLYCEMIC DRUGS: ICD-10-CM

## 2024-11-17 DIAGNOSIS — I10 ESSENTIAL (PRIMARY) HYPERTENSION: ICD-10-CM

## 2024-11-17 DIAGNOSIS — J61 PNEUMOCONIOSIS DUE TO ASBESTOS AND OTHER MINERAL FIBERS: ICD-10-CM

## 2024-11-17 DIAGNOSIS — Z79.02 LONG TERM (CURRENT) USE OF ANTITHROMBOTICS/ANTIPLATELETS: ICD-10-CM

## 2024-11-17 DIAGNOSIS — K29.60 OTHER GASTRITIS WITHOUT BLEEDING: ICD-10-CM

## 2024-11-17 DIAGNOSIS — D62 ACUTE POSTHEMORRHAGIC ANEMIA: ICD-10-CM

## 2024-11-17 DIAGNOSIS — Z99.3 DEPENDENCE ON WHEELCHAIR: ICD-10-CM

## 2024-11-17 DIAGNOSIS — K57.11 DIVERTICULOSIS OF SMALL INTESTINE WITHOUT PERFORATION OR ABSCESS WITH BLEEDING: ICD-10-CM

## 2024-11-17 DIAGNOSIS — Z88.0 ALLERGY STATUS TO PENICILLIN: ICD-10-CM

## 2024-11-17 DIAGNOSIS — E83.42 HYPOMAGNESEMIA: ICD-10-CM

## 2024-11-17 DIAGNOSIS — D50.9 IRON DEFICIENCY ANEMIA, UNSPECIFIED: ICD-10-CM

## 2024-11-17 DIAGNOSIS — F43.21 ADJUSTMENT DISORDER WITH DEPRESSED MOOD: ICD-10-CM

## 2024-11-17 DIAGNOSIS — F17.200 NICOTINE DEPENDENCE, UNSPECIFIED, UNCOMPLICATED: ICD-10-CM

## 2024-11-26 LAB — HUMAN ERYTHROCYTE ANTIGEN PANEL RESULT: SIGNIFICANT CHANGE UP

## 2024-11-26 RX ORDER — ENOXAPARIN SODIUM 40MG/0.4ML
30 SYRINGE (ML) SUBCUTANEOUS
Qty: 0 | Refills: 0 | DISCHARGE

## 2024-11-26 NOTE — H&P ADULT - NSHPLABSRESULTS_GEN_ALL_CORE
Home Medications:  atorvastatin 20 mg oral tablet: 1 tab(s) orally once a day (at bedtime) (26 Nov 2024 15:34)  ferrous sulfate 325 mg (65 mg elemental iron) oral tablet: 1 tab(s) orally once a day (26 Nov 2024 15:35)  gabapentin 300 mg oral tablet: 1 tab(s) orally 3 times a day (26 Nov 2024 15:35)  Lovenox 30 mg/0.3 mL injectable solution: 30 milligram(s) subcutaneously once a day (26 Nov 2024 15:35)  melatonin 3 mg oral tablet: 1 tab(s) orally once a day (at bedtime) (26 Nov 2024 15:35)  metFORMIN 500 mg oral tablet: 1 tab(s) orally 2 times a day (26 Nov 2024 15:35)  omeprazole 20 mg oral delayed release tablet: 1 tab(s) orally once a day (26 Nov 2024 15:34)  polyethylene glycol 3350 oral powder for reconstitution: 17 gram(s) orally once a day (26 Nov 2024 15:35)  senna leaf extract oral tablet: 2 tab(s) orally once a day (at bedtime) (26 Nov 2024 15:35)  tamsulosin 0.4 mg oral capsule: 1 cap(s) orally once a day (at bedtime) (26 Nov 2024 15:35)    MEDICATIONS  (STANDING):  atorvastatin 20 milliGRAM(s) Oral at bedtime  dextrose 5%. 1000 milliLiter(s) (50 mL/Hr) IV Continuous <Continuous>  dextrose 5%. 1000 milliLiter(s) (100 mL/Hr) IV Continuous <Continuous>  dextrose 50% Injectable 25 Gram(s) IV Push once  dextrose 50% Injectable 12.5 Gram(s) IV Push once  dextrose 50% Injectable 25 Gram(s) IV Push once  ferrous    sulfate 325 milliGRAM(s) Oral daily  gabapentin 300 milliGRAM(s) Oral three times a day  glucagon  Injectable 1 milliGRAM(s) IntraMuscular once  insulin lispro (ADMELOG) corrective regimen sliding scale   SubCutaneous three times a day before meals  melatonin 3 milliGRAM(s) Oral at bedtime  pantoprazole  Injectable 40 milliGRAM(s) IV Push every 12 hours  polyethylene glycol 3350 17 Gram(s) Oral daily  senna 2 Tablet(s) Oral at bedtime  sodium chloride 0.9%. 1000 milliLiter(s) (75 mL/Hr) IV Continuous <Continuous>  tamsulosin 0.4 milliGRAM(s) Oral at bedtime    MEDICATIONS  (PRN):  dextrose Oral Gel 15 Gram(s) Oral once PRN Blood Glucose LESS THAN 70 milliGRAM(s)/deciliter        LABS:                        7.1    7.09  )-----------( 419      ( 26 Nov 2024 10:36 )             23.7     11-26    138  |  103  |  35[H]  ----------------------------<  120[H]  4.4   |  22  |  0.9    Ca    9.5      26 Nov 2024 10:36    TPro  6.2  /  Alb  3.4[L]  /  TBili  <0.2  /  DBili  x   /  AST  9   /  ALT  <5  /  AlkPhos  125[H]  11-26    LIVER FUNCTIONS - ( 26 Nov 2024 10:36 )  Alb: 3.4 g/dL / Pro: 6.2 g/dL / ALK PHOS: 125 U/L / ALT: <5 U/L / AST: 9 U/L / GGT: x               PT/INR - ( 26 Nov 2024 14:35 )   PT: 10.60 sec;   INR: 0.90 ratio         PTT - ( 26 Nov 2024 14:35 )  PTT:34.5 sec  Urinalysis Basic - ( 26 Nov 2024 10:36 )    Color: x / Appearance: x / SG: x / pH: x  Gluc: 120 mg/dL / Ketone: x  / Bili: x / Urobili: x   Blood: x / Protein: x / Nitrite: x   Leuk Esterase: x / RBC: x / WBC x   Sq Epi: x / Non Sq Epi: x / Bacteria: x

## 2024-11-26 NOTE — ED PROVIDER NOTE - CLINICAL SUMMARY MEDICAL DECISION MAKING FREE TEXT BOX
84-year-old male with a history of iron deficiency anemia.  Recent hospitalization for anemia requiring transfusions.  Underwent endoscopy with cauterization of duodenal bleed.  Patient presents from nursing home with a low hemoglobin.  Today his antibody screen was positive and Ingrid positive.  I discussed the case with the hematologist who recommended holding off on transfusion looking for external sources.  Patient did have melena on CHINYERE.  Admitted to medicine service for further GI consult, goals of care and hematology management

## 2024-11-26 NOTE — PATIENT PROFILE ADULT - FUNCTIONAL ASSESSMENT - BASIC MOBILITY ASSESSMENT TYPE
[facility] requesting medication refill furosemide 20 mg Qty: 30 with 1 refill   Admission

## 2024-11-26 NOTE — CONSULT NOTE ADULT - ATTENDING COMMENTS
Patient was sent in by his hematologist for low hemoglobin to receive IV iron transfusion.  His hemoglobin was found to be below his baseline however he is known to have a history of chronic anemia.  His anemia was previously worked up by endoscopy he had a capsule endoscopy followed by deep enteroscopy with ablation of multiple AVMs.  At the moment he has no overt GI bleeding his CHINYERE revealed brown stool.  After discussion with the patient and his son they would like to defer any endoscopic evaluation.  Will defer endoscopy unless there is overt GI bleeding.  Continue to monitor CBC and transfuse or supplement with iron as needed according to what hematology recommends.

## 2024-11-26 NOTE — ED ADULT TRIAGE NOTE - WEIGHT IN KG
sepsis sepsis sepsis sepsis sepsis sepsis sepsis sepsis sepsis sepsis sepsis sepsis sepsis sepsis sepsis sepsis sepsis sepsis sepsis sepsis sepsis sepsis sepsis sepsis sepsis sepsis sepsis sepsis sepsis sepsis sepsis sepsis 57.2

## 2024-11-26 NOTE — PATIENT PROFILE ADULT - FALL HARM RISK - FALLEN IN PAST
ANTICOAGULATION MANAGEMENT     Roger Bonilla 69 year old male is on warfarin with subtherapeutic INR result. (Goal INR 2.0-3.0)    Recent labs: (last 7 days)     08/06/24  1330   INR 1.6*       ASSESSMENT     Source(s): Chart Review and Patient/Caregiver Call     Warfarin doses taken: Warfarin taken as instructed  Diet: No new diet changes identified  Medication/supplement changes: None noted  New illness, injury, or hospitalization: No  Signs or symptoms of bleeding or clotting: No  Previous result: Subtherapeutic  Additional findings: None       PLAN     Recommended plan for no diet, medication or health factor changes affecting INR     Dosing Instructions: Increase your warfarin dose (15% change) with next INR in 2 weeks       Summary  As of 8/6/2024      Full warfarin instructions:  7.5 mg every Tue; 5 mg all other days   Next INR check:  8/20/2024               Telephone call with Flex who verbalizes understanding and agrees to plan    Lab visit scheduled    Education provided: Please call back if any changes to your diet, medications or how you've been taking warfarin  Contact [phone] with any changes, questions or concerns.     Plan made per [facility] anticoagulation protocol    Mary Clemente RN  Anticoagulation Clinic  8/6/2024    _______________________________________________________________________     Anticoagulation Episode Summary       Current INR goal:  2.0-3.0   TTR:  47.4% (3.4 mo)   Target end date:  Indefinite   Send INR reminders to:  [facility]    Indications    Atrial fibrillation/flutter (H) [I48.91  I48.92]             Comments:               Anticoagulation Care Providers       Provider Role Specialty Phone number    Maria De Jesus Hay APRN [facility] Referring Family Medicine [phone]             No

## 2024-11-26 NOTE — CONSULT NOTE ADULT - ASSESSMENT
83 y/o man w PMHx of HLD, DM, CAD s/p stents x2 on plavix, malignant mesothelioma 8/2024 and s/p VATS pleurodesis, R sided PleurX, duodenal AVM, JASMIN, referred from NH for anemia to 6.5, in ED found to have Hb 7.1, Baseline 7-8. The ED reported CHINYERE positive for melena. Gi consulted for suspected GI bleed. GI team evaluated bedside. CHINYERE shows dark stool, brown on smear. Patient is on iron tablets at home. Patient follows with Dr. Epperson hematology, recommend iron infusion inpatient. Patient himself denies any blood per rectum or melena, as does his son. Of note, Patient recently underwent EGD on 10/28/24 x2AVM in the duodenum s/p APC. Denies abdominal pain, odynophagia, dysphagia, constipation, diarrhea, nausea, vomiting.     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 s/p APC  Was stable and discharged, has denied any recurrent melena or blood per rectum  CHINYERE: dark, brown stool on smear   Hb 7.1; baseline 7-8  No overt GI bleed and hemodynamically stable  On iron tabs at home    Plan  Can advance diet as tolerated   In the absence of overt GI bleeding, HD stability, and negative CHINYERE do not recommend endoscopic evaluation at this time   Iron infusions per hematology   Maintain active Type and screen, monitor for signs of active bleed or significant drop in Hb  Trend H&H   Start PPI PO BID for total  2 weeks and then q24h thereafter   Can resume necessary DAPT as deemed indicated per team  Can follow up with private GI  If has sings of overt GI bleed or significant drop in Hb would reassess for repeat endoscopy      #Small hypodensity in segment 7 of the liver seen on prior CT.  -Consider RUQ US as OP  -f/u w/ primary GI

## 2024-11-26 NOTE — H&P ADULT - NSVTERISKREFERASSESS_GEN_ALL_CORE
Discharge instructions given to pt. Prescriptions handed to pt's granddaughter. Pt educated, verbalizes understanding. All belongings accounted for. Pt wheeled out of ED with granddaughter to take her home and then to take her back to [facility] in the morning.      Refer to the Assessment tab to view/cancel completed assessment.

## 2024-11-26 NOTE — PATIENT PROFILE ADULT - FALL HARM RISK - HARM RISK INTERVENTIONS

## 2024-11-26 NOTE — ED ADULT NURSE NOTE - NSFALLHARMRISKINTERV_ED_ALL_ED
Assistance OOB with selected safe patient handling equipment if applicable/Assistance with ambulation/Communicate risk of Fall with Harm to all staff, patient, and family/Monitor gait and stability/Provide visual cue: red socks, yellow wristband, yellow gown, etc/Reinforce activity limits and safety measures with patient and family/Bed in lowest position, wheels locked, appropriate side rails in place/Call bell, personal items and telephone in reach/Instruct patient to call for assistance before getting out of bed/chair/stretcher/Non-slip footwear applied when patient is off stretcher/[place] to call system/Physically safe environment - no spills, clutter or unnecessary equipment/Purposeful Proactive Rounding/Room/bathroom lighting operational, light cord in reach

## 2024-11-26 NOTE — CONSULT NOTE ADULT - SUBJECTIVE AND OBJECTIVE BOX
Gastroenterology Consultation:    Patient is a 84y old  Male who presents with a chief complaint of Melena (26 Nov 2024 15:12)        Admitted on: 11-26-24      HPI:  83 y/o man w PMHx of HLD, DM, CAD s/p stents x2 on plavix, malignant mesothelioma 8/2024 and s/p VATS pleurodesis, R sided PleurX, duodenal AVM, JASMIN, referred from NH for anemia to 6.5, in ED found to have Hb 7.1, Baseline 7-8. The ED reported CHINYERE positive for melena. Gi consulted for suspected GI bleed. GI team evaluated bedside. CHINYERE shows dark stool, brown on smear. Patient is on iron tablets at home. Patient follows with Dr. Epperson hematology, recommend iron infusion inpatient. Patient himself denies any blood per rectum or melena, as does his son. Of note, Patient recently underwent EGD on 10/28/24 x2AVM in the duodenum s/p APC. Denies abdominal pain, odynophagia, dysphagia, constipation, diarrhea, nausea, vomiting.       Prior EGD:as above     Prior Colonoscopy: none in chart      PAST MEDICAL & SURGICAL HISTORY:  DM (diabetes mellitus)      MI (myocardial infarction)      History of CAD (coronary artery disease)      Dyslipidemia      Currently smokes tobacco      Mesothelioma, malignant      Coronary stent patent            FAMILY HISTORY:      Social History:  Tobacco: denies  Alcohol:denies  Drugs:denies    Home Medications:  atorvastatin 20 mg oral tablet: 1 tab(s) orally once a day (at bedtime) (26 Nov 2024 15:34)  ferrous sulfate 325 mg (65 mg elemental iron) oral tablet: 1 tab(s) orally once a day (26 Nov 2024 15:35)  gabapentin 300 mg oral tablet: 1 tab(s) orally 3 times a day (26 Nov 2024 15:35)  Lovenox 30 mg/0.3 mL injectable solution: 30 milligram(s) subcutaneously once a day (26 Nov 2024 15:35)  melatonin 3 mg oral tablet: 1 tab(s) orally once a day (at bedtime) (26 Nov 2024 15:35)  metFORMIN 500 mg oral tablet: 1 tab(s) orally 2 times a day (26 Nov 2024 15:35)  omeprazole 20 mg oral delayed release tablet: 1 tab(s) orally once a day (26 Nov 2024 15:34)  polyethylene glycol 3350 oral powder for reconstitution: 17 gram(s) orally once a day (26 Nov 2024 15:35)  senna leaf extract oral tablet: 2 tab(s) orally once a day (at bedtime) (26 Nov 2024 15:35)  tamsulosin 0.4 mg oral capsule: 1 cap(s) orally once a day (at bedtime) (26 Nov 2024 15:35)        MEDICATIONS  (STANDING):  atorvastatin 20 milliGRAM(s) Oral at bedtime  dextrose 5%. 1000 milliLiter(s) (50 mL/Hr) IV Continuous <Continuous>  dextrose 5%. 1000 milliLiter(s) (100 mL/Hr) IV Continuous <Continuous>  dextrose 50% Injectable 25 Gram(s) IV Push once  dextrose 50% Injectable 12.5 Gram(s) IV Push once  dextrose 50% Injectable 25 Gram(s) IV Push once  gabapentin 300 milliGRAM(s) Oral three times a day  glucagon  Injectable 1 milliGRAM(s) IntraMuscular once  insulin lispro (ADMELOG) corrective regimen sliding scale   SubCutaneous three times a day before meals  pantoprazole  Injectable 40 milliGRAM(s) IV Push every 12 hours  sodium chloride 0.9%. 1000 milliLiter(s) (75 mL/Hr) IV Continuous <Continuous>  tamsulosin 0.4 milliGRAM(s) Oral at bedtime    MEDICATIONS  (PRN):  dextrose Oral Gel 15 Gram(s) Oral once PRN Blood Glucose LESS THAN 70 milliGRAM(s)/deciliter      Allergies  penicillin (Unknown)      Review of Systems:   Constitutional:  No Fever, No Chills  ENT/Mouth:  No Hearing Changes,  No Difficulty Swallowing  Eyes:  No Eye Pain, No Vision Changes  Cardiovascular:  No Chest Pain, No Palpitations  Respiratory:  No Cough, No Dyspnea  Gastrointestinal:  As described in HPI  Musculoskeletal:  No Joint Swelling, No Back Pain  Skin:  No Skin Lesions, No Jaundice  Neuro:  No Syncope, No Dizziness  Heme/Lymph:  No Bruising, No Bleeding.          Physical Examination:  T(C): 36.6 (11-26-24 @ 15:50), Max: 36.6 (11-26-24 @ 15:50)  HR: 89 (11-26-24 @ 15:50) (89 - 93)  BP: 107/70 (11-26-24 @ 15:50) (107/70 - 109/70)  RR: 18 (11-26-24 @ 15:50) (18 - 18)  SpO2: 99% (11-26-24 @ 15:50) (97% - 99%)  Height (cm): 172.7 (11-26-24 @ 09:38)  Weight (kg): 57.2 (11-26-24 @ 09:38)        GENERAL: AAOx3, no acute distress.  HEAD:  Atraumatic, Normocephalic  EYES: conjunctiva and sclera clear  NECK: Supple, no JVD or thyromegaly  CHEST/LUNG: Clear to auscultation bilaterally; No wheeze, rhonchi, or rales. Drain in place   HEART: Regular rate and rhythm; normal S1, S2, No murmurs.  ABDOMEN: Soft, nontender, nondistended; Bowel sounds present  SKIN: Intact, no jaundice  CHINYERE: dark, brown stool on smear      Data:                        7.1    7.09  )-----------( 419      ( 26 Nov 2024 10:36 )             23.7     Hgb Trend:  7.1  11-26-24 @ 10:36      11-26    138  |  103  |  35[H]  ----------------------------<  120[H]  4.4   |  22  |  0.9    Ca    9.5      26 Nov 2024 10:36    TPro  6.2  /  Alb  3.4[L]  /  TBili  <0.2  /  DBili  x   /  AST  9   /  ALT  <5  /  AlkPhos  125[H]  11-26    Liver panel trend:  TBili <0.2   /   AST 9   /   ALT <5   /   AlkP 125   /   Tptn 6.2   /   Alb 3.4    /   DBili --      11-26      PT/INR - ( 26 Nov 2024 14:35 )   PT: 10.60 sec;   INR: 0.90 ratio         PTT - ( 26 Nov 2024 14:35 )  PTT:34.5 sec        Radiology:

## 2024-11-26 NOTE — ED PROVIDER NOTE - PROGRESS NOTE DETAILS
Patient noted to have a positive antibody screen.  I reviewed his prior lab work and this is his first time having the positive antibody screen.  Given this I have reached out to his primary hematologist Dr. Epperson.  Discussed the plan with his the son John who would like for the patient to be transfused given he gets recurrent transfusions for low hemoglobin.  Would likely return to the ER [facility]: team spoke with Dr. Epperson regarding the patients clinical picture. recommendation is to defer blood transfusion at this time and admission for Iron studies and possible Iron repletion. CHINYERE positive for Black stools.

## 2024-11-26 NOTE — ED PROVIDER NOTE - DISCUSSED CASE WITH MULTISELECT OPTIONS
QI outreach to schedule an office visit for:MWV. Patient Left message to return call to clinic. Please schedule on [facility].         Consultant

## 2024-11-26 NOTE — ED ADULT NURSE NOTE - NS ED NURSE LEVEL OF CONSCIOUSNESS AFFECT
Lab Results   Component Value Date    HGBA1C 7 2 (H) 05/05/2023       Recent Labs     05/14/23  1114 05/14/23  1610 05/14/23  2052 05/15/23  0735   POCGLU 181* 215* 264* 201*       Blood Sugar Average: Last 72 hrs:  (P) 230 [id number]   · Insulin sliding scale    Diabetic diet Calm

## 2024-11-26 NOTE — ED PROVIDER NOTE - ATTENDING CONTRIBUTION TO CARE
I saw and evaluated the patient on my own.  Briefly, I have the following impression and plan...  pt asymptomatic. sent in for HgB 6.5 has hx chronc anemia requiring transfusions. recent admission  Please see MDM for further details.

## 2024-11-26 NOTE — H&P ADULT - HISTORY OF PRESENT ILLNESS
85 y/o man w PMHx of HLD, DM, CAD s/p stents x2 in 2007, pt of Dr Avila, h/o asbestosis of lung, dx w malignant mesothelioma ~8/2024 and s/p VATS pleurodesis, R sided PleurX, h/o AVM/GIB, JASMIN, referred from NH for anemia to 6.5, in ED found to have Hb 7.1, CHINYERE +ve for melena. GI team consulted, clear liquid diet and NPO after midnight for possible EGD tomorrow.     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 (APC), angioectasia in the duodenal sweep. 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    Follows Dr Johnson for rad/onc    ED Course:  Labs: Hb 7.1, , BUN 35, Dir Antiglobulin +ve  Medications: IV PPI BID  Imaging: None    Patient being admitted to medicine for possible UGI. GI consulted.    85 y/o man w PMHx of HLD, DM, CAD s/p stents x2 in 2007, pt of Dr Avila, h/o asbestosis of lung, dx w malignant mesothelioma ~8/2024 and s/p VATS pleurodesis, R sided PleurX, h/o AVM/GIB, JASMIN, referred from NH for anemia to 6.5, in ED found to have Hb 7.1, CHINYERE +ve for melena. GI team consulted, clear liquid diet and NPO after midnight for possible EGD tomorrow.     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 (APC), angioectasia in the duodenal sweep. 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      ED Course:  Labs: Hb 7.1, , BUN 35, Dir Antiglobulin +ve  Medications: IV PPI BID  Imaging: None    Patient being admitted to medicine for possible UGI. GI consulted.    85 y/o man w PMHx of HLD, DM, CAD s/p stents x2 in 2007, pt of Dr Avila, h/o asbestosis of lung, dx w malignant mesothelioma ~8/2024 and s/p VATS pleurodesis, R sided PleurX, h/o AVM/GIB, JASMIN, referred from NH for anemia to 6.5, in ED found to have Hb 7.1, CHINYERE +ve for melena according to ED notes. GI team consulted, clear liquid diet and NPO after midnight for possible EGD tomorrow.    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 (APC), angioectasia in the duodenal sweep. 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    ED Course:  Labs: Hb 7.1, , BUN 35, Dir Antiglobulin +ve  Medications: IV PPI BID  Imaging: None    Patient being admitted to medicine for possible UGI. GI consulted.

## 2024-11-26 NOTE — PATIENT PROFILE ADULT - FUNCTIONAL ASSESSMENT - BASIC MOBILITY 6.
3-calculated by average/Not able to assess (calculate score using [facility] averaging method)

## 2024-11-26 NOTE — H&P ADULT - NSHPPHYSICALEXAM_GEN_ALL_CORE
Physical Examination   CONSTITUTIONAL: Not in acute distress  NEUROLOGICAL: Alert and oriented x 3  EYES: Pupils equal, Round and reactive to light.  CARDIAC: Normal rate, Regular rhythm.    RESPIRATORY: No wheezing, No crackles, Normal chest expansion, Not tachypneic, No use of accessory muscles  GASTROINTESTINAL: Abdomen soft, Non-tender, No guarding, + BS, CHINYERE+ve for melena  SKIN: Skin intact  EXTREMITIES:  2+ Peripheral Pulses, No clubbing, cyanosis, or edema

## 2024-11-26 NOTE — ED ADULT NURSE NOTE - SUICIDE SCREENING QUESTION 2
PATIENT REQUESTING ATIVAN FOR ANXIETY. PRN ATIVAN ADMINISTERED PER ORDERS. Patient unable to complete

## 2024-11-26 NOTE — ED PROVIDER NOTE - OBJECTIVE STATEMENT
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  Patient recently admitted for similar symptoms, GI endoscopy done found to have bleeding in the duodenum on contact, hemostasis achieved.  Found to have acute on chronic anemia with JASMIN and no overt GI bleed.  Son at bedside states no active bloody or black stools.  Patient not complaining of any symptoms at this time.  Denies chest pain, SOB, abdominal pain, back pain, UTI symptoms, blood in the urine, blood in the stool, vomiting, dizziness. wheelchair at baseline.

## 2024-11-26 NOTE — H&P ADULT - ASSESSMENT
85 y/o man w PMHx of HLD, DM, CAD s/p stents x2 in 2007, pt of Dr Avila, h/o asbestosis of lung, dx w malignant mesothelioma ~8/2024 and s/p VATS pleurodesis, R sided PleurX, h/o AVM/GIB, JASMIN, referred from NH for anemia to 6.5, in ED found to have Hb 7.1, CHINYERE +ve for melena. GI team consulted, clear liquid diet and NPO after midnight for possible EGD tomorrow.   Patient being admitted to medicine for possible UGI. GI consulted.     # Possible UGI bleed  # Acute on chronic anemia  # Hx Angioectasia of duodenum  - Hemodynamically stable   - Baseline hemoglobin - 8-9   - BUN 35  - Hb at NH - 6.5  - Hemoglobin on admission - 7.1   - Holding home plavix   Plan:    - c/w clear liquid diet, NPO after midnight  - f/u 8 PM lactate and f/u with GI STAT if >4.   - f/u repeat CBC and BMP at 8 with  - Maintain active Type and screen  - Trend H&H  - Ingrid positive on type and screen, reached out to private hematologist Dr Epperson (left voice message) for now Hb goal of 7  - c/w ferrous sulfate  - c/w IV PPI BID  - Target Hgb >7  - Avoid NSAIDs  - f/u GI consult    # Malignant mesothelioma (diagnosed 8/2024 ), Stable  # Hx Asbestos lung   - F/u heme onc outpatient     # HLD  # CAD s/p stents x2 in 2007 (follows Dr Avila)  - Holding home plavix  - c/w other home meds     # CKD Stage II  - Creatinine stable    Miscellaneous:  DVT prophylaxis: Holding iso possible UGI bleed  Bowel  - Feeds: Clear, NPO after midnight  - Prophylaxis: PPI IV BID  - Regimen: None  Activity: AAT  MedRec: Confirmed with NH papers  Code status: Full   Handoff: f/u 8 PM labs, f/u GI consult, Monitor vitals, Monitor H&H, Monitor for further bleeding               83 y/o man w PMHx of HLD, DM, CAD s/p stents x2 in 2007, pt of Dr Avila, h/o asbestosis of lung, dx w malignant mesothelioma ~8/2024 and s/p VATS pleurodesis, R sided PleurX, h/o AVM/GIB, JASMIN, referred from NH for anemia to 6.5, in ED found to have Hb 7.1, CHINYERE +ve for melena. GI team consulted, clear liquid diet and NPO after midnight for possible EGD tomorrow.   Patient being admitted to medicine for possible UGI. GI consulted.     # Possible UGI bleed  # Acute on chronic anemia  # Hx Angioectasia of duodenum  - Hemodynamically stable   - Baseline hemoglobin - 8-9   - BUN 35  - Hb at NH - 6.5  - Hemoglobin on admission - 7.1   - Holding home plavix   Plan:    - c/w clear liquid diet, NPO after midnight  - f/u 8 PM lactate and f/u with GI STAT if >4.   - f/u repeat CBC and BMP at 8 with  - Maintain active Type and screen  - Trend H&H  - Ingrid positive on type and screen, reached out to private hematologist Dr Epperson (left voice message) for now Hb goal of 7  - c/w ferrous sulfate  - c/w IV PPI BID  - Target Hgb >7  - Avoid NSAIDs  - f/u GI consult    # Malignant mesothelioma (diagnosed 8/2024 ), Stable  # Hx Asbestos lung   - F/u heme onc outpatient     # HLD  # CAD s/p stents x2 in 2007 (follows Dr Avila)  - Holding home plavix  - c/w other home meds     # CKD Stage II  - Creatinine stable    # DM Type 2  - Home med: Metformin 500 mg BID  - ISS to keep -180    Miscellaneous:  DVT prophylaxis: Holding iso possible UGI bleed  Bowel  - Feeds: Clear, NPO after midnight  - Prophylaxis: PPI IV BID  - Regimen: None  Activity: AAT  MedRec: Confirmed with NH papers  Code status: Full   Handoff: f/u 8 PM labs, f/u GI consult, Monitor vitals, Monitor H&H, Monitor for further bleeding               83 y/o man w PMHx of HLD, DM, CAD s/p stents x2 in 2007, pt of Dr Avila, h/o asbestosis of lung, dx w malignant mesothelioma ~8/2024 and s/p VATS pleurodesis, R sided PleurX, h/o AVM/GIB, JASMIN, referred from NH for anemia to 6.5, in ED found to have Hb 7.1, CHINYERE +ve for melena. GI team consulted, clear liquid diet and NPO after midnight for possible EGD tomorrow.   Patient being admitted to medicine for possible UGI. GI consulted.     # Possible UGI bleed  # Acute on chronic anemia  # Hx Angioectasia of duodenum  - Hemodynamically stable   - Baseline hemoglobin - 8-9   - BUN 35  - Hb at NH - 6.5  - Hemoglobin on admission - 7.1   - Holding home plavix   Plan:    - f/u repeat CBC and BMP at 8   - Maintain active Type and screen  - Trend H&H  - Ingrid positive on type and screen, reached out to private hematologist Dr Epperson (left voice message) for now Hb goal of 7  - c/w ferrous sulfate  - c/w IV PPI BID  - Target Hgb >7  - Avoid NSAIDs  - f/u GI consult    # Malignant mesothelioma (diagnosed 8/2024 ), Stable  # Hx Asbestos lung   - F/u heme onc outpatient     # HLD  # CAD s/p stents x2 in 2007 (follows Dr Avila)  - Holding home plavix  - c/w other home meds     # CKD Stage II  - Creatinine stable    # DM Type 2  - Home med: Metformin 500 mg BID  - ISS to keep -180    Miscellaneous:  DVT prophylaxis: Holding iso possible UGI bleed  Bowel  - Feeds: DASH/TLC  - Prophylaxis: PPI IV BID  - Regimen: None  Activity: AAT  MedRec: Confirmed with NH papers  Code status: Full   Handoff: f/u 8 PM labs, f/u GI consult, Monitor vitals, Monitor H&H, Monitor for further bleeding               83 y/o man w PMHx of HLD, DM, CAD s/p stents x2 in 2007, pt of Dr Avila, h/o asbestosis of lung, dx w malignant mesothelioma ~8/2024 and s/p VATS pleurodesis, R sided PleurX, h/o AVM/GIB, JASMIN, referred from NH for anemia to 6.5, in ED found to have Hb 7.1, CHINYERE +ve for melena. GI team consulted, clear liquid diet and NPO after midnight for possible EGD tomorrow.   Patient being admitted to medicine for possible UGI. GI consulted.     # Possible UGI bleed  # Acute on chronic anemia  # Hx Angioectasia of duodenum  - Hemodynamically stable   - Baseline hemoglobin - 8-9   - BUN 35  - Hb at NH - 6.5  - Hemoglobin on admission - 7.1   - Low total iron and % saturation: 11 %  - Holding home plavix   -   Plan:    - f/u repeat CBC and BMP at 8   - Maintain active Type and screen  - Trend H&H  - c/w home ferrous sulfate  - consider IV venofer in AM  - c/w IV PPI BID for now  - Target Hgb >7  - Avoid NSAIDs  - f/u GI consult    # Malignant mesothelioma (diagnosed 8/2024 ), Stable  # Hx Asbestos lung   - F/u heme onc outpatient     # HLD  # CAD s/p stents x2 in 2007 (follows Dr Avila)  - Holding home plavix (reasses continued need for plavix)  - c/w other home meds     # CKD Stage II  - Creatinine stable    # DM Type 2  - Home med: Metformin 500 mg BID  - ISS to keep -180    Miscellaneous:  DVT prophylaxis: Holding iso possible UGI bleed  Bowel  - Feeds: DASH/TLC  - Prophylaxis: PPI IV BID  - Regimen: None  Activity: AAT  MedRec: Confirmed with NH papers  Code status: Full   Handoff: f/u 8 PM labs, f/u GI consult, Monitor vitals, Monitor H&H, Monitor for further bleeding

## 2024-11-26 NOTE — H&P ADULT - ATTENDING COMMENTS
Present with the resident during the interview and examination of the patient. I personally interviewed the patient and repeated the exam. I reviewed/revised the exam and discussed with the resident in regards to assessment and plan as documented. See resident's section for details and agree with the above documented note.    S-Patient states that he feels well currently. denies any complaints. no Syncope like symptoms, no active bleeding currently, no dizziness. had one melena earlier.     O-PEx: Cardiac exam is regular rate and rhythm, lungs are clear to auscultation.  I asked the RN at bedside to monitor Orthostasis.     I have utilized all available resources to obtain, update, or review the patients current medications.     Results of imaging, labs and EKG reviewed independently    Medical Problems:   suspected GIB with symptomatic Anemia of Acute Blood Loss:   -Monitor Hb q8H, Lactic Acid, PPI IV bid 40mg, GI consult, NPO at MN, clear liquid diet overnight.   -pending Hematology for PRBC due to bobby positive on type and screen, GI consulted and aware for possible EGD.     Hyperlipidemia continue home meds  h/o Mesothelioma s/p VATS for asbestos exposure: Stable,   CAD s/p prior stents on Plavix? stents placed in 2007;----HOLD Plavix for now until confirm need,     Discussed case with housestaff  Available prior records reviewed  Agree with resident note with exceptions    Disposition and Medical Necessity :   -2-3- [place], pending Hematology for PRBC due to bobby positive on type and screen, GI consulted and aware for possible EGD.     I have seen and examined the patient today and I spend time with the patient half of the time face-to-face encounter, I examine the patient, reviewed medications, I have reviewed laboratories, and imaging, and discussed plan of care with nurses staff. Please excuse any dictation or typographical errors that have not been edited out

## 2024-11-26 NOTE — ED PROVIDER NOTE - PHYSICAL EXAMINATION
VITAL SIGNS: I have reviewed nursing notes and confirm.  CONSTITUTIONAL: Well-developed; well-nourished; in no acute distress.   SKIN: Skin exam is warm and dry, no acute rash.   HEAD: Normocephalic; atraumatic.  EYES: PERRL, EOM intact; conjunctiva and sclera clear.  ENT: No nasal discharge; airway clear. TMs clear. Pharynx clear.  NECK: Supple; non tender.  CARD: S1, S2 normal; no murmurs, gallops, or rubs. Regular rate and rhythm.  RESP: Normal respiratory effort, no tachypnea or distress. Lungs CTAB, no wheezes, rales or rhonchi.  ABD: soft, NT/ND.   EXT: Normal ROM. No clubbing, cyanosis or edema.  LYMPH: No acute cervical adenopathy.  NEURO: Alert, oriented. Grossly unremarkable. No focal deficits.  PSYCH: Cooperative, appropriate.

## 2024-11-27 NOTE — PROGRESS NOTE ADULT - ASSESSMENT
83 y/o man w PMHx of HLD, DM, CAD s/p stents x2 in 2007, pt of Dr Avila, h/o asbestosis of lung, dx w malignant mesothelioma ~8/2024 and s/p VATS pleurodesis, R sided PleurX, h/o AVM/GIB, JASMIN, referred from NH for anemia to 6.5, in ED found to have Hb 7.1, CHINYERE +ve for melena. GI team consulted, clear liquid diet and NPO after midnight for possible EGD tomorrow.   Patient being admitted to medicine for possible UGI. GI consulted.     # Possible UGI bleed  # Acute on chronic anemia  # Hx Angioectasia of duodenum  - Hemodynamically stable   - Baseline hemoglobin - 8-9   - Hb at NH - 6.5  - Hemoglobin on admission - 7.1 then went to down to 5.4 , s/p 1 unit PRBC's , Hb improved to around 8   - Low total iron and % saturation: 11 %  - Holding home plavix , resume ASA instead as below   - GI consult appreciated > can advance diet as tolerated   -In the absence of overt GI bleeding, HD stability, and negative CHINYERE do not recommend endoscopic evaluation at this time   -Patient states that he would only want endoscopic intervention if absolutely necessary. Discussed this with patient's son John and he is in agreement  -Iron infusions per hematology   -Maintain active Type and screen, monitor for signs of active bleed or significant drop in Hb  -Trend H&H   -Start PPI PO BID for total  2 weeks and then q24h thereafter   -Can resume necessary DAPT as deemed indicated per team  -Can follow up with private GI  -If has signs of overt GI bleed or significant drop in Hb would reassess for repeat endoscopy    #Small hypodensity in segment 7 of the liver seen on prior CT.  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   - will likely resume ASA tmw AM instead of Plavix as lower risk of bleeding   - c/w statin     #DM Type 2  - Home med: Metformin 500 mg BID  - ISS to keep -180    #Miscellaneous:  - DVT prophylaxis: SCDs  - Feeds: Clears then advance to DASH/TLC  - Prophylaxis: PPI  -Activity: AAT  -Code status: Full

## 2024-11-27 NOTE — PROGRESS NOTE ADULT - SUBJECTIVE AND OBJECTIVE BOX
SUBJECTIVE:  HPI:  85 y/o man w PMHx of HLD, DM, CAD s/p stents x2 in 2007, pt of Dr Avila, h/o asbestosis of lung, dx w malignant mesothelioma ~8/2024 and s/p VATS pleurodesis, R sided PleurX, h/o AVM/GIB, JASMIN, referred from NH for anemia to 6.5, in ED found to have Hb 7.1, CHINYERE +ve for melena according to ED notes. GI team consulted, clear liquid diet and NPO after midnight for possible EGD tomorrow.    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 (APC), angioectasia in the duodenal sweep. 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    ED Course:  Labs: Hb 7.1, , BUN 35, Dir Antiglobulin +ve  Medications: IV PPI BID  Imaging: None    Patient being admitted to medicine for possible UGI. GI consulted.    (26 Nov 2024 15:12)      Patient is a 84y old Male who presents with a chief complaint of Melena (27 Nov 2024 09:00)    Currently admitted to medicine with the primary diagnosis of Anemia       Today is hospital day 1d.     PAST MEDICAL & SURGICAL HISTORY  DM (diabetes mellitus)    MI (myocardial infarction)    History of CAD (coronary artery disease)    Dyslipidemia    Currently smokes tobacco    Mesothelioma, malignant    Coronary stent patent        ALLERGIES:  penicillin (Unknown)    MEDICATIONS:  ACTIVE MEDICATIONS  atorvastatin 20 milliGRAM(s) Oral at bedtime  chlorhexidine 2% Cloths 1 Application(s) Topical <User Schedule>  dextrose 5%. 1000 milliLiter(s) IV Continuous <Continuous>  dextrose 5%. 1000 milliLiter(s) IV Continuous <Continuous>  dextrose 50% Injectable 25 Gram(s) IV Push once  dextrose 50% Injectable 12.5 Gram(s) IV Push once  dextrose 50% Injectable 25 Gram(s) IV Push once  dextrose Oral Gel 15 Gram(s) Oral once PRN  ferrous    sulfate 325 milliGRAM(s) Oral daily  gabapentin 300 milliGRAM(s) Oral three times a day  glucagon  Injectable 1 milliGRAM(s) IntraMuscular once  insulin lispro (ADMELOG) corrective regimen sliding scale   SubCutaneous three times a day before meals  melatonin 3 milliGRAM(s) Oral at bedtime  pantoprazole    Tablet 40 milliGRAM(s) Oral every 12 hours  polyethylene glycol 3350 17 Gram(s) Oral daily  senna 2 Tablet(s) Oral at bedtime  sodium chloride 0.9%. 1000 milliLiter(s) IV Continuous <Continuous>  tamsulosin 0.4 milliGRAM(s) Oral at bedtime      VITALS:   T(F): 97.6  HR: 80  BP: 130/72  RR: 18  SpO2: 99%    LABS:                        8.2    6.75  )-----------( 371      ( 27 Nov 2024 07:34 )             26.4     11-27    138  |  105  |  33[H]  ----------------------------<  141[H]  4.5   |  21  |  0.9    Ca    9.4      27 Nov 2024 07:34    TPro  6.2  /  Alb  3.4[L]  /  TBili  <0.2  /  DBili  x   /  AST  9   /  ALT  <5  /  AlkPhos  125[H]  11-26    PT/INR - ( 26 Nov 2024 14:35 )   PT: 10.60 sec;   INR: 0.90 ratio         PTT - ( 26 Nov 2024 14:35 )  PTT:34.5 sec  Urinalysis Basic - ( 27 Nov 2024 07:34 )    Color: x / Appearance: x / SG: x / pH: x  Gluc: 141 mg/dL / Ketone: x  / Bili: x / Urobili: x   Blood: x / Protein: x / Nitrite: x   Leuk Esterase: x / RBC: x / WBC x   Sq Epi: x / Non Sq Epi: x / Bacteria: x        Lactate, Blood: 2.3 mmol/L *H* (11-26-24 @ 21:28)              PHYSICAL EXAM:  GENERAL: AAOx3, no acute distress.  HEAD:  Atraumatic, Normocephalic  EYES: conjunctiva and sclera clear  NECK: Supple, no JVD or thyromegaly  CHEST/LUNG: Clear to auscultation bilaterally; No wheeze, rhonchi, or rales, drain in place   HEART: Regular rate and rhythm; normal S1, S2, No murmurs.  ABDOMEN: Soft, nontender, nondistended; Bowel sounds present,   NEUROLOGY: No asterixis or tremor.   SKIN: Intact, no jaundice  CHINYERE: brown stool     Indwelling Urethral Catheter:     Connect To:  Straight Drainage/[place]    Indication:  Urinary Retention / Obstruction (11-27-24 @ 08:10) (not performed) [Active]

## 2024-11-27 NOTE — PROGRESS NOTE ADULT - ASSESSMENT
83 y/o man w PMHx of HLD, DM, CAD s/p stents x2 on plavix, malignant mesothelioma 8/2024 and s/p VATS pleurodesis, R sided PleurX, duodenal AVM, JASMIN, referred from NH for anemia to 6.5, in ED found to have Hb 7.1, Baseline 7-8. The ED reported CHINYERE positive for melena. Gi consulted for suspected GI bleed. GI team evaluated bedside. CHINYERE shows dark stool, brown on smear. Patient is on iron tablets at home. Patient follows with Dr. Epperson hematology, recommend iron infusion inpatient. Patient himself denies any blood per rectum or melena, as does his son. Of note, Patient recently underwent EGD on 10/28/24 x2AVM in the duodenum s/p APC. Denies abdominal pain, odynophagia, dysphagia, constipation, diarrhea, nausea, vomiting.     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 s/p APC  Was stable and discharged, has denied any recurrent melena or blood per rectum  CHINYERE: dark, brown stool on smear   Hb 7.1; baseline 7-8  No overt GI bleed and hemodynamically stable  On iron tabs at home    11/27: HB drop to 7.1->5.5->8.2 (s/p 1 unit PRBC). Patient remains HD stable, not tachy. No overt bleeding overnight. No Bm overnight. CHINYERE brown. Abdominal exam soft and benign.     Plan  Can advance diet as tolerated   In the absence of overt GI bleeding, HD stability, and negative CHINYERE do not recommend endoscopic evaluation at this time   Patient states that he would only want endoscopic intervention if absolutely necessary. Discussed this with patient's son John and he is in agreement  Iron infusions per hematology   Maintain active Type and screen, monitor for signs of active bleed or significant drop in Hb  Trend H&H   Start PPI PO BID for total  2 weeks and then q24h thereafter   Can resume necessary DAPT as deemed indicated per team  Can follow up with private GI  If has signs of overt GI bleed or significant drop in Hb would reassess for repeat endoscopy    #Small hypodensity in segment 7 of the liver seen on prior CT.  -Consider RUQ US as OP  -f/u w/ primary GI

## 2024-11-27 NOTE — PROGRESS NOTE ADULT - SUBJECTIVE AND OBJECTIVE BOX
Gastroenterology progress note:     Patient is a 84y old  Male who presents with a chief complaint of Melena (26 Nov 2024 16:59)       Admitted on: 11-26-24    We are following the patient for: anemia r/o GI bleed        Interval History:    No acute events overnight. Had drop in Hb 7.1->5.5->8.2(s/p 1 unit PRBC)  - Diet - tolerating regular diet   - last BM - yesterday, CHINYERE today brown stool  - Abdominal pain - denies      PAST MEDICAL & SURGICAL HISTORY:  DM (diabetes mellitus)      MI (myocardial infarction)      History of CAD (coronary artery disease)      Dyslipidemia      Currently smokes tobacco      Mesothelioma, malignant      Coronary stent patent          MEDICATIONS  (STANDING):  atorvastatin 20 milliGRAM(s) Oral at bedtime  chlorhexidine 2% Cloths 1 Application(s) Topical <User Schedule>  dextrose 5%. 1000 milliLiter(s) (50 mL/Hr) IV Continuous <Continuous>  dextrose 5%. 1000 milliLiter(s) (100 mL/Hr) IV Continuous <Continuous>  dextrose 50% Injectable 25 Gram(s) IV Push once  dextrose 50% Injectable 12.5 Gram(s) IV Push once  dextrose 50% Injectable 25 Gram(s) IV Push once  ferrous    sulfate 325 milliGRAM(s) Oral daily  gabapentin 300 milliGRAM(s) Oral three times a day  glucagon  Injectable 1 milliGRAM(s) IntraMuscular once  insulin lispro (ADMELOG) corrective regimen sliding scale   SubCutaneous three times a day before meals  melatonin 3 milliGRAM(s) Oral at bedtime  pantoprazole  Injectable 40 milliGRAM(s) IV Push every 12 hours  polyethylene glycol 3350 17 Gram(s) Oral daily  senna 2 Tablet(s) Oral at bedtime  sodium chloride 0.9%. 1000 milliLiter(s) (75 mL/Hr) IV Continuous <Continuous>  tamsulosin 0.4 milliGRAM(s) Oral at bedtime    MEDICATIONS  (PRN):  dextrose Oral Gel 15 Gram(s) Oral once PRN Blood Glucose LESS THAN 70 milliGRAM(s)/deciliter      Allergies  penicillin (Unknown)      Review of Systems:   Cardiovascular:  No Chest Pain, No Palpitations  Respiratory:  No Cough, No Dyspnea  Gastrointestinal:  As described in HPI  Skin:  No Skin Lesions, No Jaundice  Neuro:  No Syncope, No Dizziness    Physical Examination:  T(C): 36.4 (11-27-24 @ 05:22), Max: 36.8 (11-27-24 @ 02:55)  HR: 80 (11-27-24 @ 05:22) (80 - 96)  BP: 130/72 (11-27-24 @ 05:22) (107/70 - 146/78)  RR: 18 (11-27-24 @ 05:22) (18 - 18)  SpO2: 99% (11-27-24 @ 05:22) (96% - 99%)  Weight (kg): 56.9 (11-26-24 @ 23:16)      GENERAL: AAOx3, no acute distress.  HEAD:  Atraumatic, Normocephalic  EYES: conjunctiva and sclera clear  NECK: Supple, no JVD or thyromegaly  CHEST/LUNG: Clear to auscultation bilaterally; No wheeze, rhonchi, or rales, drain in place   HEART: Regular rate and rhythm; normal S1, S2, No murmurs.  ABDOMEN: Soft, nontender, nondistended; Bowel sounds present,   NEUROLOGY: No asterixis or tremor.   SKIN: Intact, no jaundice     Data:                        8.2    6.75  )-----------( 371      ( 27 Nov 2024 07:34 )             26.4     Hgb trend:  8.2  11-27-24 @ 07:34  5.4  11-26-24 @ 22:24  6.6  11-26-24 @ 21:28  7.1  11-26-24 @ 10:36      11-26    140  |  106  |  38[H]  ----------------------------<  181[H]  4.8   |  22  |  1.1    Ca    8.9      26 Nov 2024 21:28    TPro  6.2  /  Alb  3.4[L]  /  TBili  <0.2  /  DBili  x   /  AST  9   /  ALT  <5  /  AlkPhos  125[H]  11-26    Liver panel trend:  TBili <0.2   /   AST 9   /   ALT <5   /   AlkP 125   /   Tptn 6.2   /   Alb 3.4    /   DBili --      11-26      PT/INR - ( 26 Nov 2024 14:35 )   PT: 10.60 sec;   INR: 0.90 ratio         PTT - ( 26 Nov 2024 14:35 )  PTT:34.5 sec       Radiology:       Gastroenterology progress note:     Patient is a 84y old  Male who presents with a chief complaint of Melena (26 Nov 2024 16:59)       Admitted on: 11-26-24    We are following the patient for: anemia r/o GI bleed        Interval History:    No acute events overnight. Had drop in Hb 7.1->5.5->8.2(s/p 1 unit PRBC)  - Diet - tolerating regular diet   - last BM - yesterday, CHINYERE today brown stool  - Abdominal pain - denies      PAST MEDICAL & SURGICAL HISTORY:  DM (diabetes mellitus)      MI (myocardial infarction)      History of CAD (coronary artery disease)      Dyslipidemia      Currently smokes tobacco      Mesothelioma, malignant      Coronary stent patent          MEDICATIONS  (STANDING):  atorvastatin 20 milliGRAM(s) Oral at bedtime  chlorhexidine 2% Cloths 1 Application(s) Topical <User Schedule>  dextrose 5%. 1000 milliLiter(s) (50 mL/Hr) IV Continuous <Continuous>  dextrose 5%. 1000 milliLiter(s) (100 mL/Hr) IV Continuous <Continuous>  dextrose 50% Injectable 25 Gram(s) IV Push once  dextrose 50% Injectable 12.5 Gram(s) IV Push once  dextrose 50% Injectable 25 Gram(s) IV Push once  ferrous    sulfate 325 milliGRAM(s) Oral daily  gabapentin 300 milliGRAM(s) Oral three times a day  glucagon  Injectable 1 milliGRAM(s) IntraMuscular once  insulin lispro (ADMELOG) corrective regimen sliding scale   SubCutaneous three times a day before meals  melatonin 3 milliGRAM(s) Oral at bedtime  pantoprazole  Injectable 40 milliGRAM(s) IV Push every 12 hours  polyethylene glycol 3350 17 Gram(s) Oral daily  senna 2 Tablet(s) Oral at bedtime  sodium chloride 0.9%. 1000 milliLiter(s) (75 mL/Hr) IV Continuous <Continuous>  tamsulosin 0.4 milliGRAM(s) Oral at bedtime    MEDICATIONS  (PRN):  dextrose Oral Gel 15 Gram(s) Oral once PRN Blood Glucose LESS THAN 70 milliGRAM(s)/deciliter      Allergies  penicillin (Unknown)      Review of Systems:   Cardiovascular:  No Chest Pain, No Palpitations  Respiratory:  No Cough, No Dyspnea  Gastrointestinal:  As described in HPI  Skin:  No Skin Lesions, No Jaundice  Neuro:  No Syncope, No Dizziness    Physical Examination:  T(C): 36.4 (11-27-24 @ 05:22), Max: 36.8 (11-27-24 @ 02:55)  HR: 80 (11-27-24 @ 05:22) (80 - 96)  BP: 130/72 (11-27-24 @ 05:22) (107/70 - 146/78)  RR: 18 (11-27-24 @ 05:22) (18 - 18)  SpO2: 99% (11-27-24 @ 05:22) (96% - 99%)  Weight (kg): 56.9 (11-26-24 @ 23:16)      GENERAL: AAOx3, no acute distress.  HEAD:  Atraumatic, Normocephalic  EYES: conjunctiva and sclera clear  NECK: Supple, no JVD or thyromegaly  CHEST/LUNG: Clear to auscultation bilaterally; No wheeze, rhonchi, or rales, drain in place   HEART: Regular rate and rhythm; normal S1, S2, No murmurs.  ABDOMEN: Soft, nontender, nondistended; Bowel sounds present,   NEUROLOGY: No asterixis or tremor.   SKIN: Intact, no jaundice  CHINYERE: brown stool      Data:                        8.2    6.75  )-----------( 371      ( 27 Nov 2024 07:34 )             26.4     Hgb trend:  8.2  11-27-24 @ 07:34  5.4  11-26-24 @ 22:24  6.6  11-26-24 @ 21:28  7.1  11-26-24 @ 10:36      11-26    140  |  106  |  38[H]  ----------------------------<  181[H]  4.8   |  22  |  1.1    Ca    8.9      26 Nov 2024 21:28    TPro  6.2  /  Alb  3.4[L]  /  TBili  <0.2  /  DBili  x   /  AST  9   /  ALT  <5  /  AlkPhos  125[H]  11-26    Liver panel trend:  TBili <0.2   /   AST 9   /   ALT <5   /   AlkP 125   /   Tptn 6.2   /   Alb 3.4    /   DBili --      11-26      PT/INR - ( 26 Nov 2024 14:35 )   PT: 10.60 sec;   INR: 0.90 ratio         PTT - ( 26 Nov 2024 14:35 )  PTT:34.5 sec       Radiology:

## 2024-11-28 NOTE — DIETITIAN INITIAL EVALUATION ADULT - PERTINENT LABORATORY DATA
11-28    141  |  109  |  26[H]  ----------------------------<  141[H]  4.4   |  22  |  0.9    Ca    8.7      28 Nov 2024 07:00  Mg     1.7     11-28    TPro  5.7[L]  /  Alb  3.2[L]  /  TBili  <0.2  /  DBili  x   /  AST  7   /  ALT  <5  /  AlkPhos  104  11-28  POCT Blood Glucose.: 255 mg/dL (11-28-24 @ 11:18)  A1C with Estimated Average Glucose Result: 6.1 % (10-27-24 @ 06:46)  A1C with Estimated Average Glucose Result: 5.8 % (08-11-24 @ 05:09)  A1C with Estimated Average Glucose Result: 5.7 % (07-22-24 @ 04:30)

## 2024-11-28 NOTE — DIETITIAN INITIAL EVALUATION ADULT - NS FNS DIET ORDER
Diet, NPO after Midnight:      NPO Start Date: 28-Nov-2024,   NPO Start Time: 23:59 (11-28-24 @ 12:07)

## 2024-11-28 NOTE — DIETITIAN INITIAL EVALUATION ADULT - ORAL INTAKE PTA/DIET HISTORY
Patient unwilling to provide specific information regarding food intake PTA. Patient reports that he does not keep track of how many meals he consumes a day. Patient reports a normal appetite, and says, " I eat when I'm hungry." Patient does not endorse a special diet. Patient reports UBW 63.6kg, CBW 56.9 kg, patient w a 10.5% wt loss in an unknown span of time. RD not able to obtain specific information on PO intake and wt hx. Patient unwilling to provide specific information regarding food intake PTA. Patient reports that he does not keep track of how many meals he consumes a day. Patient reports a normal appetite, and says, " I eat when I'm hungry." Patient does not endorse a special diet. Patient reports UBW 63.6kg, per HIE patient weighed  61.2 kg 8/29. Patient's wt on 8/29 vs. CBW shows a 7% wt loss in 3 months. Patient does not meet wt criteria for malnutrition.

## 2024-11-28 NOTE — DIETITIAN INITIAL EVALUATION ADULT - PERTINENT MEDS FT
MEDICATIONS  (STANDING):  aspirin  chewable 81 milliGRAM(s) Oral daily  atorvastatin 20 milliGRAM(s) Oral at bedtime  chlorhexidine 2% Cloths 1 Application(s) Topical <User Schedule>  dextrose 5%. 1000 milliLiter(s) (50 mL/Hr) IV Continuous <Continuous>  dextrose 5%. 1000 milliLiter(s) (100 mL/Hr) IV Continuous <Continuous>  dextrose 50% Injectable 25 Gram(s) IV Push once  dextrose 50% Injectable 12.5 Gram(s) IV Push once  dextrose 50% Injectable 25 Gram(s) IV Push once  ferrous    sulfate 325 milliGRAM(s) Oral daily  gabapentin 300 milliGRAM(s) Oral three times a day  glucagon  Injectable 1 milliGRAM(s) IntraMuscular once  insulin lispro (ADMELOG) corrective regimen sliding scale   SubCutaneous three times a day before meals  magnesium sulfate  IVPB 2 Gram(s) IV Intermittent once  melatonin 3 milliGRAM(s) Oral at bedtime  pantoprazole    Tablet 40 milliGRAM(s) Oral every 12 hours  polyethylene glycol 3350 17 Gram(s) Oral daily  senna 2 Tablet(s) Oral at bedtime  sodium chloride 0.9%. 1000 milliLiter(s) (75 mL/Hr) IV Continuous <Continuous>  tamsulosin 0.4 milliGRAM(s) Oral at bedtime    MEDICATIONS  (PRN):  dextrose Oral Gel 15 Gram(s) Oral once PRN Blood Glucose LESS THAN 70 milliGRAM(s)/deciliter

## 2024-11-28 NOTE — DIETITIAN INITIAL EVALUATION ADULT - ORAL NUTRITION SUPPLEMENTS
Patient refuses oral nutritional supplementation.  Patient refuses oral nutritional supplementation, but will accept ice cream.  RD will provide ice cream 2x/day (342 kcal, 6g protein per serving)  RD will try magic cup 3x/day (290kcal, 9g protein per serving)

## 2024-11-28 NOTE — DIETITIAN INITIAL EVALUATION ADULT - OTHER INFO
88 y/o male admitted for melena. Transferred from NH for anemia.   #Acute on chronic anemia  # Possible UGI bleed  - Hemodynamically stable   - Low total iron and % saturation: 11 %  # Malignant mesothelioma (diagnosed 8/2024 ), Stable  # Hx Asbestos lung

## 2024-11-28 NOTE — PROGRESS NOTE ADULT - SUBJECTIVE AND OBJECTIVE BOX
Patient seen and examined. Events over the last 24 hours noted.   Pt resting in bed comfortably  Last evening some confusion, thought to be 2/2 sundowning  Hgb dropped from 8.2 to 7.2  per RN no melena or hematochezia       T(F): 98.6 (11-28-24 @ 04:29), Max: 98.6 (11-28-24 @ 04:29)  HR: 98 (11-28-24 @ 04:29)  BP: 122/63 (11-28-24 @ 04:29)  RR: 18 (11-28-24 @ 04:29)  SpO2: 98% (11-28-24 @ 04:29) (96% - 98%)    PHYSICAL EXAM:  GEN: No acute distress, pallor  HEENT: normocephalic, atraumatic, anicteric  LUNGS: b/l breath sounds present, clear to auscultation bilaterally   HEART: S1/S2 present. RRR  ABD: Soft, non-tender, non-distended. Bowel sounds present  EXT: warm   NEURO: awake, no new focal deficits    LABS  11-28    141  |  109  |  26[H]  ----------------------------<  141[H]  4.4   |  22  |  0.9    Ca    8.7      28 Nov 2024 07:00  Mg     1.7     11-28    TPro  5.7[L]  /  Alb  3.2[L]  /  TBili  <0.2  /  DBili  x   /  AST  7   /  ALT  <5  /  AlkPhos  104  11-28                          7.2    7.93  )-----------( 363      ( 28 Nov 2024 07:00 )             23.5     PT/INR - ( 26 Nov 2024 14:35 )   PT: 10.60 sec;   INR: 0.90 ratio         PTT - ( 26 Nov 2024 14:35 )  PTT:34.5 sec       MEDICATIONS  (STANDING):  aspirin  chewable 81 milliGRAM(s) Oral daily  atorvastatin 20 milliGRAM(s) Oral at bedtime  chlorhexidine 2% Cloths 1 Application(s) Topical <User Schedule>  dextrose 5%. 1000 milliLiter(s) (50 mL/Hr) IV Continuous <Continuous>  dextrose 5%. 1000 milliLiter(s) (100 mL/Hr) IV Continuous <Continuous>  dextrose 50% Injectable 25 Gram(s) IV Push once  dextrose 50% Injectable 12.5 Gram(s) IV Push once  dextrose 50% Injectable 25 Gram(s) IV Push once  ferrous    sulfate 325 milliGRAM(s) Oral daily  gabapentin 300 milliGRAM(s) Oral three times a day  glucagon  Injectable 1 milliGRAM(s) IntraMuscular once  insulin lispro (ADMELOG) corrective regimen sliding scale   SubCutaneous three times a day before meals  melatonin 3 milliGRAM(s) Oral at bedtime  pantoprazole    Tablet 40 milliGRAM(s) Oral every 12 hours  polyethylene glycol 3350 17 Gram(s) Oral daily  senna 2 Tablet(s) Oral at bedtime  sodium chloride 0.9%. 1000 milliLiter(s) (75 mL/Hr) IV Continuous <Continuous>  tamsulosin 0.4 milliGRAM(s) Oral at bedtime    MEDICATIONS  (PRN):  dextrose Oral Gel 15 Gram(s) Oral once PRN Blood Glucose LESS THAN 70 milliGRAM(s)/deciliter

## 2024-11-28 NOTE — DIETITIAN INITIAL EVALUATION ADULT - OTHER CALCULATIONS
Energy needs calculated with consideration for acuity of illness, weight status, skin integrity, & age

## 2024-11-28 NOTE — DIETITIAN INITIAL EVALUATION ADULT - ENERGY INTAKE
Adequate (%) Presently patient reports he eats % of meals provided that he enjoys. Patient reports he also has food brought in from the community. Patient reports a good appetite.

## 2024-11-28 NOTE — DIETITIAN NUTRITION RISK NOTIFICATION - TREATMENT: THE FOLLOWING DIET HAS BEEN RECOMMENDED
Diet, NPO after Midnight:      NPO Start Date: 28-Nov-2024,   NPO Start Time: 23:59 (11-28-24 @ 12:07) [Active]  Diet, DASH/TLC:   Sodium & Cholesterol Restricted (11-27-24 @ 11:48) [Active]  Interventions:   Meals & coordination of care. Patient with good PO intake iso a catabolic disease.  Patient refuses oral supplements.    Diet, NPO after Midnight:      NPO Start Date: 28-Nov-2024,   NPO Start Time: 23:59 (11-28-24 @ 12:07) [Active]  Diet, DASH/TLC:   Sodium & Cholesterol Restricted (11-27-24 @ 11:48) [Active]  Interventions:   Meals & coordination of care. Patient with good PO intake iso a catabolic disease.  Patient refuses oral supplements. Patient will eat ice cream   Magic Cup 3x/day  Ice cream 2/day

## 2024-11-28 NOTE — PROGRESS NOTE ADULT - ASSESSMENT
85 y/o man w PMHx of HLD, DM, CAD s/p stents x2 in 2007, pt of Dr Avila, h/o asbestosis of lung, dx w malignant mesothelioma ~8/2024 and s/p VATS pleurodesis, R sided PleurX, h/o AVM/GIB, JASMIN, referred from NH for anemia to 6.5, in ED found to have Hb 7.1, CHINYERE +ve for melena. GI team consulted, clear liquid diet and NPO after midnight for possible EGD tomorrow.   Patient being admitted to medicine for possible UGI. GI consulted.     # Possible UGI bleed  # Acute on chronic anemia  # Hx Angioectasia of duodenum  - Hemodynamically stable   - Baseline hemoglobin - 8-9   - Hb at NH - 6.5  - Hemoglobin on admission - 7.1 then went to down to 5.4 , s/p 1 unit PRBC's , Hb improved to around 8   - Low total iron and % saturation: 11 %  - Holding home plavix , resume ASA instead as below   - GI consult appreciated > can advance diet as tolerated   -In the absence of overt GI bleeding, HD stability, and negative CHINYEER do not recommend endoscopic evaluation at this time   -Patient states that he would only want endoscopic intervention if absolutely necessary. Discussed this with patient's son John and he is in agreement  -Iron infusions per hematology   -Maintain active Type and screen, monitor for signs of active bleed or significant drop in Hb  -Trend H&H   -Start PPI PO BID for total  2 weeks and then q24h thereafter   -Can resume necessary DAPT as deemed indicated per team  -Can follow up with private GI  -If has signs of overt GI bleed or significant drop in Hb would reassess for repeat endoscopy  -11/28: Hgb 8.2>7.2; will trend H&H and transfuse if Hgb < 7; d/w GI fellow this AM, recs NPO at [facility] for possible endoscopy tomorrow.     #Small hypodensity in segment 7 of the liver seen on prior CT.  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   - will likely resume ASA tmw AM instead of Plavix as lower risk of bleeding   - c/w statin     #DM Type 2  - Home med: Metformin 500 mg BID  - ISS to keep -180    #Miscellaneous:  - DVT prophylaxis: SCDs  - Feeds: Clears then advance to DASH/TLC  - Prophylaxis: PPI  -Activity: AAT  -Code status: Full

## 2024-11-28 NOTE — DIETITIAN INITIAL EVALUATION ADULT - EDUCATION DIETARY MODIFICATIONS
Oriented - self; Oriented - place; Oriented - time
Encouraged patient to continue the good PO intake and increase hydration./(1) partially meets; needs review/practice

## 2024-11-28 NOTE — DIETITIAN INITIAL EVALUATION ADULT - ADD RECOMMEND
High Risk     Interventions: Meals,  coordination of care  Monitoring/Evaluation: Weights, labs, PO intake, nutrition-focused physical findings  1. Continue current diet  2. Encourage PO intake hydration, & assist PRN

## 2024-11-29 NOTE — PROGRESS NOTE ADULT - ASSESSMENT
85 y/o man w PMHx of HLD, DM, CAD s/p stents x2 in 2007, pt of Dr Avila, h/o asbestosis of lung, dx w malignant mesothelioma ~8/2024 and s/p VATS pleurodesis, R sided PleurX, h/o AVM/GIB, JASMIN, referred from NH for anemia to 6.5, in ED found to have Hb 7.1, CHINYERE +ve for melena. GI team consulted, clear liquid diet and NPO after midnight for possible EGD tomorrow.   Patient being admitted to medicine for possible UGI. GI consulted.     # Possible UGI bleed  # Acute on chronic anemia  # Hx Angioectasia of duodenum  - Hemodynamically stable   - Baseline hemoglobin - 8-9   - Holding home plavix , resume ASA instead as below   -GI on board   -Maintain active Type and screen, monitor for signs of active bleed or significant drop in Hb  -Trend H&H   -Start PPI PO BID for total  2 weeks and then q24h thereafter   -GI is planning to scope pt possibly on Monday , start pt on clears tmrw and prepare on sunday for procedure on monda7 ( f/u GI note)     #Small hypodensity in segment 7 of the liver seen on prior CT.  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   - will likely resume ASA tmw AM instead of Plavix as lower risk of bleeding   - c/w statin     #DM Type 2  - Home med: Metformin 500 mg BID  - ISS to keep -180    #Miscellaneous:  - DVT prophylaxis: SCDs  - Feeds: Clears then advance to DASH/TLC  - Prophylaxis: PPI  -Activity: AAT  -Code status: Full       Pending : GI is planning to scope pt possibly on Monday , start pt on clears tmrw and prepare on sunday for procedure on monda7 ( f/u GI note)   If son got the SUTAb can prepare pt with the than Golytely    83 y/o man w PMHx of HLD, DM, CAD s/p stents x2 in 2007, pt of Dr Avila, h/o asbestosis of lung, dx w malignant mesothelioma ~8/2024 and s/p VATS pleurodesis, R sided PleurX, h/o AVM/GIB, JASMIN, referred from NH for anemia to 6.5, in ED found to have Hb 7.1, CHINYERE +ve for melena. GI team consulted, clear liquid diet and NPO after midnight for possible EGD tomorrow.   Patient being admitted to medicine for possible UGI. GI consulted.     # Possible UGI bleed  # Acute on chronic anemia  # Hx Angioectasia of duodenum  - Hemodynamically stable   - Baseline hemoglobin - 8-9   - Holding home plavix , resume ASA instead as below   -GI on board   -Maintain active Type and screen, monitor for signs of active bleed or significant drop in Hb  -Trend H&H   -Start PPI PO BID for total  2 weeks and then q24h thereafter   -GI is planning to scope pt possibly on Monday , start pt on clears tmrw and prepare on sunday for procedure on monda7 ( f/u GI note)     #JEY :   #Possible UTI ?  -Creat 1.6 (baseline 0.9)   -WBC trended up  PLAN :   -IVF   -f/u UA , UCx   -trend Creat   -avoid nephrotoxins       #Small hypodensity in segment 7 of the liver seen on prior CT.  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   - will likely resume ASA tmw AM instead of Plavix as lower risk of bleeding   - c/w statin     #DM Type 2  - Home med: Metformin 500 mg BID  - ISS to keep -180    #Miscellaneous:  - DVT prophylaxis: SCDs  - Feeds: Clears then advance to DASH/TLC  - Prophylaxis: PPI  -Activity: AAT  -Code status: Full       Pending : GI is planning to scope pt possibly on Monday , start pt on clears tmrw and prepare on sunday for procedure on monda7 ( f/u GI note)   If son got the SUTAb can prepare pt with the than Golytely    83 y/o man w PMHx of HLD, DM, CAD s/p stents x2 in 2007, pt of Dr Avila, h/o asbestosis of lung, dx w malignant mesothelioma ~8/2024 and s/p VATS pleurodesis, R sided PleurX, h/o AVM/GIB, JASMIN, referred from NH for anemia to 6.5, in ED found to have Hb 7.1, CHINYERE +ve for melena. GI team consulted, clear liquid diet and NPO after midnight for possible EGD tomorrow.   Patient being admitted to medicine for possible UGI. GI consulted.     # Possible UGI bleed  # Acute on chronic anemia  # Hx Angioectasia of duodenum  - Hemodynamically stable   - Baseline hemoglobin - 8-9   - Holding home plavix , resume ASA instead as below   -GI on board   -Maintain active Type and screen, monitor for signs of active bleed or significant drop in Hb  -Trend H&H   -Start PPI PO BID for total  2 weeks and then q24h thereafter   -GI is planning to scope pt possibly on Monday , start pt on clears tmrw and prepare on sunday for procedure on monda7 ( f/u GI note)     #JEY :   #Possible UTI ?  -Creat 1.6 (baseline 0.9)   -WBC trended up  PLAN :   -IVF   -f/u UA , UCx   -trend Creat   -avoid nephrotoxins       #Small hypodensity in segment 7 of the liver seen on prior CT.  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   - will likely resume ASA tmw AM instead of Plavix as lower risk of bleeding   - c/w statin     #DM Type 2  - Home med: Metformin 500 mg BID  - ISS to keep -180    #Miscellaneous:  - DVT prophylaxis: SCDs  - Feeds: Clears then advance to DASH/TLC  - Prophylaxis: PPI  -Activity: AAT  -Code status: Full       Pending : GI is planning to scope pt possibly on Monday , start pt on clears tmrw and prepare on sunday for procedure on monda7 ( f/u GI note)   If son got the SUTAb can prepare pt with the than Golytely , f/u PATEL and UCx    85 y/o man w PMHx of HLD, DM, CAD s/p stents x2 in 2007, pt of Dr Avila, h/o asbestosis of lung, dx w malignant mesothelioma ~8/2024 and s/p VATS pleurodesis, R sided PleurX, h/o AVM/GIB, JASMIN, referred from NH for anemia to 6.5, in ED found to have Hb 7.1, CHINYERE +ve for melena. GI team consulted, clear liquid diet and NPO after midnight for possible EGD tomorrow.   Patient being admitted to medicine for possible UGI. GI consulted.     # Possible UGI bleed  # Acute on chronic anemia  # Hx Angioectasia of duodenum  - Hemodynamically stable   - Baseline hemoglobin - 8-9   - Holding home plavix , resume ASA instead as below   -GI on board   -Maintain active Type and screen, monitor for signs of active bleed or significant drop in Hb  -Trend H&H   -Start PPI PO BID for total  2 weeks and then q24h thereafter   -GI is planning to scope pt possibly on Monday , start pt on clears tmrw and prepare on sunday for procedure on monda7 ( f/u GI note)     #JEY :   #Possible UTI ?  -Creat 1.6 (baseline 0.9)   -WBC trended up  PLAN :   -IVF   -f/u UA , UCx , can start empiric ABx once cultures been collected   -trend Creat   -avoid nephrotoxins       #Small hypodensity in segment 7 of the liver seen on prior CT.  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   - will likely resume ASA tmw AM instead of Plavix as lower risk of bleeding   - c/w statin     #DM Type 2  - Home med: Metformin 500 mg BID  - ISS to keep -180    #Miscellaneous:  - DVT prophylaxis: SCDs  - Feeds: Clears then advance to DASH/TLC  - Prophylaxis: PPI  -Activity: AAT  -Code status: Full       Pending : GI is planning to scope pt possibly on Monday , start pt on clears tmrw and prepare on sunday for procedure on monda7 ( f/u GI note)   If son got the SUTAb can prepare pt with the than Golytely , f/u UA and UCx

## 2024-11-29 NOTE — CONSULT NOTE ADULT - ASSESSMENT
ASSESSMENT  This is a 84 year old male with PMH of HLD, DM, CAD s/p stents x2 in 2007, h/o asbestosis of lung, dx w malignant mesothelioma ~8/2024 and s/p VATS pleurodesis, R sided PleurX, h/o AVM/GIB, JASMIN, referred from NH for anemia    ASSESSMENT  #Upper GI Bleed- Angiectasia of duodenum  #Leukocytosis- Likely reactive for GI Bleed.  #JEY over CKD  #Intermittent Delirium-Hospital acquired  #Chronic Laws? From urinary retention in 7/2024.  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    RECOMMENDATIONS  -Recommend Changing the laws catheter.  -If UA/Urine culture is needed, send from new catheter.  -Hold off abx. See no indication.   -Consider urology follow up, if the patient can have TOV.   -Off loading to prevent pressure sores and preventive measures to avoid aspiration  -Recall ID as needed.     If any questions, please send a message or call on [facility] Teams  Please continue to update ID with any pertinent new laboratory or radiographic findings.    Mago Coy M.D  Infectious Diseases Attending/   [employer] at [facility]

## 2024-11-29 NOTE — CONSULT NOTE ADULT - ASSESSMENT
83 y/o man w PMHx of HLD, DM, CAD s/p stents x2 in 2007, pt of Dr Avila, h/o asbestosis of lung, dx w malignant mesothelioma ~8/2024 and s/p VATS pleurodesis, R sided PleurX, h/o AVM/GIB, JASMIN, referred from NH for anemia to 6.5, in ED found to have Hb 7.1, CHINYERE +ve for melena.     # Possible UGI bleed  # Acute on chronic anemia  with JASMIN   Hx Angioectasia of duodenum  -GI on board   -Trend H&H   -on PPI PO BID for total  2 weeks and then q24h thereafter   -GI is planning to scope pt possibly on Monday ,   -give venofer 200 mg qdaily x 2 doses    #JEY :   #r/o UTI     # Malignant mesothelioma (diagnosed 8/2024 ), Stable  # Hx Asbestos lung   -Mn as outpt     cont other supportive care.  will f/u

## 2024-11-29 NOTE — CONSULT NOTE ADULT - SUBJECTIVE AND OBJECTIVE BOX
Patient is a 84y old  Male who presents with a chief complaint of Melena (29 Nov 2024 17:28)      HPI:  83 y/o man w PMHx of HLD, DM, CAD s/p stents x2 in 2007, pt of Dr Avila, h/o asbestosis of lung, dx w malignant mesothelioma ~8/2024 and s/p VATS pleurodesis, R sided PleurX, h/o AVM/GIB, JASMIN, referred from NH for anemia to 6.5, in ED found to have Hb 7.1, CHINYERE +ve for melena according to ED notes.   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 (APC), angioectasia in the duodenal sweep. 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    ED Course:  Labs: Hb 7.1, , BUN 35, Dir Antiglobulin +ve  Medications: IV PPI BID  Imaging: None    Patient being admitted to medicine for possible UGI. GI consulted.    (26 Nov 2024 15:12)       ROS:  Negative except for:    PAST MEDICAL & SURGICAL HISTORY:  DM (diabetes mellitus)      MI (myocardial infarction)      History of CAD (coronary artery disease)      Dyslipidemia      Currently smokes tobacco      Mesothelioma, malignant      Coronary stent patent          SOCIAL HISTORY:    FAMILY HISTORY:      MEDICATIONS  (STANDING):  aspirin  chewable 81 milliGRAM(s) Oral daily  atorvastatin 20 milliGRAM(s) Oral at bedtime  chlorhexidine 2% Cloths 1 Application(s) Topical <User Schedule>  dextrose 5%. 1000 milliLiter(s) (50 mL/Hr) IV Continuous <Continuous>  dextrose 5%. 1000 milliLiter(s) (100 mL/Hr) IV Continuous <Continuous>  dextrose 50% Injectable 25 Gram(s) IV Push once  dextrose 50% Injectable 12.5 Gram(s) IV Push once  dextrose 50% Injectable 25 Gram(s) IV Push once  ferrous    sulfate 325 milliGRAM(s) Oral daily  gabapentin 300 milliGRAM(s) Oral three times a day  glucagon  Injectable 1 milliGRAM(s) IntraMuscular once  insulin lispro (ADMELOG) corrective regimen sliding scale   SubCutaneous three times a day before meals  melatonin 3 milliGRAM(s) Oral at bedtime  pantoprazole    Tablet 40 milliGRAM(s) Oral every 12 hours  polyethylene glycol 3350 17 Gram(s) Oral daily  senna 2 Tablet(s) Oral at bedtime  sodium chloride 0.9%. 1000 milliLiter(s) (75 mL/Hr) IV Continuous <Continuous>  sodium zirconium cyclosilicate 10 Gram(s) Oral once  tamsulosin 0.4 milliGRAM(s) Oral at bedtime    MEDICATIONS  (PRN):  dextrose Oral Gel 15 Gram(s) Oral once PRN Blood Glucose LESS THAN 70 milliGRAM(s)/deciliter      Allergies    penicillin (Unknown)    Intolerances        Vital Signs Last 24 Hrs  T(C): 36.8 (29 Nov 2024 13:47), Max: 36.8 (29 Nov 2024 13:47)  T(F): 98.3 (29 Nov 2024 13:47), Max: 98.3 (29 Nov 2024 13:47)  HR: 96 (29 Nov 2024 13:47) (89 - 111)  BP: 104/66 (29 Nov 2024 13:47) (104/66 - 133/65)  BP(mean): --  RR: 18 (29 Nov 2024 13:47) (18 - 18)  SpO2: 98% (29 Nov 2024 13:47) (94% - 99%)    Parameters below as of 29 Nov 2024 13:47  Patient On (Oxygen Delivery Method): room air        PHYSICAL EXAM  General: cachetic   HEENT: clear oropharynx, anicteric sclera, pink conjunctiva  Neck: supple  CV: normal S1/S2 with no murmur rubs or gallops  Lungs: positive air movement b/l ant lungs,clear to auscultation, no wheezes, no rales  Abdomen: soft non-tender non-distended, no hepatosplenomegaly  Ext: no clubbing cyanosis or edema  Skin: no rashes and no petechiae  Neuro: alert,lethargic no focal deficits      LABS:                          8.6    15.45 )-----------( 380      ( 29 Nov 2024 07:30 )             27.4         Mean Cell Volume : 95.5 fL  Mean Cell Hemoglobin : 30.0 pg  Mean Cell Hemoglobin Concentration : 31.4 g/dL  Auto Neutrophil # : x  Auto Lymphocyte # : x  Auto Monocyte # : x  Auto Eosinophil # : x  Auto Basophil # : x  Auto Neutrophil % : x  Auto Lymphocyte % : x  Auto Monocyte % : x  Auto Eosinophil % : x  Auto Basophil % : x      Serial CBC's  11-29 @ 07:30  Hct-27.4 / Hgb-8.6 / Plat-380 / RBC-2.87 / WBC-15.45  Serial CBC's  11-28 @ 23:47  Hct-23.8 / Hgb-7.4 / Plat--- / RBC--- / WBC---  Serial CBC's  11-28 @ 17:23  Hct-26.7 / Hgb-8.5 / Plat--- / RBC--- / WBC---  Serial CBC's  11-28 @ 11:56  Hct-22.6 / Hgb-7.0 / Plat--- / RBC--- / WBC---  Serial CBC's  11-28 @ 07:00  Hct-23.5 / Hgb-7.2 / Plat-363 / RBC-2.43 / WBC-7.93  Serial CBC's  11-27 @ 11:04  Hct-26.2 / Hgb-8.1 / Plat-399 / RBC-2.72 / WBC-7.24  Serial CBC's  11-27 @ 07:34  Hct-26.4 / Hgb-8.2 / Plat-371 / RBC-2.75 / WBC-6.75  Serial CBC's  11-26 @ 22:24  Hct-17.6 / Hgb-5.4 / Plat-372 / RBC-1.75 / WBC-6.27  Serial CBC's  11-26 @ 21:28  Hct-22.2 / Hgb-6.6 / Plat-435 / RBC-2.23 / WBC-8.27  Serial CBC's  11-26 @ 10:36  Hct-23.7 / Hgb-7.1 / Plat-419 / RBC-2.37 / WBC-7.09      11-29    142  |  108  |  40[H]  ----------------------------<  162[H]  5.7[H]   |  20  |  1.6[H]    Ca    9.5      29 Nov 2024 07:30  Mg     2.3     11-29    TPro  5.7[L]  /  Alb  3.2[L]  /  TBili  <0.2  /  DBili  x   /  AST  7   /  ALT  <5  /  AlkPhos  104  11-28          Reticulocyte Percent: 3.2 % (11-27 @ 07:34)  Iron - Total Binding Capacity.: 278 ug/dL (11-26 @ 10:36)  Ferritin: 74 ng/mL (11-26 @ 10:36)              BLOOD SMEAR INTERPRETATION:       RADIOLOGY & ADDITIONAL STUDIES:

## 2024-11-29 NOTE — PROGRESS NOTE ADULT - SUBJECTIVE AND OBJECTIVE BOX
CLARI BURGER 84y Male  MRN#: [id number]     Hospital Day: 3d    Pt is currently admitted with the primary diagnosis of  Anemia      OBJECTIVE  PAST MEDICAL & SURGICAL HISTORY  DM (diabetes mellitus)    MI (myocardial infarction)    History of CAD (coronary artery disease)    Dyslipidemia    Currently smokes tobacco    Mesothelioma, malignant    Coronary stent patent                                              -----------------------------------------------------------  ALLERGIES:  penicillin (Unknown)                                            ------------------------------------------------------------    HOME MEDICATIONS  Home Medications:  atorvastatin 20 mg oral tablet: 1 tab(s) orally once a day (at bedtime) (26 Nov 2024 15:34)  ferrous sulfate 325 mg (65 mg elemental iron) oral tablet: 1 tab(s) orally once a day (26 Nov 2024 15:35)  gabapentin 300 mg oral tablet: 1 tab(s) orally 3 times a day (26 Nov 2024 15:35)  Lovenox 30 mg/0.3 mL injectable solution: 30 milligram(s) subcutaneously once a day (26 Nov 2024 15:35)  melatonin 3 mg oral tablet: 1 tab(s) orally once a day (at bedtime) (26 Nov 2024 15:35)  metFORMIN 500 mg oral tablet: 1 tab(s) orally 2 times a day (26 Nov 2024 15:35)  omeprazole 20 mg oral delayed release tablet: 1 tab(s) orally once a day (26 Nov 2024 15:34)  polyethylene glycol 3350 oral powder for reconstitution: 17 gram(s) orally once a day (26 Nov 2024 15:35)  senna leaf extract oral tablet: 2 tab(s) orally once a day (at bedtime) (26 Nov 2024 15:35)  tamsulosin 0.4 mg oral capsule: 1 cap(s) orally once a day (at bedtime) (26 Nov 2024 15:35)                           MEDICATIONS:  STANDING MEDICATIONS  aspirin  chewable 81 milliGRAM(s) Oral daily  atorvastatin 20 milliGRAM(s) Oral at bedtime  chlorhexidine 2% Cloths 1 Application(s) Topical <User Schedule>  dextrose 5%. 1000 milliLiter(s) IV Continuous <Continuous>  dextrose 5%. 1000 milliLiter(s) IV Continuous <Continuous>  dextrose 50% Injectable 25 Gram(s) IV Push once  dextrose 50% Injectable 12.5 Gram(s) IV Push once  dextrose 50% Injectable 25 Gram(s) IV Push once  ferrous    sulfate 325 milliGRAM(s) Oral daily  gabapentin 300 milliGRAM(s) Oral three times a day  glucagon  Injectable 1 milliGRAM(s) IntraMuscular once  insulin lispro (ADMELOG) corrective regimen sliding scale   SubCutaneous three times a day before meals  melatonin 3 milliGRAM(s) Oral at bedtime  pantoprazole    Tablet 40 milliGRAM(s) Oral every 12 hours  polyethylene glycol 3350 17 Gram(s) Oral daily  senna 2 Tablet(s) Oral at bedtime  sodium chloride 0.9%. 1000 milliLiter(s) IV Continuous <Continuous>  tamsulosin 0.4 milliGRAM(s) Oral at bedtime    PRN MEDICATIONS  dextrose Oral Gel 15 Gram(s) Oral once PRN                                            ------------------------------------------------------------  VITAL SIGNS: Last 24 Hours  T(C): 36.6 (29 Nov 2024 04:59), Max: 36.7 (28 Nov 2024 20:25)  T(F): 97.9 (29 Nov 2024 04:59), Max: 98.1 (28 Nov 2024 20:25)  HR: 89 (29 Nov 2024 04:59) (74 - 111)  BP: 111/67 (29 Nov 2024 04:59) (111/67 - 164/77)  BP(mean): --  RR: 18 (29 Nov 2024 04:59) (18 - 18)  SpO2: 99% (29 Nov 2024 04:59) (94% - 99%)      11-28-24 @ 07:01  -  11-29-24 @ 07:00  --------------------------------------------------------  IN: 835 mL / OUT: 450 mL / NET: 385 mL                                             --------------------------------------------------------------  LABS:                        8.6    15.45 )-----------( 380      ( 29 Nov 2024 07:30 )             27.4     11-29    142  |  108  |  40[H]  ----------------------------<  162[H]  5.7[H]   |  20  |  1.6[H]    Ca    9.5      29 Nov 2024 07:30  Mg     2.3     11-29    TPro  5.7[L]  /  Alb  3.2[L]  /  TBili  <0.2  /  DBili  x   /  AST  7   /  ALT  <5  /  AlkPhos  104  11-28      Urinalysis Basic - ( 29 Nov 2024 07:30 )    Color: x / Appearance: x / SG: x / pH: x  Gluc: 162 mg/dL / Ketone: x  / Bili: x / Urobili: x   Blood: x / Protein: x / Nitrite: x   Leuk Esterase: x / RBC: x / WBC x   Sq Epi: x / Non Sq Epi: x / Bacteria: x        PHYSICAL EXAM:  GENERAL: AAOx3, no acute distress.  HEAD:  Atraumatic, Normocephalic  EYES: conjunctiva and sclera clear  NECK: Supple, no JVD or thyromegaly  CHEST/LUNG: Clear to auscultation bilaterally; , drain in place   HEART: Regular rate and rhythm; normal S1, S2, No murmurs.  ABDOMEN: Soft, nontender, nondistended; Bowel sounds present,

## 2024-11-29 NOTE — PROGRESS NOTE ADULT - SUBJECTIVE AND OBJECTIVE BOX
Gastroenterology progress note:     Patient is a 84y old  Male who presents with a chief complaint of Anemia     (28 Nov 2024 13:37)       Admitted on: 11-26-24    We are following the patient for:anemia       Interval History:    Patient had drop in hemoglobin 8->7 s/p 1 unit PRBC. Pe rmedical team and bedside RN there has been no evidence of overt GI bleed. Has been passing brown bowel movements.   CHINYERE repeated by GI team this AM shows dark stool, brown on smear   Patient remains HD stable, soft abdomen. Tolerating regular diet     PAST MEDICAL & SURGICAL HISTORY:  DM (diabetes mellitus)      MI (myocardial infarction)      History of CAD (coronary artery disease)      Dyslipidemia      Currently smokes tobacco      Mesothelioma, malignant      Coronary stent patent          MEDICATIONS  (STANDING):  aspirin  chewable 81 milliGRAM(s) Oral daily  atorvastatin 20 milliGRAM(s) Oral at bedtime  chlorhexidine 2% Cloths 1 Application(s) Topical <User Schedule>  dextrose 5%. 1000 milliLiter(s) (50 mL/Hr) IV Continuous <Continuous>  dextrose 5%. 1000 milliLiter(s) (100 mL/Hr) IV Continuous <Continuous>  dextrose 50% Injectable 25 Gram(s) IV Push once  dextrose 50% Injectable 12.5 Gram(s) IV Push once  dextrose 50% Injectable 25 Gram(s) IV Push once  ferrous    sulfate 325 milliGRAM(s) Oral daily  gabapentin 300 milliGRAM(s) Oral three times a day  glucagon  Injectable 1 milliGRAM(s) IntraMuscular once  insulin lispro (ADMELOG) corrective regimen sliding scale   SubCutaneous three times a day before meals  melatonin 3 milliGRAM(s) Oral at bedtime  pantoprazole    Tablet 40 milliGRAM(s) Oral every 12 hours  polyethylene glycol 3350 17 Gram(s) Oral daily  senna 2 Tablet(s) Oral at bedtime  sodium chloride 0.9%. 1000 milliLiter(s) (75 mL/Hr) IV Continuous <Continuous>  tamsulosin 0.4 milliGRAM(s) Oral at bedtime    MEDICATIONS  (PRN):  dextrose Oral Gel 15 Gram(s) Oral once PRN Blood Glucose LESS THAN 70 milliGRAM(s)/deciliter      Allergies  penicillin (Unknown)      Review of Systems:   unable to obtain    Physical Examination:  T(C): 36.6 (11-29-24 @ 04:59), Max: 36.7 (11-28-24 @ 20:25)  HR: 89 (11-29-24 @ 04:59) (74 - 111)  BP: 111/67 (11-29-24 @ 04:59) (111/67 - 164/77)  RR: 18 (11-29-24 @ 04:59) (18 - 18)  SpO2: 99% (11-29-24 @ 04:59) (94% - 99%)      11-28-24 @ 07:01  -  11-29-24 @ 07:00  --------------------------------------------------------  IN: 835 mL / OUT: 450 mL / NET: 385 mL        GENERAL: AAOx2, no acute distress.  HEAD:  Atraumatic, Normocephalic  EYES: conjunctiva and sclera clear  NECK: Supple, no JVD or thyromegaly  CHEST/LUNG: Clear to auscultation bilaterally; No wheeze, rhonchi, or rales  HEART: Regular rate and rhythm; normal S1, S2, No murmurs.  ABDOMEN: Soft, nontender, nondistended; Bowel sounds present  NEUROLOGY: No asterixis or tremor.   SKIN: Intact, no jaundice  CHINYERE: dark, brown on smear      Data:                        8.6    15.45 )-----------( 380      ( 29 Nov 2024 07:30 )             27.4     Hgb trend:  8.6  11-29-24 @ 07:30  7.4  11-28-24 @ 23:47  8.5  11-28-24 @ 17:23  7.0  11-28-24 @ 11:56  7.2  11-28-24 @ 07:00  8.1  11-27-24 @ 11:04  8.2  11-27-24 @ 07:34  5.4  11-26-24 @ 22:24  6.6  11-26-24 @ 21:28  7.1  11-26-24 @ 10:36      11-28-24 @ 07:01  -  11-29-24 @ 07:00  --------------------------------------------------------  IN: 310 mL      11-28    141  |  109  |  26[H]  ----------------------------<  141[H]  4.4   |  22  |  0.9    Ca    8.7      28 Nov 2024 07:00  Mg     1.7     11-28    TPro  5.7[L]  /  Alb  3.2[L]  /  TBili  <0.2  /  DBili  x   /  AST  7   /  ALT  <5  /  AlkPhos  104  11-28    Liver panel trend:  TBili <0.2   /   AST 7   /   ALT <5   /   AlkP 104   /   Tptn 5.7   /   Alb 3.2    /   DBili --      11-28  TBili <0.2   /   AST 9   /   ALT <5   /   AlkP 125   /   Tptn 6.2   /   Alb 3.4    /   DBili --      11-26             Radiology:

## 2024-11-29 NOTE — CONSULT NOTE ADULT - SUBJECTIVE AND OBJECTIVE BOX
JENNYFER CLARI  84y, Male  Allergies    84y  Intolerances    Male    LOS  3d    HPI  HPI:  85 y/o man w PMHx of HLD, DM, CAD s/p stents x2 in , pt of Dr Avila, h/o asbestosis of lung, dx w malignant mesothelioma ~2024 and s/p VATS pleurodesis, R sided PleurX, h/o AVM/GIB, JASMIN, referred from NH for anemia to 6.5, in ED found to have Hb 7.1, CHINYERE +ve for melena according to ED notes. GI team consulted, clear liquid diet and NPO after midnight for possible EGD tomorrow.    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 (APC), angioectasia in the duodenal sweep. 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    ED Course:  Labs: Hb 7.1, , BUN 35, Dir Antiglobulin +ve  Medications: IV PPI BID  Imaging: None    Patient being admitted to medicine for possible UGI. GI consulted.    (2024 15:12)      INFECTIOUS DISEASE HISTORY:  Hospital course-  ID consulted for antimicrobial recommendations.     Prior hospital charts reviewed [Yes]  Primary team notes reviewed [Yes]  Other consultant notes reviewed [Yes]    REVIEW OF SYSTEMS:  CONSTITUTIONAL: No fever or chills  HEENT: No sore throat  RESPIRATORY: No cough, no shortness of breath  CARDIOVASCULAR: No chest pain or palpitations  GASTROINTESTINAL: No abdominal or epigastric pain  GENITOURINARY: No dysuria  NEUROLOGICAL: No headache/dizziness  MSK: No joint pain, erythema, or swelling; no back pain  SKIN: No itching, rashes  All other ROS negative except noted above    PAST MEDICAL & SURGICAL HISTORY:  DM (diabetes mellitus)      MI (myocardial infarction)      History of CAD (coronary artery disease)      Dyslipidemia      Currently smokes tobacco      Mesothelioma, malignant      Coronary stent patent          SOCIAL HISTORY:  - Born in [place], migrated [place] in 19XX  - Currently working as / Retired  - Lives with _____; no pets  - No recent travel  - Denies tobacco use, alcohol use, illicit drug use  - Currently sexually active, has one male/female sexual partner    FAMILY HISTORY:      Allergy: 3d    ANTIMICROBIALS:      ANTIMICROBIALS (past 90 days):  MEDICATIONS  (STANDING):        OTHER MEDS:   MEDICATIONS  (STANDING):  aspirin  chewable 81 daily  atorvastatin 20 at bedtime  dextrose 50% Injectable 25 once  dextrose 50% Injectable 12.5 once  dextrose 50% Injectable 25 once  dextrose Oral Gel 15 once PRN  gabapentin 300 three times a day  glucagon  Injectable 1 once  insulin lispro (ADMELOG) corrective regimen sliding scale  three times a day before meals  melatonin 3 at bedtime  pantoprazole    Tablet 40 every 12 hours  polyethylene glycol 3350 17 daily  senna 2 at bedtime  tamsulosin 0.4 at bedtime      VITALS:  Vital Signs Last 24 Hrs  T(F): 98.3 (24 @ 13:47), Max: 98.6 (24 @ 04:29)    Vital Signs Last 24 Hrs  HR: 96 (24 @ 13:47) (89 - 111)  BP: 104/66 (24 @ 13:47) (104/66 - 133/65)  RR: 18 (24 @ 13:47)  SpO2: 98% (24 @ 13:47) (94% - 99%)  Wt(kg): --    EXAM:  GENERAL: NAD, lying in bed  HEAD: No head lesions  NECK: Supple, nontender to palpation; no JVD  CHEST/LUNG: Clear to auscultation bilaterally  HEART: S1 S2  ABDOMEN: Soft, nontender, nondistended; normoactive bowel sounds  EXTREMITIES: No clubbing, cyanosis, or petal edema  NERVOUS SYSTEM: Alert and oriented to person, time, place and situation, speech clear. No focal deficits   MSK: No joint erythema, swelling or pain  SKIN: No rashes or lesions, no superficial thrombophlebitis    Labs:                        8.6    15.45 )-----------( 380      ( 2024 07:30 )             27.4         142  |  108  |  40[H]  ----------------------------<  162[H]  5.7[H]   |  20  |  1.6[H]    Ca    9.5      2024 07:30  Mg     2.3         TPro  5.7[L]  /  Alb  3.2[L]  /  TBili  <0.2  /  DBili  x   /  AST  7   /  ALT  <5  /  AlkPhos  104        WBC Trend:  WBC Count: 15.45 (24 @ 07:30)  WBC Count: 7.93 (24 @ 07:00)  WBC Count: 7.24 (24 @ 11:04)  WBC Count: 6.75 (24 @ 07:34)      Auto Neutrophil #: 6.59 K/uL (24 @ 07:00)  Auto Neutrophil #: 5.92 K/uL (24 @ 10:36)  Auto Neutrophil #: 5.20 K/uL (24 @ 07:13)  Auto Neutrophil #: 5.70 K/uL (24 @ 06:48)  Auto Neutrophil #: 6.29 K/uL (24 @ 06:40)      Creatine Trend:  Creatinine: 1.6 ()  Creatinine: 0.9 ()  Creatinine: 0.9 ()  Creatinine: 1.1 ()      Liver Biochemical Testing Trend:  Alanine Aminotransferase (ALT/SGPT): <5 ()  Alanine Aminotransferase (ALT/SGPT): <5 ()  Alanine Aminotransferase (ALT/SGPT): 11 ()  Alanine Aminotransferase (ALT/SGPT): 7 ()  Alanine Aminotransferase (ALT/SGPT): 6 ()  Aspartate Aminotransferase (AST/SGOT): 7 (24 @ 07:00)  Aspartate Aminotransferase (AST/SGOT): 9 (24 @ 10:36)  Aspartate Aminotransferase (AST/SGOT): 12 (24 @ 07:13)  Aspartate Aminotransferase (AST/SGOT): 9 (24 @ 06:48)  Aspartate Aminotransferase (AST/SGOT): 7 (24 @ 06:40)  Bilirubin Total: <0.2 ()  Bilirubin Total: <0.2 ()  Bilirubin Total: 0.2 ()  Bilirubin Total: <0.2 ()  Bilirubin Total: <0.2 ()      Trend LDH  24 @ 07:34  145  24 @ 07:13  122  10-19-24 @ 06:40  122  24 @ 06:32  169      Auto Eosinophil %: 1.6 % (24 @ 07:00)      Urinalysis Basic - ( 2024 16:26 )    Color: Yellow / Appearance: Turbid / S.016 / pH: x  Gluc: x / Ketone: Negative mg/dL  / Bili: Negative / Urobili: 0.2 mg/dL   Blood: x / Protein: 300 mg/dL / Nitrite: Negative   Leuk Esterase: Large / RBC: 55 /HPF /  /HPF   Sq Epi: x / Non Sq Epi: 0 /HPF / Bacteria: Too Numerous to count /HPF        MICROBIOLOGY:    Male                                        Ferritin: 74 ()      Lactate Dehydrogenase, Serum: 145 ()        Lactate, Blood: 2.3 ( @ 21:28)    A1C with Estimated Average Glucose Result: 6.1 % (10-27-24 @ 06:46)  A1C with Estimated Average Glucose Result: 5.8 % (24 @ 05:09)  A1C with Estimated Average Glucose Result: 5.7 % (24 @ 04:30)      INFLAMMATORY MARKERS      RADIOLOGY & ADDITIONAL TESTS:  I have personally reviewed the imagings.  CXR      CT      CARDIOLOGY TESTING  12 Lead ECG:   Ventricular Rate 92 BPM    Atrial Rate 92 BPM    P-R Interval 132 ms    QRS Duration 100 ms    Q-T Interval 352 ms    QTC Calculation(Bazett) 435 ms    P Axis 51 degrees    R Axis 101 degrees    T Axis -80 degrees    Diagnosis Line Sinus rhythm with Premature atrial complexes  Rightward axis  ST & T wave abnormality, consider inferolateral ischemia  Abnormal ECG    Confirmed by Cesar Hong ([id number]) on 2024 5:29:19 PM (24 @ 10:51)             CLARI BURGER  84y, Male  Allergies    penicillin (Unknown)    Intolerances    LOS  3d    HPI  HPI:  83 y/o man w PMHx of HLD, DM, CAD s/p stents x2 in , pt of Dr Avila, h/o asbestosis of lung, dx w malignant mesothelioma ~2024 and s/p VATS pleurodesis, R sided PleurX, h/o AVM/GIB, JASMIN, referred from NH for anemia to 6.5, in ED found to have Hb 7.1, CHINYERE +ve for melena according to ED notes. GI team consulted, clear liquid diet and NPO after midnight for possible EGD tomorrow.    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 (APC), angioectasia in the duodenal sweep. 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    ED Course:  Labs: Hb 7.1, , BUN 35, Dir Antiglobulin +ve  Medications: IV PPI BID  Imaging: None    Patient being admitted to medicine for possible UGI. GI consulted.    (2024 15:12)      INFECTIOUS DISEASE HISTORY:  ID consulted for antimicrobial recommendations.     Prior hospital charts reviewed [Yes]  Primary team notes reviewed [Yes]  Other consultant notes reviewed [Yes]    REVIEW OF SYSTEMS:  CONSTITUTIONAL: No fever or chills  HEENT: No sore throat  RESPIRATORY: No cough, no shortness of breath  CARDIOVASCULAR: No chest pain or palpitations  GASTROINTESTINAL: No abdominal or epigastric pain  GENITOURINARY: No dysuria  NEUROLOGICAL: No headache/dizziness  MSK: No joint pain, erythema, or swelling; no back pain  SKIN: No itching, rashes  All other ROS negative except noted above    PAST MEDICAL & SURGICAL HISTORY:  DM (diabetes mellitus)      MI (myocardial infarction)      History of CAD (coronary artery disease)      Dyslipidemia      Currently smokes tobacco      Mesothelioma, malignant      Coronary stent patent          SOCIAL HISTORY:  - No recent travel    FAMILY HISTORY: No pertinent PMH for first degree relative.     ANTIMICROBIALS:      ANTIMICROBIALS (past 90 days):  MEDICATIONS  (STANDING):        OTHER MEDS:   MEDICATIONS  (STANDING):  aspirin  chewable 81 daily  atorvastatin 20 at bedtime  dextrose 50% Injectable 25 once  dextrose 50% Injectable 12.5 once  dextrose 50% Injectable 25 once  dextrose Oral Gel 15 once PRN  gabapentin 300 three times a day  glucagon  Injectable 1 once  insulin lispro (ADMELOG) corrective regimen sliding scale  three times a day before meals  melatonin 3 at bedtime  pantoprazole    Tablet 40 every 12 hours  polyethylene glycol 3350 17 daily  senna 2 at bedtime  tamsulosin 0.4 at bedtime      VITALS:  Vital Signs Last 24 Hrs  T(F): 98.3 (24 @ 13:47), Max: 98.6 (24 @ 04:29)    Vital Signs Last 24 Hrs  HR: 96 (24 @ 13:47) (89 - 111)  BP: 104/66 (24 @ 13:47) (104/66 - 133/65)  RR: 18 (24 @ 13:47)  SpO2: 98% (24 @ 13:47) (94% - 99%)  Wt(kg): --    EXAM:  GENERAL: Frail looking. Confused.   HEAD: No head lesions  NECK: Supple  CHEST/LUNG: Shallow breath sounds.   HEART: S1 S2  ABDOMEN: Soft, nontender. +[place]  EXTREMITIES: No clubbing  NERVOUS SYSTEM: Awake.   MSK: No joint erythema, swelling or pain  SKIN: No rashes or lesions, no superficial thrombophlebitis    Labs:                        8.6    15.45 )-----------( 380      ( 2024 07:30 )             27.4         142  |  108  |  40[H]  ----------------------------<  162[H]  5.7[H]   |  20  |  1.6[H]    Ca    9.5      2024 07:30  Mg     2.3         TPro  5.7[L]  /  Alb  3.2[L]  /  TBili  <0.2  /  DBili  x   /  AST  7   /  ALT  <5  /  AlkPhos  104        WBC Trend:  WBC Count: 15.45 (24 @ 07:30)  WBC Count: 7.93 (24 @ 07:00)  WBC Count: 7.24 (24 @ 11:04)  WBC Count: 6.75 (24 @ 07:34)      Auto Neutrophil #: 6.59 K/uL (24 @ 07:00)  Auto Neutrophil #: 5.92 K/uL (24 @ 10:36)  Auto Neutrophil #: 5.20 K/uL (24 @ 07:13)  Auto Neutrophil #: 5.70 K/uL (24 @ 06:48)  Auto Neutrophil #: 6.29 K/uL (24 @ 06:40)      Creatine Trend:  Creatinine: 1.6 ()  Creatinine: 0.9 ()  Creatinine: 0.9 ()  Creatinine: 1.1 ()      Liver Biochemical Testing Trend:  Alanine Aminotransferase (ALT/SGPT): <5 ()  Alanine Aminotransferase (ALT/SGPT): <5 ()  Alanine Aminotransferase (ALT/SGPT): 11 ()  Alanine Aminotransferase (ALT/SGPT): 7 ()  Alanine Aminotransferase (ALT/SGPT): 6 ()  Aspartate Aminotransferase (AST/SGOT): 7 (24 @ 07:00)  Aspartate Aminotransferase (AST/SGOT): 9 (24 @ 10:36)  Aspartate Aminotransferase (AST/SGOT): 12 (24 @ 07:13)  Aspartate Aminotransferase (AST/SGOT): 9 (24 @ 06:48)  Aspartate Aminotransferase (AST/SGOT): 7 (24 @ 06:40)  Bilirubin Total: <0.2 ()  Bilirubin Total: <0.2 ()  Bilirubin Total: 0.2 ()  Bilirubin Total: <0.2 ()  Bilirubin Total: <0.2 ()      Trend LDH  24 @ 07:34  145  24 @ 07:13  122  10-19-24 @ 06:40  122  24 @ 06:32  169      Auto Eosinophil %: 1.6 % (24 @ 07:00)      Urinalysis Basic - ( 2024 16:26 )    Color: Yellow / Appearance: Turbid / S.016 / pH: x  Gluc: x / Ketone: Negative mg/dL  / Bili: Negative / Urobili: 0.2 mg/dL   Blood: x / Protein: 300 mg/dL / Nitrite: Negative   Leuk Esterase: Large / RBC: 55 /HPF /  /HPF   Sq Epi: x / Non Sq Epi: 0 /HPF / Bacteria: Too Numerous to count /HPF        MICROBIOLOGY:    Male      Ferritin: 74 (-)      Lactate Dehydrogenase, Serum: 145 (-)        Lactate, Blood: 2.3 ( @ 21:28)    A1C with Estimated Average Glucose Result: 6.1 % (10-27-24 @ 06:46)  A1C with Estimated Average Glucose Result: 5.8 % (24 @ 05:09)  A1C with Estimated Average Glucose Result: 5.7 % (24 @ 04:30)      INFLAMMATORY MARKERS      RADIOLOGY & ADDITIONAL TESTS:  I have personally reviewed the imagings.  CXR    < from: [facility] Duplex Lower Ext Vein Scan, Bilat (24 @ 14:54) >  RIGHT:  Normal compressibility of the RIGHT common femoral, femoral and popliteal   veins.  Doppler examinationshows normal spontaneous and phasic flow.  No RIGHT calf vein thrombosis is detected.    LEFT:  Normal compressibility of the LEFT common femoral, femoral and popliteal   veins.  Doppler examination shows normal spontaneous and phasic flow.  No LEFT calf vein thrombosis is detected.    IMPRESSION:  No evidence of deep venous thrombosis in either lower extremity.    < end of copied text >    CT      CARDIOLOGY TESTING  12 Lead ECG:   Ventricular Rate 92 BPM    Atrial Rate 92 BPM    P-R Interval 132 ms    QRS Duration 100 ms    Q-T Interval 352 ms    QTC Calculation(Bazett) 435 ms    P Axis 51 degrees    R Axis 101 degrees    T Axis -80 degrees    Diagnosis Line Sinus rhythm with Premature atrial complexes  Rightward axis  ST & T wave abnormality, consider inferolateral ischemia  Abnormal ECG    Confirmed by Cesar Hogn ([id number]) on 2024 5:29:19 PM (24 @ 10:51)

## 2024-11-29 NOTE — PROGRESS NOTE ADULT - ASSESSMENT
83 y/o man w PMHx of HLD, DM, CAD s/p stents x2 on plavix, malignant mesothelioma 8/2024 and s/p VATS pleurodesis, R sided PleurX, duodenal AVM, JASMIN, referred from NH for anemia to 6.5, in ED found to have Hb 7.1, Baseline 7-8. The ED reported CHINYERE positive for melena. Gi consulted for suspected GI bleed. GI team evaluated bedside. CHINYERE shows dark stool, brown on smear. Patient is on iron tablets at home. Patient follows with Dr. Epperson hematology, recommend iron infusion inpatient. Patient himself denies any blood per rectum or melena, as does his son. Of note, Patient recently underwent EGD on 10/28/24 x2AVM in the duodenum s/p APC. Denies abdominal pain, odynophagia, dysphagia, constipation, diarrhea, nausea, vomiting.     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 s/p APC  Was stable and discharged, has denied any recurrent melena or blood per rectum  CHINYERE: dark, brown stool on smear   Hb 7.1; baseline 7-8  No overt GI bleed and hemodynamically stable  On iron tabs at home    11/27: HB drop to 7.1->5.5->8.2 (s/p 1 unit PRBC). Patient remains HD stable, not tachy. No overt bleeding overnight. No Bm overnight. CHINYERE brown. Abdominal exam soft and benign.     11/29: Patient had drop in hemoglobin 8->7 s/p 1 unit PRBC. Per medical team and bedside RN there has been no evidence of overt GI bleed. Has been passing brown bowel movements.   CHINYERE repeated by GI team this AM shows dark stool, brown on smear   Patient remains HD stable, soft abdomen. Tolerating regular diet      Plan  After long discussion with Son and HCP John, plan for EGD/colonoscopy on Monday 12/2  Patient has had difficulty with bowel prep in the past. I sent Sutap to patient's pharmacy and son John will bring in for prep. Plan for prep on Sunday 12/1 per Sutab instructions   If John is unable to obtain the Sutab prep, recommend starting bowel prep tomorrow 11/30 with Golytely and to dirnk throughout the weekend  Clear liquid diet starting tomorrow   Npo after midnight night before procedure   Patient's family requesting hematology evaluation, patient follows with Dr. Epperson who was planning on iron infusions  Maintain active Type and screen, monitor for signs of active bleed or significant drop in Hb  Trend H/H, Transfuse >7  Start PPI PO BID for total  2 weeks and then q24h thereafter   Can follow up with private GI as an outpatient   If has signs of overt GI bleed or significant drop in Hb please alert GI     #Small hypodensity in segment 7 of the liver seen on prior CT.  -Consider RUQ US as OP  -f/u w/ primary GI

## 2024-11-30 NOTE — PROGRESS NOTE ADULT - SUBJECTIVE AND OBJECTIVE BOX
Patient seen and examined. Events over the last 24 hours noted.   Pt is somnolent but arousable   He said he had breakfast       T(F): 98.2 (11-30-24 @ 12:11), Max: 99 (11-30-24 @ 05:05)  HR: 117 (11-30-24 @ 12:11)  BP: 105/57 (11-30-24 @ 12:11)  RR: 18 (11-30-24 @ 12:11)  SpO2: 94% (11-30-24 @ 12:11) (94% - 98%)    PHYSICAL EXAM:  GEN: No acute distress  HEENT: normocephalic, atraumatic, anicteric  LUNGS: b/l breath sounds present, clear to auscultation bilaterally   HEART: S1/S2 present. RRR  ABD: Soft, non-tender, non-distended. Bowel sounds present  EXT: warm   NEURO: awake, no new focal deficits    LABS  11-30    141  |  110  |  48[H]  ----------------------------<  255[H]  5.1[H]   |  18  |  1.5    Ca    8.8      30 Nov 2024 11:04  Mg     2.2     11-30                            7.5    10.73 )-----------( 306      ( 30 Nov 2024 11:04 )             24.2            MEDICATIONS  (STANDING):  aspirin  chewable 81 milliGRAM(s) Oral daily  atorvastatin 20 milliGRAM(s) Oral at bedtime  chlorhexidine 2% Cloths 1 Application(s) Topical <User Schedule>  dextrose 5%. 1000 milliLiter(s) (50 mL/Hr) IV Continuous <Continuous>  dextrose 5%. 1000 milliLiter(s) (100 mL/Hr) IV Continuous <Continuous>  dextrose 50% Injectable 25 Gram(s) IV Push once  dextrose 50% Injectable 12.5 Gram(s) IV Push once  dextrose 50% Injectable 25 Gram(s) IV Push once  ferrous    sulfate 325 milliGRAM(s) Oral daily  gabapentin 300 milliGRAM(s) Oral three times a day  glucagon  Injectable 1 milliGRAM(s) IntraMuscular once  insulin lispro (ADMELOG) corrective regimen sliding scale   SubCutaneous three times a day before meals  iron sucrose IVPB 200 milliGRAM(s) IV Intermittent every 24 hours  melatonin 3 milliGRAM(s) Oral at bedtime  pantoprazole    Tablet 40 milliGRAM(s) Oral every 12 hours  polyethylene glycol 3350 17 Gram(s) Oral daily  senna 2 Tablet(s) Oral at bedtime  sodium chloride 0.9%. 1000 milliLiter(s) (75 mL/Hr) IV Continuous <Continuous>  sodium zirconium cyclosilicate 10 Gram(s) Oral once  tamsulosin 0.4 milliGRAM(s) Oral at bedtime    MEDICATIONS  (PRN):  dextrose Oral Gel 15 Gram(s) Oral once PRN Blood Glucose LESS THAN 70 milliGRAM(s)/deciliter

## 2024-11-30 NOTE — PROGRESS NOTE ADULT - ASSESSMENT
83 y/o man w PMHx of HLD, DM, CAD s/p stents x2 in 2007, pt of Dr Avila, h/o asbestosis of lung, dx w malignant mesothelioma ~8/2024 and s/p VATS pleurodesis, R sided PleurX, h/o AVM/GIB, JASMIN, referred from NH for anemia to 6.5, in ED found to have Hb 7.1, CHINYERE +ve for melena. GI team consulted, clear liquid diet and NPO after midnight for possible EGD tomorrow.   Patient being admitted to medicine for possible UGI. GI consulted.     # Possible UGI bleed  # Acute on chronic anemia  # Hx Angioectasia of duodenum  - Hemodynamically stable   - Baseline hemoglobin - 8-9   - Holding home plavix , resume ASA instead as below   -GI on board   -Maintain active Type and screen, monitor for signs of active bleed or significant drop in Hb  -Trend H&H   -Start PPI PO BID for total  2 weeks and then q24h thereafter   -GI is planning to scope pt possibly on Monday , start pt on clears tmrw and prepare on sunday for procedure on monday ( f/u GI note)   -Heme Onc consulted, rec IV venofer 200mg x2 doses, will start today    #JEY :   -Creat 1.6 (baseline 0.9)   -WBC trended up  PLAN :   -D/w ID, changed laws  -IVF   -f/u UA , UCx   -ID states hold off abx  -trend Creat, improving   -avoid nephrotoxins       #Small hypodensity in segment 7 of the liver seen on prior CT.  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   - will likely resume ASA tmw AM instead of Plavix as lower risk of bleeding   - c/w statin     #DM Type 2  - Home med: Metformin 500 mg BID  - ISS to keep -180    #Miscellaneous:  - DVT prophylaxis: SCDs  - Feeds: Clears then advance to DASH/TLC  - Prophylaxis: PPI  -Activity: AAT  -Code status: Full       Pending : GI is planning to scope pt possibly on Monday , start pt on clears tmrw and prepare on sunday for procedure on monday ( f/u GI note). Pt will prep on sunday with Sutab (son brought it in).  IV Venofer x2 doses

## 2024-11-30 NOTE — PROGRESS NOTE ADULT - ASSESSMENT
85 y/o man w PMHx of HLD, DM, CAD s/p stents x2 in 2007, pt of Dr Avila, h/o asbestosis of lung, dx w malignant mesothelioma ~8/2024 and s/p VATS pleurodesis, R sided PleurX, h/o AVM/GIB, JASMIN, referred from NH for anemia to 6.5, in ED found to have Hb 7.1, CHINYERE +ve for melena.     # Possible UGI bleed  # Acute on chronic anemia  with JASMIN   Hx Angioectasia of duodenum  -GI on board   -Trend H&H   -on PPI PO BID for total  2 weeks and then q24h thereafter   -GI is planning to scope pt possibly on Monday ,   -give venofer 200 mg qdaily x 2 doses  follow cbc  daily  #JEY :   #r/o UTI     # Malignant mesothelioma (diagnosed 8/2024 ), Stable  # Hx Asbestos lung   -Mn as outpt     cont other supportive care.  will f/u

## 2024-12-01 NOTE — PROGRESS NOTE ADULT - ASSESSMENT
Assessment and Plan:   · Assessment	  83 y/o man w PMHx of HLD, DM, CAD s/p stents x2 in 2007, pt of Dr Avila, h/o asbestosis of lung, dx w malignant mesothelioma ~8/2024 and s/p VATS pleurodesis, R sided PleurX, h/o AVM/GIB, JASMIN, referred from NH for anemia to 6.5, in ED found to have Hb 7.1, CHINYERE +ve for melena.     # Possible UGI bleed  resolved  # Acute on chronic anemia  with JASMIN   Hx Angioectasia of duodenum  -GI on board   -Trend H&H   -on PPI PO BID for total  2 weeks and then q24h thereafter   -GI is planning to scope pt possibly on Monday ,   -give venofer 200 mg qdaily x 2 doses  follow cbc  daily  #JEY :   #r/o UTI     # Malignant mesothelioma (diagnosed 8/2024 ), Stable  # Hx Asbestos lung   -#  confusion/disoriented  ?baseline    may  get  Brain MRI R/O mets      cont other supportive care.  will f/u

## 2024-12-01 NOTE — PROGRESS NOTE ADULT - ASSESSMENT
85 y/o man w PMHx of HLD, DM, CAD s/p stents x2 in 2007, pt of Dr Avila, h/o asbestosis of lung, dx w malignant mesothelioma ~8/2024 and s/p VATS pleurodesis, R sided PleurX, h/o AVM/GIB, JASMIN, referred from NH for anemia to 6.5, in ED found to have Hb 7.1, CHINYERE +ve for melena. GI team consulted, clear liquid diet and NPO after midnight for possible EGD tomorrow.   Patient being admitted to medicine for possible UGI. GI consulted.     # Possible UGI bleed  # Acute on chronic anemia  # Hx Angioectasia of duodenum  - Hemodynamically stable   - Baseline hemoglobin - 8-9   - Holding home plavix , resume ASA instead as below   -GI on board   -Maintain active Type and screen, monitor for signs of active bleed or significant drop in Hb  -Trend H&H   -Start PPI PO BID for total  2 weeks and then q24h thereafter   -GI is planning to scope pt possibly on Monday ,however pt developed fever 12/1   -Heme Onc consulted, rec IV venofer 200mg x2 doses, gave one dose 11/30, discontinued after fever due to concern for sepsis     #Fever, possible UTI/sepsis vs blood transfusion reaction   Hgb 6.7 today, 1 unit prbc ordered, 15 minutes after transfusion he had fever to 101  blood transfusion held, tylenol given  sepsis workup started due to concern for CAUTI   blood, UA, urine cult, CXR, procal ordered  ID contacted  son John updated over phone   IV rocephin started empirically    #JEY :   -Creat 1.6 (baseline 0.9)   -WBC trended up then improved after laws exchanged   PLAN :   -D/w ID, changed laws  -IVF   -f/u UA , UCx   -trend Creat   -avoid nephrotoxins       #Small hypodensity in segment 7 of the liver seen on prior CT.  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   - will likely resume ASA tmw AM instead of Plavix as lower risk of bleeding   - c/w statin     #DM Type 2  - Home med: Metformin 500 mg BID  - ISS to keep -180    #Miscellaneous:  - DVT prophylaxis: SCDs  - Feeds: Clears then advance to DASH/TLC  - Prophylaxis: PPI  -Activity: AAT  -Code status: Full       Pending : GI was planning to scope pt possibly on Monday , however pt is febrile today. Treat possible sepsis.

## 2024-12-01 NOTE — PROGRESS NOTE ADULT - SUBJECTIVE AND OBJECTIVE BOX
HPI:  83 y/o man w PMHx of HLD, DM, CAD s/p stents x2 in 2007, pt of Dr Avila, h/o asbestosis of lung, dx w malignant mesothelioma ~8/2024 and s/p VATS pleurodesis, R sided PleurX, h/o AVM/GIB, JASMIN, referred from NH for anemia to 6.5, in ED found to have Hb 7.1, CHINYERE +ve for melena according to ED notes.   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 (APC), angioectasia in the duodenal sweep. 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  Pt is seen and examined  pt is awake and lying in bed  pt seems comfortable and denies any complaints at this time disoriented  to  place  and bernardino        ROS:  Negative except for:    MEDICATIONS  (STANDING):  aspirin  chewable 81 milliGRAM(s) Oral daily  atorvastatin 20 milliGRAM(s) Oral at bedtime  cefTRIAXone   IVPB 1000 milliGRAM(s) IV Intermittent every 24 hours  chlorhexidine 2% Cloths 1 Application(s) Topical <User Schedule>  dextrose 5%. 1000 milliLiter(s) (50 mL/Hr) IV Continuous <Continuous>  dextrose 5%. 1000 milliLiter(s) (100 mL/Hr) IV Continuous <Continuous>  dextrose 50% Injectable 25 Gram(s) IV Push once  dextrose 50% Injectable 12.5 Gram(s) IV Push once  dextrose 50% Injectable 25 Gram(s) IV Push once  ferrous    sulfate 325 milliGRAM(s) Oral daily  gabapentin 300 milliGRAM(s) Oral three times a day  glucagon  Injectable 1 milliGRAM(s) IntraMuscular once  insulin lispro (ADMELOG) corrective regimen sliding scale   SubCutaneous three times a day before meals  melatonin 3 milliGRAM(s) Oral at bedtime  pantoprazole    Tablet 40 milliGRAM(s) Oral every 12 hours  polyethylene glycol 3350 17 Gram(s) Oral daily  senna 2 Tablet(s) Oral at bedtime  sodium chloride 0.9%. 1000 milliLiter(s) (75 mL/Hr) IV Continuous <Continuous>  sodium zirconium cyclosilicate 10 Gram(s) Oral once  sutab 1 Dose(s) 1 Dose(s) Oral once  tamsulosin 0.4 milliGRAM(s) Oral at bedtime    MEDICATIONS  (PRN):  dextrose Oral Gel 15 Gram(s) Oral once PRN Blood Glucose LESS THAN 70 milliGRAM(s)/deciliter      Allergies    penicillin (Unknown)    Intolerances        Vital Signs Last 24 Hrs  T(C): 37.3 (01 Dec 2024 20:28), Max: 38.3 (01 Dec 2024 13:32)  T(F): 99.1 (01 Dec 2024 20:28), Max: 101 (01 Dec 2024 13:32)  HR: 95 (01 Dec 2024 20:28) (94 - 104)  BP: 98/59 (01 Dec 2024 20:28) (96/58 - 130/66)  BP(mean): 105 (01 Dec 2024 13:15) (105 - 105)  RR: 20 (01 Dec 2024 20:28) (16 - 20)  SpO2: 96% (01 Dec 2024 20:28) (94% - 96%)    Parameters below as of 01 Dec 2024 20:28  Patient On (Oxygen Delivery Method): room air        PHYSICAL EXAM  General: adult in NAD  HEENT: clear oropharynx, anicteric sclera, pink conjunctiva  Neck: supple  CV: normal S1/S2 with no murmur rubs or gallops  Lungs: positive air movement b/l ant lungs,clear to auscultation, no wheezes, no rales  Abdomen: soft non-tender non-distended, no hepatosplenomegaly  Ext: no clubbing cyanosis or edema  Skin: no rashes and no petechiae  Neuro: alert and oriented X 4, no focal deficits  LABS:

## 2024-12-01 NOTE — PROGRESS NOTE ADULT - SUBJECTIVE AND OBJECTIVE BOX
Patient seen and examined. Events over the last 24 hours noted.   pt was somnolent this AM  Hgb 6.7 today, 1 unit prbc ordered, 15 minutes after transfusion he had fever to 101  blood transfusion held, tylenol given  sepsis workup started due to concern for CAUTI   ID contacted  son John updated over phone     T(F): 99.1 (12-01-24 @ 14:40), Max: 101 (12-01-24 @ 13:32)  HR: 104 (12-01-24 @ 13:15)  BP: 130/66 (12-01-24 @ 13:15)  RR: 16 (12-01-24 @ 05:27)  SpO2: 94% (12-01-24 @ 13:15) (94% - 94%)    PHYSICAL EXAM:  GEN: No acute distress  HEENT: normocephalic, atraumatic, anicteric  LUNGS: b/l breath sounds present, clear to auscultation bilaterally   HEART: S1/S2 present. RRR  ABD: Soft, non-tender, non-distended. Bowel sounds present  EXT: warm   NEURO: awake, no new focal deficits    LABS  12-01    140  |  110  |  57[H]  ----------------------------<  182[H]  4.2   |  20  |  1.7[H]    Ca    8.4      01 Dec 2024 08:15  Mg     2.1     12-01                            6.7    5.50  )-----------( 241      ( 01 Dec 2024 08:15 )             21.4            MEDICATIONS  (STANDING):  aspirin  chewable 81 milliGRAM(s) Oral daily  atorvastatin 20 milliGRAM(s) Oral at bedtime  cefTRIAXone   IVPB 1000 milliGRAM(s) IV Intermittent every 24 hours  chlorhexidine 2% Cloths 1 Application(s) Topical <User Schedule>  dextrose 5%. 1000 milliLiter(s) (50 mL/Hr) IV Continuous <Continuous>  dextrose 5%. 1000 milliLiter(s) (100 mL/Hr) IV Continuous <Continuous>  dextrose 50% Injectable 25 Gram(s) IV Push once  dextrose 50% Injectable 12.5 Gram(s) IV Push once  dextrose 50% Injectable 25 Gram(s) IV Push once  ferrous    sulfate 325 milliGRAM(s) Oral daily  gabapentin 300 milliGRAM(s) Oral three times a day  glucagon  Injectable 1 milliGRAM(s) IntraMuscular once  insulin lispro (ADMELOG) corrective regimen sliding scale   SubCutaneous three times a day before meals  melatonin 3 milliGRAM(s) Oral at bedtime  pantoprazole    Tablet 40 milliGRAM(s) Oral every 12 hours  polyethylene glycol 3350 17 Gram(s) Oral daily  senna 2 Tablet(s) Oral at bedtime  sodium chloride 0.9%. 1000 milliLiter(s) (75 mL/Hr) IV Continuous <Continuous>  sodium zirconium cyclosilicate 10 Gram(s) Oral once  sutab 1 Dose(s) 1 Dose(s) Oral once  tamsulosin 0.4 milliGRAM(s) Oral at bedtime    MEDICATIONS  (PRN):  dextrose Oral Gel 15 Gram(s) Oral once PRN Blood Glucose LESS THAN 70 milliGRAM(s)/deciliter

## 2024-12-02 NOTE — PROGRESS NOTE ADULT - ASSESSMENT
ASSESSMENT  This is a 84 year old male with PMH of HLD, DM, CAD s/p stents x2 in 2007, h/o asbestosis of lung, dx w malignant mesothelioma ~8/2024 and s/p VATS pleurodesis, R sided PleurX, h/o AVM/GIB, JASMIN, referred from NH for anemia    ASSESSMENT  #Proteus species ESBL bacteremia, suspect  source    UA pyuria 323    12/1 BCX +   #Chronic Laws? From urinary retention in 7/2024.  < from: US Kidney and Bladder (11.29.24 @ 18:35) >  1.  Multiple right renal cysts, measuring up to 0.9 cm, similar to   previous.  2.  Mild to moderate right hydronephrosis, previously reported as   fullness.  3.  Mild to moderate left hydronephrosis, reported to be mild previously.  4.  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  #JEY over CKD  Creatinine: 1.6 mg/dL (12.02.24 @ 06:41)      RECOMMENDATIONS  - f/u proteus sensitivities   - f/u UCX  - Meropenem 1g q12h IV   - laws placed 11/29  - pending EGD/colonoscopy     If any questions, please send a message or call on [employer] Teams  Please continue to update ID with any pertinent new laboratory or radiographic findings.

## 2024-12-02 NOTE — PROGRESS NOTE ADULT - NS ATTEST RISK PROBLEM GEN_ALL_CORE FT
- Patient has an illness which poses a threat to life or bodily function without treatment  - The doses of the antimicrobials will be adjusted according to the estimated creatinine clearance (using the Cockroft-Gault equation)  - I independently interpreted the most recent microbiological data; I analyzed the culture report & interpreted the MICs; I used my expertise in Infectious Diseases to tailor the antimicrobials  - I discussed my recommendations with the pharmacy [facility]

## 2024-12-02 NOTE — PROGRESS NOTE ADULT - ASSESSMENT
Assessment and Plan:   · Assessment	  85 y/o man w PMHx of HLD, DM, CAD s/p stents x2 in 2007, pt of Dr Avila, h/o asbestosis of lung, dx w malignant mesothelioma ~8/2024 and s/p VATS pleurodesis, R sided PleurX, h/o AVM/GIB, JASMIN, referred from NH for anemia to 6.5, in ED found to have Hb 7.1, CHINYERE +ve for melena. GI team consulted, clear liquid diet and NPO after midnight for possible EGD tomorrow.   Patient being admitted to medicine for possible UGI. GI consulted.     # Possible UGI bleed  # Acute on chronic anemia  # Hx Angioectasia of duodenum  - Hemodynamically stable   - Baseline hemoglobin - 8-9   - Holding home plavix , resume ASA instead as below   -GI on board   -Maintain active Type and screen, monitor for signs of active bleed or significant drop in Hb  -Trend H&H   -Start PPI PO BID for total  2 weeks and then q24h thereafter   -GI is planning to scope pt possibly on Monday , start pt on clears tmrw and prepare on sunday for procedure on monda7 ( f/u GI note)     #JEY :   #Possible UTI ?  -Creat 1.6 (baseline 0.9)   -WBC trended up  PLAN :   -IVF   -f/u UA , UCx , can start empiric ABx once cultures been collected   -trend Creat   -avoid nephrotoxins       #Small hypodensity in segment 7 of the liver seen on prior CT.  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   - will likely resume ASA tmw AM instead of Plavix as lower risk of bleeding   - c/w statin     #DM Type 2  - Home med: Metformin 500 mg BID  - ISS to keep -180    #Misc:  - DVT prophylaxis: SCDs  - Feeds: Clears then advance to DASH/TLC  - Prophylaxis: PPI  -Activity: AAT  -Code status: Full       Pending : GI is planning to scope pt possibly on Monday , start pt on clears tmrw and prepare on sunday for procedure on monda7 ( f/u GI note)   If son got the SUTAb can prepare pt with the than Golytely , f/u UA and UCx          Nutritional Assessment:  · Nutritional Assessment	This patient has been assessed with a concern for Malnutrition and has been determined to have a diagnosis/diagnoses of Severe protein-calorie malnutrition.    This patient is being managed with:   Diet DASH/TLC-  Sodium & Cholesterol Restricted  Entered: Nov 29 2024 11:23AM    Attestation Statements:    Attestation Statements:  I have personally seen and examined the patient.  I fully participated in the care of this patient.  I have made amendments to the documentation where necessary, and agree with the history, physical exam, and plan as documented by the Resident.     Pt seen and examined. He is somnolent but arousable, said he wanted to sleep. D/w GI, plan for EGD and colonoscopy on monday. D/w son at bedside, he is in agreement. Plan for Sutab prep on sunday (son brought it in). Also, labs notable for leukocytosis and JEY. Urine appears to have lots of sediment with possible yuniel pus. D/w ID will replace laws and obtain new urine UA and culture.  Once urine cx obtained may need to start empiric iv atbx .     Assessment and Plan:   · Assessment	  Pt is a 84y male, with PMHx AVMs, CAD s/p stents x 2 (2007), asbestosis of lung, malignant mesothelioma, s/p VATS, HLD, DM, and JASMIN, arrived at the ED on 11/26 due to abnormal Hb (6.4). Pt did not have active bloody stools bedside and denied CP, Ab pain, N/V/D, dizziness, and UTI sxs. Prior EGD led to bleeding on contact but hemostasis was achieved. In the ED, Hb was 7.1, and CHINYERE positive for black stools in ED and (+) Ab screening. pRBC transfusion was deferred and pt was admitted to obtain further w/u, including iron studies to determine if iron supplementation will be needed and wad Dx'd with melena and anemia. Pt is currently admitted with the primary diagnosis of anemia and melena, likely 2/2 to upper GID.    # Possible UGI bleed  # Acute on chronic anemia  # Hx Angioectasia of duodenum  - Hemodynamically stable   - Baseline hemoglobin - 8-9   - Holding home plavix, resume ASA instead as below   - GI on board   - Maintain active Type and screen, monitor for signs of active bleed or significant drop in Hb  - Trend H&H and CBC daily  - Start PPI PO BID for total  2 weeks and then q24h thereafter   - GI is planning to scope pt possibly today    #Gurgling sounds during respirations  #R sided pleural effusion  - CXR 12/02/24 9:33 AM: Unchanged large right-sided opacity.   - CXR 12/02/24 11:20 AM: Unchanged large opacity projecting over the right lung. Hazy opacities in the left lung are less prominent. No pneumothorax.  - PleurX drained ~700mL from R lung catheter  - RRT called to provide pt nebulizer with inhaled saline chloride treatment to help clear mucus and chest congestion    #JEY :   #Possible UTI ?  - Creat 1.6 (baseline 0.9); c/w trending Cr  - c/w IVF  - avoid nephrotoxins  - f/u UA , UCx , can start empiric ABx once cultures been collected     - WBC trended up    #Small hypodensity in segment 7 of the liver seen on prior CT.  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   - will likely resume ASA tmw AM instead of Plavix as lower risk of bleeding   - c/w statin     #DM Type 2  - Home med: Metformin 500 mg BID  - ISS to keep -180    #Misc:  - DVT prophylaxis: SCDs  - Feeds: NPO  - Prophylaxis: protonix  - Activity: IAT  - Code status: Full     Assessment and Plan:   · Assessment	  Pt is a 84y male, with PMHx AVMs, CAD s/p stents x 2 (2007), asbestosis of lung, malignant mesothelioma, s/p VATS, HLD, DM, and JASMIN, arrived at the ED on 11/26 due to abnormal Hb (6.4). Pt did not have active bloody stools bedside and denied CP, Ab pain, N/V/D, dizziness, and UTI sxs. Prior EGD led to bleeding on contact but hemostasis was achieved. In the ED, Hb was 7.1, and CHINYERE positive for black stools in ED and (+) Ab screening. pRBC transfusion was deferred and pt was admitted to obtain further w/u, including iron studies to determine if iron supplementation will be needed and wad Dx'd with melena and anemia. Pt is currently admitted with the primary diagnosis of anemia and melena, likely 2/2 to upper GID.    # Possible UGI bleed  # Acute on chronic anemia  # Hx Angioectasia of duodenum  - Hemodynamically stable   - GI is planning to scope pt possibly today; will F/U with GI  - Maintain active Type and screen, monitor for signs of active bleed or significant drop in Hb  - Trend H&H and CBC daily  - Start PPI PO BID for total  2 weeks and then q24h thereafter     - Baseline hemoglobin - 8-9   - Holding home plavix, resume ASA instead as below       #Gurgling sounds during respirations  #R sided pleural effusion  - CXR 12/02/24 9:33 AM: Unchanged large right-sided opacity.   - CXR 12/02/24 11:20 AM: Unchanged large opacity projecting over the right lung. Hazy opacities in the left lung are less prominent. No pneumothorax.  - PleurX drained ~700mL from R lung catheter  - RRT called to provide pt nebulizer with inhaled saline chloride treatment to help clear mucus and chest congestion    #JEY :   #Possible UTI ?  - Creat 1.6 (baseline 0.9); c/w trending Cr  - c/w IVF  - avoid nephrotoxins  - f/u UA , UCx , can start empiric ABx once cultures been collected     - WBC trended up    #Small hypodensity in segment 7 of the liver seen on prior CT.  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   - will likely resume ASA tmw AM instead of Plavix as lower risk of bleeding   - c/w statin     #DM Type 2  - Home med: Metformin 500 mg BID  - ISS to keep -180    #Misc:  - DVT prophylaxis: SCDs  - Feeds: NPO  - Prophylaxis: protonix  - Activity: IAT  - Code status: Full     Assessment and Plan:   · Assessment	  Pt is a 84y male, with PMHx AVMs, CAD s/p stents x 2 (2007), asbestosis of lung, malignant mesothelioma, s/p VATS, HLD, DM, and JASMIN, arrived at the ED on 11/26 due to abnormal Hb (6.4). Pt did not have active bloody stools bedside and denied CP, Ab pain, N/V/D, dizziness, and UTI sxs. Prior EGD led to bleeding on contact but hemostasis was achieved. In the ED, Hb was 7.1, and CHINYERE positive for black stools in ED and (+) Ab screening. pRBC transfusion was deferred and pt was admitted to obtain further w/u, including iron studies to determine if iron supplementation will be needed and wad Dx'd with melena and anemia. Pt is currently admitted with the primary diagnosis of anemia and melena, likely 2/2 to upper GID.    # Possible UGI bleed  # Acute on chronic anemia  # Hx Angioectasia of duodenum  - Hemodynamically stable   - GI is planning to scope pt during this week; will F/U with GI  - Maintain active Type and screen, monitor for signs of active bleed or significant drop in Hb  - Trend H&H and CBC daily  - Start PPI PO BID for total  2 weeks and then q24h thereafter     - Baseline hemoglobin - 8-9   - Holding home plavix, resume ASA instead as below       #respiratory distress  #R sided pleural effusion  - CXR 12/02/24 9:33 AM: Unchanged large right-sided opacity.   - CXR 12/02/24 11:20 AM: Unchanged large opacity projecting over the right lung. Hazy opacities in the left lung are less prominent. No pneumothorax.  - PleurX drained ~700mL from R lung catheter  - RRT called to provide pt nebulizer with inhaled saline chloride treatment to help clear mucus and chest congestion    #GNR bacteremia  -ESBL Proteus  -Meropenem per ID    #JEY   #Possible UTI ?  - Creat 1.6 (baseline 0.9); c/w trending Cr  - c/w IVF  - avoid nephrotoxins  - f/u UCx      #Small hypodensity in segment 7 of the liver seen on prior CT.  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   - will likely resume ASA tmw AM instead of Plavix as lower risk of bleeding   - c/w statin     #DM Type 2  - Home med: Metformin 500 mg BID  - ISS to keep -180    #Misc:  - DVT prophylaxis: SCDs  - Feeds: Clear liquid diet  - Prophylaxis: protonix  - Activity: IAT  - Code status: Full

## 2024-12-02 NOTE — PROGRESS NOTE ADULT - SUBJECTIVE AND OBJECTIVE BOX
SERGEYCLARI MUNROE  84y, Male  Allergy: penicillin (Unknown)      LOS  6d    CHIEF COMPLAINT: Melena (02 Dec 2024 11:17)      INTERVAL EVENTS/HPI  - T(F): , Max: 99.1 (12-01-24 @ 14:40), BCX +   patient not engaging in interview  - WBC Count: 7.92 (12-02-24 @ 06:41)  WBC Count: 3.75 (12-01-24 @ 16:00)     - Creatinine: 1.6 (12-02-24 @ 06:41)  Creatinine: 1.8 (12-01-24 @ 16:00)     -   -   -     ROS  cannot obtain secondary to patient's sedation and/or mental status    VITALS:  T(F): 97.5, Max: 99.1 (12-01-24 @ 14:40)  HR: 101  BP: 114/72  RR: 18Vital Signs Last 24 Hrs  T(C): 36.4 (02 Dec 2024 12:13), Max: 37.3 (01 Dec 2024 14:40)  T(F): 97.5 (02 Dec 2024 12:13), Max: 99.1 (01 Dec 2024 14:40)  HR: 101 (02 Dec 2024 12:13) (80 - 101)  BP: 114/72 (02 Dec 2024 12:13) (98/59 - 120/59)  BP(mean): --  RR: 18 (02 Dec 2024 12:13) (18 - 20)  SpO2: 95% (02 Dec 2024 12:13) (95% - 96%)    Parameters below as of 01 Dec 2024 20:28  Patient On (Oxygen Delivery Method): room air        PHYSICAL EXAM:  Gen: chronically ill appearing   HEENT: Normocephalic, atraumatic  Neck: supple, no lymphadenopathy  CV: Regular rate & regular rhythm  Lungs: decreased BS at bases, no fremitus  Abdomen: Soft, BS present  Ext: Warm, well perfused  Neuro: non focal, sleepy  Skin: no rash, no erythema  Lines: no phlebitis     FH: Non-contributory  Social Hx: Non-contributory    TESTS & MEASUREMENTS:                        8.1    7.92  )-----------( 238      ( 02 Dec 2024 06:41 )             26.0     12-02    142  |  113[H]  |  59[H]  ----------------------------<  149[H]  4.3   |  17  |  1.6[H]    Ca    8.5      02 Dec 2024 06:41  Mg     2.0     12-02          Urinalysis Basic - ( 02 Dec 2024 06:41 )    Color: x / Appearance: x / SG: x / pH: x  Gluc: 149 mg/dL / Ketone: x  / Bili: x / Urobili: x   Blood: x / Protein: x / Nitrite: x   Leuk Esterase: x / RBC: x / WBC x   Sq Epi: x / Non Sq Epi: x / Bacteria: x        Culture - Blood (collected 12-01-24 @ 16:00)  Source: .Blood BLOOD  Gram Stain (12-02-24 @ 11:08):    Growth in aerobic and anaerobic bottles: Gram Negative Rods  Preliminary Report (12-02-24 @ 11:08):    Growth in aerobic and anaerobic bottles: Gram Negative Rods    Direct identification is available within approximately 3-5    hours either by Blood Panel Multiplexed PCR or Direct    MALDI-TOF. Details: [email or web address]  Organism: Blood Culture PCR (12-02-24 @ 12:43)  Organism: Blood Culture PCR (12-02-24 @ 12:43)      Method Type: PCR      -  Proteus species: Detec      -  ESBL: Detec      -  CTX-M Resistance Marker: Detec    Culture - Blood (collected 12-01-24 @ 16:00)  Source: .Blood BLOOD  Gram Stain (12-02-24 @ 11:40):    Growth in aerobic bottle: Gram Negative Rods    Growth in anaerobic bottle: Gram Negative Rods  Preliminary Report (12-02-24 @ 11:40):    Growth in aerobic bottle: Gram Negative Rods    Growth in anaerobic bottle: Gram Negative Rods        Lactate, Blood: 2.3 mmol/L (12-01-24 @ 16:00)      INFECTIOUS DISEASES TESTING      INFLAMMATORY MARKERS      RADIOLOGY & ADDITIONAL TESTS:  I have personally reviewed the last available Chest xray  CXR  Xray Chest 1 View AP/PA:   ACC: [id number] EXAM:  XR CHEST 1 VIEW   ORDERED BY: DAVID DING     PROCEDURE DATE:  12/01/2024          INTERPRETATION:  CLINICAL HISTORY: Fever    COMPARISON: October 28, 2024    TECHNIQUE: Frontal.    FINDINGS:    Support devices: Right basilar chest tube.    Cardiac/mediastinum/hilum: Heart size within normal limits, thoracic   aortic calcification..    Lung parenchyma/Pleura: Right apical opacity. Right basilar   opacity/pleural effusion.    Skeleton/soft tissues: Stable.      IMPRESSION:    Right basilar opacity/pleural effusion, increased. Redemonstration right   apical opacity.    --- End of Report ---            NEISHA PAULA MD; Attending Radiologist  This document has been electronically signed. Dec  2 2024  5:35AM (12-01-24 @ 15:25)      CT      CARDIOLOGY TESTING  12 Lead ECG:   Ventricular Rate 92 BPM    Atrial Rate 92 BPM    P-R Interval 132 ms    QRS Duration 100 ms    Q-T Interval 352 ms    QTC Calculation(Bazett) 435 ms    P Axis 51 degrees    R Axis 101 degrees    T Axis -80 degrees    Diagnosis Line Sinus rhythm with Premature atrial complexes  Rightward axis  ST & T wave abnormality, consider inferolateral ischemia  Abnormal ECG    Confirmed by Cesar Hong ([id number]) on 11/26/2024 5:29:19 PM (11-26-24 @ 10:51)      MEDICATIONS  aspirin  chewable 81  atorvastatin 20  chlorhexidine 2% Cloths 1  dextrose 5%. 1000  dextrose 5%. 1000  dextrose 50% Injectable 25  dextrose 50% Injectable 12.5  dextrose 50% Injectable 25  ferrous    sulfate 325  gabapentin 300  glucagon  Injectable 1  insulin lispro (ADMELOG) corrective regimen sliding scale   melatonin 3  meropenem  IVPB   pantoprazole    Tablet 40  polyethylene glycol 3350 17  senna 2  sodium chloride 0.9%. 1000  sodium zirconium cyclosilicate 10  sutab 1 Dose(s) 1  tamsulosin 0.4      WEIGHT  Weight (kg): 56.9 (11-26-24 @ 23:16)  Creatinine: 1.6 mg/dL (12-02-24 @ 06:41)  Creatinine: 1.8 mg/dL (12-01-24 @ 16:00)      ANTIBIOTICS:  meropenem  IVPB          All available historical records have been reviewed

## 2024-12-02 NOTE — PROGRESS NOTE ADULT - ASSESSMENT
85 y/o man w PMHx of HLD, DM, CAD s/p stents x2 in 2007, pt of Dr Avila, h/o asbestosis of lung, dx w malignant mesothelioma ~8/2024 and s/p VATS pleurodesis, R sided PleurX, h/o AVM/GIB, JASMIN, referred from NH for anemia to 6.5, in ED found to have Hb 7.1, CHINYERE +ve for melena. GI team consulted, clear liquid diet and NPO after midnight for possible EGD tomorrow.   Patient being admitted to medicine for possible UGI. GI consulted.     # Possible UGI bleed  # Acute on chronic anemia  # Hx Angioectasia of duodenum  - Hemodynamically stable   - Baseline hemoglobin - 8-9   - Holding home plavix , resume ASA instead as below   -GI on board   -Maintain active Type and screen, monitor for signs of active bleed or significant drop in Hb  -Trend H&H   -Start PPI PO BID for total  2 weeks and then q24h thereafter   -GI was planning EGD/C-scope but on hold until bacteremia resolves, d/w GI today   -Heme Onc consulted, rec IV venofer 200mg x2 doses, gave one dose 11/30, discontinued further doses due to sepsis    #Sepsis with Proteus ESBL Bacteremia (Blood Cx 12/1 positive)  #Suspect source is CAUTI (from catheter prior to admission; changed 11/29)   Appreciate ID recs, change atbx to merrem  bryson Lopez updated over phone     #JEY :   -Creat 1.6 (baseline 0.9)   -Nephro consulted  -trend Creat   -avoid nephrotoxins   -IVF    #Small hypodensity in segment 7 of the liver seen on prior CT.  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   - will likely resume ASA tmw AM instead of Plavix as lower risk of bleeding   - c/w statin     #DM Type 2  - Home med: Metformin 500 mg BID  - ISS to keep -180    #Miscellaneous:  - DVT prophylaxis: SCDs  - Feeds: Clears then advance to DASH/TLC  - Prophylaxis: PPI  -Activity: AAT  -Code status: Full     Pending : Treat sepsis/bacteremia; monitor H&H, GI procedures on hold for now 83 y/o man w PMHx of HLD, DM, CAD s/p stents x2 in 2007, pt of Dr Avila, h/o asbestosis of lung, dx w malignant mesothelioma ~8/2024 and s/p VATS pleurodesis, R sided PleurX, h/o AVM/GIB, JASMIN, referred from NH for anemia to 6.5, in ED found to have Hb 7.1, CHINYERE +ve for melena. GI team consulted, clear liquid diet and NPO after midnight for possible EGD tomorrow.   Patient being admitted to medicine for possible UGI. GI consulted.     # Possible UGI bleed  # Acute on chronic anemia  # Hx Angioectasia of duodenum  - Hemodynamically stable   - Baseline hemoglobin - 8-9   - Holding home plavix , resume ASA instead as below   -GI on board   -Maintain active Type and screen, monitor for signs of active bleed or significant drop in Hb  -Trend H&H   -Start PPI PO BID for total  2 weeks and then q24h thereafter   -GI was planning EGD/C-scope but on hold until bacteremia resolves, d/w GI today   -Heme Onc consulted, rec IV venofer 200mg x2 doses, gave one dose 11/30, discontinued further doses due to sepsis    #Sepsis with Proteus ESBL Bacteremia (Blood Cx 12/1 positive)  #Suspect source is CAUTI (from catheter prior to admission; changed 11/29)   Appreciate ID recs, change atbx to merrem  bryson Lopez updated over phone     #JEY :   -Creat 1.6 (baseline 0.9)   -Nephro consulted  -trend Creat   -avoid nephrotoxins   -IVF    #Small hypodensity in segment 7 of the liver seen on prior CT.  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   - will likely resume ASA tmw AM instead of Plavix as lower risk of bleeding   - c/w statin     #DM Type 2  - Home med: Metformin 500 mg BID  - ISS to keep -180    #Miscellaneous:  - DVT prophylaxis: SCDs  - Feeds: Clears then advance to DASH/TLC  - Prophylaxis: PPI  -Activity: AAT  -Code status: Full     Pending : Treat sepsis/bacteremia; monitor H&H, GI procedures on hold for now

## 2024-12-02 NOTE — CONSULT NOTE ADULT - ATTENDING COMMENTS
# JEY on CKD 2 prerenal  # Anemia acute on chronic  # acidosis    - creat better  - consider  cc x1   - sodium bicarb 650 po q8h   - no need for sono  - follow BMP     will follow

## 2024-12-02 NOTE — PROGRESS NOTE ADULT - SUBJECTIVE AND OBJECTIVE BOX
Gastroenterology progress note:     Patient is a 84y old  Male who presents with a chief complaint of Melena (02 Dec 2024 10:00)       Admitted on: 11-26-24    We are following the patient for: anemia        Interval History:    No acute events overnight.   - Diet - tolerating liquid diet   - last BM - Yesterday   - Abdominal pain -denies      PAST MEDICAL & SURGICAL HISTORY:  DM (diabetes mellitus)      MI (myocardial infarction)      History of CAD (coronary artery disease)      Dyslipidemia      Currently smokes tobacco      Mesothelioma, malignant      Coronary stent patent          MEDICATIONS  (STANDING):  aspirin  chewable 81 milliGRAM(s) Oral daily  atorvastatin 20 milliGRAM(s) Oral at bedtime  cefepime   IVPB      cefepime   IVPB 1000 milliGRAM(s) IV Intermittent every 12 hours  chlorhexidine 2% Cloths 1 Application(s) Topical <User Schedule>  dextrose 5%. 1000 milliLiter(s) (50 mL/Hr) IV Continuous <Continuous>  dextrose 5%. 1000 milliLiter(s) (100 mL/Hr) IV Continuous <Continuous>  dextrose 50% Injectable 25 Gram(s) IV Push once  dextrose 50% Injectable 12.5 Gram(s) IV Push once  dextrose 50% Injectable 25 Gram(s) IV Push once  ferrous    sulfate 325 milliGRAM(s) Oral daily  gabapentin 300 milliGRAM(s) Oral three times a day  glucagon  Injectable 1 milliGRAM(s) IntraMuscular once  insulin lispro (ADMELOG) corrective regimen sliding scale   SubCutaneous three times a day before meals  melatonin 3 milliGRAM(s) Oral at bedtime  pantoprazole    Tablet 40 milliGRAM(s) Oral every 12 hours  polyethylene glycol 3350 17 Gram(s) Oral daily  senna 2 Tablet(s) Oral at bedtime  sodium zirconium cyclosilicate 10 Gram(s) Oral once  sutab 1 Dose(s) 1 Dose(s) Oral once  tamsulosin 0.4 milliGRAM(s) Oral at bedtime    MEDICATIONS  (PRN):  dextrose Oral Gel 15 Gram(s) Oral once PRN Blood Glucose LESS THAN 70 milliGRAM(s)/deciliter      Allergies  penicillin (Unknown)      Review of Systems:   Cardiovascular:  No Chest Pain, No Palpitations  Respiratory:  No Cough, No Dyspnea  Gastrointestinal:  As described in HPI  Skin:  No Skin Lesions, No Jaundice  Neuro:  No Syncope, No Dizziness    Physical Examination:  T(C): 36.5 (12-02-24 @ 05:01), Max: 38.3 (12-01-24 @ 13:32)  HR: 80 (12-02-24 @ 05:01) (80 - 104)  BP: 120/59 (12-02-24 @ 05:01) (98/59 - 130/66)  RR: 18 (12-02-24 @ 05:01) (18 - 20)  SpO2: 96% (12-01-24 @ 20:28) (94% - 96%)      12-01-24 @ 07:01  -  12-02-24 @ 07:00  --------------------------------------------------------  IN: 0 mL / OUT: 1250 mL / NET: -1250 mL        GENERAL: AAOx3, no acute distress.  HEAD:  Atraumatic, Normocephalic  EYES: conjunctiva and sclera clear  NECK: Supple, no JVD or thyromegaly  CHEST/LUNG: Clear to auscultation bilaterally; No wheeze, rhonchi, or rales  HEART: Regular rate and rhythm; normal S1, S2, No murmurs.  ABDOMEN: Soft, nontender, nondistended; Bowel sounds present  NEUROLOGY: No asterixis or tremor.   SKIN: Intact, no jaundice     Data:                        8.1    7.92  )-----------( 238      ( 02 Dec 2024 06:41 )             26.0     Hgb trend:  8.1  12-02-24 @ 06:41  8.6  12-01-24 @ 16:00  6.7  12-01-24 @ 08:15  7.5  11-30-24 @ 11:04        12-02    142  |  113[H]  |  59[H]  ----------------------------<  149[H]  4.3   |  17  |  1.6[H]    Ca    8.5      02 Dec 2024 06:41  Mg     2.0     12-02      Liver panel trend:  TBili <0.2   /   AST 7   /   ALT <5   /   AlkP 104   /   Tptn 5.7   /   Alb 3.2    /   DBili --      11-28  TBili <0.2   /   AST 9   /   ALT <5   /   AlkP 125   /   Tptn 6.2   /   Alb 3.4    /   DBili --      11-26          Culture - Blood (collected 01 Dec 2024 16:00)  Source: .Blood BLOOD  Gram Stain (02 Dec 2024 11:08):    Growth in aerobic and anaerobic bottles: Gram Negative Rods  Preliminary Report (02 Dec 2024 11:08):    Growth in aerobic and anaerobic bottles: Gram Negative Rods    Direct identification is available within approximately 3-5    hours either by Blood Panel Multiplexed PCR or Direct    MALDI-TOF. Details: [email or web address]         Radiology:

## 2024-12-02 NOTE — CONSULT NOTE ADULT - SUBJECTIVE AND OBJECTIVE BOX
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 In ED, patient found to have Hb of 7.1 and CHINYERE +ve for melena. In ED patient was hemodynamically stable and given IV PPI BID. Of note, patient recently underwent EGD on 10/28/24 showing erythema in the stomach compatible with non-erosive gastritis, angioectasia in the second part of the duodenum. (APC), angioectasia in the duodenal sweep. 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 No imaging performed in ED.     Patient was admitted to medicine for concern for an upper GI bleed on 11/26/2024.     Nephrology consulted for an JEY.    From chart review, patient has a baseline creatinine of 0.9 from the last couple of years and was last at his baseline when admitted on 11/28/2026. Patients creatinine spiked to 1.6 on 11/29 and is currently at 1.8 (2x the patients baseline). Upon nephrology's evaluation of the patient, the patient states "I feel lousy."     Patient denies any edema, nausea, vomiting,  or flank pain. Patient denies any genitourinary symptoms such dysuria or hematuria. Patient denies any uremic symptoms such as loss of appetite, nausea, vomiting, metallic taste, chronic LE edema, or poorly controlled blood pressure.    Upon physical exam, the patient is hypotensive , HRRR, eupneic, saturating at *** on room air.  Patient appers *euvolomemic* on exam with no lower extremity edema, pitting edema, pulmonary crackles, jugular venous distension, abdominal distension. The patient has normal cappilary refill and skin turger.         PAST MEDICAL & SURGICAL HISTORY:  DM (diabetes mellitus)  MI (myocardial infarction)  History of CAD (coronary artery disease)  Dyslipidemia  Currently smokes tobacco  Mesothelioma, malignant  Coronary stent patent    Allergies:  penicillin (Unknown)    Home Medications Reviewed    Hospital Medications:   MEDICATIONS  (STANDING):  albuterol/ipratropium for Nebulization. 3 milliLiter(s) Nebulizer once  aspirin  chewable 81 milliGRAM(s) Oral daily  atorvastatin 20 milliGRAM(s) Oral at bedtime  cefepime   IVPB      cefepime   IVPB 1000 milliGRAM(s) IV Intermittent once  cefepime   IVPB 1000 milliGRAM(s) IV Intermittent every 12 hours  chlorhexidine 2% Cloths 1 Application(s) Topical <User Schedule>  dextrose 5%. 1000 milliLiter(s) (50 mL/Hr) IV Continuous <Continuous>  dextrose 5%. 1000 milliLiter(s) (100 mL/Hr) IV Continuous <Continuous>  dextrose 50% Injectable 25 Gram(s) IV Push once  dextrose 50% Injectable 12.5 Gram(s) IV Push once  dextrose 50% Injectable 25 Gram(s) IV Push once  ferrous    sulfate 325 milliGRAM(s) Oral daily  gabapentin 300 milliGRAM(s) Oral three times a day  glucagon  Injectable 1 milliGRAM(s) IntraMuscular once  insulin lispro (ADMELOG) corrective regimen sliding scale   SubCutaneous three times a day before meals  melatonin 3 milliGRAM(s) Oral at bedtime  pantoprazole    Tablet 40 milliGRAM(s) Oral every 12 hours  polyethylene glycol 3350 17 Gram(s) Oral daily  senna 2 Tablet(s) Oral at bedtime  sodium chloride 3%  Inhalation 4 milliLiter(s) Inhalation once  sodium zirconium cyclosilicate 10 Gram(s) Oral once  sutab 1 Dose(s) 1 Dose(s) Oral once  tamsulosin 0.4 milliGRAM(s) Oral at bedtime    SOCIAL HISTORY:  Denies ETOH,Smoking,   FAMILY HISTORY:    REVIEW OF SYSTEMS:  CONSTITUTIONAL: No weakness, fevers or chills  EYES/ENT: No visual changes;  No vertigo or throat pain   NECK: No pain or stiffness  RESPIRATORY: No cough, wheezing, hemoptysis; No shortness of breath  CARDIOVASCULAR: No chest pain or palpitations.  GASTROINTESTINAL: No abdominal or epigastric pain. No nausea, vomiting, or hematemesis; No diarrhea or constipation. No melena or hematochezia.  GENITOURINARY: No dysuria, frequency, foamy urine, urinary urgency, incontinence or hematuria  NEUROLOGICAL: No numbness or weakness  SKIN: No itching, burning, rashes, or lesions   VASCULAR: No bilateral lower extremity edema.   All other review of systems is negative unless indicated above.    VITALS:  Vital Signs Last 24 Hrs  T(C): 36.5 (02 Dec 2024 05:01), Max: 38.3 (01 Dec 2024 13:32)  T(F): 97.7 (02 Dec 2024 05:01), Max: 101 (01 Dec 2024 13:32)  HR: 80 (02 Dec 2024 05:01) (80 - 104)  BP: 120/59 (02 Dec 2024 05:01) (98/59 - 130/66)  BP(mean): 105 (01 Dec 2024 13:15) (105 - 105)  RR: 18 (02 Dec 2024 05:01) (18 - 20)  SpO2: 96% (01 Dec 2024 20:28) (94% - 96%)    Parameters below as of 01 Dec 2024 20:28  Patient On (Oxygen Delivery Method): room air        12-01 @ 07:01  -  12-02 @ 07:00  --------------------------------------------------------  IN: 0 mL / OUT: 1250 mL / NET: -1250 mL    PHYSICAL EXAM:  Constitutional: NAD  HEENT: anicteric sclera, oropharynx clear, MMM  Neck: No JVD  Respiratory: CTAB, no wheezes, rales or rhonchi  Cardiovascular: S1, S2, RRR  Gastrointestinal: BS+, soft, NT/ND  Extremities: No cyanosis or clubbing. No peripheral edema  Neurological: A/O x 3, no focal deficits  Psychiatric: Normal mood, normal affect  : No CVA tenderness. No laws.   Skin: No rashes  Vascular Access:    LABS:  12-02    142  |  113[H]  |  59[H]  ----------------------------<  149[H]  4.3   |  17  |  1.6[H]    Ca    8.5      02 Dec 2024 06:41  Mg     2.0     12-02      Creatinine Trend: 1.6 <--, 1.8 <--, 1.7 <--, 1.5 <--, 1.6 <--, 0.9 <--, 0.9 <--, 1.1 <--, 0.9 <--, 0.9 <--, 0.9 <--, 0.9 <--                        8.1    7.92  )-----------( 238      ( 02 Dec 2024 06:41 )             26.0     Urine Studies:  Urinalysis Basic - ( 02 Dec 2024 06:41 )    Color:  / Appearance:  / SG:  / pH:   Gluc: 149 mg/dL / Ketone:   / Bili:  / Urobili:    Blood:  / Protein:  / Nitrite:    Leuk Esterase:  / RBC:  / WBC    Sq Epi:  / Non Sq Epi:  / Bacteria:       Sodium, Random Urine: 20.0 mmoL/L (12-01 @ 15:13)  Creatinine, Random Urine: <4 mg/dL (12-01 @ 15:13)  Protein/Creatinine Ratio Calculation: TNP Ratio (12-01 @ 15:13)  Osmolality, Random Urine: 474 mos/kg (12-01 @ 15:13)  Potassium, Random Urine: 3 mmol/L (12-01 @ 15:13)    RADIOLOGY & ADDITIONAL STUDIES:      ACC: [id number] EXAM:  XR CHEST 1 VIEW   ORDERED BY: DAVID DING     PROCEDURE DATE:  12/01/2024          INTERPRETATION:  CLINICAL HISTORY: Fever    COMPARISON: October 28, 2024    TECHNIQUE: Frontal.    FINDINGS:    Support devices: Right basilar chest tube.    Cardiac/mediastinum/hilum: Heart size within normal limits, thoracic   aortic calcification..    Lung parenchyma/Pleura: Right apical opacity. Right basilar   opacity/pleural effusion.    Skeleton/soft tissues: Stable.      IMPRESSION:    Right basilar opacity/pleural effusion, increased. Redemonstration right   apical opacity.    --- End of Report ---      ACC: [id number] EXAM:  US KIDNEYS AND BLADDER   ORDERED BY: TRACIE PÉREZ     PROCEDURE DATE:  11/29/2024          INTERPRETATION:  CLINICAL INFORMATION: 84-year-old male with anemia and   clinical concern for hydronephrosis.    COMPARISON: Renal and bladder ultrasound examination performed 7/28/2024    TECHNIQUE: Sonography of the kidneys and bladder.    FINDINGS:  Right kidney: 10.3 cm. There are multiple right renal cysts, measuring up   to 0.9 x 0.6 x 0.9 cm, similar to previous.  No other renal mass or   calculi.  There is mild to moderate right hydronephrosis, reported to be   full previously.    Left kidney: 10.9 cm. There is mild to moderate left hydronephrosis,   reported to be mild previously.  No renal mass or calculi.  Vascular flow   is weakly demonstrated within the left kidney.    Urinary bladder: Evaluation of the urinary bladder is limited due to   presence of a Laws catheter.    IMPRESSION:  1.  Multiple right renal cysts, measuring up to 0.9 cm, similar to   previous.  2.  Mild to moderate right hydronephrosis, previously reported as   fullness.  3.  Mild to moderate left hydronephrosis, reported to be mild previously.  4.  Urinary bladder not evaluated due to presence of a Laws catheter   appear        --- End of Report ---            MARY RODRIGUEZ MD; Attending Radiologist  This document has been electronically signed. 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 In ED, patient found to have Hb of 7.1 and CHINYERE +ve for melena. In ED patient was hemodynamically stable and given IV PPI BID. Of note, patient recently underwent EGD on 10/28/24 showing erythema in the stomach compatible with non-erosive gastritis, angioectasia in the second part of the duodenum. (APC), angioectasia in the duodenal sweep. 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 No imaging performed in ED.     Patient was admitted to medicine for concern for an upper GI bleed on 11/26/2024.     Nephrology consulted for an JEY.    From chart review, patient has a baseline creatinine of 0.9 from the last couple of years and was last at his baseline when admitted on 11/28/2026. Patients creatinine increased to 1.6 on 11/29 and is currently at 1.8 (2x the patients baseline). Nurse states patient came in with a very dirty, kinked Laws that had a noxious scent. Nurse changed laws and stated 1000mL immediately drained.     Upon nephrology's evaluation of the patient, the patient states "I feel lousy." Patient denies any edema, nausea, vomiting,  or flank pain. Patient denies any genitourinary symptoms such dysuria or hematuria. Patient denies any uremic symptoms such as loss of appetite, nausea, vomiting, metallic taste, chronic LE edema, or poorly controlled blood pressure.    Upon physical exam, the patient is afebrile, hypotensive at 99/51 , HRRR at 98, eupneic, saturating at 95% on room air.  Patient appears hypovolemic on exam with no lower extremity edema, pitting edema, jugular venous distension, abdominal distension. The patient has normal capillary refill and decreased skin turger. Patient does have crackles b/l on auscultation.        PAST MEDICAL & SURGICAL HISTORY:  DM (diabetes mellitus)  MI (myocardial infarction)  History of CAD (coronary artery disease)  Dyslipidemia  Currently smokes tobacco  Mesothelioma, malignant  Coronary stent patent    Allergies:  penicillin (Unknown)    Home Medications Reviewed    Hospital Medications:   MEDICATIONS  (STANDING):  albuterol/ipratropium for Nebulization. 3 milliLiter(s) Nebulizer once  aspirin  chewable 81 milliGRAM(s) Oral daily  atorvastatin 20 milliGRAM(s) Oral at bedtime  cefepime   IVPB      cefepime   IVPB 1000 milliGRAM(s) IV Intermittent once  cefepime   IVPB 1000 milliGRAM(s) IV Intermittent every 12 hours  chlorhexidine 2% Cloths 1 Application(s) Topical <User Schedule>  dextrose 5%. 1000 milliLiter(s) (50 mL/Hr) IV Continuous <Continuous>  dextrose 5%. 1000 milliLiter(s) (100 mL/Hr) IV Continuous <Continuous>  dextrose 50% Injectable 25 Gram(s) IV Push once  dextrose 50% Injectable 12.5 Gram(s) IV Push once  dextrose 50% Injectable 25 Gram(s) IV Push once  ferrous    sulfate 325 milliGRAM(s) Oral daily  gabapentin 300 milliGRAM(s) Oral three times a day  glucagon  Injectable 1 milliGRAM(s) IntraMuscular once  insulin lispro (ADMELOG) corrective regimen sliding scale   SubCutaneous three times a day before meals  melatonin 3 milliGRAM(s) Oral at bedtime  pantoprazole    Tablet 40 milliGRAM(s) Oral every 12 hours  polyethylene glycol 3350 17 Gram(s) Oral daily  senna 2 Tablet(s) Oral at bedtime  sodium chloride 3%  Inhalation 4 milliLiter(s) Inhalation once  sodium zirconium cyclosilicate 10 Gram(s) Oral once  sutab 1 Dose(s) 1 Dose(s) Oral once  tamsulosin 0.4 milliGRAM(s) Oral at bedtime    SOCIAL HISTORY:  Denies ETOH,Smoking,   FAMILY HISTORY:    REVIEW OF SYSTEMS:  CONSTITUTIONAL: No weakness, fevers or chills  EYES/ENT: No visual changes;  No vertigo or throat pain   NECK: No pain or stiffness  RESPIRATORY: No cough, wheezing, hemoptysis; No shortness of breath  CARDIOVASCULAR: No chest pain or palpitations.  GASTROINTESTINAL: No abdominal or epigastric pain. No nausea, vomiting, or hematemesis; No diarrhea or constipation. No melena or hematochezia.  GENITOURINARY: No dysuria, frequency, foamy urine, urinary urgency, incontinence or hematuria  NEUROLOGICAL: No numbness or weakness  SKIN: No itching, burning, rashes, or lesions   VASCULAR: No bilateral lower extremity edema.   All other review of systems is negative unless indicated above.    VITALS:  Vital Signs Last 24 Hrs  T(C): 36.5 (02 Dec 2024 05:01), Max: 38.3 (01 Dec 2024 13:32)  T(F): 97.7 (02 Dec 2024 05:01), Max: 101 (01 Dec 2024 13:32)  HR: 80 (02 Dec 2024 05:01) (80 - 104)  BP: 120/59 (02 Dec 2024 05:01) (98/59 - 130/66)  BP(mean): 105 (01 Dec 2024 13:15) (105 - 105)  RR: 18 (02 Dec 2024 05:01) (18 - 20)  SpO2: 96% (01 Dec 2024 20:28) (94% - 96%)    Parameters below as of 01 Dec 2024 20:28  Patient On (Oxygen Delivery Method): room air        12-01 @ 07:01  -  12-02 @ 07:00  --------------------------------------------------------  IN: 0 mL / OUT: 1250 mL / NET: -1250 mL    PHYSICAL EXAM:  Constitutional: NAD  HEENT: anicteric sclera, oropharynx clear, MMM  Neck: No JVD  Respiratory: CTAB, no wheezes, rales or rhonchi  Cardiovascular: S1, S2, RRR  Gastrointestinal: BS+, soft, NT/ND  Extremities: No cyanosis or clubbing. No peripheral edema  Neurological: A/O x 3, no focal deficits  Psychiatric: Normal mood, normal affect  : No CVA tenderness. No laws.   Skin: No rashes  Vascular Access:    LABS:  12-02    142  |  113[H]  |  59[H]  ----------------------------<  149[H]  4.3   |  17  |  1.6[H]    Ca    8.5      02 Dec 2024 06:41  Mg     2.0     12-02      Creatinine Trend: 1.6 <--, 1.8 <--, 1.7 <--, 1.5 <--, 1.6 <--, 0.9 <--, 0.9 <--, 1.1 <--, 0.9 <--, 0.9 <--, 0.9 <--, 0.9 <--                        8.1    7.92  )-----------( 238      ( 02 Dec 2024 06:41 )             26.0     Urine Studies:  Urinalysis Basic - ( 02 Dec 2024 06:41 )    Color:  / Appearance:  / SG:  / pH:   Gluc: 149 mg/dL / Ketone:   / Bili:  / Urobili:    Blood:  / Protein:  / Nitrite:    Leuk Esterase:  / RBC:  / WBC    Sq Epi:  / Non Sq Epi:  / Bacteria:       Sodium, Random Urine: 20.0 mmoL/L (12-01 @ 15:13)  Creatinine, Random Urine: <4 mg/dL (12-01 @ 15:13)  Protein/Creatinine Ratio Calculation: TNP Ratio (12-01 @ 15:13)  Osmolality, Random Urine: 474 mos/kg (12-01 @ 15:13)  Potassium, Random Urine: 3 mmol/L (12-01 @ 15:13)    RADIOLOGY & ADDITIONAL STUDIES:      ACC: [id number] EXAM:  XR CHEST 1 VIEW   ORDERED BY: DAVID DING     PROCEDURE DATE:  12/01/2024          INTERPRETATION:  CLINICAL HISTORY: Fever    COMPARISON: October 28, 2024    TECHNIQUE: Frontal.    FINDINGS:    Support devices: Right basilar chest tube.    Cardiac/mediastinum/hilum: Heart size within normal limits, thoracic   aortic calcification..    Lung parenchyma/Pleura: Right apical opacity. Right basilar   opacity/pleural effusion.    Skeleton/soft tissues: Stable.      IMPRESSION:    Right basilar opacity/pleural effusion, increased. Redemonstration right   apical opacity.    --- End of Report ---      ACC: [id number] EXAM:  US KIDNEYS AND BLADDER   ORDERED BY: TRACIE PÉREZ     PROCEDURE DATE:  11/29/2024          INTERPRETATION:  CLINICAL INFORMATION: 84-year-old male with anemia and   clinical concern for hydronephrosis.    COMPARISON: Renal and bladder ultrasound examination performed 7/28/2024    TECHNIQUE: Sonography of the kidneys and bladder.    FINDINGS:  Right kidney: 10.3 cm. There are multiple right renal cysts, measuring up   to 0.9 x 0.6 x 0.9 cm, similar to previous.  No other renal mass or   calculi.  There is mild to moderate right hydronephrosis, reported to be   full previously.    Left kidney: 10.9 cm. There is mild to moderate left hydronephrosis,   reported to be mild previously.  No renal mass or calculi.  Vascular flow   is weakly demonstrated within the left kidney.    Urinary bladder: Evaluation of the urinary bladder is limited due to   presence of a Laws catheter.    IMPRESSION:  1.  Multiple right renal cysts, measuring up to 0.9 cm, similar to   previous.  2.  Mild to moderate right hydronephrosis, previously reported as   fullness.  3.  Mild to moderate left hydronephrosis, reported to be mild previously.  4.  Urinary bladder not evaluated due to presence of a Laws catheter   appear        --- End of Report ---            MARY RODRIGUEZ MD; Attending Radiologist  This document has been electronically signed. 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 In ED, patient found to have Hb of 7.1 and CHINYERE +ve for melena. In ED patient was hemodynamically stable and given IV PPI BID. Of note, patient recently underwent EGD on 10/28/24 showing erythema in the stomach compatible with non-erosive gastritis, angioectasia in the second part of the duodenum. (APC), angioectasia in the duodenal sweep. 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 No imaging performed in ED.     Patient was admitted to medicine for concern for an upper GI bleed on 11/26/2024.     Nephrology consulted for an JEY.    From chart review, patient has a baseline creatinine of 0.9 from the last couple of years and was last at his baseline when admitted on 11/28/2026. Patients creatinine increased to 1.6 on 11/29 and is currently at 1.8 (2x the patients baseline). Nurse states patient came in with a very dirty, kinked Laws that had a noxious scent. Nurse changed laws and stated 1000mL immediately drained.     Upon nephrology's evaluation of the patient, the patient states "I feel lousy." Patient denies any edema, nausea, vomiting,  or flank pain. Patient denies any genitourinary symptoms such dysuria or hematuria. Patient denies any uremic symptoms such as loss of appetite, nausea, vomiting, metallic taste, chronic LE edema, or poorly controlled blood pressure.    Upon physical exam, the patient is afebrile, hypotensive at 99/51 , HRRR at 98, eupneic, saturating at 95% on room air.  Patient appears hypovolemic on exam with no lower extremity edema, pitting edema, jugular venous distension, abdominal distension. The patient has normal capillary refill and decreased skin turger. Patient does have crackles b/l on auscultation.        PAST MEDICAL & SURGICAL HISTORY:  DM (diabetes mellitus)  MI (myocardial infarction)  History of CAD (coronary artery disease)  Dyslipidemia  Currently smokes tobacco  Mesothelioma, malignant  Coronary stent patent    Allergies:  penicillin (Unknown)    Home Medications Reviewed    Hospital Medications:   MEDICATIONS  (STANDING):  albuterol/ipratropium for Nebulization. 3 milliLiter(s) Nebulizer once  aspirin  chewable 81 milliGRAM(s) Oral daily  atorvastatin 20 milliGRAM(s) Oral at bedtime  cefepime   IVPB      cefepime   IVPB 1000 milliGRAM(s) IV Intermittent once  cefepime   IVPB 1000 milliGRAM(s) IV Intermittent every 12 hours  chlorhexidine 2% Cloths 1 Application(s) Topical <User Schedule>  dextrose 5%. 1000 milliLiter(s) (50 mL/Hr) IV Continuous <Continuous>  dextrose 5%. 1000 milliLiter(s) (100 mL/Hr) IV Continuous <Continuous>  dextrose 50% Injectable 25 Gram(s) IV Push once  dextrose 50% Injectable 12.5 Gram(s) IV Push once  dextrose 50% Injectable 25 Gram(s) IV Push once  ferrous    sulfate 325 milliGRAM(s) Oral daily  gabapentin 300 milliGRAM(s) Oral three times a day  glucagon  Injectable 1 milliGRAM(s) IntraMuscular once  insulin lispro (ADMELOG) corrective regimen sliding scale   SubCutaneous three times a day before meals  melatonin 3 milliGRAM(s) Oral at bedtime  pantoprazole    Tablet 40 milliGRAM(s) Oral every 12 hours  polyethylene glycol 3350 17 Gram(s) Oral daily  senna 2 Tablet(s) Oral at bedtime  sodium chloride 3%  Inhalation 4 milliLiter(s) Inhalation once  sodium zirconium cyclosilicate 10 Gram(s) Oral once  sutab 1 Dose(s) 1 Dose(s) Oral once  tamsulosin 0.4 milliGRAM(s) Oral at bedtime    SOCIAL HISTORY:  Denies ETOH,Smoking,   FAMILY HISTORY:    REVIEW OF SYSTEMS:  CONSTITUTIONAL: No weakness, fevers or chills  EYES/ENT: No visual changes;  No vertigo or throat pain   NECK: No pain or stiffness  RESPIRATORY: No cough, wheezing, hemoptysis; No shortness of breath  CARDIOVASCULAR: No chest pain or palpitations.  GASTROINTESTINAL: No abdominal or epigastric pain. No nausea, vomiting, or hematemesis; No diarrhea or constipation. No melena or hematochezia.  GENITOURINARY: No dysuria, frequency, foamy urine, urinary urgency, incontinence or hematuria  NEUROLOGICAL: No numbness or weakness  SKIN: No itching, burning, rashes, or lesions   VASCULAR: No bilateral lower extremity edema.   All other review of systems is negative unless indicated above.    VITALS:  Vital Signs Last 24 Hrs  T(C): 36.5 (02 Dec 2024 05:01), Max: 38.3 (01 Dec 2024 13:32)  T(F): 97.7 (02 Dec 2024 05:01), Max: 101 (01 Dec 2024 13:32)  HR: 80 (02 Dec 2024 05:01) (80 - 104)  BP: 120/59 (02 Dec 2024 05:01) (98/59 - 130/66)  BP(mean): 105 (01 Dec 2024 13:15) (105 - 105)  RR: 18 (02 Dec 2024 05:01) (18 - 20)  SpO2: 96% (01 Dec 2024 20:28) (94% - 96%)    Parameters below as of 01 Dec 2024 20:28  Patient On (Oxygen Delivery Method): room air        12-01 @ 07:01  -  12-02 @ 07:00  --------------------------------------------------------  IN: 0 mL / OUT: 1250 mL / NET: -1250 mL    PHYSICAL EXAM:  Constitutional: NAD  HEENT: anicteric sclera, oropharynx clear, MMM  Neck: No JVD  Respiratory: wheezing b/l   Cardiovascular: S1, S2, RRR  Gastrointestinal: BS+, soft, NT/ND  Extremities: No cyanosis or clubbing. No peripheral edema  Neurological: A/O x 3, no focal deficits  Psychiatric: Normal mood, normal affect  : No CVA tenderness.  laws.   Skin: No rashes      LABS:  12-02    142  |  113[H]  |  59[H]  ----------------------------<  149[H]  4.3   |  17  |  1.6[H]    Ca    8.5      02 Dec 2024 06:41  Mg     2.0     12-02      Creatinine Trend: 1.6 <--, 1.8 <--, 1.7 <--, 1.5 <--, 1.6 <--, 0.9 <--, 0.9 <--, 1.1 <--, 0.9 <--, 0.9 <--, 0.9 <--, 0.9 <--                        8.1    7.92  )-----------( 238      ( 02 Dec 2024 06:41 )             26.0     Urine Studies:  Urinalysis Basic - ( 02 Dec 2024 06:41 )    Color:  / Appearance:  / SG:  / pH:   Gluc: 149 mg/dL / Ketone:   / Bili:  / Urobili:    Blood:  / Protein:  / Nitrite:    Leuk Esterase:  / RBC:  / WBC    Sq Epi:  / Non Sq Epi:  / Bacteria:       Sodium, Random Urine: 20.0 mmoL/L (12-01 @ 15:13)  Creatinine, Random Urine: <4 mg/dL (12-01 @ 15:13)  Protein/Creatinine Ratio Calculation: TNP Ratio (12-01 @ 15:13)  Osmolality, Random Urine: 474 mos/kg (12-01 @ 15:13)  Potassium, Random Urine: 3 mmol/L (12-01 @ 15:13)    RADIOLOGY & ADDITIONAL STUDIES:      ACC: [id number] EXAM:  XR CHEST 1 VIEW   ORDERED BY: DAVID DING     PROCEDURE DATE:  12/01/2024          INTERPRETATION:  CLINICAL HISTORY: Fever    COMPARISON: October 28, 2024    TECHNIQUE: Frontal.    FINDINGS:    Support devices: Right basilar chest tube.    Cardiac/mediastinum/hilum: Heart size within normal limits, thoracic   aortic calcification..    Lung parenchyma/Pleura: Right apical opacity. Right basilar   opacity/pleural effusion.    Skeleton/soft tissues: Stable.      IMPRESSION:    Right basilar opacity/pleural effusion, increased. Redemonstration right   apical opacity.    --- End of Report ---      ACC: [id number] EXAM:  US KIDNEYS AND BLADDER   ORDERED BY: TRACIE PÉREZ     PROCEDURE DATE:  11/29/2024          INTERPRETATION:  CLINICAL INFORMATION: 84-year-old male with anemia and   clinical concern for hydronephrosis.    COMPARISON: Renal and bladder ultrasound examination performed 7/28/2024    TECHNIQUE: Sonography of the kidneys and bladder.    FINDINGS:  Right kidney: 10.3 cm. There are multiple right renal cysts, measuring up   to 0.9 x 0.6 x 0.9 cm, similar to previous.  No other renal mass or   calculi.  There is mild to moderate right hydronephrosis, reported to be   full previously.    Left kidney: 10.9 cm. There is mild to moderate left hydronephrosis,   reported to be mild previously.  No renal mass or calculi.  Vascular flow   is weakly demonstrated within the left kidney.    Urinary bladder: Evaluation of the urinary bladder is limited due to   presence of a Laws catheter.    IMPRESSION:  1.  Multiple right renal cysts, measuring up to 0.9 cm, similar to   previous.  2.  Mild to moderate right hydronephrosis, previously reported as   fullness.  3.  Mild to moderate left hydronephrosis, reported to be mild previously.  4.  Urinary bladder not evaluated due to presence of a Laws catheter   appear        --- End of Report ---            MARY RODRIGUEZ MD; Attending Radiologist  This document has been electronically signed. Nov 29 2024  7:31PM

## 2024-12-02 NOTE — PROGRESS NOTE ADULT - SUBJECTIVE AND OBJECTIVE BOX
Progress Note:   · Provider Specialty	Internal Medicine    · Subjective and Objective:   CLARI BURGER is a 84y male, with PMHx     Pt is currently admitted with the primary diagnosis of  Anemia    Hospital Day: 3d    OBJECTIVE  PAST MEDICAL & SURGICAL HISTORY  DM (diabetes mellitus)  MI (myocardial infarction)  History of CAD (coronary artery disease)  Dyslipidemia  Currently smokes tobacco  Mesothelioma, malignant  Coronary stent patent                                            -----------------------------------------------------------  ALLERGIES:  penicillin (Unknown)                                          ------------------------------------------------------------    HOME MEDICATIONS  Home Medications:  atorvastatin 20 mg oral tablet: 1 tab(s) orally once a day (at bedtime) (26 Nov 2024 15:34)  ferrous sulfate 325 mg (65 mg elemental iron) oral tablet: 1 tab(s) orally once a day (26 Nov 2024 15:35)  gabapentin 300 mg oral tablet: 1 tab(s) orally 3 times a day (26 Nov 2024 15:35)  Lovenox 30 mg/0.3 mL injectable solution: 30 milligram(s) subcutaneously once a day (26 Nov 2024 15:35)  melatonin 3 mg oral tablet: 1 tab(s) orally once a day (at bedtime) (26 Nov 2024 15:35)  metFORMIN 500 mg oral tablet: 1 tab(s) orally 2 times a day (26 Nov 2024 15:35)  omeprazole 20 mg oral delayed release tablet: 1 tab(s) orally once a day (26 Nov 2024 15:34)  polyethylene glycol 3350 oral powder for reconstitution: 17 gram(s) orally once a day (26 Nov 2024 15:35)  senna leaf extract oral tablet: 2 tab(s) orally once a day (at bedtime) (26 Nov 2024 15:35)  tamsulosin 0.4 mg oral capsule: 1 cap(s) orally once a day (at bedtime) (26 Nov 2024 15:35)       MEDICATIONS:  STANDING MEDICATIONS  aspirin  chewable 81 milliGRAM(s) Oral daily  atorvastatin 20 milliGRAM(s) Oral at bedtime  chlorhexidine 2% Cloths 1 Application(s) Topical <User Schedule>  dextrose 5%. 1000 milliLiter(s) IV Continuous <Continuous>  dextrose 5%. 1000 milliLiter(s) IV Continuous <Continuous>  dextrose 50% Injectable 25 Gram(s) IV Push once  dextrose 50% Injectable 12.5 Gram(s) IV Push once  dextrose 50% Injectable 25 Gram(s) IV Push once  ferrous    sulfate 325 milliGRAM(s) Oral daily  gabapentin 300 milliGRAM(s) Oral three times a day  glucagon  Injectable 1 milliGRAM(s) IntraMuscular once  insulin lispro (ADMELOG) corrective regimen sliding scale   SubCutaneous three times a day before meals  melatonin 3 milliGRAM(s) Oral at bedtime  pantoprazole    Tablet 40 milliGRAM(s) Oral every 12 hours  polyethylene glycol 3350 17 Gram(s) Oral daily  senna 2 Tablet(s) Oral at bedtime  sodium chloride 0.9%. 1000 milliLiter(s) IV Continuous <Continuous>  tamsulosin 0.4 milliGRAM(s) Oral at bedtime    PRN MEDICATIONS  dextrose Oral Gel 15 Gram(s) Oral once PRN                                            ------------------------------------------------------------  VITAL SIGNS: Last 24 Hours  T(C): 36.6 (29 Nov 2024 04:59), Max: 36.7 (28 Nov 2024 20:25)  T(F): 97.9 (29 Nov 2024 04:59), Max: 98.1 (28 Nov 2024 20:25)  HR: 89 (29 Nov 2024 04:59) (74 - 111)  BP: 111/67 (29 Nov 2024 04:59) (111/67 - 164/77)  BP(mean): --  RR: 18 (29 Nov 2024 04:59) (18 - 18)  SpO2: 99% (29 Nov 2024 04:59) (94% - 99%)      11-28-24 @ 07:01  -  11-29-24 @ 07:00  --------------------------------------------------------  IN: 835 mL / OUT: 450 mL / NET: 385 mL                                             --------------------------------------------------------------  LABS:                        8.6    15.45 )-----------( 380      ( 29 Nov 2024 07:30 )             27.4     11-29    142  |  108  |  40[H]  ----------------------------<  162[H]  5.7[H]   |  20  |  1.6[H]    Ca    9.5      29 Nov 2024 07:30  Mg     2.3     11-29    TPro  5.7[L]  /  Alb  3.2[L]  /  TBili  <0.2  /  DBili  x   /  AST  7   /  ALT  <5  /  AlkPhos  104  11-28      Urinalysis Basic - ( 29 Nov 2024 07:30 )    Color: x / Appearance: x / SG: x / pH: x  Gluc: 162 mg/dL / Ketone: x  / Bili: x / Urobili: x   Blood: x / Protein: x / Nitrite: x   Leuk Esterase: x / RBC: x / WBC x   Sq Epi: x / Non Sq Epi: x / Bacteria: x      PHYSICAL EXAM:  GENERAL: AAOx3, no acute distress.  HEAD:  Atraumatic, Normocephalic  EYES: conjunctiva and sclera clear  NECK: Supple, no JVD or thyromegaly  CHEST/LUNG: Clear to auscultation bilaterally; , drain in place   HEART: Regular rate and rhythm; normal S1, S2, No murmurs.  ABDOMEN: Soft, nontender, nondistended; Bowel sounds present Progress Note:   · Subjective and Objective:     CLARI BURGER is a 84y male, with PMHx intermittent GID x several months, Lerner's esophagus, GERD, BPH, and depression, arrived at the ED on 11/29 due to SOB upon exertion with lightheadedness x 4 days. Pt denied SOB at rest at the ED and visited urgent care prior to ED visit where pt was Dx'd with URI and was told to come into the ED to r/o PE. Pt was AOx3 in the ED with VS showing 145/56 BP, 76 HR, 16 RR, 98.6F, and 100% pulse ox. ED sig values values included 4.3 Hb, 91 PLT, 4.5K WBC, 27/16 BUN/Cr, and 412 D-dimer. Pt was given 2 units pRBC and then admitted to medicine due to an episode of black stool, likely 2/2 upper GID.    Pt is currently admitted with the primary diagnosis of melena, likely 2/2 to upper GID.    Hospital Day: 4d. Pt was seen and evaluated bedside. Pt appears clinically stable with stable VS as of 5AM today. Pt had 3 bloody BM last night at the bathroom. Pt did not take complete prep and thus colonoscopy is planned for tomorrow. Pt was told of updated plan and that pt will be on clear liquids until midnight, from when he will be NPO. Pt has concerns for becoming incontinent on the bed and thus requested a commode to have bedside. Pt denies any CP, SOB, Ab pain, N/V/D, and leg pain.    OBJECTIVE  PAST MEDICAL & SURGICAL HISTORY  DM (diabetes mellitus)  MI (myocardial infarction)  History of CAD (coronary artery disease)  Dyslipidemia  Currently smokes tobacco  Mesothelioma, malignant  Coronary stent patent                                            -----------------------------------------------------------  ALLERGIES:  penicillin (Unknown)                                          ------------------------------------------------------------    HOME MEDICATIONS  Home Medications:  atorvastatin 20 mg oral tablet: 1 tab(s) orally once a day (at bedtime) (26 Nov 2024 15:34)  ferrous sulfate 325 mg (65 mg elemental iron) oral tablet: 1 tab(s) orally once a day (26 Nov 2024 15:35)  gabapentin 300 mg oral tablet: 1 tab(s) orally 3 times a day (26 Nov 2024 15:35)  Lovenox 30 mg/0.3 mL injectable solution: 30 milligram(s) subcutaneously once a day (26 Nov 2024 15:35)  melatonin 3 mg oral tablet: 1 tab(s) orally once a day (at bedtime) (26 Nov 2024 15:35)  metFORMIN 500 mg oral tablet: 1 tab(s) orally 2 times a day (26 Nov 2024 15:35)  omeprazole 20 mg oral delayed release tablet: 1 tab(s) orally once a day (26 Nov 2024 15:34)  polyethylene glycol 3350 oral powder for reconstitution: 17 gram(s) orally once a day (26 Nov 2024 15:35)  senna leaf extract oral tablet: 2 tab(s) orally once a day (at bedtime) (26 Nov 2024 15:35)  tamsulosin 0.4 mg oral capsule: 1 cap(s) orally once a day (at bedtime) (26 Nov 2024 15:35)       MEDICATIONS:  STANDING MEDICATIONS  aspirin  chewable 81 milliGRAM(s) Oral daily  atorvastatin 20 milliGRAM(s) Oral at bedtime  chlorhexidine 2% Cloths 1 Application(s) Topical <User Schedule>  dextrose 5%. 1000 milliLiter(s) IV Continuous <Continuous>  dextrose 5%. 1000 milliLiter(s) IV Continuous <Continuous>  dextrose 50% Injectable 25 Gram(s) IV Push once  dextrose 50% Injectable 12.5 Gram(s) IV Push once  dextrose 50% Injectable 25 Gram(s) IV Push once  ferrous    sulfate 325 milliGRAM(s) Oral daily  gabapentin 300 milliGRAM(s) Oral three times a day  glucagon  Injectable 1 milliGRAM(s) IntraMuscular once  insulin lispro (ADMELOG) corrective regimen sliding scale   SubCutaneous three times a day before meals  melatonin 3 milliGRAM(s) Oral at bedtime  pantoprazole    Tablet 40 milliGRAM(s) Oral every 12 hours  polyethylene glycol 3350 17 Gram(s) Oral daily  senna 2 Tablet(s) Oral at bedtime  sodium chloride 0.9%. 1000 milliLiter(s) IV Continuous <Continuous>  tamsulosin 0.4 milliGRAM(s) Oral at bedtime    PRN MEDICATIONS  dextrose Oral Gel 15 Gram(s) Oral once PRN                                            ------------------------------------------------------------  VITAL SIGNS: Last 24 Hours  12/02/24, 5:00 AM    T(F): 97.9  HR: 74  BP: 138/63  RR: 20  SpO2: 100%     12-02-24 @ 19:00   IN: 0 mL / OUT: 700 mL / NET: -700 mL    11-28-24 @ 07:01  -  11-29-24 @ 07:00  IN: 835 mL / OUT: 450 mL / NET: 385 mL                                             --------------------------------------------------------------  LABS:  12/02/24, 6:41 AM                 8.1[L], stable    7.92 )-----------( 238             26.0[L], stable     12/02/24, 6:41 AM     142  |  113 [H], stable |  59[H]  ----------------------------<  162[H]  4.3   |  |  1.6[H]    Ca    8.5        Mg     2.0      11/28/24  TPro  5.7[L]  /  Alb  3.2[L]  /  TBili  <0.2  /  DBili  x   /  AST  7   /  ALT  <5  /  AlkPhos  104       PHYSICAL EXAM:  GENERAL: AAOx3, no acute distress; not-ill appearing  HEAD:  Atraumatic, Normocephalic  CHEST/LUNG: Clear to auscultation bilaterally; , drain in place   HEART: Regular rate and rhythm; normal S1, S2, No murmurs.  ABDOMEN: Soft, nontender, nondistended; Bowel sounds present Progress Note:   · Subjective and Objective:     CLARI BURGER is a 84y male, with PMHx AVMs, CAD s/p stents x 2 (2007), asbestosis of lung, malignant mesothelioma, s/p VATS, HLD, DM, and JASMIN, arrived at the ED on 11/26 due to abnormal Hb (6.4). Pt did not have active bloody stools bedside and denied CP, Ab pain, N/V/D, dizziness, and UTI sxs. Prior EGD led to bleeding on contact but hemostasis was achieved. In the ED, Hb was 7.1, and CHINYERE positive for black stools in ED and (+) Ab screening. pRBC transfusion was deferred and pt was admitted to obtain further w/u, including iron studies to determine if iron supplementation will be needed and wad Dx'd with melena and anemia.    Pt is currently admitted with the primary diagnosis of anemia and melena, likely 2/2 to upper GID.    Hospital Day: 7d. Pt was seen and evaluated bedside. Pt appears clinically stable with stable VS as of 5AM today. Pt was gurgling during normal respirations, and pt was not having acute respiratory distress. Pt had no acute overnight or major events. Walden catheter was changed due to purulent material. Heme/onc saw the pt and recommended trending H/H, CBC, and to c/w PPI x 2 weeks BID. GI was consulted to have the pt on a clear liquid diet with possible EGD today. If Pt's SUTAb was held by pt's GI and Golytely was not taken, as per nurse.     OBJECTIVE  PAST MEDICAL & SURGICAL HISTORY  DM (diabetes mellitus)  MI (myocardial infarction)  History of CAD (coronary artery disease)  Dyslipidemia  Currently smokes tobacco  Mesothelioma, malignant  Coronary stent patent                                            -----------------------------------------------------------  ALLERGIES:  penicillin (Unknown)                                          ------------------------------------------------------------    HOME MEDICATIONS  Home Medications:  atorvastatin 20 mg oral tablet: 1 tab(s) orally once a day (at bedtime) (26 Nov 2024 15:34)  ferrous sulfate 325 mg (65 mg elemental iron) oral tablet: 1 tab(s) orally once a day (26 Nov 2024 15:35)  gabapentin 300 mg oral tablet: 1 tab(s) orally 3 times a day (26 Nov 2024 15:35)  Lovenox 30 mg/0.3 mL injectable solution: 30 milligram(s) subcutaneously once a day (26 Nov 2024 15:35)  melatonin 3 mg oral tablet: 1 tab(s) orally once a day (at bedtime) (26 Nov 2024 15:35)  metFORMIN 500 mg oral tablet: 1 tab(s) orally 2 times a day (26 Nov 2024 15:35)  omeprazole 20 mg oral delayed release tablet: 1 tab(s) orally once a day (26 Nov 2024 15:34)  polyethylene glycol 3350 oral powder for reconstitution: 17 gram(s) orally once a day (26 Nov 2024 15:35)  senna leaf extract oral tablet: 2 tab(s) orally once a day (at bedtime) (26 Nov 2024 15:35)  tamsulosin 0.4 mg oral capsule: 1 cap(s) orally once a day (at bedtime) (26 Nov 2024 15:35)       MEDICATIONS:  STANDING MEDICATIONS  aspirin  chewable 81 milliGRAM(s) Oral daily  atorvastatin 20 milliGRAM(s) Oral at bedtime  chlorhexidine 2% Cloths 1 Application(s) Topical <User Schedule>  dextrose 5%. 1000 milliLiter(s) IV Continuous <Continuous>  dextrose 5%. 1000 milliLiter(s) IV Continuous <Continuous>  dextrose 50% Injectable 25 Gram(s) IV Push once  dextrose 50% Injectable 12.5 Gram(s) IV Push once  dextrose 50% Injectable 25 Gram(s) IV Push once  ferrous    sulfate 325 milliGRAM(s) Oral daily  gabapentin 300 milliGRAM(s) Oral three times a day  glucagon  Injectable 1 milliGRAM(s) IntraMuscular once  insulin lispro (ADMELOG) corrective regimen sliding scale   SubCutaneous three times a day before meals  melatonin 3 milliGRAM(s) Oral at bedtime  pantoprazole    Tablet 40 milliGRAM(s) Oral every 12 hours  polyethylene glycol 3350 17 Gram(s) Oral daily  senna 2 Tablet(s) Oral at bedtime  sodium chloride 0.9%. 1000 milliLiter(s) IV Continuous <Continuous>  tamsulosin 0.4 milliGRAM(s) Oral at bedtime    PRN MEDICATIONS  dextrose Oral Gel 15 Gram(s) Oral once PRN                                            ------------------------------------------------------------  VITAL SIGNS: Last 24 Hours  12/02/24, 5:00 AM    T(F): 97.9  HR: 74  BP: 138/63  RR: 20  SpO2: 100%       PHYSICAL EXAM:  GENERAL: AAOx3, no acute distress; not-ill appearing; +pt producing gurgling sounds during respirations with mouth open  HEAD:  Atraumatic, Normocephalic  CHEST/LUNG: +crackles of LUB and LLB of lungs, +PleurX drain in place, no signs of infection, drainage, ecchymosis, or pus collection; no wheezing  HEART: Regular rate and rhythm; normal S1, S2, No murmurs; no friction rubs  ABDOMEN: Soft, nontender, nondistended; Bowel sounds present  EXTREMITIES: +PICC line intact on R forearm, no signs of infection, drainage, ecchymosis, or pus collection; no pitting edema of LE, B/L                                           --------------------------------------------------------------  LABS:  12/02/24, 6:41 AM                 8.1[L], stable    7.92 )-----------( 238             26.0[L], stable     12/02/24, 6:41 AM     142  |  113 [H], stable |  59[H]  ----------------------------<  162[H]  4.3   |  |  1.6[H]    Ca    8.5        Mg     2.0      11/28/24  TPro  5.7[L]  /  Alb  3.2[L]  /  TBili  <0.2  /  DBili  x   /  AST  7   /  ALT  <5  /  AlkPhos  104       12-02-24 @ 19:00   IN: 0 mL / OUT: 700 mL / NET: -700 mL    11-28-24 @ 07:01  -  11-29-24 @ 07:00  IN: 835 mL / OUT: 450 mL / NET: 385 mL

## 2024-12-02 NOTE — CONSULT NOTE ADULT - ASSESSMENT
84 year old man w PMHx of CKD Stage II ,HLD, DM, CAD, malignant mesothelioma ~8/2024 and s/p VATS pleurodesis, R sided PleurX, h/o AVM/GIB, JASMIN, referred from NH for anemia to 6.5, in ED found to have Hb 7.1, CHINYERE +ve for melena. Patient was admitted to medicine for concern for an upper GI bleed on 11/26/2024. Nephrology consulted for an JEY. Baseline Cr. of 0.9, patient is above their baseline now at 1.8, creatine trending upwards. No episodes of hypotension with GI Bleed; however, patient mildly hypotensive on exam today. Febrile to 101 yesterday. Patient appears hypovolemic on exam. FeNA 8%.    #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 likely intrinsic 2/2 UTI with urinary retention  #Concern for UTI  #Hypovolemia  #DM Type II  #Normocytic, normochromic anemia likely 2/2 GI Bleed     - Recommend fluid resuscitation with LR at 100cc/hour   - CMP tonight, then daily, trend creatine   - Follow-up Urine cultures  - Repeat UA, Send Urine protein, and Urine protein/ creatinine ratio  - Send A1C, Multiple fingersticks in the 300 range. Continue to hold home Metformin. Recommend tighter glycemic control by adding long acting insulin  - c/w venofer 200 mg qdaily x 2 doses per hematology  - Adjust dosing of renally cleared meds   - Avoid nephrotoxic medications, except when benefits outweigh risk  - Strict I/O  - Daily weights  - No indication for urgent hemodialysis       Thank you for the pleasure of this consultation. Nephrology will continue to follow.  84 year old man w PMHx of CKD Stage II ,HLD, DM, CAD, malignant mesothelioma ~8/2024 and s/p VATS pleurodesis, R sided PleurX, h/o AVM/GIB, JASMIN, referred from NH for anemia to 6.5, in ED found to have Hb 7.1, CHINYERE +ve for melena. Patient was admitted to medicine for concern for an upper GI bleed on 11/26/2024. Nephrology consulted for an JEY. Baseline Cr. of 0.9, patient is above their baseline now at 1.8, creatine trending upwards. No episodes of hypotension with GI Bleed; however, patient mildly hypotensive on exam today. Febrile to 101 yesterday. Patient appears hypovolemic on exam. FENa 8%.    #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 likely intrinsic 2/2 UTI with urinary retention  #Concern for UTI  #Hypovolemia  #DM Type II  #Normocytic, normochromic anemia likely 2/2 GI Bleed     - Recommend fluid resuscitation with LR at 100cc/hour   - CMP tonight, then daily, trend creatine   - Follow-up Urine cultures  - Repeat UA, Send Urine protein, and Urine protein/ creatinine ratio  - Send A1C, Multiple fingersticks in the 300 range. Continue to hold home Metformin. Recommend tighter glycemic control by adding long acting insulin  - c/w venofer 200 mg qdaily x 2 doses per hematology  - Adjust dosing of renally cleared meds   - Avoid nephrotoxic medications, except when benefits outweigh risk  - Strict I/O  - Daily weights  - No indication for urgent hemodialysis       Thank you for the pleasure of this consultation. Nephrology will continue to follow.

## 2024-12-02 NOTE — PROGRESS NOTE ADULT - ASSESSMENT
85 y/o man w PMHx of HLD, DM, CAD s/p stents x2 on plavix, malignant mesothelioma 8/2024 and s/p VATS pleurodesis, R sided PleurX, duodenal AVM, JASMIN, referred from NH for anemia to 6.5, in ED found to have Hb 7.1, Baseline 7-8. The ED reported CHINYERE positive for melena. Gi consulted for suspected GI bleed.     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 s/p APC  Was stable and discharged, has denied any recurrent melena or blood per rectum  CHINYERE: dark, brown stool on smear   Hb 7.1; baseline 7-8  No overt GI bleed and hemodynamically stable  On iron tabs at home    11/27: HB drop to 7.1->5.5->8.2 (s/p 1 unit PRBC). Patient remains HD stable, not tachy. No overt bleeding overnight. No Bm overnight. CHINYERE brown. Abdominal exam soft and benign.     11/29: Patient had drop in hemoglobin 8->7 s/p 1 unit PRBC. Per medical team and bedside RN there has been no evidence of overt GI bleed. Has been passing brown bowel movements.   CHINYERE repeated by GI team this AM shows dark stool, brown on smear   Patient remains HD stable, soft abdomen. Tolerating regular diet    Plan  After long discussion with Son and HCP John, plan for EGD/colonoscopy   Patient was not given prep yesterday  Plan for EGD/Colonoscopy tomorrow   Clear liquid diet day Today, NPO after midnight  For prep please give Sutabs (brought in by patient;s son as non forumlary) evening before procedure as provided instructions  Please obtain preop labs (coags, CBC, CMP, mag) night before procedure   Patient's family requesting hematology evaluation, patient follows with Dr. Epperson who was planning on iron infusions  Maintain active Type and screen, monitor for signs of active bleed or significant drop in Hb  Trend H/H, Transfuse >7  Cw PPi q 24h  Can follow up with private GI as an outpatient   If has signs of overt GI bleed or significant drop in Hb please alert GI     #Small hypodensity in segment 7 of the liver seen on prior CT.  -Consider RUQ US as OP  -f/u w/ primary GI     85 y/o man w PMHx of HLD, DM, CAD s/p stents x2 on plavix, malignant mesothelioma 8/2024 and s/p VATS pleurodesis, R sided PleurX, duodenal AVM, JASMIN, referred from NH for anemia to 6.5, in ED found to have Hb 7.1, Baseline 7-8. The ED reported CHINYERE positive for melena. Gi consulted for suspected GI bleed.     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 s/p APC  Was stable and discharged, has denied any recurrent melena or blood per rectum  CHINYERE: dark, brown stool on smear   Hb 7.1; baseline 7-8  No overt GI bleed and hemodynamically stable  On iron tabs at home    11/27: HB drop to 7.1->5.5->8.2 (s/p 1 unit PRBC). Patient remains HD stable, not tachy. No overt bleeding overnight. No Bm overnight. CHINYERE brown. Abdominal exam soft and benign.     11/29: Patient had drop in hemoglobin 8->7 s/p 1 unit PRBC. Per medical team and bedside RN there has been no evidence of overt GI bleed. Has been passing brown bowel movements.   CHINYERE repeated by GI team this AM shows dark stool, brown on smear   Patient remains HD stable, soft abdomen. Tolerating regular diet    Plan  After long discussion with Son and HCP John, plan for EGD/colonoscopy   Patient was not given prep yesterday  Plan for EGD/Colonoscopy tomorrow   Clear liquid diet day Today, NPO after midnight  For prep please give Sutabs (brought in by patient;s son as non forumlary) evening before procedure as provided instructions  Please obtain preop labs (coags, CBC, CMP, mag) night before procedure   Patient's family requesting hematology evaluation, patient follows with Dr. Epperson who was planning on iron infusions  Maintain active Type and screen, monitor for signs of active bleed or significant drop in Hb  Trend H/H, Transfuse >7  Cw PPi q 24h  Can follow up with private GI as an outpatient   If has signs of overt GI bleed or significant drop in Hb please alert GI     #Small hypodensity in segment 7 of the liver seen on prior CT.  -Please obtain RUQ US  -f/u w/ primary GI     85 y/o man w PMHx of HLD, DM, CAD s/p stents x2 on plavix, malignant mesothelioma 8/2024 and s/p VATS pleurodesis, R sided PleurX, duodenal AVM, JASMIN, referred from NH for anemia to 6.5, in ED found to have Hb 7.1, Baseline 7-8. The ED reported CHINYERE positive for melena. Gi consulted for suspected GI bleed.     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 s/p APC  Was stable and discharged, has denied any recurrent melena or blood per rectum  CHINYERE: dark, brown stool on smear   Hb 7.1; baseline 7-8  No overt GI bleed and hemodynamically stable  On iron tabs at home    11/27: HB drop to 7.1->5.5->8.2 (s/p 1 unit PRBC). Patient remains HD stable, not tachy. No overt bleeding overnight. No Bm overnight. CHINYERE brown. Abdominal exam soft and benign.     11/29: Patient had drop in hemoglobin 8->7 s/p 1 unit PRBC. Per medical team and bedside RN there has been no evidence of overt GI bleed. Has been passing brown bowel movements.   CHINYERE repeated by GI team this AM shows dark stool, brown on smear   Patient remains HD stable, soft abdomen. Tolerating regular diet    Plan  After long discussion with Son and HCP John, plan for EGD/colonoscopy   Patient found to have ESBL Proteus bacteremia, suspect  source ID following   Will hold on EGD/Colonoscopy for now in light of bacteremia   PLan for Prep with Sutab when ready  Patient's family requesting hematology evaluation, patient follows with Dr. Epperson who was planning on iron infusions  Maintain active Type and screen, monitor for signs of active bleed or significant drop in Hb  Trend H/H, Transfuse >7  Cw PPi q 24h  Can follow up with private GI as an outpatient   If has signs of overt GI bleed or significant drop in Hb please alert GI     #Small hypodensity in segment 7 of the liver seen on prior CT.  -Please obtain RUQ US  -f/u w/ primary GI

## 2024-12-03 NOTE — PROGRESS NOTE ADULT - SUBJECTIVE AND OBJECTIVE BOX
JENNYFERCLARI  84y, Male  Allergy: penicillin (Unknown)      LOS  7d    CHIEF COMPLAINT: Melena (03 Dec 2024 10:28)      INTERVAL EVENTS/HPI  - No acute events overnight  - T(F): , Max: 97.4 (12-03-24 @ 11:51)  - Denies any worsening symptoms  - Tolerating medication  - WBC Count: 5.65 (12-03-24 @ 06:51)  WBC Count: 7.92 (12-02-24 @ 06:41)     - Creatinine: 1.4 (12-03-24 @ 06:51)  Creatinine: 1.6 (12-02-24 @ 06:41)       ROS  General: Denies rigors, nightsweats  HEENT: Denies headache, rhinorrhea, sore throat, eye pain  CV: Denies CP, palpitations  PULM: Denies wheezing, hemoptysis  GI: Denies hematemesis, hematochezia, melena  : Denies discharge, hematuria  MSK: Denies arthralgias, myalgias  SKIN: Denies rash, lesions  NEURO: Denies paresthesias, weakness  PSYCH: Denies depression, anxiety    VITALS:  T(F): 97.4, Max: 97.4 (12-03-24 @ 11:51)  HR: 80  BP: 131/82  RR: 18Vital Signs Last 24 Hrs  T(C): 36.3 (03 Dec 2024 11:51), Max: 36.3 (02 Dec 2024 20:46)  T(F): 97.4 (03 Dec 2024 11:51), Max: 97.4 (03 Dec 2024 11:51)  HR: 80 (03 Dec 2024 11:51) (77 - 96)  BP: 131/82 (03 Dec 2024 11:51) (109/50 - 131/82)  BP(mean): --  RR: 18 (03 Dec 2024 11:51) (18 - 18)  SpO2: 95% (02 Dec 2024 20:46) (95% - 95%)    Parameters below as of 02 Dec 2024 20:46  Patient On (Oxygen Delivery Method): room air        PHYSICAL EXAM:  Gen: NAD, resting in bed  HEENT: Normocephalic, atraumatic  Neck: supple, no lymphadenopathy  CV: Regular rate & regular rhythm  Lungs: decreased BS at bases, no fremitus  Abdomen: Soft, BS present  Ext: Warm, well perfused  laws   Neuro: non focal, awake  Skin: no rash, no erythema  Lines: no phlebitis    FH: Non-contributory  Social Hx: Non-contributory    TESTS & MEASUREMENTS:                        7.7    5.65  )-----------( 224      ( 03 Dec 2024 06:51 )             25.0     12-03    145  |  109  |  51[H]  ----------------------------<  125[H]  4.1   |  19  |  1.4    Ca    8.7      03 Dec 2024 06:51  Mg     2.1     12-03    TPro  5.0[L]  /  Alb  2.4[L]  /  TBili  0.2  /  DBili  x   /  AST  65[H]  /  ALT  43[H]  /  AlkPhos  145[H]  12-03      LIVER FUNCTIONS - ( 03 Dec 2024 06:51 )  Alb: 2.4 g/dL / Pro: 5.0 g/dL / ALK PHOS: 145 U/L / ALT: 43 U/L / AST: 65 U/L / GGT: x           Urinalysis Basic - ( 03 Dec 2024 06:51 )    Color: x / Appearance: x / SG: x / pH: x  Gluc: 125 mg/dL / Ketone: x  / Bili: x / Urobili: x   Blood: x / Protein: x / Nitrite: x   Leuk Esterase: x / RBC: x / WBC x   Sq Epi: x / Non Sq Epi: x / Bacteria: x        Culture - Blood (collected 12-01-24 @ 16:00)  Source: .Blood BLOOD  Gram Stain (12-02-24 @ 11:08):    Growth in aerobic and anaerobic bottles: Gram Negative Rods  Preliminary Report (12-02-24 @ 11:08):    Growth in aerobic and anaerobic bottles: Gram Negative Rods    Direct identification is available within approximately 3-5    hours either by Blood Panel Multiplexed PCR or Direct    MALDI-TOF. Details: [email or web address]  Organism: Blood Culture PCR (12-02-24 @ 12:43)  Organism: Blood Culture PCR (12-02-24 @ 12:43)      Method Type: PCR      -  Proteus species: Detec      -  ESBL: Detec      -  CTX-M Resistance Marker: Detec    Culture - Blood (collected 12-01-24 @ 16:00)  Source: .Blood BLOOD  Gram Stain (12-02-24 @ 11:40):    Growth in aerobic bottle: Gram Negative Rods    Growth in anaerobic bottle: Gram Negative Rods  Preliminary Report (12-02-24 @ 11:40):    Growth in aerobic bottle: Gram Negative Rods    Growth in anaerobic bottle: Gram Negative Rods    Culture - Urine (collected 12-01-24 @ 15:13)  Source: Catheterized Catheterized  Preliminary Report (12-02-24 @ 23:08):    >100,000 CFU/ml Proteus mirabilis        Lactate, Blood: 2.3 mmol/L (12-01-24 @ 16:00)      INFECTIOUS DISEASES TESTING  Procalcitonin: 1.59 (12-01-24 @ 16:00)      INFLAMMATORY MARKERS      RADIOLOGY & ADDITIONAL TESTS:  I have personally reviewed the last available Chest xray  CXR  Xray Chest 1 View AP/PA:   ACC: [id number] EXAM:  XR CHEST 1 VIEW   ORDERED BY: DAVID DING     PROCEDURE DATE:  12/01/2024          INTERPRETATION:  CLINICAL HISTORY: Fever    COMPARISON: October 28, 2024    TECHNIQUE: Frontal.    FINDINGS:    Support devices: Right basilar chest tube.    Cardiac/mediastinum/hilum: Heart size within normal limits, thoracic   aortic calcification..    Lung parenchyma/Pleura: Right apical opacity. Right basilar   opacity/pleural effusion.    Skeleton/soft tissues: Stable.      IMPRESSION:    Right basilar opacity/pleural effusion, increased. Redemonstration right   apical opacity.    --- End of Report ---            NEISHA PAULA MD; Attending Radiologist  This document has been electronically signed. Dec  2 2024  5:35AM (12-01-24 @ 15:25)      CT      CARDIOLOGY TESTING  12 Lead ECG:   Ventricular Rate 92 BPM    Atrial Rate 92 BPM    P-R Interval 132 ms    QRS Duration 100 ms    Q-T Interval 352 ms    QTC Calculation(Bazett) 435 ms    P Axis 51 degrees    R Axis 101 degrees    T Axis -80 degrees    Diagnosis Line Sinus rhythm with Premature atrial complexes  Rightward axis  ST & T wave abnormality, consider inferolateral ischemia  Abnormal ECG    Confirmed by Cesar Hong ([id number]) on 11/26/2024 5:29:19 PM (11-26-24 @ 10:51)      MEDICATIONS  aspirin  chewable 81 Oral daily  atorvastatin 20 Oral at bedtime  chlorhexidine 2% Cloths 1 Topical <User Schedule>  dextrose 5%. 1000 IV Continuous <Continuous>  dextrose 5%. 1000 IV Continuous <Continuous>  dextrose 50% Injectable 25 IV Push once  dextrose 50% Injectable 12.5 IV Push once  dextrose 50% Injectable 25 IV Push once  ferrous    sulfate 325 Oral daily  gabapentin 300 Oral three times a day  glucagon  Injectable 1 IntraMuscular once  insulin lispro (ADMELOG) corrective regimen sliding scale  SubCutaneous three times a day before meals  melatonin 3 Oral at bedtime  meropenem  IVPB 1000 IV Intermittent every 12 hours  meropenem  IVPB     pantoprazole    Tablet 40 Oral every 12 hours  polyethylene glycol 3350 17 Oral daily  senna 2 Oral at bedtime  sodium bicarbonate 650 Oral three times a day  sodium zirconium cyclosilicate 10 Oral once  sutab 1 Dose(s) 1 Oral once  tamsulosin 0.4 Oral at bedtime      WEIGHT  Weight (kg): 56.9 (11-26-24 @ 23:16)  Creatinine: 1.4 mg/dL (12-03-24 @ 06:51)      ANTIBIOTICS:  meropenem  IVPB 1000 milliGRAM(s) IV Intermittent every 12 hours  meropenem  IVPB          All available historical records have been reviewed

## 2024-12-03 NOTE — PROGRESS NOTE ADULT - ASSESSMENT
Assessment and Plan:   · Assessment	  Pt is a 84y male, with PMHx AVMs, CAD s/p stents x 2 (2007), asbestosis of lung, malignant mesothelioma, s/p VATS, HLD, DM, and JASMIN, arrived at the ED on 11/26 due to abnormal Hb (6.4). Pt did not have active bloody stools bedside and denied CP, Ab pain, N/V/D, dizziness, and UTI sxs. Prior EGD led to bleeding on contact but hemostasis was achieved. In the ED, Hb was 7.1, and CHINYERE positive for black stools in ED and (+) Ab screening. pRBC transfusion was deferred and pt was admitted to obtain further w/u, including iron studies to determine if iron supplementation will be needed and wad Dx'd with melena and anemia. Pt is currently admitted with the primary diagnosis of anemia and melena, likely 2/2 to upper GID.    # Possible UGI bleed  # Acute on chronic anemia  # Hx Angioectasia of duodenum  - Hemodynamically stable   - GI is planning to scope pt during this week; will F/U with GI  - Maintain active Type and screen, monitor for signs of active bleed or significant drop in Hb  - Trend H&H and CBC daily  - Start PPI PO BID for total  2 weeks and then q24h thereafter     - Baseline hemoglobin - 8-9   - Holding home plavix, resume ASA instead as below       #respiratory distress  #R sided pleural effusion  - CXR 12/02/24 9:33 AM: Unchanged large right-sided opacity.   - CXR 12/02/24 11:20 AM: Unchanged large opacity projecting over the right lung. Hazy opacities in the left lung are less prominent. No pneumothorax.  - PleurX drained ~700mL from R lung catheter  - RRT called to provide pt nebulizer with inhaled saline chloride treatment to help clear mucus and chest congestion    #GNR bacteremia  -ESBL Proteus  -Meropenem per ID    #JEY   #Possible UTI ?  - Creat 1.6 (baseline 0.9); c/w trending Cr  - c/w IVF  - avoid nephrotoxins  - f/u UCx    #Small hypodensity in segment 7 of the liver seen on prior CT  -Consider RUQ US as OP  - f/u w/ primary GI    # Malignant mesothelioma (diagnosed 8/2024 ), Stable  # Hx Asbestos lung   - F/u heme onc outpatient     #HLD  #CAD s/p stents x2 in 2007 (follows Dr Avila)  - Holding home plavix, per chart review , Dr Chang said needs Plavix or ASA not both and per last notes he was supposed to be on ASA, however was discharged on plavix?   - will likely resume ASA tmw AM instead of Plavix as lower risk of bleeding   - c/w statin     #DM Type 2  - Home med: Metformin 500 mg BID  - ISS to keep -180    #Misc:  - DVT prophylaxis: SCDs  - Feeds: Clear liquid diet  - Prophylaxis: protonix  - Activity: IAT  - Code status: Full Assessment and Plan:   · Assessment	  Pt is a 84y male, with PMHx AVMs, CAD s/p stents x 2 (2007), asbestosis of lung, malignant mesothelioma, s/p VATS, HLD, DM, and JASMIN, arrived at the ED on 11/26 due to abnormal Hb (6.4). Pt did not have active bloody stools bedside and denied CP, Ab pain, N/V/D, dizziness, and UTI sxs. Prior EGD led to bleeding on contact but hemostasis was achieved. In the ED, Hb was 7.1, and CHINYERE positive for black stools in ED and (+) Ab screening. pRBC transfusion was deferred and pt was admitted to obtain further w/u, including iron studies to determine if iron supplementation will be needed and wad Dx'd with melena and anemia. Pt is currently admitted with the primary diagnosis of anemia and melena, likely 2/2 to upper GID.    # Possible UGI bleed  # Acute on chronic anemia  # Hx Angioectasia of duodenum  - GI had a long discussion with Son and HCP John about the pt with regards to the plan for EGD/colonoscopy. Due to pt being newly Dx'd ESBL Proteus bacteremia with a suspected  source, the EGD/Colonoscopy will be held for now. Pt will be given bowel prep with Sutab when cleared for the procedure    - Pt is Hemodynamically stable   - Maintain active Type and screen, monitor for signs of active bleed or significant drop in Hb  - Trend H&H and CBC daily  - Start PPI PO BID for total  2 weeks and then q24h thereafter   - Baseline hemoglobin - 8-9   - Holding home plavix, resume ASA instead as below     #respiratory distress  #R sided pleural effusion  - CXR 12/02/24 9:33 AM: Unchanged large right-sided opacity.   - CXR 12/02/24 11:20 AM: Unchanged large opacity projecting over the right lung. Hazy opacities in the left lung are less prominent. No pneumothorax.  - PleurX drained ~700mL from R lung catheter  - Pt received nebulizer with inhaled saline chloride treatment to help clear mucus and chest congestion on 12/02/24    #GNR bacteremia  - Blood Cx on 12/02/24 showed:     Growth in aerobic and anaerobic bottles: Gram Negative Rods      -  Proteus species: Detec      -  ESBL: Detec      -  CTX-M Resistance Marker: Detec  - Meropenem per ID; 2nd dose given today; EOT: 12/11/24    #JEY   #Possible UTI?  - Cr improved too 1.4 with IVF  - discontinue IVF, but monitor Cr  - avoid nephrotoxins    #Small hypodensity in segment 7 of the liver seen on prior CT  - consider RUQ US, as per GI, as outpt management with outpt GI    # Malignant mesothelioma (diagnosed 8/2024 ), Stable  # Hx Asbestos lung   - F/u heme onc outpatient     #HLD  #CAD s/p stents x2 in 2007 (follows Dr Avila)  - Holding home plavix, per chart review , Dr Chang said needs Plavix or ASA not both and per last notes he was supposed to be on ASA, however was discharged on plavix?   - c/w statin     #DM Type 2  - Home med: Metformin 500 mg BID  - ISS to keep -180    #Misc:  - DVT prophylaxis: SCDs  - Feeds: DASH/TLC  - Prophylaxis: protonix  - Activity: IAT  - Code status: Full Assessment and Plan:   · Assessment	  Pt is a 84y male, with PMHx AVMs, CAD s/p stents x 2 (2007), asbestosis of lung, malignant mesothelioma, s/p VATS, HLD, DM, and JASMIN, arrived at the ED on 11/26 due to abnormal Hb (6.4). Pt did not have active bloody stools bedside and denied CP, Ab pain, N/V/D, dizziness, and UTI sxs. Prior EGD led to bleeding on contact but hemostasis was achieved. In the ED, Hb was 7.1, and CHINYERE positive for black stools in ED and (+) Ab screening. pRBC transfusion was deferred and pt was admitted to obtain further w/u, including iron studies to determine if iron supplementation will be needed and wad Dx'd with melena and anemia. Pt is currently admitted with the primary diagnosis of anemia and melena, likely 2/2 to upper GID.    # Possible UGI bleed  # Acute on chronic anemia  # Hx Angioectasia of duodenum  - GI had a long discussion with Son and HCP John about the pt with regards to the plan for EGD/colonoscopy. Due to pt being newly Dx'd ESBL Proteus bacteremia with a suspected  source, the EGD/Colonoscopy will be held for now. Pt will be given bowel prep with Sutab when cleared for the procedure    - Pt is Hemodynamically stable   - Maintain active Type and screen, monitor for signs of active bleed or significant drop in Hb  - Trend H&H and CBC daily  - Start PPI PO BID for total  2 weeks and then q24h thereafter   - Baseline hemoglobin - 8-9   - Holding home plavix, resumed ASA instead as below     #respiratory distress  #R sided pleural effusion  - CXR 12/02/24 9:33 AM: Unchanged large right-sided opacity.   - CXR 12/02/24 11:20 AM: Unchanged large opacity projecting over the right lung. Hazy opacities in the left lung are less prominent. No pneumothorax.  - PleurX drained ~700mL from R lung catheter  - Pt received nebulizer with inhaled saline chloride treatment to help clear mucus and chest congestion on 12/02/24    #Sespis with ESBL proteus bacteremia (Blood Cx 12/1 positive)  - Likely  from Walden placed prior to admission; Walden exchanged 11.29.2024.  - Blood Cx on 12/02/24 showed:     Growth in aerobic and anaerobic bottles: Gram Negative Rods      -  Proteus species: Detec      -  ESBL: Detec      -  CTX-M Resistance Marker: Detec  - Meropenem per ID; 2nd dose given today; EOT: 12/11/24    #JEY   #Possible UTI?  - Cr improved too 1.4 with IVF  - discontinue IVF, but monitor Cr  - avoid nephrotoxins    #Mild transaminitis  #Small hypodensity in segment 7 of the liver seen on prior CT  - order RUQ US    # Malignant mesothelioma (diagnosed 8/2024 ), Stable  # Hx Asbestos lung   - F/u heme onc outpatient   - PleurX needs drainage twice per Week Monday and Thursday. Please inform RN.    #HLD  #CAD s/p stents x2 in 2007 (follows Dr Avila)  - Holding home plavix, per chart review , Dr Chang said needs Plavix or ASA not both and per last notes he was supposed to be on ASA, however was discharged on plavix?   - c/w statin     #DM Type 2  - Home med: Metformin 500 mg BID  - ISS to keep -180    #Misc:  - DVT prophylaxis: SCDs  - Feeds: DASH/TLC  - Prophylaxis: protonix  - Activity: IAT  - Code status: Full

## 2024-12-03 NOTE — PROGRESS NOTE ADULT - ASSESSMENT
85 y/o man w PMHx of HLD, DM, CAD s/p stents x2 in 2007, pt of Dr Avila, h/o asbestosis of lung, dx w malignant mesothelioma ~8/2024 and s/p VATS pleurodesis, R sided PleurX, h/o AVM/GIB, JASMIN, referred from NH for anemia to 6.5, in ED found to have Hb 7.1, CHINYERE +ve for melena. GI team consulted, clear liquid diet and NPO after midnight for possible EGD tomorrow.   Patient being admitted to medicine for possible UGI. GI consulted.     # Possible UGI bleed  # Acute on chronic anemia  # Hx Angioectasia of duodenum  - Hemodynamically stable   - Baseline hemoglobin - 8-9   - Holding home plavix , resume ASA instead as below   -GI on board   -Maintain active Type and screen, monitor for signs of active bleed or significant drop in Hb  -Trend H&H   -Start PPI PO BID for total  2 weeks and then q24h thereafter   -GI was planning EGD/C-scope but was put on hold d/t bacteremia, ID today cleared for GI procedure, plan for EGD/colonoscopy 12/4, prep ordered for today, NPO at midnight   -Heme Onc consulted, rec IV venofer 200mg x2 doses, gave one dose 11/30, discontinued further doses due to sepsis    #Sepsis with Proteus ESBL Bacteremia (Blood Cx 12/1 positive)  #Suspect source is CAUTI (from catheter prior to admission; changed 11/29) - Urine Cx with Proteus ESBL  Appreciate ID recs, cont meropenem (day 2)   Clinically improving  Repeat blood cultures tomorrow AM    #JEY :   -Improving   -Nephro consulted  -trend Creat   -avoid nephrotoxins   -IVF    #Small hypodensity in segment 7 of the liver seen on prior CT.  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   - will likely resume ASA tmw AM instead of Plavix as lower risk of bleeding   - c/w statin     #DM Type 2  - Home med: Metformin 500 mg BID  - ISS to keep -180    #Miscellaneous:  - DVT prophylaxis: SCDs  - Feeds: Clears then advance to DASH/TLC  - Prophylaxis: PPI  -Activity: AAT  -Code status: Full     Pending : Treat sepsis/bacteremia; monitor H&H, GI procedures on hold for now

## 2024-12-03 NOTE — PROGRESS NOTE ADULT - SUBJECTIVE AND OBJECTIVE BOX
Patient seen and examined. Events over the last 24 hours noted.   Pt is alert and oriented today   respiratory status stable    T(F): 97.3 (12-03-24 @ 19:26), Max: 97.4 (12-03-24 @ 11:51)  HR: 70 (12-03-24 @ 19:26)  BP: 101/57 (12-03-24 @ 19:26)  RR: 18 (12-03-24 @ 19:26)  SpO2: 97% (12-03-24 @ 19:26) (97% - 97%)    PHYSICAL EXAM:  GEN: No acute distress  HEENT: normocephalic, atraumatic, anicteric  LUNGS: b/l breath sounds present, r sided pleurx catheter in place  HEART: S1/S2 present. RRR  ABD: Soft, non-tender, non-distended. Bowel sounds present  EXT: warm   NEURO: awake, no new focal deficits    LABS  12-03    145  |  109  |  51[H]  ----------------------------<  125[H]  4.1   |  19  |  1.4    Ca    8.7      03 Dec 2024 06:51  Mg     2.1     12-03    TPro  5.0[L]  /  Alb  2.4[L]  /  TBili  0.2  /  DBili  x   /  AST  65[H]  /  ALT  43[H]  /  AlkPhos  145[H]  12-03                          7.7    5.65  )-----------( 224      ( 03 Dec 2024 06:51 )             25.0            MEDICATIONS  (STANDING):  aspirin  chewable 81 milliGRAM(s) Oral daily  atorvastatin 20 milliGRAM(s) Oral at bedtime  chlorhexidine 2% Cloths 1 Application(s) Topical <User Schedule>  dextrose 5%. 1000 milliLiter(s) (100 mL/Hr) IV Continuous <Continuous>  dextrose 5%. 1000 milliLiter(s) (50 mL/Hr) IV Continuous <Continuous>  dextrose 50% Injectable 25 Gram(s) IV Push once  dextrose 50% Injectable 12.5 Gram(s) IV Push once  dextrose 50% Injectable 25 Gram(s) IV Push once  ferrous    sulfate 325 milliGRAM(s) Oral daily  gabapentin 300 milliGRAM(s) Oral three times a day  glucagon  Injectable 1 milliGRAM(s) IntraMuscular once  insulin lispro (ADMELOG) corrective regimen sliding scale   SubCutaneous three times a day before meals  melatonin 3 milliGRAM(s) Oral at bedtime  meropenem  IVPB 1000 milliGRAM(s) IV Intermittent every 12 hours  meropenem  IVPB      pantoprazole    Tablet 40 milliGRAM(s) Oral every 12 hours  polyethylene glycol 3350 17 Gram(s) Oral daily  senna 2 Tablet(s) Oral at bedtime  sodium bicarbonate 650 milliGRAM(s) Oral three times a day  sodium zirconium cyclosilicate 10 Gram(s) Oral once  sutab 1 Dose(s) 1 Dose(s) Oral once  tamsulosin 0.4 milliGRAM(s) Oral at bedtime    MEDICATIONS  (PRN):  acetaminophen     Tablet .. 975 milliGRAM(s) Oral every 8 hours PRN Mild Pain (1 - 3)  dextrose Oral Gel 15 Gram(s) Oral once PRN Blood Glucose LESS THAN 70 milliGRAM(s)/deciliter

## 2024-12-03 NOTE — PROGRESS NOTE ADULT - SUBJECTIVE AND OBJECTIVE BOX
Nephrology progress note    Patient was seen and examined, events over the last 24 h noted .    Allergies:  penicillin (Unknown)    Hospital Medications:   MEDICATIONS  (STANDING):  aspirin  chewable 81 milliGRAM(s) Oral daily  atorvastatin 20 milliGRAM(s) Oral at bedtime  chlorhexidine 2% Cloths 1 Application(s) Topical <User Schedule>  dextrose 5%. 1000 milliLiter(s) (50 mL/Hr) IV Continuous <Continuous>  dextrose 5%. 1000 milliLiter(s) (100 mL/Hr) IV Continuous <Continuous>  dextrose 50% Injectable 25 Gram(s) IV Push once  dextrose 50% Injectable 12.5 Gram(s) IV Push once  dextrose 50% Injectable 25 Gram(s) IV Push once  ferrous    sulfate 325 milliGRAM(s) Oral daily  gabapentin 300 milliGRAM(s) Oral three times a day  glucagon  Injectable 1 milliGRAM(s) IntraMuscular once  insulin lispro (ADMELOG) corrective regimen sliding scale   SubCutaneous three times a day before meals  melatonin 3 milliGRAM(s) Oral at bedtime  meropenem  IVPB 1000 milliGRAM(s) IV Intermittent every 12 hours  meropenem  IVPB      pantoprazole    Tablet 40 milliGRAM(s) Oral every 12 hours  polyethylene glycol 3350 17 Gram(s) Oral daily  senna 2 Tablet(s) Oral at bedtime  sodium bicarbonate 650 milliGRAM(s) Oral three times a day  sodium zirconium cyclosilicate 10 Gram(s) Oral once  sutab 1 Dose(s) 1 Dose(s) Oral once  tamsulosin 0.4 milliGRAM(s) Oral at bedtime        VITALS:  T(F): 97.4 (12-03-24 @ 11:51), Max: 97.4 (12-03-24 @ 11:51)  HR: 80 (12-03-24 @ 11:51)  BP: 131/82 (12-03-24 @ 11:51)  RR: 18 (12-03-24 @ 11:51)  SpO2: 95% (12-02-24 @ 20:46)  Wt(kg): --    12-01 @ 07:01  -  12-02 @ 07:00  --------------------------------------------------------  IN: 0 mL / OUT: 1250 mL / NET: -1250 mL    12-02 @ 07:01  -  12-03 @ 07:00  --------------------------------------------------------  IN: 0 mL / OUT: 2000 mL / NET: -2000 mL          PHYSICAL EXAM:  Constitutional: NAD  HEENT: anicteric sclera, oropharynx clear, MMM  Neck: No JVD  Respiratory: CTAB, no wheezes, rales or rhonchi  Cardiovascular: S1, S2, RRR  Gastrointestinal: BS+, soft, NT/ND  Extremities: No cyanosis or clubbing. No peripheral edema  :  No laws.   Skin: No rashes    LABS:  12-03    145  |  109  |  51[H]  ----------------------------<  125[H]  4.1   |  19  |  1.4    Ca    8.7      03 Dec 2024 06:51  Mg     2.1     12-03    TPro  5.0[L]  /  Alb  2.4[L]  /  TBili  0.2  /  DBili      /  AST  65[H]  /  ALT  43[H]  /  AlkPhos  145[H]  12-03                          7.7    5.65  )-----------( 224      ( 03 Dec 2024 06:51 )             25.0       Urine Studies:  Urinalysis Basic - ( 03 Dec 2024 06:51 )    Color:  / Appearance:  / SG:  / pH:   Gluc: 125 mg/dL / Ketone:   / Bili:  / Urobili:    Blood:  / Protein:  / Nitrite:    Leuk Esterase:  / RBC:  / WBC    Sq Epi:  / Non Sq Epi:  / Bacteria:       Sodium, Random Urine: 20.0 mmoL/L (12-01 @ 15:13)  Creatinine, Random Urine: <4 mg/dL (12-01 @ 15:13)  Protein/Creatinine Ratio Calculation: TNP Ratio (12-01 @ 15:13)  Osmolality, Random Urine: 474 mos/kg (12-01 @ 15:13)  Potassium, Random Urine: 3 mmol/L (12-01 @ 15:13)    RADIOLOGY & ADDITIONAL STUDIES:

## 2024-12-03 NOTE — PROGRESS NOTE ADULT - SUBJECTIVE AND OBJECTIVE BOX
Patient is a 84y old  Male who presents with a chief complaint of Melena (03 Dec 2024 16:17)       Pt is seen and examined this morning   pt is awake and lying in bed        ROS:  Negative except for:unable to obtain     MEDICATIONS  (STANDING):  aspirin  chewable 81 milliGRAM(s) Oral daily  atorvastatin 20 milliGRAM(s) Oral at bedtime  chlorhexidine 2% Cloths 1 Application(s) Topical <User Schedule>  dextrose 5%. 1000 milliLiter(s) (50 mL/Hr) IV Continuous <Continuous>  dextrose 5%. 1000 milliLiter(s) (100 mL/Hr) IV Continuous <Continuous>  dextrose 50% Injectable 25 Gram(s) IV Push once  dextrose 50% Injectable 12.5 Gram(s) IV Push once  dextrose 50% Injectable 25 Gram(s) IV Push once  ferrous    sulfate 325 milliGRAM(s) Oral daily  gabapentin 300 milliGRAM(s) Oral three times a day  glucagon  Injectable 1 milliGRAM(s) IntraMuscular once  insulin lispro (ADMELOG) corrective regimen sliding scale   SubCutaneous three times a day before meals  melatonin 3 milliGRAM(s) Oral at bedtime  meropenem  IVPB 1000 milliGRAM(s) IV Intermittent every 12 hours  meropenem  IVPB      pantoprazole    Tablet 40 milliGRAM(s) Oral every 12 hours  polyethylene glycol 3350 17 Gram(s) Oral daily  senna 2 Tablet(s) Oral at bedtime  sodium bicarbonate 650 milliGRAM(s) Oral three times a day  sodium zirconium cyclosilicate 10 Gram(s) Oral once  sutab 1 Dose(s) 1 Dose(s) Oral once  tamsulosin 0.4 milliGRAM(s) Oral at bedtime    MEDICATIONS  (PRN):  acetaminophen     Tablet .. 975 milliGRAM(s) Oral every 8 hours PRN Mild Pain (1 - 3)  dextrose Oral Gel 15 Gram(s) Oral once PRN Blood Glucose LESS THAN 70 milliGRAM(s)/deciliter      Allergies    penicillin (Unknown)    Intolerances        Vital Signs Last 24 Hrs  T(C): 36.3 (03 Dec 2024 11:51), Max: 36.3 (02 Dec 2024 20:46)  T(F): 97.4 (03 Dec 2024 11:51), Max: 97.4 (03 Dec 2024 11:51)  HR: 80 (03 Dec 2024 11:51) (77 - 96)  BP: 131/82 (03 Dec 2024 11:51) (109/50 - 131/82)  BP(mean): --  RR: 18 (03 Dec 2024 11:51) (18 - 18)  SpO2: 95% (02 Dec 2024 20:46) (95% - 95%)    Parameters below as of 02 Dec 2024 20:46  Patient On (Oxygen Delivery Method): room air        PHYSICAL EXAM  General: adult in NAD  HEENT: clear oropharynx, anicteric sclera, pink conjunctiva  Neck: supple  CV: normal S1/S2 with no murmur rubs or gallops  Lungs: positive air movement b/l ant lungs,clear to auscultation, no wheezes, no rales  Abdomen: soft non-tender non-distended, no hepatosplenomegaly  Ext: no clubbing cyanosis or edema  Skin: no rashes and no petechiae  Neuro: alert and oriented X 4, no focal deficits  LABS:                          7.7    5.65  )-----------( 224      ( 03 Dec 2024 06:51 )             25.0         Mean Cell Volume : 97.7 fL  Mean Cell Hemoglobin : 30.1 pg  Mean Cell Hemoglobin Concentration : 30.8 g/dL  Auto Neutrophil # : 4.97 K/uL  Auto Lymphocyte # : 0.26 K/uL  Auto Monocyte # : 0.37 K/uL  Auto Eosinophil # : 0.01 K/uL  Auto Basophil # : 0.00 K/uL  Auto Neutrophil % : 88.0 %  Auto Lymphocyte % : 4.6 %  Auto Monocyte % : 6.5 %  Auto Eosinophil % : 0.2 %  Auto Basophil % : 0.0 %    Serial CBC's  12-03 @ 06:51  Hct-25.0 / Hgb-7.7 / Plat-224 / RBC-2.56 / WBC-5.65          Serial CBC's  12-02 @ 06:41  Hct-26.0 / Hgb-8.1 / Plat-238 / RBC-2.71 / WBC-7.92          Serial CBC's  12-01 @ 16:00  Hct-27.9 / Hgb-8.6 / Plat-233 / RBC-2.86 / WBC-3.75          Serial CBC's  12-01 @ 08:15  Hct-21.4 / Hgb-6.7 / Plat-241 / RBC-2.22 / WBC-5.50          Serial CBC's  11-30 @ 11:04  Hct-24.2 / Hgb-7.5 / Plat-306 / RBC-2.48 / WBC-10.73            12-03    145  |  109  |  51[H]  ----------------------------<  125[H]  4.1   |  19  |  1.4    Ca    8.7      03 Dec 2024 06:51  Mg     2.1     12-03    TPro  5.0[L]  /  Alb  2.4[L]  /  TBili  0.2  /  DBili  x   /  AST  65[H]  /  ALT  43[H]  /  AlkPhos  145[H]  12-03          WBC Count: 5.65 K/uL (12-03-24 @ 06:51)  Hemoglobin: 7.7 g/dL (12-03-24 @ 06:51)  Hematocrit: 25.0 % (12-03-24 @ 06:51)  Platelet Count - Automated: 224 K/uL (12-03-24 @ 06:51)  WBC Count: 7.92 K/uL (12-02-24 @ 06:41)  Hemoglobin: 8.1 g/dL (12-02-24 @ 06:41)  Hematocrit: 26.0 % (12-02-24 @ 06:41)  Platelet Count - Automated: 238 K/uL (12-02-24 @ 06:41)  WBC Count: 3.75 K/uL (12-01-24 @ 16:00)  Hemoglobin: 8.6 g/dL (12-01-24 @ 16:00)  Hematocrit: 27.9 % (12-01-24 @ 16:00)  Platelet Count - Automated: 233 K/uL (12-01-24 @ 16:00)  WBC Count: 5.50 K/uL (12-01-24 @ 08:15)  Hemoglobin: 6.7 g/dL (12-01-24 @ 08:15)  Hematocrit: 21.4 % (12-01-24 @ 08:15)  Platelet Count - Automated: 241 K/uL (12-01-24 @ 08:15)  WBC Count: 10.73 K/uL (11-30-24 @ 11:04)  Hemoglobin: 7.5 g/dL (11-30-24 @ 11:04)  Hematocrit: 24.2 % (11-30-24 @ 11:04)  Platelet Count - Automated: 306 K/uL (11-30-24 @ 11:04)  WBC Count: 15.45 K/uL (11-29-24 @ 07:30)  Hemoglobin: 8.6 g/dL (11-29-24 @ 07:30)  Hematocrit: 27.4 % (11-29-24 @ 07:30)  Platelet Count - Automated: 380 K/uL (11-29-24 @ 07:30)  Hemoglobin: 7.4 g/dL (11-28-24 @ 23:47)  Hematocrit: 23.8 % (11-28-24 @ 23:47)  Hemoglobin: 8.5 g/dL (11-28-24 @ 17:23)  Hematocrit: 26.7 % (11-28-24 @ 17:23)  Hemoglobin: 7.0 g/dL (11-28-24 @ 11:56)  Hematocrit: 22.6 % (11-28-24 @ 11:56)  WBC Count: 7.93 K/uL (11-28-24 @ 07:00)  Hemoglobin: 7.2 g/dL (11-28-24 @ 07:00)  Hematocrit: 23.5 % (11-28-24 @ 07:00)  Platelet Count - Automated: 363 K/uL (11-28-24 @ 07:00)  WBC Count: 7.24 K/uL (11-27-24 @ 11:04)  Hemoglobin: 8.1 g/dL (11-27-24 @ 11:04)  Hematocrit: 26.2 % (11-27-24 @ 11:04)  Platelet Count - Automated: 399 K/uL (11-27-24 @ 11:04)  WBC Count: 6.75 K/uL (11-27-24 @ 07:34)  Hemoglobin: 8.2 g/dL (11-27-24 @ 07:34)  Hematocrit: 26.4 % (11-27-24 @ 07:34)  Platelet Count - Automated: 371 K/uL (11-27-24 @ 07:34)  Reticulocyte Percent: 3.2 % (11-27-24 @ 07:34)  WBC Count: 6.27 K/uL (11-26-24 @ 22:24)  Hemoglobin: 5.4 g/dL (11-26-24 @ 22:24)  Hematocrit: 17.6 % (11-26-24 @ 22:24)  Platelet Count - Automated: 372 K/uL (11-26-24 @ 22:24)  WBC Count: 8.27 K/uL (11-26-24 @ 21:28)  Hemoglobin: 6.6 g/dL (11-26-24 @ 21:28)  Hematocrit: 22.2 % (11-26-24 @ 21:28)  Platelet Count - Automated: 435 K/uL (11-26-24 @ 21:28)  WBC Count: 7.09 K/uL (11-26-24 @ 10:36)  Hemoglobin: 7.1 g/dL (11-26-24 @ 10:36)  Hematocrit: 23.7 % (11-26-24 @ 10:36)  Platelet Count - Automated: 419 K/uL (11-26-24 @ 10:36)  Iron - Total Binding Capacity.: 278 ug/dL (11-26-24 @ 10:36)  Ferritin: 74 ng/mL (11-26-24 @ 10:36)                BLOOD SMEAR INTERPRETATION:       RADIOLOGY & ADDITIONAL STUDIES:

## 2024-12-03 NOTE — PROGRESS NOTE ADULT - ASSESSMENT
83 y/o man w PMHx of HLD, DM, CAD s/p stents x2 in 2007, pt of Dr Avila, h/o asbestosis of lung, dx w malignant mesothelioma ~8/2024 and s/p VATS pleurodesis, R sided PleurX, h/o AVM/GIB, JASMIN, referred from NH for anemia to 6.5, in ED found to have Hb 7.1, CHINYERE +ve for melena.     # Possible UGI bleed  resolved  # Acute on chronic anemia  with JASMIN   Hx Angioectasia of duodenum  -GI on board   -Trend H&H   tfx to keep hb >7    -GI is planning to scope   follow cbc  daily    #JEY :   #bacteremia /sepsis ,likely urosepsis   on abx as per ID     # Malignant mesothelioma (diagnosed 8/2024 ), Stable  # Hx Asbestos lung   # AMS  likely sec to sepsis   consider MRI brain once sepsis resolves     cont other supportive care  overall prognosis is poor    will f/u

## 2024-12-03 NOTE — PROGRESS NOTE ADULT - SUBJECTIVE AND OBJECTIVE BOX
· Subjective and Objective:     CLARI BURGER is a 84y male, with PMHx AVMs, CAD s/p stents x 2 (2007), asbestosis of lung, malignant mesothelioma, s/p VATS, HLD, DM, and JASMIN, arrived at the ED on 11/26 due to abnormal Hb (6.4). Pt did not have active bloody stools bedside and denied CP, Ab pain, N/V/D, dizziness, and UTI sxs. Prior EGD led to bleeding on contact but hemostasis was achieved. In the ED, Hb was 7.1, and CHINYERE positive for black stools in ED and (+) Ab screening. pRBC transfusion was deferred and pt was admitted to obtain further w/u, including iron studies to determine if iron supplementation will be needed and wad Dx'd with melena and anemia.    Pt is currently admitted with the primary diagnosis of anemia and melena, likely 2/2 to upper GID.    Hospital Day: 7d. Pt was seen and evaluated bedside. Pt appears clinically stable with stable VS as of 5AM today. Pt was gurgling during normal respirations, and pt was not having acute respiratory distress. Pt had no acute overnight or major events. Walden catheter was changed due to purulent material. Heme/onc saw the pt and recommended trending H/H, CBC, and to c/w PPI x 2 weeks BID. GI was consulted to have the pt on a clear liquid diet with possible EGD today. If Pt's SUTAb was held by pt's GI and Golytely was not taken, as per nurse.     OBJECTIVE  PAST MEDICAL & SURGICAL HISTORY  DM (diabetes mellitus)  MI (myocardial infarction)  History of CAD (coronary artery disease)  Dyslipidemia  Currently smokes tobacco  Mesothelioma, malignant  Coronary stent patent                                            -----------------------------------------------------------  ALLERGIES:  penicillin (Unknown)                                          ------------------------------------------------------------    HOME MEDICATIONS  Home Medications:  atorvastatin 20 mg oral tablet: 1 tab(s) orally once a day (at bedtime) (26 Nov 2024 15:34)  ferrous sulfate 325 mg (65 mg elemental iron) oral tablet: 1 tab(s) orally once a day (26 Nov 2024 15:35)  gabapentin 300 mg oral tablet: 1 tab(s) orally 3 times a day (26 Nov 2024 15:35)  Lovenox 30 mg/0.3 mL injectable solution: 30 milligram(s) subcutaneously once a day (26 Nov 2024 15:35)  melatonin 3 mg oral tablet: 1 tab(s) orally once a day (at bedtime) (26 Nov 2024 15:35)  metFORMIN 500 mg oral tablet: 1 tab(s) orally 2 times a day (26 Nov 2024 15:35)  omeprazole 20 mg oral delayed release tablet: 1 tab(s) orally once a day (26 Nov 2024 15:34)  polyethylene glycol 3350 oral powder for reconstitution: 17 gram(s) orally once a day (26 Nov 2024 15:35)  senna leaf extract oral tablet: 2 tab(s) orally once a day (at bedtime) (26 Nov 2024 15:35)  tamsulosin 0.4 mg oral capsule: 1 cap(s) orally once a day (at bedtime) (26 Nov 2024 15:35)       MEDICATIONS:  STANDING MEDICATIONS  aspirin  chewable 81 milliGRAM(s) Oral daily  atorvastatin 20 milliGRAM(s) Oral at bedtime  chlorhexidine 2% Cloths 1 Application(s) Topical <User Schedule>  dextrose 5%. 1000 milliLiter(s) IV Continuous <Continuous>  dextrose 5%. 1000 milliLiter(s) IV Continuous <Continuous>  dextrose 50% Injectable 25 Gram(s) IV Push once  dextrose 50% Injectable 12.5 Gram(s) IV Push once  dextrose 50% Injectable 25 Gram(s) IV Push once  ferrous    sulfate 325 milliGRAM(s) Oral daily  gabapentin 300 milliGRAM(s) Oral three times a day  glucagon  Injectable 1 milliGRAM(s) IntraMuscular once  insulin lispro (ADMELOG) corrective regimen sliding scale   SubCutaneous three times a day before meals  melatonin 3 milliGRAM(s) Oral at bedtime  pantoprazole    Tablet 40 milliGRAM(s) Oral every 12 hours  polyethylene glycol 3350 17 Gram(s) Oral daily  senna 2 Tablet(s) Oral at bedtime  sodium chloride 0.9%. 1000 milliLiter(s) IV Continuous <Continuous>  tamsulosin 0.4 milliGRAM(s) Oral at bedtime    PRN MEDICATIONS  dextrose Oral Gel 15 Gram(s) Oral once PRN                                            ------------------------------------------------------------  VITAL SIGNS: Last 24 Hours  12/02/24, 5:00 AM    T(F): 97.9  HR: 74  BP: 138/63  RR: 20  SpO2: 100%       PHYSICAL EXAM:  GENERAL: AAOx3, no acute distress; not-ill appearing; +pt producing gurgling sounds during respirations with mouth open  HEAD:  Atraumatic, Normocephalic  CHEST/LUNG: +crackles of LUB and LLB of lungs, +PleurX drain in place, no signs of infection, drainage, ecchymosis, or pus collection; no wheezing  HEART: Regular rate and rhythm; normal S1, S2, No murmurs; no friction rubs  ABDOMEN: Soft, nontender, nondistended; Bowel sounds present  EXTREMITIES: +PICC line intact on R forearm, no signs of infection, drainage, ecchymosis, or pus collection; no pitting edema of LE, B/L                                           --------------------------------------------------------------  LABS:  12/02/24, 6:41 AM                 8.1[L], stable    7.92 )-----------( 238             26.0[L], stable     12/02/24, 6:41 AM     142  |  113 [H], stable |  59[H]  ----------------------------<  162[H]  4.3   |  |  1.6[H]    Ca    8.5        Mg     2.0      11/28/24  TPro  5.7[L]  /  Alb  3.2[L]  /  TBili  <0.2  /  DBili  x   /  AST  7   /  ALT  <5  /  AlkPhos  104       12-02-24 @ 19:00   IN: 0 mL / OUT: 700 mL / NET: -700 mL    11-28-24 @ 07:01  -  11-29-24 @ 07:00  IN: 835 mL / OUT: 450 mL / NET: 385 mL · Subjective and Objective:     CLARI BURGER is a 84y male, with PMHx AVMs, CAD s/p stents x 2 (2007), asbestosis of lung, malignant mesothelioma, s/p VATS, HLD, DM, and JASMIN, arrived at the ED on 11/26 due to abnormal Hb (6.4). Pt did not have active bloody stools bedside and denied CP, Ab pain, N/V/D, dizziness, and UTI sxs. Prior EGD led to bleeding on contact but hemostasis was achieved. In the ED, Hb was 7.1, and CHINYERE positive for black stools in ED and (+) Ab screening. pRBC transfusion was deferred and pt was admitted to obtain further w/u, including iron studies to determine if iron supplementation will be needed and wad Dx'd with melena and anemia.    Pt is currently admitted with the primary diagnosis of anemia and melena, likely 2/2 to upper GID.    Hospital Day: 8d. Pt was seen and evaluated bedside. Pt appears clinically stable with stable VS as of 5:10 AM today. Pt had no acute overnight or major events. Pt has been sleeping well, having BM, and urinating via a Walden. ID saw and evaluated the pt yesterday and noted pt's blood Cx on 12/02/24 grew gram negative rods, Proteus and Dx'd pt with ESBL Proteus bacteremia. GI evaluated pt yesterday and had a long discussion with Son and HCP John to notify them that we are planning an EGD/colonoscopy but will hold off on it for now due to pt Dx'd ESBL Proteus bacteremia with suspected  source, and thus will plan to prep with Sutab when cleared.    OBJECTIVE  PAST MEDICAL & SURGICAL HISTORY  DM (diabetes mellitus)  MI (myocardial infarction)  History of CAD (coronary artery disease)  Dyslipidemia  Currently smokes tobacco  Mesothelioma, malignant  Coronary stent patent                                            -----------------------------------------------------------  ALLERGIES:  penicillin (Unknown)                                          ------------------------------------------------------------    HOME MEDICATIONS  Home Medications:  atorvastatin 20 mg oral tablet: 1 tab(s) orally once a day (at bedtime) (26 Nov 2024 15:34)  ferrous sulfate 325 mg (65 mg elemental iron) oral tablet: 1 tab(s) orally once a day (26 Nov 2024 15:35)  gabapentin 300 mg oral tablet: 1 tab(s) orally 3 times a day (26 Nov 2024 15:35)  Lovenox 30 mg/0.3 mL injectable solution: 30 milligram(s) subcutaneously once a day (26 Nov 2024 15:35)  melatonin 3 mg oral tablet: 1 tab(s) orally once a day (at bedtime) (26 Nov 2024 15:35)  metFORMIN 500 mg oral tablet: 1 tab(s) orally 2 times a day (26 Nov 2024 15:35)  omeprazole 20 mg oral delayed release tablet: 1 tab(s) orally once a day (26 Nov 2024 15:34)  polyethylene glycol 3350 oral powder for reconstitution: 17 gram(s) orally once a day (26 Nov 2024 15:35)  senna leaf extract oral tablet: 2 tab(s) orally once a day (at bedtime) (26 Nov 2024 15:35)  tamsulosin 0.4 mg oral capsule: 1 cap(s) orally once a day (at bedtime) (26 Nov 2024 15:35)       MEDICATIONS:  STANDING MEDICATIONS  aspirin  chewable 81 milliGRAM(s) Oral daily  atorvastatin 20 milliGRAM(s) Oral at bedtime  chlorhexidine 2% Cloths 1 Application(s) Topical <User Schedule>  ferrous    sulfate 325 milliGRAM(s) Oral daily  gabapentin 300 milliGRAM(s) Oral three times a day  glucagon  Injectable 1 milliGRAM(s) IntraMuscular once  insulin lispro (ADMELOG) corrective regimen sliding scale   SubCutaneous three times a day before meals  melatonin 3 milliGRAM(s) Oral at bedtime  meropenem IVPB 1000 mg q12hr  pantoprazole    Tablet 40 milliGRAM(s) Oral every 12 hours  polyethylene glycol 3350 17 Gram(s) Oral daily  senna 2 Tablet(s) Oral at bedtime  sodium chloride 0.9%. 1000 milliLiter(s) IV Continuous <Continuous>  tamsulosin 0.4 milliGRAM(s) Oral at bedtime                                            ------------------------------------------------------------  VITAL SIGNS: Last 24 Hours  12/03/24, 5:01 AM    T(F): 97.1  HR: 77  BP: 115/69  RR: 18    PHYSICAL EXAM:  GENERAL: AAOx3, no acute distress; not-ill appearing; +difficulty speaking, hoarse voice  HEAD:  Atraumatic, Normocephalic  CHEST/LUNG: CTA b/l; no wheezing; no rales; no rhonchi; +PleurX drain in place, no signs of infection, drainage, ecchymosis, or pus collection  HEART: Regular rate and rhythm; normal S1, S2, No murmurs; no friction rubs  ABDOMEN: Soft, nontender, nondistended; Bowel sounds present  EXTREMITIES: no signs of infection, drainage, ecchymosis, or pus collection; no pitting edema of LE, B/L                                           --------------------------------------------------------------  LABS:  12/03/24, 06:51 AM                 7.7[L], stable    5.65 )---------------------( 224             25.0[L], stable     auto neutrophil % = 88    12/02/24, 6:41 AM     145  |  109  |  51[H]  ----------------------------<  125[H], stable  4.1   |         |  1.4    Ca    8.7[L] ---> corrected 10.0 | AST 65[H]  Albumin 2.4[L]    | ALT 43[H]  T protein 5.0[L]   | [H]    T bili 0.2  Mg     2.1      12-02-24 @ 19:00   IN: 0 mL / OUT: 700 mL / NET: -700 mL    11-28-24 @ 07:01  -  11-29-24 @ 07:00  IN: 835 mL / OUT: 450 mL / NET: 385 mL      Urinalysis Basic - ( 02 Dec 2024 06:41 )    Color: x / Appearance: x / SG: x / pH: x  Gluc: 149 mg/dL / Ketone: x  / Bili: x / Urobili: x   Blood: x / Protein: x / Nitrite: x   Leuk Esterase: x / RBC: x / WBC x   Sq Epi: x / Non Sq Epi: x / Bacteria: x    Culture - Blood (collected 12-01-24 @ 16:00)  Source: .Blood BLOOD  Gram Stain (12-02-24 @ 11:08):    Growth in aerobic and anaerobic bottles: Gram Negative Rods  Preliminary Report (12-02-24 @ 11:08):    Growth in aerobic and anaerobic bottles: Gram Negative Rods    Direct identification is available within approximately 3-5    hours either by Blood Panel Multiplexed PCR or Direct    MALDI-TOF. Details: [email or web address]  Organism: Blood Culture PCR (12-02-24 @ 12:43)  Organism: Blood Culture PCR (12-02-24 @ 12:43)      Method Type: PCR      -  Proteus species: Detec      -  ESBL: Detec      -  CTX-M Resistance Marker: Detec    Culture - Blood (collected 12-01-24 @ 16:00)  Source: .Blood BLOOD  Gram Stain (12-02-24 @ 11:40):    Growth in aerobic bottle: Gram Negative Rods    Growth in anaerobic bottle: Gram Negative Rods  Preliminary Report (12-02-24 @ 11:40):    Growth in aerobic bottle: Gram Negative Rods    Growth in anaerobic bottle: Gram Negative Rods    Lactate, Blood: 2.3 mmol/L (12-01-24 @ 16:00) · Subjective and Objective:     CLARI BURGER is a 84y male, with PMHx AVMs, CAD s/p stents x 2 (2007), asbestosis of lung, malignant mesothelioma, s/p VATS, HLD, DM, and JASMIN, arrived at the ED on 11/26 due to abnormal Hb (6.4). Pt did not have active bloody stools bedside and denied CP, Ab pain, N/V/D, dizziness, and UTI sxs. Prior EGD led to bleeding on contact but hemostasis was achieved. In the ED, Hb was 7.1, and CHINYERE positive for black stools in ED and (+) Ab screening. pRBC transfusion was deferred and pt was admitted to obtain further w/u, including iron studies to determine if iron supplementation will be needed and wad Dx'd with melena and anemia.    Pt is currently admitted with the primary diagnosis of anemia and melena, likely 2/2 to upper GID.    Hospital Day: 8d. Pt was seen and evaluated bedside. Pt appears clinically stable with stable VS as of 5:10 AM today. Pt had no acute overnight or major events. Pt has been sleeping well, having BM, and urinating via a Walden. ID saw and evaluated the pt yesterday and noted pt's blood Cx on 12/02/24 grew gram negative rods, Proteus and Dx'd pt with ESBL Proteus bacteremia. GI evaluated pt yesterday and had a long discussion with Son and HCP John to notify them that we are planning an EGD/colonoscopy but will hold off on it for now due to pt Dx'd ESBL Proteus bacteremia with suspected  source, and thus will plan to prep with Sutab when cleared.    OBJECTIVE  PAST MEDICAL & SURGICAL HISTORY  DM (diabetes mellitus)  MI (myocardial infarction)  History of CAD (coronary artery disease)  Dyslipidemia  Currently smokes tobacco  Mesothelioma, malignant  Coronary stent patent                                            -----------------------------------------------------------  ALLERGIES:  penicillin (Unknown)                                          ------------------------------------------------------------    HOME MEDICATIONS  Home Medications:  atorvastatin 20 mg oral tablet: 1 tab(s) orally once a day (at bedtime) (26 Nov 2024 15:34)  ferrous sulfate 325 mg (65 mg elemental iron) oral tablet: 1 tab(s) orally once a day (26 Nov 2024 15:35)  gabapentin 300 mg oral tablet: 1 tab(s) orally 3 times a day (26 Nov 2024 15:35)  Lovenox 30 mg/0.3 mL injectable solution: 30 milligram(s) subcutaneously once a day (26 Nov 2024 15:35)  melatonin 3 mg oral tablet: 1 tab(s) orally once a day (at bedtime) (26 Nov 2024 15:35)  metFORMIN 500 mg oral tablet: 1 tab(s) orally 2 times a day (26 Nov 2024 15:35)  omeprazole 20 mg oral delayed release tablet: 1 tab(s) orally once a day (26 Nov 2024 15:34)  polyethylene glycol 3350 oral powder for reconstitution: 17 gram(s) orally once a day (26 Nov 2024 15:35)  senna leaf extract oral tablet: 2 tab(s) orally once a day (at bedtime) (26 Nov 2024 15:35)  tamsulosin 0.4 mg oral capsule: 1 cap(s) orally once a day (at bedtime) (26 Nov 2024 15:35)       MEDICATIONS:  STANDING MEDICATIONS  aspirin  chewable 81 milliGRAM(s) Oral daily  atorvastatin 20 milliGRAM(s) Oral at bedtime  chlorhexidine 2% Cloths 1 Application(s) Topical <User Schedule>  ferrous    sulfate 325 milliGRAM(s) Oral daily  gabapentin 300 milliGRAM(s) Oral three times a day  glucagon  Injectable 1 milliGRAM(s) IntraMuscular once  insulin lispro (ADMELOG) corrective regimen sliding scale   SubCutaneous three times a day before meals  melatonin 3 milliGRAM(s) Oral at bedtime  meropenem IVPB 1000 mg q12hr  pantoprazole    Tablet 40 milliGRAM(s) Oral every 12 hours  polyethylene glycol 3350 17 Gram(s) Oral daily  senna 2 Tablet(s) Oral at bedtime  sodium chloride 0.9%. 1000 milliLiter(s) IV Continuous <Continuous>  tamsulosin 0.4 milliGRAM(s) Oral at bedtime                                            ------------------------------------------------------------  VITAL SIGNS: Last 24 Hours  12/03/24, 5:01 AM    T(F): 97.1  HR: 77  BP: 115/69  RR: 18    PHYSICAL EXAM:  GENERAL: AAOx3, no acute distress; not-ill appearing; +difficulty speaking, hoarse voice  HEAD:  Atraumatic, Normocephalic  CHEST/LUNG: CTA b/l; no wheezing; no rales; no rhonchi; +PleurX drain in place, no signs of infection, drainage, ecchymosis, or pus collection  HEART: Regular rate and rhythm; normal S1, S2, No murmurs; no friction rubs  ABDOMEN: Soft, nontender, nondistended; Bowel sounds present  EXTREMITIES: no signs of infection, drainage, ecchymosis, or pus collection; no pitting edema of LE, B/L                                           --------------------------------------------------------------  LABS:  12/03/24, 06:51 AM                 7.7[L], stable    5.65 )---------------------( 224             25.0[L], stable     auto neutrophil % = 88    12/02/24, 6:41 AM     145  |  109  |  51[H]  ----------------------------<  125[H], stable  4.1   |         |  1.4    Ca    8.7[L] ---> corrected 10.0 | AST 65[H]  Albumin 2.4[L]    | ALT 43[H]  T protein 5.0[L]   | [H]    T bili 0.2  Mg     2.1      12-02-24 @ 19:00   IN: 0 mL / OUT: 700 mL / NET: -700 mL    11-28-24 @ 07:01  -  11-29-24 @ 07:00  IN: 835 mL / OUT: 450 mL / NET: 385 mL      Urinalysis Basic - ( 02 Dec 2024 06:41 )    Color: x / Appearance: x / SG: x / pH: x  Gluc: 149 mg/dL / Ketone: x  / Bili: x / Urobili: x   Blood: x / Protein: x / Nitrite: x   Leuk Esterase: x / RBC: x / WBC x   Sq Epi: x / Non Sq Epi: x / Bacteria: x    Culture - Blood (collected 12-01-24 @ 16:00)  Source: .Blood BLOOD  Gram Stain (12-02-24 @ 11:08):    Growth in aerobic and anaerobic bottles: Gram Negative Rods  Preliminary Report (12-02-24 @ 11:08):    Growth in aerobic and anaerobic bottles: Gram Negative Rods    Direct identification is available within approximately 3-5    hours either by Blood Panel Multiplexed PCR or Direct    MALDI-TOF. Details: [email or web address]  Organism: Blood Culture PCR (12-02-24 @ 12:43)  Organism: Blood Culture PCR (12-02-24 @ 12:43)      Method Type: PCR      -  Proteus species: Detec      -  ESBL: Detec      -  CTX-M Resistance Marker: Detec    Culture - Blood (collected 12-01-24 @ 16:00)  Source: .Blood BLOOD  Gram Stain (12-02-24 @ 11:40):    Growth in aerobic bottle: Gram Negative Rods    Growth in anaerobic bottle: Gram Negative Rods  Preliminary Report (12-02-24 @ 11:40):    Growth in aerobic bottle: Gram Negative Rods    Growth in anaerobic bottle: Gram Negative Rods    Lactate, Blood: 2.3 mmol/L (12-01-24 @ 16:00)

## 2024-12-03 NOTE — PROGRESS NOTE ADULT - ASSESSMENT
84 year old man w PMHx of CKD Stage II ,HLD, DM, CAD, malignant mesothelioma ~8/2024 and s/p VATS pleurodesis, R sided PleurX, h/o AVM/GIB, JASMIN, referred from NH for anemia to 6.5, in ED found to have Hb 7.1, CHINYERE +ve for melena. Patient was admitted to medicine for concern for an upper GI bleed on 11/26/2024. Nephrology consulted for an JEY. Baseline Cr. of 0.9, patient is above their baseline now at 1.8, creatine trending upwards. No episodes of hypotension with GI Bleed; however, patient mildly hypotensive on exam today. Febrile to 101 yesterday. Patient appears hypovolemic on exam. FENa 8%.    #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 likely intrinsic 2/2 UTI with urinary retention  #Concern for UTI  #Hypovolemia  #DM Type II  #Normocytic, normochromic anemia likely 2/2 GI Bleed     - cr improving , likely prerenal     - [facility] ( Urien port/cr ratio showed no protein Ua on 11/29 had hematuria /proteinruia though in setting of UTI   - c/w venofer 200 mg qdaily x 2 doses per hematology  - Adjust dosing of renally cleared meds

## 2024-12-03 NOTE — PROGRESS NOTE ADULT - ASSESSMENT
ASSESSMENT  This is a 84 year old male with PMH of HLD, DM, CAD s/p stents x2 in 2007, h/o asbestosis of lung, dx w malignant mesothelioma ~8/2024 and s/p VATS pleurodesis, R sided PleurX, h/o AVM/GIB, JASMIN, referred from NH for anemia    ASSESSMENT  #Proteus species ESBL bacteremia, suspect  source  CAUTI     UA pyuria 323; 12/1 UCX   >100,000 CFU/ml Proteus mirabilis    12/1 BCX +   #Chronic Walden? From urinary retention in 7/2024.  < from: US Kidney and Bladder (11.29.24 @ 18:35) >  1.  Multiple right renal cysts, measuring up to 0.9 cm, similar to   previous.  2.  Mild to moderate right hydronephrosis, previously reported as   fullness.  3.  Mild to moderate left hydronephrosis, reported to be mild previously.  4.  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  #JEY over CKD  Creatinine: 1.4 mg/dL (12-03-24 @ 06:51)      RECOMMENDATIONS  - f/u proteus sensitivities   - Meropenem 1g q12h IV   - pending EGD/colonoscopy     If any questions, please send a message or call on [employer]  Please continue to update ID with any pertinent new laboratory or radiographic findings.

## 2024-12-03 NOTE — PROGRESS NOTE ADULT - SUBJECTIVE AND OBJECTIVE BOX
Gastroenterology progress note:     Patient is a 84y old  Male who presents with a chief complaint of Melena (03 Dec 2024 12:34)       Admitted on: 11-26-24    We are following the patient for: anemia       Interval History:    No acute events overnight.   - Diet - tolertaing regular   - last BM - yesterday, brown. No bloody BMs  - Abdominal pain - denies      PAST MEDICAL & SURGICAL HISTORY:  DM (diabetes mellitus)      MI (myocardial infarction)      History of CAD (coronary artery disease)      Dyslipidemia      Currently smokes tobacco      Mesothelioma, malignant      Coronary stent patent          MEDICATIONS  (STANDING):  aspirin  chewable 81 milliGRAM(s) Oral daily  atorvastatin 20 milliGRAM(s) Oral at bedtime  chlorhexidine 2% Cloths 1 Application(s) Topical <User Schedule>  dextrose 5%. 1000 milliLiter(s) (50 mL/Hr) IV Continuous <Continuous>  dextrose 5%. 1000 milliLiter(s) (100 mL/Hr) IV Continuous <Continuous>  dextrose 50% Injectable 25 Gram(s) IV Push once  dextrose 50% Injectable 12.5 Gram(s) IV Push once  dextrose 50% Injectable 25 Gram(s) IV Push once  ferrous    sulfate 325 milliGRAM(s) Oral daily  gabapentin 300 milliGRAM(s) Oral three times a day  glucagon  Injectable 1 milliGRAM(s) IntraMuscular once  insulin lispro (ADMELOG) corrective regimen sliding scale   SubCutaneous three times a day before meals  melatonin 3 milliGRAM(s) Oral at bedtime  meropenem  IVPB 1000 milliGRAM(s) IV Intermittent every 12 hours  meropenem  IVPB      pantoprazole    Tablet 40 milliGRAM(s) Oral every 12 hours  polyethylene glycol 3350 17 Gram(s) Oral daily  senna 2 Tablet(s) Oral at bedtime  sodium bicarbonate 650 milliGRAM(s) Oral three times a day  sodium zirconium cyclosilicate 10 Gram(s) Oral once  sutab 1 Dose(s) 1 Dose(s) Oral once  tamsulosin 0.4 milliGRAM(s) Oral at bedtime    MEDICATIONS  (PRN):  acetaminophen     Tablet .. 975 milliGRAM(s) Oral every 8 hours PRN Mild Pain (1 - 3)  dextrose Oral Gel 15 Gram(s) Oral once PRN Blood Glucose LESS THAN 70 milliGRAM(s)/deciliter      Allergies  penicillin (Unknown)      Review of Systems:   Cardiovascular:  No Chest Pain, No Palpitations  Respiratory:  No Cough, No Dyspnea  Gastrointestinal:  As described in HPI  Skin:  No Skin Lesions, No Jaundice  Neuro:  No Syncope, No Dizziness    Physical Examination:  T(C): 36.3 (12-03-24 @ 11:51), Max: 36.3 (12-02-24 @ 20:46)  HR: 80 (12-03-24 @ 11:51) (77 - 96)  BP: 131/82 (12-03-24 @ 11:51) (109/50 - 131/82)  RR: 18 (12-03-24 @ 11:51) (18 - 18)  SpO2: 95% (12-02-24 @ 20:46) (95% - 95%)      12-02-24 @ 07:01  -  12-03-24 @ 07:00  --------------------------------------------------------  IN: 0 mL / OUT: 2000 mL / NET: -2000 mL        GENERAL: AAOx3, no acute distress.  HEAD:  Atraumatic, Normocephalic  EYES: conjunctiva and sclera clear  NECK: Supple, no JVD or thyromegaly  CHEST/LUNG: Clear to auscultation bilaterally; No wheeze, rhonchi, or rales  HEART: Regular rate and rhythm; normal S1, S2, No murmurs.  ABDOMEN: Soft, nontender, nondistended; Bowel sounds present  NEUROLOGY: No asterixis or tremor.   SKIN: Intact, no jaundice     Data:                        7.7    5.65  )-----------( 224      ( 03 Dec 2024 06:51 )             25.0     Hgb trend:  7.7  12-03-24 @ 06:51  8.1  12-02-24 @ 06:41  8.6  12-01-24 @ 16:00  6.7  12-01-24 @ 08:15        12-03    145  |  109  |  51[H]  ----------------------------<  125[H]  4.1   |  19  |  1.4    Ca    8.7      03 Dec 2024 06:51  Mg     2.1     12-03    TPro  5.0[L]  /  Alb  2.4[L]  /  TBili  0.2  /  DBili  x   /  AST  65[H]  /  ALT  43[H]  /  AlkPhos  145[H]  12-03    Liver panel trend:  TBili 0.2   /   AST 65   /   ALT 43   /   AlkP 145   /   Tptn 5.0   /   Alb 2.4    /   DBili --      12-03  TBili <0.2   /   AST 7   /   ALT <5   /   AlkP 104   /   Tptn 5.7   /   Alb 3.2    /   DBili --      11-28  TBili <0.2   /   AST 9   /   ALT <5   /   AlkP 125   /   Tptn 6.2   /   Alb 3.4    /   DBili --      11-26          Culture - Blood (collected 01 Dec 2024 16:00)  Source: .Blood BLOOD  Gram Stain (02 Dec 2024 11:08):    Growth in aerobic and anaerobic bottles: Gram Negative Rods  Preliminary Report (03 Dec 2024 13:15):    Growth in aerobic and anaerobic bottles: Proteus mirabilis    Direct identification is available within approximately 3-5    hours either by Blood Panel Multiplexed PCR or Direct    MALDI-TOF. Details: [email or web address]  Organism: Blood Culture PCR (02 Dec 2024 12:43)  Organism: Blood Culture PCR (02 Dec 2024 12:43)    Culture - Blood (collected 01 Dec 2024 16:00)  Source: .Blood BLOOD  Gram Stain (02 Dec 2024 11:40):    Growth in aerobic bottle: Gram Negative Rods    Growth in anaerobic bottle: Gram Negative Rods  Preliminary Report (03 Dec 2024 13:35):    Growth in aerobic and anaerobic bottles: Proteus mirabilis    See previous culture [id number]    Culture - Urine (collected 01 Dec 2024 15:13)  Source: Catheterized Catheterized  Preliminary Report (02 Dec 2024 23:08):    >100,000 CFU/ml Proteus mirabilis         Radiology:       Gastroenterology progress note:     Patient is a 84y old  Male who presents with a chief complaint of Melena (03 Dec 2024 12:34)       Admitted on: 11-26-24    We are following the patient for: anemia       Interval History:    No acute events overnight.   - Diet - tolertaing regular   - last BM - yesterday, brown. No bloody BMs  - Abdominal pain - denies      PAST MEDICAL & SURGICAL HISTORY:  DM (diabetes mellitus)      MI (myocardial infarction)      History of CAD (coronary artery disease)      Dyslipidemia      Currently smokes tobacco      Mesothelioma, malignant      Coronary stent patent          MEDICATIONS  (STANDING):  aspirin  chewable 81 milliGRAM(s) Oral daily  atorvastatin 20 milliGRAM(s) Oral at bedtime  chlorhexidine 2% Cloths 1 Application(s) Topical <User Schedule>  dextrose 5%. 1000 milliLiter(s) (50 mL/Hr) IV Continuous <Continuous>  dextrose 5%. 1000 milliLiter(s) (100 mL/Hr) IV Continuous <Continuous>  dextrose 50% Injectable 25 Gram(s) IV Push once  dextrose 50% Injectable 12.5 Gram(s) IV Push once  dextrose 50% Injectable 25 Gram(s) IV Push once  ferrous    sulfate 325 milliGRAM(s) Oral daily  gabapentin 300 milliGRAM(s) Oral three times a day  glucagon  Injectable 1 milliGRAM(s) IntraMuscular once  insulin lispro (ADMELOG) corrective regimen sliding scale   SubCutaneous three times a day before meals  melatonin 3 milliGRAM(s) Oral at bedtime  meropenem  IVPB 1000 milliGRAM(s) IV Intermittent every 12 hours  meropenem  IVPB      pantoprazole    Tablet 40 milliGRAM(s) Oral every 12 hours  polyethylene glycol 3350 17 Gram(s) Oral daily  senna 2 Tablet(s) Oral at bedtime  sodium bicarbonate 650 milliGRAM(s) Oral three times a day  sodium zirconium cyclosilicate 10 Gram(s) Oral once  sutab 1 Dose(s) 1 Dose(s) Oral once  tamsulosin 0.4 milliGRAM(s) Oral at bedtime    MEDICATIONS  (PRN):  acetaminophen     Tablet .. 975 milliGRAM(s) Oral every 8 hours PRN Mild Pain (1 - 3)  dextrose Oral Gel 15 Gram(s) Oral once PRN Blood Glucose LESS THAN 70 milliGRAM(s)/deciliter      Allergies  penicillin (Unknown)      Review of Systems:   Cardiovascular:  No Chest Pain, No Palpitations  Respiratory:  No Cough, No Dyspnea  Gastrointestinal:  As described in HPI  Skin:  No Skin Lesions, No Jaundice  Neuro:  No Syncope, No Dizziness    Physical Examination:  T(C): 36.3 (12-03-24 @ 11:51), Max: 36.3 (12-02-24 @ 20:46)  HR: 80 (12-03-24 @ 11:51) (77 - 96)  BP: 131/82 (12-03-24 @ 11:51) (109/50 - 131/82)  RR: 18 (12-03-24 @ 11:51) (18 - 18)  SpO2: 95% (12-02-24 @ 20:46) (95% - 95%)      12-02-24 @ 07:01  -  12-03-24 @ 07:00  --------------------------------------------------------  IN: 0 mL / OUT: 2000 mL / NET: -2000 mL        GENERAL: AAOx3, no acute distress.  HEAD:  Atraumatic, Normocephalic  EYES: conjunctiva and sclera clear  NECK: Supple, no JVD or thyromegaly  CHEST/LUNG: Clear to auscultation bilaterally; No wheeze, rhonchi, or rales  HEART: Regular rate and rhythm; normal S1, S2, No murmurs.  ABDOMEN: Soft, nontender, nondistended; Bowel sounds present  NEUROLOGY: No asterixis or tremor.   SKIN: Intact, no jaundice     Data:                        7.7    5.65  )-----------( 224      ( 03 Dec 2024 06:51 )             25.0     Hgb trend:  7.7  12-03-24 @ 06:51  8.1  12-02-24 @ 06:41  8.6  12-01-24 @ 16:00  6.7  12-01-24 @ 08:15        12-03    145  |  109  |  51[H]  ----------------------------<  125[H]  4.1   |  19  |  1.4    Ca    8.7      03 Dec 2024 06:51  Mg     2.1     12-03    TPro  5.0[L]  /  Alb  2.4[L]  /  TBili  0.2  /  DBili  x   /  AST  65[H]  /  ALT  43[H]  /  AlkPhos  145[H]  12-03    Liver panel trend:  TBili 0.2   /   AST 65   /   ALT 43   /   AlkP 145   /   Tptn 5.0   /   Alb 2.4    /   DBili --      12-03  TBili <0.2   /   AST 7   /   ALT <5   /   AlkP 104   /   Tptn 5.7   /   Alb 3.2    /   DBili --      11-28  TBili <0.2   /   AST 9   /   ALT <5   /   AlkP 125   /   Tptn 6.2   /   Alb 3.4    /   DBili --      11-26          Culture - Blood (collected 01 Dec 2024 16:00)  Source: .Blood BLOOD  Gram Stain (02 Dec 2024 11:08):    Growth in aerobic and anaerobic bottles: Gram Negative Rods  Preliminary Report (03 Dec 2024 13:15):    Growth in aerobic and anaerobic bottles: Proteus mirabilis    Direct identification is available within approximately 3-5    hours either by Blood Panel Multiplexed PCR or Direct    MALDI-TOF. Details: [email or web address]  Organism: Blood Culture PCR (02 Dec 2024 12:43)  Organism: Blood Culture PCR (02 Dec 2024 12:43)    Culture - Blood (collected 01 Dec 2024 16:00)  Source: .Blood BLOOD  Gram Stain (02 Dec 2024 11:40):    Growth in aerobic bottle: Gram Negative Rods    Growth in anaerobic bottle: Gram Negative Rods  Preliminary Report (03 Dec 2024 13:35):    Growth in aerobic and anaerobic bottles: Proteus mirabilis    See previous culture [id number]    Culture - Urine (collected 01 Dec 2024 15:13)  Source: Catheterized Catheterized  Preliminary Report (02 Dec 2024 23:08):    >100,000 CFU/ml Proteus mirabilis         Radiology:      < from: CT Abdomen and Pelvis w/ Oral Cont and w/ IV Cont (07.29.24 @ 19:02) >  ACC: [id number] EXAM:  CT ABDOMEN AND PELVIS OC IC   ORDERED BY: CÉSAR CASTANEDA     PROCEDURE DATE:  07/29/2024          INTERPRETATION:  CLINICAL STATEMENT: Concern for malignancy. Additional   history or reason for concern are not provided.    TECHNIQUE: Contiguous axial CT images were obtained from the lower chest   to the pubic symphysis following administration of intravenous contrast.    100 cc administered of Omnipaque 350 (0 cc discarded).  Oral contrast was   administered.  Reformatted images in the coronal and sagittal planes were   acquired.    COMPARISON CT: Not available. CT chest from 7/21/2024 reviewed    OTHER STUDIES USED FOR CORRELATION: None.      FINDINGS:    LOWER CHEST: Partially imaged large right hydropneumothorax with near   complete collapse of the right lower and middle lobes. Questionable small   loculated pericardial effusion abutting the right atrium. Coronary artery   calcifications.    HEPATOBILIARY: Subcentimeter hypodensity in segment VII and to small to  characterize.    SPLEEN: Unremarkable.    PANCREAS: Unremarkable.    ADRENAL GLANDS: Unremarkable.    KIDNEYS: Symmetric enhancement. Right renal cyst. Additional   subcentimeter cortical hypodensities too small to characterize. Mild   bilateral hydroureteronephrosis with slight urothelial enhancement to the   level of the bladder without evidence of obstructing ureteral calculus.    ABDOMINOPELVIC NODES: No definite lymphadenopathy identified..    PELVIC ORGANS: Enlarged prostate gland. Bladder partially decompressed   with Walden catheter with circumferential thick-walled appearance.    PERITONEUM/MESENTERY/BOWEL: No bowel obstruction. No free fluid or free   air. Normal appendix. Moderate colonic stool.    BONES/SOFT TISSUES: Degenerativechanges along the vertebral column.    OTHER: Moderate atherosclerotic calcifications. Mildly ectatic abdominal   aorta without yuniel aneurysm.      IMPRESSION:  1.  Despite partial decompression of the bladder with Walden catheter   there is a prominent circumferential wall thickening. Recommend   correlation with urinalysis for cystitis. Alternatively findings could be   secondary to chronic bladder outlet obstruction from enlarged prostate   gland. Given symmetry of wall thickening neoplastic process felt to be   less likely.  2.  Mild bilateral hydroureteronephrosis with slight urothelial   enhancement to the level of the bladder without evidence of obstructing   ureteral calculus. Findings possibly secondary to reflux versus ascending   infection. No imaging evidence of pyelonephritis.  3.  Partially imaged large right hydropneumothorax with near complete   collapse of the right lower middle lobes. Possible small loculated   pericardial effusion.  4.  No definite evidence of abdominalpelvic neoplastic process.    --- End of Report ---            ANISA PEREZ MD; Attending Radiologist  This document has been electronically signed. Jul 30 2024 12:13PM    < end of copied text >

## 2024-12-03 NOTE — PROGRESS NOTE ADULT - ASSESSMENT
83 y/o man w PMHx of HLD, DM, CAD s/p stents x2 on plavix, malignant mesothelioma 8/2024 and s/p VATS pleurodesis, R sided PleurX, duodenal AVM, JASMIN, referred from NH for anemia to 6.5, in ED found to have Hb 7.1, Baseline 7-8. The ED reported CHINYERE positive for melena. Gi consulted for suspected GI bleed.     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 s/p APC  Was stable and discharged, has denied any recurrent melena or blood per rectum  CHINYERE: dark, brown stool on smear   Hb 7.1; baseline 7-8  No overt GI bleed and hemodynamically stable  On iron tabs at home      Plan  After long discussion with Son and HCP John, plan for EGD/colonoscopy   Patient found to have ESBL Proteus bacteremia, suspect  source ID following, being treated with meropenem  Plan for EGD/Colonoscopy Thursday 12/5   Plan for Prep with Sutab tomorrow  Clear liquid diet tomorrow and NPO after midnight before procedure  Please obtain preop labs tomorrow evening   Patient's family requesting hematology evaluation, patient follows with Dr. Epperson who was planning on iron infusions  Maintain active Type and screen, monitor for signs of active bleed or significant drop in Hb  Trend H/H, Transfuse >7  Cw PPi q 24h  Can follow up with private GI as an outpatient   If has signs of overt GI bleed or significant drop in Hb please alert GI     #Small hypodensity in segment 7 of the liver seen on prior CT.  -Please obtain RUQ US  -f/u w/ primary GI         83 y/o man w PMHx of HLD, DM, CAD s/p stents x2 on plavix, malignant mesothelioma 8/2024 and s/p VATS pleurodesis, R sided PleurX, duodenal AVM, JASMIN, referred from NH for anemia to 6.5, in ED found to have Hb 7.1, Baseline 7-8. The ED reported CHINYERE positive for melena. Gi consulted for suspected GI bleed.     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 Previous cardio/pulmonary risk stratification noted   Patient found to have ESBL Proteus bacteremia, suspect  source ID following, being treated with meropenem  Plan for EGD/Colonoscopy tomorrow 12/4   Plan for Prep with Sutab this evening   Clear liquid diet and NPO after midnight before procedure  Please obtain preop labs this evening   Patient's family requesting hematology evaluation, patient follows with Dr. Epperson who was planning on iron infusions  Maintain active Type and screen, monitor for signs of active bleed or significant drop in Hb  Trend H/H, Transfuse >7  Cw PPi q 24h  Can follow up with private GI as an outpatient   If has signs of overt GI bleed or significant drop in Hb please alert GI     #Small hypodensity in segment 7 of the liver seen on prior CT.  -Please obtain RUQ US  -f/u w/ primary GI         83 y/o man w PMHx of HLD, DM, CAD s/p stents x2 on plavix, malignant mesothelioma 8/2024 and s/p VATS pleurodesis, R sided PleurX, duodenal AVM, JASMIN, referred from NH for anemia to 6.5, in ED found to have Hb 7.1, Baseline 7-8. The ED reported CHINYERE positive for melena. Gi consulted for suspected GI bleed.     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 Previous cardio/pulmonary risk stratification noted   Patient found to have ESBL Proteus bacteremia, suspect  source ID following, being treated with meropenem  Plan for EGD/Colonoscopy tomorrow 12/4   Plan for Prep with Sutab this evening   Clear liquid diet and NPO after midnight before procedure  Please obtain preop labs this evening   Patient's family requesting hematology evaluation, patient follows with Dr. Epperson who was planning on iron infusions  Maintain active Type and screen, monitor for signs of active bleed or significant drop in Hb  Trend H/H, Transfuse >7  Cw PPi q 24h  Can follow up with private GI as an outpatient   If has signs of overt GI bleed or significant drop in Hb please alert GI     #elevated liver enzymes - suspect DILI  RUQUS   Avoid hepatoxic meds   Trend liver enzymes    #Small hypodensity in segment 7 of the liver seen on prior CT.  -Please obtain RUQ US  -f/u w/ primary GI         83 y/o man w PMHx of HLD, DM, CAD s/p stents x2 on plavix, malignant mesothelioma 8/2024 and s/p VATS pleurodesis, R sided PleurX, duodenal AVM, JASMIN, referred from NH for anemia to 6.5, in ED found to have Hb 7.1, Baseline 7-8. The ED reported CHINYERE positive for melena. Gi consulted for suspected GI bleed.     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 Previous cardio/pulmonary risk stratification noted   Patient found to have ESBL Proteus bacteremia, suspect  source ID following, being treated with meropenem  Plan for EGD/Colonoscopy tomorrow 12/4   Plan for Prep with Sutab this evening   Clear liquid diet and NPO after midnight before procedure  Please obtain preop labs this evening   Patient's family requesting hematology evaluation, patient follows with Dr. Epperson who was planning on iron infusions  Maintain active Type and screen, monitor for signs of active bleed or significant drop in Hb  Trend H/H, Transfuse >7  Cw PPi q 24h  Can follow up with private GI as an outpatient   If has signs of overt GI bleed or significant drop in Hb please alert GI     #elevated liver enzymes - suspect DILI  -RUQUS w/ doppler  -Avoid hepatoxic meds   -Trend liver enzymes    #Small hypodensity in segment 7 of the liver seen on prior CT.  -Please obtain RUQ US  -f/u w/ primary GI

## 2024-12-04 NOTE — PROGRESS NOTE ADULT - SUBJECTIVE AND OBJECTIVE BOX
seen and examined   24 h events noted   no distress       PAST HISTORY  --------------------------------------------------------------------------------  No significant changes to PMH, PSH, FHx, SHx, unless otherwise noted    ALLERGIES & MEDICATIONS  --------------------------------------------------------------------------------  Allergies    penicillin (Unknown)    Intolerances      Standing Inpatient Medications  aspirin  chewable 81 milliGRAM(s) Oral daily  atorvastatin 20 milliGRAM(s) Oral at bedtime  chlorhexidine 2% Cloths 1 Application(s) Topical <User Schedule>  dextrose 5%. 1000 milliLiter(s) IV Continuous <Continuous>  dextrose 5%. 1000 milliLiter(s) IV Continuous <Continuous>  dextrose 50% Injectable 25 Gram(s) IV Push once  dextrose 50% Injectable 12.5 Gram(s) IV Push once  dextrose 50% Injectable 25 Gram(s) IV Push once  ferrous    sulfate 325 milliGRAM(s) Oral daily  gabapentin 300 milliGRAM(s) Oral three times a day  glucagon  Injectable 1 milliGRAM(s) IntraMuscular once  insulin lispro (ADMELOG) corrective regimen sliding scale   SubCutaneous three times a day before meals  melatonin 3 milliGRAM(s) Oral at bedtime  meropenem  IVPB 1000 milliGRAM(s) IV Intermittent every 12 hours  meropenem  IVPB      pantoprazole    Tablet 40 milliGRAM(s) Oral every 12 hours  polyethylene glycol 3350 17 Gram(s) Oral daily  senna 2 Tablet(s) Oral at bedtime  sodium bicarbonate 650 milliGRAM(s) Oral three times a day  sodium zirconium cyclosilicate 10 Gram(s) Oral once  tamsulosin 0.4 milliGRAM(s) Oral at bedtime    PRN Inpatient Medications  acetaminophen     Tablet .. 975 milliGRAM(s) Oral every 8 hours PRN  dextrose Oral Gel 15 Gram(s) Oral once PRN      VITALS/PHYSICAL EXAM  --------------------------------------------------------------------------------  T(C): 36.6 (12-04-24 @ 04:10), Max: 36.6 (12-04-24 @ 04:10)  HR: 83 (12-04-24 @ 04:10) (70 - 83)  BP: 126/71 (12-04-24 @ 04:10) (101/57 - 131/82)  RR: 18 (12-04-24 @ 04:10) (18 - 18)  SpO2: 97% (12-03-24 @ 19:26) (97% - 97%)  Wt(kg): --        12-03-24 @ 07:01  -  12-04-24 @ 07:00  --------------------------------------------------------  IN: 0 mL / OUT: 1000 mL / NET: -1000 mL      Physical Exam:  	Gen: NAD  	Pulm: CTA B/L  	CV: S1S2; no rub  	Abd: +distended  	LE:  no edema  	    LABS/STUDIES  --------------------------------------------------------------------------------              7.9    8.06  >-----------<  240      [12-04-24 @ 07:43]              25.2     145  |  112  |  45  ----------------------------<  185      [12-04-24 @ 07:43]  3.8   |  21  |  1.3        Ca     8.8     [12-04-24 @ 07:43]      Mg     2.0     [12-04-24 @ 07:43]    TPro  5.0  /  Alb  2.6  /  TBili  0.2  /  DBili  x   /  AST  52  /  ALT  54  /  AlkPhos  151  [12-04-24 @ 07:43]      Creatinine Trend:  SCr 1.3 [12-04 @ 07:43]  SCr 1.4 [12-03 @ 22:51]  SCr 1.4 [12-03 @ 06:51]  SCr 1.6 [12-02 @ 06:41]  SCr 1.8 [12-01 @ 16:00]    Urinalysis - [12-04-24 @ 07:43]      Color  / Appearance  / SG  / pH       Gluc 185 / Ketone   / Bili  / Urobili        Blood  / Protein  / Leuk Est  / Nitrite       RBC  / WBC  / Hyaline  / Gran  / Sq Epi  / Non Sq Epi  / Bacteria     Urine Creatinine <4      [12-01-24 @ 15:13]  Urine Protein <5      [12-01-24 @ 15:13]  Urine Sodium 20.0      [12-01-24 @ 15:13]  Urine Urea Nitrogen <6      [12-01-24 @ 15:13]  Urine Potassium 3      [12-01-24 @ 15:13]  Urine Osmolality 474      [12-01-24 @ 15:13]    Iron 31, TIBC 278, %sat 11      [11-26-24 @ 10:36]  Ferritin 74      [11-26-24 @ 10:36]  TSH 1.11      [09-23-24 @ 12:07]

## 2024-12-04 NOTE — PROGRESS NOTE ADULT - ASSESSMENT
ASSESSMENT  This is a 84 year old male with PMH of HLD, DM, CAD s/p stents x2 in 2007, h/o asbestosis of lung, dx w malignant mesothelioma ~8/2024 and s/p VATS pleurodesis, R sided PleurX, h/o AVM/GIB, JASMIN, referred from NH for anemia    ASSESSMENT  #Proteus species ESBL bacteremia, suspect  source  CAUTI     UA pyuria 323; 12/1 UCX   >100,000 CFU/ml Proteus mirabilis    12/1 BCX +  R fluoro/bactrim  #Chronic Walden? From urinary retention in 7/2024.  < from: US Kidney and Bladder (11.29.24 @ 18:35) >  1.  Multiple right renal cysts, measuring up to 0.9 cm, similar to   previous.  2.  Mild to moderate right hydronephrosis, previously reported as   fullness.  3.  Mild to moderate left hydronephrosis, reported to be mild previously.  4.  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  #JEY over CKD  Creatinine: 1.4 mg/dL (12-03-24 @ 06:51)      RECOMMENDATIONS   - Meropenem 1g q12h IV x 7 days end 12/8, if d/c prior midline x ertapenem renally dosed pending Cr  - pending EGD/colonoscopy     If any questions, please send a message or call on [employer] Teams  Please continue to update ID with any pertinent new laboratory or radiographic findings.

## 2024-12-04 NOTE — PROGRESS NOTE ADULT - ASSESSMENT
Assessment and Plan:   · Assessment	  Pt is a 84y male, with PMHx AVMs, CAD s/p stents x 2 (2007), asbestosis of lung, malignant mesothelioma, s/p VATS, HLD, DM, and JASMIN, arrived at the ED on 11/26 due to abnormal Hb (6.4). Pt did not have active bloody stools bedside and denied CP, Ab pain, N/V/D, dizziness, and UTI sxs. Prior EGD led to bleeding on contact but hemostasis was achieved. In the ED, Hb was 7.1, and CHINYERE positive for black stools in ED and (+) Ab screening. pRBC transfusion was deferred and pt was admitted to obtain further w/u, including iron studies to determine if iron supplementation will be needed and wad Dx'd with melena and anemia. Pt is currently admitted with the primary diagnosis of anemia and melena, likely 2/2 to upper GID.    · Plan  # Possible UGI bleed  # Acute on chronic anemia  # Hx Angioectasia of duodenum  - Plan completing his bowel prep today, was NPO since midnight - for scheduled EGD/colonoscopy today, GI will F/U; pt had no blood in stool or urine overnight. Pt is hemodynamically stable  - Labs as of 12/04/24, 07:43:                  Hb 7.9[L], stable    WBC 8.07 )---------------------(                  HCT 25.2[L], stable     - Iron studies as of 11/26, 10:36:  total iron 31 [L]  unsaturated iron binding capacity 247  TIBC 278  % iron saturation 11[L]     - as per GI recs:  --- Maintain active Type and screen, monitor for signs of active bleed or significant drop in Hb  --- Trend H&H and CBC daily, transfuse pRBC is Hb <7  --- Start PPI PO BID x 2 weeks and then q24h thereafter   --- Baseline hemoglobin ~8-9   --- Holding home plavix, resumed ASA instead as below     #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 likely intrinsic 2/2 UTI with urinary retention  #Concern for UTI  - Pt's pre-renal JEY improved and Cr is stable (1.4 as of 12/03/24 and 1.3 today); FeNa 8%  - acknowledged nephro recs  - d/c venofer on 11/30/24  - renally-cleared meds are optimized  - c/w monitoring Cr  - avoid nephrotoxins    #sepsis with ESBL Proteus bacteremia  - as per ID, c/w Meropenem 1g q12h IV, EOT: 12/11/24  - F/U for Proteus sensitivites  - monitor for SIRS and sepsis  - prelim blood Cx on 12/01/24 positive; repeat Blood Cx on 12/02/24 also positive: Gram Negative Rods      -  Proteus species: Detec      -  ESBL: Detec      -  CTX-M Resistance Marker: Detec  - possible  source from Walden placed prior to admission; Walden exchanged 11/29/24    #respiratory distress - resolved  #R sided pleural effusion  - CXR 12/02/24 9:33 AM: Unchanged large right-sided opacity.   - CXR 12/02/24 11:20 AM: Unchanged large opacity projecting over the right lung. Hazy opacities in the left lung are less prominent. No pneumothorax.  - PleurX drained ~700mL from R lung catheter  - Pt received nebulizer with inhaled saline chloride treatment to help clear mucus and chest congestion on 12/02/24    #mild transaminitis  #small hypodensity in segment 7 of the liver seen on prior CT  - recent LFTs as of 12/04/24, 07:43 AM     145  |  112  |  45[H]  ----------------------------<  185[H]  3.8   |         |  1.3    Ca    8.8            | AST 52[H]  Albumin 2.6[L]    | ALT 54[H]  T protein 5.0[L]  | [H]  - RUQ US (performed 12/03/24 11:01 AM) read pending    #malignant mesothelioma (diagnosed 8/2024 ), Stable  #hx Asbestos lung   - F/u heme onc outpatient   - PleurX needs drainage twice per Week Monday and Thursday. Please inform RN.    #HLD  #CAD s/p stents x2 in 2007  - follows Dr Avila  - Holding home plavix, per chart review, Dr Chang said needs Plavix or ASA not both and per last notes he was supposed to be on ASA, however was discharged on plavix?   - c/w statin     #DM Type 2  - Home med: Metformin 500 mg BID  - ISS to keep -180    #Misc:  - DVT prophylaxis: SCDs  - Feeds: DASH/TLC  - Prophylaxis: protonix  - Activity: IAT  - Code status: Full Assessment and Plan:   · Assessment	  Pt is a 84y male, with PMHx AVMs, CAD s/p stents x 2 (2007), asbestosis of lung, malignant mesothelioma, s/p VATS, HLD, DM, and JASMIN, arrived at the ED on 11/26 due to abnormal Hb (6.4). Pt did not have active bloody stools bedside and denied CP, Ab pain, N/V/D, dizziness, and UTI sxs. Prior EGD led to bleeding on contact but hemostasis was achieved. In the ED, Hb was 7.1, and CHINYERE positive for black stools in ED and (+) Ab screening. pRBC transfusion was deferred and pt was admitted to obtain further w/u, including iron studies to determine if iron supplementation will be needed and wad Dx'd with melena and anemia. Pt is currently admitted with the primary diagnosis of anemia and melena, likely 2/2 to upper GID.    · Plan  # Possible UGI bleed  # Acute on chronic anemia  # Hx Angioectasia of duodenum  - Plan completing his bowel prep today, was NPO since midnight - for scheduled EGD/colonoscopy today, GI will F/U; pt had no blood in stool or urine overnight. Pt is hemodynamically stable  - Labs as of 12/04/24, 07:43:                  Hb 7.9[L], stable    WBC 8.07 )---------------------(                  HCT 25.2[L], stable     - Iron studies as of 11/26, 10:36:  total iron 31 [L]  unsaturated iron binding capacity 247  TIBC 278  % iron saturation 11[L]     - as per GI recs:  --- Maintain active Type and screen, monitor for signs of active bleed or significant drop in Hb  --- Trend H&H and CBC daily, transfuse pRBC is Hb <7  --- Start PPI PO BID x 2 weeks and then q24h thereafter   --- Baseline hemoglobin ~8-9   --- Holding home plavix, resumed ASA instead as below     #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 likely intrinsic 2/2 UTI with urinary retention  #Concern for UTI  - Pt's pre-renal JEY improved and Cr is stable (1.4 as of 12/03/24 and 1.3 today); FeNa 8%  - acknowledged nephro recs  - d/c venofer on 11/30/24  - renally-cleared meds are optimized  - c/w monitoring Cr  - avoid nephrotoxins    #sepsis with ESBL Proteus bacteremia  - as per ID, c/w Meropenem 1g q12h IV, EOT: 12/11/24  - F/U for Proteus sensitivites  - monitor for SIRS and sepsis  - prelim blood Cx on 12/01/24 positive; repeat Blood Cx on 12/02/24 also positive: Gram Negative Rods      -  Proteus species: Detec      -  ESBL: Detec      -  CTX-M Resistance Marker: Detec  - possible  source from Walden placed prior to admission; Walden exchanged 11/29/24    #respiratory distress - resolved  #R sided pleural effusion  - CXR 12/02/24 9:33 AM: Unchanged large right-sided opacity.   - CXR 12/02/24 11:20 AM: Unchanged large opacity projecting over the right lung. Hazy opacities in the left lung are less prominent. No pneumothorax.  - PleurX drained ~700mL from R lung catheter  - Pt received nebulizer with inhaled saline chloride treatment to help clear mucus and chest congestion on 12/02/24    #mild transaminitis  #small hypodensity in segment 7 of the liver seen on prior CT  - recent LFTs as of 12/04/24, 07:43 AM     145  |  112  |  45[H]  ----------------------------<  185[H]  3.8   |         |  1.3    Ca    8.8            | AST 52[H]  Albumin 2.6[L]    | ALT 54[H]  T protein 5.0[L]  | [H]  - RUQ US (performed 12/03/24 11:01 AM) read pending    #malignant mesothelioma (diagnosed 8/2024), stable  #hx Asbestos lung   - F/u heme onc outpatient   - PleurX needs drainage twice per Week Monday and Thursday. Please inform RN.    #HLD  #CAD s/p stents x2 in 2007  - follows Dr Avila  - Holding home plavix, per chart review, Dr Chang said needs Plavix or ASA not both and per last notes he was supposed to be on ASA, however was discharged on plavix?   - c/w statin     #DM Type 2  - Home med: Metformin 500 mg BID  - ISS to keep -180    #Misc:  - DVT prophylaxis: SCDs  - Feeds: DASH/TLC  - Prophylaxis: protonix  - Activity: IAT  - Code status: Full Assessment and Plan:   · Assessment	  Pt is a 84y male, with PMHx AVMs, CAD s/p stents x 2 (2007), asbestosis of lung, malignant mesothelioma, s/p VATS, HLD, DM, and JASMIN, arrived at the ED on 11/26 due to abnormal Hb (6.4). Pt did not have active bloody stools bedside and denied CP, Ab pain, N/V/D, dizziness, and UTI sxs. Prior EGD led to bleeding on contact but hemostasis was achieved. In the ED, Hb was 7.1, and CHINYERE positive for black stools in ED and (+) Ab screening. pRBC transfusion was deferred and pt was admitted to obtain further w/u, including iron studies to determine if iron supplementation will be needed and wad Dx'd with melena and anemia. Pt is currently admitted with the primary diagnosis of anemia and melena, likely 2/2 to upper GID.    · Plan  # Possible UGI bleed  # Acute on chronic anemia  # Hx Angioectasia of duodenum  - Plan completing his bowel prep today, was NPO since midnight - for scheduled EGD/colonoscopy today, GI will F/U; pt had no blood in stool or urine overnight. Pt is hemodynamically stable  - Labs as of 12/04/24, 07:43:                  Hb 7.9[L], stable    WBC 8.07 )---------------------(                  HCT 25.2[L], stable     - Iron studies as of 11/26, 10:36:  total iron 31 [L]  unsaturated iron binding capacity 247  TIBC 278  % iron saturation 11[L]     - as per GI recs:  --- Maintain active Type and screen, monitor for signs of active bleed or significant drop in Hb  --- Trend H&H and CBC daily, transfuse pRBC is Hb <7  --- Start PPI PO BID x 2 weeks and then q24h thereafter   --- Baseline hemoglobin ~8-9   --- Holding home plavix, resumed ASA instead as below     #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 likely intrinsic 2/2 UTI with urinary retention  #Concern for UTI  - Pt's pre-renal JEY improved and Cr is stable (1.4 as of 12/03/24 and 1.3 today); FeNa 8%  - acknowledged nephro recs  - d/c venofer on 11/30/24  - renally-cleared meds are optimized  - c/w monitoring Cr  - avoid nephrotoxins    #sepsis with ESBL Proteus bacteremia  - as per ID, c/w Meropenem 1g q12h IV, EOT: 12/08/24  - F/U for Proteus sensitivites  - monitor for SIRS and sepsis  - prelim blood Cx on 12/01/24 positive; repeat Blood Cx on 12/02/24 also positive: Gram Negative Rods      -  Proteus species: Detec      -  ESBL: Detec      -  CTX-M Resistance Marker: Detec  - possible  source from Walden placed prior to admission; Walden exchanged 11/29/24    #respiratory distress - resolved  #R sided pleural effusion  - CXR 12/02/24 9:33 AM: Unchanged large right-sided opacity.   - CXR 12/02/24 11:20 AM: Unchanged large opacity projecting over the right lung. Hazy opacities in the left lung are less prominent. No pneumothorax.  - PleurX drained ~700mL from R lung catheter  - Pt received nebulizer with inhaled saline chloride treatment to help clear mucus and chest congestion on 12/02/24    #mild transaminitis  #small hypodensity in segment 7 of the liver seen on prior CT  - recent LFTs as of 12/04/24, 07:43 AM     145  |  112  |  45[H]  ----------------------------<  185[H]  3.8   |         |  1.3    Ca    8.8            | AST 52[H]  Albumin 2.6[L]    | ALT 54[H]  T protein 5.0[L]  | [H]  - RUQ US (performed 12/03/24 11:01 AM) read pending    #malignant mesothelioma (diagnosed 8/2024), stable  #hx Asbestos lung   - F/u heme onc outpatient   - PleurX needs drainage twice per Week Monday and Thursday. Please inform RN.    #HLD  #CAD s/p stents x2 in 2007  - follows Dr Avila  - Holding home plavix, per chart review, Dr Chang said needs Plavix or ASA not both and per last notes he was supposed to be on ASA, however was discharged on plavix?   - c/w statin     #DM Type 2  - Home med: Metformin 500 mg BID  - ISS to keep -180    #Misc:  - DVT prophylaxis: SCDs  - Feeds: DASH/TLC  - Prophylaxis: protonix  - Activity: IAT  - Code status: Full

## 2024-12-04 NOTE — PROGRESS NOTE ADULT - ASSESSMENT
84 year old man w PMHx of CKD Stage II ,HLD, DM, CAD, malignant mesothelioma ~8/2024 and s/p VATS pleurodesis, R sided PleurX, h/o AVM/GIB, JASMIN, referred from NH for anemia to 6.5, in ED found to have Hb 7.1, CHINYERE +ve for melena. Patient was admitted to medicine for concern for an upper GI bleed on 11/26/2024. Nephrology consulted for an JEY. Baseline Cr. 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

## 2024-12-04 NOTE — PROGRESS NOTE ADULT - ASSESSMENT
83 y/o man w PMHx of HLD, DM, CAD s/p stents x2 in 2007, pt of Dr Avila, h/o asbestosis of lung, dx w malignant mesothelioma ~8/2024 and s/p VATS pleurodesis, R sided PleurX, h/o AVM/GIB, JASMIN, referred from NH for anemia to 6.5, in ED found to have Hb 7.1, CHINYERE +ve for melena.     # Possible UGI bleed  resolved  # Acute on chronic anemia  with JASMIN   Hx Angioectasia of duodenum  -GI on board   -Trend H&H   tfx to keep hb >7    -GI is planning to scope   follow cbc  daily    #JEY :   #bacteremia /sepsis ,likely urosepsis   on abx as per ID     # Malignant mesothelioma (diagnosed 8/2024 ), Stable  # Hx Asbestos lung   # AMS  likely sec to sepsis   consider MRI brain once sepsis resolves     cont other supportive care  overall prognosis is poor

## 2024-12-04 NOTE — PROGRESS NOTE ADULT - SUBJECTIVE AND OBJECTIVE BOX
Patient is a 84y old  Male who presents with a chief complaint of Melena (04 Dec 2024 13:40)       Pt is seen and examined  pt is awake and lying in bed        ROS:  Negative except for:weakness    MEDICATIONS  (STANDING):  aspirin  chewable 81 milliGRAM(s) Oral daily  atorvastatin 20 milliGRAM(s) Oral at bedtime  chlorhexidine 2% Cloths 1 Application(s) Topical <User Schedule>  dextrose 5%. 1000 milliLiter(s) (100 mL/Hr) IV Continuous <Continuous>  dextrose 5%. 1000 milliLiter(s) (50 mL/Hr) IV Continuous <Continuous>  dextrose 50% Injectable 25 Gram(s) IV Push once  dextrose 50% Injectable 12.5 Gram(s) IV Push once  dextrose 50% Injectable 25 Gram(s) IV Push once  ferrous    sulfate 325 milliGRAM(s) Oral daily  gabapentin 300 milliGRAM(s) Oral three times a day  glucagon  Injectable 1 milliGRAM(s) IntraMuscular once  insulin lispro (ADMELOG) corrective regimen sliding scale   SubCutaneous three times a day before meals  melatonin 3 milliGRAM(s) Oral at bedtime  meropenem  IVPB 1000 milliGRAM(s) IV Intermittent every 12 hours  meropenem  IVPB      pantoprazole    Tablet 40 milliGRAM(s) Oral every 12 hours  polyethylene glycol 3350 17 Gram(s) Oral daily  senna 2 Tablet(s) Oral at bedtime  sodium bicarbonate 650 milliGRAM(s) Oral three times a day  sodium zirconium cyclosilicate 10 Gram(s) Oral once  tamsulosin 0.4 milliGRAM(s) Oral at bedtime    MEDICATIONS  (PRN):  acetaminophen     Tablet .. 975 milliGRAM(s) Oral every 8 hours PRN Mild Pain (1 - 3)  dextrose Oral Gel 15 Gram(s) Oral once PRN Blood Glucose LESS THAN 70 milliGRAM(s)/deciliter      Allergies    penicillin (Unknown)    Intolerances        Vital Signs Last 24 Hrs  T(C): 36.8 (04 Dec 2024 17:17), Max: 36.8 (04 Dec 2024 17:15)  T(F): 98.2 (04 Dec 2024 17:15), Max: 98.2 (04 Dec 2024 17:15)  HR: 79 (04 Dec 2024 17:17) (70 - 83)  BP: 146/79 (04 Dec 2024 17:17) (101/57 - 146/79)  BP(mean): 105 (04 Dec 2024 17:17) (105 - 105)  RR: 18 (04 Dec 2024 17:17) (18 - 18)  SpO2: 99% (04 Dec 2024 17:17) (97% - 99%)    Parameters below as of 04 Dec 2024 17:15  Patient On (Oxygen Delivery Method): room air        PHYSICAL EXAM  General: adult in NAD  HEENT: clear oropharynx, anicteric sclera, pink conjunctiva  Neck: supple  CV: normal S1/S2 with no murmur rubs or gallops  Lungs: positive air movement b/l ant lungs,clear to auscultation, no wheezes, no rales  Abdomen: soft non-tender non-distended, no hepatosplenomegaly  Ext: no clubbing cyanosis or edema  Skin: no rashes and no petechiae  Neuro: alert and oriented X 2, no focal deficits  LABS:                          7.9    8.06  )-----------( 240      ( 04 Dec 2024 07:43 )             25.2         Mean Cell Volume : 95.8 fL  Mean Cell Hemoglobin : 30.0 pg  Mean Cell Hemoglobin Concentration : 31.3 g/dL  Auto Neutrophil # : 7.31 K/uL  Auto Lymphocyte # : 0.24 K/uL  Auto Monocyte # : 0.41 K/uL  Auto Eosinophil # : 0.02 K/uL  Auto Basophil # : 0.01 K/uL  Auto Neutrophil % : 90.7 %  Auto Lymphocyte % : 3.0 %  Auto Monocyte % : 5.1 %  Auto Eosinophil % : 0.2 %  Auto Basophil % : 0.1 %    Serial CBC's  12-04 @ 07:43  Hct-25.2 / Hgb-7.9 / Plat-240 / RBC-2.63 / WBC-8.06          Serial CBC's  12-03 @ 22:51  Hct-25.2 / Hgb-7.9 / Plat-226 / RBC-2.64 / WBC-8.77          Serial CBC's  12-03 @ 06:51  Hct-25.0 / Hgb-7.7 / Plat-224 / RBC-2.56 / WBC-5.65          Serial CBC's  12-02 @ 06:41  Hct-26.0 / Hgb-8.1 / Plat-238 / RBC-2.71 / WBC-7.92          Serial CBC's  12-01 @ 16:00  Hct-27.9 / Hgb-8.6 / Plat-233 / RBC-2.86 / WBC-3.75            12-04    145  |  112[H]  |  45[H]  ----------------------------<  185[H]  3.8   |  21  |  1.3    Ca    8.8      04 Dec 2024 07:43  Mg     2.0     12-04    TPro  5.0[L]  /  Alb  2.6[L]  /  TBili  0.2  /  DBili  x   /  AST  52[H]  /  ALT  54[H]  /  AlkPhos  151[H]  12-04          WBC Count: 8.06 K/uL (12-04-24 @ 07:43)  Hemoglobin: 7.9 g/dL (12-04-24 @ 07:43)  Hematocrit: 25.2 % (12-04-24 @ 07:43)  Platelet Count - Automated: 240 K/uL (12-04-24 @ 07:43)  WBC Count: 8.77 K/uL (12-03-24 @ 22:51)  Hemoglobin: 7.9 g/dL (12-03-24 @ 22:51)  Hematocrit: 25.2 % (12-03-24 @ 22:51)  Platelet Count - Automated: 226 K/uL (12-03-24 @ 22:51)  WBC Count: 5.65 K/uL (12-03-24 @ 06:51)  Hemoglobin: 7.7 g/dL (12-03-24 @ 06:51)  Hematocrit: 25.0 % (12-03-24 @ 06:51)  Platelet Count - Automated: 224 K/uL (12-03-24 @ 06:51)  WBC Count: 7.92 K/uL (12-02-24 @ 06:41)  Hemoglobin: 8.1 g/dL (12-02-24 @ 06:41)  Hematocrit: 26.0 % (12-02-24 @ 06:41)  Platelet Count - Automated: 238 K/uL (12-02-24 @ 06:41)  WBC Count: 3.75 K/uL (12-01-24 @ 16:00)  Hemoglobin: 8.6 g/dL (12-01-24 @ 16:00)  Hematocrit: 27.9 % (12-01-24 @ 16:00)  Platelet Count - Automated: 233 K/uL (12-01-24 @ 16:00)  WBC Count: 5.50 K/uL (12-01-24 @ 08:15)  Hemoglobin: 6.7 g/dL (12-01-24 @ 08:15)  Hematocrit: 21.4 % (12-01-24 @ 08:15)  Platelet Count - Automated: 241 K/uL (12-01-24 @ 08:15)  WBC Count: 10.73 K/uL (11-30-24 @ 11:04)  Hemoglobin: 7.5 g/dL (11-30-24 @ 11:04)  Hematocrit: 24.2 % (11-30-24 @ 11:04)  Platelet Count - Automated: 306 K/uL (11-30-24 @ 11:04)  WBC Count: 15.45 K/uL (11-29-24 @ 07:30)  Hemoglobin: 8.6 g/dL (11-29-24 @ 07:30)  Hematocrit: 27.4 % (11-29-24 @ 07:30)  Platelet Count - Automated: 380 K/uL (11-29-24 @ 07:30)  Hemoglobin: 7.4 g/dL (11-28-24 @ 23:47)  Hematocrit: 23.8 % (11-28-24 @ 23:47)  Hemoglobin: 8.5 g/dL (11-28-24 @ 17:23)  Hematocrit: 26.7 % (11-28-24 @ 17:23)  Hemoglobin: 7.0 g/dL (11-28-24 @ 11:56)  Hematocrit: 22.6 % (11-28-24 @ 11:56)  WBC Count: 7.93 K/uL (11-28-24 @ 07:00)  Hemoglobin: 7.2 g/dL (11-28-24 @ 07:00)  Hematocrit: 23.5 % (11-28-24 @ 07:00)  Platelet Count - Automated: 363 K/uL (11-28-24 @ 07:00)  WBC Count: 7.24 K/uL (11-27-24 @ 11:04)  Hemoglobin: 8.1 g/dL (11-27-24 @ 11:04)  Hematocrit: 26.2 % (11-27-24 @ 11:04)  Platelet Count - Automated: 399 K/uL (11-27-24 @ 11:04)  WBC Count: 6.75 K/uL (11-27-24 @ 07:34)  Hemoglobin: 8.2 g/dL (11-27-24 @ 07:34)  Hematocrit: 26.4 % (11-27-24 @ 07:34)  Platelet Count - Automated: 371 K/uL (11-27-24 @ 07:34)  Reticulocyte Percent: 3.2 % (11-27-24 @ 07:34)  WBC Count: 6.27 K/uL (11-26-24 @ 22:24)  Hemoglobin: 5.4 g/dL (11-26-24 @ 22:24)  Hematocrit: 17.6 % (11-26-24 @ 22:24)  Platelet Count - Automated: 372 K/uL (11-26-24 @ 22:24)  WBC Count: 8.27 K/uL (11-26-24 @ 21:28)  Hemoglobin: 6.6 g/dL (11-26-24 @ 21:28)  Hematocrit: 22.2 % (11-26-24 @ 21:28)  Platelet Count - Automated: 435 K/uL (11-26-24 @ 21:28)  WBC Count: 7.09 K/uL (11-26-24 @ 10:36)  Hemoglobin: 7.1 g/dL (11-26-24 @ 10:36)  Hematocrit: 23.7 % (11-26-24 @ 10:36)  Platelet Count - Automated: 419 K/uL (11-26-24 @ 10:36)  Iron - Total Binding Capacity.: 278 ug/dL (11-26-24 @ 10:36)  Ferritin: 74 ng/mL (11-26-24 @ 10:36)                BLOOD SMEAR INTERPRETATION:       RADIOLOGY & ADDITIONAL STUDIES:

## 2024-12-04 NOTE — PROGRESS NOTE ADULT - SUBJECTIVE AND OBJECTIVE BOX
· Subjective and Objective:   HPI:     CLARI BURGER is a 84y male, with PMHx AVMs, CAD s/p stents x 2 (2007), asbestosis of lung, malignant mesothelioma, s/p VATS, HLD, DM, and JASMIN, arrived at the ED on 11/26 due to abnormal Hb (6.4). Pt did not have active bloody stools bedside and denied CP, Ab pain, N/V/D, dizziness, and UTI sxs. Prior EGD led to bleeding on contact but hemostasis was achieved. In the ED, Hb was 7.1, and CHINYERE positive for black stools in ED and (+) Ab screening. pRBC transfusion was deferred and pt was admitted to obtain further w/u, including iron studies to determine if iron supplementation will be needed and wad Dx'd with melena and anemia.    Pt is currently admitted with the primary diagnosis of anemia and melena, likely 2/2 to upper GID.    Hospital Day: 9d. Pt was seen and evaluated bedside. Pt appears clinically stable with stable VS as of 4:10 AM today. Pt had no acute overnight or major events. Pt has been sleeping well, having BM, and urinating via a [place]. Pt had no bloody BMs nor gross blood in urine. Pt has been on a clear liquid diet yesterday until midnight when he was switched to NPO. Pt is in the process of completing his bowel prep and is scheduled for EGD/colonoscopy. ID, Nephro, and GI evaluated the pt. ID rec to f/u Proteus sensitivities and c/w Meropenem 1g q12h IV. GI rec F/U for pt's anemia and wait until after EGD/colonoscopy. Nephro rec repeating UA, c/w venofer, and adjusting dose of renally-cleared meds.    · OBJECTIVE  PAST MEDICAL & SURGICAL HISTORY  DM (diabetes mellitus)  MI (myocardial infarction)  History of CAD (coronary artery disease)  Dyslipidemia  Currently smokes tobacco  Mesothelioma, malignant  Coronary stent patent                                            -----------------------------------------------------------  ALLERGIES:  penicillin (Unknown)                                          ------------------------------------------------------------    HOME MEDICATIONS  Home Medications:  atorvastatin 20 mg oral tablet: 1 tab(s) orally once a day (at bedtime) (26 Nov 2024 15:34)  ferrous sulfate 325 mg (65 mg elemental iron) oral tablet: 1 tab(s) orally once a day (26 Nov 2024 15:35)  gabapentin 300 mg oral tablet: 1 tab(s) orally 3 times a day (26 Nov 2024 15:35)  Lovenox 30 mg/0.3 mL injectable solution: 30 milligram(s) subcutaneously once a day (26 Nov 2024 15:35)  melatonin 3 mg oral tablet: 1 tab(s) orally once a day (at bedtime) (26 Nov 2024 15:35)  metFORMIN 500 mg oral tablet: 1 tab(s) orally 2 times a day (26 Nov 2024 15:35)  omeprazole 20 mg oral delayed release tablet: 1 tab(s) orally once a day (26 Nov 2024 15:34)  polyethylene glycol 3350 oral powder for reconstitution: 17 gram(s) orally once a day (26 Nov 2024 15:35)  senna leaf extract oral tablet: 2 tab(s) orally once a day (at bedtime) (26 Nov 2024 15:35)  tamsulosin 0.4 mg oral capsule: 1 cap(s) orally once a day (at bedtime) (26 Nov 2024 15:35)       MEDICATIONS:  STANDING MEDICATIONS  aspirin  chewable 81 milliGRAM(s) Oral daily  atorvastatin 20 milliGRAM(s) Oral at bedtime  chlorhexidine 2% Cloths 1 Application(s) Topical <User Schedule>  ferrous    sulfate 325 milliGRAM(s) Oral daily  gabapentin 300 milliGRAM(s) Oral three times a day  glucagon  Injectable 1 milliGRAM(s) IntraMuscular once  insulin lispro (ADMELOG) corrective regimen sliding scale   SubCutaneous three times a day before meals  melatonin 3 milliGRAM(s) Oral at bedtime  meropenem IVPB 1000 mg q12hr  pantoprazole    Tablet 40 milliGRAM(s) Oral every 12 hours  polyethylene glycol 3350 17 Gram(s) Oral daily  senna 2 Tablet(s) Oral at bedtime  sodium chloride 0.9%. 1000 milliLiter(s) IV Continuous <Continuous>  sodium zirconium cylclosilicate, 10 mg, PO X 1 dose  tamsulosin 0.4 milliGRAM(s) Oral at bedtime                                            ------------------------------------------------------------  VITAL SIGNS: Last 24 Hours  12/04/24, 4:10 AM    T(F): 97.8  BP: 126/71  HR: 83  RR: 18    PHYSICAL EXAM:  GENERAL: AAOx3, no acute distress; not-ill appearing  HEAD:  Atraumatic, Normocephalic  CHEST/LUNG: CTA b/l; no wheezing; no rales; no rhonchi; +PleurX drain in place, no signs of infection, drainage, ecchymosis, or pus collection  HEART: Regular rate and rhythm; normal S1, S2, No murmurs; no friction rubs  ABDOMEN: Soft, nontender, nondistended; Bowel sounds present  EXTREMITIES: no signs of infection, drainage, ecchymosis, or pus collection; no pitting edema of LE, B/L; +0/5 muscular strength of LE, B/L; +3/5 muscular strength of UE, B/L                                           --------------------------------------------------------------  LABS:  12/04/24, 07:43 AM                 7.9[L], stable    8.07 )---------------------( 240             25.2[L], stable     auto neutrophil % = 90.7  abs neutrophil = 7.31[H]  abs lymphocytes = 0.24[L]    12/04/24, 07:43 AM     145  |  112  |  45[H]  ----------------------------<  185[H]  3.8   |         |  1.3    Ca    8.8            | AST 52[H]  Albumin 2.6[L]    | ALT 54[H]  T protein 5.0[L]  | [H]    T bili 0.2  Mg     2.0    11/26/24 10:36    total iron 31 [L]  unsaturated iron binding capacity 247  TIBC 278  % iron saturation 11[L]    12-02-24 @ 19:00   IN: 0 mL / OUT: 700 mL / NET: -700 mL    11-28-24 @ 07:01  -  11-29-24 @ 07:00  IN: 835 mL / OUT: 450 mL / NET: 385 mL      Urinalysis Basic - ( 02 Dec 2024 06:41 )    Color: x / Appearance: x / SG: x / pH: x  Gluc: 149 mg/dL / Ketone: x  / Bili: x / Urobili: x   Blood: x / Protein: x / Nitrite: x   Leuk Esterase: x / RBC: x / WBC x   Sq Epi: x / Non Sq Epi: x / Bacteria: x    Culture - Blood (collected 12-01-24 @ 16:00)  Source: .Blood BLOOD  Gram Stain (12-02-24 @ 11:08):    Growth in aerobic and anaerobic bottles: Gram Negative Rods  Preliminary Report (12-02-24 @ 11:08):    Growth in aerobic and anaerobic bottles: Gram Negative Rods    Direct identification is available within approximately 3-5    hours either by Blood Panel Multiplexed PCR or Direct    MALDI-TOF. Details: [email or web address]  Organism: Blood Culture PCR (12-02-24 @ 12:43)  Organism: Blood Culture PCR (12-02-24 @ 12:43)      Method Type: PCR      -  Proteus species: Detec      -  ESBL: Detec      -  CTX-M Resistance Marker: Detec    Culture - Blood (collected 12-01-24 @ 16:00)  Source: .Blood BLOOD  Gram Stain (12-02-24 @ 11:40):    Growth in aerobic bottle: Gram Negative Rods    Growth in anaerobic bottle: Gram Negative Rods  Preliminary Report (12-02-24 @ 11:40):    Growth in aerobic bottle: Gram Negative Rods    Growth in anaerobic bottle: Gram Negative Rods    Lactate, Blood: 2.3 mmol/L (12-01-24 @ 16:00)

## 2024-12-04 NOTE — PROGRESS NOTE ADULT - SUBJECTIVE AND OBJECTIVE BOX
RADHACLARI MUNROE  84y, Male  Allergy: penicillin (Unknown)      LOS  8d    CHIEF COMPLAINT: Melena (04 Dec 2024 10:57)      INTERVAL EVENTS/HPI  - No acute events overnight doing prep for colonoscopy  - T(F): , Max: 97.8 (12-04-24 @ 04:10)  - Denies any worsening symptoms  - Tolerating medication  - WBC Count: 8.06 (12-04-24 @ 07:43)  WBC Count: 8.77 (12-03-24 @ 22:51)     - Creatinine: 1.3 (12-04-24 @ 07:43)  Creatinine: 1.4 (12-03-24 @ 22:51)       ROS  General: Denies rigors, nightsweats  HEENT: Denies headache, rhinorrhea, sore throat, eye pain  CV: Denies CP, palpitations  PULM: Denies wheezing, hemoptysis  GI: Denies hematemesis, hematochezia, melena  : Denies discharge, hematuria  MSK: Denies arthralgias, myalgias  SKIN: Denies rash, lesions  NEURO: Denies paresthesias, weakness  PSYCH: Denies depression, anxiety    VITALS:  T(F): 97.5, Max: 97.8 (12-04-24 @ 04:10)  HR: 72  BP: 136/66  RR: 18Vital Signs Last 24 Hrs  T(C): 36.4 (04 Dec 2024 12:36), Max: 36.6 (04 Dec 2024 04:10)  T(F): 97.5 (04 Dec 2024 12:36), Max: 97.8 (04 Dec 2024 04:10)  HR: 72 (04 Dec 2024 12:36) (70 - 83)  BP: 136/66 (04 Dec 2024 12:36) (101/57 - 136/66)  BP(mean): --  RR: 18 (04 Dec 2024 12:36) (18 - 18)  SpO2: 98% (04 Dec 2024 12:36) (97% - 98%)        PHYSICAL EXAM:  Gen: NAD, resting in bed  HEENT: Normocephalic, atraumatic  Neck: supple, no lymphadenopathy  CV: Regular rate & regular rhythm  Lungs: decreased BS at bases, no fremitus  Abdomen: Soft, BS present  Ext: Warm, well perfused  Neuro: non focal, awake  Skin: no rash, no erythema  Lines: no phlebitis    FH: Non-contributory  Social Hx: Non-contributory    TESTS & MEASUREMENTS:                        7.9    8.06  )-----------( 240      ( 04 Dec 2024 07:43 )             25.2     12-04    145  |  112[H]  |  45[H]  ----------------------------<  185[H]  3.8   |  21  |  1.3    Ca    8.8      04 Dec 2024 07:43  Mg     2.0     12-04    TPro  5.0[L]  /  Alb  2.6[L]  /  TBili  0.2  /  DBili  x   /  AST  52[H]  /  ALT  54[H]  /  AlkPhos  151[H]  12-04      LIVER FUNCTIONS - ( 04 Dec 2024 07:43 )  Alb: 2.6 g/dL / Pro: 5.0 g/dL / ALK PHOS: 151 U/L / ALT: 54 U/L / AST: 52 U/L / GGT: x           Urinalysis Basic - ( 04 Dec 2024 07:43 )    Color: x / Appearance: x / SG: x / pH: x  Gluc: 185 mg/dL / Ketone: x  / Bili: x / Urobili: x   Blood: x / Protein: x / Nitrite: x   Leuk Esterase: x / RBC: x / WBC x   Sq Epi: x / Non Sq Epi: x / Bacteria: x        Culture - Blood (collected 12-01-24 @ 16:00)  Source: .Blood BLOOD  Gram Stain (12-02-24 @ 11:08):    Growth in aerobic and anaerobic bottles: Gram Negative Rods  Final Report (12-04-24 @ 09:46):    Growth in aerobic and anaerobic bottles: Proteus mirabilis ESBL    Direct identification is available within approximately 3-5    hours either by Blood Panel Multiplexed PCR or Direct    MALDI-TOF. Details: [email or web address]  Organism: Blood Culture PCR  Proteus mirabilis ESBL (12-04-24 @ 09:46)  Organism: Proteus mirabilis ESBL (12-04-24 @ 09:46)      Method Type: JOSE J      -  Ampicillin: R >16 These ampicillin results predict results for amoxicillin      -  Ampicillin/Sulbactam: R 8/4      -  Aztreonam: R <=4      -  Cefazolin: R >16      -  Cefepime: R >16      -  Ceftriaxone: R >32      -  Ciprofloxacin: R >2      -  Ertapenem: S <=0.5      -  Gentamicin: R 8      -  Levofloxacin: R >4      -  Meropenem: S <=1      -  Piperacillin/Tazobactam: R <=8      -  Tobramycin: I 4      -  Trimethoprim/Sulfamethoxazole: R >2/38  Organism: Blood Culture PCR (12-04-24 @ 09:46)      Method Type: PCR      -  Proteus species: Detec      -  ESBL: Detec      -  CTX-M Resistance Marker: Detec    Culture - Blood (collected 12-01-24 @ 16:00)  Source: .Blood BLOOD  Gram Stain (12-02-24 @ 11:40):    Growth in aerobic bottle: Gram Negative Rods    Growth in anaerobic bottle: Gram Negative Rods  Final Report (12-04-24 @ 09:47):    Growth in aerobic and anaerobic bottles: Proteus mirabilis ESBL    See previous culture [id number]    Culture - Urine (collected 12-01-24 @ 15:13)  Source: Catheterized Catheterized  Final Report (12-03-24 @ 18:28):    >100,000 CFU/ml Proteus mirabilis ESBL    Unable to evaluate further due to Proteus overgrowth  Organism: Proteus mirabilis ESBL (12-03-24 @ 18:28)  Organism: Proteus mirabilis ESBL (12-03-24 @ 18:28)      Method Type: JOSE J      -  Ampicillin: R >16 These ampicillin results predict results for amoxicillin      -  Ampicillin/Sulbactam: S 8/4      -  Aztreonam: R <=4      -  Cefazolin: R >16 For uncomplicated UTI with K. pneumoniae, E. coli, or P. mirablis: JOSE J <=16 is sensitive and JOSE J >=32 is resistant. This also predicts results for oral agents cefaclor, cefdinir, cefpodoxime, cefprozil, cefuroxime axetil, cephalexin and locarbef for uncomplicated UTI. Note that some isolates may be susceptible to these agents while testing resistant to cefazolin.      -  Cefepime: R 4      -  Ceftriaxone: R 8      -  Cefuroxime: R >16      -  Ciprofloxacin: R >2      -  Ertapenem: S <=0.5      -  Gentamicin: I 4      -  Levofloxacin: R >4      -  Meropenem: S <=1      -  Nitrofurantoin: R >64 Should not be used to treat pyelonephritis      -  Piperacillin/Tazobactam: S <=8      -  Tobramycin: S <=2      -  Trimethoprim/Sulfamethoxazole: R >2/38        Lactate, Blood: 2.3 mmol/L (12-01-24 @ 16:00)      INFECTIOUS DISEASES TESTING  Procalcitonin: 1.59 (12-01-24 @ 16:00)      INFLAMMATORY MARKERS      RADIOLOGY & ADDITIONAL TESTS:  I have personally reviewed the last available Chest xray  CXR  Xray Chest 1 View AP/PA:   ACC: [id number] EXAM:  XR CHEST 1 VIEW   ORDERED BY: DAVID DING     PROCEDURE DATE:  12/01/2024          INTERPRETATION:  CLINICAL HISTORY: Fever    COMPARISON: October 28, 2024    TECHNIQUE: Frontal.    FINDINGS:    Support devices: Right basilar chest tube.    Cardiac/mediastinum/hilum: Heart size within normal limits, thoracic   aortic calcification..    Lung parenchyma/Pleura: Right apical opacity. Right basilar   opacity/pleural effusion.    Skeleton/soft tissues: Stable.      IMPRESSION:    Right basilar opacity/pleural effusion, increased. Redemonstration right   apical opacity.    --- End of Report ---            NEISHA PAULA MD; Attending Radiologist  This document has been electronically signed. Dec  2 2024  5:35AM (12-01-24 @ 15:25)      CT      CARDIOLOGY TESTING  12 Lead ECG:   Ventricular Rate 92 BPM    Atrial Rate 92 BPM    P-R Interval 132 ms    QRS Duration 100 ms    Q-T Interval 352 ms    QTC Calculation(Bazett) 435 ms    P Axis 51 degrees    R Axis 101 degrees    T Axis -80 degrees    Diagnosis Line Sinus rhythm with Premature atrial complexes  Rightward axis  ST & T wave abnormality, consider inferolateral ischemia  Abnormal ECG    Confirmed by Cesar Hong ([id number]) on 11/26/2024 5:29:19 PM (11-26-24 @ 10:51)      MEDICATIONS  aspirin  chewable 81 Oral daily  atorvastatin 20 Oral at bedtime  chlorhexidine 2% Cloths 1 Topical <User Schedule>  dextrose 5%. 1000 IV Continuous <Continuous>  dextrose 5%. 1000 IV Continuous <Continuous>  dextrose 50% Injectable 25 IV Push once  dextrose 50% Injectable 12.5 IV Push once  dextrose 50% Injectable 25 IV Push once  ferrous    sulfate 325 Oral daily  gabapentin 300 Oral three times a day  glucagon  Injectable 1 IntraMuscular once  insulin lispro (ADMELOG) corrective regimen sliding scale  SubCutaneous three times a day before meals  melatonin 3 Oral at bedtime  meropenem  IVPB 1000 IV Intermittent every 12 hours  meropenem  IVPB     pantoprazole    Tablet 40 Oral every 12 hours  polyethylene glycol 3350 17 Oral daily  senna 2 Oral at bedtime  sodium bicarbonate 650 Oral three times a day  sodium zirconium cyclosilicate 10 Oral once  tamsulosin 0.4 Oral at bedtime      WEIGHT  Weight (kg): 56.9 (12-04-24 @ 12:36)  Creatinine: 1.3 mg/dL (12-04-24 @ 07:43)  Creatinine: 1.4 mg/dL (12-03-24 @ 22:51)      ANTIBIOTICS:  meropenem  IVPB 1000 milliGRAM(s) IV Intermittent every 12 hours  meropenem  IVPB          All available historical records have been reviewed

## 2024-12-05 NOTE — PROGRESS NOTE ADULT - SUBJECTIVE AND OBJECTIVE BOX
Patient is a 84y old  Male who presents with a chief complaint of Melena (05 Dec 2024 11:56)       Pt is seen and examined  pt is awake and lying in bed        ROS:  Negative except for:weakness    MEDICATIONS  (STANDING):  aspirin  chewable 81 milliGRAM(s) Oral daily  atorvastatin 20 milliGRAM(s) Oral at bedtime  chlorhexidine 2% Cloths 1 Application(s) Topical <User Schedule>  dextrose 5%. 1000 milliLiter(s) (50 mL/Hr) IV Continuous <Continuous>  dextrose 5%. 1000 milliLiter(s) (100 mL/Hr) IV Continuous <Continuous>  dextrose 50% Injectable 25 Gram(s) IV Push once  dextrose 50% Injectable 12.5 Gram(s) IV Push once  dextrose 50% Injectable 25 Gram(s) IV Push once  ferrous    sulfate 325 milliGRAM(s) Oral daily  gabapentin 300 milliGRAM(s) Oral three times a day  glucagon  Injectable 1 milliGRAM(s) IntraMuscular once  insulin lispro (ADMELOG) corrective regimen sliding scale   SubCutaneous three times a day before meals  melatonin 3 milliGRAM(s) Oral at bedtime  meropenem  IVPB 1000 milliGRAM(s) IV Intermittent every 12 hours  meropenem  IVPB      pantoprazole    Tablet 40 milliGRAM(s) Oral every 12 hours  polyethylene glycol 3350 17 Gram(s) Oral daily  senna 2 Tablet(s) Oral at bedtime  sodium bicarbonate 650 milliGRAM(s) Oral three times a day  sodium zirconium cyclosilicate 10 Gram(s) Oral once  tamsulosin 0.4 milliGRAM(s) Oral at bedtime    MEDICATIONS  (PRN):  acetaminophen     Tablet .. 975 milliGRAM(s) Oral every 8 hours PRN Mild Pain (1 - 3)  dextrose Oral Gel 15 Gram(s) Oral once PRN Blood Glucose LESS THAN 70 milliGRAM(s)/deciliter      Allergies    penicillin (Unknown)    Intolerances        Vital Signs Last 24 Hrs  T(C): 36.7 (05 Dec 2024 18:00), Max: 36.7 (05 Dec 2024 18:00)  T(F): 98 (05 Dec 2024 18:00), Max: 98 (05 Dec 2024 18:00)  HR: 60 (05 Dec 2024 18:00) (52 - 113)  BP: 104/47 (05 Dec 2024 18:00) (104/47 - 146/74)  BP(mean): 66 (05 Dec 2024 18:00) (66 - 98)  RR: 18 (05 Dec 2024 04:58) (18 - 18)  SpO2: 97% (04 Dec 2024 20:20) (97% - 97%)        PHYSICAL EXAM  General: adult in NAD  HEENT: clear oropharynx, anicteric sclera, pink conjunctiva  Neck: supple  CV: normal S1/S2 with no murmur rubs or gallops  Lungs: positive air movement b/l ant lungs,clear to auscultation, no wheezes, no rales  Abdomen: soft non-tender non-distended, no hepatosplenomegaly  Ext: no clubbing cyanosis or edema  Skin: no rashes and no petechiae  Neuro: alert and oriented X 2, no focal deficits  LABS:                          7.9    6.60  )-----------( 265      ( 05 Dec 2024 04:30 )             25.5         Mean Cell Volume : 96.2 fL  Mean Cell Hemoglobin : 29.8 pg  Mean Cell Hemoglobin Concentration : 31.0 g/dL  Auto Neutrophil # : 5.67 K/uL  Auto Lymphocyte # : 0.43 K/uL  Auto Monocyte # : 0.38 K/uL  Auto Eosinophil # : 0.05 K/uL  Auto Basophil # : 0.00 K/uL  Auto Neutrophil % : 85.8 %  Auto Lymphocyte % : 6.5 %  Auto Monocyte % : 5.8 %  Auto Eosinophil % : 0.8 %  Auto Basophil % : 0.0 %    Serial CBC's  12-05 @ 04:30  Hct-25.5 / Hgb-7.9 / Plat-265 / RBC-2.65 / WBC-6.60          Serial CBC's  12-04 @ 07:43  Hct-25.2 / Hgb-7.9 / Plat-240 / RBC-2.63 / WBC-8.06          Serial CBC's  12-03 @ 22:51  Hct-25.2 / Hgb-7.9 / Plat-226 / RBC-2.64 / WBC-8.77          Serial CBC's  12-03 @ 06:51  Hct-25.0 / Hgb-7.7 / Plat-224 / RBC-2.56 / WBC-5.65          Serial CBC's  12-02 @ 06:41  Hct-26.0 / Hgb-8.1 / Plat-238 / RBC-2.71 / WBC-7.92            12-05    148[H]  |  114[H]  |  36[H]  ----------------------------<  122[H]  3.6   |  20  |  1.2    Ca    8.3[L]      05 Dec 2024 04:30  Mg     2.0     12-04    TPro  4.9[L]  /  Alb  2.4[L]  /  TBili  <0.2  /  DBili  x   /  AST  16  /  ALT  32  /  AlkPhos  140[H]  12-05          WBC Count: 6.60 K/uL (12-05-24 @ 04:30)  Hemoglobin: 7.9 g/dL (12-05-24 @ 04:30)  Hematocrit: 25.5 % (12-05-24 @ 04:30)  Platelet Count - Automated: 265 K/uL (12-05-24 @ 04:30)  WBC Count: 8.06 K/uL (12-04-24 @ 07:43)  Hemoglobin: 7.9 g/dL (12-04-24 @ 07:43)  Hematocrit: 25.2 % (12-04-24 @ 07:43)  Platelet Count - Automated: 240 K/uL (12-04-24 @ 07:43)  WBC Count: 8.77 K/uL (12-03-24 @ 22:51)  Hemoglobin: 7.9 g/dL (12-03-24 @ 22:51)  Hematocrit: 25.2 % (12-03-24 @ 22:51)  Platelet Count - Automated: 226 K/uL (12-03-24 @ 22:51)  WBC Count: 5.65 K/uL (12-03-24 @ 06:51)  Hemoglobin: 7.7 g/dL (12-03-24 @ 06:51)  Hematocrit: 25.0 % (12-03-24 @ 06:51)  Platelet Count - Automated: 224 K/uL (12-03-24 @ 06:51)  WBC Count: 7.92 K/uL (12-02-24 @ 06:41)  Hemoglobin: 8.1 g/dL (12-02-24 @ 06:41)  Hematocrit: 26.0 % (12-02-24 @ 06:41)  Platelet Count - Automated: 238 K/uL (12-02-24 @ 06:41)  WBC Count: 3.75 K/uL (12-01-24 @ 16:00)  Hemoglobin: 8.6 g/dL (12-01-24 @ 16:00)  Hematocrit: 27.9 % (12-01-24 @ 16:00)  Platelet Count - Automated: 233 K/uL (12-01-24 @ 16:00)  WBC Count: 5.50 K/uL (12-01-24 @ 08:15)  Hemoglobin: 6.7 g/dL (12-01-24 @ 08:15)  Hematocrit: 21.4 % (12-01-24 @ 08:15)  Platelet Count - Automated: 241 K/uL (12-01-24 @ 08:15)  WBC Count: 10.73 K/uL (11-30-24 @ 11:04)  Hemoglobin: 7.5 g/dL (11-30-24 @ 11:04)  Hematocrit: 24.2 % (11-30-24 @ 11:04)  Platelet Count - Automated: 306 K/uL (11-30-24 @ 11:04)  WBC Count: 15.45 K/uL (11-29-24 @ 07:30)  Hemoglobin: 8.6 g/dL (11-29-24 @ 07:30)  Hematocrit: 27.4 % (11-29-24 @ 07:30)  Platelet Count - Automated: 380 K/uL (11-29-24 @ 07:30)  Hemoglobin: 7.4 g/dL (11-28-24 @ 23:47)  Hematocrit: 23.8 % (11-28-24 @ 23:47)  Hemoglobin: 8.5 g/dL (11-28-24 @ 17:23)  Hematocrit: 26.7 % (11-28-24 @ 17:23)  Hemoglobin: 7.0 g/dL (11-28-24 @ 11:56)  Hematocrit: 22.6 % (11-28-24 @ 11:56)  WBC Count: 7.93 K/uL (11-28-24 @ 07:00)  Hemoglobin: 7.2 g/dL (11-28-24 @ 07:00)  Hematocrit: 23.5 % (11-28-24 @ 07:00)  Platelet Count - Automated: 363 K/uL (11-28-24 @ 07:00)  WBC Count: 7.24 K/uL (11-27-24 @ 11:04)  Hemoglobin: 8.1 g/dL (11-27-24 @ 11:04)  Hematocrit: 26.2 % (11-27-24 @ 11:04)  Platelet Count - Automated: 399 K/uL (11-27-24 @ 11:04)  WBC Count: 6.75 K/uL (11-27-24 @ 07:34)  Hemoglobin: 8.2 g/dL (11-27-24 @ 07:34)  Hematocrit: 26.4 % (11-27-24 @ 07:34)  Platelet Count - Automated: 371 K/uL (11-27-24 @ 07:34)  Reticulocyte Percent: 3.2 % (11-27-24 @ 07:34)  WBC Count: 6.27 K/uL (11-26-24 @ 22:24)  Hemoglobin: 5.4 g/dL (11-26-24 @ 22:24)  Hematocrit: 17.6 % (11-26-24 @ 22:24)  Platelet Count - Automated: 372 K/uL (11-26-24 @ 22:24)  WBC Count: 8.27 K/uL (11-26-24 @ 21:28)  Hemoglobin: 6.6 g/dL (11-26-24 @ 21:28)  Hematocrit: 22.2 % (11-26-24 @ 21:28)  Platelet Count - Automated: 435 K/uL (11-26-24 @ 21:28)  WBC Count: 7.09 K/uL (11-26-24 @ 10:36)  Hemoglobin: 7.1 g/dL (11-26-24 @ 10:36)  Hematocrit: 23.7 % (11-26-24 @ 10:36)  Platelet Count - Automated: 419 K/uL (11-26-24 @ 10:36)  Iron - Total Binding Capacity.: 278 ug/dL (11-26-24 @ 10:36)  Ferritin: 74 ng/mL (11-26-24 @ 10:36)                BLOOD SMEAR INTERPRETATION:       RADIOLOGY & ADDITIONAL STUDIES:

## 2024-12-05 NOTE — PROGRESS NOTE ADULT - SUBJECTIVE AND OBJECTIVE BOX
· Subjective and Objective:   HPI:     CLARI BURGER is a 84y male, with PMHx AVMs, CAD s/p stents x 2 (2007), asbestosis of lung, malignant mesothelioma, s/p VATS, HLD, DM, and JASMIN, arrived at the ED on 11/26 due to abnormal Hb (6.4). Pt did not have active bloody stools bedside and denied CP, Ab pain, N/V/D, dizziness, and UTI sxs. Prior EGD led to bleeding on contact but hemostasis was achieved. In the ED, Hb was 7.1, and CHINYERE positive for black stools in ED and (+) Ab screening. pRBC transfusion was deferred and pt was admitted to obtain further w/u, including iron studies to determine if iron supplementation will be needed and wad Dx'd with melena and anemia.    Pt is currently admitted with the primary diagnosis of anemia and melena, likely 2/2 to upper GID.    Hospital Day: 10d. Pt was seen and evaluated bedside. Pt appears clinically stable with stable VS as of 4:58 AM today. Pt had no acute overnight or major events. Pt has been sleeping well, having BM, and urinating via a Walden. Pt had no bloody BMs nor gross blood in urine. Pt is conversant and not confused. Pt switched to normal, solid diet. Nutrition, Heme-Onc, and ID evaluated pt. ID rec to c/w meropenem until 12/08/24, and pt is tolerated the Abx well. Discussed with son today on update of plan.    · OBJECTIVE  PAST MEDICAL & SURGICAL HISTORY  DM (diabetes mellitus)  MI (myocardial infarction)  History of CAD (coronary artery disease)  Dyslipidemia  Currently smokes tobacco  Mesothelioma, malignant  Coronary stent patent                                            -----------------------------------------------------------  ALLERGIES:  penicillin (Unknown)                                          ------------------------------------------------------------    HOME MEDICATIONS  Home Medications:  atorvastatin 20 mg oral tablet: 1 tab(s) orally once a day (at bedtime) (26 Nov 2024 15:34)  ferrous sulfate 325 mg (65 mg elemental iron) oral tablet: 1 tab(s) orally once a day (26 Nov 2024 15:35)  gabapentin 300 mg oral tablet: 1 tab(s) orally 3 times a day (26 Nov 2024 15:35)  Lovenox 30 mg/0.3 mL injectable solution: 30 milligram(s) subcutaneously once a day (26 Nov 2024 15:35)  melatonin 3 mg oral tablet: 1 tab(s) orally once a day (at bedtime) (26 Nov 2024 15:35)  metFORMIN 500 mg oral tablet: 1 tab(s) orally 2 times a day (26 Nov 2024 15:35)  omeprazole 20 mg oral delayed release tablet: 1 tab(s) orally once a day (26 Nov 2024 15:34)  polyethylene glycol 3350 oral powder for reconstitution: 17 gram(s) orally once a day (26 Nov 2024 15:35)  senna leaf extract oral tablet: 2 tab(s) orally once a day (at bedtime) (26 Nov 2024 15:35)  tamsulosin 0.4 mg oral capsule: 1 cap(s) orally once a day (at bedtime) (26 Nov 2024 15:35)       MEDICATIONS:  STANDING MEDICATIONS  acetaminophen 975 mg q8hr PRN  aspirin  chewable 81 milliGRAM(s) Oral daily  atorvastatin 20 milliGRAM(s) Oral at bedtime  chlorhexidine 2% Cloths 1 Application(s) Topical <User Schedule>  ferrous    sulfate 325 milliGRAM(s) Oral daily  gabapentin 300 milliGRAM(s) Oral three times a day  insulin lispro (ADMELOG) corrective regimen sliding scale   SubCutaneous three times a day before meals  melatonin 3 milliGRAM(s) Oral at bedtime  meropenem IVPB 1000 mg q12hr  pantoprazole    Tablet 40 milliGRAM(s) Oral every 12 hours  polyethylene glycol 3350 17 Gram(s) Oral daily  senna 2 Tablet(s) Oral at bedtime  sodium chloride 0.9%. 1000 milliLiter(s) IV Continuous <Continuous>  sodium zirconium cylclosilicate, 10 mg, PO X 1 dose  tamsulosin 0.4 milliGRAM(s) Oral at bedtime                                            ------------------------------------------------------------  VITAL SIGNS: Last 24 Hours  12/05/24, 4:58 AM    T(F): 97.7  BP: 107/59  HR: 56  RR: 18    PHYSICAL EXAM:  GENERAL: AAOx3, no acute distress; not-ill appearing; conversant  HEAD:  Atraumatic, Normocephalic  CHEST/LUNG: CTA b/l; no wheezing; no rales; no rhonchi; +PleurX drain in place, no signs of infection, drainage, ecchymosis, or pus collection  HEART: Regular rate and rhythm; normal S1, S2, No murmurs; no friction rubs  ABDOMEN: Soft, nontender, nondistended; Bowel sounds present  EXTREMITIES: no signs of infection, drainage, ecchymosis, or pus collection; no pitting edema of LE, B/L; +0/5 muscular strength of LE, B/L; +3/5 muscular strength of UE, B/L                                           --------------------------------------------------------------  LABS:  12/05/24, 04:30 AM                 7.9[L], stable    6.60 )---------------------( 265             25.5[L], stable     auto neutrophil % = 85.8[H]  abs neutrophil = 5.67  abs lymphocytes = 0.43[L]    12/05/24, 04:30    148[H]  |  114  |  36[H]  ----------------------------<  122[H], stable  3.6       |          |  1.2    Ca    8.3 ---> corrected Ca   9.6     | AST 16]  Albumin 2.4[L]                             | ALT 32  T protein 4.9[L]                             | [H]    T bili <0.2    12/04/24, 07:43  Mg     2.0    11/26/24 10:36    total iron 31 [L]  unsaturated iron binding capacity 247  TIBC 278  % iron saturation 11[L]    12-02-24 @ 19:00   IN: 0 mL / OUT: 700 mL / NET: -700 mL    11-28-24 @ 07:01  -  11-29-24 @ 07:00  IN: 835 mL / OUT: 450 mL / NET: 385 mL      Urinalysis Basic - ( 02 Dec 2024 06:41 )    Color: x / Appearance: x / SG: x / pH: x  Gluc: 149 mg/dL / Ketone: x  / Bili: x / Urobili: x   Blood: x / Protein: x / Nitrite: x   Leuk Esterase: x / RBC: x / WBC x   Sq Epi: x / Non Sq Epi: x / Bacteria: x    Culture - Blood (collected 12-01-24 @ 16:00)  Source: .Blood BLOOD  Gram Stain (12-02-24 @ 11:08):    Growth in aerobic and anaerobic bottles: Gram Negative Rods  Preliminary Report (12-02-24 @ 11:08):    Growth in aerobic and anaerobic bottles: Gram Negative Rods    Direct identification is available within approximately 3-5    hours either by Blood Panel Multiplexed PCR or Direct    MALDI-TOF. Details: [email or web address]  Organism: Blood Culture PCR (12-02-24 @ 12:43)  Organism: Blood Culture PCR (12-02-24 @ 12:43)      Method Type: PCR      -  Proteus species: Detec      -  ESBL: Detec      -  CTX-M Resistance Marker: Detec    Culture - Blood (collected 12-01-24 @ 16:00)  Source: .Blood BLOOD  Gram Stain (12-02-24 @ 11:40):    Growth in aerobic bottle: Gram Negative Rods    Growth in anaerobic bottle: Gram Negative Rods  Preliminary Report (12-02-24 @ 11:40):    Growth in aerobic bottle: Gram Negative Rods    Growth in anaerobic bottle: Gram Negative Rods    Lactate, Blood: 2.3 mmol/L (12-01-24 @ 16:00)

## 2024-12-05 NOTE — PROGRESS NOTE ADULT - ASSESSMENT
Assessment and Plan:   · Assessment	  Pt is a 84y male, with PMHx AVMs, CAD s/p stents x 2 (2007), asbestosis of lung, malignant mesothelioma, s/p VATS, HLD, DM, and JASMIN, arrived at the ED on 11/26 due to abnormal Hb (6.4). Pt did not have active bloody stools bedside and denied CP, Ab pain, N/V/D, dizziness, and UTI sxs. Prior EGD led to bleeding on contact but hemostasis was achieved. In the ED, Hb was 7.1, and CHINYERE positive for black stools in ED and (+) Ab screening. pRBC transfusion was deferred and pt was admitted to obtain further w/u, including iron studies to determine if iron supplementation will be needed and wad Dx'd with melena and anemia. Pt is currently admitted with the primary diagnosis of anemia and melena, likely 2/2 to upper GID.    · Plan  # Possible UGI bleed  # Acute on chronic anemia  # Hx Angioectasia of duodenum  - EGD/colonscopy done yesterday w/o complications yesterday  ---EGD: esophagus normal; erythema of stomach consistent with non-erosive gastritis; previous endoclip in 2nd part of duodenum; 4 small AVMs in proximal jejunum that bled on contact;  ---colonoscopy: 3 mm polyp of ascending colon; 3 polyps of 6-10 mm of ascending colon; 5 mmm polyp of transverse colon; melanosis coli  -will F/U with GI to discuss if Aspirin should be stopped given pt has AVMs. F/U with cardio can also be considered to discuss the risks/rewards of stopping aspirin given pt has hx CAD s/p stents x2 in 2007, post-MI  - Labs as of 12/05/24, 04:30 AM                       Hb 7.9[L], stable    WBC 6.60 )---------------------(                   HCT 25.5[L], stable     auto neutrophil % = 85.8[H]  abs neutrophil = 5.67  abs lymphocytes = 0.43[L]    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 likely intrinsic 2/2 UTI with urinary retention  #Concern for UTI  - nephro recs as of 12/04/24:  --- Pt's pre-renal JEY improved and Cr is stable (1.4 as of 12/03/24 and 1.3 today); FeNa 8%  --- acknowledged nephro recs  --- d/c venofer on 11/30/24  ---renally-cleared meds are optimized  --- c/w monitoring Cr  - avoid nephrotoxins    #sepsis with ESBL Proteus bacteremia  - as per ID, c/w Meropenem 1g q12h IV, EOT: 12/08/24  - monitor for SIRS and sepsis  - will F/U with NH if can provide the Abx in the NH, along side possibly placing midline tomorrow to allow Abx Tx upon d/c in NH    - prelim blood Cx on 12/01/24 positive; repeat Blood Cx on 12/02/24 also positive: Gram Negative Rods      -  Proteus species: Detec      -  ESBL: Detec      -  CTX-M Resistance Marker: Detec  - possible  source from Walden placed prior to admission; Walden exchanged 11/29/24    #respiratory distress - resolved  #R sided pleural effusion  - PleurX to be drained today; currently drainage schedule is every Monday and Thurs, nurse notified    - CXR 12/02/24 9:33 AM: Unchanged large right-sided opacity.   - CXR 12/02/24 11:20 AM: Unchanged large opacity projecting over the right lung. Hazy opacities in the left lung are less prominent. No pneumothorax.  - On 12/02/24, PleurX drained ~700mL from R lung catheter, and pt received nebulizer with inhaled saline chloride treatment to help clear mucus and chest congestion    #mild transaminitis  #small hypodensity in segment 7 of the liver seen on prior CT  - RUQ US: read pending  - recent LFTs as of 12/05/24, 04:30    Ca    8.3 ---> corrected Ca  9.6     | AST 16]  Albumin 2.4[L]                             | ALT 32  T protein 4.9[L]                             | [H]    T bili <0.2    #malignant mesothelioma (diagnosed 8/2024), stable  #hx Asbestos lung   - F/u heme-onc outpatient   - PleurX needs drainage twice per Week Monday and Thursday. Please inform RN.    #HLD  #CAD s/p stents x2 in 2007  - follows Dr Avila  - Holding home plavix, per chart review, Dr Chang said needs Plavix or ASA not both and per last notes he was supposed to be on ASA, however was discharged on plavix?   - c/w statin     #DM Type 2  - Home med: Metformin 500 mg BID  - ISS to keep -180    #Misc:  - DVT prophylaxis: SCDs  - Feeds: DASH/TLC  - Prophylaxis: protonix, senna, miralax  - Activity: IAT  - Code status: Full

## 2024-12-05 NOTE — PROGRESS NOTE ADULT - ASSESSMENT
83 y/o man w PMHx of HLD, DM, CAD s/p stents x2 on plavix, malignant mesothelioma 8/2024 and s/p VATS pleurodesis, R sided PleurX, duodenal AVM, JASMIN, referred from NH for anemia to 6.5, in ED found to have Hb 7.1, Baseline 7-8. The ED reported CHINYERE positive for melena. Gi consulted for suspected GI bleed.     #Acute on chronic macrocytic anemia with JASMIN - found to have duodenal AVMs s/p APC   VCE done 10/24 showing active bleed in the duodenum  S/p EGD 10/28: non-erosive gastritis, AVM in the second part of the duodenum and AVM in the duodenal sweep s/p APC  Was stable and discharged, has denied any recurrent melena or blood per rectum  CHINYERE: dark, brown stool on smear   Hb 7.1; baseline 7-8  No overt GI bleed and hemodynamically stable  On iron tabs at home    EGD Impressions:  Normal esophagus. non-erosive gastritis. (Biopsy). 4small AVMs were seen in the proximal jejunum. The AVMs bled to contact (Thermal Therapy, Endoclip).    Colonoscopy Impressions:  Polyps in the ascending colon. (Polypectomy).    Plan:  -Await pathology results.  -High Fiber Diet  -Colonoscopy in 3 years, (colon polyps) if within goals of care  -Monitor for recurrent bleeding  -Transfuse PRN to keep Hb >8  -Iron deficiency anemia is likely due to small bowel AVMs  -Iron supplementation. Follow up with Hematology   -Can continue with ASA  -Can follow with primary GI or with [facility], whichever patient prefers     #elevated liver enzymes - suspect DILI  -RUQUS w/ doppler - > patient refused   -Avoid hepatoxic meds   -Trend liver enzymes    #Small hypodensity in segment 7 of the liver seen on prior CT.  -Please obtain RUQ US as outpatient if patient willing   -f/u w/ primary GI

## 2024-12-05 NOTE — PROGRESS NOTE ADULT - ASSESSMENT
85 y/o man w PMHx of HLD, DM, CAD s/p stents x2 in 2007, pt of Dr Avila, h/o asbestosis of lung, dx w malignant mesothelioma ~8/2024 and s/p VATS pleurodesis, R sided PleurX, h/o AVM/GIB, JASMIN, referred from NH for anemia to 6.5, in ED found to have Hb 7.1, CHINYERE +ve for melena.     # Possible UGI bleed  resolved  # Acute on chronic anemia  with JASMIN   Hx Angioectasia of duodenum  -GI on board   -Trend H&H   tfx to keep hb >7    -s/p egd/colonoscopy >showed AVM ,s/p cauterization   follow cbc  daily  give one more dose of venofer 200 mg     #JEY :   #bacteremia /sepsis ,likely urosepsis   on abx as per ID     # Malignant mesothelioma (diagnosed 8/2024 ), Stable  # Hx Asbestos lung       cont other supportive care  d/c plan as per primary team   will f/u

## 2024-12-05 NOTE — PROGRESS NOTE ADULT - SUBJECTIVE AND OBJECTIVE BOX
Gastroenterology progress note:     Patient is a 84y old  Male who presents with a chief complaint of Melena (05 Dec 2024 10:50)       Admitted on: 11-26-24    We are following the patient for: anemia       Interval History:    No acute events overnight. s/p EGD/colonoscopy yesterday w/ APC of AVM and polypectomy  - Diet - tolerating liquid   - last BM - yesterday  - Abdominal pain - denies      PAST MEDICAL & SURGICAL HISTORY:  DM (diabetes mellitus)      MI (myocardial infarction)      History of CAD (coronary artery disease)      Dyslipidemia      Currently smokes tobacco      Mesothelioma, malignant      Coronary stent patent          MEDICATIONS  (STANDING):  aspirin  chewable 81 milliGRAM(s) Oral daily  atorvastatin 20 milliGRAM(s) Oral at bedtime  chlorhexidine 2% Cloths 1 Application(s) Topical <User Schedule>  dextrose 5%. 1000 milliLiter(s) (50 mL/Hr) IV Continuous <Continuous>  dextrose 5%. 1000 milliLiter(s) (100 mL/Hr) IV Continuous <Continuous>  dextrose 50% Injectable 25 Gram(s) IV Push once  dextrose 50% Injectable 12.5 Gram(s) IV Push once  dextrose 50% Injectable 25 Gram(s) IV Push once  ferrous    sulfate 325 milliGRAM(s) Oral daily  gabapentin 300 milliGRAM(s) Oral three times a day  glucagon  Injectable 1 milliGRAM(s) IntraMuscular once  insulin lispro (ADMELOG) corrective regimen sliding scale   SubCutaneous three times a day before meals  melatonin 3 milliGRAM(s) Oral at bedtime  meropenem  IVPB 1000 milliGRAM(s) IV Intermittent every 12 hours  meropenem  IVPB      pantoprazole    Tablet 40 milliGRAM(s) Oral every 12 hours  polyethylene glycol 3350 17 Gram(s) Oral daily  senna 2 Tablet(s) Oral at bedtime  sodium bicarbonate 650 milliGRAM(s) Oral three times a day  sodium zirconium cyclosilicate 10 Gram(s) Oral once  tamsulosin 0.4 milliGRAM(s) Oral at bedtime    MEDICATIONS  (PRN):  acetaminophen     Tablet .. 975 milliGRAM(s) Oral every 8 hours PRN Mild Pain (1 - 3)  dextrose Oral Gel 15 Gram(s) Oral once PRN Blood Glucose LESS THAN 70 milliGRAM(s)/deciliter      Allergies  penicillin (Unknown)      Review of Systems:   Cardiovascular:  No Chest Pain, No Palpitations  Respiratory:  No Cough, No Dyspnea  Gastrointestinal:  As described in HPI  Skin:  No Skin Lesions, No Jaundice  Neuro:  No Syncope, No Dizziness    Physical Examination:  T(C): 36.5 (12-05-24 @ 04:58), Max: 36.8 (12-04-24 @ 17:15)  HR: 56 (12-05-24 @ 04:58) (52 - 84)  BP: 107/59 (12-05-24 @ 04:58) (93/52 - 146/79)  RR: 18 (12-05-24 @ 04:58) (18 - 18)  SpO2: 97% (12-04-24 @ 20:20) (96% - 99%)  Weight (kg): 56.9 (12-04-24 @ 17:17)    12-04-24 @ 07:01  -  12-05-24 @ 07:00  --------------------------------------------------------  IN: 0 mL / OUT: 1450 mL / NET: -1450 mL        GENERAL: AAOx3, no acute distress.  HEAD:  Atraumatic, Normocephalic  EYES: conjunctiva and sclera clear  NECK: Supple, no JVD or thyromegaly  CHEST/LUNG: Clear to auscultation bilaterally; No wheeze, rhonchi, or rales  HEART: Regular rate and rhythm; normal S1, S2, No murmurs.  ABDOMEN: Soft, nontender, nondistended; Bowel sounds present  NEUROLOGY: No asterixis or tremor.   SKIN: Intact, no jaundice     Data:                        7.9    6.60  )-----------( 265      ( 05 Dec 2024 04:30 )             25.5     Hgb trend:  7.9  12-05-24 @ 04:30  7.9  12-04-24 @ 07:43  7.9  12-03-24 @ 22:51  7.7  12-03-24 @ 06:51        12-05    148[H]  |  114[H]  |  36[H]  ----------------------------<  122[H]  3.6   |  20  |  1.2    Ca    8.3[L]      05 Dec 2024 04:30  Mg     2.0     12-04    TPro  4.9[L]  /  Alb  2.4[L]  /  TBili  <0.2  /  DBili  x   /  AST  16  /  ALT  32  /  AlkPhos  140[H]  12-05    Liver panel trend:  TBili <0.2   /   AST 16   /   ALT 32   /   AlkP 140   /   Tptn 4.9   /   Alb 2.4    /   DBili --      12-05  TBili 0.2   /   AST 52   /   ALT 54   /   AlkP 151   /   Tptn 5.0   /   Alb 2.6    /   DBili --      12-04  TBili 0.2   /   AST 65   /   ALT 43   /   AlkP 145   /   Tptn 5.0   /   Alb 2.4    /   DBili --      12-03  TBili <0.2   /   AST 7   /   ALT <5   /   AlkP 104   /   Tptn 5.7   /   Alb 3.2    /   DBili --      11-28  TBili <0.2   /   AST 9   /   ALT <5   /   AlkP 125   /   Tptn 6.2   /   Alb 3.4    /   DBili --      11-26             Radiology:

## 2024-12-06 NOTE — OCCUPATIONAL THERAPY INITIAL EVALUATION ADULT - SHORT TERM MEMORY, REHAB EVAL
able to repeat 3/3 words immediately; able to recall 2/3 words with 2 options provided post ~10 minutes, min-moderate impairment/impaired

## 2024-12-06 NOTE — PHYSICAL THERAPY INITIAL EVALUATION ADULT - GENERAL OBSERVATIONS, REHAB EVAL
PT IE [id number]. Chart reviewed and case discussed with AR Finch. Pt encountered semi-reclined in bed. In NAD. + IV lock, + laws

## 2024-12-06 NOTE — PROGRESS NOTE ADULT - ASSESSMENT
85 y/o man w PMHx of HLD, DM, CAD s/p stents x2 in 2007, pt of Dr Avila, h/o asbestosis of lung, dx w malignant mesothelioma ~8/2024 and s/p VATS pleurodesis, R sided PleurX, h/o AVM/GIB, JASMIN, referred from NH for anemia to 6.5, in ED found to have Hb 7.1, CHINYERE +ve for melena.     # Possible UGI bleed  resolved  # Acute on chronic anemia  with JASMIN   Hx Angioectasia of duodenum  -GI on board   -Trend H&H   tfx to keep hb >7    -s/p egd/colonoscopy >showed AVM ,s/p cauterization   follow cbc  daily  give one more dose of venofer 200 mg (total 2 doses)    #JEY :   #bacteremia /sepsis ,likely urosepsis   on abx as per ID     # Malignant mesothelioma (diagnosed 8/2024 ), Stable  # Hx Asbestos lung       cont other supportive care  d/c plan as per primary team   he will f/u with Dr chavez as outpt for further Mn of mesothelioma/JASMIN

## 2024-12-06 NOTE — OCCUPATIONAL THERAPY INITIAL EVALUATION ADULT - ADL RETRAINING, OT EVAL
Pt will perform UB dressing task with Lazara by dc. Pt will perform LB dressing task using AEs with Lazara by dc.

## 2024-12-06 NOTE — OCCUPATIONAL THERAPY INITIAL EVALUATION ADULT - LEVEL OF INDEPENDENCE: BED TO CHAIR, REHAB EVAL
Pt refused to perform at this time despite max encouragement and education, will assess at later session

## 2024-12-06 NOTE — PHYSICAL THERAPY INITIAL EVALUATION ADULT - PERTINENT HX OF CURRENT PROBLEM, REHAB EVAL
83 y/o man w PMHx of HLD, DM, CAD s/p stents x2 in 2007, pt of Dr Avila, h/o asbestosis of lung, dx w malignant mesothelioma ~8/2024 and s/p VATS pleurodesis, R sided PleurX, h/o AVM/GIB, JASMIN,

## 2024-12-06 NOTE — OCCUPATIONAL THERAPY INITIAL EVALUATION ADULT - GENERAL OBSERVATIONS, REHAB EVAL
Pt encountered semi ontiveros in bed reported right abdominal pain(post drainage, gauze dressing, unable to quantify, AR Finch made aware and pain meds provided during OT eval) +IV locked +laws cath. Pt agreed to OT eval, pain level remained the same(pain meds taken during OT eval). BP taken 91/62 supine in bed and post OT transfer training/ADL at eob and supine back to bed 118/65. Pt refused to sit at b/s recliner at this time despite max encouragement and education, left semi ontiveros in bed, AR Finch aware.

## 2024-12-06 NOTE — PROGRESS NOTE ADULT - ASSESSMENT
Assessment and Plan:   · Assessment	    Pt is a 84y male, with PMHx AVMs, CAD s/p stents x 2 (2007), asbestosis of lung, malignant mesothelioma, s/p VATS, HLD, DM, and JASMIN, arrived at the ED on 11/26 due to abnormal Hb (6.4). Pt did not have active bloody stools bedside and denied CP, Ab pain, N/V/D, dizziness, and UTI sxs. Prior EGD led to bleeding on contact but hemostasis was achieved. In the ED, Hb was 7.1, and CHINYERE positive for black stools in ED and (+) Ab screening. pRBC transfusion was deferred and pt was admitted to obtain further w/u, including iron studies to determine if iron supplementation will be needed and wad Dx'd with melena and anemia. Pt is currently admitted with the primary diagnosis of anemia and melena, likely 2/2 to upper GID.    · Plan  # Possible UGI bleed  # Acute on chronic anemia  # Hx Angioectasia of duodenum  - EGD/colonscopy done yesterday w/o complications yesterday  ---EGD: esophagus normal; erythema of stomach consistent with non-erosive gastritis; previous endoclip in 2nd part of duodenum; 4 small AVMs in proximal jejunum that bled on contact;  ---colonoscopy: 3 mm polyp of ascending colon; 3 polyps of 6-10 mm of ascending colon; 5 mmm polyp of transverse colon; melanosis coli  -will F/U with GI to discuss if Aspirin should be stopped given pt has AVMs. F/U with cardio can also be considered to discuss the risks/rewards of stopping aspirin given pt has hx CAD s/p stents x2 in 2007, post-MI  - Labs as of 12/05/24, 04:30 AM                       Hb 7.9[L], stable    WBC 6.60 )---------------------(                   HCT 25.5[L], stable     auto neutrophil % = 85.8[H]  abs neutrophil = 5.67  abs lymphocytes = 0.43[L]      JASMIN due to AVM malformation, discussed with patient's son today.  Will continue iron supplementation.  One additional dose of venofer given.  Plan to discharge on aspirin 81mg daily alone, hold clopidogrel as risks outweigh benefits.  Discussed with patient's son regarding this today, who was in agreement with holding antiplatelet medications.  Plan to place midline tomorrow.  Discharge planning tomorrow for placement to facility to complete antibiotic therapy.  PT consulted.        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 likely intrinsic 2/2 UTI with urinary retention  #Concern for UTI  - nephro recs as of 12/04/24:  --- Pt's pre-renal JEY improved and Cr is stable (1.4 as of 12/03/24 and 1.3 today); FeNa 8%  --- acknowledged nephro recs  --- d/c venofer on 11/30/24  ---renally-cleared meds are optimized  --- c/w monitoring Cr  - avoid nephrotoxins    #sepsis with ESBL Proteus bacteremia  - as per ID, c/w Meropenem 1g q12h IV, EOT: 12/08/24  - monitor for SIRS and sepsis  - will F/U with NH if can provide the Abx in the NH, along side possibly placing midline tomorrow to allow Abx Tx upon d/c in NH    - prelim blood Cx on 12/01/24 positive; repeat Blood Cx on 12/02/24 also positive: Gram Negative Rods      -  Proteus species: Detec      -  ESBL: Detec      -  CTX-M Resistance Marker: Detec  - possible  source from [place] placed prior to admission; [place] exchanged 11/29/24    #respiratory distress - resolved  #R sided pleural effusion  - PleurX to be drained today; currently drainage schedule is every Monday and Thurs, nurse notified    - CXR 12/02/24 9:33 AM: Unchanged large right-sided opacity.   - CXR 12/02/24 11:20 AM: Unchanged large opacity projecting over the right lung. Hazy opacities in the left lung are less prominent. No pneumothorax.  - On 12/02/24, PleurX drained ~700mL from R lung catheter, and pt received nebulizer with inhaled saline chloride treatment to help clear mucus and chest congestion    #small hypodensity in segment 7 of the liver seen on prior CT  - pt refused RUQ US, which was originally ordered due to mild transaminitis with suspected DILI. However, LFTs as of today's labs show resolved LFTs  - recent LFTs as of 12/06/24, 07:47     Ca    8.3 ---> corrected Ca   9.6     | AST 13  Albumin 2.4[L]                              | ALT 23  T protein 4.7[L]                             |   T bili <0.2    #malignant mesothelioma (diagnosed 8/2024), stable  #hx Asbestos lung   - 800 mL output on Pleur X port; port site is clean, dry, and intact; no signs of infection  - Pt currently as Pleur X port, drained every Monday and Thurs in hospital.   - F/U heme-onc outpt    #DM Type 2  - labs POCT BS     (12/06/24) 164 <-- 129      | (12/05/24) <-- 231 <-- 344 <-- 171 <-- 192 <-- 124  - POCT BS well-controlled mostly in 140-180 range  - currently on Lantus 5 and sliding scale to keep -180; c/w monitor BS and adjust insulin PRN  - Home med: Metformin 500 mg BID    #HLD  #CAD s/p stents x2 in 2007  - follows Dr Avila  - Holding home plavix, per chart review, Dr Chang said needs Plavix or ASA not both and per last notes he was supposed to be on ASA, however was discharged on plavix?   - c/w statin     #Misc:  - DVT prophylaxis: SCDs  - Feeds: DASH/TLC  - Prophylaxis: protonix, senna, miralax  - Activity: IAT & AAT  - Code status: Full Assessment and Plan:   · Assessment	    Pt is a 84y male, with PMHx AVMs, CAD s/p stents x 2 (2007), asbestosis of lung, malignant mesothelioma, s/p VATS, HLD, DM, and JASMIN, arrived at the ED on 11/26 due to abnormal Hb (6.4). Pt did not have active bloody stools bedside and denied CP, Ab pain, N/V/D, dizziness, and UTI sxs. Prior EGD led to bleeding on contact but hemostasis was achieved. In the ED, Hb was 7.1, and CHINYERE positive for black stools in ED and (+) Ab screening. pRBC transfusion was deferred and pt was admitted to obtain further w/u, including iron studies to determine if iron supplementation will be needed and wad Dx'd with melena and anemia. Pt is currently admitted with the primary diagnosis of anemia and melena, likely 2/2 to upper GID.    · Plan  # Possible UGI bleed  # Acute on chronic anemia  # Hx Angioectasia of duodenum  - GI recs today:  ---F/U pathology results  ---start high fiber diet  ---repeat colonoscopy in 3 years  ---monitor for recurrent bleeding (with transfusion PRN to keep Hb > 8)  ---start on iron supplementation, c/w ASA, and F/U with GI outpt or [facility]  -Heme-Onc rec to give 1 more dose of venofer 200mg. Midline was placed on R arm w/o complications  - labs 12/06/24, 07:47                 7.3[L], stable    6.07 )---------------------( 271             23.8[L], stable     auto neutrophil % = 86.4[H]  abs neutrophil = 5.25  abs lymphocytes = 0.48[L]    - EGD/colonscopy done yesterday w/o complications yesterday  ---EGD: esophagus normal; erythema of stomach consistent with non-erosive gastritis; previous endoclip in 2nd part of duodenum; 4 small AVMs in proximal jejunum that bled on contact;  ---colonoscopy: 3 mm polyp of ascending colon; 3 polyps of 6-10 mm of ascending colon; 5 mmm polyp of transverse colon; melanosis coli  - Iron studies as of 11/26, 10:36:  total iron 31 [L]  unsaturated iron binding capacity 247  TIBC 278  % iron saturation 11[L]     #sepsis with ESBL Proteus bacteremia  - midline placed on R arm for Abx outpt Tx in NH  - as per ID, c/w Meropenem 1g q12h IV, EOT: 12/08/24  - monitor for SIRS and sepsis    - prelim blood Cx on 12/01/24 positive; repeat Blood Cx on 12/02/24 also positive: Gram Negative Rods      -  Proteus species: Detec      -  ESBL: Detec      -  CTX-M Resistance Marker: Detec    #R sided pleural effusion  #malignant mesothelioma (diagnosed 8/2024), stable  #hx asbestosis of lung  - 800 mL output on Pleur X port, but developed some sharp pain on chest afterwards. CXR done today at 2:13, pending read  - Pt currently as Pleur X port, drained every Monday and Thurs in hospital.   - F/U heme-onc outpt    - CXR 12/02/24 9:33 AM: Unchanged large right-sided opacity.   - CXR 12/02/24 11:20 AM: Unchanged large opacity projecting over the right lung. Hazy opacities in the left lung are less prominent. No pneumothorax.  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 likely intrinsic 2/2 UTI with urinary retention  #Concern for UTI  - Cr has been stable (1.3 today)  - renally-cleared meds are optimized  - c/w monitoring Cr  - avoid nephrotoxins    #DM Type 2  - labs POCT BS     (12/06/24) 164 <-- 129      | (12/05/24) <-- 231 <-- 344 <-- 171 <-- 192 <-- 124  - POCT BS well-controlled mostly in 140-180 range  - currently on Lantus 5 and sliding scale to keep -180; c/w monitor BS and adjust insulin PRN  - Home med: Metformin 500 mg BID    #small hypodensity in segment 7 of the liver seen on prior CT  - pt refused RUQ US, which was originally ordered due to mild transaminitis with suspected DILI. However, LFTs as of today's labs show resolved LFTs  - recent LFTs as of 12/06/24, 07:47   Ca    8.3 ---> corrected Ca   9.6     | AST 13  Albumin 2.4[L]                              | ALT 23  T protein 4.7[L]                             |   T bili <0.2    #HLD  #CAD s/p stents x2 in 2007  - follows Dr Avila  - Holding home plavix, per chart review, Dr Chang said needs Plavix or ASA not both and per last notes he was supposed to be on ASA, however was discharged on plavix?   - c/w statin     #Misc:  - DVT prophylaxis: SCDs  - Feeds: DASH/TLC  - Prophylaxis: protonix, senna, miralax  - Activity: IAT & AAT  - Code status: Full Assessment and Plan:   · Assessment	    Pt is a 84y male, with PMHx AVMs, CAD s/p stents x 2 (2007), asbestosis of lung, malignant mesothelioma, s/p VATS, HLD, DM, and JASMIN, arrived at the ED on 11/26 due to abnormal Hb (6.4). Pt did not have active bloody stools bedside and denied CP, Ab pain, N/V/D, dizziness, and UTI sxs. Prior EGD led to bleeding on contact but hemostasis was achieved. In the ED, Hb was 7.1, and CHINYERE positive for black stools in ED and (+) Ab screening. pRBC transfusion was deferred and pt was admitted to obtain further w/u, including iron studies to determine if iron supplementation will be needed and wad Dx'd with melena and anemia. Pt is currently admitted with the primary diagnosis of anemia and melena, likely 2/2 to upper GID.    · Plan  # Possible UGI bleed  # Acute on chronic anemia  # Hx Angioectasia of duodenum  - Found to be 2/2 Angioectasia   - GI recs:  ---F/U pathology results  ---start high fiber diet  ---repeat colonoscopy in 3 years  ---monitor for recurrent bleeding (with transfusion PRN to keep Hb > 8)  ---start on iron supplementation, c/w ASA, and F/U with GI outpt or [facility]  -Heme-Onc rec to give 1 more dose of venofer 200mg. Holding off in context of recent bacteremia.  - labs 12/06/24, 07:47                 7.3[L], stable    6.07 )---------------------( 271             23.8[L], stable     auto neutrophil % = 86.4[H]  abs neutrophil = 5.25  abs lymphocytes = 0.48[L]    - EGD/colonscopy done yesterday w/o complications yesterday  ---EGD: esophagus normal; erythema of stomach consistent with non-erosive gastritis; previous endoclip in 2nd part of duodenum; 4 small AVMs in proximal jejunum that bled on contact;  ---colonoscopy: 3 mm polyp of ascending colon; 3 polyps of 6-10 mm of ascending colon; 5 mmm polyp of transverse colon; melanosis coli  - Iron studies as of 11/26, 10:36:  total iron 31 [L]  unsaturated iron binding capacity 247  TIBC 278  % iron saturation 11[L]     #sepsis with ESBL Proteus bacteremia  - midline placed on R arm for Abx outpt Tx in NH  - as per ID, c/w Meropenem 1g q12h IV, EOT: 12/08/24  - monitor for SIRS and sepsis    - blood Cx on 12/01/24 positive; repeat Blood Cx on 12/04/24 NGTD      -  Proteus species: Detec      -  ESBL: Detec      -  CTX-M Resistance Marker: Detec    #R sided pleural effusion  #malignant mesothelioma (diagnosed 8/2024), stable  #hx asbestosis of lung  - 800 mL output on Pleur X port, but developed some sharp pain on chest afterwards. CXR done with no ACPD. Pain resolved.   - Pt currently has Pleur X port, drained every Monday and Friday in the hospital. C/w to drain 2/week at NH.  - F/U heme-onc outpt    - CXR 12/02/24 9:33 AM: Unchanged large right-sided opacity.   - CXR 12/02/24 11:20 AM: Unchanged large opacity projecting over the right lung. Hazy opacities in the left lung are less prominent. No pneumothorax.  - On 12/02/24, PleurX drained ~700mL from R lung catheter    #JEY on CKD Stage II - RESOLVED  #Concern for UTI as source of bacteremia- controlled  - Cr has been stable (1.3 today)  - renally-cleared meds are optimized  - c/w monitoring Cr  - avoid nephrotoxins    #DM Type 2  - labs POCT BS     (12/06/24) 164 <-- 129      | (12/05/24) <-- 231 <-- 344 <-- 171 <-- 192 <-- 124  - POCT BS well-controlled mostly in 140-180 range  - currently on Lantus 5 and sliding scale to keep -180; c/w monitor BS and adjust insulin PRN  - Home med: Metformin 500 mg BID    #small hypodensity in segment 7 of the liver seen on prior CT  - pt refused RUQ US  - recent LFTs as of 12/06/24, 07:47   Ca    8.3 ---> corrected Ca   9.6     | AST 13  Albumin 2.4[L]                              | ALT 23  T protein 4.7[L]                             |   T bili <0.2    #HLD  #CAD s/p stents x2 in 2007  - follows Dr Avila  - Holding home plavix, per chart review, Dr Chang patient needs Plavix or ASA not both   - patient tolerating ASA  - c/w statin     #Misc:  - DVT prophylaxis: SCDs  - Feeds: DASH/TLC  - Prophylaxis: protonix, senna, miralax  - Activity: IAT & AAT  - Code status: Full

## 2024-12-06 NOTE — OCCUPATIONAL THERAPY INITIAL EVALUATION ADULT - LIVES WITH, PROFILE
Pt was not aware he was from NH. Based on chart, pt admitted from [facility] and prior to [facility], pt lived alone in a PH with 5 steps to enter and 1FOS to bedroom/bathroom, house bound.

## 2024-12-06 NOTE — PHARMACOTHERAPY INTERVENTION NOTE - COMMENTS
Modified penicillin allergy history to state that patient tolerated ceftriaxone, cefepime, and meropenem during this admission. 
Modified meropenem order so patient completes a total of 7 days of therapy per Dr. Leung's recommendation. 
Blood culture from 12/1 updated to ESBL+ Proteus species. Patient is not currently covered on cefepime. Recommended switching cefepime to meropenem 1000mg IV q12h STAT (CrCl ~28 mL/min). 
30-Sep-2021 19:29

## 2024-12-06 NOTE — PROGRESS NOTE ADULT - SUBJECTIVE AND OBJECTIVE BOX
· Subjective and Objective:   HPI:     CLARI BURGER is a 84y male, with PMHx AVMs, CAD s/p stents x 2 (2007), asbestosis of lung, malignant mesothelioma, s/p VATS, HLD, DM, and JASMIN, arrived at the ED on 11/26 due to abnormal Hb (6.4). Pt did not have active bloody stools bedside and denied CP, Ab pain, N/V/D, dizziness, and UTI sxs. Prior EGD led to bleeding on contact but hemostasis was achieved. In the ED, Hb was 7.1, and CHINYERE positive for black stools in ED and (+) Ab screening. pRBC transfusion was deferred and pt was admitted to obtain further w/u, including iron studies to determine if iron supplementation will be needed and wad Dx'd with melena and anemia.    Pt is currently admitted with the primary diagnosis of anemia and melena, likely 2/2 to upper GID.    Hospital Day: 11d. Pt was seen and evaluated bedside. Pt appears clinically stable with stable VS as of 5:26 AM today. Pt had no acute overnight or major events. Pt has a Walden and normal urine output. Pt is A&O x 3 and conversant. Pt tolerating solid diet. GI and Heme-Onc evaluated the pt. Gi gave recs to F/U pathology results, start high fiber diet, repeat colonoscopy in 3 years, monitor for recurrent bleeding (with transfusion PRN to keep Hb > 8), start on iron supplementation, c/w ASA, and F/U with GI outpt or [facility] clinic. Heme-Onc rec to give 1 more dose of venofer 200mg.    · OBJECTIVE  PAST MEDICAL & SURGICAL HISTORY  DM (diabetes mellitus)  MI (myocardial infarction)  History of CAD (coronary artery disease)  Dyslipidemia  Currently smokes tobacco  Mesothelioma, malignant  Coronary stent patent                                            -----------------------------------------------------------  ALLERGIES:  penicillin (Unknown)                                          ------------------------------------------------------------    HOME MEDICATIONS  Home Medications:  atorvastatin 20 mg oral tablet: 1 tab(s) orally once a day (at bedtime) (26 Nov 2024 15:34)  ferrous sulfate 325 mg (65 mg elemental iron) oral tablet: 1 tab(s) orally once a day (26 Nov 2024 15:35)  gabapentin 300 mg oral tablet: 1 tab(s) orally 3 times a day (26 Nov 2024 15:35)  Lovenox 30 mg/0.3 mL injectable solution: 30 milligram(s) subcutaneously once a day (26 Nov 2024 15:35)  melatonin 3 mg oral tablet: 1 tab(s) orally once a day (at bedtime) (26 Nov 2024 15:35)  metFORMIN 500 mg oral tablet: 1 tab(s) orally 2 times a day (26 Nov 2024 15:35)  omeprazole 20 mg oral delayed release tablet: 1 tab(s) orally once a day (26 Nov 2024 15:34)  polyethylene glycol 3350 oral powder for reconstitution: 17 gram(s) orally once a day (26 Nov 2024 15:35)  senna leaf extract oral tablet: 2 tab(s) orally once a day (at bedtime) (26 Nov 2024 15:35)  tamsulosin 0.4 mg oral capsule: 1 cap(s) orally once a day (at bedtime) (26 Nov 2024 15:35)       MEDICATIONS:  STANDING MEDICATIONS  acetaminophen 975 mg q8hr PRN  aspirin  chewable 81 milliGRAM(s) Oral daily  atorvastatin 20 milliGRAM(s) Oral at bedtime  chlorhexidine 2% Cloths 1 Application(s) Topical <User Schedule>  ferrous    sulfate 325 milliGRAM(s) Oral daily  gabapentin 300 milliGRAM(s) Oral three times a day  insulin lispro (ADMELOG) corrective regimen sliding scale   SubCutaneous three times a day before meals  melatonin 3 milliGRAM(s) Oral at bedtime  meropenem IVPB 1000 mg q12hr  pantoprazole    Tablet 40 milliGRAM(s) Oral every 12 hours  polyethylene glycol 3350 17 Gram(s) Oral daily  senna 2 Tablet(s) Oral at bedtime  sodium chloride 0.9%. 1000 milliLiter(s) IV Continuous <Continuous>  tamsulosin 0.4 milliGRAM(s) Oral at bedtime                                            ------------------------------------------------------------  VITAL SIGNS: Last 24 Hours  12/05/24, 05:26    T(F): 97.8  BP: 105/51 (97/56 on 07:56)  HR: 78  RR: 18    PHYSICAL EXAM:  GENERAL: AAOx3, no acute distress; not-ill appearing; conversant  HEAD:  Atraumatic, Normocephalic  CHEST/LUNG: CTA b/l; no wheezing; no rales; no rhonchi; +PleurX drain in place, no signs of infection, drainage, ecchymosis, or pus collection  HEART: Regular rate and rhythm; normal S1, S2, No murmurs; no friction rubs  ABDOMEN: Soft, nontender, nondistended; Bowel sounds present  EXTREMITIES: no signs of infection, drainage, ecchymosis, or pus collection; no pitting edema of LE, B/L; +0/5 muscular strength of LE, B/L; +3/5 muscular strength of UE, B/L                                           --------------------------------------------------------------  LABS:  12/06/24, 07:47                 7.3[L], stable    6.07 )---------------------( 271             23.8[L], stable     auto neutrophil % = 86.4[H]  abs neutrophil = 5.25  abs lymphocytes = 0.48[L]    12/06/24, 07:47     POCT BS     (12/06/24) 164 <-- 129      | (12/05/24) <-- 231 <-- 344 <-- 171 <-- 192 <-- 124    144  |  111[H] |  32[H]  ----------------------------<  117[H]  3.7    |            |  1.3    Ca    8.3 ---> corrected Ca   9.6     | AST 13  Albumin 2.4[L]                             | ALT 23  T protein 4.7[L]                             |     T bili <0.2      11/26/24 10:36    total iron 31 [L]  unsaturated iron binding capacity 247  TIBC 278  % iron saturation 11[L]      Urinalysis Basic - ( 02 Dec 2024 06:41 )    Color: x / Appearance: x / SG: x / pH: x  Gluc: 149 mg/dL / Ketone: x  / Bili: x / Urobili: x   Blood: x / Protein: x / Nitrite: x   Leuk Esterase: x / RBC: x / WBC x   Sq Epi: x / Non Sq Epi: x / Bacteria: x    Culture - Blood (collected 12-01-24 @ 16:00)  Source: .Blood BLOOD  Gram Stain (12-02-24 @ 11:08):    Growth in aerobic and anaerobic bottles: Gram Negative Rods  Preliminary Report (12-02-24 @ 11:08):    Growth in aerobic and anaerobic bottles: Gram Negative Rods    Direct identification is available within approximately 3-5    hours either by Blood Panel Multiplexed PCR or Direct    MALDI-TOF. Details: [email or web address]  Organism: Blood Culture PCR (12-02-24 @ 12:43)  Organism: Blood Culture PCR (12-02-24 @ 12:43)      Method Type: PCR      -  Proteus species: Detec      -  ESBL: Detec      -  CTX-M Resistance Marker: Detec    Culture - Blood (collected 12-01-24 @ 16:00)  Source: .Blood BLOOD  Gram Stain (12-02-24 @ 11:40):    Growth in aerobic bottle: Gram Negative Rods    Growth in anaerobic bottle: Gram Negative Rods  Preliminary Report (12-02-24 @ 11:40):    Growth in aerobic bottle: Gram Negative Rods    Growth in anaerobic bottle: Gram Negative Rods    Lactate, Blood: 2.3 mmol/L (12-01-24 @ 16:00)   · Subjective and Objective:   HPI:     CLARI BURGER is a 84y male, with PMHx AVMs, CAD s/p stents x 2 (2007), asbestosis of lung, malignant mesothelioma, s/p VATS, HLD, DM, and JASMIN, arrived at the ED on 11/26 due to abnormal Hb (6.4). Pt did not have active bloody stools bedside and denied CP, Ab pain, N/V/D, dizziness, and UTI sxs. Prior EGD led to bleeding on contact but hemostasis was achieved. In the ED, Hb was 7.1, and CHINYERE positive for black stools in ED and (+) Ab screening. pRBC transfusion was deferred and pt was admitted to obtain further w/u, including iron studies to determine if iron supplementation will be needed and wad Dx'd with melena and anemia.    Pt is currently admitted with the primary diagnosis of anemia and melena, likely 2/2 to upper GID.    Hospital Day: 11d. Pt was seen and evaluated bedside. Pt appears clinically stable with stable VS as of 5:26 AM today. Pt had no acute overnight or major events. Pt has a Walden and normal urine output. Pt is A&O x 3 and conversant. Pt tolerating solid diet. GI and Heme-Onc evaluated the pt. GI gave recs to F/U pathology results, start high fiber diet, repeat colonoscopy in 3 years, monitor for recurrent bleeding (with transfusion PRN to keep Hb > 8), start on iron supplementation, c/w ASA, and F/U with GI outpt or [facility] clinic. Heme-Onc rec to give 1 more dose of venofer 200mg. Midline was placed on R arm w/o complications.    · OBJECTIVE  PAST MEDICAL & SURGICAL HISTORY  DM (diabetes mellitus)  MI (myocardial infarction)  History of CAD (coronary artery disease)  Dyslipidemia  Currently smokes tobacco  Mesothelioma, malignant  Coronary stent patent                                            -----------------------------------------------------------  ALLERGIES:  penicillin (Unknown)                                          ------------------------------------------------------------    HOME MEDICATIONS  Home Medications:  atorvastatin 20 mg oral tablet: 1 tab(s) orally once a day (at bedtime) (26 Nov 2024 15:34)  ferrous sulfate 325 mg (65 mg elemental iron) oral tablet: 1 tab(s) orally once a day (26 Nov 2024 15:35)  gabapentin 300 mg oral tablet: 1 tab(s) orally 3 times a day (26 Nov 2024 15:35)  Lovenox 30 mg/0.3 mL injectable solution: 30 milligram(s) subcutaneously once a day (26 Nov 2024 15:35)  melatonin 3 mg oral tablet: 1 tab(s) orally once a day (at bedtime) (26 Nov 2024 15:35)  metFORMIN 500 mg oral tablet: 1 tab(s) orally 2 times a day (26 Nov 2024 15:35)  omeprazole 20 mg oral delayed release tablet: 1 tab(s) orally once a day (26 Nov 2024 15:34)  polyethylene glycol 3350 oral powder for reconstitution: 17 gram(s) orally once a day (26 Nov 2024 15:35)  senna leaf extract oral tablet: 2 tab(s) orally once a day (at bedtime) (26 Nov 2024 15:35)  tamsulosin 0.4 mg oral capsule: 1 cap(s) orally once a day (at bedtime) (26 Nov 2024 15:35)       MEDICATIONS:  STANDING MEDICATIONS  acetaminophen 975 mg q8hr PRN  aspirin  chewable 81 milliGRAM(s) Oral daily  atorvastatin 20 milliGRAM(s) Oral at bedtime  chlorhexidine 2% Cloths 1 Application(s) Topical <User Schedule>  ferrous    sulfate 325 milliGRAM(s) Oral daily  gabapentin 300 milliGRAM(s) Oral three times a day  insulin lispro (ADMELOG) corrective regimen sliding scale   SubCutaneous three times a day before meals  melatonin 3 milliGRAM(s) Oral at bedtime  meropenem IVPB 1000 mg q12hr  pantoprazole    Tablet 40 milliGRAM(s) Oral every 12 hours  polyethylene glycol 3350 17 Gram(s) Oral daily  senna 2 Tablet(s) Oral at bedtime  sodium chloride 0.9%. 1000 milliLiter(s) IV Continuous <Continuous>  tamsulosin 0.4 milliGRAM(s) Oral at bedtime                                            ------------------------------------------------------------  VITAL SIGNS: Last 24 Hours  12/05/24, 05:26    T(F): 97.8  BP: 105/51 (97/56 on 07:56)  HR: 78  RR: 18    PHYSICAL EXAM:  GENERAL: AAOx3, no acute distress; not-ill appearing; conversant  HEAD:  Atraumatic, Normocephalic  CHEST/LUNG: CTA b/l; no wheezing; no rales; no rhonchi; +PleurX drain in place, no signs of infection, drainage, ecchymosis, or pus collection  HEART: Regular rate and rhythm; normal S1, S2, No murmurs; no friction rubs  ABDOMEN: Soft, nontender, nondistended; Bowel sounds present  EXTREMITIES: + midline placed on R arm w/o complications, no signs of infection, drainage, ecchymosis, or pus collection; no pitting edema of LE, B/L; +0/5 muscular strength of LE, B/L; +3/5 muscular strength of UE, B/L                                           --------------------------------------------------------------  LABS:  12/06/24, 07:47                 7.3[L], stable    6.07 )---------------------( 271             23.8[L], stable     auto neutrophil % = 86.4[H]  abs neutrophil = 5.25  abs lymphocytes = 0.48[L]    12/06/24, 07:47     POCT BS     (12/06/24) 164 <-- 129      | (12/05/24) <-- 231 <-- 344 <-- 171 <-- 192 <-- 124    144  |  111[H] |  32[H]  ----------------------------<  117[H]  3.7    |            |  1.3    Ca    8.3 ---> corrected Ca   9.6     | AST 13  Albumin 2.4[L]                             | ALT 23  T protein 4.7[L]                             |     T bili <0.2      11/26/24 10:36    total iron 31 [L]  unsaturated iron binding capacity 247  TIBC 278  % iron saturation 11[L]      Urinalysis Basic - ( 02 Dec 2024 06:41 )    Color: x / Appearance: x / SG: x / pH: x  Gluc: 149 mg/dL / Ketone: x  / Bili: x / Urobili: x   Blood: x / Protein: x / Nitrite: x   Leuk Esterase: x / RBC: x / WBC x   Sq Epi: x / Non Sq Epi: x / Bacteria: x    Culture - Blood (collected 12-01-24 @ 16:00)  Source: .Blood BLOOD  Gram Stain (12-02-24 @ 11:08):    Growth in aerobic and anaerobic bottles: Gram Negative Rods  Preliminary Report (12-02-24 @ 11:08):    Growth in aerobic and anaerobic bottles: Gram Negative Rods    Direct identification is available within approximately 3-5    hours either by Blood Panel Multiplexed PCR or Direct    MALDI-TOF. Details: [email or web address]  Organism: Blood Culture PCR (12-02-24 @ 12:43)  Organism: Blood Culture PCR (12-02-24 @ 12:43)      Method Type: PCR      -  Proteus species: Detec      -  ESBL: Detec      -  CTX-M Resistance Marker: Detec    Culture - Blood (collected 12-01-24 @ 16:00)  Source: .Blood BLOOD  Gram Stain (12-02-24 @ 11:40):    Growth in aerobic bottle: Gram Negative Rods    Growth in anaerobic bottle: Gram Negative Rods  Preliminary Report (12-02-24 @ 11:40):    Growth in aerobic bottle: Gram Negative Rods    Growth in anaerobic bottle: Gram Negative Rods    Lactate, Blood: 2.3 mmol/L (12-01-24 @ 16:00)   · Subjective and Objective:   HPI:     CLARI BURGER is a 84y male, with PMHx AVMs, CAD s/p stents x 2 (2007), asbestosis of lung, malignant mesothelioma, s/p VATS, HLD, DM, and JASMIN, arrived at the ED on 11/26 due to abnormal Hb (6.4). Pt did not have active bloody stools bedside and denied CP, Ab pain, N/V/D, dizziness, and UTI sxs. Prior EGD led to bleeding on contact but hemostasis was achieved. In the ED, Hb was 7.1, and CHINYERE positive for black stools in ED and (+) Ab screening. pRBC transfusion was deferred and pt was admitted to obtain further w/u, including iron studies to determine if iron supplementation will be needed and wad Dx'd with melena and anemia.    Pt is currently admitted with the primary diagnosis of anemia and melena, likely 2/2 to upper GID.    Hospital Day: 11d. Pt was seen and evaluated bedside. Pt appears clinically stable with stable VS as of 5:26 AM today. Pt had no acute overnight or major events. Pt has a Walden and normal urine output. Pt is A&O x 3 and conversant. Pt tolerating solid diet. GI and Heme-Onc evaluated the pt. GI gave recs to F/U pathology results, start high fiber diet, repeat colonoscopy in 3 years, monitor for recurrent bleeding (with transfusion PRN to keep Hb > 8), start on iron supplementation, c/w ASA, and F/U with GI outpt or [facility] clinic. Heme-Onc rec to give 1 more dose of venofer 200mg. Midline was placed on R arm w/o complications.    · OBJECTIVE  PAST MEDICAL & SURGICAL HISTORY  DM (diabetes mellitus)  MI (myocardial infarction)  History of CAD (coronary artery disease)  Dyslipidemia  Currently smokes tobacco  Mesothelioma, malignant  Coronary stent patent                                            -----------------------------------------------------------  ALLERGIES:  penicillin (Unknown)                                          ------------------------------------------------------------    HOME MEDICATIONS  Home Medications:  atorvastatin 20 mg oral tablet: 1 tab(s) orally once a day (at bedtime) (26 Nov 2024 15:34)  ferrous sulfate 325 mg (65 mg elemental iron) oral tablet: 1 tab(s) orally once a day (26 Nov 2024 15:35)  gabapentin 300 mg oral tablet: 1 tab(s) orally 3 times a day (26 Nov 2024 15:35)  Lovenox 30 mg/0.3 mL injectable solution: 30 milligram(s) subcutaneously once a day (26 Nov 2024 15:35)  melatonin 3 mg oral tablet: 1 tab(s) orally once a day (at bedtime) (26 Nov 2024 15:35)  metFORMIN 500 mg oral tablet: 1 tab(s) orally 2 times a day (26 Nov 2024 15:35)  omeprazole 20 mg oral delayed release tablet: 1 tab(s) orally once a day (26 Nov 2024 15:34)  polyethylene glycol 3350 oral powder for reconstitution: 17 gram(s) orally once a day (26 Nov 2024 15:35)  senna leaf extract oral tablet: 2 tab(s) orally once a day (at bedtime) (26 Nov 2024 15:35)  tamsulosin 0.4 mg oral capsule: 1 cap(s) orally once a day (at bedtime) (26 Nov 2024 15:35)       MEDICATIONS:  STANDING MEDICATIONS  acetaminophen 975 mg q8hr PRN  aspirin  chewable 81 milliGRAM(s) Oral daily  atorvastatin 20 milliGRAM(s) Oral at bedtime  chlorhexidine 2% Cloths 1 Application(s) Topical <User Schedule>  ferrous    sulfate 325 milliGRAM(s) Oral daily  gabapentin 300 milliGRAM(s) Oral three times a day  insulin lispro (ADMELOG) corrective regimen sliding scale   SubCutaneous three times a day before meals  melatonin 3 milliGRAM(s) Oral at bedtime  meropenem IVPB 1000 mg q12hr  pantoprazole    Tablet 40 milliGRAM(s) Oral every 12 hours  polyethylene glycol 3350 17 Gram(s) Oral daily  senna 2 Tablet(s) Oral at bedtime  sodium chloride 0.9%. 1000 milliLiter(s) IV Continuous <Continuous>  tamsulosin 0.4 milliGRAM(s) Oral at bedtime                                            ------------------------------------------------------------  VITAL SIGNS: Last 24 Hours  12/05/24, 05:26    T(F): 97.8  BP: 105/51 (97/56 on 07:56)  HR: 78  RR: 18    PHYSICAL EXAM:  GENERAL: AAOx3, no acute distress; not-ill appearing; conversant  HEAD:  Atraumatic, Normocephalic  CHEST/LUNG: CTA b/l; no wheezing; no rales; no rhonchi; +PleurX drain in place, no signs of infection, drainage, ecchymosis, or pus collection  HEART: Regular rate and rhythm; normal S1, S2, No murmurs; no friction rubs  ABDOMEN: Soft, nontender, nondistended; Bowel sounds present  EXTREMITIES: + midline placed on R arm w/o complications, no signs of infection, drainage, ecchymosis, or pus collection; no pitting edema of LE, B/L; +0/5 muscular strength of LE, B/L; +3/5 muscular strength of UE, B/L                                           --------------------------------------------------------------  LABS:  12/06/24, 07:47                 7.3[L], stable    6.07 )---------------------( 271             23.8[L], stable     auto neutrophil % = 86.4[H]  abs neutrophil = 5.25  abs lymphocytes = 0.48[L]    12/06/24, 07:47     POCT BS     (12/06/24) 164 <-- 129      | (12/05/24) <-- 231 <-- 344 <-- 171 <-- 192 <-- 124    144  |  111[H] |  32[H]  ----------------------------<  117[H]  3.7    |            |  1.3    Ca    8.3 ---> corrected Ca   9.6     | AST 13  Albumin 2.4[L]                             | ALT 23  T protein 4.7[L]                             |     T bili <0.2      11/26/24 10:36    total iron 31 [L]  unsaturated iron binding capacity 247  TIBC 278  % iron saturation 11[L]      Urinalysis Basic - ( 02 Dec 2024 06:41 )    Color: x / Appearance: x / SG: x / pH: x  Gluc: 149 mg/dL / Ketone: x  / Bili: x / Urobili: x   Blood: x / Protein: x / Nitrite: x   Leuk Esterase: x / RBC: x / WBC x   Sq Epi: x / Non Sq Epi: x / Bacteria: x    Culture - Blood (collected 12-01-24 @ 16:00)  Source: .Blood BLOOD  Gram Stain (12-02-24 @ 11:08):    Growth in aerobic and anaerobic bottles: Gram Negative Rods  Preliminary Report (12-02-24 @ 11:08):    Growth in aerobic and anaerobic bottles: Gram Negative Rods    Direct identification is available within approximately 3-5    hours either by Blood Panel Multiplexed PCR or Direct    MALDI-TOF. Details: [email or web address]  Organism: Blood Culture PCR (12-02-24 @ 12:43)  Organism: Blood Culture PCR (12-02-24 @ 12:43)      Method Type: PCR      -  Proteus species: Detec      -  ESBL: Detec      -  CTX-M Resistance Marker: Detec    Culture - Blood (collected 12-01-24 @ 16:00)  Source: .Blood BLOOD  Gram Stain (12-02-24 @ 11:40):    Growth in aerobic bottle: Gram Negative Rods    Growth in anaerobic bottle: Gram Negative Rods  Preliminary Report (12-02-24 @ 11:40):    Growth in aerobic bottle: Gram Negative Rods    Growth in anaerobic bottle: Gram Negative Rods    Lactate, Blood: 2.3 mmol/L (12-01-24 @ 16:00)

## 2024-12-06 NOTE — PROGRESS NOTE ADULT - SUBJECTIVE AND OBJECTIVE BOX
Patient is a 84y old  Male who presents with a chief complaint of Melena (06 Dec 2024 14:02)       Pt is seen and examined  pt is awake and lying in bed          ROS:  unable to obtain     MEDICATIONS  (STANDING):  aspirin  chewable 81 milliGRAM(s) Oral daily  atorvastatin 20 milliGRAM(s) Oral at bedtime  chlorhexidine 2% Cloths 1 Application(s) Topical <User Schedule>  dextrose 5%. 1000 milliLiter(s) (100 mL/Hr) IV Continuous <Continuous>  dextrose 5%. 1000 milliLiter(s) (50 mL/Hr) IV Continuous <Continuous>  dextrose 50% Injectable 25 Gram(s) IV Push once  dextrose 50% Injectable 12.5 Gram(s) IV Push once  dextrose 50% Injectable 25 Gram(s) IV Push once  ferrous    sulfate 325 milliGRAM(s) Oral daily  gabapentin 300 milliGRAM(s) Oral three times a day  glucagon  Injectable 1 milliGRAM(s) IntraMuscular once  insulin lispro (ADMELOG) corrective regimen sliding scale   SubCutaneous three times a day before meals  melatonin 3 milliGRAM(s) Oral at bedtime  meropenem  IVPB 1000 milliGRAM(s) IV Intermittent every 12 hours  pantoprazole    Tablet 40 milliGRAM(s) Oral every 12 hours  polyethylene glycol 3350 17 Gram(s) Oral daily  senna 2 Tablet(s) Oral at bedtime  sodium bicarbonate 650 milliGRAM(s) Oral three times a day  tamsulosin 0.4 milliGRAM(s) Oral at bedtime    MEDICATIONS  (PRN):  acetaminophen     Tablet .. 975 milliGRAM(s) Oral every 8 hours PRN Mild Pain (1 - 3)  dextrose Oral Gel 15 Gram(s) Oral once PRN Blood Glucose LESS THAN 70 milliGRAM(s)/deciliter      Allergies    penicillin (Unknown)    Intolerances        Vital Signs Last 24 Hrs  T(C): 36 (06 Dec 2024 13:30), Max: 36.7 (05 Dec 2024 18:00)  T(F): 96.8 (06 Dec 2024 13:30), Max: 98 (05 Dec 2024 18:00)  HR: 72 (06 Dec 2024 12:13) (60 - 98)  BP: 143/71 (06 Dec 2024 12:13) (88/48 - 144/81)  BP(mean): 95 (06 Dec 2024 12:13) (66 - 95)  RR: 20 (06 Dec 2024 12:13) (18 - 20)  SpO2: 100% (06 Dec 2024 12:13) (93% - 100%)        PHYSICAL EXAM  General: adult in NAD  HEENT: clear oropharynx, anicteric sclera, pink conjunctiva  Neck: supple  CV: normal S1/S2 with no murmur rubs or gallops  Lungs: positive air movement b/l ant lungs,clear to auscultation, no wheezes, no rales  Abdomen: soft non-tender non-distended, no hepatosplenomegaly  Ext: no clubbing cyanosis or edema  Skin: no rashes and no petechiae  Neuro: alert and oriented X 2, no focal deficits  LABS:                          7.3    6.07  )-----------( 271      ( 06 Dec 2024 07:47 )             23.8         Mean Cell Volume : 96.0 fL  Mean Cell Hemoglobin : 29.4 pg  Mean Cell Hemoglobin Concentration : 30.7 g/dL  Auto Neutrophil # : 5.25 K/uL  Auto Lymphocyte # : 0.48 K/uL  Auto Monocyte # : 0.24 K/uL  Auto Eosinophil # : 0.06 K/uL  Auto Basophil # : 0.00 K/uL  Auto Neutrophil % : 86.4 %  Auto Lymphocyte % : 7.9 %  Auto Monocyte % : 4.0 %  Auto Eosinophil % : 1.0 %  Auto Basophil % : 0.0 %    Serial CBC's  12-06 @ 07:47  Hct-23.8 / Hgb-7.3 / Plat-271 / RBC-2.48 / WBC-6.07          Serial CBC's  12-05 @ 04:30  Hct-25.5 / Hgb-7.9 / Plat-265 / RBC-2.65 / WBC-6.60          Serial CBC's  12-04 @ 07:43  Hct-25.2 / Hgb-7.9 / Plat-240 / RBC-2.63 / WBC-8.06          Serial CBC's  12-03 @ 22:51  Hct-25.2 / Hgb-7.9 / Plat-226 / RBC-2.64 / WBC-8.77          Serial CBC's  12-03 @ 06:51  Hct-25.0 / Hgb-7.7 / Plat-224 / RBC-2.56 / WBC-5.65            12-06    144  |  111[H]  |  32[H]  ----------------------------<  117[H]  3.7   |  24  |  1.3    Ca    8.3[L]      06 Dec 2024 07:47    TPro  4.7[L]  /  Alb  2.4[L]  /  TBili  <0.2  /  DBili  x   /  AST  13  /  ALT  23  /  AlkPhos  114  12-06          WBC Count: 6.07 K/uL (12-06-24 @ 07:47)  Hemoglobin: 7.3 g/dL (12-06-24 @ 07:47)  Hematocrit: 23.8 % (12-06-24 @ 07:47)  Platelet Count - Automated: 271 K/uL (12-06-24 @ 07:47)  WBC Count: 6.60 K/uL (12-05-24 @ 04:30)  Hemoglobin: 7.9 g/dL (12-05-24 @ 04:30)  Hematocrit: 25.5 % (12-05-24 @ 04:30)  Platelet Count - Automated: 265 K/uL (12-05-24 @ 04:30)  WBC Count: 8.06 K/uL (12-04-24 @ 07:43)  Hemoglobin: 7.9 g/dL (12-04-24 @ 07:43)  Hematocrit: 25.2 % (12-04-24 @ 07:43)  Platelet Count - Automated: 240 K/uL (12-04-24 @ 07:43)  WBC Count: 8.77 K/uL (12-03-24 @ 22:51)  Hemoglobin: 7.9 g/dL (12-03-24 @ 22:51)  Hematocrit: 25.2 % (12-03-24 @ 22:51)  Platelet Count - Automated: 226 K/uL (12-03-24 @ 22:51)  WBC Count: 5.65 K/uL (12-03-24 @ 06:51)  Hemoglobin: 7.7 g/dL (12-03-24 @ 06:51)  Hematocrit: 25.0 % (12-03-24 @ 06:51)  Platelet Count - Automated: 224 K/uL (12-03-24 @ 06:51)  WBC Count: 7.92 K/uL (12-02-24 @ 06:41)  Hemoglobin: 8.1 g/dL (12-02-24 @ 06:41)  Hematocrit: 26.0 % (12-02-24 @ 06:41)  Platelet Count - Automated: 238 K/uL (12-02-24 @ 06:41)  WBC Count: 3.75 K/uL (12-01-24 @ 16:00)  Hemoglobin: 8.6 g/dL (12-01-24 @ 16:00)  Hematocrit: 27.9 % (12-01-24 @ 16:00)  Platelet Count - Automated: 233 K/uL (12-01-24 @ 16:00)  WBC Count: 5.50 K/uL (12-01-24 @ 08:15)  Hemoglobin: 6.7 g/dL (12-01-24 @ 08:15)  Hematocrit: 21.4 % (12-01-24 @ 08:15)  Platelet Count - Automated: 241 K/uL (12-01-24 @ 08:15)  WBC Count: 10.73 K/uL (11-30-24 @ 11:04)  Hemoglobin: 7.5 g/dL (11-30-24 @ 11:04)  Hematocrit: 24.2 % (11-30-24 @ 11:04)  Platelet Count - Automated: 306 K/uL (11-30-24 @ 11:04)  WBC Count: 15.45 K/uL (11-29-24 @ 07:30)  Hemoglobin: 8.6 g/dL (11-29-24 @ 07:30)  Hematocrit: 27.4 % (11-29-24 @ 07:30)  Platelet Count - Automated: 380 K/uL (11-29-24 @ 07:30)  Hemoglobin: 7.4 g/dL (11-28-24 @ 23:47)  Hematocrit: 23.8 % (11-28-24 @ 23:47)  Hemoglobin: 8.5 g/dL (11-28-24 @ 17:23)  Hematocrit: 26.7 % (11-28-24 @ 17:23)  Hemoglobin: 7.0 g/dL (11-28-24 @ 11:56)  Hematocrit: 22.6 % (11-28-24 @ 11:56)  WBC Count: 7.93 K/uL (11-28-24 @ 07:00)  Hemoglobin: 7.2 g/dL (11-28-24 @ 07:00)  Hematocrit: 23.5 % (11-28-24 @ 07:00)  Platelet Count - Automated: 363 K/uL (11-28-24 @ 07:00)  WBC Count: 7.24 K/uL (11-27-24 @ 11:04)  Hemoglobin: 8.1 g/dL (11-27-24 @ 11:04)  Hematocrit: 26.2 % (11-27-24 @ 11:04)  Platelet Count - Automated: 399 K/uL (11-27-24 @ 11:04)  WBC Count: 6.75 K/uL (11-27-24 @ 07:34)  Hemoglobin: 8.2 g/dL (11-27-24 @ 07:34)  Hematocrit: 26.4 % (11-27-24 @ 07:34)  Platelet Count - Automated: 371 K/uL (11-27-24 @ 07:34)  Reticulocyte Percent: 3.2 % (11-27-24 @ 07:34)  WBC Count: 6.27 K/uL (11-26-24 @ 22:24)  Hemoglobin: 5.4 g/dL (11-26-24 @ 22:24)  Hematocrit: 17.6 % (11-26-24 @ 22:24)  Platelet Count - Automated: 372 K/uL (11-26-24 @ 22:24)  WBC Count: 8.27 K/uL (11-26-24 @ 21:28)  Hemoglobin: 6.6 g/dL (11-26-24 @ 21:28)  Hematocrit: 22.2 % (11-26-24 @ 21:28)  Platelet Count - Automated: 435 K/uL (11-26-24 @ 21:28)                BLOOD SMEAR INTERPRETATION:       RADIOLOGY & ADDITIONAL STUDIES:

## 2024-12-06 NOTE — OCCUPATIONAL THERAPY INITIAL EVALUATION ADULT - TRANSFER TRAINING, PT EVAL
Pt will perform sit<>stand transfer using appropriate AD with Lazara by dc. Pt will perform bed<>chair transfer using appropriate AD with Lazara by dc.

## 2024-12-06 NOTE — OCCUPATIONAL THERAPY INITIAL EVALUATION ADULT - PERTINENT HX OF CURRENT PROBLEM, REHAB EVAL
Pt is an 85 y/o man w PMHx of HLD, DM, CAD s/p stents x2 in 2007, pt of Dr Avila, h/o asbestosis of lung, dx w malignant mesothelioma ~8/2024 and s/p VATS pleurodesis, R sided PleurX, h/o AVM/GIB, JASMIN, referred from NH for anemia to 6.5, in ED found to have Hb 7.1, CHINYERE +ve for melena according to ED notes. GI team consulted, clear liquid diet and NPO after midnight for possible EGD tomorrow.    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 (APC), angioectasia in the duodenal sweep. 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

## 2024-12-06 NOTE — PHYSICAL THERAPY INITIAL EVALUATION ADULT - ASSISTIVE DEVICE FOR TRANSFER: SIT/STAND, REHAB EVAL
Brief Operative Note    Patient: Charles Trevino 82 year old female    MRN: [id number]    Surgeon(s): Lazaro Johnson DO  Phone Number: [phone]                       Surgeon(s) and Role:     * Lazaro Johnson DO - Primary    Assistant(s): none    Pre-Op Diagnosis: Incisional hernia, without obstruction or gangrene [K43.2]     Post-Op Diagnosis: same     Procedure: Procedure(s):  REPAIR, HERNIA, INCISIONAL    Anesthesia Type: General                                   Complications: None    Description: none    Findings: none    Specimens Removed: No specimens collected     Estimated Blood Loss: No Value min    Assistant Tasks: None     Implants:   Implant Name Type Inv. Item Serial No.  Lot No. LRB No. Used Action   MESH PROCEED [id number] OVAL SURG HERNIA REPR - S. Mesh MESH PROCEED [id number] OVAL SURG HERNIA REPR . [employer] [id number] N/A 1 Implanted         I was present for the key portions of the procedure and was immediately available for the non-key portions      
rolling walker
today

## 2024-12-06 NOTE — OCCUPATIONAL THERAPY INITIAL EVALUATION ADULT - ORIENTATION, REHAB EVAL
Pt knows that he is at hospital, unaware of he came from home or [facility]. Pt knows current year of 2024, unknown current month despite 2 options/cues./person/place

## 2024-12-06 NOTE — OCCUPATIONAL THERAPY INITIAL EVALUATION ADULT - ADDITIONAL COMMENTS
+pt was poor historian, unable to provide any social info regarding prior level of assistance negative

## 2024-12-07 NOTE — PROGRESS NOTE ADULT - SUBJECTIVE AND OBJECTIVE BOX
Patient is a 84y old  Male who presents with a chief complaint of Melena (06 Dec 2024 17:17)       Pt is seen and examined  pt is awake and lying in bed      ROS:  Negative except for:weakness    MEDICATIONS  (STANDING):  aspirin  chewable 81 milliGRAM(s) Oral daily  atorvastatin 20 milliGRAM(s) Oral at bedtime  chlorhexidine 2% Cloths 1 Application(s) Topical <User Schedule>  dextrose 5%. 1000 milliLiter(s) (100 mL/Hr) IV Continuous <Continuous>  dextrose 5%. 1000 milliLiter(s) (50 mL/Hr) IV Continuous <Continuous>  dextrose 50% Injectable 25 Gram(s) IV Push once  dextrose 50% Injectable 12.5 Gram(s) IV Push once  dextrose 50% Injectable 25 Gram(s) IV Push once  ferrous    sulfate 325 milliGRAM(s) Oral daily  gabapentin 300 milliGRAM(s) Oral three times a day  glucagon  Injectable 1 milliGRAM(s) IntraMuscular once  insulin glargine Injectable (LANTUS) 5 Unit(s) SubCutaneous every morning  insulin lispro (ADMELOG) corrective regimen sliding scale   SubCutaneous three times a day before meals  melatonin 3 milliGRAM(s) Oral at bedtime  meropenem  IVPB 1000 milliGRAM(s) IV Intermittent every 12 hours  pantoprazole    Tablet 40 milliGRAM(s) Oral every 12 hours  polyethylene glycol 3350 17 Gram(s) Oral daily  senna 2 Tablet(s) Oral at bedtime  sodium bicarbonate 650 milliGRAM(s) Oral three times a day  tamsulosin 0.4 milliGRAM(s) Oral at bedtime    MEDICATIONS  (PRN):  acetaminophen     Tablet .. 975 milliGRAM(s) Oral every 8 hours PRN Mild Pain (1 - 3)  dextrose Oral Gel 15 Gram(s) Oral once PRN Blood Glucose LESS THAN 70 milliGRAM(s)/deciliter      Allergies    penicillin (Unknown)    Intolerances        Vital Signs Last 24 Hrs  T(C): 36.6 (07 Dec 2024 05:04), Max: 36.6 (07 Dec 2024 05:04)  T(F): 97.9 (07 Dec 2024 05:04), Max: 97.9 (07 Dec 2024 05:04)  HR: 88 (07 Dec 2024 05:04) (74 - 88)  BP: 97/56 (07 Dec 2024 05:04) (97/56 - 110/59)  BP(mean): --  RR: 18 (07 Dec 2024 05:04) (18 - 19)  SpO2: 95% (07 Dec 2024 05:04) (95% - 97%)        PHYSICAL EXAM  General: adult in NAD  HEENT: clear oropharynx, anicteric sclera, pink conjunctiva  Neck: supple  CV: normal S1/S2 with no murmur rubs or gallops  Lungs: positive air movement b/l ant lungs,clear to auscultation, no wheezes, no rales  Abdomen: soft non-tender non-distended, no hepatosplenomegaly  Ext: no clubbing cyanosis or edema  Skin: no rashes and no petechiae  Neuro: alert and oriented X 4, no focal deficits  LABS:                          7.8    9.51  )-----------( 348      ( 07 Dec 2024 08:00 )             25.9         Mean Cell Volume : 97.0 fL  Mean Cell Hemoglobin : 29.2 pg  Mean Cell Hemoglobin Concentration : 30.1 g/dL  Auto Neutrophil # : 8.52 K/uL  Auto Lymphocyte # : 0.45 K/uL  Auto Monocyte # : 0.37 K/uL  Auto Eosinophil # : 0.10 K/uL  Auto Basophil # : 0.01 K/uL  Auto Neutrophil % : 89.6 %  Auto Lymphocyte % : 4.7 %  Auto Monocyte % : 3.9 %  Auto Eosinophil % : 1.1 %  Auto Basophil % : 0.1 %    Serial CBC's  12-07 @ 08:00  Hct-25.9 / Hgb-7.8 / Plat-348 / RBC-2.67 / WBC-9.51          Serial CBC's  12-06 @ 07:47  Hct-23.8 / Hgb-7.3 / Plat-271 / RBC-2.48 / WBC-6.07          Serial CBC's  12-05 @ 04:30  Hct-25.5 / Hgb-7.9 / Plat-265 / RBC-2.65 / WBC-6.60          Serial CBC's  12-04 @ 07:43  Hct-25.2 / Hgb-7.9 / Plat-240 / RBC-2.63 / WBC-8.06          Serial CBC's  12-03 @ 22:51  Hct-25.2 / Hgb-7.9 / Plat-226 / RBC-2.64 / WBC-8.77            12-07    144  |  109  |  32[H]  ----------------------------<  116[H]  4.3   |  24  |  1.3    Ca    8.6      07 Dec 2024 08:00  Mg     1.9     12-07    TPro  4.7[L]  /  Alb  2.4[L]  /  TBili  <0.2  /  DBili  x   /  AST  13  /  ALT  23  /  AlkPhos  114  12-06          Platelet Count - Automated: 348 K/uL (12-07-24 @ 08:00)  WBC Count: 9.51 K/uL (12-07-24 @ 08:00)  Hemoglobin: 7.8 g/dL (12-07-24 @ 08:00)  Hematocrit: 25.9 % (12-07-24 @ 08:00)  WBC Count: 6.07 K/uL (12-06-24 @ 07:47)  Hemoglobin: 7.3 g/dL (12-06-24 @ 07:47)  Hematocrit: 23.8 % (12-06-24 @ 07:47)  Platelet Count - Automated: 271 K/uL (12-06-24 @ 07:47)  WBC Count: 6.60 K/uL (12-05-24 @ 04:30)  Hemoglobin: 7.9 g/dL (12-05-24 @ 04:30)  Hematocrit: 25.5 % (12-05-24 @ 04:30)  Platelet Count - Automated: 265 K/uL (12-05-24 @ 04:30)  WBC Count: 8.06 K/uL (12-04-24 @ 07:43)  Hemoglobin: 7.9 g/dL (12-04-24 @ 07:43)  Hematocrit: 25.2 % (12-04-24 @ 07:43)  Platelet Count - Automated: 240 K/uL (12-04-24 @ 07:43)  WBC Count: 8.77 K/uL (12-03-24 @ 22:51)  Hemoglobin: 7.9 g/dL (12-03-24 @ 22:51)  Hematocrit: 25.2 % (12-03-24 @ 22:51)  Platelet Count - Automated: 226 K/uL (12-03-24 @ 22:51)  WBC Count: 5.65 K/uL (12-03-24 @ 06:51)  Hemoglobin: 7.7 g/dL (12-03-24 @ 06:51)  Hematocrit: 25.0 % (12-03-24 @ 06:51)  Platelet Count - Automated: 224 K/uL (12-03-24 @ 06:51)  WBC Count: 7.92 K/uL (12-02-24 @ 06:41)  Hemoglobin: 8.1 g/dL (12-02-24 @ 06:41)  Hematocrit: 26.0 % (12-02-24 @ 06:41)  Platelet Count - Automated: 238 K/uL (12-02-24 @ 06:41)  WBC Count: 3.75 K/uL (12-01-24 @ 16:00)  Hemoglobin: 8.6 g/dL (12-01-24 @ 16:00)  Hematocrit: 27.9 % (12-01-24 @ 16:00)  Platelet Count - Automated: 233 K/uL (12-01-24 @ 16:00)  WBC Count: 5.50 K/uL (12-01-24 @ 08:15)  Hemoglobin: 6.7 g/dL (12-01-24 @ 08:15)  Hematocrit: 21.4 % (12-01-24 @ 08:15)  Platelet Count - Automated: 241 K/uL (12-01-24 @ 08:15)  WBC Count: 10.73 K/uL (11-30-24 @ 11:04)  Hemoglobin: 7.5 g/dL (11-30-24 @ 11:04)  Hematocrit: 24.2 % (11-30-24 @ 11:04)  Platelet Count - Automated: 306 K/uL (11-30-24 @ 11:04)  WBC Count: 15.45 K/uL (11-29-24 @ 07:30)  Hemoglobin: 8.6 g/dL (11-29-24 @ 07:30)  Hematocrit: 27.4 % (11-29-24 @ 07:30)  Platelet Count - Automated: 380 K/uL (11-29-24 @ 07:30)  Hemoglobin: 7.4 g/dL (11-28-24 @ 23:47)  Hematocrit: 23.8 % (11-28-24 @ 23:47)  Hemoglobin: 8.5 g/dL (11-28-24 @ 17:23)  Hematocrit: 26.7 % (11-28-24 @ 17:23)  Hemoglobin: 7.0 g/dL (11-28-24 @ 11:56)  Hematocrit: 22.6 % (11-28-24 @ 11:56)  WBC Count: 7.93 K/uL (11-28-24 @ 07:00)  Hemoglobin: 7.2 g/dL (11-28-24 @ 07:00)  Hematocrit: 23.5 % (11-28-24 @ 07:00)  Platelet Count - Automated: 363 K/uL (11-28-24 @ 07:00)                BLOOD SMEAR INTERPRETATION:       RADIOLOGY & ADDITIONAL STUDIES:

## 2024-12-07 NOTE — PROGRESS NOTE ADULT - ASSESSMENT
84y male, with PMHx AVMs, CAD s/p stents x 2 (2007), asbestosis of lung, malignant mesothelioma, s/p VATS, HLD, DM, and JASMIN, arrived at the ED on 11/26 due to abnormal Hb (6.4).     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 (Polypectomy).  3 Polyps (6 mm to 10 mm) in the ascending colon. (Polypectomy).  Polyp (5 mm) in the transverse colon. (Polypectomy).  Continue aspirin 81mg daily  Discontinue clopidogrel  Iron supplementation to maintain hemoglobin    #sepsis POA with ESBL Proteus bacteremia, sepsis resolved  Infectious disease consulted  Meropenem end of therapy 12/8    Patient medically ready for discharge, pending placement.

## 2024-12-07 NOTE — PROGRESS NOTE ADULT - SUBJECTIVE AND OBJECTIVE BOX
RADHAKY CLARI  84y  Male      Patient is a 84y old  Male who presents with a chief complaint of Melena (07 Dec 2024 13:35)      INTERVAL HPI/OVERNIGHT EVENTS:  No acute events overnight.    T(C): 36.7 (12-07-24 @ 13:46), Max: 36.7 (12-07-24 @ 13:46)  HR: 86 (12-07-24 @ 13:46) (74 - 88)  BP: 126/61 (12-07-24 @ 13:46) (97/56 - 126/61)  RR: 17 (12-07-24 @ 13:46) (17 - 19)  SpO2: 95% (12-07-24 @ 13:46) (95% - 97%)  Wt(kg): --Vital Signs Last 24 Hrs  T(C): 36.7 (07 Dec 2024 13:46), Max: 36.7 (07 Dec 2024 13:46)  T(F): 98 (07 Dec 2024 13:46), Max: 98 (07 Dec 2024 13:46)  HR: 86 (07 Dec 2024 13:46) (74 - 88)  BP: 126/61 (07 Dec 2024 13:46) (97/56 - 126/61)  BP(mean): 81 (07 Dec 2024 13:46) (81 - 81)  RR: 17 (07 Dec 2024 13:46) (17 - 19)  SpO2: 95% (07 Dec 2024 13:46) (95% - 97%)    Parameters below as of 07 Dec 2024 13:46  Patient On (Oxygen Delivery Method): room air          12-06-24 @ 07:01  -  12-07-24 @ 07:00  --------------------------------------------------------  IN: 0 mL / OUT: 1200 mL / NET: -1200 mL        PHYSICAL EXAM:  GENERAL: NAD, sitting up in bed  PSYCH: no agitation, baseline mentation  NERVOUS SYSTEM:  Alert, freely moving all extremities  PULMONARY: Clear to percussion bilaterally  CARDIOVASCULAR: Regular rate and rhythm  GI: Soft, Nontender  EXTREMITIES:  No cyanosis or edema  SKIN: No obvious rashes or lesions    Consultant(s) Notes Reviewed:  [x ] YES  [ ] NO    Discussed with Consultants/Other Providers [ x] YES     LABS                          7.8    9.51  )-----------( 348      ( 07 Dec 2024 08:00 )             25.9     12-07    144  |  109  |  32[H]  ----------------------------<  116[H]  4.3   |  24  |  1.3    Ca    8.6      07 Dec 2024 08:00  Mg     1.9     12-07    TPro  4.7[L]  /  Alb  2.4[L]  /  TBili  <0.2  /  DBili  x   /  AST  13  /  ALT  23  /  AlkPhos  114  12-06      Urinalysis Basic - ( 07 Dec 2024 08:00 )    Color: x / Appearance: x / SG: x / pH: x  Gluc: 116 mg/dL / Ketone: x  / Bili: x / Urobili: x   Blood: x / Protein: x / Nitrite: x   Leuk Esterase: x / RBC: x / WBC x   Sq Epi: x / Non Sq Epi: x / Bacteria: x        Lactate Trend        CAPILLARY BLOOD GLUCOSE      POCT Blood Glucose.: 262 mg/dL (07 Dec 2024 17:57)        RADIOLOGY & ADDITIONAL TESTS:    Imaging Personally Reviewed:  [ ] YES  [ ] NO    HEALTH ISSUES - PROBLEM Dx:

## 2024-12-07 NOTE — PROGRESS NOTE ADULT - ASSESSMENT
83 y/o man w PMHx of HLD, DM, CAD s/p stents x2 in 2007, pt of Dr Avila, h/o asbestosis of lung, dx w malignant mesothelioma ~8/2024 and s/p VATS pleurodesis, R sided PleurX, h/o AVM/GIB, JASMIN, referred from NH for anemia to 6.5, in ED found to have Hb 7.1, CHINYERE +ve for melena.     # Possible UGI bleed  resolved  # Acute on chronic anemia  with JASMIN   Hx Angioectasia of duodenum  -GI on board   -Trend H&H   tfx to keep hb >7    -s/p egd/colonoscopy >showed AVM ,s/p cauterization   follow cbc  daily  venofer 200 mg (total 2 doses)    #JEY :   #bacteremia /sepsis ,likely urosepsis   on abx as per ID     # Malignant mesothelioma (diagnosed 8/2024 ), Stable  # Hx Asbestos lung       cont other supportive care  d/c plan as per primary team   he will f/u with Dr chavez as outpt for further Mn of mesothelioma/JASMIN

## 2024-12-08 NOTE — PROGRESS NOTE ADULT - SUBJECTIVE AND OBJECTIVE BOX
Patient is a 84y old  Male who presents with a chief complaint of Melena (07 Dec 2024 18:58)       Pt is seen and examined  pt is awake and lying in bed      ROS:  unable to obtain     MEDICATIONS  (STANDING):  aspirin  chewable 81 milliGRAM(s) Oral daily  atorvastatin 20 milliGRAM(s) Oral at bedtime  chlorhexidine 2% Cloths 1 Application(s) Topical <User Schedule>  dextrose 5%. 1000 milliLiter(s) (100 mL/Hr) IV Continuous <Continuous>  dextrose 5%. 1000 milliLiter(s) (50 mL/Hr) IV Continuous <Continuous>  dextrose 50% Injectable 25 Gram(s) IV Push once  dextrose 50% Injectable 12.5 Gram(s) IV Push once  dextrose 50% Injectable 25 Gram(s) IV Push once  ferrous    sulfate 325 milliGRAM(s) Oral daily  gabapentin 300 milliGRAM(s) Oral three times a day  glucagon  Injectable 1 milliGRAM(s) IntraMuscular once  insulin glargine Injectable (LANTUS) 5 Unit(s) SubCutaneous every morning  insulin lispro (ADMELOG) corrective regimen sliding scale   SubCutaneous three times a day before meals  melatonin 3 milliGRAM(s) Oral at bedtime  meropenem  IVPB 1000 milliGRAM(s) IV Intermittent every 12 hours  pantoprazole    Tablet 40 milliGRAM(s) Oral every 12 hours  polyethylene glycol 3350 17 Gram(s) Oral daily  senna 2 Tablet(s) Oral at bedtime  sodium bicarbonate 650 milliGRAM(s) Oral three times a day  tamsulosin 0.4 milliGRAM(s) Oral at bedtime    MEDICATIONS  (PRN):  acetaminophen     Tablet .. 975 milliGRAM(s) Oral every 8 hours PRN Mild Pain (1 - 3)  dextrose Oral Gel 15 Gram(s) Oral once PRN Blood Glucose LESS THAN 70 milliGRAM(s)/deciliter      Allergies    penicillin (Unknown)    Intolerances        Vital Signs Last 24 Hrs  T(C): 36.9 (08 Dec 2024 05:21), Max: 37 (07 Dec 2024 20:15)  T(F): 98.5 (08 Dec 2024 05:21), Max: 98.6 (07 Dec 2024 20:15)  HR: 62 (08 Dec 2024 05:21) (62 - 86)  BP: 115/68 (08 Dec 2024 05:21) (113/80 - 126/61)  BP(mean): 84 (08 Dec 2024 05:21) (81 - 84)  RR: 18 (08 Dec 2024 05:21) (17 - 18)  SpO2: 94% (08 Dec 2024 05:21) (94% - 96%)    Parameters below as of 08 Dec 2024 05:21  Patient On (Oxygen Delivery Method): room air        PHYSICAL EXAM  General: adult in NAD  HEENT: clear oropharynx, anicteric sclera, pink conjunctiva  Neck: supple  CV: normal S1/S2 with no murmur rubs or gallops  Lungs: positive air movement b/l ant lungs,clear to auscultation, no wheezes, no rales  Abdomen: soft non-tender non-distended, no hepatosplenomegaly  Ext: no clubbing cyanosis or edema  Skin: no rashes and no petechiae  Neuro: alert and oriented X 2, no focal deficits  LABS:                          7.8    9.51  )-----------( 348      ( 07 Dec 2024 08:00 )             25.9         Mean Cell Volume : 97.0 fL  Mean Cell Hemoglobin : 29.2 pg  Mean Cell Hemoglobin Concentration : 30.1 g/dL  Auto Neutrophil # : 8.52 K/uL  Auto Lymphocyte # : 0.45 K/uL  Auto Monocyte # : 0.37 K/uL  Auto Eosinophil # : 0.10 K/uL  Auto Basophil # : 0.01 K/uL  Auto Neutrophil % : 89.6 %  Auto Lymphocyte % : 4.7 %  Auto Monocyte % : 3.9 %  Auto Eosinophil % : 1.1 %  Auto Basophil % : 0.1 %    Serial CBC's  12-07 @ 08:00  Hct-25.9 / Hgb-7.8 / Plat-348 / RBC-2.67 / WBC-9.51          Serial CBC's  12-06 @ 07:47  Hct-23.8 / Hgb-7.3 / Plat-271 / RBC-2.48 / WBC-6.07          Serial CBC's  12-05 @ 04:30  Hct-25.5 / Hgb-7.9 / Plat-265 / RBC-2.65 / WBC-6.60            12-07    144  |  109  |  32[H]  ----------------------------<  116[H]  4.3   |  24  |  1.3    Ca    8.6      07 Dec 2024 08:00  Mg     1.9     12-07            WBC Count: 9.51 K/uL (12-07-24 @ 08:00)  Hemoglobin: 7.8 g/dL (12-07-24 @ 08:00)  Hematocrit: 25.9 % (12-07-24 @ 08:00)  Platelet Count - Automated: 348 K/uL (12-07-24 @ 08:00)  WBC Count: 6.07 K/uL (12-06-24 @ 07:47)  Hemoglobin: 7.3 g/dL (12-06-24 @ 07:47)  Hematocrit: 23.8 % (12-06-24 @ 07:47)  Platelet Count - Automated: 271 K/uL (12-06-24 @ 07:47)  WBC Count: 6.60 K/uL (12-05-24 @ 04:30)  Hemoglobin: 7.9 g/dL (12-05-24 @ 04:30)  Hematocrit: 25.5 % (12-05-24 @ 04:30)  Platelet Count - Automated: 265 K/uL (12-05-24 @ 04:30)  WBC Count: 8.06 K/uL (12-04-24 @ 07:43)  Hemoglobin: 7.9 g/dL (12-04-24 @ 07:43)  Hematocrit: 25.2 % (12-04-24 @ 07:43)  Platelet Count - Automated: 240 K/uL (12-04-24 @ 07:43)  WBC Count: 8.77 K/uL (12-03-24 @ 22:51)  Hemoglobin: 7.9 g/dL (12-03-24 @ 22:51)  Hematocrit: 25.2 % (12-03-24 @ 22:51)  Platelet Count - Automated: 226 K/uL (12-03-24 @ 22:51)  WBC Count: 5.65 K/uL (12-03-24 @ 06:51)  Hemoglobin: 7.7 g/dL (12-03-24 @ 06:51)  Hematocrit: 25.0 % (12-03-24 @ 06:51)  Platelet Count - Automated: 224 K/uL (12-03-24 @ 06:51)  WBC Count: 7.92 K/uL (12-02-24 @ 06:41)  Hemoglobin: 8.1 g/dL (12-02-24 @ 06:41)  Hematocrit: 26.0 % (12-02-24 @ 06:41)  Platelet Count - Automated: 238 K/uL (12-02-24 @ 06:41)  WBC Count: 3.75 K/uL (12-01-24 @ 16:00)  Hemoglobin: 8.6 g/dL (12-01-24 @ 16:00)  Hematocrit: 27.9 % (12-01-24 @ 16:00)  Platelet Count - Automated: 233 K/uL (12-01-24 @ 16:00)  WBC Count: 5.50 K/uL (12-01-24 @ 08:15)  Hemoglobin: 6.7 g/dL (12-01-24 @ 08:15)  Hematocrit: 21.4 % (12-01-24 @ 08:15)  Platelet Count - Automated: 241 K/uL (12-01-24 @ 08:15)  WBC Count: 10.73 K/uL (11-30-24 @ 11:04)  Hemoglobin: 7.5 g/dL (11-30-24 @ 11:04)  Hematocrit: 24.2 % (11-30-24 @ 11:04)  Platelet Count - Automated: 306 K/uL (11-30-24 @ 11:04)  WBC Count: 15.45 K/uL (11-29-24 @ 07:30)  Hemoglobin: 8.6 g/dL (11-29-24 @ 07:30)  Hematocrit: 27.4 % (11-29-24 @ 07:30)  Platelet Count - Automated: 380 K/uL (11-29-24 @ 07:30)  Hemoglobin: 7.4 g/dL (11-28-24 @ 23:47)  Hematocrit: 23.8 % (11-28-24 @ 23:47)  Hemoglobin: 8.5 g/dL (11-28-24 @ 17:23)  Hematocrit: 26.7 % (11-28-24 @ 17:23)  Hemoglobin: 7.0 g/dL (11-28-24 @ 11:56)  Hematocrit: 22.6 % (11-28-24 @ 11:56)                BLOOD SMEAR INTERPRETATION:       RADIOLOGY & ADDITIONAL STUDIES:

## 2024-12-08 NOTE — PROGRESS NOTE ADULT - ASSESSMENT
84y male, with PMHx AVMs, CAD s/p stents x 2 (2007), asbestosis of lung, malignant mesothelioma, s/p VATS, HLD, DM, and JASMIN, arrived at the ED on 11/26 due to abnormal Hb (6.4).     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 (Polypectomy).  3 Polyps (6 mm to 10 mm) in the ascending colon. (Polypectomy).  Polyp (5 mm) in the transverse colon. (Polypectomy).  Continue aspirin 81mg daily  Discontinue clopidogrel  Iron supplementation to maintain hemoglobin    #sepsis POA with ESBL Proteus bacteremia, sepsis resolved  Infectious disease consulted  Meropenem end of therapy 12/8    #Mesothelioma  #Recurrent right sided malignant pleural effusion with presence of PleurX catheter  Patient seems to be reaccumulating pleural fluid more rapidly  700 cc drained today.  800cc drained 2 days ago.  Patient also having difficulty controlling secretions.  Pulmonology consulted to determine if PleurX catheter should be drained more frequently.  Discussed with family today that patient has been declining during his hospital stay.  Patient's son amenable to palliative care consult for discussion of goals of care.    #Gluteal and sacral pressure ulcer stage II, discovered 12/8 - Wound care consulted

## 2024-12-08 NOTE — PROGRESS NOTE ADULT - SUBJECTIVE AND OBJECTIVE BOX
CLARI BURGER  84y  Male      Patient is a 84y old  Male who presents with a chief complaint of Melena (08 Dec 2024 11:48)      INTERVAL HPI/OVERNIGHT EVENTS:  Patient having more shortness of breath today on exam.  PleurX catheter drained a day early. Deep tissue wound found today.    T(C): 36.7 (12-08-24 @ 14:11), Max: 36.9 (12-08-24 @ 05:21)  HR: 79 (12-08-24 @ 14:11) (62 - 79)  BP: 121/68 (12-08-24 @ 14:11) (115/68 - 121/68)  RR: 18 (12-08-24 @ 14:11) (18 - 18)  SpO2: 96% (12-08-24 @ 14:11) (94% - 96%)  Wt(kg): --Vital Signs Last 24 Hrs  T(C): 36.7 (08 Dec 2024 14:11), Max: 36.9 (08 Dec 2024 05:21)  T(F): 98.1 (08 Dec 2024 14:11), Max: 98.5 (08 Dec 2024 05:21)  HR: 79 (08 Dec 2024 14:11) (62 - 79)  BP: 121/68 (08 Dec 2024 14:11) (115/68 - 121/68)  BP(mean): 86 (08 Dec 2024 14:11) (84 - 86)  RR: 18 (08 Dec 2024 14:11) (18 - 18)  SpO2: 96% (08 Dec 2024 14:11) (94% - 96%)    Parameters below as of 08 Dec 2024 14:11  Patient On (Oxygen Delivery Method): room air          12-07-24 @ 07:01  -  12-08-24 @ 07:00  --------------------------------------------------------  IN: 0 mL / OUT: 400 mL / NET: -400 mL    12-08-24 @ 07:01  -  12-08-24 @ 21:30  --------------------------------------------------------  IN: 0 mL / OUT: 500 mL / NET: -500 mL        PHYSICAL EXAM:  GENERAL: NAD, sitting up in bed  PSYCH: no agitation, baseline mentation  NERVOUS SYSTEM:  Alert, freely moving all extremities  PULMONARY: Right pleurX catheter in place  CARDIOVASCULAR: Regular rate and rhythm  GI: Soft, Nontender  EXTREMITIES:  No cyanosis or edema  SKIN: Gluteal and sacral pressure ulcer stage II    Consultant(s) Notes Reviewed:  [x ] YES  [ ] NO    Discussed with Consultants/Other Providers [ x] YES     LABS                          7.8    9.51  )-----------( 348      ( 07 Dec 2024 08:00 )             25.9     12-07    144  |  109  |  32[H]  ----------------------------<  116[H]  4.3   |  24  |  1.3    Ca    8.6      07 Dec 2024 08:00  Mg     1.9     12-07        Urinalysis Basic - ( 07 Dec 2024 08:00 )    Color: x / Appearance: x / SG: x / pH: x  Gluc: 116 mg/dL / Ketone: x  / Bili: x / Urobili: x   Blood: x / Protein: x / Nitrite: x   Leuk Esterase: x / RBC: x / WBC x   Sq Epi: x / Non Sq Epi: x / Bacteria: x        Lactate Trend        CAPILLARY BLOOD GLUCOSE      POCT Blood Glucose.: 344 mg/dL (08 Dec 2024 20:47)        RADIOLOGY & ADDITIONAL TESTS:    Imaging Personally Reviewed:  [ ] YES  [ ] NO    HEALTH ISSUES - PROBLEM Dx:

## 2024-12-09 NOTE — PROGRESS NOTE ADULT - ASSESSMENT
83 y/o man w PMHx of HLD, DM, CAD s/p stents x2 in 2007, pt of Dr Avila, h/o asbestosis of lung, dx w malignant mesothelioma ~8/2024 and s/p VATS pleurodesis, R sided PleurX, h/o AVM/GIB, JASMIN, referred from NH for anemia to 6.5, in ED found to have Hb 7.1, CHINYERE +ve for melena.     # Possible UGI bleed  resolved  # Acute on chronic anemia  with JASMIN   Hx Angioectasia of duodenum  -GI on board   -Trend H&H   tfx to keep hb >7    -s/p egd/colonoscopy >showed AVM ,s/p cauterization   follow cbc  daily  s/p venofer 200 mg (total 2 doses)      #bacteremia with ESBL /sepsis ,  on abx as per ID     # Malignant mesothelioma (diagnosed 8/2024 ), Stable  # Hx Asbestos lung     Not a candidate for any aggressive Mn from hem/onc stand point given his overall poor PS  Had a detail discussion with son today  discussed comfort care   family agrees    pls get palliative care f/u for GOC discussion     cont other supportive care  overall prognosis is poor  will f/u

## 2024-12-09 NOTE — PROGRESS NOTE ADULT - SUBJECTIVE AND OBJECTIVE BOX
Patient is a 84y old  Male who presents with a chief complaint of Melena (09 Dec 2024 11:50)       Pt is seen and examined  pt is awake and lying in bed        ROS:  unable to obtain     MEDICATIONS  (STANDING):  aspirin  chewable 81 milliGRAM(s) Oral daily  atorvastatin 20 milliGRAM(s) Oral at bedtime  chlorhexidine 2% Cloths 1 Application(s) Topical <User Schedule>  dextrose 5%. 1000 milliLiter(s) (50 mL/Hr) IV Continuous <Continuous>  dextrose 5%. 1000 milliLiter(s) (100 mL/Hr) IV Continuous <Continuous>  dextrose 50% Injectable 25 Gram(s) IV Push once  dextrose 50% Injectable 12.5 Gram(s) IV Push once  dextrose 50% Injectable 25 Gram(s) IV Push once  ferrous    sulfate 325 milliGRAM(s) Oral daily  gabapentin 300 milliGRAM(s) Oral three times a day  glucagon  Injectable 1 milliGRAM(s) IntraMuscular once  insulin glargine Injectable (LANTUS) 5 Unit(s) SubCutaneous every morning  insulin lispro (ADMELOG) corrective regimen sliding scale   SubCutaneous three times a day before meals  lactated ringers. 1000 milliLiter(s) (50 mL/Hr) IV Continuous <Continuous>  melatonin 3 milliGRAM(s) Oral at bedtime  meropenem  IVPB 1000 milliGRAM(s) IV Intermittent every 12 hours  pantoprazole    Tablet 40 milliGRAM(s) Oral every 12 hours  polyethylene glycol 3350 17 Gram(s) Oral daily  senna 2 Tablet(s) Oral at bedtime  sodium bicarbonate 650 milliGRAM(s) Oral three times a day  tamsulosin 0.4 milliGRAM(s) Oral at bedtime  vancomycin  IVPB 850 milliGRAM(s) IV Intermittent every 24 hours    MEDICATIONS  (PRN):  acetaminophen     Tablet .. 975 milliGRAM(s) Oral every 8 hours PRN Mild Pain (1 - 3)  albuterol/ipratropium for Nebulization 3 milliLiter(s) Nebulizer every 6 hours PRN Shortness of Breath  dextrose Oral Gel 15 Gram(s) Oral once PRN Blood Glucose LESS THAN 70 milliGRAM(s)/deciliter      Allergies    penicillin (Unknown)    Intolerances        Vital Signs Last 24 Hrs  T(C): 37.2 (09 Dec 2024 14:45), Max: 37.2 (09 Dec 2024 14:45)  T(F): 98.9 (09 Dec 2024 14:45), Max: 98.9 (09 Dec 2024 14:45)  HR: 49 (09 Dec 2024 14:45) (49 - 97)  BP: 99/61 (09 Dec 2024 14:45) (99/61 - 110/67)  BP(mean): --  RR: 18 (09 Dec 2024 14:45) (18 - 18)  SpO2: 98% (09 Dec 2024 14:45) (94% - 98%)    Parameters below as of 09 Dec 2024 04:18  Patient On (Oxygen Delivery Method): nasal cannula        PHYSICAL EXAM  General: cachetic   HEENT: clear oropharynx, anicteric sclera, pink conjunctiva  Neck: supple  CV: normal S1/S2 with no murmur rubs or gallops  Lungs: positive air movement b/l ant lungs  Abdomen: soft non-tender non-distended, no hepatosplenomegaly  Ext: no clubbing cyanosis or edema  Skin: no rashes and no petechiae  Neuro: alert /lethargic  no focal deficits  LABS:                          8.2    13.40 )-----------( 441      ( 09 Dec 2024 07:14 )             27.0         Mean Cell Volume : 97.1 fL  Mean Cell Hemoglobin : 29.5 pg  Mean Cell Hemoglobin Concentration : 30.4 g/dL  Auto Neutrophil # : x  Auto Lymphocyte # : x  Auto Monocyte # : x  Auto Eosinophil # : x  Auto Basophil # : x  Auto Neutrophil % : x  Auto Lymphocyte % : x  Auto Monocyte % : x  Auto Eosinophil % : x  Auto Basophil % : x    Serial CBC's  12-09 @ 07:14  Hct-27.0 / Hgb-8.2 / Plat-441 / RBC-2.78 / WBC-13.40          Serial CBC's  12-07 @ 08:00  Hct-25.9 / Hgb-7.8 / Plat-348 / RBC-2.67 / WBC-9.51          Serial CBC's  12-06 @ 07:47  Hct-23.8 / Hgb-7.3 / Plat-271 / RBC-2.48 / WBC-6.07            12-09    140  |  104  |  27[H]  ----------------------------<  131[H]  3.8   |  25  |  1.2    Ca    8.4      09 Dec 2024 07:14  Phos  3.0     12-09  Mg     1.9     12-09    TPro  5.6[L]  /  Alb  2.5[L]  /  TBili  0.2  /  DBili  x   /  AST  27  /  ALT  28  /  AlkPhos  190[H]  12-09          WBC Count: 13.40 K/uL (12-09-24 @ 07:14)  Hemoglobin: 8.2 g/dL (12-09-24 @ 07:14)  Hematocrit: 27.0 % (12-09-24 @ 07:14)  Platelet Count - Automated: 441 K/uL (12-09-24 @ 07:14)  WBC Count: 9.51 K/uL (12-07-24 @ 08:00)  Hemoglobin: 7.8 g/dL (12-07-24 @ 08:00)  Hematocrit: 25.9 % (12-07-24 @ 08:00)  Platelet Count - Automated: 348 K/uL (12-07-24 @ 08:00)  WBC Count: 6.07 K/uL (12-06-24 @ 07:47)  Hemoglobin: 7.3 g/dL (12-06-24 @ 07:47)  Hematocrit: 23.8 % (12-06-24 @ 07:47)  Platelet Count - Automated: 271 K/uL (12-06-24 @ 07:47)  WBC Count: 6.60 K/uL (12-05-24 @ 04:30)  Hemoglobin: 7.9 g/dL (12-05-24 @ 04:30)  Hematocrit: 25.5 % (12-05-24 @ 04:30)  Platelet Count - Automated: 265 K/uL (12-05-24 @ 04:30)  WBC Count: 8.06 K/uL (12-04-24 @ 07:43)  Hemoglobin: 7.9 g/dL (12-04-24 @ 07:43)  Hematocrit: 25.2 % (12-04-24 @ 07:43)  Platelet Count - Automated: 240 K/uL (12-04-24 @ 07:43)  WBC Count: 8.77 K/uL (12-03-24 @ 22:51)  Hemoglobin: 7.9 g/dL (12-03-24 @ 22:51)  Hematocrit: 25.2 % (12-03-24 @ 22:51)  Platelet Count - Automated: 226 K/uL (12-03-24 @ 22:51)  WBC Count: 5.65 K/uL (12-03-24 @ 06:51)  Hemoglobin: 7.7 g/dL (12-03-24 @ 06:51)  Hematocrit: 25.0 % (12-03-24 @ 06:51)  Platelet Count - Automated: 224 K/uL (12-03-24 @ 06:51)  WBC Count: 7.92 K/uL (12-02-24 @ 06:41)  Hemoglobin: 8.1 g/dL (12-02-24 @ 06:41)  Hematocrit: 26.0 % (12-02-24 @ 06:41)  Platelet Count - Automated: 238 K/uL (12-02-24 @ 06:41)  WBC Count: 3.75 K/uL (12-01-24 @ 16:00)  Hemoglobin: 8.6 g/dL (12-01-24 @ 16:00)  Hematocrit: 27.9 % (12-01-24 @ 16:00)  Platelet Count - Automated: 233 K/uL (12-01-24 @ 16:00)  WBC Count: 5.50 K/uL (12-01-24 @ 08:15)  Hemoglobin: 6.7 g/dL (12-01-24 @ 08:15)  Hematocrit: 21.4 % (12-01-24 @ 08:15)  Platelet Count - Automated: 241 K/uL (12-01-24 @ 08:15)  WBC Count: 10.73 K/uL (11-30-24 @ 11:04)  Hemoglobin: 7.5 g/dL (11-30-24 @ 11:04)  Hematocrit: 24.2 % (11-30-24 @ 11:04)  Platelet Count - Automated: 306 K/uL (11-30-24 @ 11:04)                BLOOD SMEAR INTERPRETATION:       RADIOLOGY & ADDITIONAL STUDIES:

## 2024-12-09 NOTE — ADVANCED PRACTICE NURSE CONSULT - RECOMMEDATIONS
Cleanse wound to sacrococcygeal region with soap and water.   Pat dry, apply triad twice a day and prn for soiling.     Cleanse wound to left heel with soap and water  Pat dry, apply Cavilon skin prep twice a day, prn for soiling  Offload heels    Maintain pressure injury prevention.   Keep skin clean.   Maintain incontinence care.   Monitor skin for changes and notify provider   Case discussed with primary RN

## 2024-12-09 NOTE — ADVANCED PRACTICE NURSE CONSULT - ASSESSMENT
History of Present Illness:   83 y/o man w PMHx of HLD, DM, CAD s/p stents x2 in 2007, pt of Dr Avila, h/o asbestosis of lung, dx w malignant mesothelioma ~8/2024 and s/p VATS pleurodesis, R sided PleurX, h/o AVM/GIB, JASMIN, referred from NH for anemia to 6.5, in ED found to have Hb 7.1, CHINYERE +ve for melena according to ED notes. GI team consulted, clear liquid diet and NPO after midnight for possible EGD tomorrow. 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 (APC), angioectasia in the duodenal sweep. 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 Patient being admitted to medicine for possible UGI. GI consulted.       Patient received lying in bed. Awake. Limited mobility. Walden in place. High risk for pressure injury development and progression.    Wound #1  Type of Wound: Evolving Deep Tissue Injury  Location: Sacrococcygeal region  Measurements: ~[id number]  Tunneling/ Undermining: No  Wound bed: Purple/maroon with areas of epidermal skin loss, white tissue by coccyx  Wound edges: Irregular  Periwound: Intact  Wound exudate: None  Wound odor: No  Induration, erythema, warmth: No  Wound pain: No    Wound #2  Type of Wound: Deep Tissue Injury  Location: Left heel  Measurements: ~[id number]  Tunneling/ Undermining: No  Wound bed: Purple/maroon  Wound edges: Intact  Periwound: Intact  Wound exudate: None  Wound odor: No  Induration, erythema, warmth: No  Wound pain: No    Skin to right heel intact at time of assessment.

## 2024-12-09 NOTE — PROGRESS NOTE ADULT - SUBJECTIVE AND OBJECTIVE BOX
· Subjective and Objective:   SUBJECTIVE  HPI:     CLARI BURGER is a 84y male, with PMHx AVMs, CAD s/p stents x 2 (2007), asbestosis of lung, malignant mesothelioma, s/p VATS, HLD, DM, and JASMIN, arrived at the ED on 11/26 due to abnormal Hb (6.4). Pt did not have active bloody stools bedside and denied CP, Ab pain, N/V/D, dizziness, and UTI sxs. Prior EGD led to bleeding on contact but hemostasis was achieved. In the ED, Hb was 7.1, and CHINYERE positive for black stools in ED and (+) Ab screening. pRBC transfusion was deferred and pt was admitted to obtain further w/u, including iron studies to determine if iron supplementation will be needed and wad Dx'd with melena and anemia.    Pt is currently admitted with the primary diagnosis of anemia and melena, likely 2/2 to upper GID.    Hospital Day: 14d. Pt evaluated bedside this AM with VS stable. Pt appears to have chest congestion. Pt had an EKG last night due to unstable HR and hypoxia (82 saturation on RA). Pt was put on NC and had saturation up to 90%. Pt dislikes the NC and keeps insisting it to be taken off. Pt is off the NC now. Pleur X accumulated 100 cc this AM, although 700 cc was drained yesterday. Pulm consult rec for it be drained daily due to more rapid accumulation of pleural effusion. Pt has a Walden and normal urine output. No BM overnight.    · OBJECTIVE  PAST MEDICAL & SURGICAL HISTORY  DM (diabetes mellitus)  MI (myocardial infarction)  History of CAD (coronary artery disease)  Dyslipidemia  Currently smokes tobacco  Mesothelioma, malignant  Coronary stent patent                                            -----------------------------------------------------------  ALLERGIES:  penicillin (Unknown)                                          ------------------------------------------------------------    HOME MEDICATIONS  Home Medications:  atorvastatin 20 mg oral tablet: 1 tab(s) orally once a day (at bedtime) (26 Nov 2024 15:34)  ferrous sulfate 325 mg (65 mg elemental iron) oral tablet: 1 tab(s) orally once a day (26 Nov 2024 15:35)  gabapentin 300 mg oral tablet: 1 tab(s) orally 3 times a day (26 Nov 2024 15:35)  Lovenox 30 mg/0.3 mL injectable solution: 30 milligram(s) subcutaneously once a day (26 Nov 2024 15:35)  melatonin 3 mg oral tablet: 1 tab(s) orally once a day (at bedtime) (26 Nov 2024 15:35)  metFORMIN 500 mg oral tablet: 1 tab(s) orally 2 times a day (26 Nov 2024 15:35)  omeprazole 20 mg oral delayed release tablet: 1 tab(s) orally once a day (26 Nov 2024 15:34)  polyethylene glycol 3350 oral powder for reconstitution: 17 gram(s) orally once a day (26 Nov 2024 15:35)  senna leaf extract oral tablet: 2 tab(s) orally once a day (at bedtime) (26 Nov 2024 15:35)  tamsulosin 0.4 mg oral capsule: 1 cap(s) orally once a day (at bedtime) (26 Nov 2024 15:35)       MEDICATIONS:  STANDING MEDICATIONS  acetaminophen 975 mg q8hr PRN  aspirin  chewable 81 milliGRAM(s) Oral daily  atorvastatin 20 milliGRAM(s) Oral at bedtime  chlorhexidine 2% Cloths 1 Application(s) Topical <User Schedule>  ferrous    sulfate 325 milliGRAM(s) Oral daily  gabapentin 300 milliGRAM(s) Oral three times a day  insulin lispro (ADMELOG) corrective regimen sliding scale   SubCutaneous three times a day before meals  melatonin 3 milliGRAM(s) Oral at bedtime  meropenem IVPB 1000 mg q12hr  pantoprazole    Tablet 40 milliGRAM(s) Oral every 12 hours  polyethylene glycol 3350 17 Gram(s) Oral daily  senna 2 Tablet(s) Oral at bedtime  sodium chloride 0.9%. 1000 milliLiter(s) IV Continuous <Continuous>  tamsulosin 0.4 milliGRAM(s) Oral at bedtime                                            ------------------------------------------------------------  VITAL SIGNS: Last 24 Hours  12/09/24, 04:18    T(F): 97.8  BP: 110/67  HR: 97  RR: 18  O2 sat: 97% (NC)    PHYSICAL EXAM:  GENERAL: AAOx3, no acute distress; not-ill appearing; conversant  HEAD:  Atraumatic, Normocephalic  CHEST/LUNG: CTA b/l; no wheezing; no rales; no rhonchi; +PleurX drain in place, no signs of infection, drainage, ecchymosis, or pus collection  HEART: Regular rate and rhythm; normal S1, S2, No murmurs; no friction rubs  ABDOMEN: Soft, nontender, nondistended; Bowel sounds present  EXTREMITIES: + midline placed on R arm w/o complications, no signs of infection, drainage, ecchymosis, or pus collection; no pitting edema of LE, B/L; +0/5 muscular strength of LE, B/L; +3/5 muscular strength of UE, B/L                                           --------------------------------------------------------------  LABS:  12/06/24, 07:47                 7.3[L], stable    6.07 )---------------------( 271             23.8[L], stable     auto neutrophil % = 86.4[H]  abs neutrophil = 5.25  abs lymphocytes = 0.48[L]    12/06/24, 07:47     POCT BS     (12/06/24) 164 <-- 129      | (12/05/24) <-- 231 <-- 344 <-- 171 <-- 192 <-- 124    144  |  111[H] |  32[H]  ----------------------------<  117[H]  3.7    |            |  1.3    Ca    8.3 ---> corrected Ca   9.6     | AST 13  Albumin 2.4[L]                             | ALT 23  T protein 4.7[L]                             |     T bili <0.2      11/26/24 10:36    total iron 31 [L]  unsaturated iron binding capacity 247  TIBC 278  % iron saturation 11[L]      Urinalysis Basic - ( 02 Dec 2024 06:41 )    Color: x / Appearance: x / SG: x / pH: x  Gluc: 149 mg/dL / Ketone: x  / Bili: x / Urobili: x   Blood: x / Protein: x / Nitrite: x   Leuk Esterase: x / RBC: x / WBC x   Sq Epi: x / Non Sq Epi: x / Bacteria: x    Culture - Blood (collected 12-01-24 @ 16:00)  Source: .Blood BLOOD  Gram Stain (12-02-24 @ 11:08):    Growth in aerobic and anaerobic bottles: Gram Negative Rods  Preliminary Report (12-02-24 @ 11:08):    Growth in aerobic and anaerobic bottles: Gram Negative Rods    Direct identification is available within approximately 3-5    hours either by Blood Panel Multiplexed PCR or Direct    MALDI-TOF. Details: [email or web address]  Organism: Blood Culture PCR (12-02-24 @ 12:43)  Organism: Blood Culture PCR (12-02-24 @ 12:43)      Method Type: PCR      -  Proteus species: Detec      -  ESBL: Detec      -  CTX-M Resistance Marker: Detec    Culture - Blood (collected 12-01-24 @ 16:00)  Source: .Blood BLOOD  Gram Stain (12-02-24 @ 11:40):    Growth in aerobic bottle: Gram Negative Rods    Growth in anaerobic bottle: Gram Negative Rods  Preliminary Report (12-02-24 @ 11:40):    Growth in aerobic bottle: Gram Negative Rods    Growth in anaerobic bottle: Gram Negative Rods    Lactate, Blood: 2.3 mmol/L (12-01-24 @ 16:00)   · Subjective and Objective:   SUBJECTIVE  HPI:     CLARI BURGER is a 84y male, with PMHx AVMs, CAD s/p stents x 2 (2007), asbestosis of lung, malignant mesothelioma, s/p VATS, HLD, DM, and JASMIN, arrived at the ED on 11/26 due to abnormal Hb (6.4). Pt did not have active bloody stools bedside and denied CP, Ab pain, N/V/D, dizziness, and UTI sxs. Prior EGD led to bleeding on contact but hemostasis was achieved. In the ED, Hb was 7.1, and CHINYERE positive for black stools in ED and (+) Ab screening. pRBC transfusion was deferred and pt was admitted to obtain further w/u, including iron studies to determine if iron supplementation will be needed and wad Dx'd with melena and anemia.    Pt is currently admitted with the primary diagnosis of anemia and melena, likely 2/2 to upper GID.    Hospital Day: 14d. Pt evaluated bedside this AM with VS stable. Pt appears to have chest congestion. Pt had an EKG last night due to unstable HR and hypoxia (82 saturation on RA). Pt was put on NC and had saturation up to 90%. Pt dislikes the NC and keeps insisting it to be taken off. Pt is off the NC now. Pleur X accumulated 100 cc this AM, although 700 cc was drained yesterday. Pulm consult rec for it be drained daily due to more rapid accumulation of pleural effusion. Pt has a Walden and normal urine output. No BM overnight.    · OBJECTIVE  PAST MEDICAL & SURGICAL HISTORY  DM (diabetes mellitus)  MI (myocardial infarction)  History of CAD (coronary artery disease)  Dyslipidemia  Currently smokes tobacco  Mesothelioma, malignant  Coronary stent patent                                            -----------------------------------------------------------  ALLERGIES:  penicillin (Unknown)                                          ------------------------------------------------------------    HOME MEDICATIONS  Home Medications:  atorvastatin 20 mg oral tablet: 1 tab(s) orally once a day (at bedtime) (26 Nov 2024 15:34)  ferrous sulfate 325 mg (65 mg elemental iron) oral tablet: 1 tab(s) orally once a day (26 Nov 2024 15:35)  gabapentin 300 mg oral tablet: 1 tab(s) orally 3 times a day (26 Nov 2024 15:35)  melatonin 3 mg oral tablet: 1 tab(s) orally once a day (at bedtime) (26 Nov 2024 15:35)  polyethylene glycol 3350 oral powder for reconstitution: 17 gram(s) orally once a day (26 Nov 2024 15:35)  senna leaf extract oral tablet: 2 tab(s) orally once a day (at bedtime) (26 Nov 2024 15:35)  tamsulosin 0.4 mg oral capsule: 1 cap(s) orally once a day (at bedtime) (26 Nov 2024 15:35)       MEDICATIONS:  STANDING MEDICATIONS  acetaminophen 975 mg q8hr PRN  aspirin  chewable 81 milliGRAM(s) Oral daily  atorvastatin 20 milliGRAM(s) Oral at bedtime  chlorhexidine 2% Cloths 1 Application(s) Topical <User Schedule>  ferrous    sulfate 325 milliGRAM(s) Oral daily  gabapentin 300 milliGRAM(s) Oral three times a day  insulin lispro (ADMELOG) corrective regimen sliding scale   SubCutaneous three times a day before meals  melatonin 3 milliGRAM(s) Oral at bedtime  meropenem IVPB 1000 mg q12hr  pantoprazole    Tablet 40 milliGRAM(s) Oral every 12 hours  polyethylene glycol 3350 17 Gram(s) Oral daily  senna 2 Tablet(s) Oral at bedtime  sodium chloride 0.9%. 1000 milliLiter(s) IV Continuous <Continuous>  tamsulosin 0.4 milliGRAM(s) Oral at bedtime                                            ------------------------------------------------------------  VITAL SIGNS: Last 24 Hours  12/09/24, 04:18    T(F): 97.8  BP: 110/67  HR: 97  RR: 18  O2 sat: 97% (NC)    PHYSICAL EXAM:  GENERAL: AAOx3, no acute distress; not-ill appearing; +confused  HEAD:  Atraumatic, Normocephalic  CHEST/LUNG: +wheezing and rales upon ausculation of all lobes of lung; +100 mL cc yellow colored fluid collected in container overnight from PleurX, PleurX drain in place, no signs of infection, ecchymosis, or pus collection  HEART: Regular rate and rhythm; normal S1, S2, No murmurs; no friction rubs  ABDOMEN: Soft, nontender, nondistended; Bowel sounds present  SKIN: +unable to examine sacral wound due to pain discomfort  EXTREMITIES: no pitting edema of LE, B/L; +1/5 muscular strength of LE, B/L; +3/5 muscular strength of UE, B/L                                           --------------------------------------------------------------  LABS:    CXR 12/09/24, 12:34  -IMPRESSION:  Right lung opacity/pleural effusion, decreased, no pneumothorax.   Redemonstration right apical opacity/mass. Left lung opacities..      12/09/24, 07:14                 8.2[L], stable    13.4 )---------------------( 441[H]             27.0[L], stable     D-dimer 2501[H]    POCT BS    12/09/24: 128 <-- 150  |  12/08/24:  203 <-- 344 <-- 188    12/09/24, 07:14     140  |  104   |  27[H]  ----------------------------<  131[H]  3.8    |            |  1.2    Ca    8.4 ---> corrected Ca   9.6   | AST 27  Albumin 2.5[L]                       | ALT 28  T protein 4.7[L]                       | [H]    T bili 0.2  Mg 1.9  Phos 3.0    11/26/24 10:36    total iron 31 [L]  unsaturated iron binding capacity 247  TIBC 278  % iron saturation 11[L]      Urinalysis Basic - ( 02 Dec 2024 06:41 )    Color: x / Appearance: x / SG: x / pH: x  Gluc: 149 mg/dL / Ketone: x  / Bili: x / Urobili: x   Blood: x / Protein: x / Nitrite: x   Leuk Esterase: x / RBC: x / WBC x   Sq Epi: x / Non Sq Epi: x / Bacteria: x    Culture - Blood (collected 12-01-24 @ 16:00)  Source: .Blood BLOOD  Gram Stain (12-02-24 @ 11:08):    Growth in aerobic and anaerobic bottles: Gram Negative Rods  Preliminary Report (12-02-24 @ 11:08):    Growth in aerobic and anaerobic bottles: Gram Negative Rods    Direct identification is available within approximately 3-5    hours either by Blood Panel Multiplexed PCR or Direct    MALDI-TOF. Details: [email or web address]  Organism: Blood Culture PCR (12-02-24 @ 12:43)  Organism: Blood Culture PCR (12-02-24 @ 12:43)      Method Type: PCR      -  Proteus species: Detec      -  ESBL: Detec      -  CTX-M Resistance Marker: Detec    Culture - Blood (collected 12-01-24 @ 16:00)  Source: .Blood BLOOD  Gram Stain (12-02-24 @ 11:40):    Growth in aerobic bottle: Gram Negative Rods    Growth in anaerobic bottle: Gram Negative Rods  Preliminary Report (12-02-24 @ 11:40):    Growth in aerobic bottle: Gram Negative Rods    Growth in anaerobic bottle: Gram Negative Rods    Lactate, Blood: 2.3 mmol/L (12-01-24 @ 16:00)   · Subjective and Objective:   SUBJECTIVE  HPI:     CLARI BURGER is a 84y male, with PMHx AVMs, CAD s/p stents x 2 (2007), asbestosis of lung, malignant mesothelioma, s/p VATS, HLD, DM, and JASMIN, arrived at the ED on 11/26 due to abnormal Hb (6.4). Pt did not have active bloody stools bedside and denied CP, Ab pain, N/V/D, dizziness, and UTI sxs. Prior EGD led to bleeding on contact but hemostasis was achieved. In the ED, Hb was 7.1, and CHINYERE positive for black stools in ED and (+) Ab screening. pRBC transfusion was deferred and pt was admitted to obtain further w/u, including iron studies to determine if iron supplementation will be needed and wad Dx'd with melena and anemia.    Pt is currently admitted with the primary diagnosis of anemia and melena, likely 2/2 to upper GID.    Hospital Day: 14d. Pt evaluated bedside this AM with VS stable. Pt appears to have chest congestion. Pt had an EKG last night due to unstable HR and hypoxia (82 saturation on RA). Pt was put on NC and had saturation up to 90%. Pt dislikes the NC and keeps insisting it to be taken off. Pt is off the NC now. Pleur X accumulated 100 cc this AM, although 700 cc was drained yesterday. Pulm consult rec for it be drained daily due to more rapid accumulation of pleural effusion. Pt has a Walden and normal urine output. No BM overnight.    · OBJECTIVE  PAST MEDICAL & SURGICAL HISTORY  DM (diabetes mellitus)  MI (myocardial infarction)  History of CAD (coronary artery disease)  Dyslipidemia  Currently smokes tobacco  Mesothelioma, malignant  Coronary stent patent                                            -----------------------------------------------------------  ALLERGIES:  penicillin (Unknown)                                          ------------------------------------------------------------    HOME MEDICATIONS  Home Medications:  atorvastatin 20 mg oral tablet: 1 tab(s) orally once a day (at bedtime) (26 Nov 2024 15:34)  ferrous sulfate 325 mg (65 mg elemental iron) oral tablet: 1 tab(s) orally once a day (26 Nov 2024 15:35)  gabapentin 300 mg oral tablet: 1 tab(s) orally 3 times a day (26 Nov 2024 15:35)  melatonin 3 mg oral tablet: 1 tab(s) orally once a day (at bedtime) (26 Nov 2024 15:35)  polyethylene glycol 3350 oral powder for reconstitution: 17 gram(s) orally once a day (26 Nov 2024 15:35)  senna leaf extract oral tablet: 2 tab(s) orally once a day (at bedtime) (26 Nov 2024 15:35)  tamsulosin 0.4 mg oral capsule: 1 cap(s) orally once a day (at bedtime) (26 Nov 2024 15:35)       MEDICATIONS:  STANDING MEDICATIONS  acetaminophen 975 mg q8hr PRN  aspirin  chewable 81 milliGRAM(s) Oral daily  atorvastatin 20 milliGRAM(s) Oral at bedtime  chlorhexidine 2% Cloths 1 Application(s) Topical <User Schedule>  ferrous    sulfate 325 milliGRAM(s) Oral daily  gabapentin 300 milliGRAM(s) Oral three times a day  insulin lispro (ADMELOG) corrective regimen sliding scale   SubCutaneous three times a day before meals  melatonin 3 milliGRAM(s) Oral at bedtime  meropenem IVPB 1000 mg q12hr  pantoprazole    Tablet 40 milliGRAM(s) Oral every 12 hours  polyethylene glycol 3350 17 Gram(s) Oral daily  senna 2 Tablet(s) Oral at bedtime  sodium chloride 0.9%. 1000 milliLiter(s) IV Continuous <Continuous>  tamsulosin 0.4 milliGRAM(s) Oral at bedtime                                            ------------------------------------------------------------  VITAL SIGNS: Last 24 Hours  12/09/24, 04:18    T(F): 97.8  BP: 110/67  HR: 97  RR: 18  O2 sat: 97% (NC)    PHYSICAL EXAM:  GENERAL: AAOx3, no acute distress; not-ill appearing; +confused  HEAD:  Atraumatic, Normocephalic  CHEST/LUNG: +wheezing and rales upon ausculation of all lobes of lung; +100 mL cc yellow colored fluid collected in container overnight from PleurX, PleurX drain in place, no signs of infection, ecchymosis, or pus collection  HEART: Regular rate and rhythm; normal S1, S2, No murmurs; no friction rubs  ABDOMEN: Soft, nontender, nondistended; Bowel sounds present  SKIN: +unable to examine sacral wound due to pain discomfort  EXTREMITIES: no pitting edema of LE, B/L; +1/5 muscular strength of LE, B/L; +3/5 muscular strength of UE, B/L                                           --------------------------------------------------------------  LABS:    CXR 12/09/24, 12:34  -IMPRESSION:  Right lung opacity/pleural effusion, decreased, no pneumothorax.   Redemonstration right apical opacity/mass. Left lung opacities..      12/09/24, 07:14                 8.2[L], stable    13.4[H] )---------------------( 441[H]             27.0[L], stable     D-dimer 2501[H]    POCT BS    12/09/24: 128 <-- 150  |  12/08/24:  203 <-- 344 <-- 188    12/09/24, 07:14     140  |  104   |  27[H]  ----------------------------<  131[H]  3.8    |            |  1.2    Ca    8.4 ---> corrected Ca   9.6   | AST 27  Albumin 2.5[L]                       | ALT 28  T protein 4.7[L]                       | [H]    T bili 0.2  Mg 1.9  Phos 3.0    11/26/24 10:36    total iron 31 [L]  unsaturated iron binding capacity 247  TIBC 278  % iron saturation 11[L]      Urinalysis Basic - ( 02 Dec 2024 06:41 )    Color: x / Appearance: x / SG: x / pH: x  Gluc: 149 mg/dL / Ketone: x  / Bili: x / Urobili: x   Blood: x / Protein: x / Nitrite: x   Leuk Esterase: x / RBC: x / WBC x   Sq Epi: x / Non Sq Epi: x / Bacteria: x    Culture - Blood (collected 12-01-24 @ 16:00)  Source: .Blood BLOOD  Gram Stain (12-02-24 @ 11:08):    Growth in aerobic and anaerobic bottles: Gram Negative Rods  Preliminary Report (12-02-24 @ 11:08):    Growth in aerobic and anaerobic bottles: Gram Negative Rods    Direct identification is available within approximately 3-5    hours either by Blood Panel Multiplexed PCR or Direct    MALDI-TOF. Details: [email or web address]  Organism: Blood Culture PCR (12-02-24 @ 12:43)  Organism: Blood Culture PCR (12-02-24 @ 12:43)      Method Type: PCR      -  Proteus species: Detec      -  ESBL: Detec      -  CTX-M Resistance Marker: Detec    Culture - Blood (collected 12-01-24 @ 16:00)  Source: .Blood BLOOD  Gram Stain (12-02-24 @ 11:40):    Growth in aerobic bottle: Gram Negative Rods    Growth in anaerobic bottle: Gram Negative Rods  Preliminary Report (12-02-24 @ 11:40):    Growth in aerobic bottle: Gram Negative Rods    Growth in anaerobic bottle: Gram Negative Rods    Lactate, Blood: 2.3 mmol/L (12-01-24 @ 16:00)

## 2024-12-09 NOTE — CONSULT NOTE ADULT - SUBJECTIVE AND OBJECTIVE BOX
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 Patient has been declining during hospital stay, palliative consulted for GOC.

## 2024-12-09 NOTE — PROGRESS NOTE ADULT - ASSESSMENT
Assessment and Plan:   · Assessment	    Pt is a 84y male, with PMHx AVMs, CAD s/p stents x 2 (2007), asbestosis of lung, malignant mesothelioma, s/p VATS, HLD, DM, and JASMIN, arrived at the ED on 11/26 due to abnormal Hb (6.4). Pt did not have active bloody stools bedside and denied CP, Ab pain, N/V/D, dizziness, and UTI sxs. Prior EGD led to bleeding on contact but hemostasis was achieved. In the ED, Hb was 7.1, and CHINYERE positive for black stools in ED and (+) Ab screening. pRBC transfusion was deferred and pt was admitted to obtain further w/u, including iron studies to determine if iron supplementation will be needed and wad Dx'd with melena and anemia. Pt is currently admitted with the primary diagnosis of anemia and melena, likely 2/2 to upper GID.    · Plan  # Possible UGI bleed  # Acute on chronic anemia  # Hx Angioectasia of duodenum  - GI recs:  ---F/U pathology results  ---start high fiber diet  ---repeat colonoscopy in 3 years  ---monitor for recurrent bleeding (with transfusion PRN to keep Hb > 8)  ---start on iron supplementation, c/w ASA, and F/U with GI outpt or [facility]  -Heme-Onc rec to give 1 more dose of venofer 200mg. Holding off in context of recent bacteremia.  - labs 12/06/24, 07:47                 7.3[L], stable    6.07 )---------------------( 271             23.8[L], stable     auto neutrophil % = 86.4[H]  abs neutrophil = 5.25  abs lymphocytes = 0.48[L]    - EGD/colonscopy done yesterday w/o complications yesterday  ---EGD: esophagus normal; erythema of stomach consistent with non-erosive gastritis; previous endoclip in 2nd part of duodenum; 4 small AVMs in proximal jejunum that bled on contact;  ---colonoscopy: 3 mm polyp of ascending colon; 3 polyps of 6-10 mm of ascending colon; 5 mmm polyp of transverse colon; melanosis coli  - Iron studies as of 11/26, 10:36:  total iron 31 [L]  unsaturated iron binding capacity 247  TIBC 278  % iron saturation 11[L]     #R sided pleural effusion  #malignant mesothelioma (diagnosed 8/2024), stable  #hx asbestosis of lung  - 800 mL output on Pleur X port, but developed some sharp pain on chest afterwards. CXR done with no ACPD. Pain resolved.   - Pt currently has Pleur X port, drained every Monday and Friday in the hospital. C/w to drain 2/week at NH.  - F/U heme-onc outpt    - CXR 12/02/24 9:33 AM: Unchanged large right-sided opacity.   - CXR 12/02/24 11:20 AM: Unchanged large opacity projecting over the right lung. Hazy opacities in the left lung are less prominent. No pneumothorax.  - On 12/02/24, PleurX drained ~700mL from R lung catheter    #DM Type 2  - labs POCT BS     (12/06/24) 164 <-- 129      | (12/05/24) <-- 231 <-- 344 <-- 171 <-- 192 <-- 124  - POCT BS well-controlled mostly in 140-180 range  - currently on Lantus 5 and sliding scale to keep -180; c/w monitor BS and adjust insulin PRN  - Home med: Metformin 500 mg BID    #small hypodensity in segment 7 of the liver seen on prior CT  - pt refused RUQ US    #HLD  #CAD s/p stents x2 in 2007  - follows Dr Avila  - Holding home plavix, per chart review, Dr Chang patient needs Plavix or ASA not both   - patient tolerating ASA  - c/w statin     #Misc:  - DVT prophylaxis: SCDs  - GI ppx: protonix, senna, miralax  - Diet: DASH/TLC  - Activity: ambulate as tolerated; out of bed to chair  - Code status: Full CHIEF COMPLAINT:   Annual Exam    HISTORY OF PRESENT ILLNESS:   Patient is a 39 year old female, G 2, P 2, LMP 6/1/2023.   Patient reports her vulvar irritation is under control with clobetasol cream.  Patient presents today for routine annual examination.  She does not have problems with menstruation.    ROS    Denies any fever or chills.  Denies shortness of breath, chest pain or difficulty swallowing   Denies any dysuria or hematuria.  Denies nausea or vomiting.    Past Medical History:   Diagnosis Date   • Depression    • Familial autonomic nervous dysfunction (CMD)    • Galactorrhea    • Vasovagal syndrome    • Vulvar dermatitis        Past Surgical History:   Procedure Laterality Date   • Appendectomy     • Brain surgery  10/2019   • Hand finger surgery unlisted Left     reconstruction of left thumb   • Hernia repair     • Removal of tonsils,13+ y/o      23yrs old       Medication  Prior to Admission medications    Medication Sig Start Date End Date Taking? Authorizing Provider   clobetasol (TEMOVATE) 0.05 % cream Apply externally as directed twice a day 6/9/23  Yes Leonid Hunter MD   metoPROLOL succinate (TOPROL-XL) 25 MG 24 hr tablet TK 1 T PO QD 11/27/19  Yes Provider, Outside   EMGALITY 120 MG/ML injection INJECT 1ML INTO THE SKIN Q 30 DAYS 11/8/19  Yes Provider, Outside   cyanocobalamin 1000 MCG/ML injection  11/6/18  Yes Provider, Outside   sertraline (ZOLOFT) 50 MG tablet  12/11/18  Yes Provider, Outside   acetaminophen (TYLENOL) 500 MG tablet 1,000 mg every 6 hours as needed.   Yes Provider, Outside   Calcium Carbonate Antacid (TUMS PO) Chew 1 tablet and swallow daily.   Yes Provider, Outside   amitriptyline (ELAVIL) 10 MG tablet Take 1 tablet by mouth nightly. 1/18/19  Yes Leonid Hunter MD   fluconazole (Diflucan) 150 MG tablet Take 1 tablet by mouth and repeat dosage in 3 days. 3/8/21   Leonid Hunter MD   ketotifen (ZADITOR) 0.025 % ophthalmic solution One drop in affected eye(s) 3-4 X daily  as needed for eye irritation 3/1/20   Dandy Moreau, [facility]   spironolactone (ALDACTONE) 100 MG tablet TK 1 T PO QD 11/19/19   Provider, Outside   Aspirin-Acetaminophen-Caffeine (MIGRAINE RELIEF PO)     Provider, Outside   PROMETRIUM 200 MG capsule Take 1 capsule by mouth daily. One per day for 10 days when late on menses 12/20/19   Leonid Hunter MD   naproxen (NAPROSYN) 500 MG tablet  12/17/18   Provider, Outside   DISPENSE Take 1 tablet by mouth daily. Choco Antioxidant by [employer]    Provider, Outside   DISPENSE Take by mouth as needed. Proflavanol C by [employer]    Provider, Outside   Dapsone (ACZONE) 5 % Gel     Provider, Outside       ALLERGIES:   Allergen Reactions   • Clocinizine Other (See Comments)     Horrific Yeast Infections    • Clindamycin Hcl Other (See Comments) and RASH   • Terconazole RASH and SWELLING     Cant take the cream   • Trinessa Other (See Comments)       Nonsmoker     PHYSICAL EXAMINATION:   GENERAL: Well-nourished, well-developed female not in acute distress.   VITAL SIGNS:   Visit Vitals  /60   Ht 5' 8\" (1.727 m)   Wt 73.5 kg (162 lb)   LMP 06/01/2023   BMI 24.63 kg/m²      HEENT: Within normal limits.   NECK: Supple, without thyromegaly.   LUNGS: Clear to auscultation.   HEART: Regular rhythm and rate.   BREASTS: Without mass or lymphadenopathy.  ABDOMEN: Nondistended abdomen    Good bowel sound was present    Soft and nontender without mass    No rebound or guarding was noted     PELVIC:  External genitalia, Minor abrasion like skin change was noted along the labial folds right side was greater than the left side  Urethra  normal in appearance without evidence of atrophy or prolapse    Vagina normal appearing vaginal mucosa with increased vaginal discharge    Cervix   normal appearing without prolapse   Uterus normal size without tenderness    Adnexa without mass or tenderness     ASSESSMENT:   Annual Exam  Vulvar irritation under control with clobetasol cream  Increased vaginal  discharge    PLAN:   Pap smear was done.  [facility]  Clobetasol Cream as directed   Screening mammogram  Patient is to follow-up in the future as clinically indicated.       Assessment and Plan:   · Assessment	    Pt is a 84y male, with PMHx AVMs, CAD s/p stents x 2 (2007), asbestosis of lung, malignant mesothelioma, s/p VATS, HLD, DM, and JASMIN, arrived at the ED on 11/26 due to abnormal Hb (6.4). Pt did not have active bloody stools bedside and denied CP, Ab pain, N/V/D, dizziness, and UTI sxs. Prior EGD led to bleeding on contact but hemostasis was achieved. In the ED, Hb was 7.1, and CHINYERE positive for black stools in ED and (+) Ab screening. pRBC transfusion was deferred and pt was admitted to obtain further w/u, including iron studies to determine if iron supplementation will be needed and wad Dx'd with melena and anemia. Pt is currently admitted with the primary diagnosis of anemia and melena, likely 2/2 to upper GID.    · Plan  #Pulmonary Embolism?  #Pneumonia?  - Pt had sinus tachycardia with hypoxia on RA (O2 sat ~82% overnight), put on NC; with pleural effusions accumulating more rapidly the past few days. Clinical signs include wheezing and rales upon ausculation of lungs.  -CXR 12/09/24, 12:34  IMPRESSION:  Right lung opacity/pleural effusion, decreased, no pneumothorax.   Redemonstration right apical opacity/mass. Left lung opacities..  - Labs 12/09/24, 07:14 - D-dimer elevated 2501, Hb  8.2[L], stable around bsl, WBC elevated 13.4; PLT elevated 441  - ID consult for appropriate Abx regimen  - CT angio performed, pending results read    #R sided pleural effusion  #malignant mesothelioma (diagnosed 8/2024), stable  #hx asbestosis of lung  - 700 mL output on Pleur X port yesterday; switched from Mon and Thurs drainage schedule to drainage as much as possible daily as per pulm consult  - pt put on NC    -Previous w/u:  CXR 12/02/24 9:33 AM: Unchanged large right-sided opacity.   CXR 12/02/24 11:20 AM: Unchanged large opacity projecting over the right lung. Hazy opacities in the left lung are less prominent. No pneumothorax.      # Hx UGI bleed (upon admission)  # Acute on chronic anemia  # Hx Angioectasia of duodenum  - GI recs:  ---c/w ASA, d/c Plavix  ---monitor for recurrent bleeding (with transfusion PRN to keep Hb > 8)  ---c/w iron supplementation  ---upon d/c F/U with GI outpt or [facility]  ---F/U pathology results  ---start high fiber diet  ---repeat colonoscopy in 3 years  - EGD/colonscopy done during hospital course w/o complications showed  ---EGD: esophagus normal; erythema of stomach consistent with non-erosive gastritis; previous endoclip in 2nd part of duodenum; 4 small AVMs in proximal jejunum that bled on contact;  ---colonoscopy: 3 mm polyp of ascending colon; 3 polyps of 6-10 mm of ascending colon; 5 mmm polyp of transverse colon; melanosis coli    #DM Type 2  - POCT BS well-controlled mostly in 140-180 range  - currently on Lantus 5 and sliding scale to keep -180; c/w monitor BS and adjust insulin PRN  - Home med: Metformin 500 mg BID    #small hypodensity in segment 7 of the liver seen on prior CT  - pt refused RUQ US    #HLD  #CAD s/p stents x2 in 2007  - follows Dr Avila  - Holding home plavix, per chart review, Dr Chang patient needs Plavix or ASA not both   - patient tolerating ASA  - c/w statin     #Misc:  - DVT prophylaxis: SCDs  - GI ppx: protonix, senna, miralax  - Diet: DASH/TLC  - Activity: ambulate as tolerated; out of bed to chair  - Code status: Full Assessment and Plan:   · Assessment	    Pt is a 84y male, with PMHx AVMs, CAD s/p stents x 2 (2007), asbestosis of lung, malignant mesothelioma, s/p VATS, HLD, DM, and JASMIN, arrived at the ED on 11/26 due to abnormal Hb (6.4). Pt did not have active bloody stools bedside and denied CP, Ab pain, N/V/D, dizziness, and UTI sxs. Prior EGD led to bleeding on contact but hemostasis was achieved. In the ED, Hb was 7.1, and CHINYERE positive for black stools in ED and (+) Ab screening. pRBC transfusion was deferred and pt was admitted to obtain further w/u, including iron studies to determine if iron supplementation will be needed and wad Dx'd with melena and anemia. Pt is currently admitted with the primary diagnosis of anemia and melena, likely 2/2 to upper GID.    · Plan  #Pulmonary Embolism?  #Pneumonia?  - Pt had sinus tachycardia with hypoxia on RA (O2 sat ~82% overnight), put on NC; with pleural effusions accumulating more rapidly the past few days. Clinical signs include wheezing and rales upon ausculation of lungs.  -CXR 12/09/24, 12:34  IMPRESSION:  Right lung opacity/pleural effusion, decreased, no pneumothorax.   Redemonstration right apical opacity/mass. Left lung opacities..  - Labs 12/09/24, 07:14 - D-dimer elevated 2501, Hb  8.2[L], stable around bsl, WBC elevated 13.4; PLT elevated 441  - ID consult for appropriate Abx regimen  - CT angio performed, pending results read    #R sided pleural effusion  #malignant mesothelioma (diagnosed 8/2024)  #hx asbestosis of lung  - 700 mL output on Pleur X port yesterday; switched from Mon and Thurs drainage schedule to drainage as much as possible daily as per pulm consult  - pt put on NC  - pt's son amenable to goals of care discussion  - pt's prognosis appears poor; assessed for severe protein malnutrition; nutrition rec c/w regular diet and Ensure    -Previous w/u:  CXR 12/02/24 9:33 AM: Unchanged large right-sided opacity.   CXR 12/02/24 11:20 AM: Unchanged large opacity projecting over the right lung. Hazy opacities in the left lung are less prominent. No pneumothorax.    #gluteal and sacral wound  - wound consult on board  - WBC elevated 13.4 today's labs, consider ID consult    # Hx UGI bleed (upon admission)  # Acute on chronic anemia  # Hx Angioectasia of duodenum  - GI recs:  ---c/w ASA, d/c Plavix  ---monitor for recurrent bleeding (with transfusion PRN to keep Hb > 8)  ---c/w iron supplementation  ---upon d/c F/U with GI outpt or [facility]  ---F/U pathology results  ---start high fiber diet  ---repeat colonoscopy in 3 years  - EGD/colonscopy done during hospital course w/o complications showed  ---EGD: esophagus normal; erythema of stomach consistent with non-erosive gastritis; previous endoclip in 2nd part of duodenum; 4 small AVMs in proximal jejunum that bled on contact;  ---colonoscopy: 3 mm polyp of ascending colon; 3 polyps of 6-10 mm of ascending colon; 5 mmm polyp of transverse colon; melanosis coli    #DM Type 2  - POCT BS well-controlled mostly in 140-180 range  - currently on Lantus 5 and sliding scale to keep -180; c/w monitor BS and adjust insulin PRN  - Home med: Metformin 500 mg BID    #small hypodensity in segment 7 of the liver seen on prior CT  - pt refused RUQ US    #HLD  #CAD s/p stents x2 in 2007  - follows Dr Avila  - Holding home plavix, per chart review, Dr Chang patient needs Plavix or ASA not both   - patient tolerating ASA  - c/w statin     #Misc:  - DVT prophylaxis: SCDs  - GI ppx: protonix, senna, miralax  - Diet: DASH/TLC  - Activity: ambulate as tolerated; out of bed to chair  - Code status: Full Assessment and Plan:   · Assessment	    Pt is a 84y male, with PMHx AVMs, CAD s/p stents x 2 (2007), asbestosis of lung, malignant mesothelioma, s/p VATS, HLD, DM, and JASMIN, arrived at the ED on 11/26 due to abnormal Hb (6.4). Pt did not have active bloody stools bedside and denied CP, Ab pain, N/V/D, dizziness, and UTI sxs. Prior EGD led to bleeding on contact but hemostasis was achieved. In the ED, Hb was 7.1, and CHINYERE positive for black stools in ED and (+) Ab screening. pRBC transfusion was deferred and pt was admitted to obtain further w/u, including iron studies to determine if iron supplementation will be needed and wad Dx'd with melena and anemia. Pt is currently admitted with the primary diagnosis of anemia and melena, likely 2/2 to upper GID.    · Plan  #Pulmonary Embolism?  #Pneumonia?  - Pt had sinus tachycardia with hypoxia on RA (O2 sat ~82% overnight), put on NC; with pleural effusions accumulating more rapidly the past few days. Clinical signs include wheezing and rales upon ausculation of lungs. Well's Criteria for PE: 7.5 points (40.6% risk of PE)  -CXR 12/09/24, 12:34  IMPRESSION:  Right lung opacity/pleural effusion, decreased, no pneumothorax.   Redemonstration right apical opacity/mass. Left lung opacities..  - Labs 12/09/24, 07:14 - D-dimer elevated 2501, Hb  8.2[L], stable around bsl, WBC elevated 13.4; PLT elevated 441  - ID consult for appropriate Abx regimen  - CT angio performed, pending results read    #R sided pleural effusion  #malignant mesothelioma (diagnosed 8/2024)  #hx asbestosis of lung  - 700 mL output on Pleur X port yesterday; switched from Mon and Thurs drainage schedule to drainage as much as possible daily as per pulm consult  - pt put on NC  - pt's son amenable to goals of care discussion  - pt's prognosis appears poor; assessed for severe protein malnutrition; nutrition rec c/w regular diet and Ensure    -Previous w/u:  CXR 12/02/24 9:33 AM: Unchanged large right-sided opacity.   CXR 12/02/24 11:20 AM: Unchanged large opacity projecting over the right lung. Hazy opacities in the left lung are less prominent. No pneumothorax.    #gluteal and sacral wound  - wound consult on board  - WBC elevated 13.4 today's labs, consider ID consult    # Hx UGI bleed (upon admission)  # Acute on chronic anemia  # Hx Angioectasia of duodenum  - GI recs:  ---c/w ASA, d/c Plavix  ---monitor for recurrent bleeding (with transfusion PRN to keep Hb > 8)  ---c/w iron supplementation  ---upon d/c F/U with GI outpt or [facility]  ---F/U pathology results  ---start high fiber diet  ---repeat colonoscopy in 3 years  - EGD/colonscopy done during hospital course w/o complications showed  ---EGD: esophagus normal; erythema of stomach consistent with non-erosive gastritis; previous endoclip in 2nd part of duodenum; 4 small AVMs in proximal jejunum that bled on contact;  ---colonoscopy: 3 mm polyp of ascending colon; 3 polyps of 6-10 mm of ascending colon; 5 mmm polyp of transverse colon; melanosis coli    #DM Type 2  - POCT BS well-controlled mostly in 140-180 range  - currently on Lantus 5 and sliding scale to keep -180; c/w monitor BS and adjust insulin PRN  - Home med: Metformin 500 mg BID    #small hypodensity in segment 7 of the liver seen on prior CT  - pt refused RUQ US    #HLD  #CAD s/p stents x2 in 2007  - follows Dr Avila  - Holding home plavix, per chart review, Dr Chang patient needs Plavix or ASA not both   - patient tolerating ASA  - c/w statin     #Misc:  - DVT prophylaxis: SCDs  - GI ppx: protonix, senna, miralax  - Diet: DASH/TLC  - Activity: ambulate as tolerated; out of bed to chair  - Code status: Full

## 2024-12-10 NOTE — PROGRESS NOTE ADULT - ASSESSMENT
ASSESSMENT  This is a 84 year old male with PMH of HLD, DM, CAD s/p stents x2 in 2007, h/o asbestosis of lung, dx w malignant mesothelioma ~8/2024 and s/p VATS pleurodesis, R sided PleurX, h/o AVM/GIB, JASMIN, referred from NH for anemia    ASSESSMENT  #Fever    12/9 UA     12/9 CTA 1. Two small filling defects are seen within a segmental and subsegmental branch of the left lower lobe pulmonary artery. The filling defects appear thin and at adherent to the wall of the vessel indicating likely small chronic PEs. No other intraluminal filling defects are seen within the pulmonary arteries.  There is no evidence of right heart strain (RV:LV ratio <1; RV=2.7 cm and LV = 5.0 cm)  2. A right-sided pleural drainage catheter (PleurX) is noted with a moderate reduction in the right-sided pleural effusion. However there is a new loculation in the right apex.  3. Compared with the previous scan of 7/31/2024, there has been increased right sided pleural thickening and increased consolidation in the right upper, middle and lower  lobes.  #Proteus species ESBL bacteremia, suspect  source  CAUTI     UA pyuria 323; 12/1 UCX   >100,000 CFU/ml Proteus mirabilis    12/1 BCX +  R fluoro/bactrim  #Chronic Walden? From urinary retention in 7/2024.  < from: US Kidney and Bladder (11.29.24 @ 18:35) >  1.  Multiple right renal cysts, measuring up to 0.9 cm, similar to   previous.  2.  Mild to moderate right hydronephrosis, previously reported as   fullness.  3.  Mild to moderate left hydronephrosis, reported to be mild previously.  4.  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  #JEY over CKD  Creatinine: 1.4 mg/dL (12-03-24 @ 06:51)      RECOMMENDATIONS   - This is a preliminary incomplete pended note, all final recommendations to follow after interview and examination of the patient.     If any questions, please send a message or call on [facility] Teams  Please continue to update ID with any pertinent new laboratory or radiographic findings.       ASSESSMENT  This is a 84 year old male with PMH of HLD, DM, CAD s/p stents x2 in 2007, h/o asbestosis of lung, dx w malignant mesothelioma ~8/2024 and s/p VATS pleurodesis, R sided PleurX, h/o AVM/GIB, JASMIN, referred from NH for anemia    ASSESSMENT  #Fever & Leukocytosis   Tm 12/9 100.9  Ddx includes HAP, CAUTI, PE    12/9 UA     MRSA PCR Result.: Negative (12-09-24 @ 15:50)    12/9 CTA 1. Two small filling defects are seen within a segmental and subsegmental branch of the left lower lobe pulmonary artery. The filling defects appear thin and at adherent to the wall of the vessel indicating likely small chronic PEs. No other intraluminal filling defects are seen within the pulmonary arteries.  There is no evidence of right heart strain (RV:LV ratio <1; RV=2.7 cm and LV = 5.0 cm)  2. A right-sided pleural drainage catheter (PleurX) is noted with a moderate reduction in the right-sided pleural effusion. However there is a new loculation in the right apex.  3. Compared with the previous scan of 7/31/2024, there has been increased right sided pleural thickening and increased consolidation in the right upper, middle and lower  lobes.  #Proteus species ESBL bacteremia, suspect  source, s/p meropenem 12/2-12/9  CAUTI     UA pyuria 323; 12/1 UCX   >100,000 CFU/ml Proteus mirabilis    12/1 BCX +  R fluoro/bactrim  #Chronic Walden? From urinary retention in 7/2024.  < from: US Kidney and Bladder (11.29.24 @ 18:35) >  1.  Multiple right renal cysts, measuring up to 0.9 cm, similar to previous.  2.  Mild to moderate right hydronephrosis, previously reported as fullness.  3.  Mild to moderate left hydronephrosis, reported to be mild previously.  4.  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  #JEY / CKD  Creatinine: 1.3 mg/dL (12.10.24 @ 08:30) 41 mL/min  Creatinine clearance for normal weight patient, using ideal body weight of 68 kg (149 lbs).      RECOMMENDATIONS   - meropenem  IVPB 1000 milliGRAM(s) IV Intermittent every 12 hours  - Vanc dosing AUC/JOSE J per clinical pharmacist . D/C if BCX NG (MRSA PCR is negative)  - f/u BCX, UCX   - Walden 11/29- recommend TOV if possible otherwise exchange     If any questions, please send a message or call on [employer] Teams  Please continue to update ID with any pertinent new laboratory or radiographic findings.       ASSESSMENT  This is a 84 year old male with PMH of HLD, DM, CAD s/p stents x2 in 2007, h/o asbestosis of lung, dx w malignant mesothelioma ~8/2024 and s/p VATS pleurodesis, R sided PleurX, h/o AVM/GIB, JASMIN, referred from NH for anemia    ASSESSMENT  #Fever & Leukocytosis   Tm 12/9 100.9  Ddx includes HAP, CAUTI, PE    12/9 UA     MRSA PCR Result.: Negative (12-09-24 @ 15:50)    12/9 CTA 1. Two small filling defects are seen within a segmental and subsegmental branch of the left lower lobe pulmonary artery. The filling defects appear thin and at adherent to the wall of the vessel indicating likely small chronic PEs. No other intraluminal filling defects are seen within the pulmonary arteries.  There is no evidence of right heart strain (RV:LV ratio <1; RV=2.7 cm and LV = 5.0 cm)  2. A right-sided pleural drainage catheter (PleurX) is noted with a moderate reduction in the right-sided pleural effusion. However there is a new loculation in the right apex.  3. Compared with the previous scan of 7/31/2024, there has been increased right sided pleural thickening and increased consolidation in the right upper, middle and lower  lobes.  #Proteus species ESBL bacteremia, suspect  source, s/p meropenem 12/2-12/9  CAUTI     UA pyuria 323; 12/1 UCX   >100,000 CFU/ml Proteus mirabilis    12/1 BCX +  R fluoro/bactrim  #Chronic Walden? From urinary retention in 7/2024.  < from: US Kidney and Bladder (11.29.24 @ 18:35) >  1.  Multiple right renal cysts, measuring up to 0.9 cm, similar to previous.  2.  Mild to moderate right hydronephrosis, previously reported as fullness.  3.  Mild to moderate left hydronephrosis, reported to be mild previously.  4.  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  #JEY / CKD  Creatinine: 1.3 mg/dL (12.10.24 @ 08:30) 41 mL/min  Creatinine clearance for normal weight patient, using ideal body weight of 68 kg (149 lbs).      RECOMMENDATIONS   - meropenem  IVPB 1000 milliGRAM(s) IV Intermittent every 12 hours  - Vanc dosing AUC/JOSE J per clinical pharmacist . D/C if BCX NG (MRSA PCR is negative)  - f/u BCX, UCX   - Send pleural studies for cell count, glucose, LDH, protein, pH, G/S & CX  - Win 11/29- recommend TOV if possible otherwise exchange     If any questions, please send a message or call on [employer] Teams  Please continue to update ID with any pertinent new laboratory or radiographic findings.

## 2024-12-10 NOTE — PROGRESS NOTE ADULT - SUBJECTIVE AND OBJECTIVE BOX
JENNYFERCLARI  84y, Male  Allergy: penicillin (Unknown)      LOS  14d    CHIEF COMPLAINT: Melena (09 Dec 2024 17:45)      INTERVAL EVENTS/HPI  - fever  - T(F): , Max: 100.9 (12-09-24 @ 18:00)  - Tolerating medication  - WBC Count: 15.44 (12-09-24 @ 17:44) uptrending   WBC Count: 13.40 (12-09-24 @ 07:14)     - Creatinine: 1.2 (12-09-24 @ 17:44)  Creatinine: 1.2 (12-09-24 @ 07:14)       ROS  ***    VITALS:  T(F): 97.5, Max: 100.9 (12-09-24 @ 18:00)  HR: 89  BP: 109/61  RR: 18Vital Signs Last 24 Hrs  T(C): 36.4 (10 Dec 2024 04:29), Max: 38.3 (09 Dec 2024 18:00)  T(F): 97.5 (10 Dec 2024 04:29), Max: 100.9 (09 Dec 2024 18:00)  HR: 89 (10 Dec 2024 07:30) (49 - 89)  BP: 109/61 (10 Dec 2024 07:30) (98/54 - 127/61)  BP(mean): --  RR: 18 (10 Dec 2024 04:29) (18 - 18)  SpO2: 100% (10 Dec 2024 07:30) (94% - 100%)    Parameters below as of 10 Dec 2024 07:30  Patient On (Oxygen Delivery Method): nasal cannula  O2 Flow (L/min): 4      PHYSICAL EXAM:  ***    FH: Non-contributory  Social Hx: Non-contributory    TESTS & MEASUREMENTS:                        8.5    15.44 )-----------( 480      ( 09 Dec 2024 17:44 )             28.0     12-09    142  |  106  |  27[H]  ----------------------------<  113[H]  4.2   |  25  |  1.2    Ca    8.6      09 Dec 2024 17:44  Phos  3.0     12-09  Mg     1.9     12-09    TPro  5.6[L]  /  Alb  2.5[L]  /  TBili  0.2  /  DBili  x   /  AST  27  /  ALT  28  /  AlkPhos  190[H]  12-09      LIVER FUNCTIONS - ( 09 Dec 2024 07:14 )  Alb: 2.5 g/dL / Pro: 5.6 g/dL / ALK PHOS: 190 U/L / ALT: 28 U/L / AST: 27 U/L / GGT: x           Urinalysis Basic - ( 09 Dec 2024 18:22 )    Color: Yellow / Appearance: Turbid / SG: >1.030 / pH: x  Gluc: x / Ketone: Negative mg/dL  / Bili: Negative / Urobili: 0.2 mg/dL   Blood: x / Protein: 100 mg/dL / Nitrite: Negative   Leuk Esterase: Moderate / RBC: 45 /HPF /  /HPF   Sq Epi: x / Non Sq Epi: 2 /HPF / Bacteria: Moderate /HPF        Culture - Blood (collected 12-04-24 @ 07:43)  Source: .Blood BLOOD  Final Report (12-09-24 @ 17:00):    No growth at 5 days    Culture - Blood (collected 12-01-24 @ 16:00)  Source: .Blood BLOOD  Gram Stain (12-02-24 @ 11:08):    Growth in aerobic and anaerobic bottles: Gram Negative Rods  Final Report (12-04-24 @ 09:46):    Growth in aerobic and anaerobic bottles: Proteus mirabilis ESBL    Direct identification is available within approximately 3-5    hours either by Blood Panel Multiplexed PCR or Direct    MALDI-TOF. Details: [email or web address]  Organism: Blood Culture PCR  Proteus mirabilis ESBL (12-04-24 @ 09:46)  Organism: Proteus mirabilis ESBL (12-04-24 @ 09:46)      Method Type: JOSE J      -  Ampicillin: R >16 These ampicillin results predict results for amoxicillin      -  Ampicillin/Sulbactam: R 8/4      -  Aztreonam: R <=4      -  Cefazolin: R >16      -  Cefepime: R >16      -  Ceftriaxone: R >32      -  Ciprofloxacin: R >2      -  Ertapenem: S <=0.5      -  Gentamicin: R 8      -  Levofloxacin: R >4      -  Meropenem: S <=1      -  Piperacillin/Tazobactam: R <=8      -  Tobramycin: I 4      -  Trimethoprim/Sulfamethoxazole: R >2/38  Organism: Blood Culture PCR (12-04-24 @ 09:46)      Method Type: PCR      -  Proteus species: Detec      -  ESBL: Detec      -  CTX-M Resistance Marker: Detec    Culture - Blood (collected 12-01-24 @ 16:00)  Source: .Blood BLOOD  Gram Stain (12-02-24 @ 11:40):    Growth in aerobic bottle: Gram Negative Rods    Growth in anaerobic bottle: Gram Negative Rods  Final Report (12-04-24 @ 09:47):    Growth in aerobic and anaerobic bottles: Proteus mirabilis ESBL    See previous culture [id number]    Culture - Urine (collected 12-01-24 @ 15:13)  Source: Catheterized Catheterized  Final Report (12-03-24 @ 18:28):    >100,000 CFU/ml Proteus mirabilis ESBL    Unable to evaluate further due to Proteus overgrowth  Organism: Proteus mirabilis ESBL (12-03-24 @ 18:28)  Organism: Proteus mirabilis ESBL (12-03-24 @ 18:28)      Method Type: JOSE J      -  Ampicillin: R >16 These ampicillin results predict results for amoxicillin      -  Ampicillin/Sulbactam: S 8/4      -  Aztreonam: R <=4      -  Cefazolin: R >16 For uncomplicated UTI with K. pneumoniae, E. coli, or P. mirablis: JOSE J <=16 is sensitive and JOSE J >=32 is resistant. This also predicts results for oral agents cefaclor, cefdinir, cefpodoxime, cefprozil, cefuroxime axetil, cephalexin and locarbef for uncomplicated UTI. Note that some isolates may be susceptible to these agents while testing resistant to cefazolin.      -  Cefepime: R 4      -  Ceftriaxone: R 8      -  Cefuroxime: R >16      -  Ciprofloxacin: R >2      -  Ertapenem: S <=0.5      -  Gentamicin: I 4      -  Levofloxacin: R >4      -  Meropenem: S <=1      -  Nitrofurantoin: R >64 Should not be used to treat pyelonephritis      -  Piperacillin/Tazobactam: S <=8      -  Tobramycin: S <=2      -  Trimethoprim/Sulfamethoxazole: R >2/38            INFECTIOUS DISEASES TESTING  MRSA PCR Result.: Negative (12-09-24 @ 15:50)  Procalcitonin: 1.59 (12-01-24 @ 16:00)  strept    INFLAMMATORY MARKERS      RADIOLOGY & ADDITIONAL TESTS:  I have personally reviewed the last available Chest xray  CXR      CT  CT Angio Chest PE Protocol w/ IV Cont:   ACC: [id number] EXAM:  CT ANGIO CHEST PULM ART [facility]   ORDERED BY: KARI LANCASTER     PROCEDURE DATE:  12/09/2024          INTERPRETATION:  CLINICAL INFORMATION: SOB. Chest pain.  Pulmonary   embolus evaluation. WBC 13.40  PMHx of HLD, DM, CAD s/p stents x2 in   2007, h/o asbestosis of lung, dx w malignant mesothelioma   8/2024 and s/p VATS pleurodesis, R sided PleurX, h/o AVM/GIB, JASMIN    COMPARISON: CT scan of the chest, abdomen and pelvis dated 7/31/2024    CONTRAST/COMPLICATIONS:  IV Contrast: Omnipaque 350  65 cc administered   35 cc discarded  Oral Contrast: None  Complications: None reported at time of study completion    PROCEDURE:  CT Angiography of the Chest.  Sagittal and coronal reformats were performed as well as 3D (MIP)   reconstructions.    FINDINGS:      TUBES AND LINES: A right-sided pleural drainage catheter (PleurX) is   noted.  LUNGS AND LARGE AIRWAYS: Patent central airways. No pulmonary nodules.   Compared with the previous scan of 7/31/2024, there has been increased   consolidation in the right upper, middle, and lower lobes. No   pneumothorax.  PLEURA: There is been a moderate reduction in the right-sided pleural   effusion. However there is a new loculation in the right apex.  Compared with the previous scan of 7/31/2024, there has been increased   pleural thickening. There is loss of volume in the right lung with shift   of mediastinal structures to the right.    VESSELS: Two small filling defects are seen within a segmental and   subsegmental branch of the left lower lobe pulmonary artery. The filling   defects appear thin and at adherent to the wall of the vessel indicating   likely small chronic PEs. No other intraluminal filling defects are seen   within the pulmonary arteries.  There is no evidence of right heart   strain (RV:LV ratio <1; RV=2.7 cm and LV = 5.0 cm)  Normal caliber aorta and pulmonary artery. Aortic calcifications are   noted. There is no evidence of aortic aneurysm or dissection. The   brachiocephalic vesselsare unremarkable.  HEART: Heart size is normal. No pericardial effusion. Coronary artery   calcifications are noted.  MEDIASTINUM AND RAYMOND: No lymphadenopathy. The thyroid is unremarkable.  CHEST WALL AND LOWER NECK: Within normal limits.  VISUALIZEDUPPER ABDOMEN: The partially visualized upper abdomen shows no   acute pathology.  BONES: Degenerative changes of the spine are noted.    IMPRESSION:    1. Two small filling defects are seen within a segmental and subsegmental   branch of the left lower lobe pulmonary artery. The filling defects   appear thin and at adherent to the wall of the vessel indicating likely   small chronic PEs. No other intraluminal filling defects are seen within   the pulmonary arteries.  There is no evidence of right heart strain   (RV:LV ratio <1; RV=2.7 cm and LV = 5.0 cm)      2. A right-sided pleural drainage catheter (PleurX) is noted with a   moderate reduction in the right-sided pleural effusion. However there is   a new loculation in the right apex.    3. Compared with the previous scan of 7/31/2024, there has been increased   right sided pleural thickening and increased consolidation in the right   upper, middle and lower  lobes.    --- End of Report ---            LISBETH ARZATE MD; Attending Interventional Radiologist  This document has been electronically signed. Dec  9 2024  6:03PM (12-09-24 @ 13:53)      CARDIOLOGY TESTING  12 Lead ECG:   Ventricular Rate 106 BPM    Atrial Rate 106 BPM    P-R Interval 120 ms    QRS Duration 90 ms    Q-T Interval 342 ms    QTC Calculation(Bazett) 454 ms    P Axis 45 degrees    R Axis 43 degrees    T Axis 172 degrees    Diagnosis Line Sinus tachycardia with Premature atrial complexes  Inferior infarct , age undetermined  Abnormal ECG    Confirmed by Cesar Hong ([id number]) on 12/9/2024 4:17:18 PM (12-08-24 @ 23:10)  12 Lead ECG:   Ventricular Rate 92 BPM    Atrial Rate 92 BPM    P-R Interval 132 ms    QRS Duration 100 ms    Q-T Interval 352 ms    QTC Calculation(Bazett) 435 ms    P Axis 51 degrees    R Axis 101 degrees    T Axis -80 degrees    Diagnosis Line Sinus rhythm with Premature atrial complexes  Rightward axis  ST & T wave abnormality, consider inferolateral ischemia  Abnormal ECG    Confirmed by Cesar Hong ([id number]) on 11/26/2024 5:29:19 PM (11-26-24 @ 10:51)      MEDICATIONS  aspirin  chewable 81 Oral daily  atorvastatin 20 Oral at bedtime  chlorhexidine 2% Cloths 1 Topical <User Schedule>  dextrose 5%. 1000 IV Continuous <Continuous>  dextrose 5%. 1000 IV Continuous <Continuous>  dextrose 50% Injectable 25 IV Push once  dextrose 50% Injectable 12.5 IV Push once  dextrose 50% Injectable 25 IV Push once  ferrous    sulfate 325 Oral daily  gabapentin 300 Oral three times a day  glucagon  Injectable 1 IntraMuscular once  glycopyrrolate Injectable 0.2 IV Push once  insulin glargine Injectable (LANTUS) 5 SubCutaneous every morning  insulin lispro (ADMELOG) corrective regimen sliding scale  SubCutaneous three times a day before meals  lactated ringers. 1000 IV Continuous <Continuous>  melatonin 3 Oral at bedtime  meropenem  IVPB 1000 IV Intermittent every 12 hours  pantoprazole    Tablet 40 Oral every 12 hours  polyethylene glycol 3350 17 Oral daily  senna 2 Oral at bedtime  sodium bicarbonate 650 Oral three times a day  tamsulosin 0.4 Oral at bedtime  vancomycin  IVPB 1000 IV Intermittent every 24 hours      WEIGHT  Weight (kg): 56.9 (12-04-24 @ 17:17)  Creatinine: 1.2 mg/dL (12-09-24 @ 17:44)      ANTIBIOTICS:  meropenem  IVPB 1000 milliGRAM(s) IV Intermittent every 12 hours  vancomycin  IVPB 1000 milliGRAM(s) IV Intermittent every 24 hours      All available historical records have been reviewed       JENNYFERCLARI  84y, Male  Allergy: penicillin (Unknown)      LOS  14d    CHIEF COMPLAINT: Melena (09 Dec 2024 17:45)      INTERVAL EVENTS/HPI  - fever  - T(F): , Max: 100.9 (12-09-24 @ 18:00)  - Tolerating medication  - WBC Count: 15.44 (12-09-24 @ 17:44) uptrending   WBC Count: 13.40 (12-09-24 @ 07:14)  - Creatinine: 1.2 (12-09-24 @ 17:44)  Creatinine: 1.2 (12-09-24 @ 07:14)       ROS  unable to obtain history secondary to patient's mental status and/or sedation     VITALS:  T(F): 97.5, Max: 100.9 (12-09-24 @ 18:00)  HR: 89  BP: 109/61  RR: 18Vital Signs Last 24 Hrs  T(C): 36.4 (10 Dec 2024 04:29), Max: 38.3 (09 Dec 2024 18:00)  T(F): 97.5 (10 Dec 2024 04:29), Max: 100.9 (09 Dec 2024 18:00)  HR: 89 (10 Dec 2024 07:30) (49 - 89)  BP: 109/61 (10 Dec 2024 07:30) (98/54 - 127/61)  BP(mean): --  RR: 18 (10 Dec 2024 04:29) (18 - 18)  SpO2: 100% (10 Dec 2024 07:30) (94% - 100%)    Parameters below as of 10 Dec 2024 07:30  Patient On (Oxygen Delivery Method): nasal cannula  O2 Flow (L/min): 4      PHYSICAL EXAM:  Gen: chronically ill appearing lethargic  HEENT: Normocephalic, atraumatic  Neck: supple, no lymphadenopathy  CV: Regular rate & regular rhythm  Lungs: decreased BS at bases, no fremitus  Abdomen: Soft, BS present  Ext: Warm, well perfused  Neuro: non focal,    laws   Skin: no rash, no erythema  Lines: no phlebitis     FH: Non-contributory  Social Hx: Non-contributory    TESTS & MEASUREMENTS:                        8.5    15.44 )-----------( 480      ( 09 Dec 2024 17:44 )             28.0     12-09    142  |  106  |  27[H]  ----------------------------<  113[H]  4.2   |  25  |  1.2    Ca    8.6      09 Dec 2024 17:44  Phos  3.0     12-09  Mg     1.9     12-09    TPro  5.6[L]  /  Alb  2.5[L]  /  TBili  0.2  /  DBili  x   /  AST  27  /  ALT  28  /  AlkPhos  190[H]  12-09      LIVER FUNCTIONS - ( 09 Dec 2024 07:14 )  Alb: 2.5 g/dL / Pro: 5.6 g/dL / ALK PHOS: 190 U/L / ALT: 28 U/L / AST: 27 U/L / GGT: x           Urinalysis Basic - ( 09 Dec 2024 18:22 )    Color: Yellow / Appearance: Turbid / SG: >1.030 / pH: x  Gluc: x / Ketone: Negative mg/dL  / Bili: Negative / Urobili: 0.2 mg/dL   Blood: x / Protein: 100 mg/dL / Nitrite: Negative   Leuk Esterase: Moderate / RBC: 45 /HPF /  /HPF   Sq Epi: x / Non Sq Epi: 2 /HPF / Bacteria: Moderate /HPF        Culture - Blood (collected 12-04-24 @ 07:43)  Source: .Blood BLOOD  Final Report (12-09-24 @ 17:00):    No growth at 5 days    Culture - Blood (collected 12-01-24 @ 16:00)  Source: .Blood BLOOD  Gram Stain (12-02-24 @ 11:08):    Growth in aerobic and anaerobic bottles: Gram Negative Rods  Final Report (12-04-24 @ 09:46):    Growth in aerobic and anaerobic bottles: Proteus mirabilis ESBL    Direct identification is available within approximately 3-5    hours either by Blood Panel Multiplexed PCR or Direct    MALDI-TOF. Details: [email or web address]  Organism: Blood Culture PCR  Proteus mirabilis ESBL (12-04-24 @ 09:46)  Organism: Proteus mirabilis ESBL (12-04-24 @ 09:46)      Method Type: JOSE J      -  Ampicillin: R >16 These ampicillin results predict results for amoxicillin      -  Ampicillin/Sulbactam: R 8/4      -  Aztreonam: R <=4      -  Cefazolin: R >16      -  Cefepime: R >16      -  Ceftriaxone: R >32      -  Ciprofloxacin: R >2      -  Ertapenem: S <=0.5      -  Gentamicin: R 8      -  Levofloxacin: R >4      -  Meropenem: S <=1      -  Piperacillin/Tazobactam: R <=8      -  Tobramycin: I 4      -  Trimethoprim/Sulfamethoxazole: R >2/38  Organism: Blood Culture PCR (12-04-24 @ 09:46)      Method Type: PCR      -  Proteus species: Detec      -  ESBL: Detec      -  CTX-M Resistance Marker: Detec    Culture - Blood (collected 12-01-24 @ 16:00)  Source: .Blood BLOOD  Gram Stain (12-02-24 @ 11:40):    Growth in aerobic bottle: Gram Negative Rods    Growth in anaerobic bottle: Gram Negative Rods  Final Report (12-04-24 @ 09:47):    Growth in aerobic and anaerobic bottles: Proteus mirabilis ESBL    See previous culture [id number]    Culture - Urine (collected 12-01-24 @ 15:13)  Source: Catheterized Catheterized  Final Report (12-03-24 @ 18:28):    >100,000 CFU/ml Proteus mirabilis ESBL    Unable to evaluate further due to Proteus overgrowth  Organism: Proteus mirabilis ESBL (12-03-24 @ 18:28)  Organism: Proteus mirabilis ESBL (12-03-24 @ 18:28)      Method Type: JOSE J      -  Ampicillin: R >16 These ampicillin results predict results for amoxicillin      -  Ampicillin/Sulbactam: S 8/4      -  Aztreonam: R <=4      -  Cefazolin: R >16 For uncomplicated UTI with K. pneumoniae, E. coli, or P. mirablis: JOSE J <=16 is sensitive and JOSE J >=32 is resistant. This also predicts results for oral agents cefaclor, cefdinir, cefpodoxime, cefprozil, cefuroxime axetil, cephalexin and locarbef for uncomplicated UTI. Note that some isolates may be susceptible to these agents while testing resistant to cefazolin.      -  Cefepime: R 4      -  Ceftriaxone: R 8      -  Cefuroxime: R >16      -  Ciprofloxacin: R >2      -  Ertapenem: S <=0.5      -  Gentamicin: I 4      -  Levofloxacin: R >4      -  Meropenem: S <=1      -  Nitrofurantoin: R >64 Should not be used to treat pyelonephritis      -  Piperacillin/Tazobactam: S <=8      -  Tobramycin: S <=2      -  Trimethoprim/Sulfamethoxazole: R >2/38            INFECTIOUS DISEASES TESTING  MRSA PCR Result.: Negative (12-09-24 @ 15:50)  Procalcitonin: 1.59 (12-01-24 @ 16:00)  strept    INFLAMMATORY MARKERS      RADIOLOGY & ADDITIONAL TESTS:  I have personally reviewed the last available Chest xray  CXR      CT  CT Angio Chest PE Protocol w/ IV Cont:   ACC: [id number] EXAM:  CT ANGIO CHEST PULM ART [facility]   ORDERED BY: KARI LANCASTER     PROCEDURE DATE:  12/09/2024          INTERPRETATION:  CLINICAL INFORMATION: SOB. Chest pain.  Pulmonary   embolus evaluation. WBC 13.40  PMHx of HLD, DM, CAD s/p stents x2 in   2007, h/o asbestosis of lung, dx w malignant mesothelioma   8/2024 and s/p VATS pleurodesis, R sided PleurX, h/o AVM/GIB, JASMIN    COMPARISON: CT scan of the chest, abdomen and pelvis dated 7/31/2024    CONTRAST/COMPLICATIONS:  IV Contrast: Omnipaque 350  65 cc administered   35 cc discarded  Oral Contrast: None  Complications: None reported at time of study completion    PROCEDURE:  CT Angiography of the Chest.  Sagittal and coronal reformats were performed as well as 3D (MIP)   reconstructions.    FINDINGS:      TUBES AND LINES: A right-sided pleural drainage catheter (PleurX) is   noted.  LUNGS AND LARGE AIRWAYS: Patent central airways. No pulmonary nodules.   Compared with the previous scan of 7/31/2024, there has been increased   consolidation in the right upper, middle, and lower lobes. No   pneumothorax.  PLEURA: There is been a moderate reduction in the right-sided pleural   effusion. However there is a new loculation in the right apex.  Compared with the previous scan of 7/31/2024, there has been increased   pleural thickening. There is loss of volume in the right lung with shift   of mediastinal structures to the right.    VESSELS: Two small filling defects are seen within a segmental and   subsegmental branch of the left lower lobe pulmonary artery. The filling   defects appear thin and at adherent to the wall of the vessel indicating   likely small chronic PEs. No other intraluminal filling defects are seen   within the pulmonary arteries.  There is no evidence of right heart   strain (RV:LV ratio <1; RV=2.7 cm and LV = 5.0 cm)  Normal caliber aorta and pulmonary artery. Aortic calcifications are   noted. There is no evidence of aortic aneurysm or dissection. The   brachiocephalic vesselsare unremarkable.  HEART: Heart size is normal. No pericardial effusion. Coronary artery   calcifications are noted.  MEDIASTINUM AND RAYMOND: No lymphadenopathy. The thyroid is unremarkable.  CHEST WALL AND LOWER NECK: Within normal limits.  VISUALIZEDUPPER ABDOMEN: The partially visualized upper abdomen shows no   acute pathology.  BONES: Degenerative changes of the spine are noted.    IMPRESSION:  1. Two small filling defects are seen within a segmental and subsegmental branch of the left lower lobe pulmonary artery. The filling defects appear thin and at adherent to the wall of the vessel indicating likely   small chronic PEs. No other intraluminal filling defects are seen within   the pulmonary arteries.  There is no evidence of right heart strain   (RV:LV ratio <1; RV=2.7 cm and LV = 5.0 cm)  2. A right-sided pleural drainage catheter (PleurX) is noted with a moderate reduction in the right-sided pleural effusion. However there is a new loculation in the right apex.  3. Compared with the previous scan of 7/31/2024, there has been increased right sided pleural thickening and increased consolidation in the right upper, middle and lower  lobes.    --- End of Report ---            LISBETH ARZATE MD; Attending Interventional Radiologist  This document has been electronically signed. Dec  9 2024  6:03PM (12-09-24 @ 13:53)      CARDIOLOGY TESTING  12 Lead ECG:   Ventricular Rate 106 BPM    Atrial Rate 106 BPM    P-R Interval 120 ms    QRS Duration 90 ms    Q-T Interval 342 ms    QTC Calculation(Bazett) 454 ms    P Axis 45 degrees    R Axis 43 degrees    T Axis 172 degrees    Diagnosis Line Sinus tachycardia with Premature atrial complexes  Inferior infarct , age undetermined  Abnormal ECG    Confirmed by Cesar Hong ([id number]) on 12/9/2024 4:17:18 PM (12-08-24 @ 23:10)  12 Lead ECG:   Ventricular Rate 92 BPM    Atrial Rate 92 BPM    P-R Interval 132 ms    QRS Duration 100 ms    Q-T Interval 352 ms    QTC Calculation(Bazett) 435 ms    P Axis 51 degrees    R Axis 101 degrees    T Axis -80 degrees    Diagnosis Line Sinus rhythm with Premature atrial complexes  Rightward axis  ST & T wave abnormality, consider inferolateral ischemia  Abnormal ECG    Confirmed by Cesar Hong ([id number]) on 11/26/2024 5:29:19 PM (11-26-24 @ 10:51)      MEDICATIONS  aspirin  chewable 81 Oral daily  atorvastatin 20 Oral at bedtime  chlorhexidine 2% Cloths 1 Topical <User Schedule>  dextrose 5%. 1000 IV Continuous <Continuous>  dextrose 5%. 1000 IV Continuous <Continuous>  dextrose 50% Injectable 25 IV Push once  dextrose 50% Injectable 12.5 IV Push once  dextrose 50% Injectable 25 IV Push once  ferrous    sulfate 325 Oral daily  gabapentin 300 Oral three times a day  glucagon  Injectable 1 IntraMuscular once  glycopyrrolate Injectable 0.2 IV Push once  insulin glargine Injectable (LANTUS) 5 SubCutaneous every morning  insulin lispro (ADMELOG) corrective regimen sliding scale  SubCutaneous three times a day before meals  lactated ringers. 1000 IV Continuous <Continuous>  melatonin 3 Oral at bedtime  meropenem  IVPB 1000 IV Intermittent every 12 hours  pantoprazole    Tablet 40 Oral every 12 hours  polyethylene glycol 3350 17 Oral daily  senna 2 Oral at bedtime  sodium bicarbonate 650 Oral three times a day  tamsulosin 0.4 Oral at bedtime  vancomycin  IVPB 1000 IV Intermittent every 24 hours      WEIGHT  Weight (kg): 56.9 (12-04-24 @ 17:17)  Creatinine: 1.2 mg/dL (12-09-24 @ 17:44)      ANTIBIOTICS:  meropenem  IVPB 1000 milliGRAM(s) IV Intermittent every 12 hours  vancomycin  IVPB 1000 milliGRAM(s) IV Intermittent every 24 hours      All available historical records have been reviewed       JENNYFERCLARI  84y, Male  Allergy: penicillin (Unknown)      LOS  14d    CHIEF COMPLAINT: Melena (09 Dec 2024 17:45)      INTERVAL EVENTS/HPI  - fever  - T(F): , Max: 100.9 (12-09-24 @ 18:00)  - blood pleural fluid, dark stools  - Tolerating medication  - WBC Count: 15.44 (12-09-24 @ 17:44) uptrending   WBC Count: 13.40 (12-09-24 @ 07:14)  - Creatinine: 1.2 (12-09-24 @ 17:44)  Creatinine: 1.2 (12-09-24 @ 07:14)       ROS  unable to obtain history secondary to patient's mental status and/or sedation     VITALS:  T(F): 97.5, Max: 100.9 (12-09-24 @ 18:00)  HR: 89  BP: 109/61  RR: 18Vital Signs Last 24 Hrs  T(C): 36.4 (10 Dec 2024 04:29), Max: 38.3 (09 Dec 2024 18:00)  T(F): 97.5 (10 Dec 2024 04:29), Max: 100.9 (09 Dec 2024 18:00)  HR: 89 (10 Dec 2024 07:30) (49 - 89)  BP: 109/61 (10 Dec 2024 07:30) (98/54 - 127/61)  BP(mean): --  RR: 18 (10 Dec 2024 04:29) (18 - 18)  SpO2: 100% (10 Dec 2024 07:30) (94% - 100%)    Parameters below as of 10 Dec 2024 07:30  Patient On (Oxygen Delivery Method): nasal cannula  O2 Flow (L/min): 4      PHYSICAL EXAM:  Gen: chronically ill appearing lethargic  HEENT: Normocephalic, atraumatic  Neck: supple, no lymphadenopathy  CV: Regular rate & regular rhythm  Lungs: decreased BS at bases, no fremitus  Abdomen: Soft, BS present  Ext: Warm, well perfused  Neuro: non focal,    laws   Skin: no rash, no erythema  Lines: no phlebitis     FH: Non-contributory  Social Hx: Non-contributory    TESTS & MEASUREMENTS:                        8.5    15.44 )-----------( 480      ( 09 Dec 2024 17:44 )             28.0     12-09    142  |  106  |  27[H]  ----------------------------<  113[H]  4.2   |  25  |  1.2    Ca    8.6      09 Dec 2024 17:44  Phos  3.0     12-09  Mg     1.9     12-09    TPro  5.6[L]  /  Alb  2.5[L]  /  TBili  0.2  /  DBili  x   /  AST  27  /  ALT  28  /  AlkPhos  190[H]  12-09      LIVER FUNCTIONS - ( 09 Dec 2024 07:14 )  Alb: 2.5 g/dL / Pro: 5.6 g/dL / ALK PHOS: 190 U/L / ALT: 28 U/L / AST: 27 U/L / GGT: x           Urinalysis Basic - ( 09 Dec 2024 18:22 )    Color: Yellow / Appearance: Turbid / SG: >1.030 / pH: x  Gluc: x / Ketone: Negative mg/dL  / Bili: Negative / Urobili: 0.2 mg/dL   Blood: x / Protein: 100 mg/dL / Nitrite: Negative   Leuk Esterase: Moderate / RBC: 45 /HPF /  /HPF   Sq Epi: x / Non Sq Epi: 2 /HPF / Bacteria: Moderate /HPF        Culture - Blood (collected 12-04-24 @ 07:43)  Source: .Blood BLOOD  Final Report (12-09-24 @ 17:00):    No growth at 5 days    Culture - Blood (collected 12-01-24 @ 16:00)  Source: .Blood BLOOD  Gram Stain (12-02-24 @ 11:08):    Growth in aerobic and anaerobic bottles: Gram Negative Rods  Final Report (12-04-24 @ 09:46):    Growth in aerobic and anaerobic bottles: Proteus mirabilis ESBL    Direct identification is available within approximately 3-5    hours either by Blood Panel Multiplexed PCR or Direct    MALDI-TOF. Details: [email or web address]  Organism: Blood Culture PCR  Proteus mirabilis ESBL (12-04-24 @ 09:46)  Organism: Proteus mirabilis ESBL (12-04-24 @ 09:46)      Method Type: JOSE J      -  Ampicillin: R >16 These ampicillin results predict results for amoxicillin      -  Ampicillin/Sulbactam: R 8/4      -  Aztreonam: R <=4      -  Cefazolin: R >16      -  Cefepime: R >16      -  Ceftriaxone: R >32      -  Ciprofloxacin: R >2      -  Ertapenem: S <=0.5      -  Gentamicin: R 8      -  Levofloxacin: R >4      -  Meropenem: S <=1      -  Piperacillin/Tazobactam: R <=8      -  Tobramycin: I 4      -  Trimethoprim/Sulfamethoxazole: R >2/38  Organism: Blood Culture PCR (12-04-24 @ 09:46)      Method Type: PCR      -  Proteus species: Detec      -  ESBL: Detec      -  CTX-M Resistance Marker: Detec    Culture - Blood (collected 12-01-24 @ 16:00)  Source: .Blood BLOOD  Gram Stain (12-02-24 @ 11:40):    Growth in aerobic bottle: Gram Negative Rods    Growth in anaerobic bottle: Gram Negative Rods  Final Report (12-04-24 @ 09:47):    Growth in aerobic and anaerobic bottles: Proteus mirabilis ESBL    See previous culture [id number]    Culture - Urine (collected 12-01-24 @ 15:13)  Source: Catheterized Catheterized  Final Report (12-03-24 @ 18:28):    >100,000 CFU/ml Proteus mirabilis ESBL    Unable to evaluate further due to Proteus overgrowth  Organism: Proteus mirabilis ESBL (12-03-24 @ 18:28)  Organism: Proteus mirabilis ESBL (12-03-24 @ 18:28)      Method Type: JOSE J      -  Ampicillin: R >16 These ampicillin results predict results for amoxicillin      -  Ampicillin/Sulbactam: S 8/4      -  Aztreonam: R <=4      -  Cefazolin: R >16 For uncomplicated UTI with K. pneumoniae, E. coli, or P. mirablis: JOSE J <=16 is sensitive and JOSE J >=32 is resistant. This also predicts results for oral agents cefaclor, cefdinir, cefpodoxime, cefprozil, cefuroxime axetil, cephalexin and locarbef for uncomplicated UTI. Note that some isolates may be susceptible to these agents while testing resistant to cefazolin.      -  Cefepime: R 4      -  Ceftriaxone: R 8      -  Cefuroxime: R >16      -  Ciprofloxacin: R >2      -  Ertapenem: S <=0.5      -  Gentamicin: I 4      -  Levofloxacin: R >4      -  Meropenem: S <=1      -  Nitrofurantoin: R >64 Should not be used to treat pyelonephritis      -  Piperacillin/Tazobactam: S <=8      -  Tobramycin: S <=2      -  Trimethoprim/Sulfamethoxazole: R >2/38            INFECTIOUS DISEASES TESTING  MRSA PCR Result.: Negative (12-09-24 @ 15:50)  Procalcitonin: 1.59 (12-01-24 @ 16:00)  strept    INFLAMMATORY MARKERS      RADIOLOGY & ADDITIONAL TESTS:  I have personally reviewed the last available Chest xray  CXR      CT  CT Angio Chest PE Protocol w/ IV Cont:   ACC: [id number] EXAM:  CT ANGIO CHEST PULM ART WAWIC   ORDERED BY: KARI LANCASTER     PROCEDURE DATE:  12/09/2024          INTERPRETATION:  CLINICAL INFORMATION: SOB. Chest pain.  Pulmonary   embolus evaluation. WBC 13.40  PMHx of HLD, DM, CAD s/p stents x2 in   2007, h/o asbestosis of lung, dx w malignant mesothelioma   8/2024 and s/p VATS pleurodesis, R sided PleurX, h/o AVM/GIB, JASMIN    COMPARISON: CT scan of the chest, abdomen and pelvis dated 7/31/2024    CONTRAST/COMPLICATIONS:  IV Contrast: Omnipaque 350  65 cc administered   35 cc discarded  Oral Contrast: None  Complications: None reported at time of study completion    PROCEDURE:  CT Angiography of the Chest.  Sagittal and coronal reformats were performed as well as 3D (MIP)   reconstructions.    FINDINGS:      TUBES AND LINES: A right-sided pleural drainage catheter (PleurX) is   noted.  LUNGS AND LARGE AIRWAYS: Patent central airways. No pulmonary nodules.   Compared with the previous scan of 7/31/2024, there has been increased   consolidation in the right upper, middle, and lower lobes. No   pneumothorax.  PLEURA: There is been a moderate reduction in the right-sided pleural   effusion. However there is a new loculation in the right apex.  Compared with the previous scan of 7/31/2024, there has been increased   pleural thickening. There is loss of volume in the right lung with shift   of mediastinal structures to the right.    VESSELS: Two small filling defects are seen within a segmental and   subsegmental branch of the left lower lobe pulmonary artery. The filling   defects appear thin and at adherent to the wall of the vessel indicating   likely small chronic PEs. No other intraluminal filling defects are seen   within the pulmonary arteries.  There is no evidence of right heart   strain (RV:LV ratio <1; RV=2.7 cm and LV = 5.0 cm)  Normal caliber aorta and pulmonary artery. Aortic calcifications are   noted. There is no evidence of aortic aneurysm or dissection. The   brachiocephalic vesselsare unremarkable.  HEART: Heart size is normal. No pericardial effusion. Coronary artery   calcifications are noted.  MEDIASTINUM AND RAYMOND: No lymphadenopathy. The thyroid is unremarkable.  CHEST WALL AND LOWER NECK: Within normal limits.  VISUALIZEDUPPER ABDOMEN: The partially visualized upper abdomen shows no   acute pathology.  BONES: Degenerative changes of the spine are noted.    IMPRESSION:  1. Two small filling defects are seen within a segmental and subsegmental branch of the left lower lobe pulmonary artery. The filling defects appear thin and at adherent to the wall of the vessel indicating likely   small chronic PEs. No other intraluminal filling defects are seen within   the pulmonary arteries.  There is no evidence of right heart strain   (RV:LV ratio <1; RV=2.7 cm and LV = 5.0 cm)  2. A right-sided pleural drainage catheter (PleurX) is noted with a moderate reduction in the right-sided pleural effusion. However there is a new loculation in the right apex.  3. Compared with the previous scan of 7/31/2024, there has been increased right sided pleural thickening and increased consolidation in the right upper, middle and lower  lobes.    --- End of Report ---            LISBETH ARZATE MD; Attending Interventional Radiologist  This document has been electronically signed. Dec  9 2024  6:03PM (12-09-24 @ 13:53)      CARDIOLOGY TESTING  12 Lead ECG:   Ventricular Rate 106 BPM    Atrial Rate 106 BPM    P-R Interval 120 ms    QRS Duration 90 ms    Q-T Interval 342 ms    QTC Calculation(Bazett) 454 ms    P Axis 45 degrees    R Axis 43 degrees    T Axis 172 degrees    Diagnosis Line Sinus tachycardia with Premature atrial complexes  Inferior infarct , age undetermined  Abnormal ECG    Confirmed by Cesar Hong ([id number]) on 12/9/2024 4:17:18 PM (12-08-24 @ 23:10)  12 Lead ECG:   Ventricular Rate 92 BPM    Atrial Rate 92 BPM    P-R Interval 132 ms    QRS Duration 100 ms    Q-T Interval 352 ms    QTC Calculation(Bazett) 435 ms    P Axis 51 degrees    R Axis 101 degrees    T Axis -80 degrees    Diagnosis Line Sinus rhythm with Premature atrial complexes  Rightward axis  ST & T wave abnormality, consider inferolateral ischemia  Abnormal ECG    Confirmed by Cesar Hong ([id number]) on 11/26/2024 5:29:19 PM (11-26-24 @ 10:51)      MEDICATIONS  aspirin  chewable 81 Oral daily  atorvastatin 20 Oral at bedtime  chlorhexidine 2% Cloths 1 Topical <User Schedule>  dextrose 5%. 1000 IV Continuous <Continuous>  dextrose 5%. 1000 IV Continuous <Continuous>  dextrose 50% Injectable 25 IV Push once  dextrose 50% Injectable 12.5 IV Push once  dextrose 50% Injectable 25 IV Push once  ferrous    sulfate 325 Oral daily  gabapentin 300 Oral three times a day  glucagon  Injectable 1 IntraMuscular once  glycopyrrolate Injectable 0.2 IV Push once  insulin glargine Injectable (LANTUS) 5 SubCutaneous every morning  insulin lispro (ADMELOG) corrective regimen sliding scale  SubCutaneous three times a day before meals  lactated ringers. 1000 IV Continuous <Continuous>  melatonin 3 Oral at bedtime  meropenem  IVPB 1000 IV Intermittent every 12 hours  pantoprazole    Tablet 40 Oral every 12 hours  polyethylene glycol 3350 17 Oral daily  senna 2 Oral at bedtime  sodium bicarbonate 650 Oral three times a day  tamsulosin 0.4 Oral at bedtime  vancomycin  IVPB 1000 IV Intermittent every 24 hours      WEIGHT  Weight (kg): 56.9 (12-04-24 @ 17:17)  Creatinine: 1.2 mg/dL (12-09-24 @ 17:44)      ANTIBIOTICS:  meropenem  IVPB 1000 milliGRAM(s) IV Intermittent every 12 hours  vancomycin  IVPB 1000 milliGRAM(s) IV Intermittent every 24 hours      All available historical records have been reviewed

## 2024-12-10 NOTE — PROGRESS NOTE ADULT - ASSESSMENT
83 y/o man w PMHx of HLD, DM, CAD s/p stents x2 in 2007, pt of Dr Avila, h/o asbestosis of lung, dx w malignant mesothelioma ~8/2024 and s/p VATS pleurodesis, R sided PleurX, h/o AVM/GIB, JASMIN, referred from NH for anemia to 6.5, in ED found to have Hb 7.1, CHINYERE +ve for melena.     # Possible UGI bleed  resolved  # Acute on chronic anemia  with JASMIN   Hx Angioectasia of duodenum  -GI on board   -Trend H&H   tfx to keep hb >7    -s/p egd/colonoscopy >showed AVM ,s/p cauterization   follow cbc  daily  s/p venofer 200 mg (total 2 doses)  repeat iron panel /ferritin         #bacteremia with ESBL /sepsis ,  on abx as per ID    #Diarrhea   :r/o cdiff     # Malignant mesothelioma (diagnosed 8/2024 ), Stable  # Hx Asbestos lung     Not a candidate for any aggressive Mn from hem/onc stand point given his overall poor PS  Had a detail discussion with son yesterday   discussed comfort care   palliative care on board  for GOC discussion     cont other supportive care  overall prognosis is poor  DNR/DNI is  in effect   will f/u

## 2024-12-10 NOTE — PROGRESS NOTE ADULT - SUBJECTIVE AND OBJECTIVE BOX
Patient is a 84y old  Male who presents with a chief complaint of Melena (10 Dec 2024 16:14)       Pt is seen and examined this morning   pt is awake and lying in bed        ROS:  unable to obtain     MEDICATIONS  (STANDING):  aspirin  chewable 81 milliGRAM(s) Oral daily  atorvastatin 20 milliGRAM(s) Oral at bedtime  chlorhexidine 2% Cloths 1 Application(s) Topical <User Schedule>  dextrose 5%. 1000 milliLiter(s) (50 mL/Hr) IV Continuous <Continuous>  dextrose 5%. 1000 milliLiter(s) (100 mL/Hr) IV Continuous <Continuous>  dextrose 50% Injectable 25 Gram(s) IV Push once  dextrose 50% Injectable 12.5 Gram(s) IV Push once  dextrose 50% Injectable 25 Gram(s) IV Push once  ferrous    sulfate 325 milliGRAM(s) Oral daily  gabapentin 300 milliGRAM(s) Oral three times a day  glucagon  Injectable 1 milliGRAM(s) IntraMuscular once  insulin glargine Injectable (LANTUS) 5 Unit(s) SubCutaneous every morning  insulin lispro (ADMELOG) corrective regimen sliding scale   SubCutaneous three times a day before meals  lactated ringers. 1000 milliLiter(s) (50 mL/Hr) IV Continuous <Continuous>  melatonin 3 milliGRAM(s) Oral at bedtime  meropenem  IVPB 1000 milliGRAM(s) IV Intermittent every 12 hours  pantoprazole    Tablet 40 milliGRAM(s) Oral every 12 hours  polyethylene glycol 3350 17 Gram(s) Oral daily  senna 2 Tablet(s) Oral at bedtime  sodium bicarbonate 650 milliGRAM(s) Oral three times a day  tamsulosin 0.4 milliGRAM(s) Oral at bedtime  vancomycin  IVPB 1000 milliGRAM(s) IV Intermittent every 24 hours    MEDICATIONS  (PRN):  acetaminophen     Tablet .. 975 milliGRAM(s) Oral every 8 hours PRN Mild Pain (1 - 3)  albuterol/ipratropium for Nebulization 3 milliLiter(s) Nebulizer every 6 hours PRN Shortness of Breath  dextrose Oral Gel 15 Gram(s) Oral once PRN Blood Glucose LESS THAN 70 milliGRAM(s)/deciliter      Allergies    penicillin (Unknown)    Intolerances        Vital Signs Last 24 Hrs  T(C): 37.6 (10 Dec 2024 14:41), Max: 38.3 (09 Dec 2024 18:00)  T(F): 99.6 (10 Dec 2024 14:41), Max: 100.9 (09 Dec 2024 18:00)  HR: 80 (10 Dec 2024 14:41) (56 - 89)  BP: 85/57 (10 Dec 2024 14:41) (85/57 - 127/61)  BP(mean): --  RR: 18 (10 Dec 2024 14:41) (18 - 18)  SpO2: 97% (10 Dec 2024 14:41) (94% - 100%)    Parameters below as of 10 Dec 2024 08:40  Patient On (Oxygen Delivery Method): nasal cannula  O2 Flow (L/min): 4      PHYSICAL EXAM  General: cachetic   HEENT: clear oropharynx, anicteric sclera, pink conjunctiva  Neck: supple  CV: normal S1/S2 with no murmur rubs or gallops  Lungs: positive air movement b/l ant lungs  Abdomen: soft non-tender non-distended, no hepatosplenomegaly  Ext: no clubbing cyanosis or edema  Skin: no rashes and no petechiae  Neuro: alert and lethargic , limited exam   LABS:                          7.1    15.15 )-----------( 421      ( 10 Dec 2024 08:30 )             23.7         Mean Cell Volume : 98.3 fL  Mean Cell Hemoglobin : 29.5 pg  Mean Cell Hemoglobin Concentration : 30.0 g/dL  Auto Neutrophil # : x  Auto Lymphocyte # : x  Auto Monocyte # : x  Auto Eosinophil # : x  Auto Basophil # : x  Auto Neutrophil % : x  Auto Lymphocyte % : x  Auto Monocyte % : x  Auto Eosinophil % : x  Auto Basophil % : x    Serial CBC's  12-10 @ 08:30  Hct-23.7 / Hgb-7.1 / Plat-421 / RBC-2.41 / WBC-15.15          Serial CBC's  12-09 @ 17:44  Hct-28.0 / Hgb-8.5 / Plat-480 / RBC-2.89 / WBC-15.44          Serial CBC's  12-09 @ 07:14  Hct-27.0 / Hgb-8.2 / Plat-441 / RBC-2.78 / WBC-13.40          Serial CBC's  12-07 @ 08:00  Hct-25.9 / Hgb-7.8 / Plat-348 / RBC-2.67 / WBC-9.51            12-10    138  |  106  |  28[H]  ----------------------------<  145[H]  4.9   |  21  |  1.3    Ca    7.9[L]      10 Dec 2024 08:30  Phos  3.0     12-09  Mg     1.9     12-09    TPro  5.6[L]  /  Alb  2.5[L]  /  TBili  0.2  /  DBili  x   /  AST  27  /  ALT  28  /  AlkPhos  190[H]  12-09      PT/INR - ( 09 Dec 2024 17:44 )   PT: 13.00 sec;   INR: 1.10 ratio         PTT - ( 09 Dec 2024 17:44 )  PTT:37.9 sec    WBC Count: 15.15 K/uL (12-10-24 @ 08:30)  Hemoglobin: 7.1 g/dL (12-10-24 @ 08:30)  Hematocrit: 23.7 % (12-10-24 @ 08:30)  Platelet Count - Automated: 421 K/uL (12-10-24 @ 08:30)  Hemoglobin: 8.5 g/dL (12-09-24 @ 17:44)  Hematocrit: 28.0 % (12-09-24 @ 17:44)  Platelet Count - Automated: 480 K/uL (12-09-24 @ 17:44)  WBC Count: 15.44 K/uL (12-09-24 @ 17:44)  WBC Count: 13.40 K/uL (12-09-24 @ 07:14)  Hemoglobin: 8.2 g/dL (12-09-24 @ 07:14)  Hematocrit: 27.0 % (12-09-24 @ 07:14)  Platelet Count - Automated: 441 K/uL (12-09-24 @ 07:14)  WBC Count: 9.51 K/uL (12-07-24 @ 08:00)  Hemoglobin: 7.8 g/dL (12-07-24 @ 08:00)  Hematocrit: 25.9 % (12-07-24 @ 08:00)  Platelet Count - Automated: 348 K/uL (12-07-24 @ 08:00)  WBC Count: 6.07 K/uL (12-06-24 @ 07:47)  Hemoglobin: 7.3 g/dL (12-06-24 @ 07:47)  Hematocrit: 23.8 % (12-06-24 @ 07:47)  Platelet Count - Automated: 271 K/uL (12-06-24 @ 07:47)  WBC Count: 6.60 K/uL (12-05-24 @ 04:30)  Hemoglobin: 7.9 g/dL (12-05-24 @ 04:30)  Hematocrit: 25.5 % (12-05-24 @ 04:30)  Platelet Count - Automated: 265 K/uL (12-05-24 @ 04:30)  WBC Count: 8.06 K/uL (12-04-24 @ 07:43)  Hemoglobin: 7.9 g/dL (12-04-24 @ 07:43)  Hematocrit: 25.2 % (12-04-24 @ 07:43)  Platelet Count - Automated: 240 K/uL (12-04-24 @ 07:43)  WBC Count: 8.77 K/uL (12-03-24 @ 22:51)  Hemoglobin: 7.9 g/dL (12-03-24 @ 22:51)  Hematocrit: 25.2 % (12-03-24 @ 22:51)  Platelet Count - Automated: 226 K/uL (12-03-24 @ 22:51)  WBC Count: 5.65 K/uL (12-03-24 @ 06:51)  Hemoglobin: 7.7 g/dL (12-03-24 @ 06:51)  Hematocrit: 25.0 % (12-03-24 @ 06:51)  Platelet Count - Automated: 224 K/uL (12-03-24 @ 06:51)  WBC Count: 7.92 K/uL (12-02-24 @ 06:41)  Hemoglobin: 8.1 g/dL (12-02-24 @ 06:41)  Hematocrit: 26.0 % (12-02-24 @ 06:41)  Platelet Count - Automated: 238 K/uL (12-02-24 @ 06:41)  WBC Count: 3.75 K/uL (12-01-24 @ 16:00)  Hemoglobin: 8.6 g/dL (12-01-24 @ 16:00)  Hematocrit: 27.9 % (12-01-24 @ 16:00)  Platelet Count - Automated: 233 K/uL (12-01-24 @ 16:00)  WBC Count: 5.50 K/uL (12-01-24 @ 08:15)  Hemoglobin: 6.7 g/dL (12-01-24 @ 08:15)  Hematocrit: 21.4 % (12-01-24 @ 08:15)  Platelet Count - Automated: 241 K/uL (12-01-24 @ 08:15)                BLOOD SMEAR INTERPRETATION:       RADIOLOGY & ADDITIONAL STUDIES:

## 2024-12-10 NOTE — CONSULT NOTE ADULT - ASSESSMENT
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 Patient has been declining during hospital stay, palliative consulted for GOC.    - c/w IV abx including IV vanco, monitor vanco levels  - see above  - monitor H/H  - will follow  - DNR/DNI, continue other medical care for now  - will follow    ______________  Josh Miller MD  Palliative Medicine  [facility]   of Geriatric and Palliative Medicine  [phone]

## 2024-12-10 NOTE — CONSULT NOTE ADULT - CONVERSATION DETAILS
Patient was living independently prior to admission, and has been  for a year. Son John states that since that time, he has declined. Confirmed DNR/DNI, and discussed comfort care. Son wants to focus on symptoms, but not transition to full comfort care. If patient stabilizes, son aware he may not be able to live alone, but does not want to focus on this at this point. Agreed to reassess his clinical status, and discussion comfort care vs disposition based on his inpatient clinical course.

## 2024-12-10 NOTE — GOALS OF CARE CONVERSATION - ADVANCED CARE PLANNING - MOLST UPDATED
[facility] General Surgery Operative Note    Pre-operative diagnosis: right Inguinal hernia   Post-operative diagnosis: same   Procedure: Robotic assisted laparoscopic right inguinal hernia repair   Surgeon: Frandy Gonzales MD   Assistant(s): Lillie Powell PA-C  The Physician Assistant was medically necessary for their expertise in prepping, camera management, exchanging robotic instruments, passing suture and mesh through the robotic ports, and suturing   Anesthesia: general   Estimated blood loss:  Specimen:  FINDINGS:  5 cc  none  Right inguinal hernia       Indication for Procedure: This is a 68 year old female who presented to the office with a symptomatic right inguinal hernia. After discussing risks and benefits, they agreed to proceed with robotic approach.  Description of procedure:  Patient was brought to the operating room, placed on the operating table in supine position. Anesthesia was induced. Her pressure points were padded and the patient was secured with a safety strap. A timeout was called to verify the patient, site of procedure and procedure to be performed.  A transverse supraumbilical incision was made and dissection carried down to fascia. The Veress needle was then used to enter the abdomen and the abdomen insuflatted to 15mmHg. An 8mm port was placed into the abdomen. The camera was then placed and no apparent injuries from the veress or trocar were noted.   Two 8mm ports were then placed on right and left lateral abdomen.   The peritoneum was opened transversely from the  medial umbilical ligament to the lateral abdominal wall 5cm above the internal ring and ASIS. The peritoneum was dissected away from the abdominal wall focusing first on the medial dissection to identify the pubic tubercle and coopers ligament, then the lateral dissection toward the iliac crest. Laterally, care was taken to stay directly on the peritoneum.   The indirect sac was then carefully reduced in order to  achieve complete reduction we did cauterize and cut the round ligament. . A large 3D max mid mesh was placed in the pocket and secured in place to coopers ligament with two 2-0 vicryl stitches. The peritoneum was closed in a running fashion with a 3-0 Stratafix suture.   The cavity was inspected and there was adequate hemostasis.   The trocars were then removed. Local was injected along the fascia and subcutaneous tissues. The skin was closed with 4-0 suture. Glue applied as dressing. At the end of the operation, all sponge, instrument, and needle counts were correct.     Frandy Gonzales MD     10-Dec-2024

## 2024-12-10 NOTE — GOALS OF CARE CONVERSATION - ADVANCED CARE PLANNING - CONVERSATION DETAILS
Extensive goals of care conversations were held with patient and his son. Pt understands his condition and the treatment options were detailed. MOLST discussed and pt chose to be DNR/DNI with trial of NIV. The same was confirmed with the son.

## 2024-12-10 NOTE — CONSULT NOTE ADULT - SUBJECTIVE AND OBJECTIVE BOX
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 Patient has been declining during hospital stay, palliative consulted for GOC.    PERTINENT PM/SXH:   DM (diabetes mellitus)  MI (myocardial infarction)  History of CAD (coronary artery disease)  Dyslipidemia  Currently smokes tobacco  Mesothelioma, malignant  Coronary stent patent    FAMILY HISTORY:  no pertinent family history    SOCIAL HISTORY:   Significant other/partner[ ]  Children[X ]  Congregation/Spirituality:  Substance hx:  [ ]   Tobacco hx:  [ ]   Alcohol hx: [ ]   Living Situation: [ ]Home  [ ]Long term care  [ ]Rehab [ ]Other  Home Services: [ ] HHA [ ] Visting RN [ ] Hospice  Occupation:  Home Opioid hx:  [ ] Y [ ] N [ ] I-Stop Reference No:    ADVANCE DIRECTIVES:    [ ] Full Code [ X] DNR  MOLST  [ ]  Living Will  [ ]   DECISION MAKER(s):  [ ] Health Care Proxy(s)  [ ] Surrogate(s)  [ ] Guardian           Name(s): Phone Number(s): bryson Lopez [phone]    BASELINE (I)ADL(s) (prior to admission):  [place]: [ ]Total  [ ] Moderate [ ]Dependent  Palliative Performance Status Version 2:         %    http://npcrc.org/files/news/palliative_performance_scale_ppsv2.pdf    Allergies    penicillin (Unknown)    Intolerances    MEDICATIONS  (STANDING):  aspirin  chewable 81 milliGRAM(s) Oral daily  atorvastatin 20 milliGRAM(s) Oral at bedtime  chlorhexidine 2% Cloths 1 Application(s) Topical <User Schedule>  dextrose 5%. 1000 milliLiter(s) (50 mL/Hr) IV Continuous <Continuous>  dextrose 5%. 1000 milliLiter(s) (100 mL/Hr) IV Continuous <Continuous>  dextrose 50% Injectable 25 Gram(s) IV Push once  dextrose 50% Injectable 12.5 Gram(s) IV Push once  dextrose 50% Injectable 25 Gram(s) IV Push once  ferrous    sulfate 325 milliGRAM(s) Oral daily  gabapentin 300 milliGRAM(s) Oral three times a day  glucagon  Injectable 1 milliGRAM(s) IntraMuscular once  insulin glargine Injectable (LANTUS) 5 Unit(s) SubCutaneous every morning  insulin lispro (ADMELOG) corrective regimen sliding scale   SubCutaneous three times a day before meals  lactated ringers. 1000 milliLiter(s) (50 mL/Hr) IV Continuous <Continuous>  melatonin 3 milliGRAM(s) Oral at bedtime  meropenem  IVPB 1000 milliGRAM(s) IV Intermittent every 12 hours  pantoprazole    Tablet 40 milliGRAM(s) Oral every 12 hours  polyethylene glycol 3350 17 Gram(s) Oral daily  senna 2 Tablet(s) Oral at bedtime  sodium bicarbonate 650 milliGRAM(s) Oral three times a day  tamsulosin 0.4 milliGRAM(s) Oral at bedtime  vancomycin  IVPB 1000 milliGRAM(s) IV Intermittent every 24 hours    MEDICATIONS  (PRN):  acetaminophen     Tablet .. 975 milliGRAM(s) Oral every 8 hours PRN Mild Pain (1 - 3)  albuterol/ipratropium for Nebulization 3 milliLiter(s) Nebulizer every 6 hours PRN Shortness of Breath  dextrose Oral Gel 15 Gram(s) Oral once PRN Blood Glucose LESS THAN 70 milliGRAM(s)/deciliter    PRESENT SYMPTOMS: [ X]Unable to obtain due to poor mentation   Source if other than patient:  [ ]Family   [ ]Team   [ ] All review of systems negative including pain and dyspnea unless noted below    All components of pain assessment below addressed. Blank spaces indicate that the patient did/could not complete the assessment.  Pain: [ ]yes [ ]no  QOL impact -   Location -                    Aggravating factors -  Quality -  Radiation -  Timing-  Severity (0-10 scale):  Minimal acceptable level (0-10 scale):     CPOT:    https://www.sccm.org/getattachment/nka24r70-4d4f-4h1t-9j4d-9561p4684r2z/Critical-Care-Pain-Observation-Tool-(CPOT)    PAIN AD Score: 0  [email or web address]/painad.pdf (press ctrl +  left click to view)    Dyspnea:                           [ ]None[ ]Mild [ ]Moderate [ ]Severe     Respiratory Distress Observation Scale (RDOS): 0  A score of 0 to 2 signifies little or no respiratory distress, 3 signifies mild distress, scores 4 to 6 indicate moderate distress, and scores greater than 7 signify severe distress  [email or web address]/sites/default/files/[email or web address]-eilsezthxaq-skmrvqzc-accjyguvwtq-mvvxi.pdf    Anxiety:                             [ ]None[ ]Mild [ ]Moderate [ ]Severe   Fatigue:                             [ ]None[ ]Mild [ ]Moderate [ ]Severe   Nausea:                             [ ]None[ ]Mild [ ]Moderate [ ]Severe   Loss of appetite:              [ ]None[ ]Mild [ ]Moderate [ ]Severe   Constipation:                    [ ]None[ ]Mild [ ]Moderate [ ]Severe    Other Symptoms:      Palliative Performance Status Version 2:         %    http://[email or web address]/files/news/palliative_performance_scale_[email or web address].pdf    PHYSICAL EXAM:  Vital Signs Last 24 Hrs  T(C): 37.6 (10 Dec 2024 14:41), Max: 38.3 (09 Dec 2024 18:00)  T(F): 99.6 (10 Dec 2024 14:41), Max: 100.9 (09 Dec 2024 18:00)  HR: 80 (10 Dec 2024 14:41) (56 - 89)  BP: 85/57 (10 Dec 2024 14:41) (85/57 - 127/61)  BP(mean): --  RR: 18 (10 Dec 2024 14:41) (18 - 18)  SpO2: 97% (10 Dec 2024 14:41) (94% - 100%)    Parameters below as of 10 Dec 2024 08:40  Patient On (Oxygen Delivery Method): nasal cannula  O2 Flow (L/min): 4    GENERAL:  [X ] No acute distress [ ]Lethargic  [ ]Unarousable  [ ]Verbal  [ ]Non-Verbal [ ]Cachexia    BEHAVIORAL/PSYCH:  [ ]Alert and Oriented x  [ ] Anxiety [ ] Delirium [ ] Agitation [X ] Calm   EYES: [ ] No scleral icterus [ ] Scleral icterus [ ] Closed  ENMT:  [ ]Dry mouth  [ ]No external oral lesions [ X] No external ear or nose lesions  CARDIOVASCULAR:  [ ]Regular [ ]Irregular [ ]Tachy [ ]Not Tachy  [ ]William [ ] Edema [ ] No edema  PULMONARY:  [ ]Tachypnea  [ ]Audible excessive secretions [X ] No labored breathing [ ] labored breathing  GASTROINTESTINAL: [ ]Soft  [ ]Distended  [ X]Not distended [ ]Non tender [ ]Tender  MUSCULOSKELETAL: [ ]No clubbing [ ] clubbing  [ X] No cyanosis [ ] cyanosis  NEUROLOGIC: [ ]No focal deficits  [ ]Follows commands  [ ]Does not follow commands  [ ]Cognitive impairment  [ ]Dysphagia  [ ]Dysarthria  [ ]Paresis   SKIN: [ ] Jaundiced [X ] Non-jaundiced [ ]Rash [ ]No Rash [ ] Warm [ ] Dry  MISC/LINES: [ ] ET tube [ ] Trach [ ]NGT/OGT [ ]PEG [ ][place]    CRITICAL CARE:  [ ] Shock Present  [ ]Septic [ ]Cardiogenic [ ]Neurologic [ ]Hypovolemic  [ ]  Vasopressors [ ]  Inotropes   [ ]Respiratory failure present [ ]Mechanical ventilation [ ]Non-invasive ventilatory support [ ]High flow  [ ]Acute  [ ]Chronic [ ]Hypoxic  [ ]Hypercarbic [ ]Other  [ ]Other organ failure     LABS: reviewed by me, notable for: elevated WBC                                   7.1    15.15 )-----------( 421      ( 10 Dec 2024 08:30 )             23.7     12-10    138  |  106  |  28[H]  ----------------------------<  145[H]  4.9   |  21  |  1.3    Ca    7.9[L]      10 Dec 2024 08:30  Phos  3.0     12-09  Mg     1.9     12-09        RADIOLOGY & ADDITIONAL STUDIES: CXR personally reviewed by me: R opacity    PROTEIN CALORIE MALNUTRITION PRESENT: [ ]mild [ ]moderate [ ]severe [ ]underweight [ ]morbid obesity  https://www.andeal.org/vault/1270/web/files/ONC/Table_Clinical%20Characteristics%20to%20Document%20Malnutrition-White%20JV%20et%20al%202012.pdf    Height (cm): 172.7 (12-04-24 @ 17:17), 172.7 (10-28-24 @ 17:49), 172.7 (09-22-24 @ 20:06)  Weight (kg): 56.9 (12-04-24 @ 17:17), 60.9 (10-28-24 @ 17:49), 61.2 (09-19-24 @ 15:29)  BMI (kg/m2): 19.1 (12-04-24 @ 17:17), 20.4 (10-28-24 @ 17:49), 20.5 (09-22-24 @ 20:06)    [ ]PPSV2 < or = to 30% [ ]significant weight loss  [ ]poor nutritional intake  [ ]anasarca      [ ]Artificial Nutrition      Palliative Care Spiritual/Emotional Screening Tool Question  Severity (0-4):                    OR                    [X ] Unable to determine/NA  Score of 2 or greater indicates recommendation of Chaplaincy referral  Chaplaincy Referral: [ ] Yes [ ] Refused [ ] Following     Caregiver [place]:  [ ] Yes [ ] No    OR    [x ] Unable to determine. Will assess at later time if appropriate.  Social Work Referral [ ]  Patient and Family Centered Care Referral [ ]    Anticipatory Grief Present: [ ] Yes [ ] No    OR     [ x] Unable to determine. Will assess at later time if appropriate.  Social Work Referral [ ]  Patient and Family Centered Care Referral [ ]    REFERRALS:   [ ]Chaplaincy  [ ]Hospice  [ ]Child Life  [ ]Social Work  [ ]Case management [ ]Holistic Therapy     Palliative care education provided to patient and/or family    Goals of Care Document:     ______________  Josh Miller MD  Palliative Medicine  [facility]   of Geriatric and Palliative Medicine  [phone]

## 2024-12-10 NOTE — PROGRESS NOTE ADULT - SUBJECTIVE AND OBJECTIVE BOX
CLARI BURGER 84y MRN:[id number]  Hospital Day: 15d    Patient is a 84y old Male, with PMHx AVMs, CAD s/p stents x 2 (2007), asbestosis of lung, malignant mesothelioma, s/p VATS, HLD, DM, and JASMIN, arrived at the ED on 11/26 due to abnormal Hb (6.4). Pt did not have active bloody stools bedside and denied CP, Ab pain, N/V/D, dizziness, and UTI sxs. Prior EGD led to bleeding on contact but hemostasis was achieved. In the ED, Hb was 7.1, and CHINYERE positive for black stools in ED and (+) Ab screening. pRBC transfusion was deferred and pt was admitted to obtain further w/u, including iron studies to determine if iron supplementation will be needed and wad Dx'd with melena and anemia.     Pt was admitted with the primary diagnosis of melena.    INTERVAL HPI/OVERNIGHT EVENTS: Pt evaluated bedside today. Pt's temp was 100.9F last night and desaturated to the 80s, thus NC was increased to 4L by overnight nurses. RT did suction overnight. Pt's Sat increased to 100 on NC. Walden was renewed. Pt had worsening wheezing today and was somnolent. Pt was aroused via sternal rub. Today's PleurX drained 300cc, of dark red fluid. Pt woke up during the PleurX drainage. Heme/Onc evaluated pt yesterday and rec palliative GOC discussion with son and c/w supportive care since pt is not a candidate for aggressive Mn.    Medications:  acetaminophen     Tablet .. 975 milliGRAM(s) Oral every 8 hours PRN  albuterol/ipratropium for Nebulization 3 milliLiter(s) Nebulizer every 6 hours PRN  aspirin  chewable 81 milliGRAM(s) Oral daily  atorvastatin 20 milliGRAM(s) Oral at bedtime  chlorhexidine 2% Cloths 1 Application(s) Topical <User Schedule>  dextrose 5%. 1000 milliLiter(s) (50 mL/Hr) IV Continuous <Continuous>  dextrose 5%. 1000 milliLiter(s) (100 mL/Hr) IV Continuous <Continuous>  dextrose 50% Injectable 25 Gram(s) IV Push once  dextrose 50% Injectable 12.5 Gram(s) IV Push once  dextrose 50% Injectable 25 Gram(s) IV Push once  dextrose Oral Gel 15 Gram(s) Oral once PRN  ferrous    sulfate 325 milliGRAM(s) Oral daily  gabapentin 300 milliGRAM(s) Oral three times a day  glucagon  Injectable 1 milliGRAM(s) IntraMuscular once  insulin glargine Injectable (LANTUS) 5 Unit(s) SubCutaneous every morning  insulin lispro (ADMELOG) corrective regimen sliding scale   SubCutaneous three times a day before meals  lactated ringers. 1000 milliLiter(s) (50 mL/Hr) IV Continuous <Continuous>  melatonin 3 milliGRAM(s) Oral at bedtime  meropenem  IVPB 1000 milliGRAM(s) IV Intermittent every 12 hours  pantoprazole    Tablet 40 milliGRAM(s) Oral every 12 hours  polyethylene glycol 3350 17 Gram(s) Oral daily  senna 2 Tablet(s) Oral at bedtime  sodium bicarbonate 650 milliGRAM(s) Oral three times a day  tamsulosin 0.4 milliGRAM(s) Oral at bedtime  vancomycin  IVPB 1000 milliGRAM(s) IV Intermittent every 24 hours      REVIEW OF SYSTEMS:  CONSTITUTIONAL: No fever, chills  EYES: No eye pain, visual disturbances, or discharge  ENMT:  No difficulty hearing, tinnitus, vertigo; No sinus or throat pain  RESPIRATORY: +wheezing; +difficulty breathing  CARDIOVASCULAR: No chest pain, palpitations  GASTROINTESTINAL: No abdominal pain. No nausea, vomiting, or diarrhea  GENITOURINARY: No dysuria  NEUROLOGICAL: No HA  SKIN: No itching, burning, rashes, or lesions   ENDOCRINE: No heat or cold intolerance; No hair loss  PSYCHIATRIC: No depression, anxiety, mood swings, or difficulty sleeping    Vitals:  T(C): 36.4 (12-10-24 @ 04:29), Max: 38.3 (12-09-24 @ 18:00)  HR: 86 (12-10-24 @ 08:40) (49 - 89)  BP: 102/53 (12-10-24 @ 08:40) (98/54 - 127/61)  RR: 18 (12-10-24 @ 04:29) (18 - 18)  SpO2: 100% (12-10-24 @ 08:40) (94% - 100%)  Wt(kg): --Vital Signs Last 24 Hrs  T(C): 36.4 (10 Dec 2024 04:29), Max: 38.3 (09 Dec 2024 18:00)  T(F): 97.5 (10 Dec 2024 04:29), Max: 100.9 (09 Dec 2024 18:00)  HR: 86 (10 Dec 2024 08:40) (49 - 89)  BP: 102/53 (10 Dec 2024 08:40) (98/54 - 127/61)  BP(mean): --  RR: 18 (10 Dec 2024 04:29) (18 - 18)  SpO2: 100% (10 Dec 2024 08:40) (94% - 100%)    Parameters below as of 10 Dec 2024 08:40  Patient On (Oxygen Delivery Method): nasal cannula  O2 Flow (L/min): 4    PHYSICAL EXAM:  GENERAL: +somnolent, but arousable  HEAD:  Atraumatic, Normocephalic; +on 4L NC  EYES: EOMI, PERRLA, conjunctiva and sclera clear  CHEST/LUNG: +diffuse rales on lungs, B/L  HEART: Regular rate and rhythm; No murmurs, rubs, or gallops  ABDOMEN: Soft, Nontender, Nondistended; Bowel sounds present  NEURO: Alert & Oriented X3  EXTREMITIES: No LE edema, no calf tenderness; +cannot move LE, B/L    Consultant(s) Notes Reviewed:  [x ] YES  [ ] NO  Care Discussed with Consultants/Other Providers [ x] YES  [ ] NO    LABS:                            7.1[L]    15.15[H] )-------------( 421[H], stable      ( 10 Dec 2024 08:30 )                 23.7[L]     12-10    138  |  106  |  28[H]  ----------------------------<  145[H]  4.9   |  21  |  1.3    Ca    7.9[L] ---> corrected Ca 9.1     10 Dec 2024 08:30  Phos  3.0     12-09  Mg     1.9     12-09    TPro  5.6[L]  /  Alb  2.5[L]  /  TBili  0.2  /  DBili  x   /  AST  27  /  ALT  28  /  AlkPhos  190[H]  12-09    PT/INR - ( 09 Dec 2024 17:44 )   PT: 13.00 sec;   INR: 1.10 ratio      PTT - ( 09 Dec 2024 17:44 )  PTT:37.9 sec  Urinalysis Basic - ( 10 Dec 2024 08:30 )    Color: x / Appearance: x / SG: x / pH: x  Gluc: 145 mg/dL / Ketone: x  / Bili: x / Urobili: x   Blood: x / Protein: x / Nitrite: x   Leuk Esterase: x / RBC: x / WBC x   Sq Epi: x / Non Sq Epi: x / Bacteria: x      CAPILLARY BLOOD GLUCOSE      POCT Blood Glucose.: 173 mg/dL (10 Dec 2024 08:15)  POCT Blood Glucose.: 232 mg/dL (09 Dec 2024 21:40)  POCT Blood Glucose.: 122 mg/dL (09 Dec 2024 16:54)  POCT Blood Glucose.: 128 mg/dL (09 Dec 2024 11:53)      ABG - ( 10 Dec 2024 08:52 )  pH, Arterial: 7.42  pH, Blood: x     /  pCO2: 38    /  pO2: 158   / HCO3: 25    / Base Excess: 0.3   /  SaO2: 100.0     Urinalysis Basic - ( 10 Dec 2024 08:30 )    Color: x / Appearance: x / SG: x / pH: x  Gluc: 145 mg/dL / Ketone: x  / Bili: x / Urobili: x   Blood: x / Protein: x / Nitrite: x   Leuk Esterase: x / RBC: x / WBC x   Sq Epi: x / Non Sq Epi: x / Bacteria: x        RADIOLOGY & ADDITIONAL TESTS:    Imaging Personally Reviewed:  [X] YES  [ ] NO    CTA head, 12/10/24  read pending    CXR, 12/10/24  IMPRESSION:    Unchanged right sided opacities, better characterized on recent CTA chest.    UE LE Venous, 12/09/24, 5:24 PM  read pending    CT Angio 12/09/24, 1:21 PM  IMPRESSION:    1. Two small filling defects are seen within a segmental and subsegmental branch of the left lower lobe pulmonary artery. The filling defects appear thin and at adherent to the wall of the vessel indicating likely small chronic PEs. No other intraluminal filling defects are seen within the pulmonary arteries. There is no evidence of right heart strain (RV:LV ratio <1; RV=2.7 cm and LV = 5.0 cm)      2. A right-sided pleural drainage catheter (PleurX) is noted with a moderate reduction in the right-sided pleural effusion. However there is a new loculation in the right apex.    3. Compared with the previous scan of 7/31/2024, there has been increased right sided pleural thickening and increased consolidation in the right upper, middle and lower lobes.   CLARI BURGER 84y MRN:[id number]  Hospital Day: 15d    Patient is a 84y old Male, with PMHx AVMs, CAD s/p stents x 2 (2007), asbestosis of lung, malignant mesothelioma, s/p VATS, HLD, DM, and JASMIN, arrived at the ED on 11/26 due to abnormal Hb (6.4). Pt did not have active bloody stools bedside and denied CP, Ab pain, N/V/D, dizziness, and UTI sxs. Prior EGD led to bleeding on contact but hemostasis was achieved. In the ED, Hb was 7.1, and CHINYERE positive for black stools in ED and (+) Ab screening. pRBC transfusion was deferred and pt was admitted to obtain further w/u, including iron studies to determine if iron supplementation will be needed and wad Dx'd with melena and anemia.     Pt was admitted with the primary diagnosis of melena.    INTERVAL HPI/OVERNIGHT EVENTS: Pt evaluated bedside today. Pt's temp was 100.9F last night and desaturated to the 80s, thus NC was increased to 4L by overnight nurses. RT did suction overnight. Pt's Sat increased to 100 on NC. [place] was renewed. Pt had worsening wheezing today and was somnolent. Pt was aroused via sternal rub. Today's PleurX drained 300cc, of dark red fluid. Pt woke up during the PleurX drainage. IV glycopyrrolate 0.2mg was given to reduce secretions. Heme/Onc evaluated pt yesterday and rec palliative GOC discussion with son and c/w supportive care since pt is not a candidate for aggressive Mn.    Medications:  acetaminophen     Tablet .. 975 milliGRAM(s) Oral every 8 hours PRN  albuterol/ipratropium for Nebulization 3 milliLiter(s) Nebulizer every 6 hours PRN  aspirin  chewable 81 milliGRAM(s) Oral daily  atorvastatin 20 milliGRAM(s) Oral at bedtime  chlorhexidine 2% Cloths 1 Application(s) Topical <User Schedule>  dextrose 5%. 1000 milliLiter(s) (50 mL/Hr) IV Continuous <Continuous>  dextrose 5%. 1000 milliLiter(s) (100 mL/Hr) IV Continuous <Continuous>  dextrose 50% Injectable 25 Gram(s) IV Push once  dextrose 50% Injectable 12.5 Gram(s) IV Push once  dextrose 50% Injectable 25 Gram(s) IV Push once  dextrose Oral Gel 15 Gram(s) Oral once PRN  ferrous    sulfate 325 milliGRAM(s) Oral daily  gabapentin 300 milliGRAM(s) Oral three times a day  glucagon  Injectable 1 milliGRAM(s) IntraMuscular once  insulin glargine Injectable (LANTUS) 5 Unit(s) SubCutaneous every morning  insulin lispro (ADMELOG) corrective regimen sliding scale   SubCutaneous three times a day before meals  lactated ringers. 1000 milliLiter(s) (50 mL/Hr) IV Continuous <Continuous>  melatonin 3 milliGRAM(s) Oral at bedtime  meropenem  IVPB 1000 milliGRAM(s) IV Intermittent every 12 hours  pantoprazole    Tablet 40 milliGRAM(s) Oral every 12 hours  polyethylene glycol 3350 17 Gram(s) Oral daily  senna 2 Tablet(s) Oral at bedtime  sodium bicarbonate 650 milliGRAM(s) Oral three times a day  tamsulosin 0.4 milliGRAM(s) Oral at bedtime  vancomycin  IVPB 1000 milliGRAM(s) IV Intermittent every 24 hours      REVIEW OF SYSTEMS:  CONSTITUTIONAL: No fever, chills  EYES: No eye pain, visual disturbances, or discharge  ENMT:  No difficulty hearing, tinnitus, vertigo; No sinus or throat pain  RESPIRATORY: +wheezing; +difficulty breathing  CARDIOVASCULAR: No chest pain, palpitations  GASTROINTESTINAL: No abdominal pain. No nausea, vomiting, or diarrhea  GENITOURINARY: No dysuria  NEUROLOGICAL: No HA  SKIN: No itching, burning, rashes, or lesions   ENDOCRINE: No heat or cold intolerance; No hair loss  PSYCHIATRIC: No depression, anxiety, mood swings, or difficulty sleeping    Vitals:  T(C): 36.4 (12-10-24 @ 04:29), Max: 38.3 (12-09-24 @ 18:00)  HR: 86 (12-10-24 @ 08:40) (49 - 89)  BP: 102/53 (12-10-24 @ 08:40) (98/54 - 127/61)  RR: 18 (12-10-24 @ 04:29) (18 - 18)  SpO2: 100% (12-10-24 @ 08:40) (94% - 100%)  Wt(kg): --Vital Signs Last 24 Hrs  T(C): 36.4 (10 Dec 2024 04:29), Max: 38.3 (09 Dec 2024 18:00)  T(F): 97.5 (10 Dec 2024 04:29), Max: 100.9 (09 Dec 2024 18:00)  HR: 86 (10 Dec 2024 08:40) (49 - 89)  BP: 102/53 (10 Dec 2024 08:40) (98/54 - 127/61)  BP(mean): --  RR: 18 (10 Dec 2024 04:29) (18 - 18)  SpO2: 100% (10 Dec 2024 08:40) (94% - 100%)    Parameters below as of 10 Dec 2024 08:40  Patient On (Oxygen Delivery Method): nasal cannula  O2 Flow (L/min): 4    PHYSICAL EXAM:  GENERAL: +somnolent, but arousable  HEAD:  Atraumatic, Normocephalic; +on 4L NC  EYES: EOMI, PERRLA, conjunctiva and sclera clear  CHEST/LUNG: +diffuse rales on lungs, B/L  HEART: Regular rate and rhythm; No murmurs, rubs, or gallops  ABDOMEN: Soft, Nontender, Nondistended; Bowel sounds present  NEURO: Alert & Oriented X3  EXTREMITIES: No LE edema, no calf tenderness; +cannot move LE, B/L    Consultant(s) Notes Reviewed:  [x ] YES  [ ] NO  Care Discussed with Consultants/Other Providers [ x] YES  [ ] NO    LABS:                            7.1[L]    15.15[H] )-------------( 421[H], stable      ( 10 Dec 2024 08:30 )                 23.7[L]     12-10    138  |  106  |  28[H]  ----------------------------<  145[H]  4.9   |  21  |  1.3    Ca    7.9[L] ---> corrected Ca 9.1     10 Dec 2024 08:30  Phos  3.0     12-09  Mg     1.9     12-09    TPro  5.6[L]  /  Alb  2.5[L]  /  TBili  0.2  /  DBili  x   /  AST  27  /  ALT  28  /  AlkPhos  190[H]  12-09    PT/INR - ( 09 Dec 2024 17:44 )   PT: 13.00 sec;   INR: 1.10 ratio      PTT - ( 09 Dec 2024 17:44 )  PTT:37.9 sec  Urinalysis Basic - ( 10 Dec 2024 08:30 )    Color: x / Appearance: x / SG: x / pH: x  Gluc: 145 mg/dL / Ketone: x  / Bili: x / Urobili: x   Blood: x / Protein: x / Nitrite: x   Leuk Esterase: x / RBC: x / WBC x   Sq Epi: x / Non Sq Epi: x / Bacteria: x      CAPILLARY BLOOD GLUCOSE      POCT Blood Glucose.: 173 mg/dL (10 Dec 2024 08:15)  POCT Blood Glucose.: 232 mg/dL (09 Dec 2024 21:40)  POCT Blood Glucose.: 122 mg/dL (09 Dec 2024 16:54)  POCT Blood Glucose.: 128 mg/dL (09 Dec 2024 11:53)      ABG - ( 10 Dec 2024 08:52 )  pH, Arterial: 7.42  pH, Blood: x     /  pCO2: 38    /  pO2: 158   / HCO3: 25    / Base Excess: 0.3   /  SaO2: 100.0     Urinalysis Basic - ( 10 Dec 2024 08:30 )    Color: x / Appearance: x / SG: x / pH: x  Gluc: 145 mg/dL / Ketone: x  / Bili: x / Urobili: x   Blood: x / Protein: x / Nitrite: x   Leuk Esterase: x / RBC: x / WBC x   Sq Epi: x / Non Sq Epi: x / Bacteria: x        RADIOLOGY & ADDITIONAL TESTS:    Imaging Personally Reviewed:  [X] YES  [ ] NO    CTA head, 12/10/24  read pending    CXR, 12/10/24  IMPRESSION:    Unchanged right sided opacities, better characterized on recent CTA chest.    UE LE Venous, 12/09/24, 5:24 PM  read pending    CT Angio 12/09/24, 1:21 PM  IMPRESSION:    1. Two small filling defects are seen within a segmental and subsegmental branch of the left lower lobe pulmonary artery. The filling defects appear thin and at adherent to the wall of the vessel indicating likely small chronic PEs. No other intraluminal filling defects are seen within the pulmonary arteries. There is no evidence of right heart strain (RV:LV ratio <1; RV=2.7 cm and LV = 5.0 cm)      2. A right-sided pleural drainage catheter (PleurX) is noted with a moderate reduction in the right-sided pleural effusion. However there is a new loculation in the right apex.    3. Compared with the previous scan of 7/31/2024, there has been increased right sided pleural thickening and increased consolidation in the right upper, middle and lower lobes.

## 2024-12-10 NOTE — PROGRESS NOTE ADULT - ASSESSMENT
Assessment and Plan:   · Assessment	    Pt is a 84y male, with PMHx AVMs, CAD s/p stents x 2 (2007), asbestosis of lung, malignant mesothelioma, s/p VATS, HLD, DM, and JASMIN, arrived at the ED on 11/26 due to abnormal Hb (6.4). Pt did not have active bloody stools bedside and denied CP, Ab pain, N/V/D, dizziness, and UTI sxs. Prior EGD led to bleeding on contact but hemostasis was achieved. In the ED, Hb was 7.1, and CHINYERE positive for black stools in ED and (+) Ab screening. pRBC transfusion was deferred and pt was admitted to obtain further w/u, including iron studies to determine if iron supplementation will be needed and wad Dx'd with melena and anemia. Pt is currently admitted with the primary diagnosis of anemia and melena, likely 2/2 to upper GID.    · Plan  #Pulmonary Embolism (2 chronic emboli)  #Sepsis  - PT has 2 chronic emboli of segmental and sub-segmental branches of L lower lobe pulmonary artery, as per CT chest angio on 12/09/24. Patient will not tolerate anticoagulation due to AVMs in small intestine found of EGD.  With new infection in setting of mesothelioma, prognosis is guarded.  Discussed with patient's grand daughter at bedside, who provided updates to patient's son.   - Pt desaturated to 80s last night and was then put on 4L NC w/ improvement to 100% O2 sat.  - Pt was somnolent and hard to arouse. GCS = 8 points; Pt woke up during PleurX drainage and was aware enough to express he wants to remain DNR/DNI  - ABG stat ordered today. CXR and CT head done today.  - ID consult rec Levofloxacin and Flagyl  - CTA head, 12/10/24  ---read pending  CXR, 12/10/24  ---IMPRESSION:  Unchanged right sided opacities, better characterized on recent CTA chest.  UE LE Venous, 12/09/24, 5:24 PM  read pending  -ABG - ( 10 Dec 2024 08:52 )  ---pH, Arterial: 7.42  pH, Blood: x     /  pCO2: 38    /  pO2: 158   / HCO3: 25    / Base Excess: 0.3   /  SaO2: 100.0   - CBC trend x 2 daily  - CT surgery consult    - Labs 12/09/24, 07:14 - D-dimer elevated 2501, Hb  8.2[L], stable around bsl, WBC elevated 13.4; PLT elevated 441    #R sided pleural effusion  #malignant mesothelioma (diagnosed 8/2024)  #hx asbestosis of lung  - 300 cc of tea-colored fluid drained from Pleur X, pt tolerated well. Pt is on daily drainage schedule  - Heme/Onc evaluated pt yesterday and rec palliative GOC discussion with son and c/w supportive care since pt is not a candidate for aggressive Mn. Pt's son amenable to goals of care discussion  - pt's prognosis appears poor; assessed for severe protein malnutrition; nutrition rec c/w regular diet and Ensure    -Previous w/u:  CXR 12/02/24 9:33 AM: Unchanged large right-sided opacity.   CXR 12/02/24 11:20 AM: Unchanged large opacity projecting over the right lung. Hazy opacities in the left lung are less prominent. No pneumothorax.    #gluteal and sacral wound  - WBC 15.15 today  - wound consult on board    # Hx UGI bleed (upon admission)  # Acute on chronic anemia  # Hx Angioectasia of duodenum  - GI recs:  ---c/w ASA, d/c Plavix  ---monitor for recurrent bleeding (with transfusion PRN to keep Hb > 8)  ---c/w iron supplementation  ---upon d/c F/U with GI outpt or [facility]  ---F/U pathology results  ---start high fiber diet  ---repeat colonoscopy in 3 years  - EGD/colonscopy done during hospital course w/o complications showed  ---EGD: esophagus normal; erythema of stomach consistent with non-erosive gastritis; previous endoclip in 2nd part of duodenum; 4 small AVMs in proximal jejunum that bled on contact;  ---colonoscopy: 3 mm polyp of ascending colon; 3 polyps of 6-10 mm of ascending colon; 5 mmm polyp of transverse colon; melanosis coli    #DM Type 2  - POCT BS well-controlled mostly in 140-180 range  - currently on Lantus 5 and sliding scale to keep -180; c/w monitor BS and adjust insulin PRN  - Home med: Metformin 500 mg BID    #small hypodensity in segment 7 of the liver seen on prior CT  - pt refused RUQ US    #HLD  #CAD s/p stents x2 in 2007  - follows Dr Avila  - Holding home plavix, per chart review, Dr Chang patient needs Plavix or ASA not both   - patient tolerating ASA  - c/w statin     #Misc:  - DVT prophylaxis: SCDs  - GI ppx: protonix, senna, miralax  - Diet: DASH/TLC  - Activity: ambulate as tolerated; out of bed to chair  - Code status: Full Assessment and Plan:   · Assessment	    Pt is a 84y male, with PMHx AVMs, CAD s/p stents x 2 (2007), asbestosis of lung, malignant mesothelioma, s/p VATS, HLD, DM, and JASMIN, arrived at the ED on 11/26 due to abnormal Hb (6.4). Pt did not have active bloody stools bedside and denied CP, Ab pain, N/V/D, dizziness, and UTI sxs. Prior EGD led to bleeding on contact but hemostasis was achieved. In the ED, Hb was 7.1, and CHINYERE positive for black stools in ED and (+) Ab screening. pRBC transfusion was deferred and pt was admitted to obtain further w/u, including iron studies to determine if iron supplementation will be needed and wad Dx'd with melena and anemia. Pt is currently admitted with the primary diagnosis of anemia and melena, likely 2/2 to upper GID.    · Plan  #Pulmonary Embolism (2 chronic emboli)  #Sepsis  - PT has 2 chronic emboli of segmental and sub-segmental branches of L lower lobe pulmonary artery, as per CT chest angio on 12/09/24. Patient will not tolerate anticoagulation due to AVMs in small intestine found of EGD.  With new infection in setting of mesothelioma, prognosis is guarded.  Discussed with patient's grand daughter at bedside, who provided updates to patient's son.   - Pt desaturated to 80s last night and was then put on 4L NC w/ improvement to 100% O2 sat.  - Pt was somnolent and hard to arouse. GCS = 8 points; Pt woke up during PleurX drainage and was aware enough to express he wants to remain DNR/DNI  - ABG stat ordered today. CXR and CT head done today.  - ID consult rec Levofloxacin and Flagyl  - CTA head, 12/10/24  ---read pending  CXR, 12/10/24  ---IMPRESSION:  Unchanged right sided opacities, better characterized on recent CTA chest.  UE LE Venous, 12/09/24, 5:24 PM  read pending  -ABG - ( 10 Dec 2024 08:52 )  ---pH, Arterial: 7.42  pH, Blood: x     /  pCO2: 38    /  pO2: 158   / HCO3: 25    / Base Excess: 0.3   /  SaO2: 100.0   - CBC trend x 2 daily  - CT surgery consult    - Labs 12/09/24, 07:14 - D-dimer elevated 2501, Hb  8.2[L], stable around bsl, WBC elevated 13.4; PLT elevated 441    #R sided pleural effusion  #malignant mesothelioma (diagnosed 8/2024)  #hx asbestosis of lung  - 300 cc of tea-colored fluid drained from Pleur X, pt tolerated well. Pt is on daily drainage schedule  - IV glycopyrrolate 0.2mg was given to reduce secretions.   - Heme/Onc evaluated pt yesterday and rec palliative GOC discussion with son and c/w supportive care since pt is not a candidate for aggressive Mn. Pt's son amenable to goals of care discussion  - pt's prognosis appears poor; assessed for severe protein malnutrition; nutrition rec c/w regular diet and Ensure    -Previous w/u:  CXR 12/02/24 9:33 AM: Unchanged large right-sided opacity.   CXR 12/02/24 11:20 AM: Unchanged large opacity projecting over the right lung. Hazy opacities in the left lung are less prominent. No pneumothorax.    #gluteal and sacral wound  - WBC 15.15 today  - wound consult on board    # Hx UGI bleed (upon admission)  # Acute on chronic anemia  # Hx Angioectasia of duodenum  - GI recs:  ---c/w ASA, d/c Plavix  ---monitor for recurrent bleeding (with transfusion PRN to keep Hb > 8)  ---c/w iron supplementation  ---upon d/c F/U with GI outpt or [facility]  ---F/U pathology results  ---start high fiber diet  ---repeat colonoscopy in 3 years  - EGD/colonscopy done during hospital course w/o complications showed  ---EGD: esophagus normal; erythema of stomach consistent with non-erosive gastritis; previous endoclip in 2nd part of duodenum; 4 small AVMs in proximal jejunum that bled on contact;  ---colonoscopy: 3 mm polyp of ascending colon; 3 polyps of 6-10 mm of ascending colon; 5 mmm polyp of transverse colon; melanosis coli    #DM Type 2  - POCT BS well-controlled mostly in 140-180 range  - currently on Lantus 5 and sliding scale to keep -180; c/w monitor BS and adjust insulin PRN  - Home med: Metformin 500 mg BID    #small hypodensity in segment 7 of the liver seen on prior CT  - pt refused RUQ US    #HLD  #CAD s/p stents x2 in 2007  - follows Dr Avila  - Holding home plavix, per chart review, Dr Chang patient needs Plavix or ASA not both   - patient tolerating ASA  - c/w statin     #Misc:  - DVT prophylaxis: SCDs  - GI ppx: protonix, senna, miralax  - Diet: DASH/TLC  - Activity: ambulate as tolerated; out of bed to chair  - Code status: Full Assessment and Plan:   · Assessment	    Pt is a 84y male, with PMHx AVMs, CAD s/p stents x 2 (2007), asbestosis of lung, malignant mesothelioma, s/p VATS, HLD, DM, and JASMIN, arrived at the ED on 11/26 due to abnormal Hb (6.4). Pt did not have active bloody stools bedside and denied CP, Ab pain, N/V/D, dizziness, and UTI sxs. Prior EGD led to bleeding on contact but hemostasis was achieved. In the ED, Hb was 7.1, and CHINYERE positive for black stools in ED and (+) Ab screening. pRBC transfusion was deferred and pt was admitted to obtain further w/u, including iron studies to determine if iron supplementation will be needed and wad Dx'd with melena and anemia. Pt is currently admitted with the primary diagnosis of anemia and melena, likely 2/2 to upper GID.    · Plan  #Pulmonary Embolism (2 chronic emboli)  #Sepsis  - PT has 2 chronic emboli of segmental and sub-segmental branches of L lower lobe pulmonary artery, as per CT chest angio on 12/09/24. Patient will not tolerate anticoagulation due to AVMs in small intestine found of EGD.  With new infection in setting of mesothelioma, prognosis is guarded.  Discussed with patient's grand daughter at bedside, who provided updates to patient's son.   - Pt desaturated to 80s last night and was then put on 4L NC w/ improvement to 100% O2 sat.  - Pt was somnolent and hard to arouse. GCS = 8 points; Pt woke up during PleurX drainage and was aware enough to express he wants to remain DNR/DNI  - ABG stat ordered today. CXR and CT head done today.  - ID consult rec Meropenem and Vancomycin QD  - CTA head, 12/10/24  ---read pending  CXR, 12/10/24  ---IMPRESSION:  Unchanged right sided opacities, better characterized on recent CTA chest.  UE LE Venous, 12/09/24, 5:24 PM  read pending  -ABG - ( 10 Dec 2024 08:52 )  ---pH, Arterial: 7.42  pH, Blood: x     /  pCO2: 38    /  pO2: 158   / HCO3: 25    / Base Excess: 0.3   /  SaO2: 100.0   - CBC trend x 2 daily  - CT surgery consult    - Labs 12/09/24, 07:14 - D-dimer elevated 2501, Hb  8.2[L], stable around bsl, WBC elevated 13.4; PLT elevated 441    #R sided pleural effusion  #malignant mesothelioma (diagnosed 8/2024)  #hx asbestosis of lung  - 300 cc of tea-colored fluid drained from Pleur X, pt tolerated well. Pt is on daily drainage schedule  - ordered Cx of pleural fluid  - IV glycopyrrolate 0.2mg was given to reduce secretions.   - Heme/Onc evaluated pt yesterday and rec palliative GOC discussion with son and c/w supportive care since pt is not a candidate for aggressive Mn. Pt's son amenable to goals of care discussion  - pt's prognosis appears poor; assessed for severe protein malnutrition; nutrition rec c/w regular diet and Ensure    -Previous w/u:  CXR 12/02/24 9:33 AM: Unchanged large right-sided opacity.   CXR 12/02/24 11:20 AM: Unchanged large opacity projecting over the right lung. Hazy opacities in the left lung are less prominent. No pneumothorax.    #gluteal and sacral wound  - WBC 15.15 today  - wound consult on board    # Hx UGI bleed (upon admission)  # Acute on chronic anemia  # Hx Angioectasia of duodenum  - GI recs:  ---c/w ASA, d/c Plavix  ---monitor for recurrent bleeding (with transfusion PRN to keep Hb > 8)  ---c/w iron supplementation  ---upon d/c F/U with GI outpt or [facility]  ---F/U pathology results  ---start high fiber diet  ---repeat colonoscopy in 3 years  - EGD/colonscopy done during hospital course w/o complications showed  ---EGD: esophagus normal; erythema of stomach consistent with non-erosive gastritis; previous endoclip in 2nd part of duodenum; 4 small AVMs in proximal jejunum that bled on contact;  ---colonoscopy: 3 mm polyp of ascending colon; 3 polyps of 6-10 mm of ascending colon; 5 mmm polyp of transverse colon; melanosis coli    #DM Type 2  - POCT BS well-controlled mostly in 140-180 range  - currently on Lantus 5 and sliding scale to keep -180; c/w monitor BS and adjust insulin PRN  - Home med: Metformin 500 mg BID    #small hypodensity in segment 7 of the liver seen on prior CT  - pt refused RUQ US    #HLD  #CAD s/p stents x2 in 2007  - follows Dr Avila  - Holding home plavix, per chart review, Dr Chang patient needs Plavix or ASA not both   - patient tolerating ASA  - c/w statin     #Misc:  - DVT prophylaxis: SCDs  - GI ppx: protonix, senna, miralax  - Diet: DASH/TLC  - Activity: ambulate as tolerated; out of bed to chair  - Code status: Full

## 2024-12-11 NOTE — PROGRESS NOTE ADULT - SUBJECTIVE AND OBJECTIVE BOX
JENNYFERCLARI  84y, Male  Allergy: penicillin (Unknown)      LOS  15d    CHIEF COMPLAINT: Melena (11 Dec 2024 15:54)      INTERVAL EVENTS/HPI  - T(F): , Max: 98.6 (12-11-24 @ 14:53), dark stools   - WBC Count: 15.36 (12-11-24 @ 08:40)  WBC Count: 15.31 (12-10-24 @ 23:22)     - Creatinine: 1.2 (12-11-24 @ 08:40)  Creatinine: 1.3 (12-10-24 @ 08:30)     -   -   -     ROS  cannot obtain secondary to patient's sedation and/or mental status    VITALS:  T(F): 98.6, Max: 98.6 (12-11-24 @ 14:53)  HR: 98  BP: 100/59  RR: 18Vital Signs Last 24 Hrs  T(C): 37 (11 Dec 2024 14:53), Max: 37 (11 Dec 2024 14:53)  T(F): 98.6 (11 Dec 2024 14:53), Max: 98.6 (11 Dec 2024 14:53)  HR: 98 (11 Dec 2024 14:53) (78 - 98)  BP: 100/59 (11 Dec 2024 14:53) (100/59 - 111/66)  BP(mean): 78 (10 Dec 2024 19:59) (78 - 78)  RR: 18 (11 Dec 2024 14:53) (18 - 18)  SpO2: 96% (11 Dec 2024 05:25) (96% - 100%)        PHYSICAL EXAM:  Gen: chronically ill appearing   HEENT: Normocephalic, atraumatic  Neck: supple, no lymphadenopathy  CV: Regular rate & regular rhythm  Lungs: decreased BS at bases, no fremitus pleurx  Abdomen: Soft, BS present  Ext: Warm, well perfused  Neuro: non focal,   Skin: no rash, no erythema  Lines: no phlebitis     FH: Non-contributory  Social Hx: Non-contributory    TESTS & MEASUREMENTS:                        7.6    15.36 )-----------( 469      ( 11 Dec 2024 08:40 )             25.7     12-11    142  |  105  |  28[H]  ----------------------------<  106[H]  4.4   |  26  |  1.2    Ca    8.4      11 Dec 2024 08:40  Phos  4.0     12-11  Mg     1.8     12-11          Urinalysis Basic - ( 11 Dec 2024 08:40 )    Color: x / Appearance: x / SG: x / pH: x  Gluc: 106 mg/dL / Ketone: x  / Bili: x / Urobili: x   Blood: x / Protein: x / Nitrite: x   Leuk Esterase: x / RBC: x / WBC x   Sq Epi: x / Non Sq Epi: x / Bacteria: x        Culture - Body Fluid with Gram Stain (collected 12-10-24 @ 13:23)  Source: Pleural Fl  Gram Stain (12-11-24 @ 03:14):    polymorphonuclear leukocytes seen    No organisms seen    by cytocentrifuge    Culture - Urine (collected 12-09-24 @ 18:22)  Source: Catheterized Catheterized  Final Report (12-10-24 @ 23:21):    <10,000 CFU/mL Normal Urogenital Stefany    Culture - Blood (collected 12-09-24 @ 17:44)  Source: .Blood BLOOD  Preliminary Report (12-10-24 @ 23:01):    No growth at 24 hours    Culture - Blood (collected 12-04-24 @ 07:43)  Source: .Blood BLOOD  Final Report (12-09-24 @ 17:00):    No growth at 5 days    Culture - Blood (collected 12-01-24 @ 16:00)  Source: .Blood BLOOD  Gram Stain (12-02-24 @ 11:08):    Growth in aerobic and anaerobic bottles: Gram Negative Rods  Final Report (12-04-24 @ 09:46):    Growth in aerobic and anaerobic bottles: Proteus mirabilis ESBL    Direct identification is available within approximately 3-5    hours either by Blood Panel Multiplexed PCR or Direct    MALDI-TOF. Details: [email or web address]  Organism: Blood Culture PCR  Proteus mirabilis ESBL (12-04-24 @ 09:46)  Organism: Proteus mirabilis ESBL (12-04-24 @ 09:46)      Method Type: JOSE J      -  Ampicillin: R >16 These ampicillin results predict results for amoxicillin      -  Ampicillin/Sulbactam: R 8/4      -  Aztreonam: R <=4      -  Cefazolin: R >16      -  Cefepime: R >16      -  Ceftriaxone: R >32      -  Ciprofloxacin: R >2      -  Ertapenem: S <=0.5      -  Gentamicin: R 8      -  Levofloxacin: R >4      -  Meropenem: S <=1      -  Piperacillin/Tazobactam: R <=8      -  Tobramycin: I 4      -  Trimethoprim/Sulfamethoxazole: R >2/38  Organism: Blood Culture PCR (12-04-24 @ 09:46)      Method Type: PCR      -  Proteus species: Detec      -  ESBL: Detec      -  CTX-M Resistance Marker: Detec    Culture - Blood (collected 12-01-24 @ 16:00)  Source: .Blood BLOOD  Gram Stain (12-02-24 @ 11:40):    Growth in aerobic bottle: Gram Negative Rods    Growth in anaerobic bottle: Gram Negative Rods  Final Report (12-04-24 @ 09:47):    Growth in aerobic and anaerobic bottles: Proteus mirabilis ESBL    See previous culture [id number]    Culture - Urine (collected 12-01-24 @ 15:13)  Source: Catheterized Catheterized  Final Report (12-03-24 @ 18:28):    >100,000 CFU/ml Proteus mirabilis ESBL    Unable to evaluate further due to Proteus overgrowth  Organism: Proteus mirabilis ESBL (12-03-24 @ 18:28)  Organism: Proteus mirabilis ESBL (12-03-24 @ 18:28)      Method Type: JOSE J      -  Ampicillin: R >16 These ampicillin results predict results for amoxicillin      -  Ampicillin/Sulbactam: S 8/4      -  Aztreonam: R <=4      -  Cefazolin: R >16 For uncomplicated UTI with K. pneumoniae, E. coli, or P. mirablis: JOSE J <=16 is sensitive and JOSE J >=32 is resistant. This also predicts results for oral agents cefaclor, cefdinir, cefpodoxime, cefprozil, cefuroxime axetil, cephalexin and locarbef for uncomplicated UTI. Note that some isolates may be susceptible to these agents while testing resistant to cefazolin.      -  Cefepime: R 4      -  Ceftriaxone: R 8      -  Cefuroxime: R >16      -  Ciprofloxacin: R >2      -  Ertapenem: S <=0.5      -  Gentamicin: I 4      -  Levofloxacin: R >4      -  Meropenem: S <=1      -  Nitrofurantoin: R >64 Should not be used to treat pyelonephritis      -  Piperacillin/Tazobactam: S <=8      -  Tobramycin: S <=2      -  Trimethoprim/Sulfamethoxazole: R >2/38            INFECTIOUS DISEASES TESTING  Rapid RVP Result: NotDetec (12-09-24 @ 19:11)  MRSA PCR Result.: Negative (12-09-24 @ 15:50)  Procalcitonin: 1.59 (12-01-24 @ 16:00)      INFLAMMATORY MARKERS      RADIOLOGY & ADDITIONAL TESTS:  I have personally reviewed the last available Chest xray  CXR      CT  CT Angio Chest PE Protocol w/ IV Cont:   ACC: [id number] EXAM:  CT ANGIO CHEST PULM ART WAWIC   ORDERED BY: KARI LANCASTER     PROCEDURE DATE:  12/09/2024          INTERPRETATION:  CLINICAL INFORMATION: SOB. Chest pain.  Pulmonary   embolus evaluation. WBC 13.40  PMHx of HLD, DM, CAD s/p stents x2 in   2007, h/o asbestosis of lung, dx w malignant mesothelioma   8/2024 and s/p VATS pleurodesis, R sided PleurX, h/o AVM/GIB, JASMIN    COMPARISON: CT scan of the chest, abdomen and pelvis dated 7/31/2024    CONTRAST/COMPLICATIONS:  IV Contrast: Omnipaque 350  65 cc administered   35 cc discarded  Oral Contrast: None  Complications: None reported at time of study completion    PROCEDURE:  CT Angiography of the Chest.  Sagittal and coronal reformats were performed as well as 3D (MIP)   reconstructions.    FINDINGS:      TUBES AND LINES: A right-sided pleural drainage catheter (PleurX) is   noted.  LUNGS AND LARGE AIRWAYS: Patent central airways. No pulmonary nodules.   Compared with the previous scan of 7/31/2024, there has been increased   consolidation in the right upper, middle, and lower lobes. No   pneumothorax.  PLEURA: There is been a moderate reduction in the right-sided pleural   effusion. However there is a new loculation in the right apex.  Compared with the previous scan of 7/31/2024, there has been increased   pleural thickening. There is loss of volume in the right lung with shift   of mediastinal structures to the right.    VESSELS: Two small filling defects are seen within a segmental and   subsegmental branch of the left lower lobe pulmonary artery. The filling   defects appear thin and at adherent to the wall of the vessel indicating   likely small chronic PEs. No other intraluminal filling defects are seen   within the pulmonary arteries.  There is no evidence of right heart   strain (RV:LV ratio <1; RV=2.7 cm and LV = 5.0 cm)  Normal caliber aorta and pulmonary artery. Aortic calcifications are   noted. There is no evidence of aortic aneurysm or dissection. The   brachiocephalic vesselsare unremarkable.  HEART: Heart size is normal. No pericardial effusion. Coronary artery   calcifications are noted.  MEDIASTINUM AND RAYMOND: No lymphadenopathy. The thyroid is unremarkable.  CHEST WALL AND LOWER NECK: Within normal limits.  VISUALIZEDUPPER ABDOMEN: The partially visualized upper abdomen shows no   acute pathology.  BONES: Degenerative changes of the spine are noted.    IMPRESSION:    1. Two small filling defects are seen within a segmental and subsegmental   branch of the left lower lobe pulmonary artery. The filling defects   appear thin and at adherent to the wall of the vessel indicating likely   small chronic PEs. No other intraluminal filling defects are seen within   the pulmonary arteries.  There is no evidence of right heart strain   (RV:LV ratio <1; RV=2.7 cm and LV = 5.0 cm)      2. A right-sided pleural drainage catheter (PleurX) is noted with a   moderate reduction in the right-sided pleural effusion. However there is   a new loculation in the right apex.    3. Compared with the previous scan of 7/31/2024, there has been increased   right sided pleural thickening and increased consolidation in the right   upper, middle and lower  lobes.    --- End of Report ---            LISBETH ARZATE MD; Attending Interventional Radiologist  This document has been electronically signed. Dec  9 2024  6:03PM (12-09-24 @ 13:53)      CARDIOLOGY TESTING  12 Lead ECG:   Ventricular Rate 106 BPM    Atrial Rate 106 BPM    P-R Interval 120 ms    QRS Duration 90 ms    Q-T Interval 342 ms    QTC Calculation(Bazett) 454 ms    P Axis 45 degrees    R Axis 43 degrees    T Axis 172 degrees    Diagnosis Line Sinus tachycardia with Premature atrial complexes  Inferior infarct , age undetermined  Abnormal ECG    Confirmed by Cesar Hong ([id number]) on 12/9/2024 4:17:18 PM (12-08-24 @ 23:10)      MEDICATIONS  aspirin  chewable 81  atorvastatin 20  chlorhexidine 2% Cloths 1  dextrose 5%. 1000  dextrose 5%. 1000  dextrose 50% Injectable 25  dextrose 50% Injectable 12.5  dextrose 50% Injectable 25  ferrous    sulfate 325  gabapentin 300  glucagon  Injectable 1  insulin glargine Injectable (LANTUS) 5  insulin lispro (ADMELOG) corrective regimen sliding scale   lactated ringers. 1000  melatonin 3  meropenem  IVPB 1000  pantoprazole    Tablet 40  polyethylene glycol 3350 17  senna 2  sodium bicarbonate 650  tamsulosin 0.4      WEIGHT  Weight (kg): 56.9 (12-04-24 @ 17:17)  Creatinine: 1.2 mg/dL (12-11-24 @ 08:40)      ANTIBIOTICS:  meropenem  IVPB 1000 milliGRAM(s) IV Intermittent every 12 hours      All available historical records have been reviewed

## 2024-12-11 NOTE — PROGRESS NOTE ADULT - ATTENDING SUPERVISION STATEMENT
Fellow
Fellow
Resident
Resident
Resident/Student
Resident
Resident/Student
Fellow
Resident
Fellow
Resident/Student
Resident/Student

## 2024-12-11 NOTE — PROGRESS NOTE ADULT - SUBJECTIVE AND OBJECTIVE BOX
24H events:    Patient is a 84y old Male who presents with a chief complaint of Melena (10 Dec 2024 16:43)    Primary diagnosis of Anemia    PAST MEDICAL & SURGICAL HISTORY  DM (diabetes mellitus)  MI (myocardial infarction)  History of CAD (coronary artery disease)  Dyslipidemia  Currently smokes tobacco  Mesothelioma, malignant  Coronary stent patent    SOCIAL HISTORY:  Social History:    ALLERGIES:  penicillin (Unknown)    MEDICATIONS:  STANDING MEDICATIONS  aspirin  chewable 81 milliGRAM(s) Oral daily  atorvastatin 20 milliGRAM(s) Oral at bedtime  chlorhexidine 2% Cloths 1 Application(s) Topical <User Schedule>  dextrose 5%. 1000 milliLiter(s) IV Continuous <Continuous>  dextrose 5%. 1000 milliLiter(s) IV Continuous <Continuous>  dextrose 50% Injectable 25 Gram(s) IV Push once  dextrose 50% Injectable 12.5 Gram(s) IV Push once  dextrose 50% Injectable 25 Gram(s) IV Push once  ferrous    sulfate 325 milliGRAM(s) Oral daily  gabapentin 300 milliGRAM(s) Oral three times a day  glucagon  Injectable 1 milliGRAM(s) IntraMuscular once  insulin glargine Injectable (LANTUS) 5 Unit(s) SubCutaneous every morning  insulin lispro (ADMELOG) corrective regimen sliding scale   SubCutaneous three times a day before meals  lactated ringers. 1000 milliLiter(s) IV Continuous <Continuous>  melatonin 3 milliGRAM(s) Oral at bedtime  meropenem  IVPB 1000 milliGRAM(s) IV Intermittent every 12 hours  pantoprazole    Tablet 40 milliGRAM(s) Oral every 12 hours  polyethylene glycol 3350 17 Gram(s) Oral daily  senna 2 Tablet(s) Oral at bedtime  sodium bicarbonate 650 milliGRAM(s) Oral three times a day  tamsulosin 0.4 milliGRAM(s) Oral at bedtime    PRN MEDICATIONS  acetaminophen     Tablet .. 975 milliGRAM(s) Oral every 8 hours PRN  albuterol/ipratropium for Nebulization 3 milliLiter(s) Nebulizer every 6 hours PRN  dextrose Oral Gel 15 Gram(s) Oral once PRN    VITALS:   T(F): 97.1  HR: 88  BP: 111/66  RR: 18  SpO2: 96%    PHYSICAL EXAM:  GENERAL:   ( x ) NAD, lying in bed comfortably     (  ) obtunded     (  ) lethargic     (  ) somnolent    HEAD:   ( x ) Atraumatic     (  ) hematoma     (  ) laceration (specify location:       )     NECK:  (x ) Supple     (  ) neck stiffness     (  ) nuchal rigidity     (  )  no JVD     (  ) JVD present ( -- cm)    HEART:  Rate -->     (  x) normal rate     (  ) bradycardic     (  ) tachycardic  Rhythm -->     ( x ) regular     (  ) regularly irregular     (  ) irregularly irregular  Murmurs -->     (  ) normal s1s2     (  ) systolic murmur     (  ) diastolic murmur     (  ) continuous murmur      (  ) S3 present     (  ) S4 present    LUNGS:   ( x )Unlabored respirations     (  ) tachypnea  ( x ) B/L air entry     (  ) decreased breath sounds in:  (location     )    (  ) no adventitious sound     (  ) crackles     (  ) wheezing      (  ) rhonchi      (specify location:       )  (  ) chest wall tenderness (specify location:       )    ABDOMEN:   ( x ) Soft     (  ) tense   |   (  ) nondistended     (  ) distended   |   (  ) +BS     (  ) hypoactive bowel sounds     (  ) hyperactive bowel sounds  (  ) nontender     (  ) RUQ tenderness     (  ) RLQ tenderness     (  ) LLQ tenderness     (  ) epigastric tenderness     (  ) diffuse tenderness  (  ) Splenomegaly      (  ) Hepatomegaly      (  ) Jaundice     (  ) ecchymosis     EXTREMITIES:  (x  ) Normal     (  ) Rash     (  ) ecchymosis     (  ) varicose veins      (  ) pitting edema     (  ) non-pitting edema   (  ) ulceration     (  ) gangrene:     (location:     )    NERVOUS SYSTEM:    (  ) A&Ox3     (  ) confused     (  ) lethargic  CN II-XII:     (  ) Intact     (  ) deficits found     (Specify:     )   Upper extremities:     (  ) no sensorimotor deficits     (  ) weakness     (  ) loss of proprioception/vibration     (  ) loss of touch/temperature (specify:    )  Lower extremities:     (  ) no sensorimotor deficits     (  ) weakness     (  ) loss of proprioception/vibration     (  ) loss of touch/temperature (specify:    )    SKIN:   ( x ) No rashes or lesions     (  ) maculopapular rash     (  ) pustules     (  ) vesicles     (  ) ulcer     (  ) ecchymosis     (specify location:     LABS:                        7.6    15.36 )-----------( 469      ( 11 Dec 2024 08:40 )             25.7     12-11    142  |  105  |  28[H]  ----------------------------<  106[H]  4.4   |  26  |  1.2    Ca    8.4      11 Dec 2024 08:40  Phos  4.0     12-11  Mg     1.8     12-11    PT/INR - ( 09 Dec 2024 17:44 )   PT: 13.00 sec;   INR: 1.10 ratio      PTT - ( 09 Dec 2024 17:44 )  PTT:37.9 sec  Urinalysis Basic - ( 11 Dec 2024 08:40 )    Color: x / Appearance: x / SG: x / pH: x  Gluc: 106 mg/dL / Ketone: x  / Bili: x / Urobili: x   Blood: x / Protein: x / Nitrite: x   Leuk Esterase: x / RBC: x / WBC x   Sq Epi: x / Non Sq Epi: x / Bacteria: x    ABG - ( 10 Dec 2024 08:52 )  pH, Arterial: 7.42  pH, Blood: x     /  pCO2: 38    /  pO2: 158   / HCO3: 25    / Base Excess: 0.3   /  SaO2: 100.0     Culture - Body Fluid with Gram Stain (collected 10 Dec 2024 13:23)  Source: Pleural Fl  Gram Stain (11 Dec 2024 03:14):    polymorphonuclear leukocytes seen    No organisms seen    by cytocentrifuge    Culture - Urine (collected 09 Dec 2024 18:22)  Source: Catheterized Catheterized  Final Report (10 Dec 2024 23:21):    <10,000 CFU/mL Normal Urogenital Stefany    Culture - Blood (collected 09 Dec 2024 17:44)  Source: .Blood BLOOD  Preliminary Report (10 Dec 2024 23:01):    No growth at 24 hours

## 2024-12-11 NOTE — PROGRESS NOTE ADULT - ATTENDING COMMENTS
Pt is a 84y male, with PMHx AVMs, CAD s/p stents x 2 (2007), asbestosis of lung, malignant mesothelioma, s/p VATS, HLD, DM, and JASMIN, arrived at the ED on 11/26 due to abnormal Hb (6.4). Pt did not have active bloody stools bedside and denied CP, Ab pain, N/V/D, dizziness, and UTI sxs. Prior EGD led to bleeding on contact but hemostasis was achieved.    JASMIN due to AVM malformation, discussed with patient's son today.  Will continue iron supplementation.  One additional dose of venofer given.  Plan to discharge on aspirin 81mg daily alone, hold clopidogrel as risks outweigh benefits.  Discussed with patient's son regarding this today, who was in agreement with holding antiplatelet medications.  Plan to place midline tomorrow.  Discharge planning tomorrow for placement to facility to complete antibiotic therapy.  PT consulted.
Pt seen and examined. He is admitted with melena and was found to have hemoglobin of 6.5 at nursing home and 6.6>5.4 here. He received 1 unit prbc transfusion with repeat 8.2. D/w GI who said due to stable hgb and negative CHINYERE do not recommended endoscopic eval at this time. Will switch plavix to asa 81mg on discharge (per last admission notes this was OK'd by cardiology). Monitor Hgb today, advance diet and if Hgb is stable likely d/c tomorrow. D/w son John over the phone.
Pt seen and examined. Pt is somnolent but arousable. He denies complaints. He is on meropenem and vancomycin for UTI/sepsis. D/c vanco as blood cx and MRSA nares negative. Yesterday GOC discussion had with pt and family and code was changed to DNR/DNI.  Transfuse 1 unit prbc today for goal Hgb 8. Daily  PleurX drainage. Per Heme/Onc, pt is not a candidate for any aggressive [place] from hem/onc stand point given his overall poor PS. Will need to cont GOC with pt and family as overall prognosis is poor.
agree w/ above - pt s/p EGD/enteroscopy yesterday and his JASMIN is likely due to SB AVMs. iron supplementation. no further intervention. can f/u w/ primary GI Dr. Katz after d/c. Rest of recommendations per the note above.     Time-based billing (NON-critical care).   50 minutes spent on total encounter; more than 50% of the visit was spent counseling and / or coordinating care by the attending physician.  The necessity of the time spent during the encounter on this date of service was due to: Coordination of care.
Agree with the above.  Patient with recurrent admission for symptomatic anemia, plan for EGD and colonoscopy tomorrow.  Prior evaluation by cardiology and pulmonary teams noted.  Rest of recommendations per the note above.    Time-based billing (NON-critical care).   50 minutes spent on total encounter; more than 50% of the visit was spent counseling and / or coordinating care by the attending physician.  The necessity of the time spent during the encounter on this date of service was due to: Coordination of care.
I edited the note.  Time-based billing (NON-critical care).   50 minutes spent on total encounter; more than 50% of the visit was spent counseling and / or coordinating care by the attending physician.  The necessity of the time spent during the encounter on this date of service was due to: Coordination of care.
No overt bleeding. HGb stable. will continue to monitor.
84y male, with PMHx AVMs, CAD s/p stents x 2 (2007), asbestosis of lung, malignant mesothelioma, s/p VATS, HLD, DM, and JASMIN, arrived at the ED on 11/26 due to abnormal Hb (6.4).     Discussed updates today with patient's son by phone prior to his EGD and colonoscopy.    #Upper GI bleed  Gastroenterology consulted  EGD noted AVMs in proximal jejunum  f/u gastric biopsies to r/o H pylori  Colonoscopy:  Polyp (3 mm) in the ascending colon. (Polypectomy).  3 Polyps (6 mm to 10 mm) in the ascending colon. (Polypectomy).  Polyp (5 mm) in the transverse colon. (Polypectomy).  Despite history of CAD, could consider discontinuing aspirin in this 84 year old gentleman with hx of mesothelioma.  Can discuss with GI/cardiology.    #sepsis with ESBL Proteus bacteremia  Infectious disease consulted  Continue meropenem
Pt seen and examined. He is somnolent but arousable, said he wanted to sleep. D/w GI, plan for EGD and colonoscopy on monday. D/w son at bedside, he is in agreement. Plan for Sutab prep on sunday (son brought it in). Also, labs notable for leukocytosis and JEY. Urine appears to have lots of sediment with possible yuniel pus. D/w ID will replace laws and obtain new urine UA and culture.  Once urine cx obtained may need to start empiric iv atbx
Pt is a 84y male, with PMHx AVMs, CAD s/p stents x 2 (2007), asbestosis of lung, malignant mesothelioma, s/p VATS, HLD, DM, and JASMIN, arrived at the ED on 11/26 due to abnormal Hb (6.4).    PleurX catheter drained today with 800cc out.  Midline placed for antibiotic therapy.  Called and provided updates to patient's son, answered questions to his satisfaction.  Patient is medically ready for discharge to subacute rehab, pending placement.  Will need to complete meropenem regimen with EOT 12/8.
84y male, with PMHx AVMs, CAD s/p stents x 2 (2007), asbestosis of lung, malignant mesothelioma, s/p VATS, HLD, DM, and JASMIN, arrived at the ED on 11/26 due to abnormal Hb (6.4).    Patient appears to be decompensating, prognosis guarded.  Continued goals of care discussion with patient and patient's son today.  Code status changed to DNR/DNI.  Palliative care following.  Introduced the idea of hospice.  Cultures of pleural fluid sent.  Remove laws catheter tomorrow for trial of voiding.  Continue vancomycin and meropenem.  Given that he has CAD, would transfuse PRBC for Hgb < 8.  Type and screen ordered with repeat CBC tonight, please order transfusion if Hgb remains less than 8.  Patient has small chronic PE x2, but cannot tolerate anticoagulation due to multiple AVMs found on EGD.  Continue wound care for deep tissue injury.
Pt is a 84y male, with PMHx AVMs, CAD s/p stents x 2 (2007), asbestosis of lung, malignant mesothelioma, s/p VATS, HLD, DM, and JASMIN, arrived at the ED on 11/26 due to abnormal Hb (6.4).    Patient's WBC trending up to 15.44.  Blood pressure soft at 99/61, HR down at 49.  Afebrile, though on exam patient shivering.  He is also having difficulty controlling oral secretions and gurgling while breathing.  CTA chest shows new loculation in right apex.  Patient has recently found gluteal deep tissue injury.    Concerned for infection in pleural space given patient has chronic PleurX catheter.  Will discuss with ID tomorrow.  CT surgery consulted for increased bloody output when PleurX catheter drained.  Palliative care consulted.    Stat labs including blood cultures sent.  Start vancomycin.  Extend meropenem 1g Q12H for now.  Get repeat UA and urine culture given chronic laws catheter.    CTA chest shows small chronic PE.  Patient will not tolerate anticoagulation due to AVMs in small intestine found of EGD.  With new infection in setting of mesothelioma, prognosis is guarded.  Discussed with patient's grand daughter at bedside, who provided updates to patient's son.

## 2024-12-11 NOTE — PROGRESS NOTE ADULT - SUBJECTIVE AND OBJECTIVE BOX
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 Patient has been declining during hospital stay, palliative consulted for GOC.    INTERVAL EVENTS  12/11: patient appears comfortable    ADVANCE DIRECTIVES:    [ ] Full Code [ X] DNR  MOLST  [ ]  Living Will  [ ]   DECISION MAKER(s):  [ ] Health Care Proxy(s)  [ ] Surrogate(s)  [ ] Guardian           Name(s): Phone Number(s): bryson Lopez [phone]    BASELINE (I)ADL(s) (prior to admission):  [place]: [ ]Total  [ ] Moderate [ ]Dependent  Palliative Performance Status Version 2:         %    http://npcrc.org/files/news/palliative_performance_scale_ppsv2.pdf    Allergies    penicillin (Unknown)    Intolerances    MEDICATIONS  (STANDING):  aspirin  chewable 81 milliGRAM(s) Oral daily  atorvastatin 20 milliGRAM(s) Oral at bedtime  chlorhexidine 2% Cloths 1 Application(s) Topical <User Schedule>  dextrose 5%. 1000 milliLiter(s) (100 mL/Hr) IV Continuous <Continuous>  dextrose 5%. 1000 milliLiter(s) (50 mL/Hr) IV Continuous <Continuous>  dextrose 50% Injectable 25 Gram(s) IV Push once  dextrose 50% Injectable 12.5 Gram(s) IV Push once  dextrose 50% Injectable 25 Gram(s) IV Push once  ferrous    sulfate 325 milliGRAM(s) Oral daily  gabapentin 300 milliGRAM(s) Oral three times a day  glucagon  Injectable 1 milliGRAM(s) IntraMuscular once  insulin glargine Injectable (LANTUS) 5 Unit(s) SubCutaneous every morning  insulin lispro (ADMELOG) corrective regimen sliding scale   SubCutaneous three times a day before meals  lactated ringers. 1000 milliLiter(s) (50 mL/Hr) IV Continuous <Continuous>  melatonin 3 milliGRAM(s) Oral at bedtime  meropenem  IVPB 1000 milliGRAM(s) IV Intermittent every 12 hours  pantoprazole    Tablet 40 milliGRAM(s) Oral every 12 hours  polyethylene glycol 3350 17 Gram(s) Oral daily  senna 2 Tablet(s) Oral at bedtime  sodium bicarbonate 650 milliGRAM(s) Oral three times a day  tamsulosin 0.4 milliGRAM(s) Oral at bedtime    MEDICATIONS  (PRN):  acetaminophen     Tablet .. 975 milliGRAM(s) Oral every 8 hours PRN Mild Pain (1 - 3)  albuterol/ipratropium for Nebulization 3 milliLiter(s) Nebulizer every 6 hours PRN Shortness of Breath  dextrose Oral Gel 15 Gram(s) Oral once PRN Blood Glucose LESS THAN 70 milliGRAM(s)/deciliter      PRESENT SYMPTOMS: [ X]Unable to obtain due to poor mentation   Source if other than patient:  [ ]Family   [ ]Team   [ ] All review of systems negative including pain and dyspnea unless noted below    All components of pain assessment below addressed. Blank spaces indicate that the patient did/could not complete the assessment.  Pain: [ ]yes [ ]no  QOL impact -   Location -                    Aggravating factors -  Quality -  Radiation -  Timing-  Severity (0-10 scale):  Minimal acceptable level (0-10 scale):     CPOT:    https://www.[facility].org/getattachment/nsh20r26-3u8v-5u1o-5m3g-0292i0592u0c/Critical-Care-Pain-Observation-Tool-(CPOT)    PAIN AD Score: 0  http://geriatrictoolkit.[place].[email or web address]/cog/painad.pdf (press ctrl +  left click to view)    Dyspnea:                           [ ]None[ ]Mild [ ]Moderate [ ]Severe     Respiratory Distress Observation Scale (RDOS): 1  A score of 0 to 2 signifies little or no respiratory distress, 3 signifies mild distress, scores 4 to 6 indicate moderate distress, and scores greater than 7 signify severe distress  https://[email or web address]/sites/default/files/PDFS/627469-sgvjmvkevqu-qsksqdck-vwtbjulbiom-eudix.pdf    Anxiety:                             [ ]None[ ]Mild [ ]Moderate [ ]Severe   Fatigue:                             [ ]None[ ]Mild [ ]Moderate [ ]Severe   Nausea:                             [ ]None[ ]Mild [ ]Moderate [ ]Severe   Loss of appetite:              [ ]None[ ]Mild [ ]Moderate [ ]Severe   Constipation:                    [ ]None[ ]Mild [ ]Moderate [ ]Severe    Other Symptoms:      Palliative Performance Status Version 2:         %    http://npcrc.org/files/news/palliative_performance_scale_ppsv2.pdf    PHYSICAL EXAM:  \Vital Signs Last 24 Hrs  T(C): 37 (11 Dec 2024 14:53), Max: 37 (11 Dec 2024 14:53)  T(F): 98.6 (11 Dec 2024 14:53), Max: 98.6 (11 Dec 2024 14:53)  HR: 98 (11 Dec 2024 14:53) (78 - 98)  BP: 100/59 (11 Dec 2024 14:53) (100/59 - 111/66)  BP(mean): 78 (10 Dec 2024 19:59) (78 - 78)  RR: 18 (11 Dec 2024 14:53) (18 - 18)  SpO2: 96% (11 Dec 2024 05:25) (96% - 100%)        GENERAL:  [X ] No acute distress [ ]Lethargic  [ ]Unarousable  [ ]Verbal  [ ]Non-Verbal [ ]Cachexia    BEHAVIORAL/PSYCH:  [ ]Alert and Oriented x  [ ] Anxiety [ ] Delirium [ ] Agitation [X ] Calm   EYES: [ ] No scleral icterus [ ] Scleral icterus [ ] Closed  ENMT:  [ ]Dry mouth  [ ]No external oral lesions [ X] No external ear or nose lesions  CARDIOVASCULAR:  [ ]Regular [ ]Irregular [ ]Tachy [ ]Not Tachy  [ ]William [ ] Edema [ ] No edema  PULMONARY:  [ ]Tachypnea  [ ]Audible excessive secretions [X ] No labored breathing [ ] labored breathing  GASTROINTESTINAL: [ ]Soft  [ ]Distended  [ X]Not distended [ ]Non tender [ ]Tender  MUSCULOSKELETAL: [ ]No clubbing [ ] clubbing  [ X] No cyanosis [ ] cyanosis  NEUROLOGIC: [ ]No focal deficits  [ ]Follows commands  [ ]Does not follow commands  [ ]Cognitive impairment  [ ]Dysphagia  [ ]Dysarthria  [ ]Paresis   SKIN: [ ] Jaundiced [X ] Non-jaundiced [ ]Rash [ ]No Rash [ ] Warm [ ] Dry  MISC/LINES: [ ] ET tube [ ] Trach [ ]NGT/OGT [ ]PEG [ ][place]    CRITICAL CARE:  [ ] Shock Present  [ ]Septic [ ]Cardiogenic [ ]Neurologic [ ]Hypovolemic  [ ]  Vasopressors [ ]  Inotropes   [ ]Respiratory failure present [ ]Mechanical ventilation [ ]Non-invasive ventilatory support [ ]High flow  [ ]Acute  [ ]Chronic [ ]Hypoxic  [ ]Hypercarbic [ ]Other  [ ]Other organ failure     LABS: reviewed by me, notable for: elevated WBC                                   7.6    15.36 )-----------( 469      ( 11 Dec 2024 08:40 )             25.7     12-11    142  |  105  |  28[H]  ----------------------------<  106[H]  4.4   |  26  |  1.2    Ca    8.4      11 Dec 2024 08:40  Phos  4.0     12-11  Mg     1.8     12-11        RADIOLOGY & ADDITIONAL STUDIES: CXR personally reviewed by me: R opacity    PROTEIN CALORIE MALNUTRITION PRESENT: [ ]mild [ ]moderate [ ]severe [ ]underweight [ ]morbid obesity  https://www.andeal.org/vault/2440/web/files/ONC/Table_Clinical%20Characteristics%20to%20Document%20Malnutrition-White%20JV%20et%20al%202012.pdf    Height (cm): 172.7 (12-04-24 @ 17:17), 172.7 (10-28-24 @ 17:49), 172.7 (09-22-24 @ 20:06)  Weight (kg): 56.9 (12-04-24 @ 17:17), 60.9 (10-28-24 @ 17:49), 61.2 (09-19-24 @ 15:29)  BMI (kg/m2): 19.1 (12-04-24 @ 17:17), 20.4 (10-28-24 @ 17:49), 20.5 (09-22-24 @ 20:06)    [ ]PPSV2 < or = to 30% [ ]significant weight loss  [ ]poor nutritional intake  [ ]anasarca      [ ]Artificial Nutrition      Palliative Care Spiritual/Emotional Screening Tool Question  Severity (0-4):                    OR                    [X ] Unable to determine/NA  Score of 2 or greater indicates recommendation of Chaplaincy referral  Chaplaincy Referral: [ ] Yes [ ] Refused [ ] Following     Caregiver [place]:  [ ] Yes [ ] No    OR    [x ] Unable to determine. Will assess at later time if appropriate.  Social Work Referral [ ]  Patient and Family Centered Care Referral [ ]    Anticipatory Grief Present: [ ] Yes [ ] No    OR     [ x] Unable to determine. Will assess at later time if appropriate.  Social Work Referral [ ]  Patient and Family Centered Care Referral [ ]    REFERRALS:   [ ]Chaplaincy  [ ]Hospice  [ ]Child Life  [ ]Social Work  [ ]Case management [ ]Holistic Therapy     Palliative care education provided to patient and/or family    Goals of Care Document:     ______________  Josh Miller MD  Palliative Medicine  [facility]   of Geriatric and Palliative Medicine  [phone]

## 2024-12-11 NOTE — PROGRESS NOTE ADULT - ASSESSMENT
Pt is a 84y male, with PMHx AVMs, CAD s/p stents x 2 (2007), asbestosis of lung, malignant mesothelioma, s/p VATS, HLD, DM, and JASMIN, arrived at the ED on 11/26 due to abnormal Hb (6.4). Pt did not have active bloody stools bedside and denied CP, Ab pain, N/V/D, dizziness, and UTI sxs. Prior EGD led to bleeding on contact but hemostasis was achieved. In the ED, Hb was 7.1, and CHINYERE positive for black stools in ED and (+) Ab screening. pRBC transfusion was deferred and pt was admitted to obtain further w/u, including iron studies to determine if iron supplementation will be needed and wad Dx'd with melena and anemia. Pt is currently admitted with the primary diagnosis of anemia and melena, likely 2/2 to upper GID.    #Pulmonary Embolism  #Sepsis  - PT has 2 chronic emboli of segmental and sub-segmental branches of L lower lobe pulmonary artery, as per CT chest angio on 12/09/24. Patient will not tolerate anticoagulation due to AVMs in small intestine found of EGD.  With new infection in setting of mesothelioma, prognosis is guarded.  Discussed with patient's grand daughter at bedside, who provided updates to patient's son.   - Pt desaturated to 80s last night and was then put on 4L NC w/ improvement to 100% O2 sat.  - Pt was somnolent and hard to arouse. GCS = 8 points; Pt woke up during PleurX drainage and was aware enough to express he wants to remain DNR/DNI  - ABG stat ordered today. CXR and CT head done today.  - ID consult rec Meropenem  CXR, 12/10/24  ---IMPRESSION:  Unchanged right sided opacities, better characterized on recent CTA chest.  UE LE Venous, 12/09/24, 5:24 PM  read pending  -ABG - ( 10 Dec 2024 08:52 )  --pH, Arterial: 7.42  pH, Blood: x     /  pCO2: 38    /  pO2: 158   / HCO3: 25    / Base Excess: 0.3   /  SaO2: 100.0   - CBC trend x 2 daily    #R sided pleural effusion  #malignant mesothelioma (diagnosed 8/2024)  #hx asbestosis of lung  - 300 cc of tea-colored fluid drained from Pleur X, pt tolerated well. Pt is on daily drainage schedule  - ordered Cx of pleural fluid - NTD  - IV glycopyrrolate 0.2mg was given to reduce secretions.   - Heme/Onc evaluated pt yesterday and rec palliative GOC discussion with son and c/w supportive care since pt is not a candidate for aggressive Mn. Pt's son amenable to goals of care discussion  - pt's prognosis appears poor; assessed for severe protein malnutrition; nutrition rec c/w regular diet and Ensure    -Previous w/u:  CXR 12/02/24 9:33 AM: Unchanged large right-sided opacity.   CXR 12/02/24 11:20 AM: Unchanged large opacity projecting over the right lung. Hazy opacities in the left lung are less prominent. No pneumothorax.    #gluteal and sacral wound  - wound consult on board    # Hx UGI bleed (upon admission)  # Acute on chronic anemia  # Hx Angioectasia of duodenum  - GI recs:  ---c/w ASA, d/c Plavix  ---monitor for recurrent bleeding (with transfusion PRN to keep Hb > 8)  ---c/w iron supplementation  ---upon d/c F/U with GI outpt or [facility]  ---F/U pathology results  ---start high fiber diet  ---repeat colonoscopy in 3 years  - EGD/colonscopy done during hospital course w/o complications showed  ---EGD: esophagus normal; erythema of stomach consistent with non-erosive gastritis; previous endoclip in 2nd part of duodenum; 4 small AVMs in proximal jejunum that bled on contact;  ---colonoscopy: 3 mm polyp of ascending colon; 3 polyps of 6-10 mm of ascending colon; 5 mmm polyp of transverse colon; melanosis coli  - transfusing 1prbc today    #DM Type 2  - POCT BS well-controlled mostly in 140-180 range  - currently on Lantus 5 and sliding scale to keep -180; c/w monitor BS and adjust insulin PRN  - Home med: Metformin 500 mg BID    #small hypodensity in segment 7 of the liver seen on prior CT  - pt refused RUQ US    #HLD  #CAD s/p stents x2 in 2007  - follows Dr Avila  - Holding home plavix, per chart review, Dr Zghieb patient needs Plavix or ASA not both   - patient tolerating ASA  - c/w statin     #Misc:  - DVT prophylaxis: SCDs  - GI ppx: protonix, senna, miralax  - Diet: DASH/TLC  - Activity: ambulate as tolerated; out of bed to chair  - Code status: DNR/DNI

## 2024-12-11 NOTE — PROGRESS NOTE ADULT - ASSESSMENT
ASSESSMENT  This is a 84 year old male with PMH of HLD, DM, CAD s/p stents x2 in 2007, h/o asbestosis of lung, dx w malignant mesothelioma ~8/2024 and s/p VATS pleurodesis, R sided PleurX, h/o AVM/GIB, JASMIN, referred from NH for anemia    ASSESSMENT  #Fever & Leukocytosis   Tm 12/9 100.9    12/9 Blood & Urine cxs NGTD     Pleural cx NG, not consistent with infection    12/9 UA     MRSA PCR Result.: Negative (12-09-24 @ 15:50)    12/9 CTA 1. Two small filling defects are seen within a segmental and subsegmental branch of the left lower lobe pulmonary artery. The filling defects appear thin and at adherent to the wall of the vessel indicating likely small chronic PEs. No other intraluminal filling defects are seen within the pulmonary arteries.  There is no evidence of right heart strain (RV:LV ratio <1; RV=2.7 cm and LV = 5.0 cm)  2. A right-sided pleural drainage catheter (PleurX) is noted with a moderate reduction in the right-sided pleural effusion. However there is a new loculation in the right apex.  3. Compared with the previous scan of 7/31/2024, there has been increased right sided pleural thickening and increased consolidation in the right upper, middle and lower  lobes.  #Proteus species ESBL bacteremia, suspect  source, s/p meropenem 12/2-12/9  CAUTI     UA pyuria 323; 12/1 UCX   >100,000 CFU/ml Proteus mirabilis    12/1 BCX +  R fluoro/bactrim  #Chronic Walden? From urinary retention in 7/2024.  < from: US Kidney and Bladder (11.29.24 @ 18:35) >  1.  Multiple right renal cysts, measuring up to 0.9 cm, similar to previous.  2.  Mild to moderate right hydronephrosis, previously reported as fullness.  3.  Mild to moderate left hydronephrosis, reported to be mild previously.  4.  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  #JEY / CKD  Creatinine: 1.3 mg/dL (12.10.24 @ 08:30) 41 mL/min  Creatinine clearance for normal weight patient, using ideal body weight of 68 kg (149 lbs).      RECOMMENDATIONS   - If remains stable overnight, D/C meropenem 12/12  - D/C Vanc   - Grave prognosis, GOC    If any questions, please send a message or call on [employer] Teams  Please continue to update ID with any pertinent new laboratory or radiographic findings.

## 2024-12-11 NOTE — PROGRESS NOTE ADULT - ASSESSMENT
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 Patient has been declining during hospital stay, palliative consulted for GOC.    - c/w IV abx including IV meropenem  - see GOC note 12/10/24  - monitor H/H  - will follow  - DNR/DNI, continue other medical care for now  - appears comfortable  - will follow    ______________  Josh Miller MD  Palliative Medicine  [facility]   of Geriatric and Palliative Medicine  [phone]

## 2024-12-12 NOTE — PROGRESS NOTE ADULT - ASSESSMENT
Pt is a 84y male, with PMHx AVMs, CAD s/p stents x 2 (2007), asbestosis of lung, malignant mesothelioma, s/p VATS, HLD, DM, and JASMIN, arrived at the ED on 11/26 due to abnormal Hb (6.4). Pt did not have active bloody stools bedside and denied CP, Ab pain, N/V/D, dizziness, and UTI sxs. Prior EGD led to bleeding on contact but hemostasis was achieved. In the ED, Hb was 7.1, and CHINYERE positive for black stools in ED and (+) Ab screening. pRBC transfusion was deferred and pt was admitted to obtain further w/u, including iron studies to determine if iron supplementation will be needed and wad Dx'd with melena and anemia. Pt is currently admitted with the primary diagnosis of anemia and melena, likely 2/2 to upper GID.    #Pulmonary Embolism  #Sepsis  - PT has 2 chronic emboli of segmental and sub-segmental branches of L lower lobe pulmonary artery, as per CT chest angio on 12/09/24. Patient will not tolerate anticoagulation due to AVMs in small intestine found of EGD.  With new infection in setting of mesothelioma, prognosis is guarded.  Discussed with patient's grand daughter at bedside, who provided updates to patient's son.   - Pt desaturated to 80% 12/9/24 and was then put on 4L NC w/ improvement to 100% O2 sat.  - Pt was somnolent and hard to arouse. GCS = 8 points; Pt woke up during PleurX drainage and was aware enough to express he wants to remain DNR/DNI  - ID consult rec Meropenem  CXR, 12/10/24  ---IMPRESSION:  Unchanged right sided opacities, better characterized on recent CTA chest.  UE LE Venous, 12/09/24, 5:24 PM  read pending  -ABG - ( 10 Dec 2024 08:52 )  --pH, Arterial: 7.42  pH, Blood: x     /  pCO2: 38    /  pO2: 158   / HCO3: 25    / Base Excess: 0.3   /  SaO2: 100.0   - CBC trend x 2 daily    #R sided pleural effusion  #malignant mesothelioma (diagnosed 8/2024)  #hx asbestosis of lung  - 300 cc of tea-colored fluid drained from Pleur X, pt tolerated well. Pt is on daily drainage schedule  - ordered Cx of pleural fluid - NTD  - IV glycopyrrolate 0.2mg was given to reduce secretions.   - Heme/Onc evaluated pt yesterday and rec palliative GOC discussion with son and c/w supportive care since pt is not a candidate for aggressive Mn. Pt's son amenable to goals of care discussion  - pt's prognosis appears poor; assessed for severe protein malnutrition; nutrition rec c/w regular diet and Ensure    -Previous w/u:  CXR 12/02/24 9:33 AM: Unchanged large right-sided opacity.   CXR 12/02/24 11:20 AM: Unchanged large opacity projecting over the right lung. Hazy opacities in the left lung are less prominent. No pneumothorax.    #gluteal and sacral wound  - wound consult on board    # Hx UGI bleed (upon admission)  # Acute on chronic anemia  # Hx Angioectasia of duodenum  - GI recs:  ---c/w ASA, d/c Plavix  ---monitor for recurrent bleeding (with transfusion PRN to keep Hb > 8)  ---c/w iron supplementation  ---upon d/c F/U with GI outpt or [facility]  ---F/U pathology results  ---start high fiber diet  ---repeat colonoscopy in 3 years  - EGD/colonscopy done during hospital course w/o complications showed  ---EGD: esophagus normal; erythema of stomach consistent with non-erosive gastritis; previous endoclip in 2nd part of duodenum; 4 small AVMs in proximal jejunum that bled on contact;  ---colonoscopy: 3 mm polyp of ascending colon; 3 polyps of 6-10 mm of ascending colon; 5 mmm polyp of transverse colon; melanosis coli    #DM Type 2  - POCT BS well-controlled mostly in 140-180 range  - currently on Lantus 5 and sliding scale to keep -180; c/w monitor BS and adjust insulin PRN  - Home med: Metformin 500 mg BID    #small hypodensity in segment 7 of the liver seen on prior CT  - pt refused RUQ US    #HLD  #CAD s/p stents x2 in 2007  - follows Dr Avila  - Holding home plavix, per chart review, Dr Chang patient needs Plavix or ASA not both   - patient tolerating ASA  - c/w statin     # Sepsis, ESBL Proteus bacteremia, sepsis resolved  Infectious disease consulted  Meropenem end of therapy 12/8    #Misc:  - DVT prophylaxis: SCDs  - GI ppx: protonix, senna, miralax  - Diet: DASH/TLC  - Activity: ambulate as tolerated; out of bed to chair  - Code status: DNR/DNI  Pt is a 84y male, with PMHx AVMs, CAD s/p stents x 2 (2007), asbestosis of lung, malignant mesothelioma, s/p VATS, HLD, DM, and JASMIN, arrived at the ED on 11/26 due to abnormal Hb (6.4). Pt did not have active bloody stools bedside and denied CP, Ab pain, N/V/D, dizziness, and UTI sxs. Prior EGD led to bleeding on contact but hemostasis was achieved. In the ED, Hb was 7.1, and CHINYERE positive for black stools in ED and (+) Ab screening. pRBC transfusion was deferred and pt was admitted to obtain further w/u, including iron studies to determine if iron supplementation will be needed and wad Dx'd with melena and anemia. Pt is currently admitted with the primary diagnosis of anemia and melena, likely 2/2 to upper GID.    #Pulmonary Embolism  #Sepsis  - PT has 2 chronic emboli of segmental and sub-segmental branches of L lower lobe pulmonary artery, as per CT chest angio on 12/09/24. Patient will not tolerate anticoagulation due to AVMs in small intestine found of EGD.  With new infection in setting of mesothelioma, prognosis is guarded.  Discussed with patient's grand daughter at bedside, who provided updates to patient's son.   - Pt desaturated to 80% 12/9/24 and was then put on 4L NC w/ improvement to 100% O2 sat.  - Pt was somnolent and hard to arouse. GCS = 8 points; Pt woke up during PleurX drainage and was aware enough to express he wants to remain DNR/DNI  - ID consult rec Meropenem  CXR, 12/10/24  ---IMPRESSION:  Unchanged right sided opacities, better characterized on recent CTA chest.  UE LE Venous, 12/09/24, 5:24 PM  read pending  -ABG - ( 10 Dec 2024 08:52 )  --pH, Arterial: 7.42  pH, Blood: x     /  pCO2: 38    /  pO2: 158   / HCO3: 25    / Base Excess: 0.3   /  SaO2: 100.0   - CBC trend x 2 daily    #R sided pleural effusion  #malignant mesothelioma (diagnosed 8/2024)  #hx asbestosis of lung  - 300 cc of tea-colored fluid drained from Pleur X, pt tolerated well. Pt is on daily drainage schedule  - ordered Cx of pleural fluid - NTD  - IV glycopyrrolate 0.2mg was given to reduce secretions.   - Heme/Onc evaluated pt yesterday and rec palliative GOC discussion with son and c/w supportive care since pt is not a candidate for aggressive Mn. Pt's son amenable to goals of care discussion  - pt's prognosis appears poor; assessed for severe protein malnutrition; nutrition rec c/w regular diet and Ensure    -Previous w/u:  CXR 12/02/24 9:33 AM: Unchanged large right-sided opacity.   CXR 12/02/24 11:20 AM: Unchanged large opacity projecting over the right lung. Hazy opacities in the left lung are less prominent. No pneumothorax.    #gluteal and sacral wound  - wound consult on board    # Hx UGI bleed (upon admission)  # Acute on chronic anemia  # Hx Angioectasia of duodenum  - GI recs:  ---c/w ASA, d/c Plavix  ---monitor for recurrent bleeding (with transfusion PRN to keep Hb > 8)  ---c/w iron supplementation  ---upon d/c F/U with GI outpt or [facility]  ---F/U pathology results  ---start high fiber diet  ---repeat colonoscopy in 3 years  - EGD/colonscopy done during hospital course w/o complications showed  ---EGD: esophagus normal; erythema of stomach consistent with non-erosive gastritis; previous endoclip in 2nd part of duodenum; 4 small AVMs in proximal jejunum that bled on contact;  ---colonoscopy: 3 mm polyp of ascending colon; 3 polyps of 6-10 mm of ascending colon; 5 mmm polyp of transverse colon; melanosis coli    #DM Type 2  - POCT BS well-controlled mostly in 140-180 range  - currently on Lantus 5 and sliding scale to keep -180; c/w monitor BS and adjust insulin PRN  - Home med: Metformin 500 mg BID    #small hypodensity in segment 7 of the liver seen on prior CT  - pt refused RUQ US    #HLD  #CAD s/p stents x2 in 2007  - follows Dr Avila  - Holding home plavix, per chart review, Dr Chang patient needs Plavix or ASA not both   - patient tolerating ASA  - c/w statin     # Sepsis, ESBL Proteus bacteremia, sepsis resolved  Infectious disease consulted  Meropenem end of therapy 12/8    #Misc:  - DVT prophylaxis: SCDs  - GI ppx: protonix, senna, miralax  - Diet: DASH/TLC  - Activity: ambulate as tolerated; out of bed to chair  - Code status: DNR/DNI   - Dispo: Extensive discussion with pt's son John and grand-daughter Meaghan today regarding discharge. Pt had bed at [facility] but family refused discharge. They state he will decline in [facility] but they also refuse hospice care (pt is hospice appropriate). D/w Palliative care team who d/w family who are now amenable to hospice at facility. Referral to hospice made by .

## 2024-12-12 NOTE — PROGRESS NOTE ADULT - SUBJECTIVE AND OBJECTIVE BOX
Patient is a 84y old  Male who presents with a chief complaint of Melena (12 Dec 2024 15:48)       Pt is seen and examined  pt is awake and lying in bed          ROS:  Negative except for:weakness    MEDICATIONS  (STANDING):  aspirin  chewable 81 milliGRAM(s) Oral daily  atorvastatin 20 milliGRAM(s) Oral at bedtime  chlorhexidine 2% Cloths 1 Application(s) Topical <User Schedule>  dextrose 5%. 1000 milliLiter(s) (100 mL/Hr) IV Continuous <Continuous>  dextrose 5%. 1000 milliLiter(s) (50 mL/Hr) IV Continuous <Continuous>  dextrose 50% Injectable 25 Gram(s) IV Push once  dextrose 50% Injectable 12.5 Gram(s) IV Push once  dextrose 50% Injectable 25 Gram(s) IV Push once  ferrous    sulfate 325 milliGRAM(s) Oral daily  gabapentin 300 milliGRAM(s) Oral three times a day  glucagon  Injectable 1 milliGRAM(s) IntraMuscular once  insulin glargine Injectable (LANTUS) 5 Unit(s) SubCutaneous every morning  insulin lispro (ADMELOG) corrective regimen sliding scale   SubCutaneous three times a day before meals  lactated ringers. 1000 milliLiter(s) (50 mL/Hr) IV Continuous <Continuous>  melatonin 3 milliGRAM(s) Oral at bedtime  pantoprazole    Tablet 40 milliGRAM(s) Oral every 12 hours  polyethylene glycol 3350 17 Gram(s) Oral daily  scopolamine 1 mG/72 Hr(s) Patch 1 Patch Transdermal every 72 hours  senna 2 Tablet(s) Oral at bedtime  tamsulosin 0.4 milliGRAM(s) Oral at bedtime    MEDICATIONS  (PRN):  acetaminophen     Tablet .. 975 milliGRAM(s) Oral every 8 hours PRN Mild Pain (1 - 3)  albuterol/ipratropium for Nebulization 3 milliLiter(s) Nebulizer every 6 hours PRN Shortness of Breath  dextrose Oral Gel 15 Gram(s) Oral once PRN Blood Glucose LESS THAN 70 milliGRAM(s)/deciliter  sodium chloride 3%  Inhalation 4 milliLiter(s) Inhalation every 12 hours PRN secretions      Allergies    penicillin (Unknown)    Intolerances        Vital Signs Last 24 Hrs  T(C): 37.2 (12 Dec 2024 14:43), Max: 37.2 (12 Dec 2024 14:43)  T(F): 98.9 (12 Dec 2024 14:43), Max: 98.9 (12 Dec 2024 14:43)  HR: 98 (12 Dec 2024 14:43) (75 - 107)  BP: 104/62 (12 Dec 2024 14:43) (104/62 - 116/69)  BP(mean): --  RR: 18 (12 Dec 2024 14:43) (18 - 18)  SpO2: 96% (12 Dec 2024 14:43) (95% - 96%)        PHYSICAL EXAM  General: cachectic  HEENT: clear oropharynx, anicteric sclera, pink conjunctiva  Neck: supple  CV: normal S1/S2 with no murmur rubs or gallops  Lungs: positive air movement b/l ant lungs  Abdomen: soft non-tender non-distended, no hepatosplenomegaly  Ext: no clubbing cyanosis or edema  Skin: no rashes and no petechiae  Neuro: alert and oriented X 4, no focal deficits  LABS:                          8.9    14.62 )-----------( 432      ( 12 Dec 2024 07:27 )             29.2         Mean Cell Volume : 95.7 fL  Mean Cell Hemoglobin : 29.2 pg  Mean Cell Hemoglobin Concentration : 30.5 g/dL  Auto Neutrophil # : x  Auto Lymphocyte # : x  Auto Monocyte # : x  Auto Eosinophil # : x  Auto Basophil # : x  Auto Neutrophil % : x  Auto Lymphocyte % : x  Auto Monocyte % : x  Auto Eosinophil % : x  Auto Basophil % : x    Serial CBC's  12-12 @ 07:27  Hct-29.2 / Hgb-8.9 / Plat-432 / RBC-3.05 / WBC-14.62          Serial CBC's  12-11 @ 22:26  Hct-27.9 / Hgb-8.4 / Plat-412 / RBC-2.93 / WBC-16.63          Serial CBC's  12-11 @ 08:40  Hct-25.7 / Hgb-7.6 / Plat-469 / RBC-2.60 / WBC-15.36          Serial CBC's  12-10 @ 23:22  Hct-23.6 / Hgb-7.2 / Plat-401 / RBC-2.43 / WBC-15.31          Serial CBC's  12-10 @ 08:30  Hct-23.7 / Hgb-7.1 / Plat-421 / RBC-2.41 / WBC-15.15            12-12    139  |  103  |  36[H]  ----------------------------<  197[H]  4.5   |  24  |  1.4    Ca    8.5      12 Dec 2024 07:27  Phos  4.0     12-11  Mg     1.8     12-11            WBC Count: 14.62 K/uL (12-12-24 @ 07:27)  Hemoglobin: 8.9 g/dL (12-12-24 @ 07:27)  Hematocrit: 29.2 % (12-12-24 @ 07:27)  Platelet Count - Automated: 432 K/uL (12-12-24 @ 07:27)  WBC Count: 16.63 K/uL (12-11-24 @ 22:26)  Hemoglobin: 8.4 g/dL (12-11-24 @ 22:26)  Hematocrit: 27.9 % (12-11-24 @ 22:26)  Platelet Count - Automated: 412 K/uL (12-11-24 @ 22:26)  WBC Count: 15.36 K/uL (12-11-24 @ 08:40)  Hemoglobin: 7.6 g/dL (12-11-24 @ 08:40)  Hematocrit: 25.7 % (12-11-24 @ 08:40)  Platelet Count - Automated: 469 K/uL (12-11-24 @ 08:40)  WBC Count: 15.31 K/uL (12-10-24 @ 23:22)  Hemoglobin: 7.2 g/dL (12-10-24 @ 23:22)  Hematocrit: 23.6 % (12-10-24 @ 23:22)  Platelet Count - Automated: 401 K/uL (12-10-24 @ 23:22)  WBC Count: 15.15 K/uL (12-10-24 @ 08:30)  Hemoglobin: 7.1 g/dL (12-10-24 @ 08:30)  Hematocrit: 23.7 % (12-10-24 @ 08:30)  Platelet Count - Automated: 421 K/uL (12-10-24 @ 08:30)  WBC Count: 15.44 K/uL (12-09-24 @ 17:44)  Hemoglobin: 8.5 g/dL (12-09-24 @ 17:44)  Hematocrit: 28.0 % (12-09-24 @ 17:44)  Platelet Count - Automated: 480 K/uL (12-09-24 @ 17:44)  WBC Count: 13.40 K/uL (12-09-24 @ 07:14)  Hemoglobin: 8.2 g/dL (12-09-24 @ 07:14)  Hematocrit: 27.0 % (12-09-24 @ 07:14)  Platelet Count - Automated: 441 K/uL (12-09-24 @ 07:14)  WBC Count: 9.51 K/uL (12-07-24 @ 08:00)  Hemoglobin: 7.8 g/dL (12-07-24 @ 08:00)  Hematocrit: 25.9 % (12-07-24 @ 08:00)  Platelet Count - Automated: 348 K/uL (12-07-24 @ 08:00)  WBC Count: 6.07 K/uL (12-06-24 @ 07:47)  Hemoglobin: 7.3 g/dL (12-06-24 @ 07:47)  Hematocrit: 23.8 % (12-06-24 @ 07:47)  Platelet Count - Automated: 271 K/uL (12-06-24 @ 07:47)  WBC Count: 6.60 K/uL (12-05-24 @ 04:30)  Hemoglobin: 7.9 g/dL (12-05-24 @ 04:30)  Hematocrit: 25.5 % (12-05-24 @ 04:30)  Platelet Count - Automated: 265 K/uL (12-05-24 @ 04:30)  WBC Count: 8.06 K/uL (12-04-24 @ 07:43)  Hemoglobin: 7.9 g/dL (12-04-24 @ 07:43)  Hematocrit: 25.2 % (12-04-24 @ 07:43)  Platelet Count - Automated: 240 K/uL (12-04-24 @ 07:43)  WBC Count: 8.77 K/uL (12-03-24 @ 22:51)  Hemoglobin: 7.9 g/dL (12-03-24 @ 22:51)  Hematocrit: 25.2 % (12-03-24 @ 22:51)  Platelet Count - Automated: 226 K/uL (12-03-24 @ 22:51)  WBC Count: 5.65 K/uL (12-03-24 @ 06:51)  Hemoglobin: 7.7 g/dL (12-03-24 @ 06:51)  Hematocrit: 25.0 % (12-03-24 @ 06:51)  Platelet Count - Automated: 224 K/uL (12-03-24 @ 06:51)                BLOOD SMEAR INTERPRETATION:       RADIOLOGY & ADDITIONAL STUDIES:

## 2024-12-12 NOTE — PROGRESS NOTE ADULT - ASSESSMENT
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 Patient has been declining during hospital stay, palliative consulted for GOC.     Patient comfortable on exam. Spoke at length with family about their goals which are to keep the patient comfortable. Hospice referral to be sent. Spoke with case management, hospice, and primary team. Will Fu with family once they speak with hospice care.     Education about palliative care provided to patient/family.  See Recs below.    Please call [phone] with questions or concerns 24/7.   We will continue to follow.

## 2024-12-12 NOTE — PROGRESS NOTE ADULT - SUBJECTIVE AND OBJECTIVE BOX
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 Patient has been declining during hospital stay, palliative consulted for GOC.    INTERVAL EVENTS  12/11: patient appears comfortable  12/12: patient's only complaint is that he wants soda but declined the diet soda at his bedside. reports some pain in his legs but could not elaborate. no family at bedside.     ADVANCE DIRECTIVES:    [ ] Full Code [ X] DNR/DNI  MOLST  [ ]  Living Will  [ ]   DECISION MAKER(s):   [ ] Health Care Proxy(s)  [ ] Surrogate(s)  [ ] Guardian           Name(s): Phone Number(s): bryson Lopez [phone]    BASELINE (I)ADL(s) (prior to admission):  Riley: [ ]Total  [ ] Moderate [ ]Dependent  Palliative Performance Status Version 2:         %    http://npcrc.org/files/news/palliative_performance_scale_ppsv2.pdf    Allergies    penicillin (Unknown)    Intolerances    MEDICATIONS  (STANDING):  aspirin  chewable 81 milliGRAM(s) Oral daily  atorvastatin 20 milliGRAM(s) Oral at bedtime  chlorhexidine 2% Cloths 1 Application(s) Topical <User Schedule>  dextrose 5%. 1000 milliLiter(s) (100 mL/Hr) IV Continuous <Continuous>  dextrose 5%. 1000 milliLiter(s) (50 mL/Hr) IV Continuous <Continuous>  dextrose 50% Injectable 25 Gram(s) IV Push once  dextrose 50% Injectable 12.5 Gram(s) IV Push once  dextrose 50% Injectable 25 Gram(s) IV Push once  ferrous    sulfate 325 milliGRAM(s) Oral daily  gabapentin 300 milliGRAM(s) Oral three times a day  glucagon  Injectable 1 milliGRAM(s) IntraMuscular once  insulin glargine Injectable (LANTUS) 5 Unit(s) SubCutaneous every morning  insulin lispro (ADMELOG) corrective regimen sliding scale   SubCutaneous three times a day before meals  lactated ringers. 1000 milliLiter(s) (50 mL/Hr) IV Continuous <Continuous>  melatonin 3 milliGRAM(s) Oral at bedtime  meropenem  IVPB 1000 milliGRAM(s) IV Intermittent every 12 hours  pantoprazole    Tablet 40 milliGRAM(s) Oral every 12 hours  polyethylene glycol 3350 17 Gram(s) Oral daily  senna 2 Tablet(s) Oral at bedtime  sodium bicarbonate 650 milliGRAM(s) Oral three times a day  tamsulosin 0.4 milliGRAM(s) Oral at bedtime    MEDICATIONS  (PRN):  acetaminophen     Tablet .. 975 milliGRAM(s) Oral every 8 hours PRN Mild Pain (1 - 3)  albuterol/ipratropium for Nebulization 3 milliLiter(s) Nebulizer every 6 hours PRN Shortness of Breath  dextrose Oral Gel 15 Gram(s) Oral once PRN Blood Glucose LESS THAN 70 milliGRAM(s)/deciliter      PRESENT SYMPTOMS: [ ]Unable to obtain due to poor mentation   Source if other than patient:  [ ]Family   [ ]Team   [ X] All review of systems negative including pain and dyspnea unless noted below    All components of pain assessment below addressed. Blank spaces indicate that the patient did/could not complete the assessment.  Pain: [ X]yes [ ]no  QOL impact - mild  Location -             legs        Aggravating factors -   Quality -  Radiation -  Timing-  Severity (0-10 scale):  Minimal acceptable level (0-10 scale):     CPOT:    https://[email or web address]/getattachment/[email or web address]/Critical-Care-Pain-Observation-Tool-(CPOT)    PAIN AD Score:   [email or web address]/painad.pdf (press ctrl +  left click to view)    Dyspnea:                           [ X]None[ ]Mild [ ]Moderate [ ]Severe     Respiratory Distress Observation Scale (RDOS):   A score of 0 to 2 signifies little or no respiratory distress, 3 signifies mild distress, scores 4 to 6 indicate moderate distress, and scores greater than 7 signify severe distress  [email or web address]/[email or web address]/[email or web address]-fbcowjplbfj-thpxyhwc-gcxkymvjmtm-bxxzl.pdf    Anxiety:                             [ ]None[ ]Mild [ ]Moderate [ ]Severe   Fatigue:                             [ ]None[ ]Mild [ ]Moderate [ ]Severe   Nausea:                             [ ]None[ ]Mild [ ]Moderate [ ]Severe   Loss of appetite:              [ ]None[ ]Mild [ ]Moderate [ ]Severe   Constipation:                    [ ]None[ ]Mild [ ]Moderate [ ]Severe    Other Symptoms:      Palliative Performance Status Version 2:         %    http://[facility].org/files/news/palliative_performance_scale_ppsv2.pdf    PHYSICAL EXAM:  ICU Vital Signs Last 24 Hrs  T(C): 37.2 (12 Dec 2024 14:43), Max: 37.2 (12 Dec 2024 14:43)  T(F): 98.9 (12 Dec 2024 14:43), Max: 98.9 (12 Dec 2024 14:43)  HR: 98 (12 Dec 2024 14:43) (75 - 107)  BP: 104/62 (12 Dec 2024 14:43) (104/62 - 116/69)  RR: 18 (12 Dec 2024 14:43) (18 - 18)  SpO2: 96% (12 Dec 2024 14:43) (95% - 96%)    GENERAL:  [X ] No acute distress [ ]Lethargic  [ ]Unarousable  [X ]Verbal  [ ]Non-Verbal [ ]Cachexia    BEHAVIORAL/PSYCH:  [ ]Alert and Oriented x  [ ] Anxiety [ ] Delirium [ ] Agitation [X ] Calm   EYES: [ ] No scleral icterus [ ] Scleral icterus [ ] Closed  ENMT:  [ ]Dry mouth  [ ]No external oral lesions [ X] No external ear or nose lesions  CARDIOVASCULAR:  [ ]Regular [ ]Irregular [ ]Tachy [ ]Not Tachy  [ ]William [ ] Edema [ ] No edema  PULMONARY:  [ ]Tachypnea  [ ]Audible excessive secretions [X ] No labored breathing [ ] labored breathing  GASTROINTESTINAL: [ ]Soft  [ ]Distended  [ X]Not distended [ ]Non tender [ ]Tender  MUSCULOSKELETAL: [X ]No clubbing [ ] clubbing  [ X] No cyanosis [ ] cyanosis  NEUROLOGIC: [ ]No focal deficits  [ ]Follows commands  [ ]Does not follow commands  [X ]Cognitive impairment  [ ]Dysphagia  [ ]Dysarthria  [ ]Paresis   SKIN: [ ] Jaundiced [X ] Non-jaundiced [ ]Rash [ ]No Rash [ ] Warm [ ] Dry  MISC/LINES: [ ] ET tube [ ] Trach [ ]NGT/OGT [ ]PEG [ ]Walden      LABS: reviewed by me, notable for: elevated WBC    12-12    139  |  103  |  36[H]  ----------------------------<  197[H]  4.5   |  24  |  1.4    Ca    8.5      12 Dec 2024 07:27  Phos  4.0     12-11  Mg     1.8     12-11                        8.9    14.62 )-----------( 432      ( 12 Dec 2024 07:27 )             29.2     IMAGING: Reviewed by me  < from: Xray Chest 1 View-PORTABLE IMMEDIATE (Xray Chest 1 View-PORTABLE IMMEDIATE .) (12.10.24 @ 02:31) >    IMPRESSION:    Unchanged right sided opacities, better characterized on recent CTA chest.    --- End of Report ---    < end of copied text >      REFERRALS:   [ ]Chaplaincy  [ ]Hospice  [ ]Child Life  [ ]Social Work  [ ]Case management [ ]Holistic Therapy     Palliative care education provided to patient and/or family

## 2024-12-12 NOTE — PROGRESS NOTE ADULT - SUBJECTIVE AND OBJECTIVE BOX
JENNYFERCLARI  84y, Male  Allergy: penicillin (Unknown)      LOS  16d    CHIEF COMPLAINT: Melena (12 Dec 2024 09:21)      INTERVAL EVENTS/HPI  - No acute events overnight  - T(F): , Max: 98.9 (12-12-24 @ 14:43)  - Denies any worsening symptoms  - Tolerating medication  - WBC Count: 14.62 (12-12-24 @ 07:27) downtrending   WBC Count: 16.63 (12-11-24 @ 22:26)     - Creatinine: 1.4 (12-12-24 @ 07:27)  Creatinine: 1.2 (12-11-24 @ 08:40)       ROS  General: Denies rigors, nightsweats  HEENT: Denies headache, rhinorrhea, sore throat, eye pain  CV: Denies CP, palpitations  PULM: Denies wheezing, hemoptysis  GI: Denies hematemesis, hematochezia, melena  : Denies discharge, hematuria  MSK: Denies arthralgias, myalgias  SKIN: Denies rash, lesions  NEURO: Denies paresthesias, weakness  PSYCH: Denies depression, anxiety    VITALS:  T(F): 98.9, Max: 98.9 (12-12-24 @ 14:43)  HR: 98  BP: 104/62  RR: 18Vital Signs Last 24 Hrs  T(C): 37.2 (12 Dec 2024 14:43), Max: 37.2 (12 Dec 2024 14:43)  T(F): 98.9 (12 Dec 2024 14:43), Max: 98.9 (12 Dec 2024 14:43)  HR: 98 (12 Dec 2024 14:43) (75 - 107)  BP: 104/62 (12 Dec 2024 14:43) (104/62 - 116/69)  BP(mean): --  RR: 18 (12 Dec 2024 14:43) (18 - 18)  SpO2: 96% (12 Dec 2024 14:43) (95% - 96%)        PHYSICAL EXAM:  Gen: NAD, resting in bed chronically ill appearing   HEENT: Normocephalic, atraumatic  Neck: supple, no lymphadenopathy  CV: Regular rate & regular rhythm  Lungs: decreased BS at bases, no fremitus rhonchi  laws  Abdomen: Soft, BS present  Ext: Warm, well perfused  Neuro: non focal, awake  Skin: no rash, no erythema  Lines: no phlebitis    FH: Non-contributory  Social Hx: Non-contributory    TESTS & MEASUREMENTS:                        8.9    14.62 )-----------( 432      ( 12 Dec 2024 07:27 )             29.2     12-12    139  |  103  |  36[H]  ----------------------------<  197[H]  4.5   |  24  |  1.4    Ca    8.5      12 Dec 2024 07:27  Phos  4.0     12-11  Mg     1.8     12-11          Urinalysis Basic - ( 12 Dec 2024 07:27 )    Color: x / Appearance: x / SG: x / pH: x  Gluc: 197 mg/dL / Ketone: x  / Bili: x / Urobili: x   Blood: x / Protein: x / Nitrite: x   Leuk Esterase: x / RBC: x / WBC x   Sq Epi: x / Non Sq Epi: x / Bacteria: x        Culture - Body Fluid with Gram Stain (collected 12-10-24 @ 13:23)  Source: Pleural Fl  Gram Stain (12-11-24 @ 03:14):    polymorphonuclear leukocytes seen    No organisms seen    by cytocentrifuge  Preliminary Report (12-11-24 @ 20:05):    No growth    Culture - Urine (collected 12-09-24 @ 18:22)  Source: Catheterized Catheterized  Final Report (12-10-24 @ 23:21):    <10,000 CFU/mL Normal Urogenital Stefany    Culture - Blood (collected 12-09-24 @ 17:44)  Source: .Blood BLOOD  Preliminary Report (12-11-24 @ 23:08):    No growth at 48 Hours    Culture - Blood (collected 12-04-24 @ 07:43)  Source: .Blood BLOOD  Final Report (12-09-24 @ 17:00):    No growth at 5 days    Culture - Blood (collected 12-01-24 @ 16:00)  Source: .Blood BLOOD  Gram Stain (12-02-24 @ 11:08):    Growth in aerobic and anaerobic bottles: Gram Negative Rods  Final Report (12-04-24 @ 09:46):    Growth in aerobic and anaerobic bottles: Proteus mirabilis ESBL    Direct identification is available within approximately 3-5    hours either by Blood Panel Multiplexed PCR or Direct    MALDI-TOF. Details: [email or web address]  Organism: Blood Culture PCR  Proteus mirabilis ESBL (12-04-24 @ 09:46)  Organism: Proteus mirabilis ESBL (12-04-24 @ 09:46)      Method Type: JOSE J      -  Ampicillin: R >16 These ampicillin results predict results for amoxicillin      -  Ampicillin/Sulbactam: R 8/4      -  Aztreonam: R <=4      -  Cefazolin: R >16      -  Cefepime: R >16      -  Ceftriaxone: R >32      -  Ciprofloxacin: R >2      -  Ertapenem: S <=0.5      -  Gentamicin: R 8      -  Levofloxacin: R >4      -  Meropenem: S <=1      -  Piperacillin/Tazobactam: R <=8      -  Tobramycin: I 4      -  Trimethoprim/Sulfamethoxazole: R >2/38  Organism: Blood Culture PCR (12-04-24 @ 09:46)      Method Type: PCR      -  Proteus species: Detec      -  ESBL: Detec      -  CTX-M Resistance Marker: Detec    Culture - Blood (collected 12-01-24 @ 16:00)  Source: .Blood BLOOD  Gram Stain (12-02-24 @ 11:40):    Growth in aerobic bottle: Gram Negative Rods    Growth in anaerobic bottle: Gram Negative Rods  Final Report (12-04-24 @ 09:47):    Growth in aerobic and anaerobic bottles: Proteus mirabilis ESBL    See previous culture [id number]    Culture - Urine (collected 12-01-24 @ 15:13)  Source: Catheterized Catheterized  Final Report (12-03-24 @ 18:28):    >100,000 CFU/ml Proteus mirabilis ESBL    Unable to evaluate further due to Proteus overgrowth  Organism: Proteus mirabilis ESBL (12-03-24 @ 18:28)  Organism: Proteus mirabilis ESBL (12-03-24 @ 18:28)      Method Type: JOSE J      -  Ampicillin: R >16 These ampicillin results predict results for amoxicillin      -  Ampicillin/Sulbactam: S 8/4      -  Aztreonam: R <=4      -  Cefazolin: R >16 For uncomplicated UTI with K. pneumoniae, E. coli, or P. mirablis: JOSE J <=16 is sensitive and JOSE J >=32 is resistant. This also predicts results for oral agents cefaclor, cefdinir, cefpodoxime, cefprozil, cefuroxime axetil, cephalexin and locarbef for uncomplicated UTI. Note that some isolates may be susceptible to these agents while testing resistant to cefazolin.      -  Cefepime: R 4      -  Ceftriaxone: R 8      -  Cefuroxime: R >16      -  Ciprofloxacin: R >2      -  Ertapenem: S <=0.5      -  Gentamicin: I 4      -  Levofloxacin: R >4      -  Meropenem: S <=1      -  Nitrofurantoin: R >64 Should not be used to treat pyelonephritis      -  Piperacillin/Tazobactam: S <=8      -  Tobramycin: S <=2      -  Trimethoprim/Sulfamethoxazole: R >2/38            INFECTIOUS DISEASES TESTING  Rapid RVP Result: NotDetec (12-09-24 @ 19:11)  MRSA PCR Result.: Negative (12-09-24 @ 15:50)  Procalcitonin: 1.59 (12-01-24 @ 16:00)      INFLAMMATORY MARKERS      RADIOLOGY & ADDITIONAL TESTS:  I have personally reviewed the last available Chest xray  CXR      CT      CARDIOLOGY TESTING  12 Lead ECG:   Ventricular Rate 106 BPM    Atrial Rate 106 BPM    P-R Interval 120 ms    QRS Duration 90 ms    Q-T Interval 342 ms    QTC Calculation(Bazett) 454 ms    P Axis 45 degrees    R Axis 43 degrees    T Axis 172 degrees    Diagnosis Line Sinus tachycardia with Premature atrial complexes  Inferior infarct , age undetermined  Abnormal ECG    Confirmed by Cesar Hong ([id number]) on 12/9/2024 4:17:18 PM (12-08-24 @ 23:10)      MEDICATIONS  aspirin  chewable 81 Oral daily  atorvastatin 20 Oral at bedtime  chlorhexidine 2% Cloths 1 Topical <User Schedule>  dextrose 5%. 1000 IV Continuous <Continuous>  dextrose 5%. 1000 IV Continuous <Continuous>  dextrose 50% Injectable 25 IV Push once  dextrose 50% Injectable 12.5 IV Push once  dextrose 50% Injectable 25 IV Push once  ferrous    sulfate 325 Oral daily  gabapentin 300 Oral three times a day  glucagon  Injectable 1 IntraMuscular once  insulin glargine Injectable (LANTUS) 5 SubCutaneous every morning  insulin lispro (ADMELOG) corrective regimen sliding scale  SubCutaneous three times a day before meals  lactated ringers. 1000 IV Continuous <Continuous>  melatonin 3 Oral at bedtime  pantoprazole    Tablet 40 Oral every 12 hours  polyethylene glycol 3350 17 Oral daily  scopolamine 1 mG/72 Hr(s) Patch 1 Transdermal every 72 hours  senna 2 Oral at bedtime  tamsulosin 0.4 Oral at bedtime      WEIGHT  Weight (kg): 56.9 (12-04-24 @ 17:17)  Creatinine: 1.4 mg/dL (12-12-24 @ 07:27)      ANTIBIOTICS:      All available historical records have been reviewed

## 2024-12-12 NOTE — PROGRESS NOTE ADULT - ASSESSMENT
83 y/o man w PMHx of HLD, DM, CAD s/p stents x2 in 2007, pt of Dr Avila, h/o asbestosis of lung, dx w malignant mesothelioma ~8/2024 and s/p VATS pleurodesis, R sided PleurX, h/o AVM/GIB, JASMIN, referred from NH for anemia to 6.5, in ED found to have Hb 7.1, CHINYERE +ve for melena.     # Possible UGI bleed  resolved  # Acute on chronic anemia  with JASMIN   Hx Angioectasia of duodenum  -GI on board   -Trend H&H   tfx to keep hb >7    -s/p egd/colonoscopy >showed AVM ,s/p cauterization   follow cbc  daily  s/p venofer 200 mg (total 2 doses)        #bacteremia with ESBL /sepsis ,  on abx as per ID      # Malignant mesothelioma (diagnosed 8/2024 ), Stable  # Hx Asbestos lung     Not a candidate for any aggressive Mn from hem/onc stand point given his overall poor PS  Had a detail discussion with son   family opted for comfort care only   palliative care on board  for [facility] discussion   hospice care eval    cont other supportive care  overall prognosis is poor  DNR/DNI is  in effect   will f/u

## 2024-12-12 NOTE — PROGRESS NOTE ADULT - SUBJECTIVE AND OBJECTIVE BOX
Patient seen and examined. Events over the last 24 hours noted.   Pt somnolent, appears comfortable    T(F): 98.9 (12-12-24 @ 14:43), Max: 98.9 (12-12-24 @ 14:43)  HR: 98 (12-12-24 @ 14:43)  BP: 104/62 (12-12-24 @ 14:43)  RR: 18 (12-12-24 @ 14:43)  SpO2: 96% (12-12-24 @ 14:43) (95% - 96%)    PHYSICAL EXAM:  GEN: No acute distress  HEENT: normocephalic, atraumatic, anicteric  LUNGS: b/l breath sounds present, clear to auscultation bilaterally   HEART: S1/S2 present. RRR  ABD: Soft, non-tender, non-distended. Bowel sounds present  EXT: warm   NEURO: awake, no new focal deficits    LABS  12-12    139  |  103  |  36[H]  ----------------------------<  197[H]  4.5   |  24  |  1.4    Ca    8.5      12 Dec 2024 07:27  Phos  4.0     12-11  Mg     1.8     12-11                            8.9    14.62 )-----------( 432      ( 12 Dec 2024 07:27 )             29.2            MEDICATIONS  (STANDING):  aspirin  chewable 81 milliGRAM(s) Oral daily  atorvastatin 20 milliGRAM(s) Oral at bedtime  chlorhexidine 2% Cloths 1 Application(s) Topical <User Schedule>  dextrose 5%. 1000 milliLiter(s) (100 mL/Hr) IV Continuous <Continuous>  dextrose 5%. 1000 milliLiter(s) (50 mL/Hr) IV Continuous <Continuous>  dextrose 50% Injectable 25 Gram(s) IV Push once  dextrose 50% Injectable 12.5 Gram(s) IV Push once  dextrose 50% Injectable 25 Gram(s) IV Push once  ferrous    sulfate 325 milliGRAM(s) Oral daily  gabapentin 300 milliGRAM(s) Oral three times a day  glucagon  Injectable 1 milliGRAM(s) IntraMuscular once  insulin glargine Injectable (LANTUS) 5 Unit(s) SubCutaneous every morning  insulin lispro (ADMELOG) corrective regimen sliding scale   SubCutaneous three times a day before meals  lactated ringers. 1000 milliLiter(s) (50 mL/Hr) IV Continuous <Continuous>  melatonin 3 milliGRAM(s) Oral at bedtime  pantoprazole    Tablet 40 milliGRAM(s) Oral every 12 hours  polyethylene glycol 3350 17 Gram(s) Oral daily  scopolamine 1 mG/72 Hr(s) Patch 1 Patch Transdermal every 72 hours  senna 2 Tablet(s) Oral at bedtime  tamsulosin 0.4 milliGRAM(s) Oral at bedtime    MEDICATIONS  (PRN):  acetaminophen     Tablet .. 975 milliGRAM(s) Oral every 8 hours PRN Mild Pain (1 - 3)  albuterol/ipratropium for Nebulization 3 milliLiter(s) Nebulizer every 6 hours PRN Shortness of Breath  dextrose Oral Gel 15 Gram(s) Oral once PRN Blood Glucose LESS THAN 70 milliGRAM(s)/deciliter  sodium chloride 3%  Inhalation 4 milliLiter(s) Inhalation every 12 hours PRN secretions

## 2024-12-12 NOTE — DISCHARGE NOTE PROVIDER - NSDCMRMEDTOKEN_GEN_ALL_CORE_FT
atorvastatin 20 mg oral tablet: 1 tab(s) orally once a day (at bedtime)  clopidogrel 75 mg oral tablet: 1 tab(s) orally once a day  ferrous sulfate 325 mg (65 mg elemental iron) oral tablet: 1 tab(s) orally once a day  gabapentin 300 mg oral tablet: 1 tab(s) orally 3 times a day  Lovenox 30 mg/0.3 mL injectable solution: 30 milligram(s) subcutaneously once a day  melatonin 3 mg oral tablet: 1 tab(s) orally once a day (at bedtime)  metFORMIN 500 mg oral tablet: 1 tab(s) orally 2 times a day  omeprazole 20 mg oral delayed release tablet: 1 tab(s) orally once a day  polyethylene glycol 3350 oral powder for reconstitution: 17 gram(s) orally once a day  senna leaf extract oral tablet: 2 tab(s) orally once a day (at bedtime)  Sutab 0.225 g-0.188 g-1.479 g oral tablet: 24 tab(s) orally once Instructions per package information (12 the night before, 12 the morning of exam)  tamsulosin 0.4 mg oral capsule: 1 cap(s) orally once a day (at bedtime)

## 2024-12-12 NOTE — PROGRESS NOTE ADULT - ASSESSMENT
ASSESSMENT  This is a 84 year old male with PMH of HLD, DM, CAD s/p stents x2 in 2007, h/o asbestosis of lung, dx w malignant mesothelioma ~8/2024 and s/p VATS pleurodesis, R sided PleurX, h/o AVM/GIB, JASMIN, referred from NH for anemia    ASSESSMENT  #Fever & Leukocytosis   Tm 12/9 100.9    12/9 Blood & Urine cxs NGTD     Pleural cx NG, not consistent with infection    12/9 UA     MRSA PCR Result.: Negative (12-09-24 @ 15:50)    12/9 CTA 1. Two small filling defects are seen within a segmental and subsegmental branch of the left lower lobe pulmonary artery. The filling defects appear thin and at adherent to the wall of the vessel indicating likely small chronic PEs. No other intraluminal filling defects are seen within the pulmonary arteries.  There is no evidence of right heart strain (RV:LV ratio <1; RV=2.7 cm and LV = 5.0 cm)  2. A right-sided pleural drainage catheter (PleurX) is noted with a moderate reduction in the right-sided pleural effusion. However there is a new loculation in the right apex.  3. Compared with the previous scan of 7/31/2024, there has been increased right sided pleural thickening and increased consolidation in the right upper, middle and lower  lobes.  #Proteus species ESBL bacteremia, suspect  source, s/p meropenem 12/2-12/9  CAUTI     UA pyuria 323; 12/1 UCX   >100,000 CFU/ml Proteus mirabilis    12/1 BCX +  R fluoro/bactrim  #Chronic Walden? From urinary retention in 7/2024.  < from: US Kidney and Bladder (11.29.24 @ 18:35) >  1.  Multiple right renal cysts, measuring up to 0.9 cm, similar to previous.  2.  Mild to moderate right hydronephrosis, previously reported as fullness.  3.  Mild to moderate left hydronephrosis, reported to be mild previously.  4.  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  #JEY / CKD  Creatinine: 1.3 mg/dL (12.10.24 @ 08:30) 41 mL/min  Creatinine clearance for normal weight patient, using ideal body weight of 68 kg (149 lbs).      RECOMMENDATIONS   - repeat procalcitonin   - monitor off antimicrobials   - Grave prognosis, GOC    If any questions, please send a message or call on [facility] Teams  Please continue to update ID with any pertinent new laboratory or radiographic findings.

## 2024-12-12 NOTE — PROCEDURE NOTE - NSPROCDETAILS_GEN_ALL_CORE
sterile technique, indwelling urinary device inserted
sterile dressing applied/sterile technique, catheter placed

## 2024-12-12 NOTE — PROGRESS NOTE ADULT - NUTRITIONAL ASSESSMENT
This patient has been assessed with a concern for Malnutrition and has been determined to have a diagnosis/diagnoses of Severe protein-calorie malnutrition.    This patient is being managed with:   Diet Clear Liquid-  Entered: Nov 30 2024 12:45PM  
This patient has been assessed with a concern for Malnutrition and has been determined to have a diagnosis/diagnoses of Severe protein-calorie malnutrition.    This patient is being managed with:   Diet Clear Liquid-  Entered: Nov 30 2024 12:45PM  
This patient has been assessed with a concern for Malnutrition and has been determined to have a diagnosis/diagnoses of Severe protein-calorie malnutrition.    This patient is being managed with:   Diet DASH/TLC-  Sodium & Cholesterol Restricted  Entered: Dec  5 2024  6:06AM    Diet NPO after Midnight-     NPO Start Date: 03-Dec-2024   NPO Start Time: 23:59  Except Medications  Entered: Dec  3 2024  5:02PM    The following pending diet order is being considered for treatment of Severe protein-calorie malnutrition:  Diet Clear Liquid-  Supplement Feeding Modality:  Oral  Ensure Clear Cans or Servings Per Day:  1       Frequency:  Three Times a day  Entered: Dec  4 2024 12:26PM    Diet Clear Liquid-  Supplement Feeding Modality:  Oral  Ensure Clear Cans or Servings Per Day:  1       Frequency:  Three Times a day  Entered: Dec  4 2024 12:25PM  
This patient has been assessed with a concern for Malnutrition and has been determined to have a diagnosis/diagnoses of Severe protein-calorie malnutrition.    This patient is being managed with:   Diet NPO after Midnight-     NPO Start Date: 03-Dec-2024   NPO Start Time: 23:59  Except Medications  Entered: Dec  3 2024  5:02PM    Diet Clear Liquid-  Entered: Dec  3 2024  5:01PM  
This patient has been assessed with a concern for Malnutrition and has been determined to have a diagnosis/diagnoses of Severe protein-calorie malnutrition.    This patient is being managed with:   Diet NPO-  NPO for Procedure/Test     NPO Start Date: 02-Dec-2024   NPO Start Time: 09:20  Except Medications  Entered: Dec  2 2024  9:24AM  
This patient has been assessed with a concern for Malnutrition and has been determined to have a diagnosis/diagnoses of Severe protein-calorie malnutrition.    This patient is being managed with:   Diet DASH/TLC-  Sodium & Cholesterol Restricted  Entered: Dec  5 2024  6:06AM    Diet NPO after Midnight-     NPO Start Date: 03-Dec-2024   NPO Start Time: 23:59  Except Medications  Entered: Dec  3 2024  5:02PM    The following pending diet order is being considered for treatment of Severe protein-calorie malnutrition:  Diet Clear Liquid-  Supplement Feeding Modality:  Oral  Ensure Clear Cans or Servings Per Day:  1       Frequency:  Three Times a day  Entered: Dec  4 2024 12:26PM    Diet Clear Liquid-  Supplement Feeding Modality:  Oral  Ensure Clear Cans or Servings Per Day:  1       Frequency:  Three Times a day  Entered: Dec  4 2024 12:25PM  
This patient has been assessed with a concern for Malnutrition and has been determined to have a diagnosis/diagnoses of Severe protein-calorie malnutrition.    This patient is being managed with:   Diet DASH/TLC-  Sodium & Cholesterol Restricted  Entered: Nov 29 2024 11:23AM  
This patient has been assessed with a concern for Malnutrition and has been determined to have a diagnosis/diagnoses of Severe protein-calorie malnutrition.    This patient is being managed with:   Diet DASH/TLC-  Sodium & Cholesterol Restricted  Entered: Dec  5 2024  6:06AM    Diet NPO after Midnight-     NPO Start Date: 03-Dec-2024   NPO Start Time: 23:59  Except Medications  Entered: Dec  3 2024  5:02PM    The following pending diet order is being considered for treatment of Severe protein-calorie malnutrition:  Diet Clear Liquid-  Supplement Feeding Modality:  Oral  Ensure Clear Cans or Servings Per Day:  1       Frequency:  Three Times a day  Entered: Dec  4 2024 12:26PM    Diet Clear Liquid-  Supplement Feeding Modality:  Oral  Ensure Clear Cans or Servings Per Day:  1       Frequency:  Three Times a day  Entered: Dec  4 2024 12:25PM  
This patient has been assessed with a concern for Malnutrition and has been determined to have a diagnosis/diagnoses of Severe protein-calorie malnutrition.    This patient is being managed with:   Diet DASH/TLC-  Sodium & Cholesterol Restricted  Entered: Nov 29 2024 11:23AM  
This patient has been assessed with a concern for Malnutrition and has been determined to have a diagnosis/diagnoses of Severe protein-calorie malnutrition.    This patient is being managed with:   Diet DASH/TLC-  Sodium & Cholesterol Restricted  Entered: Dec  5 2024  6:06AM    Diet NPO after Midnight-     NPO Start Date: 03-Dec-2024   NPO Start Time: 23:59  Except Medications  Entered: Dec  3 2024  5:02PM    The following pending diet order is being considered for treatment of Severe protein-calorie malnutrition:  Diet Clear Liquid-  Supplement Feeding Modality:  Oral  Ensure Clear Cans or Servings Per Day:  1       Frequency:  Three Times a day  Entered: Dec  4 2024 12:26PM    Diet Clear Liquid-  Supplement Feeding Modality:  Oral  Ensure Clear Cans or Servings Per Day:  1       Frequency:  Three Times a day  Entered: Dec  4 2024 12:25PM  
This patient has been assessed with a concern for Malnutrition and has been determined to have a diagnosis/diagnoses of Severe protein-calorie malnutrition.    This patient is being managed with:   Diet Clear Liquid-  Entered: Nov 30 2024 12:45PM

## 2024-12-12 NOTE — DISCHARGE NOTE PROVIDER - NSDCCPCAREPLAN_GEN_ALL_CORE_FT
PRINCIPAL DISCHARGE DIAGNOSIS  Diagnosis: Anemia  Assessment and Plan of Treatment: You were admitted to the hospital due to blood in your stool. You were given a packed of red blood cells and monitored by blood work that checked for hemoglobin.  Your hemoglobin was below normal (called a state of anemia) due to the bleeding. You had an EGD and colonscopy done and it revealed small areas in your small intestine, called arteriovascular malformations, that tend to break open and cause bleeding. You are being managed now with a blood thinner to help prevent future bleeding, called Aspirin. Please continue taking it as prescribed and follow-up with your specialists.      SECONDARY DISCHARGE DIAGNOSES  Diagnosis: Melena  Assessment and Plan of Treatment: You were admitted to the hospital due to blood in your stool. You were given a packed of red blood cells and monitored by blood work that checked for hemoglobin. You had an EGD and colonscopy done and it revealed small areas in your small intestine, called arteriovascular malformations, that tend to break open and cause bleeding. You are being managed now with a blood thinner to help prevent future bleeding, called Aspirin. Please continue taking it as prescribed and follow-up with your specialists.    Diagnosis: Mesothelioma  Assessment and Plan of Treatment: You have a history of mesothelioma and pleural effuions (fluid accumulation), for which you have a PleurX drainage. You were drained from the PleurX daily during your hospital stay. Please continue to monitor the area and keep it clean and dry. If you feel short of breath or having difficulty breathing or speaking, please call your primary care physician or specialist, or come back to the emergency department immediately.    Diagnosis: Chronic pulmonary embolism  Assessment and Plan of Treatment: During your hospital stay, a CT angio of the chest was done. It revealed 2 small emboli that appear to be chronic in nature. You are currently on aspirin as a blood thinner but as per the GI's recommendations, it is safe to not place you on any other blood thinner at the moment. If you have chest pain, shortness of breath, palpitations, or having difficulty breathing or speaking, call your primary care physician or specialist, or come back to the emergency department immediately.    Diagnosis: Diabetes mellitus  Assessment and Plan of Treatment: Continue to take your medications to mange your diabetes as prescribed. Continue to follow-up with your primary care physician regularly.    Diagnosis: CAD (coronary artery disease)  Assessment and Plan of Treatment: Continue to take your medications for your heart as prescribed. Continue to follow-up with your primary care physician regularly.    Diagnosis: S/P coronary artery stent placement  Assessment and Plan of Treatment: Continue to take your medications for your heart as prescribed. Continue to follow-up with your primary care physician regularly.

## 2024-12-12 NOTE — PROGRESS NOTE ADULT - CONVERSATION DETAILS
Spoke with family on the phone. They express concern because they understand the patient is nearing end of life, and they fear that the rehab facilities cannot handle the patient's medical issues. They don't want him constantly being re-admitted to the hospital. They are hesitant to agree to hospice care because when the patient's wife was on hospice, she was on a lot of morphine and  quickly. Reviewed hospice philosophy and that the patient only would receive medications IF needed and typically on a PRN basis to start. They are interested in comfort based end of life care but are concerned about room and board costs. They do not feel equip to take the patient home with all of his health issues.

## 2024-12-12 NOTE — DISCHARGE NOTE PROVIDER - HOSPITAL COURSE
Patient is a 84y old Male, with PMHx AVMs, CAD s/p stents x 2 (2007), asbestosis of lung, malignant mesothelioma, s/p VATS, HLD, DM, and JASMIN, arrived at the ED on 11/26 due to abnormal Hb (6.4). Pt did not have active bloody stools bedside and denied CP, Ab pain, N/V/D, dizziness, and UTI sxs. Prior EGD led to bleeding on contact but hemostasis was achieved. In the ED, Hb was 7.1, and CHINYERE positive for black stools in ED and (+) Ab screening. pRBC transfusion was deferred and pt was admitted to obtain further w/u, including iron studies to determine if iron supplementation will be needed and wad Dx'd with melena and anemia. Pt was admitted with the primary diagnosis of melena. Pt developed pleural effusions which had to be drained via the pt's PleurX on the R side of his chest. Patient is a 84y old Male, with PMHx AVMs, CAD s/p stents x 2 (2007), asbestosis of lung, malignant mesothelioma, s/p VATS, HLD, DM, and JASMIN, arrived at the ED on 11/26 due to abnormal Hb (6.4). Pt did not have active bloody stools bedside and denied CP, Ab pain, N/V/D, dizziness, and UTI sxs. Prior EGD led to bleeding on contact but hemostasis was achieved. In the ED, Hb was 7.1, and CHINYERE positive for black stools in ED and (+) Ab screening. pRBC transfusion was deferred and pt was admitted to obtain further w/u, including iron studies to determine if iron supplementation will be needed and was Dx'd with melena and anemia. Pt was admitted with the primary diagnosis of melena. EGD/colonoscopy was performed that showed multiple AVMs; 3 mm polyp of ascending colon; 3 polyps of 6-10 mm of ascending colon; 5 mmm polyp of transverse colon; and melanosis coli. ASA was resumed after Hb stabilized but Plavix d/c, as per GI. Pt has received 1 unit of pRBC 4 times during hospital course. Mild transaminitis and a small hypodensity in segment 7 of the liver seen on prior CT, and thus RUQ US was recommended but the pt refused. Pt developed pleural effusions which had to be drained via the pt's PleurX on the R side of his chest. These pleural effusions became more rapid leading to the pt have wheezing and rales on exam and thus the drainage schedule was changec from Monday and Thursdays to daily, as per pulm. Pt was drained up to 700 cc of tea-colored fluid daily that was initially red in color in the initial drainages. Pt was given IV glycopyrrolate to reduce secretions but changed to scopolamine patch. Pt developed an infection and was put on meropenem and vancomycin but d/c now as per ID after WBC stabilized and pt has been afebrile and clinically stable. CT angio was performed - due to sinus tachycardia, hypoxia on room air, and given hx pleural effusions - that showed 2 chronic emboli but not put on AC due to small AVMs of small intestine found on EGD. Pt was very somnolent 2 days ago and thus STAT ABG (normal lactate, pH, and C02 levels), CXR was done (unchanged opacities), and US LE (no DVT). But, the pt woke up from sternal rubs and became aware, thus stroke code was not called. Pt developed a stage 1 decubitus sacral wound ulcer that has been monitored and covered.    #Pulmonary Emboli  - PT has 2 emboli of segmental and sub-segmental branches of L lower lobe pulmonary artery, as per CT chest angio on 12/09/24. Pt is only on ASA, Plavix was d/c. Pt is on no other AC due to being unable to tolerate it due to multiple AVMs noted in EGD/colonscopy  - d/c Abx as per ID due to pt appearing clinically stable and being afebrile with stabilized WBC count    #recurrent, R-sided pleural effusion  #malignant mesothelioma (diagnosed 8/2024)  #hx asbestosis of lung  - up to 700 cc of tea-colored fluid drained from Pleur X daily, pt tolerates well. Pt was on daily drainage schedule  -  Pt's prognosis is overall poor and is a candidate for hospice. Heme/Onc and Palliative have been having GOC discussions with the son since pt is not a candidate for aggressive management    #gluteal and sacral wound  - wound care team evaluated and placed a foam dressing    # Hx UGI bleed (upon current admission)  # Acute on chronic anemia  # Hx Angioectasia of duodenum  - GI recs:  ---c/w ASA  ---upon d/c F/U with GI outpt or [facility]  ---start high fiber diet  ---repeat colonoscopy in 3 years    #DM Type 2  - c/w home Metformin    #HLD  #CAD s/p stents x2 in 2007  - c/w home ASA  - c/w home statin   - follows Dr Avila

## 2024-12-12 NOTE — DISCHARGE NOTE PROVIDER - DETAILS OF MALNUTRITION DIAGNOSIS/DIAGNOSES
This patient has been assessed with a concern for Malnutrition and was treated during this hospitalization for the following Nutrition diagnosis/diagnoses:     -  11/28/2024: Severe protein-calorie malnutrition

## 2024-12-12 NOTE — PROGRESS NOTE ADULT - PROBLEM/PLAN-1
Received bedside report and assumed care from LYN Guzman.   Pt resting comfortably, no current needs, universal fall and safety precautions in place, bed in lowest position and call light within reach.   Discussed POC and call light use with pt, pt agreeable to all.     
DISPLAY PLAN FREE TEXT
DISPLAY PLAN FREE TEXT

## 2024-12-13 NOTE — CHART NOTE - NSCHARTNOTESELECT_GEN_ALL_CORE
Event Note
Nutrition/Event Note
Palliative Medicine/Off Service Note
Event Note

## 2024-12-13 NOTE — PROGRESS NOTE ADULT - ASSESSMENT
ASSESSMENT  This is a 84 year old male with PMH of HLD, DM, CAD s/p stents x2 in 2007, h/o asbestosis of lung, dx w malignant mesothelioma ~8/2024 and s/p VATS pleurodesis, R sided PleurX, h/o AVM/GIB, JASMIN, referred from NH for anemia    ASSESSMENT  #Fever & Leukocytosis   Tm 12/9 100.9    12/9 Blood & Urine cxs NGTD     Pleural cx NG, not consistent with infection    12/9 UA     MRSA PCR Result.: Negative (12-09-24 @ 15:50)    12/9 CTA 1. Two small filling defects are seen within a segmental and subsegmental branch of the left lower lobe pulmonary artery. The filling defects appear thin and at adherent to the wall of the vessel indicating likely small chronic PEs. No other intraluminal filling defects are seen within the pulmonary arteries.  There is no evidence of right heart strain (RV:LV ratio <1; RV=2.7 cm and LV = 5.0 cm)  2. A right-sided pleural drainage catheter (PleurX) is noted with a moderate reduction in the right-sided pleural effusion. However there is a new loculation in the right apex.  3. Compared with the previous scan of 7/31/2024, there has been increased right sided pleural thickening and increased consolidation in the right upper, middle and lower  lobes.  #Proteus species ESBL bacteremia, suspect  source, s/p meropenem 12/2-12/9  CAUTI     UA pyuria 323; 12/1 UCX   >100,000 CFU/ml Proteus mirabilis    12/1 BCX +  R fluoro/bactrim  #Chronic Walden? From urinary retention in 7/2024.  < from: US Kidney and Bladder (11.29.24 @ 18:35) >  1.  Multiple right renal cysts, measuring up to 0.9 cm, similar to previous.  2.  Mild to moderate right hydronephrosis, previously reported as fullness.  3.  Mild to moderate left hydronephrosis, reported to be mild previously.  4.  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  #JEY / CKD  Creatinine: 1.3 mg/dL (12.10.24 @ 08:30) 41 mL/min  Creatinine clearance for normal weight patient, using ideal body weight of 68 kg (149 lbs).      RECOMMENDATIONS   - repeat procalcitonin   - Worsening R sided opacities - rule out volume overload, ok for Meropenem 1g q12h IV for now given critical illness  - D/C Vanc  - Grave prognosis, GOC    If any questions, please send a message or call on [employer]  Please continue to update ID with any pertinent new laboratory or radiographic findings.

## 2024-12-13 NOTE — PROVIDER CONTACT NOTE (OTHER) - REASON
Elevated blood glucose
Pt altered mental status
pt breathing is congested
Patient blood pressure and temperature are low

## 2024-12-13 NOTE — PROGRESS NOTE ADULT - REASON FOR ADMISSION
Tamra
Melena  mesothelioma
Tamra
Melena  Mesothelioma
Tamra

## 2024-12-13 NOTE — PROVIDER CONTACT NOTE (OTHER) - ACTION/TREATMENT ORDERED:
MD Mario ordered STAT ABG, Asiful, and recommended RN to suction pt and as well as drain chest tube
MD Mario ordered 250 ml Lactated ringer bolus, warming blanket with a target temperature of 98, and CMP, CMC. lactate, and culture blood work
None

## 2024-12-13 NOTE — PROGRESS NOTE ADULT - TIME BILLING
Direct patient care, interdisciplinary rounds
I have personally seen and examined this patient.  I have reviewed all pertinent clinical information and reviewed all relevant imaging and diagnostic studies personally.   I counseled the patient about diagnostic testing and treatment plan.   I discussed my recommendations with the primary team
I have personally seen and examined this patient.  I have reviewed all pertinent clinical information and reviewed all relevant imaging and diagnostic studies personally.   I counseled the patient about diagnostic testing and treatment plan.   I discussed my recommendations with the primary team
Direct patient care, interdisciplinary rounds
I have personally seen and examined this patient.  I have reviewed all pertinent clinical information and reviewed all relevant imaging and diagnostic studies personally.   I counseled the patient about diagnostic testing and treatment plan.   I discussed my recommendations with the primary team
I have personally seen and examined this patient.  I have reviewed all pertinent clinical information and reviewed all relevant imaging and diagnostic studies personally.   I counseled the patient about diagnostic testing and treatment plan.   I discussed my recommendations with the primary team Dr Coleman
Direct patient care, interdisciplinary rounds
50 minutes spent on total encounter; more than 50% of the visit was spent counseling and / or coordinating care by the attending physician.  The necessity of the time spent during the encounter on this date of service was due to: Coordination of care.
Direct patient care, interdisciplinary rounds
GOC discussion with family on phone, coordination of care with primary team, case management, chart review, documentation, physical exam/ROS.

## 2024-12-13 NOTE — CHART NOTE - NSCHARTNOTEFT_GEN_A_CORE
Ordered scopalamine patch for the secretions as well as hypertonic saline inhlation
Alerted by RN that patient is somnolent and difficult to arouse. Patient was assessed at bedside, was arousable to deep sternal rub, made eye contact and pulled away, however unresponsive to commands. Pupils equal and reactive, vitals stable.    Cxr showed increased opacity/right side effusion.  ABG showed pH 7.3 CO2 52, patient likely too altered for BIPAP    Later in the night, BP 87/49 MAP 63, ordered 2x 250 LR bolus, improved BP to MAP 70s    STAT labs showed WBC 13k, Cr 1.6, lactate 1.6. Cultures pending, Meropenem restarted as patient was previously ESBL Proteus bacteremic and vancomycin started.
PACU ANESTHESIA ADMISSION NOTE      Procedure:   Post op diagnosis:      ____  Intubated  TV:______       Rate: ______      FiO2: ______    __x__  Patent Airway    ____  Full return of protective reflexes    ____  Full recovery from anesthesia / back to baseline     Vitals:   T:     98      R: 12                 BP:   102/55                Sat:   99%                 P:  76      Mental Status:  __x__ Awake   _____ Alert   _____ Drowsy   _____ Sedated    Nausea/Vomiting:  __x__ NO  ______Yes,   See Post - Op Orders          Pain Scale (0-10):  _____    Treatment: ____ None    ___x_ See Post - Op/PCA Orders    Post - Operative Fluids:   ____ Oral   ___x_ See Post - Op Orders    Plan: Discharge:   ____Home       ___x__Floor     _____Critical Care    _____  Other:_________________    Comments: Uneventful intraoperative course. No anesthesia issues or complications noted.  Patient stable upon arrival to PACU. Report given to RN. Discharge when criteria met.
Palliative Medicine Fellow Note    Palliative care consult received electronically. Chart reviewed. Will plan to see patient for full consult on Monday.    Please call [phone] PRN for any questions, needs, or concerns prior to that time.    Kleber Lanza MD  Palliative Medicine Fellow  [facility]
Patient was shivering at bedside. Spiked a fever 100.9. Sent full lab work up and UA/cx. Will f/u with recent CTA reading. Started patient on vancomycin as well.
Registered Dietitian Follow-Up     Patient Profile Reviewed                           Yes [x]   No []     Nutrition History Previously Obtained        Yes [x]  No []       Pertinent Subjective Information: Pt was NPO this morning for EGD/colonoscopy. Currently on clears diet order.      Pertinent Medical Interventions:  # Possible UGI bleed  # Acute on chronic anemia  - Plan completing his bowel prep today, was NPO since midnight - for scheduled EGD/colonoscopy today, GI will F/U; pt had no blood in stool or urine overnight. 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 1000 milliLiter(s) (100 mL/Hr) IV Continuous <Continuous>  dextrose 5%. 1000 milliLiter(s) (50 mL/Hr) IV Continuous <Continuous>  dextrose 50% Injectable 25 Gram(s) IV Push once  dextrose 50% Injectable 12.5 Gram(s) IV Push once  dextrose 50% Injectable 25 Gram(s) IV Push once  ferrous    sulfate 325 milliGRAM(s) Oral daily  gabapentin 300 milliGRAM(s) Oral three times a day  glucagon  Injectable 1 milliGRAM(s) IntraMuscular once  insulin lispro (ADMELOG) corrective regimen sliding scale   SubCutaneous three times a day before meals  melatonin 3 milliGRAM(s) Oral at bedtime  meropenem  IVPB 1000 milliGRAM(s) IV Intermittent every 12 hours  meropenem  IVPB      pantoprazole    Tablet 40 milliGRAM(s) Oral every 12 hours  polyethylene glycol 3350 17 Gram(s) Oral daily  senna 2 Tablet(s) Oral at bedtime  sodium bicarbonate 650 milliGRAM(s) Oral three times a day  sodium zirconium cyclosilicate 10 Gram(s) Oral once  tamsulosin 0.4 milliGRAM(s) Oral at bedtime    MEDICATIONS  (PRN):  dextrose Oral Gel 15 Gram(s) Oral once PRN Blood Glucose LESS THAN 70 milliGRAM(s)/deciliter    Pertinent Labs: 12-04 @ 07:43: Na 145, BUN 45[H], Cr 1.3, [H], K+ 3.8, Phos --, Mg 2.0, Alk Phos 151[H], ALT/SGPT 54[H], AST/SGOT 52[H], HbA1c --  12-03 @ 22:51: Na 143, BUN 48[H], Cr 1.4, [H], K+ 4.5, Phos --, Mg 2.1, Alk Phos --, ALT/SGPT --, AST/SGOT --, HbA1c --    Finger Sticks:  POCT Blood Glucose.: 166 mg/dL (12-04 @ 12:04)  POCT Blood Glucose.: 176 mg/dL (12-04 @ 07:42)  POCT Blood Glucose.: 175 mg/dL (12-03 @ 21:16)  POCT Blood Glucose.: 190 mg/dL (12-03 @ 16:38)    Physical Findings:  - Appearance: alert/confused/disoriented to time  - GI function: WDL, last BM 12/3  - Tubes: none   - Oral/Mouth cavity: none  - Skin: ecchymosis, skin tear R butt   - Edema: none     Nutrition Requirements: adjusted   Weight Used: 56.9kg      Estimated Energy Needs    Continue []  Adjust [x]  1710-1991kcal/day (using 30-35kcal/kg-- BMI vs. PCM considered )      Estimated Protein Needs    Continue [x]  Adjust []  68-85g/day (using 1.2-1.3g/kg -- same reason as above)     Estimated Fluid Needs        Continue [x]  Adjust []  1mL/kcal/day      Nutrient Intake: NPO since midnight, currently on clears     [] Previous Nutrition Diagnosis: severe PCM             [x] Ongoing          [] Resolved     Nutrition Education: deferred     Goal/Expected Outcome: Pt to consume and tolerate >75% of meals/snacks and PO supplements in 3-5 days.      Nutrition Monitoring:diet order,energy intake,body composition,NFPF, lytes, GI (BM), BG     Recommendation:  1. Diet advancement per GI team (Goal diet: low fiber)   2. encourage and assist with PO intake   3. Add Ensure Clear 3x/day (720kcal/24g PRO)     high risk f/u   RD remains available: Analilia Mcintyre RDN [phone]
Resident on call called to bedside for change in mentation noted by the pt's granddaughter. Pt was uncooperative, agitated but lethargic. He was using all four extremities to push people away and asking to leave him alone. He was neurologically intact and AAOx2. Vital signs stable. Likely sundowning vs hospital acquired delirium. Will continue to monitor.

## 2024-12-13 NOTE — PROGRESS NOTE ADULT - SUBJECTIVE AND OBJECTIVE BOX
JENNYFER CLARI  84y, Male  Allergy: penicillin (Unknown)      LOS  17d    CHIEF COMPLAINT: Melena (12 Dec 2024 16:18)      INTERVAL EVENTS/HPI  - hypotension, worsening R sided opacities   - T(F): , Max: 98.9 (12-12-24 @ 14:43)  - Tolerating medication  - WBC Count: 13.69 (12-13-24 @ 01:30)  WBC Count: 14.62 (12-12-24 @ 07:27)     - Creatinine: 1.6 (12-13-24 @ 01:30)  Creatinine: 1.4 (12-12-24 @ 07:27)       ROS  ***    VITALS:  T(F): 96.5, Max: 98.9 (12-12-24 @ 14:43)  HR: 77  BP: 82/54  RR: 20Vital Signs Last 24 Hrs  T(C): 35.8 (13 Dec 2024 00:25), Max: 37.2 (12 Dec 2024 14:43)  T(F): 96.5 (13 Dec 2024 00:25), Max: 98.9 (12 Dec 2024 14:43)  HR: 77 (13 Dec 2024 02:39) (65 - 98)  BP: 82/54 (13 Dec 2024 02:39) (82/54 - 104/62)  BP(mean): 87 (12 Dec 2024 19:52) (87 - 87)  RR: 20 (13 Dec 2024 00:25) (18 - 20)  SpO2: 97% (13 Dec 2024 02:39) (96% - 100%)    Parameters below as of 13 Dec 2024 02:39  Patient On (Oxygen Delivery Method): nasal cannula  O2 Flow (L/min): 4      PHYSICAL EXAM:  ***    FH: Non-contributory  Social Hx: Non-contributory    TESTS & MEASUREMENTS:                        9.3    13.69 )-----------( 417      ( 13 Dec 2024 01:30 )             31.3     12-13    142  |  105  |  42[H]  ----------------------------<  175[H]  4.9   |  25  |  1.6[H]    Ca    8.9      13 Dec 2024 01:30  Phos  4.0     12-11  Mg     1.8     12-11    TPro  5.4[L]  /  Alb  2.3[L]  /  TBili  <0.2  /  DBili  x   /  AST  37  /  ALT  24  /  AlkPhos  167[H]  12-13      LIVER FUNCTIONS - ( 13 Dec 2024 01:30 )  Alb: 2.3 g/dL / Pro: 5.4 g/dL / ALK PHOS: 167 U/L / ALT: 24 U/L / AST: 37 U/L / GGT: x           Urinalysis Basic - ( 13 Dec 2024 01:30 )    Color: x / Appearance: x / SG: x / pH: x  Gluc: 175 mg/dL / Ketone: x  / Bili: x / Urobili: x   Blood: x / Protein: x / Nitrite: x   Leuk Esterase: x / RBC: x / WBC x   Sq Epi: x / Non Sq Epi: x / Bacteria: x        Culture - Body Fluid with Gram Stain (collected 12-10-24 @ 13:23)  Source: Pleural Fl  Gram Stain (12-11-24 @ 03:14):    polymorphonuclear leukocytes seen    No organisms seen    by cytocentrifuge  Preliminary Report (12-11-24 @ 20:05):    No growth    Culture - Urine (collected 12-09-24 @ 18:22)  Source: Catheterized Catheterized  Final Report (12-10-24 @ 23:21):    <10,000 CFU/mL Normal Urogenital Stefany    Culture - Blood (collected 12-09-24 @ 17:44)  Source: .Blood BLOOD  Preliminary Report (12-12-24 @ 23:01):    No growth at 72 Hours    Culture - Blood (collected 12-04-24 @ 07:43)  Source: .Blood BLOOD  Final Report (12-09-24 @ 17:00):    No growth at 5 days    Culture - Blood (collected 12-01-24 @ 16:00)  Source: .Blood BLOOD  Gram Stain (12-02-24 @ 11:08):    Growth in aerobic and anaerobic bottles: Gram Negative Rods  Final Report (12-04-24 @ 09:46):    Growth in aerobic and anaerobic bottles: Proteus mirabilis ESBL    Direct identification is available within approximately 3-5    hours either by Blood Panel Multiplexed PCR or Direct    MALDI-TOF. Details: [email or web address]  Organism: Blood Culture PCR  Proteus mirabilis ESBL (12-04-24 @ 09:46)  Organism: Proteus mirabilis ESBL (12-04-24 @ 09:46)      Method Type: JOSE J      -  Ampicillin: R >16 These ampicillin results predict results for amoxicillin      -  Ampicillin/Sulbactam: R 8/4      -  Aztreonam: R <=4      -  Cefazolin: R >16      -  Cefepime: R >16      -  Ceftriaxone: R >32      -  Ciprofloxacin: R >2      -  Ertapenem: S <=0.5      -  Gentamicin: R 8      -  Levofloxacin: R >4      -  Meropenem: S <=1      -  Piperacillin/Tazobactam: R <=8      -  Tobramycin: I 4      -  Trimethoprim/Sulfamethoxazole: R >2/38  Organism: Blood Culture PCR (12-04-24 @ 09:46)      Method Type: PCR      -  Proteus species: Detec      -  ESBL: Detec      -  CTX-M Resistance Marker: Detec    Culture - Blood (collected 12-01-24 @ 16:00)  Source: .Blood BLOOD  Gram Stain (12-02-24 @ 11:40):    Growth in aerobic bottle: Gram Negative Rods    Growth in anaerobic bottle: Gram Negative Rods  Final Report (12-04-24 @ 09:47):    Growth in aerobic and anaerobic bottles: Proteus mirabilis ESBL    See previous culture [id number]    Culture - Urine (collected 12-01-24 @ 15:13)  Source: Catheterized Catheterized  Final Report (12-03-24 @ 18:28):    >100,000 CFU/ml Proteus mirabilis ESBL    Unable to evaluate further due to Proteus overgrowth  Organism: Proteus mirabilis ESBL (12-03-24 @ 18:28)  Organism: Proteus mirabilis ESBL (12-03-24 @ 18:28)      Method Type: JOSE J      -  Ampicillin: R >16 These ampicillin results predict results for amoxicillin      -  Ampicillin/Sulbactam: S 8/4      -  Aztreonam: R <=4      -  Cefazolin: R >16 For uncomplicated UTI with K. pneumoniae, E. coli, or P. mirablis: JOSE J <=16 is sensitive and JOSE J >=32 is resistant. This also predicts results for oral agents cefaclor, cefdinir, cefpodoxime, cefprozil, cefuroxime axetil, cephalexin and locarbef for uncomplicated UTI. Note that some isolates may be susceptible to these agents while testing resistant to cefazolin.      -  Cefepime: R 4      -  Ceftriaxone: R 8      -  Cefuroxime: R >16      -  Ciprofloxacin: R >2      -  Ertapenem: S <=0.5      -  Gentamicin: I 4      -  Levofloxacin: R >4      -  Meropenem: S <=1      -  Nitrofurantoin: R >64 Should not be used to treat pyelonephritis      -  Piperacillin/Tazobactam: S <=8      -  Tobramycin: S <=2      -  Trimethoprim/Sulfamethoxazole: R >2/38        Lactate, Blood: 1.6 mmol/L (12-13-24 @ 01:30)      INFECTIOUS DISEASES TESTING  Rapid RVP Result: NotDetec (12-09-24 @ 19:11)  MRSA PCR Result.: Negative (12-09-24 @ 15:50)  Procalcitonin: 1.59 (12-01-24 @ 16:00)  strept    INFLAMMATORY MARKERS      RADIOLOGY & ADDITIONAL TESTS:  I have personally reviewed the last available Chest xray  CXR      CT      CARDIOLOGY TESTING  12 Lead ECG:   Ventricular Rate 106 BPM    Atrial Rate 106 BPM    P-R Interval 120 ms    QRS Duration 90 ms    Q-T Interval 342 ms    QTC Calculation(Bazett) 454 ms    P Axis 45 degrees    R Axis 43 degrees    T Axis 172 degrees    Diagnosis Line Sinus tachycardia with Premature atrial complexes  Inferior infarct , age undetermined  Abnormal ECG    Confirmed by Cesar Hong ([id number]) on 12/9/2024 4:17:18 PM (12-08-24 @ 23:10)      MEDICATIONS  aspirin  chewable 81 Oral daily  atorvastatin 20 Oral at bedtime  chlorhexidine 2% Cloths 1 Topical <User Schedule>  dextrose 5%. 1000 IV Continuous <Continuous>  dextrose 5%. 1000 IV Continuous <Continuous>  dextrose 50% Injectable 25 IV Push once  dextrose 50% Injectable 12.5 IV Push once  dextrose 50% Injectable 25 IV Push once  ferrous    sulfate 325 Oral daily  gabapentin 300 Oral three times a day  glucagon  Injectable 1 IntraMuscular once  insulin glargine Injectable (LANTUS) 5 SubCutaneous every morning  insulin lispro (ADMELOG) corrective regimen sliding scale  SubCutaneous three times a day before meals  lactated ringers. 250 IV Continuous <Continuous>  lactated ringers. 1000 IV Continuous <Continuous>  melatonin 3 Oral at bedtime  meropenem  IVPB 1000 IV Intermittent every 12 hours  meropenem  IVPB     pantoprazole    Tablet 40 Oral every 12 hours  polyethylene glycol 3350 17 Oral daily  scopolamine 1 mG/72 Hr(s) Patch 1 Transdermal every 72 hours  senna 2 Oral at bedtime  tamsulosin 0.4 Oral at bedtime  vancomycin  IVPB     vancomycin  IVPB 1000 IV Intermittent every 12 hours      WEIGHT  Weight (kg): 56.9 (12-04-24 @ 17:17)  Creatinine: 1.6 mg/dL (12-13-24 @ 01:30)  Creatinine: 1.4 mg/dL (12-12-24 @ 07:27)      ANTIBIOTICS:  meropenem  IVPB 1000 milliGRAM(s) IV Intermittent every 12 hours  meropenem  IVPB      vancomycin  IVPB      vancomycin  IVPB 1000 milliGRAM(s) IV Intermittent every 12 hours      All available historical records have been reviewed

## 2024-12-13 NOTE — DISCHARGE NOTE FOR THE EXPIRED PATIENT - HOSPITAL COURSE
Patient is a 84y old Male, with PMHx AVMs, CAD s/p stents x 2 (2007), asbestosis of lung, malignant mesothelioma, s/p VATS, HLD, DM, and JASMIN, arrived at the ED on 11/26 due to abnormal Hb (6.4). Pt did not have active bloody stools bedside and denied CP, Ab pain, N/V/D, dizziness, and UTI sxs. Prior EGD led to bleeding on contact but hemostasis was achieved. In the ED, Hb was 7.1, and CHINYERE positive for black stools in ED and (+) Ab screening. pRBC transfusion was deferred and pt was admitted to obtain further w/u, including iron studies to determine if iron supplementation will be needed and was Dx'd with melena and anemia. Pt was admitted with the primary diagnosis of melena. EGD/colonoscopy was performed that showed multiple AVMs; 3 mm polyp of ascending colon; 3 polyps of 6-10 mm of ascending colon; 5 mmm polyp of transverse colon; and melanosis coli. ASA was resumed after Hb stabilized but Plavix d/c, as per GI. Pt has received 1 unit of pRBC 4 times during hospital course. Mild transaminitis and a small hypodensity in segment 7 of the liver seen on prior CT, and thus RUQ US was recommended but the pt refused. Pt developed pleural effusions which had to be drained via the pt's PleurX on the R side of his chest. These pleural effusions became more rapid leading to the pt have wheezing and rales on exam and thus the drainage schedule was changec from Monday and Thursdays to daily, as per pulm. Pt was drained up to 700 cc of tea-colored fluid daily that was initially red in color in the initial drainages. Pt was given IV glycopyrrolate to reduce secretions but changed to scopolamine patch. Pt developed an infection and was put on meropenem and vancomycin but d/c now as per ID after WBC stabilized and pt has been afebrile and clinically stable. CT angio was performed - due to sinus tachycardia, hypoxia on room air, and given hx pleural effusions - that showed 2 chronic emboli but not put on AC due to small AVMs of small intestine found on EGD. Pt was very somnolent 2 days ago and thus STAT ABG (normal lactate, pH, and C02 levels), CXR was done (unchanged opacities), and US LE (no DVT). But, the pt woke up from sternal rubs and became aware, thus stroke code was not called. Pt developed a stage 1 decubitus sacral wound ulcer that has been monitored and covered.      Eventually pt during the hospital course and his family were communicated of the grave prognosis. Pt later developed pulmonary emboli and abx was discontinued since the course was done. Pt was maintained on supportive management. On 12/13 am pt developed agonal breathing and pt passed away at 7:55 am on 12/13/24. Family was notified of this death.

## 2024-12-13 NOTE — PROGRESS NOTE ADULT - PROVIDER SPECIALTY LIST ADULT
Gastroenterology
Gastroenterology
Heme/Onc
Hospitalist
Infectious Disease
Internal Medicine
Gastroenterology
Heme/Onc
Hospitalist
Hospitalist
Infectious Disease
Infectious Disease
Internal Medicine
Internal Medicine
Nephrology
Gastroenterology
Heme/Onc
Hospitalist
Internal Medicine
Internal Medicine
Gastroenterology
Heme/Onc
Internal Medicine
Nephrology
Hospitalist
Hospitalist
Infectious Disease
Internal Medicine
Palliative Care
Infectious Disease
Internal Medicine
Palliative Care

## 2024-12-13 NOTE — PROVIDER CONTACT NOTE (OTHER) - ASSESSMENT
Pt temperature was 96.5 rectally, hr 65, bp 87/49, spo2 on 4L nasal cannula was 100%
Pt would arouse only to painful stimuli such as sternal rubs temporarily before becoming somnolent again. Pt bp was 91/62, hr 68, spo2 96 on 4L nasal cannula. Pt was using accessory muscles to breath, respirations appeared labored, and patient was breathing with mouth wide open. Pt was not able to ingest anything orally such as evening medications.

## 2024-12-14 LAB
CULTURE RESULTS: SIGNIFICANT CHANGE UP
SPECIMEN SOURCE: SIGNIFICANT CHANGE UP

## 2024-12-15 LAB
CULTURE RESULTS: SIGNIFICANT CHANGE UP
SPECIMEN SOURCE: SIGNIFICANT CHANGE UP

## 2024-12-16 DIAGNOSIS — N17.9 ACUTE KIDNEY FAILURE, UNSPECIFIED: ICD-10-CM

## 2024-12-16 DIAGNOSIS — N13.6 PYONEPHROSIS: ICD-10-CM

## 2024-12-16 DIAGNOSIS — Z88.0 ALLERGY STATUS TO PENICILLIN: ICD-10-CM

## 2024-12-16 DIAGNOSIS — D50.9 IRON DEFICIENCY ANEMIA, UNSPECIFIED: ICD-10-CM

## 2024-12-16 DIAGNOSIS — I27.82 CHRONIC PULMONARY EMBOLISM: ICD-10-CM

## 2024-12-16 DIAGNOSIS — A41.59 OTHER GRAM-NEGATIVE SEPSIS: ICD-10-CM

## 2024-12-16 DIAGNOSIS — E86.1 HYPOVOLEMIA: ICD-10-CM

## 2024-12-16 DIAGNOSIS — I25.10 ATHEROSCLEROTIC HEART DISEASE OF NATIVE CORONARY ARTERY WITHOUT ANGINA PECTORIS: ICD-10-CM

## 2024-12-16 DIAGNOSIS — E11.22 TYPE 2 DIABETES MELLITUS WITH DIABETIC CHRONIC KIDNEY DISEASE: ICD-10-CM

## 2024-12-16 DIAGNOSIS — I12.9 HYPERTENSIVE CHRONIC KIDNEY DISEASE WITH STAGE 1 THROUGH STAGE 4 CHRONIC KIDNEY DISEASE, OR UNSPECIFIED CHRONIC KIDNEY DISEASE: ICD-10-CM

## 2024-12-16 DIAGNOSIS — E43 UNSPECIFIED SEVERE PROTEIN-CALORIE MALNUTRITION: ICD-10-CM

## 2024-12-16 DIAGNOSIS — F05 DELIRIUM DUE TO KNOWN PHYSIOLOGICAL CONDITION: ICD-10-CM

## 2024-12-16 DIAGNOSIS — K29.70 GASTRITIS, UNSPECIFIED, WITHOUT BLEEDING: ICD-10-CM

## 2024-12-16 DIAGNOSIS — Z79.01 LONG TERM (CURRENT) USE OF ANTICOAGULANTS: ICD-10-CM

## 2024-12-16 DIAGNOSIS — Z66 DO NOT RESUSCITATE: ICD-10-CM

## 2024-12-16 DIAGNOSIS — N18.2 CHRONIC KIDNEY DISEASE, STAGE 2 (MILD): ICD-10-CM

## 2024-12-16 DIAGNOSIS — L89.152 PRESSURE ULCER OF SACRAL REGION, STAGE 2: ICD-10-CM

## 2024-12-16 DIAGNOSIS — C45.7 MESOTHELIOMA OF OTHER SITES: ICD-10-CM

## 2024-12-16 DIAGNOSIS — Z79.02 LONG TERM (CURRENT) USE OF ANTITHROMBOTICS/ANTIPLATELETS: ICD-10-CM

## 2024-12-16 DIAGNOSIS — Z96.0 PRESENCE OF UROGENITAL IMPLANTS: ICD-10-CM

## 2024-12-16 DIAGNOSIS — F17.210 NICOTINE DEPENDENCE, CIGARETTES, UNCOMPLICATED: ICD-10-CM

## 2024-12-16 DIAGNOSIS — D62 ACUTE POSTHEMORRHAGIC ANEMIA: ICD-10-CM

## 2024-12-16 DIAGNOSIS — Z95.5 PRESENCE OF CORONARY ANGIOPLASTY IMPLANT AND GRAFT: ICD-10-CM

## 2024-12-16 DIAGNOSIS — K63.5 POLYP OF COLON: ICD-10-CM

## 2024-12-16 DIAGNOSIS — Z16.12 EXTENDED SPECTRUM BETA LACTAMASE (ESBL) RESISTANCE: ICD-10-CM

## 2024-12-16 DIAGNOSIS — K55.21 ANGIODYSPLASIA OF COLON WITH HEMORRHAGE: ICD-10-CM

## 2024-12-16 DIAGNOSIS — D84.81 IMMUNODEFICIENCY DUE TO CONDITIONS CLASSIFIED ELSEWHERE: ICD-10-CM

## 2024-12-16 DIAGNOSIS — E78.5 HYPERLIPIDEMIA, UNSPECIFIED: ICD-10-CM

## 2024-12-16 DIAGNOSIS — Z79.84 LONG TERM (CURRENT) USE OF ORAL HYPOGLYCEMIC DRUGS: ICD-10-CM

## 2024-12-16 DIAGNOSIS — J90 PLEURAL EFFUSION, NOT ELSEWHERE CLASSIFIED: ICD-10-CM

## 2024-12-16 DIAGNOSIS — I25.2 OLD MYOCARDIAL INFARCTION: ICD-10-CM

## 2024-12-16 DIAGNOSIS — E87.20 ACIDOSIS, UNSPECIFIED: ICD-10-CM

## 2024-12-18 LAB
CULTURE RESULTS: SIGNIFICANT CHANGE UP
SPECIMEN SOURCE: SIGNIFICANT CHANGE UP

## 2025-01-02 NOTE — PHYSICAL THERAPY INITIAL EVALUATION ADULT - NUMBER OF STAIRS, REHAB EVAL
Continued Stay SW/CM Assessment/Plan of Care Note       Active Substitute Decision Maker (SDM)    There are no active Substitute Decision Maker (SDM) on file.       Progress note:  -CM left a VM to [facility]  to follow up if auth has been started.     -Received a response from [facility] via [facility]:   \"Response: Yes, willing to accept patient  Comments: THANKS \" AVS updated.     DCP: [facility] pending ins auth and primary team clearance, per thoracic surgery, pt is cleared on their standpoint.    Back up: Home with [facility]    4:12 PM  Per primary team (Dr. Sherman team) pt is cleared for discharge, informed that per [facility] Radha auth is still pending at this time, MD made aware.    4:12 PM  Received a call from Radha, [facility] admissions who received a response from insurance, reports they intend to deny, requested a peer to peer with PMR. Per Radha, she reached out to PMR who reports that he now recs pt to go home with HH. Secure chat sent to attending MD, she stated understanding. Per MD, plan home with [facility] today, pt has the FWW. [facility] informed via [facility]IN.     CM spoke with pt at the bedside, updated, informed insurance denied auth for AIR and per MD, discharge today with [facility], pt verbalized understanding and reports that his neighbors is able to assist him upon discharge. His neighbor who is coming to visit will transport him home. Pt verified that he has the walker at home. Discussed tub transfer bench per therapy rec, pt reports he can self purchase, pt reports he owns a shower chair.     Discussed with RN who reports discharge today is pending medical clearance from attending MD.      See SW/CM flowsheets for other objective data.    Disposition Recommendations:  Preliminary discharge destination:    SW/CM recommendation for discharge: Home, Home therapy    Destination Pharmacy:        Discharge Plan/Needs:     Continued Care and Services - Admitted Since 12/27/2024       Home Medical Care  Coordination complete.      Service Provider Request Status Selected Services Address Phone Fax    [facility]  Selected Home Health Services [address] [facility] [id number] [phone]                  Prior To Hospitalization:    Living Situation: Alone and residing at House    .  Support Systems: Family members, Friends   Home Devices/Equipment: None            Mobility Assist Devices: Standard walker   Type of Service Prior to Hospitalization: None               Patient/Family discharge goal (s):  Home     Resources provided:           Prior Function  Bed Mobility: Independent (12/30/24 1105 : Donita Hooper, PT)  Transfers: Independent (12/30/24 1105 : Tiffany Hoopervia, PT)  Ambulation in the Home: Independent (12/30/24 1105 : Ashanti Hoopera, PT)  Ambulation in the Community: Independent (12/30/24 1105 : Ashanti Hoopera, PT)    Current Function  Last Filed Values         Value Time User    Current Function  slightly below baseline level of function 12/30/2024 11:33 AM Donita Hooper, PT    Therapy Impairments  activity tolerance; balance 12/30/2024 11:33 AM Donita Hooper, PT    ADLs Requiring Support  bed mobility; transfers; ambulation; stairs 12/30/2024 11:33 AM Donita Hooper, PT            Therapy Recommendations for Discharge:   PT:      Last Filed Values         Value Time User    PT Discharge Needs  therapy 1-3 times per week 12/30/2024 11:33 AM Donita Hooper, PT          OT:       Last Filed Values         Value Time User    OT Discharge Needs  therapy 1-3 times per week 12/30/2024  3:32 PM Kadie Alegre, OTR/L            Mobility Equipment Recommended for Discharge: pt owns [facility]       9

## 2025-05-30 NOTE — PROGRESS NOTE ADULT - SUBJECTIVE AND OBJECTIVE BOX
-follows urology   -continues flomax   -recently had ferrell cath removed          Progress Note:  Provider Speciality                            Hospitalist      CLARI BURGER MRN-[id number] 84y Male     CHIEF PRESENTING COMPLAINT:  Patient is a 84y old  Male who presents with a chief complaint of Acute on chronic anemia (25 Oct 2024 19:13)        SUBJECTIVE:  Patient was seen and examined at bedside.  No new complaints described by patient in morning rounds.   No significant overnight events reported.     HISTORY OF PRESENTING ILLNESS:  HPI:  HPI: 85 y/o man  PMHx of HLD, DM, CAD s/p stents x2 in 2007, pt of Dr Avila, hx  asbestosis of lung, diagnosed w malignant mesothelioma (8/2024) and s/p VATS pleurodesis, R sided PleurX, hx AVM/GIB and JASMIN, sent in by NH for low hemoglobin. Per NH, patient also refused Pleurx drainage today and is requesting to drain it inpatient (gets drained 2-3 times per week). Of note, patient had a recent admission in September for anemia for which he received 2 units prbc, IV Venofer and was recommended EGD/[place] but given his advance age and co morbidities decided to hold off. Patient is a poor historian. Uses wheelchair at baseline. Denies fever chills, shortness of breath, cough, chest pain, leg swelling, hematemesis, hematochezia.     Vital Signs Last 24 Hrs  T(C): 36.4 (15 Oct 2024 01:09), Max: 36.9 (14 Oct 2024 22:51)  T(F): 97.5 (15 Oct 2024 01:09), Max: 98.4 (14 Oct 2024 22:51)  HR: 100 (15 Oct 2024 01:09) (89 - 105)  BP: 132/74 (15 Oct 2024 01:09) (104/64 - 132/74)  RR: 18 (15 Oct 2024 01:09) (17 - 19)  SpO2: 97% (15 Oct 2024 01:09) (93% - 97%)    Parameters below as of 15 Oct 2024 01:09  Patient On (Oxygen Delivery Method): room air    Labs: Hgb 6.4 , Platelets 500 , Cr 1.2 (baseline)   CXR: Worsening R pleural effusion   EKG: NSR with PACs    Admitted for anemia      (15 Oct 2024 01:16)        REVIEW OF SYSTEMS:   No new complaints described by patient in morning rounds.       PAST MEDICAL & SURGICAL HISTORY:  PAST MEDICAL & SURGICAL HISTORY:  DM (diabetes mellitus)      MI (myocardial infarction)      History of CAD (coronary artery disease)      Dyslipidemia      Currently smokes tobacco      Mesothelioma, malignant      Coronary stent patent              VITAL SIGNS:  Vital Signs Last 24 Hrs  T(C): 36.4 (27 Oct 2024 04:47), Max: 36.4 (26 Oct 2024 13:15)  T(F): 97.6 (27 Oct 2024 04:47), Max: 97.6 (27 Oct 2024 04:47)  HR: 78 (27 Oct 2024 04:47) (69 - 79)  BP: 121/70 (27 Oct 2024 04:47) (121/70 - 134/78)  BP(mean): 87 (27 Oct 2024 04:47) (87 - 97)  RR: 18 (27 Oct 2024 04:47) (16 - 18)  SpO2: 96% (27 Oct 2024 04:47) (96% - 98%)    Parameters below as of 27 Oct 2024 04:47  Patient On (Oxygen Delivery Method): room air              PHYSICAL EXAMINATION:  Not in acute distress,cachectic  General: No icterus  HEENT:   no JVD.  Heart: S1+S2 audible  Lungs:Rt sided ant pleurx cath ,  bilateral  moderate air entry, no wheezing, no crepitations.  Abdomen: Soft, non-tender, non-distended , no  rigidity or guarding.  CNS: , AAOx2, baseline dementia  Extremities:  No edema            CONSULTS:  Consultant(s) Notes Reviewed by me.   Care Discussed with Consultants/Other Providers where required.    All the images and labs were reviewed today        MEDICATIONS:  MEDICATIONS  (STANDING):  aspirin  chewable 81 milliGRAM(s) Oral daily  atorvastatin 10 milliGRAM(s) Oral at bedtime  chlorhexidine 2% Cloths 1 Application(s) Topical <User Schedule>  chlorhexidine 2% Cloths 1 Application(s) Topical daily  dextrose 5% + sodium chloride 0.45%. 1000 milliLiter(s) (75 mL/Hr) IV Continuous <Continuous>  dextrose 5%. 1000 milliLiter(s) (50 mL/Hr) IV Continuous <Continuous>  dextrose 5%. 1000 milliLiter(s) (100 mL/Hr) IV Continuous <Continuous>  dextrose 50% Injectable 12.5 Gram(s) IV Push once  dextrose 50% Injectable 25 Gram(s) IV Push once  dextrose 50% Injectable 25 Gram(s) IV Push once  ferrous    sulfate 325 milliGRAM(s) Oral daily  gabapentin 300 milliGRAM(s) Oral three times a day  glucagon  Injectable 1 milliGRAM(s) IntraMuscular once  insulin lispro (ADMELOG) corrective regimen sliding scale   SubCutaneous three times a day before meals  melatonin 3 milliGRAM(s) Oral at bedtime  pantoprazole  Injectable 40 milliGRAM(s) IV Push every 12 hours  polyethylene glycol 3350 17 Gram(s) Oral daily  senna 2 Tablet(s) Oral at bedtime  tamsulosin 0.4 milliGRAM(s) Oral at bedtime    MEDICATIONS  (PRN):  acetaminophen     Tablet .. 650 milliGRAM(s) Oral every 6 hours PRN Mild Pain (1 - 3)  dextrose Oral Gel 15 Gram(s) Oral once PRN Blood Glucose LESS THAN 70 milliGRAM(s)/deciliter  oxycodone    5 mG/acetaminophen 325 mG 1 Tablet(s) Oral every 6 hours PRN Moderate Pain (4 - 6)

## 2025-07-11 NOTE — ED PROVIDER NOTE - ATTENDING CONTRIBUTION TO CARE
Call placed, no answer, left voicemail and will sent [employer] message.  Order placed.  Appt scheduled.   83 yo M pmhx CAD s/p stents, DM, HLD, anemia, (+) smoker, admitted Aug 2024, found to have pleural effusions, s/p Pleurx, s/p VATS, bx (+) for lung neoplasm, presents to ED from [facility] for eval of low hemoglobin. Pt denies rectal bleeding, hematuria, vomiting blood. Reports generalized weakness. No CP or SOB.    CONSTITUTIONAL: NAD.   SKIN: warm, dry, pale   HEAD: Normocephalic; atraumatic.  ENT: MMM.   NECK: Supple.  CARD: RRR.   RESP: No wheezes, rales or rhonchi. R sided Pleurx   ABD: soft ntnd.   RECTAL: negative for blood   EXT: Normal ROM.  No lower extremity edema.   NEURO: AAOX3. follows commands. no FND.

## 2025-07-23 NOTE — PROGRESS NOTE ADULT - SUBJECTIVE AND OBJECTIVE BOX
HPI:  85 y/o man w PMHx of HLD, DM, CAD s/p stents x2 in 2007, pt of Dr Avila, h/o asbestosis of lung, dx w malignant mesothelioma ~8/2024 and s/p VATS pleurodesis, R sided PleurX, h/o AVM/GIB, JASMIN, referred from NH for anemia to 6.5, in ED found to have Hb 7.1, CHINYERE +ve for melena according to ED notes.   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 (APC), angioectasia in the duodenal sweep. 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  Pt is seen and examined  pt is awake and lying in bed  pt seems comfortable and denies any complaints at this time disoriented  to  place  and time          ROS:  Negative except for:    MEDICATIONS  (STANDING):  aspirin  chewable 81 milliGRAM(s) Oral daily  atorvastatin 20 milliGRAM(s) Oral at bedtime  chlorhexidine 2% Cloths 1 Application(s) Topical <User Schedule>  dextrose 5%. 1000 milliLiter(s) (50 mL/Hr) IV Continuous <Continuous>  dextrose 5%. 1000 milliLiter(s) (100 mL/Hr) IV Continuous <Continuous>  dextrose 50% Injectable 25 Gram(s) IV Push once  dextrose 50% Injectable 12.5 Gram(s) IV Push once  dextrose 50% Injectable 25 Gram(s) IV Push once  ferrous    sulfate 325 milliGRAM(s) Oral daily  gabapentin 300 milliGRAM(s) Oral three times a day  glucagon  Injectable 1 milliGRAM(s) IntraMuscular once  insulin lispro (ADMELOG) corrective regimen sliding scale   SubCutaneous three times a day before meals  iron sucrose IVPB 200 milliGRAM(s) IV Intermittent every 24 hours  melatonin 3 milliGRAM(s) Oral at bedtime  pantoprazole    Tablet 40 milliGRAM(s) Oral every 12 hours  polyethylene glycol 3350 17 Gram(s) Oral daily  senna 2 Tablet(s) Oral at bedtime  sodium chloride 0.9%. 1000 milliLiter(s) (75 mL/Hr) IV Continuous <Continuous>  sodium zirconium cyclosilicate 10 Gram(s) Oral once  tamsulosin 0.4 milliGRAM(s) Oral at bedtime    MEDICATIONS  (PRN):  dextrose Oral Gel 15 Gram(s) Oral once PRN Blood Glucose LESS THAN 70 milliGRAM(s)/deciliter      Allergies    penicillin (Unknown)    Intolerances        Vital Signs Last 24 Hrs  T(C): 36.4 (30 Nov 2024 20:27), Max: 37.2 (30 Nov 2024 05:05)  T(F): 97.6 (30 Nov 2024 20:27), Max: 99 (30 Nov 2024 05:05)  HR: 103 (30 Nov 2024 20:27) (94 - 117)  BP: 109/67 (30 Nov 2024 20:27) (105/57 - 111/60)  BP(mean): 81 (30 Nov 2024 20:27) (81 - 81)  RR: 18 (30 Nov 2024 20:27) (16 - 18)  SpO2: 94% (30 Nov 2024 20:27) (94% - 94%)        PHYSICAL EXAM  General: adult in NAD  HEENT: clear oropharynx, anicteric sclera, pink conjunctiva  Neck: supple  CV: normal S1/S2 with no murmur rubs or gallops  Lungs: positive air movement b/l ant lungs,clear to auscultation, no wheezes, no rales  Abdomen: soft non-tender non-distended, no hepatosplenomegaly  Ext: no clubbing cyanosis or edema  Skin: no rashes and no petechiae  Neuro: alert and oriented X 4, no focal deficits  LABS:     Detail Level: Detailed General Sunscreen Counseling: I recommended a broad spectrum sunscreen with a SPF of 30 or higher.  I explained that SPF 30 sunscreens block approximately 97 percent of the sun's harmful rays.  Sunscreens should be applied at least 15 minutes prior to expected sun exposure and then every 2 hours after that as long as sun exposure continues. If swimming or exercising sunscreen should be reapplied every 45 minutes to an hour after getting wet or sweating.  One ounce, or the equivalent of a shot glass full of sunscreen, is adequate to protect the skin not covered by a bathing suit. I also recommended a lip balm with a sunscreen as well. Sun protective clothing can be used in lieu of sunscreen but must be worn the entire time you are exposed to the sun's rays.